# Patient Record
Sex: FEMALE | Race: BLACK OR AFRICAN AMERICAN | NOT HISPANIC OR LATINO | Employment: UNEMPLOYED | URBAN - METROPOLITAN AREA
[De-identification: names, ages, dates, MRNs, and addresses within clinical notes are randomized per-mention and may not be internally consistent; named-entity substitution may affect disease eponyms.]

---

## 2018-06-21 ENCOUNTER — HOSPITAL ENCOUNTER (EMERGENCY)
Age: 63
Discharge: HOME | End: 2018-06-22
Attending: EMERGENCY MEDICINE

## 2018-06-21 VITALS
DIASTOLIC BLOOD PRESSURE: 77 MMHG | SYSTOLIC BLOOD PRESSURE: 129 MMHG | TEMPERATURE: 98.1 F | HEIGHT: 62 IN | BODY MASS INDEX: 53.92 KG/M2 | OXYGEN SATURATION: 99 % | RESPIRATION RATE: 18 BRPM | HEART RATE: 66 BPM | WEIGHT: 293 LBS

## 2018-06-21 DIAGNOSIS — R10.9 ABDOMINAL PAIN, UNSPECIFIED ABDOMINAL LOCATION: Primary | ICD-10-CM

## 2018-06-21 LAB
ANION GAP BLD CALC-SCNC: 13 MMOL/L
APPEARANCE UR: CLEAR
BASOPHILS # BLD AUTO: 0 K/MCL (ref 0–0.3)
BASOPHILS NFR BLD AUTO: 1 %
BILIRUB UR QL STRIP: NEGATIVE
BUN BLD-MCNC: 7 MG/DL (ref 6–20)
CA-I BLD ISE-SCNC: 1.2 MMOL/L (ref 1.15–1.29)
CHLORIDE BLD-SCNC: 107 MMOL/L (ref 98–107)
CO2 BLD-SCNC: 24 MMOL/L (ref 19–24)
COLOR UR: YELLOW
CREAT BLD-MCNC: 1.1 MG/DL (ref 0.51–0.95)
CREAT BLD-MCNC: 63 MG/DL
DIFFERENTIAL METHOD BLD: ABNORMAL
EOSINOPHIL # BLD AUTO: 0.3 K/MCL (ref 0.1–0.5)
EOSINOPHIL NFR SPEC: 4 %
ERYTHROCYTE [DISTWIDTH] IN BLOOD: 19.1 % (ref 11–15)
GLUCOSE BLD-MCNC: 100 MG/DL (ref 65–99)
GLUCOSE UR STRIP-MCNC: NEGATIVE MG/DL
HCT VFR BLD CALC: 39.6 % (ref 36–46.5)
HCT VFR BLD CALC: 41 % (ref 36–46.5)
HGB BLD-MCNC: 11.5 G/DL (ref 12–15.5)
HGB BLD-MCNC: 13.9 G/DL (ref 12–15.5)
HGB UR QL STRIP: NEGATIVE
IMM GRANULOCYTES # BLD AUTO: 0 K/MCL (ref 0–0.2)
IMM GRANULOCYTES NFR BLD: 1 %
KETONES UR STRIP-MCNC: NEGATIVE MG/DL
LEUKOCYTE ESTERASE UR QL STRIP: NEGATIVE
LYMPHOCYTES # BLD MANUAL: 1.9 K/MCL (ref 1–4)
LYMPHOCYTES NFR BLD MANUAL: 28 %
MCH RBC QN AUTO: 21 PG (ref 26–34)
MCHC RBC AUTO-ENTMCNC: 29 G/DL (ref 32–36.5)
MCV RBC AUTO: 72.3 FL (ref 78–100)
MONOCYTES # BLD MANUAL: 0.5 K/MCL (ref 0.3–0.9)
MONOCYTES NFR BLD MANUAL: 8 %
NEUTROPHILS # BLD: 4 K/MCL (ref 1.8–7.7)
NEUTROPHILS NFR BLD AUTO: 58 %
NITRITE UR QL STRIP: NEGATIVE
NRBC BLD MANUAL-RTO: 0 /100 WBC
PH UR STRIP: 7 UNITS (ref 5–7)
PLATELET # BLD: 266 K/MCL (ref 140–450)
POTASSIUM BLD-SCNC: 3.6 MMOL/L (ref 3.4–5.1)
PROT UR STRIP-MCNC: NEGATIVE MG/DL
RBC # BLD: 5.48 MIL/MCL (ref 4–5.2)
SODIUM BLD-SCNC: 140 MMOL/L (ref 135–145)
SP GR UR STRIP: 1.01 (ref 1–1.03)
SPECIMEN SOURCE: NORMAL
TROPONIN I BLD-MCNC: <0.1 NG/ML
UROBILINOGEN UR STRIP-MCNC: 1 MG/DL (ref 0–1)
WBC # BLD: 6.7 K/MCL (ref 4.2–11)

## 2018-06-21 PROCEDURE — 80053 COMPREHEN METABOLIC PANEL: CPT

## 2018-06-21 PROCEDURE — 84484 ASSAY OF TROPONIN QUANT: CPT

## 2018-06-21 PROCEDURE — 36000 PLACE NEEDLE IN VEIN: CPT

## 2018-06-21 PROCEDURE — 99284 EMERGENCY DEPT VISIT MOD MDM: CPT

## 2018-06-21 PROCEDURE — 85025 COMPLETE CBC W/AUTO DIFF WBC: CPT

## 2018-06-21 PROCEDURE — 36415 COLL VENOUS BLD VENIPUNCTURE: CPT

## 2018-06-21 PROCEDURE — 83690 ASSAY OF LIPASE: CPT

## 2018-06-21 PROCEDURE — 10002807 HB RX 258: Performed by: EMERGENCY MEDICINE

## 2018-06-21 PROCEDURE — 80047 BASIC METABLC PNL IONIZED CA: CPT

## 2018-06-21 PROCEDURE — 81003 URINALYSIS AUTO W/O SCOPE: CPT

## 2018-06-21 PROCEDURE — 10002800 HB RX 250 W HCPCS: Performed by: EMERGENCY MEDICINE

## 2018-06-21 RX ADMIN — MORPHINE SULFATE 4 MG: 50 INJECTION, SOLUTION, CONCENTRATE INTRAVENOUS at 23:23

## 2018-06-21 ASSESSMENT — PAIN SCALES - GENERAL
PAINLEVEL_OUTOF10: 7
PAIN_LEVEL_AT_REST: 7
PAINLEVEL_OUTOF10: 10

## 2018-06-21 ASSESSMENT — ENCOUNTER SYMPTOMS
ABDOMINAL PAIN: 1
SHORTNESS OF BREATH: 0
NAUSEA: 0
VOMITING: 0
HEADACHES: 0
COUGH: 0
DIZZINESS: 0
FEVER: 0
DIARRHEA: 0
LIGHT-HEADEDNESS: 0
RHINORRHEA: 0
WEAKNESS: 0
CHILLS: 0
SORE THROAT: 0
DIAPHORESIS: 0

## 2018-06-22 ENCOUNTER — APPOINTMENT (OUTPATIENT)
Dept: CT IMAGING | Age: 63
End: 2018-06-22
Attending: EMERGENCY MEDICINE

## 2018-06-22 LAB
ALBUMIN SERPL-MCNC: 3.3 G/DL (ref 3.6–5.1)
ALBUMIN/GLOB SERPL: 0.8 {RATIO} (ref 1–2.4)
ALP SERPL-CCNC: 73 UNITS/L (ref 45–117)
ALT SERPL-CCNC: 21 UNITS/L
ANION GAP SERPL CALC-SCNC: 13 MMOL/L (ref 10–20)
AST SERPL-CCNC: 15 UNITS/L
BILIRUB SERPL-MCNC: 0.3 MG/DL (ref 0.2–1)
BUN SERPL-MCNC: 8 MG/DL (ref 6–20)
BUN/CREAT SERPL: 8 (ref 7–25)
CALCIUM SERPL-MCNC: 8.8 MG/DL (ref 8.4–10.2)
CHLORIDE SERPL-SCNC: 107 MMOL/L (ref 98–107)
CO2 SERPL-SCNC: 24 MMOL/L (ref 21–32)
CREAT SERPL-MCNC: 1.05 MG/DL (ref 0.51–0.95)
GLOBULIN SER-MCNC: 4.3 G/DL (ref 2–4)
GLUCOSE SERPL-MCNC: 101 MG/DL (ref 65–99)
LIPASE SERPL-CCNC: 167 UNITS/L (ref 73–393)
POTASSIUM SERPL-SCNC: 3.7 MMOL/L (ref 3.4–5.1)
PROT SERPL-MCNC: 7.6 G/DL (ref 6.4–8.2)
SODIUM SERPL-SCNC: 140 MMOL/L (ref 135–145)

## 2018-06-22 PROCEDURE — 74177 CT ABD & PELVIS W/CONTRAST: CPT | Performed by: RADIOLOGY

## 2018-06-22 PROCEDURE — 74177 CT ABD & PELVIS W/CONTRAST: CPT

## 2018-06-22 PROCEDURE — 10002807 HB RX 258: Performed by: EMERGENCY MEDICINE

## 2018-06-22 PROCEDURE — 10002805 HB CONTRAST AGENT: Performed by: EMERGENCY MEDICINE

## 2018-06-22 RX ORDER — HYOSCYAMINE SULFATE 0.125 MG
.125-.25 TABLET ORAL EVERY 6 HOURS PRN
Qty: 20 TABLET | Refills: 0 | Status: SHIPPED | OUTPATIENT
Start: 2018-06-22 | End: 2019-01-29

## 2018-06-22 RX ADMIN — SODIUM CHLORIDE 50 ML: 9 INJECTION, SOLUTION INTRAVENOUS at 01:02

## 2018-06-22 RX ADMIN — IOPAMIDOL 100 ML: 612 INJECTION, SOLUTION INTRAVENOUS at 01:02

## 2018-06-22 ASSESSMENT — PAIN SCALES - GENERAL: PAINLEVEL_OUTOF10: 5

## 2019-01-29 ENCOUNTER — HOSPITAL ENCOUNTER (INPATIENT)
Age: 64
LOS: 11 days | Discharge: HOME | DRG: 872 | End: 2019-02-09
Attending: EMERGENCY MEDICINE

## 2019-01-29 ENCOUNTER — APPOINTMENT (OUTPATIENT)
Dept: CT IMAGING | Age: 64
DRG: 872 | End: 2019-01-29
Attending: INTERNAL MEDICINE

## 2019-01-29 DIAGNOSIS — L03.311 CELLULITIS OF ABDOMINAL WALL: Primary | ICD-10-CM

## 2019-01-29 DIAGNOSIS — A41.9 SEPSIS, DUE TO UNSPECIFIED ORGANISM: ICD-10-CM

## 2019-01-29 LAB
ALBUMIN SERPL-MCNC: 3.2 G/DL (ref 3.6–5.1)
ALBUMIN/GLOB SERPL: 0.7 {RATIO} (ref 1–2.4)
ALP SERPL-CCNC: 95 UNITS/L (ref 45–117)
ALT SERPL-CCNC: 29 UNITS/L
ANION GAP BLD CALC-SCNC: 19 MMOL/L
ANION GAP SERPL CALC-SCNC: 13 MMOL/L (ref 10–20)
APPEARANCE UR: CLEAR
AST SERPL-CCNC: 26 UNITS/L
BACTERIA #/AREA URNS HPF: ABNORMAL /HPF
BASOPHILS # BLD AUTO: 0 K/MCL (ref 0–0.3)
BASOPHILS NFR BLD AUTO: 0 %
BILIRUB SERPL-MCNC: 0.9 MG/DL (ref 0.2–1)
BILIRUB UR QL STRIP: NEGATIVE
BUN BLD-MCNC: 12 MG/DL (ref 6–20)
BUN SERPL-MCNC: 13 MG/DL (ref 6–20)
BUN/CREAT SERPL: 10 (ref 7–25)
CA-I BLD ISE-SCNC: 1.12 MMOL/L (ref 1.15–1.29)
CALCIUM SERPL-MCNC: 8.5 MG/DL (ref 8.4–10.2)
CHLORIDE BLD-SCNC: 102 MMOL/L (ref 98–107)
CHLORIDE SERPL-SCNC: 105 MMOL/L (ref 98–107)
CO2 BLD-SCNC: 21 MMOL/L (ref 19–24)
CO2 SERPL-SCNC: 22 MMOL/L (ref 21–32)
COLOR UR: ABNORMAL
CREAT BLD-MCNC: 1.4 MG/DL (ref 0.51–0.95)
CREAT BLD-MCNC: 47 MG/DL
CREAT SERPL-MCNC: 1.26 MG/DL (ref 0.51–0.95)
DIFFERENTIAL METHOD BLD: ABNORMAL
EOSINOPHIL # BLD AUTO: 0 K/MCL (ref 0.1–0.5)
EOSINOPHIL NFR SPEC: 0 %
ERYTHROCYTE [DISTWIDTH] IN BLOOD: 18.4 % (ref 11–15)
GLOBULIN SER-MCNC: 4.9 G/DL (ref 2–4)
GLUCOSE BLD-MCNC: 169 MG/DL (ref 65–99)
GLUCOSE SERPL-MCNC: 161 MG/DL (ref 65–99)
GLUCOSE UR STRIP-MCNC: NEGATIVE MG/DL
HCT VFR BLD CALC: 39.8 % (ref 36–46.5)
HCT VFR BLD CALC: 44 % (ref 36–46.5)
HGB BLD-MCNC: 11.8 G/DL (ref 12–15.5)
HGB BLD-MCNC: 15 G/DL (ref 12–15.5)
HGB UR QL STRIP: NEGATIVE
HYALINE CASTS #/AREA URNS LPF: ABNORMAL /LPF (ref 0–5)
IMM GRANULOCYTES # BLD AUTO: 0.1 K/MCL (ref 0–0.2)
IMM GRANULOCYTES NFR BLD: 1 %
KETONES UR STRIP-MCNC: NEGATIVE MG/DL
LACTATE BLD-MCNC: 1.3 MMOL/L
LEUKOCYTE ESTERASE UR QL STRIP: NEGATIVE
LYMPHOCYTES # BLD MANUAL: 1.5 K/MCL (ref 1–4)
LYMPHOCYTES NFR BLD MANUAL: 10 %
MCH RBC QN AUTO: 21.3 PG (ref 26–34)
MCHC RBC AUTO-ENTMCNC: 29.6 G/DL (ref 32–36.5)
MCV RBC AUTO: 71.8 FL (ref 78–100)
MONOCYTES # BLD MANUAL: 1.5 K/MCL (ref 0.3–0.9)
MONOCYTES NFR BLD MANUAL: 10 %
NEUTROPHILS # BLD: 11.4 K/MCL (ref 1.8–7.7)
NEUTROPHILS NFR BLD AUTO: 79 %
NITRITE UR QL STRIP: NEGATIVE
NRBC BLD MANUAL-RTO: 0 /100 WBC
PH UR STRIP: 6.5 UNITS (ref 5–7)
PLATELET # BLD: 300 K/MCL (ref 140–450)
POTASSIUM BLD-SCNC: 3.2 MMOL/L (ref 3.4–5.1)
POTASSIUM SERPL-SCNC: 3.4 MMOL/L (ref 3.4–5.1)
PROT SERPL-MCNC: 8.1 G/DL (ref 6.4–8.2)
PROT UR STRIP-MCNC: 100 MG/DL
RBC # BLD: 5.54 MIL/MCL (ref 4–5.2)
RBC #/AREA URNS HPF: ABNORMAL /HPF (ref 0–3)
SODIUM BLD-SCNC: 138 MMOL/L (ref 135–145)
SODIUM SERPL-SCNC: 137 MMOL/L (ref 135–145)
SP GR UR STRIP: 1.03 (ref 1–1.03)
SPECIMEN SOURCE: ABNORMAL
SQUAMOUS #/AREA URNS HPF: ABNORMAL /HPF (ref 0–5)
UROBILINOGEN UR STRIP-MCNC: 2 MG/DL (ref 0–1)
WBC # BLD: 14.5 K/MCL (ref 4.2–11)
WBC #/AREA URNS HPF: ABNORMAL /HPF (ref 0–5)

## 2019-01-29 PROCEDURE — 36415 COLL VENOUS BLD VENIPUNCTURE: CPT

## 2019-01-29 PROCEDURE — 10002801 HB RX 250 W/O HCPCS: Performed by: PHYSICIAN ASSISTANT

## 2019-01-29 PROCEDURE — 87040 BLOOD CULTURE FOR BACTERIA: CPT

## 2019-01-29 PROCEDURE — 96361 HYDRATE IV INFUSION ADD-ON: CPT

## 2019-01-29 PROCEDURE — 81001 URINALYSIS AUTO W/SCOPE: CPT

## 2019-01-29 PROCEDURE — 74177 CT ABD & PELVIS W/CONTRAST: CPT

## 2019-01-29 PROCEDURE — 96360 HYDRATION IV INFUSION INIT: CPT

## 2019-01-29 PROCEDURE — 96374 THER/PROPH/DIAG INJ IV PUSH: CPT

## 2019-01-29 PROCEDURE — 10002800 HB RX 250 W HCPCS: Performed by: PHYSICIAN ASSISTANT

## 2019-01-29 PROCEDURE — 99223 1ST HOSP IP/OBS HIGH 75: CPT | Performed by: HOSPITALIST

## 2019-01-29 PROCEDURE — 96365 THER/PROPH/DIAG IV INF INIT: CPT

## 2019-01-29 PROCEDURE — 10000002 HB ROOM CHARGE MED SURG

## 2019-01-29 PROCEDURE — 10004651 HB RX, NO CHARGE ITEM: Performed by: PHYSICIAN ASSISTANT

## 2019-01-29 PROCEDURE — 80047 BASIC METABLC PNL IONIZED CA: CPT

## 2019-01-29 PROCEDURE — 10002807 HB RX 258: Performed by: PHYSICIAN ASSISTANT

## 2019-01-29 PROCEDURE — 74177 CT ABD & PELVIS W/CONTRAST: CPT | Performed by: RADIOLOGY

## 2019-01-29 PROCEDURE — 83605 ASSAY OF LACTIC ACID: CPT

## 2019-01-29 PROCEDURE — 10002805 HB CONTRAST AGENT: Performed by: EMERGENCY MEDICINE

## 2019-01-29 PROCEDURE — 80053 COMPREHEN METABOLIC PANEL: CPT

## 2019-01-29 PROCEDURE — 85025 COMPLETE CBC W/AUTO DIFF WBC: CPT

## 2019-01-29 PROCEDURE — 99284 EMERGENCY DEPT VISIT MOD MDM: CPT

## 2019-01-29 PROCEDURE — 10002800 HB RX 250 W HCPCS: Performed by: HOSPITALIST

## 2019-01-29 PROCEDURE — 10002807 HB RX 258: Performed by: EMERGENCY MEDICINE

## 2019-01-29 RX ORDER — MORPHINE SULFATE 15 MG/1
15 TABLET ORAL EVERY 4 HOURS PRN
Status: DISCONTINUED | OUTPATIENT
Start: 2019-01-29 | End: 2019-02-05 | Stop reason: ALTCHOICE

## 2019-01-29 RX ORDER — FUROSEMIDE 40 MG/1
40 TABLET ORAL DAILY
COMMUNITY

## 2019-01-29 RX ORDER — ENOXAPARIN SODIUM 100 MG/ML
40 INJECTION SUBCUTANEOUS EVERY 12 HOURS
Status: DISCONTINUED | OUTPATIENT
Start: 2019-01-30 | End: 2019-01-30

## 2019-01-29 RX ORDER — POTASSIUM CHLORIDE 750 MG/1
10 TABLET, EXTENDED RELEASE ORAL DAILY
COMMUNITY

## 2019-01-29 RX ORDER — OMEPRAZOLE 40 MG/1
40 CAPSULE, DELAYED RELEASE ORAL NIGHTLY
COMMUNITY

## 2019-01-29 RX ORDER — DEXTROSE MONOHYDRATE 25 G/50ML
25 INJECTION, SOLUTION INTRAVENOUS PRN
Status: DISCONTINUED | OUTPATIENT
Start: 2019-01-29 | End: 2019-01-30 | Stop reason: SDUPTHER

## 2019-01-29 RX ORDER — 0.9 % SODIUM CHLORIDE 0.9 %
2 VIAL (ML) INJECTION EVERY 12 HOURS SCHEDULED
Status: DISCONTINUED | OUTPATIENT
Start: 2019-01-30 | End: 2019-02-09 | Stop reason: HOSPADM

## 2019-01-29 RX ORDER — HEPARIN SODIUM 5000 [USP'U]/ML
5000 INJECTION, SOLUTION INTRAVENOUS; SUBCUTANEOUS EVERY 8 HOURS SCHEDULED
Status: DISCONTINUED | OUTPATIENT
Start: 2019-01-30 | End: 2019-01-29

## 2019-01-29 RX ORDER — METFORMIN HYDROCHLORIDE 500 MG/1
1000 TABLET, EXTENDED RELEASE ORAL
COMMUNITY

## 2019-01-29 RX ORDER — METFORMIN HYDROCHLORIDE 750 MG/1
750 TABLET, EXTENDED RELEASE ORAL
COMMUNITY
End: 2019-01-29 | Stop reason: DRUGHIGH

## 2019-01-29 RX ORDER — TIZANIDINE 4 MG/1
4 TABLET ORAL EVERY 6 HOURS PRN
COMMUNITY

## 2019-01-29 RX ORDER — ERGOCALCIFEROL 1.25 MG/1
50000 CAPSULE ORAL
COMMUNITY

## 2019-01-29 RX ORDER — BUDESONIDE AND FORMOTEROL FUMARATE DIHYDRATE 160; 4.5 UG/1; UG/1
2 AEROSOL RESPIRATORY (INHALATION) 2 TIMES DAILY
COMMUNITY

## 2019-01-29 RX ORDER — SODIUM CHLORIDE, SODIUM LACTATE, POTASSIUM CHLORIDE, CALCIUM CHLORIDE 600; 310; 30; 20 MG/100ML; MG/100ML; MG/100ML; MG/100ML
INJECTION, SOLUTION INTRAVENOUS CONTINUOUS
Status: DISCONTINUED | OUTPATIENT
Start: 2019-01-29 | End: 2019-02-01

## 2019-01-29 RX ORDER — 0.9 % SODIUM CHLORIDE 0.9 %
2 VIAL (ML) INJECTION PRN
Status: DISCONTINUED | OUTPATIENT
Start: 2019-01-29 | End: 2019-02-09 | Stop reason: HOSPADM

## 2019-01-29 RX ORDER — DEXTROSE MONOHYDRATE 50 MG/ML
INJECTION, SOLUTION INTRAVENOUS CONTINUOUS PRN
Status: DISCONTINUED | OUTPATIENT
Start: 2019-01-29 | End: 2019-01-30 | Stop reason: SDUPTHER

## 2019-01-29 RX ORDER — ACETAMINOPHEN 500 MG
1000 TABLET ORAL ONCE
Status: COMPLETED | OUTPATIENT
Start: 2019-01-29 | End: 2019-01-29

## 2019-01-29 RX ORDER — NICOTINE POLACRILEX 4 MG
15 LOZENGE BUCCAL PRN
Status: DISCONTINUED | OUTPATIENT
Start: 2019-01-29 | End: 2019-01-30 | Stop reason: SDUPTHER

## 2019-01-29 RX ADMIN — SODIUM CHLORIDE 1000 ML: 9 INJECTION, SOLUTION INTRAVENOUS at 23:43

## 2019-01-29 RX ADMIN — SODIUM CHLORIDE 50 ML: 9 INJECTION, SOLUTION INTRAVENOUS at 19:07

## 2019-01-29 RX ADMIN — SODIUM CHLORIDE 1000 ML: 9 INJECTION, SOLUTION INTRAVENOUS at 22:32

## 2019-01-29 RX ADMIN — CEFEPIME HYDROCHLORIDE 2000 MG: 2 INJECTION, POWDER, FOR SOLUTION INTRAVENOUS at 21:20

## 2019-01-29 RX ADMIN — ACETAMINOPHEN 1000 MG: 500 TABLET ORAL at 18:37

## 2019-01-29 RX ADMIN — SODIUM CHLORIDE 1000 ML: 9 INJECTION, SOLUTION INTRAVENOUS at 18:37

## 2019-01-29 RX ADMIN — SODIUM CHLORIDE 1000 ML: 9 INJECTION, SOLUTION INTRAVENOUS at 21:24

## 2019-01-29 RX ADMIN — MORPHINE SULFATE 2 MG: 4 INJECTION INTRAVENOUS at 21:20

## 2019-01-29 RX ADMIN — IOPAMIDOL 100 ML: 612 INJECTION, SOLUTION INTRAVENOUS at 19:07

## 2019-01-29 RX ADMIN — VANCOMYCIN HYDROCHLORIDE 1500 MG: 10 INJECTION, POWDER, LYOPHILIZED, FOR SOLUTION INTRAVENOUS at 22:35

## 2019-01-29 ASSESSMENT — COGNITIVE AND FUNCTIONAL STATUS - GENERAL
ARE YOU DEAF OR DO YOU HAVE SERIOUS DIFFICULTY  HEARING: NO
BECAUSE OF A PHYSICAL, MENTAL, OR EMOTIONAL CONDITION, DO YOU HAVE DIFFICULTY DOING ERRANDS ALONE: NO
ARE YOU BLIND OR DO YOU HAVE SERIOUS DIFFICULTY SEEING, EVEN WHEN WEARING GLASSES: NO
BECAUSE OF A PHYSICAL, MENTAL, OR EMOTIONAL CONDITION, DO YOU HAVE SERIOUS DIFFICULTY CONCENTRATING, REMEMBERING OR MAKING DECISIONS: NO
DO YOU HAVE DIFFICULTY DRESSING OR BATHING: YES
DO YOU HAVE SERIOUS DIFFICULTY WALKING OR CLIMBING STAIRS: YES

## 2019-01-29 ASSESSMENT — ACTIVITIES OF DAILY LIVING (ADL)
CHRONIC_PAIN_PRESENT: YES, CHRONIC
RECENT_DECLINE_ADL: NO
MOBILITY_ASSIST_DEVICES: CANE;STANDARD WALKER
ADL_BEFORE_ADMISSION: INDEPENDENT
DESCRIBE HOW PAIN IMPACTS YOUR LIFE: MOBILITY
ADL_SHORT_OF_BREATH: YES
ADL_SCORE: 12

## 2019-01-29 ASSESSMENT — ENCOUNTER SYMPTOMS
DIARRHEA: 0
LIGHT-HEADEDNESS: 0
ABDOMINAL PAIN: 0
HEADACHES: 0
BACK PAIN: 0
WHEEZING: 0
RHINORRHEA: 0
FEVER: 1
SORE THROAT: 0
WEAKNESS: 0
VOMITING: 0
NAUSEA: 0
SHORTNESS OF BREATH: 0
CHILLS: 0
DIZZINESS: 0

## 2019-01-29 ASSESSMENT — PAIN SCALES - GENERAL
PAINLEVEL_OUTOF10: 10
PAINLEVEL_OUTOF10: 10

## 2019-01-29 ASSESSMENT — LIFESTYLE VARIABLES
ALCOHOL_USE_STATUS: NO OR LOW RISK WITH VALIDATED TOOL
HOW OFTEN DO YOU HAVE 6 OR MORE DRINKS ON ONE OCCASION: NEVER
HOW MANY STANDARD DRINKS CONTAINING ALCOHOL DO YOU HAVE ON A TYPICAL DAY: 0,1 OR 2
AUDIT-C TOTAL SCORE: 0
HOW OFTEN DO YOU HAVE A DRINK CONTAINING ALCOHOL: NEVER

## 2019-01-30 LAB
ALBUMIN SERPL-MCNC: 2.5 G/DL (ref 3.6–5.1)
ALBUMIN/GLOB SERPL: 0.6 {RATIO} (ref 1–2.4)
ALP SERPL-CCNC: 85 UNITS/L (ref 45–117)
ALT SERPL-CCNC: 23 UNITS/L
ANION GAP SERPL CALC-SCNC: 13 MMOL/L (ref 10–20)
AST SERPL-CCNC: 19 UNITS/L
BASOPHILS # BLD AUTO: 0.1 K/MCL (ref 0–0.3)
BASOPHILS NFR BLD AUTO: 0 %
BILIRUB SERPL-MCNC: 1.7 MG/DL (ref 0.2–1)
BUN SERPL-MCNC: 9 MG/DL (ref 6–20)
BUN/CREAT SERPL: 8 (ref 7–25)
CALCIUM SERPL-MCNC: 7.3 MG/DL (ref 8.4–10.2)
CHLORIDE SERPL-SCNC: 110 MMOL/L (ref 98–107)
CO2 SERPL-SCNC: 18 MMOL/L (ref 21–32)
CREAT SERPL-MCNC: 1.13 MG/DL (ref 0.51–0.95)
CRP SERPL-MCNC: 24 MG/DL
DIFFERENTIAL METHOD BLD: ABNORMAL
EOSINOPHIL # BLD AUTO: 0 K/MCL (ref 0.1–0.5)
EOSINOPHIL NFR SPEC: 0 %
ERYTHROCYTE [DISTWIDTH] IN BLOOD: 17.4 % (ref 11–15)
ERYTHROCYTE [SEDIMENTATION RATE] IN BLOOD: 42 MM/HR (ref 0–20)
GLOBULIN SER-MCNC: 4 G/DL (ref 2–4)
GLUCOSE BLDC GLUCOMTR-MCNC: 156 MG/DL (ref 65–99)
GLUCOSE BLDC GLUCOMTR-MCNC: 189 MG/DL (ref 65–99)
GLUCOSE BLDC GLUCOMTR-MCNC: 199 MG/DL (ref 65–99)
GLUCOSE SERPL-MCNC: 224 MG/DL (ref 65–99)
HCT VFR BLD CALC: 35.9 % (ref 36–46.5)
HGB BLD-MCNC: 10.4 G/DL (ref 12–15.5)
IMM GRANULOCYTES # BLD AUTO: 0.2 K/MCL (ref 0–0.2)
IMM GRANULOCYTES NFR BLD: 1 %
LACTATE SERPL-SCNC: 1.6 MMOL/L (ref 0–2)
LYMPHOCYTES # BLD MANUAL: 1.5 K/MCL (ref 1–4)
LYMPHOCYTES NFR BLD MANUAL: 9 %
MAGNESIUM SERPL-MCNC: 1.6 MG/DL (ref 1.7–2.4)
MCH RBC QN AUTO: 20.7 PG (ref 26–34)
MCHC RBC AUTO-ENTMCNC: 29 G/DL (ref 32–36.5)
MCV RBC AUTO: 71.4 FL (ref 78–100)
MONOCYTES # BLD MANUAL: 2 K/MCL (ref 0.3–0.9)
MONOCYTES NFR BLD MANUAL: 12 %
NEUTROPHILS # BLD: 12.4 K/MCL (ref 1.8–7.7)
NEUTROPHILS NFR BLD AUTO: 78 %
NRBC BLD MANUAL-RTO: 0 /100 WBC
PLATELET # BLD: 246 K/MCL (ref 140–450)
POTASSIUM SERPL-SCNC: 3.1 MMOL/L (ref 3.4–5.1)
POTASSIUM SERPL-SCNC: 3.9 MMOL/L (ref 3.4–5.1)
PROCALCITONIN SERPL-MCNC: 1.72 NG/ML
PROT SERPL-MCNC: 6.5 G/DL (ref 6.4–8.2)
RBC # BLD: 5.03 MIL/MCL (ref 4–5.2)
SODIUM SERPL-SCNC: 138 MMOL/L (ref 135–145)
WBC # BLD: 16.1 K/MCL (ref 4.2–11)

## 2019-01-30 PROCEDURE — 82962 GLUCOSE BLOOD TEST: CPT

## 2019-01-30 PROCEDURE — 36415 COLL VENOUS BLD VENIPUNCTURE: CPT

## 2019-01-30 PROCEDURE — 10002807 HB RX 258: Performed by: INTERNAL MEDICINE

## 2019-01-30 PROCEDURE — 85025 COMPLETE CBC W/AUTO DIFF WBC: CPT

## 2019-01-30 PROCEDURE — 94640 AIRWAY INHALATION TREATMENT: CPT

## 2019-01-30 PROCEDURE — 10002801 HB RX 250 W/O HCPCS: Performed by: HOSPITALIST

## 2019-01-30 PROCEDURE — 10000002 HB ROOM CHARGE MED SURG

## 2019-01-30 PROCEDURE — 10002800 HB RX 250 W HCPCS: Performed by: INTERNAL MEDICINE

## 2019-01-30 PROCEDURE — 80053 COMPREHEN METABOLIC PANEL: CPT

## 2019-01-30 PROCEDURE — 10002803 HB RX 637: Performed by: INTERNAL MEDICINE

## 2019-01-30 PROCEDURE — 83735 ASSAY OF MAGNESIUM: CPT

## 2019-01-30 PROCEDURE — 10004325 HB COUNTER ASSESSMENT EA 15 MIN

## 2019-01-30 PROCEDURE — 10002807 HB RX 258: Performed by: PHYSICIAN ASSISTANT

## 2019-01-30 PROCEDURE — 10002800 HB RX 250 W HCPCS: Performed by: HOSPITALIST

## 2019-01-30 PROCEDURE — 86140 C-REACTIVE PROTEIN: CPT

## 2019-01-30 PROCEDURE — 10002801 HB RX 250 W/O HCPCS: Performed by: INTERNAL MEDICINE

## 2019-01-30 PROCEDURE — 83605 ASSAY OF LACTIC ACID: CPT

## 2019-01-30 PROCEDURE — 10004651 HB RX, NO CHARGE ITEM: Performed by: INTERNAL MEDICINE

## 2019-01-30 PROCEDURE — 85652 RBC SED RATE AUTOMATED: CPT

## 2019-01-30 PROCEDURE — 10002807 HB RX 258: Performed by: HOSPITALIST

## 2019-01-30 PROCEDURE — 10002803 HB RX 637: Performed by: HOSPITALIST

## 2019-01-30 PROCEDURE — 84145 PROCALCITONIN (PCT): CPT

## 2019-01-30 PROCEDURE — 10004651 HB RX, NO CHARGE ITEM: Performed by: HOSPITALIST

## 2019-01-30 PROCEDURE — 84132 ASSAY OF SERUM POTASSIUM: CPT

## 2019-01-30 RX ORDER — POTASSIUM CHLORIDE 14.9 G/1000ML
40 INJECTION, SOLUTION INTRAVENOUS EVERY 4 HOURS PRN
Status: DISCONTINUED | OUTPATIENT
Start: 2019-01-30 | End: 2019-02-09 | Stop reason: HOSPADM

## 2019-01-30 RX ORDER — ACETAMINOPHEN 325 MG/1
650 TABLET ORAL EVERY 4 HOURS PRN
Status: DISCONTINUED | OUTPATIENT
Start: 2019-01-30 | End: 2019-02-09 | Stop reason: HOSPADM

## 2019-01-30 RX ORDER — POTASSIUM CHLORIDE 1.5 G/1.58G
20 POWDER, FOR SOLUTION ORAL EVERY 4 HOURS PRN
Status: DISCONTINUED | OUTPATIENT
Start: 2019-01-30 | End: 2019-02-09 | Stop reason: HOSPADM

## 2019-01-30 RX ORDER — SODIUM CHLORIDE 9 MG/ML
INJECTION, SOLUTION INTRAVENOUS
Status: DISPENSED
Start: 2019-01-30 | End: 2019-01-30

## 2019-01-30 RX ORDER — ERGOCALCIFEROL 1.25 MG/1
50000 CAPSULE ORAL
Status: DISCONTINUED | OUTPATIENT
Start: 2019-02-03 | End: 2019-02-09 | Stop reason: HOSPADM

## 2019-01-30 RX ORDER — PANTOPRAZOLE SODIUM 40 MG/1
40 TABLET, DELAYED RELEASE ORAL
Status: DISCONTINUED | OUTPATIENT
Start: 2019-01-30 | End: 2019-02-09 | Stop reason: HOSPADM

## 2019-01-30 RX ORDER — ALBUTEROL SULFATE 90 UG/1
2 AEROSOL, METERED RESPIRATORY (INHALATION)
Status: DISCONTINUED | OUTPATIENT
Start: 2019-01-30 | End: 2019-02-09 | Stop reason: HOSPADM

## 2019-01-30 RX ORDER — POTASSIUM CHLORIDE 20 MEQ/1
20 TABLET, EXTENDED RELEASE ORAL EVERY 4 HOURS PRN
Status: DISCONTINUED | OUTPATIENT
Start: 2019-01-30 | End: 2019-02-09 | Stop reason: HOSPADM

## 2019-01-30 RX ORDER — ACETAZOLAMIDE 250 MG/1
250 TABLET ORAL 2 TIMES DAILY
Status: DISCONTINUED | OUTPATIENT
Start: 2019-01-30 | End: 2019-01-31

## 2019-01-30 RX ORDER — DEXTROSE MONOHYDRATE 50 MG/ML
INJECTION, SOLUTION INTRAVENOUS CONTINUOUS PRN
Status: DISCONTINUED | OUTPATIENT
Start: 2019-01-30 | End: 2019-02-09 | Stop reason: HOSPADM

## 2019-01-30 RX ORDER — ENOXAPARIN SODIUM 100 MG/ML
40 INJECTION SUBCUTANEOUS EVERY 12 HOURS
Status: DISCONTINUED | OUTPATIENT
Start: 2019-01-30 | End: 2019-02-09 | Stop reason: HOSPADM

## 2019-01-30 RX ORDER — INSULIN GLARGINE 100 [IU]/ML
10 INJECTION, SOLUTION SUBCUTANEOUS DAILY
Status: DISCONTINUED | OUTPATIENT
Start: 2019-01-30 | End: 2019-02-01

## 2019-01-30 RX ORDER — TIZANIDINE 4 MG/1
4 TABLET ORAL EVERY 6 HOURS PRN
Status: DISCONTINUED | OUTPATIENT
Start: 2019-01-30 | End: 2019-02-09 | Stop reason: HOSPADM

## 2019-01-30 RX ORDER — MONTELUKAST SODIUM 10 MG/1
10 TABLET ORAL NIGHTLY
Status: DISCONTINUED | OUTPATIENT
Start: 2019-01-30 | End: 2019-02-09 | Stop reason: HOSPADM

## 2019-01-30 RX ORDER — NICOTINE POLACRILEX 4 MG
15 LOZENGE BUCCAL PRN
Status: DISCONTINUED | OUTPATIENT
Start: 2019-01-30 | End: 2019-02-09 | Stop reason: HOSPADM

## 2019-01-30 RX ORDER — SIMVASTATIN 40 MG
40 TABLET ORAL NIGHTLY
Status: DISCONTINUED | OUTPATIENT
Start: 2019-01-30 | End: 2019-01-31 | Stop reason: ALTCHOICE

## 2019-01-30 RX ORDER — POTASSIUM CHLORIDE 1.5 G/1.58G
40 POWDER, FOR SOLUTION ORAL EVERY 4 HOURS PRN
Status: DISCONTINUED | OUTPATIENT
Start: 2019-01-30 | End: 2019-02-09 | Stop reason: HOSPADM

## 2019-01-30 RX ORDER — EZETIMIBE 10 MG/1
10 TABLET ORAL DAILY
Status: DISCONTINUED | OUTPATIENT
Start: 2019-01-30 | End: 2019-02-09 | Stop reason: HOSPADM

## 2019-01-30 RX ORDER — FLUTICASONE PROPIONATE AND SALMETEROL 250; 50 UG/1; UG/1
1 POWDER RESPIRATORY (INHALATION)
Status: DISCONTINUED | OUTPATIENT
Start: 2019-01-30 | End: 2019-02-09 | Stop reason: HOSPADM

## 2019-01-30 RX ORDER — CLINDAMYCIN PHOSPHATE 900 MG/50ML
900 INJECTION INTRAVENOUS EVERY 8 HOURS SCHEDULED
Status: DISCONTINUED | OUTPATIENT
Start: 2019-01-30 | End: 2019-02-08

## 2019-01-30 RX ORDER — DEXTROSE MONOHYDRATE 25 G/50ML
25 INJECTION, SOLUTION INTRAVENOUS PRN
Status: DISCONTINUED | OUTPATIENT
Start: 2019-01-30 | End: 2019-02-09 | Stop reason: HOSPADM

## 2019-01-30 RX ORDER — POTASSIUM CHLORIDE 20 MEQ/1
40 TABLET, EXTENDED RELEASE ORAL EVERY 4 HOURS PRN
Status: DISCONTINUED | OUTPATIENT
Start: 2019-01-30 | End: 2019-02-09 | Stop reason: HOSPADM

## 2019-01-30 RX ORDER — MAGNESIUM SULFATE 1 G/100ML
1 INJECTION INTRAVENOUS DAILY PRN
Status: DISCONTINUED | OUTPATIENT
Start: 2019-01-30 | End: 2019-02-09 | Stop reason: HOSPADM

## 2019-01-30 RX ORDER — LORATADINE 10 MG/1
10 TABLET ORAL
Status: DISCONTINUED | OUTPATIENT
Start: 2019-01-30 | End: 2019-02-09 | Stop reason: HOSPADM

## 2019-01-30 RX ORDER — POTASSIUM CHLORIDE 14.9 MG/ML
20 INJECTION INTRAVENOUS EVERY 4 HOURS PRN
Status: DISCONTINUED | OUTPATIENT
Start: 2019-01-30 | End: 2019-02-09 | Stop reason: HOSPADM

## 2019-01-30 RX ADMIN — MUPIROCIN: 20 OINTMENT TOPICAL at 20:52

## 2019-01-30 RX ADMIN — PANTOPRAZOLE SODIUM 40 MG: 40 TABLET, DELAYED RELEASE ORAL at 17:15

## 2019-01-30 RX ADMIN — MORPHINE SULFATE 15 MG: 15 TABLET ORAL at 20:24

## 2019-01-30 RX ADMIN — MUPIROCIN: 20 OINTMENT TOPICAL at 09:18

## 2019-01-30 RX ADMIN — VANCOMYCIN HYDROCHLORIDE 1500 MG: 10 INJECTION, POWDER, LYOPHILIZED, FOR SOLUTION INTRAVENOUS at 05:15

## 2019-01-30 RX ADMIN — MORPHINE SULFATE 15 MG: 15 TABLET ORAL at 00:09

## 2019-01-30 RX ADMIN — LORATADINE 10 MG: 10 TABLET ORAL at 20:21

## 2019-01-30 RX ADMIN — MORPHINE SULFATE 15 MG: 15 TABLET ORAL at 09:17

## 2019-01-30 RX ADMIN — CEFEPIME 2000 MG: 2 INJECTION, POWDER, FOR SOLUTION INTRAVENOUS at 20:26

## 2019-01-30 RX ADMIN — ASPIRIN 81 MG: 81 TABLET, COATED ORAL at 20:21

## 2019-01-30 RX ADMIN — CEFEPIME HYDROCHLORIDE 1000 MG: 1 INJECTION, POWDER, FOR SOLUTION INTRAMUSCULAR; INTRAVENOUS at 08:54

## 2019-01-30 RX ADMIN — MORPHINE SULFATE 15 MG: 15 TABLET ORAL at 05:15

## 2019-01-30 RX ADMIN — CLINDAMYCIN IN 5 PERCENT DEXTROSE 900 MG: 18 INJECTION, SOLUTION INTRAVENOUS at 14:45

## 2019-01-30 RX ADMIN — SIMVASTATIN 40 MG: 40 TABLET, FILM COATED ORAL at 20:21

## 2019-01-30 RX ADMIN — ACETAMINOPHEN 650 MG: 325 TABLET ORAL at 06:28

## 2019-01-30 RX ADMIN — MAGNESIUM SULFATE HEPTAHYDRATE 1 G: 1 INJECTION, SOLUTION INTRAVENOUS at 15:40

## 2019-01-30 RX ADMIN — ALBUTEROL SULFATE 2 PUFF: 90 AEROSOL, METERED RESPIRATORY (INHALATION) at 15:50

## 2019-01-30 RX ADMIN — SODIUM CHLORIDE, POTASSIUM CHLORIDE, SODIUM LACTATE AND CALCIUM CHLORIDE: 600; 310; 30; 20 INJECTION, SOLUTION INTRAVENOUS at 14:23

## 2019-01-30 RX ADMIN — CLINDAMYCIN IN 5 PERCENT DEXTROSE 900 MG: 18 INJECTION, SOLUTION INTRAVENOUS at 05:15

## 2019-01-30 RX ADMIN — ACETAZOLAMIDE 250 MG: 250 TABLET ORAL at 20:21

## 2019-01-30 RX ADMIN — MUPIROCIN: 20 OINTMENT TOPICAL at 14:25

## 2019-01-30 RX ADMIN — FLUTICASONE PROPIONATE AND SALMETEROL 1 PUFF: 50; 250 POWDER RESPIRATORY (INHALATION) at 17:35

## 2019-01-30 RX ADMIN — SODIUM CHLORIDE 1000 ML: 9 INJECTION, SOLUTION INTRAVENOUS at 00:45

## 2019-01-30 RX ADMIN — POTASSIUM CHLORIDE 40 MEQ: 1500 TABLET, EXTENDED RELEASE ORAL at 14:24

## 2019-01-30 RX ADMIN — MORPHINE SULFATE 15 MG: 15 TABLET ORAL at 14:39

## 2019-01-30 RX ADMIN — ACETAMINOPHEN 650 MG: 325 TABLET ORAL at 01:17

## 2019-01-30 RX ADMIN — SODIUM CHLORIDE 250 ML: 9 INJECTION, SOLUTION INTRAVENOUS at 00:53

## 2019-01-30 RX ADMIN — ACETAMINOPHEN 650 MG: 325 TABLET ORAL at 20:40

## 2019-01-30 RX ADMIN — EZETIMIBE 10 MG: 10 TABLET ORAL at 20:21

## 2019-01-30 RX ADMIN — CLINDAMYCIN IN 5 PERCENT DEXTROSE 900 MG: 18 INJECTION, SOLUTION INTRAVENOUS at 01:17

## 2019-01-30 RX ADMIN — MONTELUKAST 10 MG: 10 TABLET, FILM COATED ORAL at 20:21

## 2019-01-30 RX ADMIN — SODIUM CHLORIDE, POTASSIUM CHLORIDE, SODIUM LACTATE AND CALCIUM CHLORIDE: 600; 310; 30; 20 INJECTION, SOLUTION INTRAVENOUS at 01:12

## 2019-01-30 ASSESSMENT — PAIN SCALES - GENERAL
PAIN_LEVEL_AT_REST: 10
PAIN_LEVEL_AT_REST: 10
PAIN_LEVEL_AT_REST: 8
PAIN_LEVEL_AT_REST: 10
PAIN_LEVEL_AT_REST: 10

## 2019-01-31 LAB
ASO AB TITR SER LA: <100 UNITS/ML
BASOPHILS # BLD AUTO: 0 K/MCL (ref 0–0.3)
BASOPHILS NFR BLD AUTO: 0 %
DIFFERENTIAL METHOD BLD: ABNORMAL
EOSINOPHIL # BLD AUTO: 0.2 K/MCL (ref 0.1–0.5)
EOSINOPHIL NFR SPEC: 2 %
ERYTHROCYTE [DISTWIDTH] IN BLOOD: 18.2 % (ref 11–15)
GLUCOSE BLDC GLUCOMTR-MCNC: 155 MG/DL (ref 65–99)
GLUCOSE BLDC GLUCOMTR-MCNC: 205 MG/DL (ref 65–99)
GLUCOSE BLDC GLUCOMTR-MCNC: 212 MG/DL (ref 65–99)
GLUCOSE BLDC GLUCOMTR-MCNC: 225 MG/DL (ref 65–99)
HCT VFR BLD CALC: 35.2 % (ref 36–46.5)
HGB BLD-MCNC: 10.2 G/DL (ref 12–15.5)
IMM GRANULOCYTES # BLD AUTO: 0.4 K/MCL (ref 0–0.2)
IMM GRANULOCYTES NFR BLD: 3 %
LYMPHOCYTES # BLD MANUAL: 1.5 K/MCL (ref 1–4)
LYMPHOCYTES NFR BLD MANUAL: 10 %
MCH RBC QN AUTO: 20.8 PG (ref 26–34)
MCHC RBC AUTO-ENTMCNC: 29 G/DL (ref 32–36.5)
MCV RBC AUTO: 71.8 FL (ref 78–100)
MONOCYTES # BLD MANUAL: 1.2 K/MCL (ref 0.3–0.9)
MONOCYTES NFR BLD MANUAL: 8 %
NEUTROPHILS # BLD: 12.2 K/MCL (ref 1.8–7.7)
NEUTROPHILS NFR BLD AUTO: 77 %
NRBC BLD MANUAL-RTO: 0 /100 WBC
PLATELET # BLD: 265 K/MCL (ref 140–450)
RBC # BLD: 4.9 MIL/MCL (ref 4–5.2)
WBC # BLD: 15.6 K/MCL (ref 4.2–11)

## 2019-01-31 PROCEDURE — 94640 AIRWAY INHALATION TREATMENT: CPT

## 2019-01-31 PROCEDURE — 10004651 HB RX, NO CHARGE ITEM: Performed by: HOSPITALIST

## 2019-01-31 PROCEDURE — 86060 ANTISTREPTOLYSIN O TITER: CPT

## 2019-01-31 PROCEDURE — 85025 COMPLETE CBC W/AUTO DIFF WBC: CPT

## 2019-01-31 PROCEDURE — 10002803 HB RX 637: Performed by: INTERNAL MEDICINE

## 2019-01-31 PROCEDURE — 36415 COLL VENOUS BLD VENIPUNCTURE: CPT

## 2019-01-31 PROCEDURE — 10002801 HB RX 250 W/O HCPCS: Performed by: INTERNAL MEDICINE

## 2019-01-31 PROCEDURE — 10000002 HB ROOM CHARGE MED SURG

## 2019-01-31 PROCEDURE — 10002807 HB RX 258: Performed by: HOSPITALIST

## 2019-01-31 PROCEDURE — 10002801 HB RX 250 W/O HCPCS: Performed by: HOSPITALIST

## 2019-01-31 PROCEDURE — 10002800 HB RX 250 W HCPCS: Performed by: HOSPITALIST

## 2019-01-31 PROCEDURE — 10002807 HB RX 258: Performed by: INTERNAL MEDICINE

## 2019-01-31 PROCEDURE — 10002800 HB RX 250 W HCPCS: Performed by: INTERNAL MEDICINE

## 2019-01-31 PROCEDURE — 10004651 HB RX, NO CHARGE ITEM: Performed by: INTERNAL MEDICINE

## 2019-01-31 PROCEDURE — 82962 GLUCOSE BLOOD TEST: CPT

## 2019-01-31 PROCEDURE — 99221 1ST HOSP IP/OBS SF/LOW 40: CPT | Performed by: SURGERY

## 2019-01-31 PROCEDURE — 10002803 HB RX 637: Performed by: HOSPITALIST

## 2019-01-31 RX ORDER — INSULIN LISPRO 100 [IU]/ML
4 INJECTION, SOLUTION INTRAVENOUS; SUBCUTANEOUS ONCE
Status: DISCONTINUED | OUTPATIENT
Start: 2019-01-31 | End: 2019-02-09 | Stop reason: HOSPADM

## 2019-01-31 RX ORDER — ATORVASTATIN CALCIUM 40 MG/1
40 TABLET, FILM COATED ORAL NIGHTLY
COMMUNITY

## 2019-01-31 RX ORDER — ACETAZOLAMIDE 250 MG/1
500 TABLET ORAL 2 TIMES DAILY
Status: DISCONTINUED | OUTPATIENT
Start: 2019-01-31 | End: 2019-02-09 | Stop reason: HOSPADM

## 2019-01-31 RX ORDER — ATORVASTATIN CALCIUM 40 MG/1
40 TABLET, FILM COATED ORAL NIGHTLY
Status: DISCONTINUED | OUTPATIENT
Start: 2019-01-31 | End: 2019-02-09

## 2019-01-31 RX ADMIN — ASPIRIN 81 MG: 81 TABLET, COATED ORAL at 20:18

## 2019-01-31 RX ADMIN — MORPHINE SULFATE 2 MG: 4 INJECTION INTRAVENOUS at 09:54

## 2019-01-31 RX ADMIN — CLINDAMYCIN IN 5 PERCENT DEXTROSE 900 MG: 18 INJECTION, SOLUTION INTRAVENOUS at 22:48

## 2019-01-31 RX ADMIN — MUPIROCIN: 20 OINTMENT TOPICAL at 16:06

## 2019-01-31 RX ADMIN — CLINDAMYCIN IN 5 PERCENT DEXTROSE 900 MG: 18 INJECTION, SOLUTION INTRAVENOUS at 18:03

## 2019-01-31 RX ADMIN — PANTOPRAZOLE SODIUM 40 MG: 40 TABLET, DELAYED RELEASE ORAL at 06:06

## 2019-01-31 RX ADMIN — MORPHINE SULFATE 15 MG: 15 TABLET ORAL at 00:25

## 2019-01-31 RX ADMIN — MORPHINE SULFATE 15 MG: 15 TABLET ORAL at 06:06

## 2019-01-31 RX ADMIN — CLINDAMYCIN IN 5 PERCENT DEXTROSE 900 MG: 18 INJECTION, SOLUTION INTRAVENOUS at 06:06

## 2019-01-31 RX ADMIN — CEFEPIME 2000 MG: 2 INJECTION, POWDER, FOR SOLUTION INTRAVENOUS at 09:53

## 2019-01-31 RX ADMIN — SODIUM CHLORIDE, POTASSIUM CHLORIDE, SODIUM LACTATE AND CALCIUM CHLORIDE: 600; 310; 30; 20 INJECTION, SOLUTION INTRAVENOUS at 18:02

## 2019-01-31 RX ADMIN — MUPIROCIN: 20 OINTMENT TOPICAL at 20:20

## 2019-01-31 RX ADMIN — FLUTICASONE PROPIONATE AND SALMETEROL 1 PUFF: 50; 250 POWDER RESPIRATORY (INHALATION) at 08:16

## 2019-01-31 RX ADMIN — MUPIROCIN: 20 OINTMENT TOPICAL at 09:54

## 2019-01-31 RX ADMIN — ACETAMINOPHEN 650 MG: 325 TABLET ORAL at 06:06

## 2019-01-31 RX ADMIN — ACETAZOLAMIDE 250 MG: 250 TABLET ORAL at 09:54

## 2019-01-31 RX ADMIN — MORPHINE SULFATE 2 MG: 4 INJECTION INTRAVENOUS at 20:27

## 2019-01-31 RX ADMIN — SODIUM CHLORIDE, POTASSIUM CHLORIDE, SODIUM LACTATE AND CALCIUM CHLORIDE: 600; 310; 30; 20 INJECTION, SOLUTION INTRAVENOUS at 18:03

## 2019-01-31 RX ADMIN — ACETAMINOPHEN 650 MG: 325 TABLET ORAL at 12:08

## 2019-01-31 RX ADMIN — CLINDAMYCIN IN 5 PERCENT DEXTROSE 900 MG: 18 INJECTION, SOLUTION INTRAVENOUS at 00:07

## 2019-01-31 RX ADMIN — EZETIMIBE 10 MG: 10 TABLET ORAL at 20:18

## 2019-01-31 RX ADMIN — ACETAMINOPHEN 650 MG: 325 TABLET ORAL at 18:09

## 2019-01-31 RX ADMIN — ACETAZOLAMIDE 500 MG: 250 TABLET ORAL at 20:18

## 2019-01-31 RX ADMIN — ATORVASTATIN CALCIUM 40 MG: 40 TABLET, FILM COATED ORAL at 20:18

## 2019-01-31 RX ADMIN — CEFEPIME 2000 MG: 2 INJECTION, POWDER, FOR SOLUTION INTRAVENOUS at 20:17

## 2019-01-31 RX ADMIN — SODIUM CHLORIDE, PRESERVATIVE FREE 2 ML: 5 INJECTION INTRAVENOUS at 20:28

## 2019-01-31 RX ADMIN — FLUTICASONE PROPIONATE AND SALMETEROL 1 PUFF: 50; 250 POWDER RESPIRATORY (INHALATION) at 18:27

## 2019-01-31 RX ADMIN — VANCOMYCIN HYDROCHLORIDE 1250 MG: 10 INJECTION, POWDER, LYOPHILIZED, FOR SOLUTION INTRAVENOUS at 06:06

## 2019-01-31 RX ADMIN — LORATADINE 10 MG: 10 TABLET ORAL at 20:18

## 2019-01-31 RX ADMIN — MORPHINE SULFATE 15 MG: 15 TABLET ORAL at 16:05

## 2019-01-31 RX ADMIN — SODIUM CHLORIDE, POTASSIUM CHLORIDE, SODIUM LACTATE AND CALCIUM CHLORIDE: 600; 310; 30; 20 INJECTION, SOLUTION INTRAVENOUS at 03:15

## 2019-01-31 RX ADMIN — MONTELUKAST 10 MG: 10 TABLET, FILM COATED ORAL at 20:18

## 2019-01-31 ASSESSMENT — PAIN SCALES - GENERAL
PAIN_LEVEL_AT_REST: 7
PAIN_LEVEL_AT_REST: 10
PAIN_LEVEL_AT_REST: 9
PAIN_LEVEL_AT_REST: 10
PAIN_LEVEL_AT_REST: 10
PAIN_LEVEL_AT_REST: 5
PAIN_LEVEL_AT_REST: 10

## 2019-02-01 LAB
ANION GAP SERPL CALC-SCNC: 12 MMOL/L (ref 10–20)
BASOPHILS # BLD AUTO: 0 K/MCL (ref 0–0.3)
BASOPHILS NFR BLD AUTO: 0 %
BUN SERPL-MCNC: 12 MG/DL (ref 6–20)
BUN/CREAT SERPL: 10 (ref 7–25)
CALCIUM SERPL-MCNC: 7.8 MG/DL (ref 8.4–10.2)
CHLORIDE SERPL-SCNC: 107 MMOL/L (ref 98–107)
CO2 SERPL-SCNC: 21 MMOL/L (ref 21–32)
CREAT SERPL-MCNC: 1.25 MG/DL (ref 0.51–0.95)
DIFFERENTIAL METHOD BLD: ABNORMAL
EOSINOPHIL # BLD AUTO: 0.1 K/MCL (ref 0.1–0.5)
EOSINOPHIL NFR SPEC: 1 %
ERYTHROCYTE [DISTWIDTH] IN BLOOD: 18 % (ref 11–15)
GLUCOSE BLDC GLUCOMTR-MCNC: 159 MG/DL (ref 65–99)
GLUCOSE BLDC GLUCOMTR-MCNC: 163 MG/DL (ref 65–99)
GLUCOSE BLDC GLUCOMTR-MCNC: 221 MG/DL (ref 65–99)
GLUCOSE BLDC GLUCOMTR-MCNC: 230 MG/DL (ref 65–99)
GLUCOSE SERPL-MCNC: 222 MG/DL (ref 65–99)
HCT VFR BLD CALC: 31.6 % (ref 36–46.5)
HGB BLD-MCNC: 9.4 G/DL (ref 12–15.5)
IMM GRANULOCYTES # BLD AUTO: 0.3 K/MCL (ref 0–0.2)
IMM GRANULOCYTES NFR BLD: 2 %
LYMPHOCYTES # BLD MANUAL: 1.3 K/MCL (ref 1–4)
LYMPHOCYTES NFR BLD MANUAL: 9 %
MCH RBC QN AUTO: 21 PG (ref 26–34)
MCHC RBC AUTO-ENTMCNC: 29.7 G/DL (ref 32–36.5)
MCV RBC AUTO: 70.5 FL (ref 78–100)
MONOCYTES # BLD MANUAL: 0.9 K/MCL (ref 0.3–0.9)
MONOCYTES NFR BLD MANUAL: 7 %
NEUTROPHILS # BLD: 10.8 K/MCL (ref 1.8–7.7)
NEUTROPHILS NFR BLD AUTO: 81 %
NRBC BLD MANUAL-RTO: 0 /100 WBC
PLATELET # BLD: 282 K/MCL (ref 140–450)
POTASSIUM SERPL-SCNC: 3.9 MMOL/L (ref 3.4–5.1)
RBC # BLD: 4.48 MIL/MCL (ref 4–5.2)
SODIUM SERPL-SCNC: 136 MMOL/L (ref 135–145)
WBC # BLD: 13.5 K/MCL (ref 4.2–11)

## 2019-02-01 PROCEDURE — 10004651 HB RX, NO CHARGE ITEM: Performed by: INTERNAL MEDICINE

## 2019-02-01 PROCEDURE — 10002807 HB RX 258: Performed by: INTERNAL MEDICINE

## 2019-02-01 PROCEDURE — 82962 GLUCOSE BLOOD TEST: CPT

## 2019-02-01 PROCEDURE — 10004325 HB COUNTER ASSESSMENT EA 15 MIN

## 2019-02-01 PROCEDURE — 10002800 HB RX 250 W HCPCS: Performed by: HOSPITALIST

## 2019-02-01 PROCEDURE — 10002807 HB RX 258: Performed by: HOSPITALIST

## 2019-02-01 PROCEDURE — 10002801 HB RX 250 W/O HCPCS: Performed by: INTERNAL MEDICINE

## 2019-02-01 PROCEDURE — 10002800 HB RX 250 W HCPCS: Performed by: INTERNAL MEDICINE

## 2019-02-01 PROCEDURE — 10002801 HB RX 250 W/O HCPCS: Performed by: HOSPITALIST

## 2019-02-01 PROCEDURE — 80048 BASIC METABOLIC PNL TOTAL CA: CPT

## 2019-02-01 PROCEDURE — 10002803 HB RX 637: Performed by: INTERNAL MEDICINE

## 2019-02-01 PROCEDURE — 85025 COMPLETE CBC W/AUTO DIFF WBC: CPT

## 2019-02-01 PROCEDURE — 10000002 HB ROOM CHARGE MED SURG

## 2019-02-01 PROCEDURE — 10004651 HB RX, NO CHARGE ITEM: Performed by: HOSPITALIST

## 2019-02-01 PROCEDURE — 94640 AIRWAY INHALATION TREATMENT: CPT

## 2019-02-01 PROCEDURE — 36415 COLL VENOUS BLD VENIPUNCTURE: CPT

## 2019-02-01 PROCEDURE — 10002803 HB RX 637: Performed by: HOSPITALIST

## 2019-02-01 RX ORDER — INSULIN GLARGINE 100 [IU]/ML
10 INJECTION, SOLUTION SUBCUTANEOUS EVERY 12 HOURS SCHEDULED
Status: DISCONTINUED | OUTPATIENT
Start: 2019-02-01 | End: 2019-02-05

## 2019-02-01 RX ORDER — INSULIN GLARGINE 100 [IU]/ML
6 INJECTION, SOLUTION SUBCUTANEOUS ONCE
Status: COMPLETED | OUTPATIENT
Start: 2019-02-01 | End: 2019-02-01

## 2019-02-01 RX ORDER — FUROSEMIDE 20 MG/1
40 TABLET ORAL DAILY
Status: DISCONTINUED | OUTPATIENT
Start: 2019-02-01 | End: 2019-02-09 | Stop reason: HOSPADM

## 2019-02-01 RX ADMIN — ATORVASTATIN CALCIUM 40 MG: 40 TABLET, FILM COATED ORAL at 20:54

## 2019-02-01 RX ADMIN — ALBUTEROL SULFATE 2 PUFF: 90 AEROSOL, METERED RESPIRATORY (INHALATION) at 08:01

## 2019-02-01 RX ADMIN — ACETAZOLAMIDE 500 MG: 250 TABLET ORAL at 09:09

## 2019-02-01 RX ADMIN — INSULIN GLARGINE 10 UNITS: 100 INJECTION, SOLUTION SUBCUTANEOUS at 21:10

## 2019-02-01 RX ADMIN — ENOXAPARIN SODIUM 40 MG: 40 INJECTION SUBCUTANEOUS at 20:55

## 2019-02-01 RX ADMIN — INSULIN LISPRO 5 UNITS: 100 INJECTION, SOLUTION INTRAVENOUS; SUBCUTANEOUS at 18:22

## 2019-02-01 RX ADMIN — MONTELUKAST 10 MG: 10 TABLET, FILM COATED ORAL at 20:54

## 2019-02-01 RX ADMIN — MUPIROCIN: 20 OINTMENT TOPICAL at 20:57

## 2019-02-01 RX ADMIN — ACETAMINOPHEN 650 MG: 325 TABLET ORAL at 09:09

## 2019-02-01 RX ADMIN — FUROSEMIDE 40 MG: 20 TABLET ORAL at 13:53

## 2019-02-01 RX ADMIN — FLUTICASONE PROPIONATE AND SALMETEROL 1 PUFF: 50; 250 POWDER RESPIRATORY (INHALATION) at 20:08

## 2019-02-01 RX ADMIN — VANCOMYCIN HYDROCHLORIDE 1250 MG: 10 INJECTION, POWDER, LYOPHILIZED, FOR SOLUTION INTRAVENOUS at 06:27

## 2019-02-01 RX ADMIN — MORPHINE SULFATE 2 MG: 4 INJECTION INTRAVENOUS at 19:06

## 2019-02-01 RX ADMIN — MORPHINE SULFATE 2 MG: 4 INJECTION INTRAVENOUS at 06:13

## 2019-02-01 RX ADMIN — MORPHINE SULFATE 15 MG: 15 TABLET ORAL at 21:25

## 2019-02-01 RX ADMIN — CLINDAMYCIN IN 5 PERCENT DEXTROSE 900 MG: 18 INJECTION, SOLUTION INTRAVENOUS at 14:02

## 2019-02-01 RX ADMIN — EZETIMIBE 10 MG: 10 TABLET ORAL at 20:54

## 2019-02-01 RX ADMIN — FLUTICASONE PROPIONATE AND SALMETEROL 1 PUFF: 50; 250 POWDER RESPIRATORY (INHALATION) at 08:01

## 2019-02-01 RX ADMIN — CEFEPIME 2000 MG: 2 INJECTION, POWDER, FOR SOLUTION INTRAVENOUS at 09:10

## 2019-02-01 RX ADMIN — SODIUM CHLORIDE, POTASSIUM CHLORIDE, SODIUM LACTATE AND CALCIUM CHLORIDE: 600; 310; 30; 20 INJECTION, SOLUTION INTRAVENOUS at 09:21

## 2019-02-01 RX ADMIN — MORPHINE SULFATE 2 MG: 4 INJECTION INTRAVENOUS at 13:56

## 2019-02-01 RX ADMIN — CEFEPIME 2000 MG: 2 INJECTION, POWDER, FOR SOLUTION INTRAVENOUS at 21:26

## 2019-02-01 RX ADMIN — ACETAZOLAMIDE 500 MG: 250 TABLET ORAL at 20:59

## 2019-02-01 RX ADMIN — MUPIROCIN: 20 OINTMENT TOPICAL at 09:55

## 2019-02-01 RX ADMIN — LORATADINE 10 MG: 10 TABLET ORAL at 20:54

## 2019-02-01 RX ADMIN — CLINDAMYCIN IN 5 PERCENT DEXTROSE 900 MG: 18 INJECTION, SOLUTION INTRAVENOUS at 20:51

## 2019-02-01 RX ADMIN — MUPIROCIN: 20 OINTMENT TOPICAL at 14:52

## 2019-02-01 RX ADMIN — INSULIN LISPRO 6 UNITS: 100 INJECTION, SOLUTION INTRAVENOUS; SUBCUTANEOUS at 14:49

## 2019-02-01 RX ADMIN — SODIUM CHLORIDE, PRESERVATIVE FREE 2 ML: 5 INJECTION INTRAVENOUS at 20:57

## 2019-02-01 RX ADMIN — INSULIN GLARGINE 6 UNITS: 100 INJECTION, SOLUTION SUBCUTANEOUS at 14:48

## 2019-02-01 RX ADMIN — ASPIRIN 81 MG: 81 TABLET, COATED ORAL at 20:54

## 2019-02-01 RX ADMIN — CLINDAMYCIN IN 5 PERCENT DEXTROSE 900 MG: 18 INJECTION, SOLUTION INTRAVENOUS at 05:38

## 2019-02-01 RX ADMIN — ACETAMINOPHEN 650 MG: 325 TABLET ORAL at 15:04

## 2019-02-01 RX ADMIN — PANTOPRAZOLE SODIUM 40 MG: 40 TABLET, DELAYED RELEASE ORAL at 06:13

## 2019-02-01 ASSESSMENT — PAIN SCALES - GENERAL
PAIN_LEVEL_AT_REST: 10
PAIN_LEVEL_AT_REST: 7
PAIN_LEVEL_AT_REST: 10
PAIN_LEVEL_AT_REST: 10
PAIN_LEVEL_AT_REST: 7

## 2019-02-02 ENCOUNTER — APPOINTMENT (OUTPATIENT)
Dept: CT IMAGING | Age: 64
DRG: 872 | End: 2019-02-02
Attending: INTERNAL MEDICINE

## 2019-02-02 LAB
C DIFF DNA SPEC QL NAA+PROBE: NOT DETECTED
CREAT SERPL-MCNC: 1.09 MG/DL (ref 0.51–0.95)
GLUCOSE BLDC GLUCOMTR-MCNC: 141 MG/DL (ref 65–99)
GLUCOSE BLDC GLUCOMTR-MCNC: 154 MG/DL (ref 65–99)
GLUCOSE BLDC GLUCOMTR-MCNC: 157 MG/DL (ref 65–99)
SPECIMEN SOURCE: NORMAL
VANCOMYCIN TROUGH SERPL-MCNC: 7 MCG/ML (ref 10–20)

## 2019-02-02 PROCEDURE — 10002807 HB RX 258: Performed by: RADIOLOGY

## 2019-02-02 PROCEDURE — 82962 GLUCOSE BLOOD TEST: CPT

## 2019-02-02 PROCEDURE — 10002800 HB RX 250 W HCPCS: Performed by: INTERNAL MEDICINE

## 2019-02-02 PROCEDURE — 10002801 HB RX 250 W/O HCPCS: Performed by: INTERNAL MEDICINE

## 2019-02-02 PROCEDURE — 80202 ASSAY OF VANCOMYCIN: CPT

## 2019-02-02 PROCEDURE — 10002801 HB RX 250 W/O HCPCS: Performed by: HOSPITALIST

## 2019-02-02 PROCEDURE — 87493 C DIFF AMPLIFIED PROBE: CPT

## 2019-02-02 PROCEDURE — 10002807 HB RX 258: Performed by: HOSPITALIST

## 2019-02-02 PROCEDURE — 10002803 HB RX 637: Performed by: INTERNAL MEDICINE

## 2019-02-02 PROCEDURE — 71260 CT THORAX DX C+: CPT | Performed by: RADIOLOGY

## 2019-02-02 PROCEDURE — 10004651 HB RX, NO CHARGE ITEM: Performed by: HOSPITALIST

## 2019-02-02 PROCEDURE — 94640 AIRWAY INHALATION TREATMENT: CPT

## 2019-02-02 PROCEDURE — 10004651 HB RX, NO CHARGE ITEM: Performed by: INTERNAL MEDICINE

## 2019-02-02 PROCEDURE — 10002800 HB RX 250 W HCPCS: Performed by: HOSPITALIST

## 2019-02-02 PROCEDURE — 82565 ASSAY OF CREATININE: CPT

## 2019-02-02 PROCEDURE — 10002805 HB CONTRAST AGENT: Performed by: RADIOLOGY

## 2019-02-02 PROCEDURE — 10002807 HB RX 258: Performed by: INTERNAL MEDICINE

## 2019-02-02 PROCEDURE — 71260 CT THORAX DX C+: CPT

## 2019-02-02 PROCEDURE — 10000002 HB ROOM CHARGE MED SURG

## 2019-02-02 PROCEDURE — 36415 COLL VENOUS BLD VENIPUNCTURE: CPT

## 2019-02-02 RX ADMIN — ACETAZOLAMIDE 500 MG: 250 TABLET ORAL at 21:47

## 2019-02-02 RX ADMIN — IOPAMIDOL 75 ML: 612 INJECTION, SOLUTION INTRAVENOUS at 08:45

## 2019-02-02 RX ADMIN — FLUTICASONE PROPIONATE AND SALMETEROL 1 PUFF: 50; 250 POWDER RESPIRATORY (INHALATION) at 06:07

## 2019-02-02 RX ADMIN — ASPIRIN 81 MG: 81 TABLET, COATED ORAL at 21:47

## 2019-02-02 RX ADMIN — ENOXAPARIN SODIUM 40 MG: 40 INJECTION SUBCUTANEOUS at 21:48

## 2019-02-02 RX ADMIN — CLINDAMYCIN IN 5 PERCENT DEXTROSE 900 MG: 18 INJECTION, SOLUTION INTRAVENOUS at 13:23

## 2019-02-02 RX ADMIN — FLUTICASONE PROPIONATE AND SALMETEROL 1 PUFF: 50; 250 POWDER RESPIRATORY (INHALATION) at 19:22

## 2019-02-02 RX ADMIN — VANCOMYCIN HYDROCHLORIDE 1750 MG: 10 INJECTION, POWDER, LYOPHILIZED, FOR SOLUTION INTRAVENOUS at 10:58

## 2019-02-02 RX ADMIN — INSULIN LISPRO 4 UNITS: 100 INJECTION, SOLUTION INTRAVENOUS; SUBCUTANEOUS at 14:35

## 2019-02-02 RX ADMIN — PANTOPRAZOLE SODIUM 40 MG: 40 TABLET, DELAYED RELEASE ORAL at 05:32

## 2019-02-02 RX ADMIN — EZETIMIBE 10 MG: 10 TABLET ORAL at 21:47

## 2019-02-02 RX ADMIN — MUPIROCIN: 20 OINTMENT TOPICAL at 21:48

## 2019-02-02 RX ADMIN — ATORVASTATIN CALCIUM 40 MG: 40 TABLET, FILM COATED ORAL at 21:47

## 2019-02-02 RX ADMIN — INSULIN LISPRO 4 UNITS: 100 INJECTION, SOLUTION INTRAVENOUS; SUBCUTANEOUS at 18:50

## 2019-02-02 RX ADMIN — INSULIN GLARGINE 10 UNITS: 100 INJECTION, SOLUTION SUBCUTANEOUS at 21:48

## 2019-02-02 RX ADMIN — SODIUM CHLORIDE, PRESERVATIVE FREE 2 ML: 5 INJECTION INTRAVENOUS at 21:59

## 2019-02-02 RX ADMIN — ACETAZOLAMIDE 500 MG: 250 TABLET ORAL at 10:08

## 2019-02-02 RX ADMIN — INSULIN GLARGINE 10 UNITS: 100 INJECTION, SOLUTION SUBCUTANEOUS at 10:15

## 2019-02-02 RX ADMIN — CEFEPIME 2000 MG: 2 INJECTION, POWDER, FOR SOLUTION INTRAVENOUS at 10:09

## 2019-02-02 RX ADMIN — LORATADINE 10 MG: 10 TABLET ORAL at 21:48

## 2019-02-02 RX ADMIN — FUROSEMIDE 40 MG: 20 TABLET ORAL at 10:08

## 2019-02-02 RX ADMIN — MUPIROCIN: 20 OINTMENT TOPICAL at 14:33

## 2019-02-02 RX ADMIN — ACETAMINOPHEN 650 MG: 325 TABLET ORAL at 14:33

## 2019-02-02 RX ADMIN — ENOXAPARIN SODIUM 40 MG: 40 INJECTION SUBCUTANEOUS at 10:09

## 2019-02-02 RX ADMIN — CEFEPIME 2000 MG: 2 INJECTION, POWDER, FOR SOLUTION INTRAVENOUS at 22:38

## 2019-02-02 RX ADMIN — CLINDAMYCIN IN 5 PERCENT DEXTROSE 900 MG: 18 INJECTION, SOLUTION INTRAVENOUS at 05:30

## 2019-02-02 RX ADMIN — CLINDAMYCIN IN 5 PERCENT DEXTROSE 900 MG: 18 INJECTION, SOLUTION INTRAVENOUS at 21:49

## 2019-02-02 RX ADMIN — MUPIROCIN: 20 OINTMENT TOPICAL at 10:10

## 2019-02-02 RX ADMIN — SODIUM CHLORIDE 50 ML: 9 INJECTION, SOLUTION INTRAVENOUS at 08:45

## 2019-02-02 RX ADMIN — SODIUM CHLORIDE, PRESERVATIVE FREE 2 ML: 5 INJECTION INTRAVENOUS at 10:09

## 2019-02-02 RX ADMIN — MONTELUKAST 10 MG: 10 TABLET, FILM COATED ORAL at 21:47

## 2019-02-02 ASSESSMENT — PAIN SCALES - GENERAL
PAIN_LEVEL_AT_REST: 5
PAIN_LEVEL_AT_REST: 8

## 2019-02-03 LAB
ANION GAP SERPL CALC-SCNC: 11 MMOL/L (ref 10–20)
BACTERIA BLD CULT: NORMAL
BACTERIA BLD CULT: NORMAL
BASOPHILS # BLD AUTO: 0.1 K/MCL (ref 0–0.3)
BASOPHILS NFR BLD AUTO: 1 %
BUN SERPL-MCNC: 9 MG/DL (ref 6–20)
BUN/CREAT SERPL: 9 (ref 7–25)
CALCIUM SERPL-MCNC: 8.6 MG/DL (ref 8.4–10.2)
CHLORIDE SERPL-SCNC: 110 MMOL/L (ref 98–107)
CO2 SERPL-SCNC: 23 MMOL/L (ref 21–32)
CREAT SERPL-MCNC: 1.01 MG/DL (ref 0.51–0.95)
DIFFERENTIAL METHOD BLD: ABNORMAL
EOSINOPHIL # BLD AUTO: 0.4 K/MCL (ref 0.1–0.5)
EOSINOPHIL NFR SPEC: 3 %
ERYTHROCYTE [DISTWIDTH] IN BLOOD: 17.5 % (ref 11–15)
GLUCOSE BLDC GLUCOMTR-MCNC: 125 MG/DL (ref 65–99)
GLUCOSE BLDC GLUCOMTR-MCNC: 126 MG/DL (ref 65–99)
GLUCOSE BLDC GLUCOMTR-MCNC: 154 MG/DL (ref 65–99)
GLUCOSE BLDC GLUCOMTR-MCNC: 183 MG/DL (ref 65–99)
GLUCOSE SERPL-MCNC: 126 MG/DL (ref 65–99)
HCT VFR BLD CALC: 31.1 % (ref 36–46.5)
HGB BLD-MCNC: 9.4 G/DL (ref 12–15.5)
IMM GRANULOCYTES # BLD AUTO: 0.7 K/MCL (ref 0–0.2)
IMM GRANULOCYTES NFR BLD: 5 %
LYMPHOCYTES # BLD MANUAL: 2 K/MCL (ref 1–4)
LYMPHOCYTES NFR BLD MANUAL: 15 %
MCH RBC QN AUTO: 20.8 PG (ref 26–34)
MCHC RBC AUTO-ENTMCNC: 30.2 G/DL (ref 32–36.5)
MCV RBC AUTO: 69 FL (ref 78–100)
MONOCYTES # BLD MANUAL: 1.1 K/MCL (ref 0.3–0.9)
MONOCYTES NFR BLD MANUAL: 8 %
NEUTROPHILS # BLD: 9.2 K/MCL (ref 1.8–7.7)
NEUTROPHILS NFR BLD AUTO: 68 %
NRBC BLD MANUAL-RTO: 0 /100 WBC
PLATELET # BLD: 314 K/MCL (ref 140–450)
POTASSIUM SERPL-SCNC: 3.6 MMOL/L (ref 3.4–5.1)
RBC # BLD: 4.51 MIL/MCL (ref 4–5.2)
REPORT STATUS (RPT): NORMAL
REPORT STATUS (RPT): NORMAL
SODIUM SERPL-SCNC: 140 MMOL/L (ref 135–145)
SPECIMEN SOURCE: NORMAL
SPECIMEN SOURCE: NORMAL
WBC # BLD: 13.4 K/MCL (ref 4.2–11)

## 2019-02-03 PROCEDURE — 10004651 HB RX, NO CHARGE ITEM: Performed by: HOSPITALIST

## 2019-02-03 PROCEDURE — 82962 GLUCOSE BLOOD TEST: CPT

## 2019-02-03 PROCEDURE — 10004651 HB RX, NO CHARGE ITEM: Performed by: INTERNAL MEDICINE

## 2019-02-03 PROCEDURE — 10002800 HB RX 250 W HCPCS: Performed by: INTERNAL MEDICINE

## 2019-02-03 PROCEDURE — 10002803 HB RX 637: Performed by: HOSPITALIST

## 2019-02-03 PROCEDURE — 10002801 HB RX 250 W/O HCPCS: Performed by: HOSPITALIST

## 2019-02-03 PROCEDURE — 10002801 HB RX 250 W/O HCPCS: Performed by: INTERNAL MEDICINE

## 2019-02-03 PROCEDURE — 36415 COLL VENOUS BLD VENIPUNCTURE: CPT

## 2019-02-03 PROCEDURE — 94640 AIRWAY INHALATION TREATMENT: CPT

## 2019-02-03 PROCEDURE — 10002807 HB RX 258: Performed by: INTERNAL MEDICINE

## 2019-02-03 PROCEDURE — 10002807 HB RX 258: Performed by: HOSPITALIST

## 2019-02-03 PROCEDURE — 10002803 HB RX 637: Performed by: INTERNAL MEDICINE

## 2019-02-03 PROCEDURE — 85025 COMPLETE CBC W/AUTO DIFF WBC: CPT

## 2019-02-03 PROCEDURE — 10002800 HB RX 250 W HCPCS: Performed by: HOSPITALIST

## 2019-02-03 PROCEDURE — 10000002 HB ROOM CHARGE MED SURG

## 2019-02-03 PROCEDURE — 80048 BASIC METABOLIC PNL TOTAL CA: CPT

## 2019-02-03 RX ADMIN — MUPIROCIN: 20 OINTMENT TOPICAL at 21:42

## 2019-02-03 RX ADMIN — LORATADINE 10 MG: 10 TABLET ORAL at 21:41

## 2019-02-03 RX ADMIN — ERGOCALCIFEROL 50000 UNITS: 1.25 CAPSULE ORAL at 09:06

## 2019-02-03 RX ADMIN — FUROSEMIDE 40 MG: 20 TABLET ORAL at 09:05

## 2019-02-03 RX ADMIN — MUPIROCIN: 20 OINTMENT TOPICAL at 09:07

## 2019-02-03 RX ADMIN — INSULIN LISPRO 4 UNITS: 100 INJECTION, SOLUTION INTRAVENOUS; SUBCUTANEOUS at 09:29

## 2019-02-03 RX ADMIN — ACETAMINOPHEN 650 MG: 325 TABLET ORAL at 00:32

## 2019-02-03 RX ADMIN — INSULIN GLARGINE 10 UNITS: 100 INJECTION, SOLUTION SUBCUTANEOUS at 09:29

## 2019-02-03 RX ADMIN — MONTELUKAST 10 MG: 10 TABLET, FILM COATED ORAL at 21:41

## 2019-02-03 RX ADMIN — INSULIN LISPRO 4 UNITS: 100 INJECTION, SOLUTION INTRAVENOUS; SUBCUTANEOUS at 18:48

## 2019-02-03 RX ADMIN — ACETAMINOPHEN 650 MG: 325 TABLET ORAL at 07:19

## 2019-02-03 RX ADMIN — FLUTICASONE PROPIONATE AND SALMETEROL 1 PUFF: 50; 250 POWDER RESPIRATORY (INHALATION) at 08:28

## 2019-02-03 RX ADMIN — CLINDAMYCIN IN 5 PERCENT DEXTROSE 900 MG: 18 INJECTION, SOLUTION INTRAVENOUS at 14:52

## 2019-02-03 RX ADMIN — CLINDAMYCIN IN 5 PERCENT DEXTROSE 900 MG: 18 INJECTION, SOLUTION INTRAVENOUS at 06:38

## 2019-02-03 RX ADMIN — ACETAZOLAMIDE 500 MG: 250 TABLET ORAL at 21:41

## 2019-02-03 RX ADMIN — CLINDAMYCIN IN 5 PERCENT DEXTROSE 900 MG: 18 INJECTION, SOLUTION INTRAVENOUS at 21:41

## 2019-02-03 RX ADMIN — ACETAMINOPHEN 650 MG: 325 TABLET ORAL at 15:40

## 2019-02-03 RX ADMIN — CEFEPIME 2000 MG: 2 INJECTION, POWDER, FOR SOLUTION INTRAVENOUS at 09:07

## 2019-02-03 RX ADMIN — VANCOMYCIN HYDROCHLORIDE 1750 MG: 10 INJECTION, POWDER, LYOPHILIZED, FOR SOLUTION INTRAVENOUS at 07:20

## 2019-02-03 RX ADMIN — MUPIROCIN: 20 OINTMENT TOPICAL at 14:52

## 2019-02-03 RX ADMIN — INSULIN GLARGINE 10 UNITS: 100 INJECTION, SOLUTION SUBCUTANEOUS at 21:41

## 2019-02-03 RX ADMIN — SODIUM CHLORIDE, PRESERVATIVE FREE 2 ML: 5 INJECTION INTRAVENOUS at 21:43

## 2019-02-03 RX ADMIN — ACETAMINOPHEN 650 MG: 325 TABLET ORAL at 21:40

## 2019-02-03 RX ADMIN — ASPIRIN 81 MG: 81 TABLET, COATED ORAL at 21:41

## 2019-02-03 RX ADMIN — EZETIMIBE 10 MG: 10 TABLET ORAL at 21:41

## 2019-02-03 RX ADMIN — CEFEPIME 2000 MG: 2 INJECTION, POWDER, FOR SOLUTION INTRAVENOUS at 22:31

## 2019-02-03 RX ADMIN — FLUTICASONE PROPIONATE AND SALMETEROL 1 PUFF: 50; 250 POWDER RESPIRATORY (INHALATION) at 19:51

## 2019-02-03 RX ADMIN — INSULIN LISPRO 5 UNITS: 100 INJECTION, SOLUTION INTRAVENOUS; SUBCUTANEOUS at 12:36

## 2019-02-03 RX ADMIN — ATORVASTATIN CALCIUM 40 MG: 40 TABLET, FILM COATED ORAL at 21:41

## 2019-02-03 RX ADMIN — ENOXAPARIN SODIUM 40 MG: 40 INJECTION SUBCUTANEOUS at 09:06

## 2019-02-03 RX ADMIN — PANTOPRAZOLE SODIUM 40 MG: 40 TABLET, DELAYED RELEASE ORAL at 06:37

## 2019-02-03 RX ADMIN — ENOXAPARIN SODIUM 40 MG: 40 INJECTION SUBCUTANEOUS at 21:42

## 2019-02-03 RX ADMIN — ACETAZOLAMIDE 500 MG: 250 TABLET ORAL at 09:06

## 2019-02-03 ASSESSMENT — PAIN SCALES - GENERAL
PAIN_LEVEL_AT_REST: 5
PAIN_LEVEL_AT_REST: 4
PAIN_LEVEL_AT_REST: 10
PAIN_LEVEL_AT_REST: 7
PAIN_LEVEL_AT_REST: 5
PAIN_LEVEL_WITH_ACTIVITY: 5
PAIN_LEVEL_AT_REST: 10
PAIN_LEVEL_WITH_ACTIVITY: 5

## 2019-02-04 LAB
ANION GAP SERPL CALC-SCNC: 12 MMOL/L (ref 10–20)
BASOPHILS # BLD: 0 K/MCL (ref 0–0.3)
BASOPHILS NFR BLD: 0 %
BUN SERPL-MCNC: 8 MG/DL (ref 6–20)
BUN/CREAT SERPL: 9 (ref 7–25)
CALCIUM SERPL-MCNC: 9.4 MG/DL (ref 8.4–10.2)
CHLORIDE SERPL-SCNC: 109 MMOL/L (ref 98–107)
CO2 SERPL-SCNC: 22 MMOL/L (ref 21–32)
CREAT SERPL-MCNC: 0.85 MG/DL (ref 0.51–0.95)
DIFFERENTIAL METHOD BLD: ABNORMAL
EOSINOPHIL # BLD: 0.3 K/MCL (ref 0.1–0.5)
EOSINOPHIL NFR BLD: 2 %
ERYTHROCYTE [DISTWIDTH] IN BLOOD: 18 % (ref 11–15)
GLUCOSE BLDC GLUCOMTR-MCNC: 115 MG/DL (ref 65–99)
GLUCOSE BLDC GLUCOMTR-MCNC: 153 MG/DL (ref 65–99)
GLUCOSE BLDC GLUCOMTR-MCNC: 189 MG/DL (ref 65–99)
GLUCOSE SERPL-MCNC: 111 MG/DL (ref 65–99)
HCT VFR BLD CALC: 33.1 % (ref 36–46.5)
HGB BLD-MCNC: 10 G/DL (ref 12–15.5)
HYPOCHROMIA BLD QL SMEAR: ABNORMAL
LG PLATELETS BLD QL SMEAR: PRESENT
LYMPHOCYTES # BLD: 2.2 K/MCL (ref 1–4)
LYMPHOCYTES NFR BLD: 16 %
MCH RBC QN AUTO: 21 PG (ref 26–34)
MCHC RBC AUTO-ENTMCNC: 30.2 G/DL (ref 32–36.5)
MCV RBC AUTO: 69.4 FL (ref 78–100)
METAMYELOCYTES NFR BLD: 4 % (ref 0–2)
MICROCYTES BLD QL SMEAR: ABNORMAL
MONOCYTES # BLD: 0.6 K/MCL (ref 0.3–0.9)
MONOCYTES NFR BLD: 4 %
NEUTROPHILS # BLD: 10.3 K/MCL (ref 1.8–7.7)
NEUTS BAND NFR BLD: 4 % (ref 0–10)
NEUTS HYPERSEG NFR BLD: 1 %
NEUTS SEG NFR BLD: 69 %
NRBC BLD MANUAL-RTO: 1 /100 WBC
OVALOCYTES BLD QL SMEAR: ABNORMAL
PLATELET # BLD: 360 K/MCL (ref 140–450)
POLYCHROMASIA BLD QL SMEAR: ABNORMAL
POTASSIUM SERPL-SCNC: 3.7 MMOL/L (ref 3.4–5.1)
RBC # BLD: 4.77 MIL/MCL (ref 4–5.2)
SCHISTOCYTES BLD QL SMEAR: ABNORMAL
SODIUM SERPL-SCNC: 139 MMOL/L (ref 135–145)
VANCOMYCIN TROUGH SERPL-MCNC: 10.1 MCG/ML (ref 10–20)
WBC # BLD: 13.9 K/MCL (ref 4.2–11)

## 2019-02-04 PROCEDURE — 94640 AIRWAY INHALATION TREATMENT: CPT

## 2019-02-04 PROCEDURE — 87205 SMEAR GRAM STAIN: CPT

## 2019-02-04 PROCEDURE — 10002800 HB RX 250 W HCPCS: Performed by: INTERNAL MEDICINE

## 2019-02-04 PROCEDURE — 82962 GLUCOSE BLOOD TEST: CPT

## 2019-02-04 PROCEDURE — 80048 BASIC METABOLIC PNL TOTAL CA: CPT

## 2019-02-04 PROCEDURE — 10004651 HB RX, NO CHARGE ITEM: Performed by: HOSPITALIST

## 2019-02-04 PROCEDURE — 10002801 HB RX 250 W/O HCPCS: Performed by: HOSPITALIST

## 2019-02-04 PROCEDURE — 87186 SC STD MICRODIL/AGAR DIL: CPT

## 2019-02-04 PROCEDURE — 10002801 HB RX 250 W/O HCPCS: Performed by: INTERNAL MEDICINE

## 2019-02-04 PROCEDURE — 10002803 HB RX 637: Performed by: INTERNAL MEDICINE

## 2019-02-04 PROCEDURE — 10004325 HB COUNTER ASSESSMENT EA 15 MIN

## 2019-02-04 PROCEDURE — 10002807 HB RX 258: Performed by: INTERNAL MEDICINE

## 2019-02-04 PROCEDURE — 87147 CULTURE TYPE IMMUNOLOGIC: CPT

## 2019-02-04 PROCEDURE — 10000002 HB ROOM CHARGE MED SURG

## 2019-02-04 PROCEDURE — 10002807 HB RX 258: Performed by: HOSPITALIST

## 2019-02-04 PROCEDURE — 85025 COMPLETE CBC W/AUTO DIFF WBC: CPT

## 2019-02-04 PROCEDURE — 36415 COLL VENOUS BLD VENIPUNCTURE: CPT

## 2019-02-04 PROCEDURE — 10002800 HB RX 250 W HCPCS: Performed by: HOSPITALIST

## 2019-02-04 PROCEDURE — 10004651 HB RX, NO CHARGE ITEM: Performed by: INTERNAL MEDICINE

## 2019-02-04 PROCEDURE — 80202 ASSAY OF VANCOMYCIN: CPT

## 2019-02-04 RX ADMIN — ACETAZOLAMIDE 500 MG: 250 TABLET ORAL at 09:41

## 2019-02-04 RX ADMIN — FLUTICASONE PROPIONATE AND SALMETEROL 1 PUFF: 50; 250 POWDER RESPIRATORY (INHALATION) at 07:43

## 2019-02-04 RX ADMIN — INSULIN LISPRO 4 UNITS: 100 INJECTION, SOLUTION INTRAVENOUS; SUBCUTANEOUS at 09:43

## 2019-02-04 RX ADMIN — CEFEPIME 2000 MG: 2 INJECTION, POWDER, FOR SOLUTION INTRAVENOUS at 09:40

## 2019-02-04 RX ADMIN — ATORVASTATIN CALCIUM 40 MG: 40 TABLET, FILM COATED ORAL at 20:55

## 2019-02-04 RX ADMIN — VANCOMYCIN HYDROCHLORIDE 1750 MG: 10 INJECTION, POWDER, LYOPHILIZED, FOR SOLUTION INTRAVENOUS at 06:48

## 2019-02-04 RX ADMIN — LORATADINE 10 MG: 10 TABLET ORAL at 20:55

## 2019-02-04 RX ADMIN — CEFEPIME 2000 MG: 2 INJECTION, POWDER, FOR SOLUTION INTRAVENOUS at 21:46

## 2019-02-04 RX ADMIN — MONTELUKAST 10 MG: 10 TABLET, FILM COATED ORAL at 20:55

## 2019-02-04 RX ADMIN — CLINDAMYCIN IN 5 PERCENT DEXTROSE 900 MG: 18 INJECTION, SOLUTION INTRAVENOUS at 15:13

## 2019-02-04 RX ADMIN — CLINDAMYCIN IN 5 PERCENT DEXTROSE 900 MG: 18 INJECTION, SOLUTION INTRAVENOUS at 21:00

## 2019-02-04 RX ADMIN — MUPIROCIN: 20 OINTMENT TOPICAL at 15:17

## 2019-02-04 RX ADMIN — FUROSEMIDE 40 MG: 20 TABLET ORAL at 09:41

## 2019-02-04 RX ADMIN — ACETAMINOPHEN 650 MG: 325 TABLET ORAL at 21:54

## 2019-02-04 RX ADMIN — FLUTICASONE PROPIONATE AND SALMETEROL 1 PUFF: 50; 250 POWDER RESPIRATORY (INHALATION) at 19:03

## 2019-02-04 RX ADMIN — MUPIROCIN: 20 OINTMENT TOPICAL at 20:55

## 2019-02-04 RX ADMIN — PANTOPRAZOLE SODIUM 40 MG: 40 TABLET, DELAYED RELEASE ORAL at 06:07

## 2019-02-04 RX ADMIN — CLINDAMYCIN IN 5 PERCENT DEXTROSE 900 MG: 18 INJECTION, SOLUTION INTRAVENOUS at 06:07

## 2019-02-04 RX ADMIN — INSULIN LISPRO 5 UNITS: 100 INJECTION, SOLUTION INTRAVENOUS; SUBCUTANEOUS at 16:26

## 2019-02-04 RX ADMIN — SODIUM CHLORIDE, PRESERVATIVE FREE 2 ML: 5 INJECTION INTRAVENOUS at 20:52

## 2019-02-04 RX ADMIN — ASPIRIN 81 MG: 81 TABLET, COATED ORAL at 20:52

## 2019-02-04 RX ADMIN — INSULIN GLARGINE 10 UNITS: 100 INJECTION, SOLUTION SUBCUTANEOUS at 21:46

## 2019-02-04 RX ADMIN — ACETAZOLAMIDE 500 MG: 250 TABLET ORAL at 20:54

## 2019-02-04 RX ADMIN — ENOXAPARIN SODIUM 40 MG: 40 INJECTION SUBCUTANEOUS at 20:55

## 2019-02-04 RX ADMIN — INSULIN GLARGINE 10 UNITS: 100 INJECTION, SOLUTION SUBCUTANEOUS at 09:43

## 2019-02-04 ASSESSMENT — PAIN SCALES - GENERAL
PAIN_LEVEL_AT_REST: 6
PAIN_LEVEL_WITH_ACTIVITY: 3
PAIN_LEVEL_AT_REST: 3
PAIN_LEVEL_AT_REST: 3
PAIN_LEVEL_WITH_ACTIVITY: 3
PAIN_LEVEL_AT_REST: 3
PAIN_LEVEL_AT_REST: 6

## 2019-02-05 ENCOUNTER — APPOINTMENT (OUTPATIENT)
Dept: ULTRASOUND IMAGING | Age: 64
DRG: 872 | End: 2019-02-05
Attending: NURSE PRACTITIONER

## 2019-02-05 LAB
BASOPHILS # BLD: 0 K/MCL (ref 0–0.3)
BASOPHILS NFR BLD: 0 %
DIFFERENTIAL METHOD BLD: ABNORMAL
EOSINOPHIL # BLD: 0.3 K/MCL (ref 0.1–0.5)
EOSINOPHIL NFR BLD: 2 %
ERYTHROCYTE [DISTWIDTH] IN BLOOD: 17.5 % (ref 11–15)
GLUCOSE BLDC GLUCOMTR-MCNC: 126 MG/DL (ref 65–99)
GLUCOSE BLDC GLUCOMTR-MCNC: 145 MG/DL (ref 65–99)
HCT VFR BLD CALC: 33.5 % (ref 36–46.5)
HGB BLD-MCNC: 10.1 G/DL (ref 12–15.5)
LYMPHOCYTES # BLD: 3.4 K/MCL (ref 1–4)
LYMPHOCYTES NFR BLD: 26 %
MCH RBC QN AUTO: 20.5 PG (ref 26–34)
MCHC RBC AUTO-ENTMCNC: 30.1 G/DL (ref 32–36.5)
MCV RBC AUTO: 68 FL (ref 78–100)
METAMYELOCYTES NFR BLD: 2 % (ref 0–2)
MICROCYTES BLD QL SMEAR: ABNORMAL
MONOCYTES # BLD: 0.9 K/MCL (ref 0.3–0.9)
MONOCYTES NFR BLD: 7 %
NEUTROPHILS # BLD: 8.1 K/MCL (ref 1.8–7.7)
NEUTS SEG NFR BLD: 63 %
NRBC BLD MANUAL-RTO: 1 /100 WBC
OVALOCYTES BLD QL SMEAR: ABNORMAL
PLAT MORPH BLD: NORMAL
PLATELET # BLD: 408 K/MCL (ref 140–450)
POLYCHROMASIA BLD QL SMEAR: ABNORMAL
RBC # BLD: 4.93 MIL/MCL (ref 4–5.2)
WBC # BLD: 12.9 K/MCL (ref 4.2–11)
WBC MORPH BLD: NORMAL

## 2019-02-05 PROCEDURE — 10002801 HB RX 250 W/O HCPCS: Performed by: INTERNAL MEDICINE

## 2019-02-05 PROCEDURE — 76604 US EXAM CHEST: CPT

## 2019-02-05 PROCEDURE — 10002800 HB RX 250 W HCPCS: Performed by: HOSPITALIST

## 2019-02-05 PROCEDURE — 10002800 HB RX 250 W HCPCS: Performed by: INTERNAL MEDICINE

## 2019-02-05 PROCEDURE — 87147 CULTURE TYPE IMMUNOLOGIC: CPT

## 2019-02-05 PROCEDURE — 87186 SC STD MICRODIL/AGAR DIL: CPT

## 2019-02-05 PROCEDURE — 94640 AIRWAY INHALATION TREATMENT: CPT

## 2019-02-05 PROCEDURE — 10002803 HB RX 637: Performed by: INTERNAL MEDICINE

## 2019-02-05 PROCEDURE — 10002807 HB RX 258: Performed by: INTERNAL MEDICINE

## 2019-02-05 PROCEDURE — 85025 COMPLETE CBC W/AUTO DIFF WBC: CPT

## 2019-02-05 PROCEDURE — 36415 COLL VENOUS BLD VENIPUNCTURE: CPT

## 2019-02-05 PROCEDURE — 10002801 HB RX 250 W/O HCPCS: Performed by: HOSPITALIST

## 2019-02-05 PROCEDURE — 87205 SMEAR GRAM STAIN: CPT

## 2019-02-05 PROCEDURE — 82962 GLUCOSE BLOOD TEST: CPT

## 2019-02-05 PROCEDURE — 10004651 HB RX, NO CHARGE ITEM: Performed by: HOSPITALIST

## 2019-02-05 PROCEDURE — 10004651 HB RX, NO CHARGE ITEM: Performed by: INTERNAL MEDICINE

## 2019-02-05 PROCEDURE — 10000002 HB ROOM CHARGE MED SURG

## 2019-02-05 PROCEDURE — 76604 US EXAM CHEST: CPT | Performed by: RADIOLOGY

## 2019-02-05 RX ORDER — DIPHENHYDRAMINE HYDROCHLORIDE 50 MG/ML
25 INJECTION INTRAMUSCULAR; INTRAVENOUS EVERY 4 HOURS PRN
Status: DISCONTINUED | OUTPATIENT
Start: 2019-02-05 | End: 2019-02-09 | Stop reason: HOSPADM

## 2019-02-05 RX ORDER — INSULIN GLARGINE 100 [IU]/ML
10 INJECTION, SOLUTION SUBCUTANEOUS DAILY
Status: DISCONTINUED | OUTPATIENT
Start: 2019-02-06 | End: 2019-02-09 | Stop reason: HOSPADM

## 2019-02-05 RX ORDER — DIPHENHYDRAMINE HCL 25 MG
25 CAPSULE ORAL EVERY 4 HOURS PRN
Status: DISCONTINUED | OUTPATIENT
Start: 2019-02-05 | End: 2019-02-09 | Stop reason: HOSPADM

## 2019-02-05 RX ORDER — OXYCODONE HYDROCHLORIDE 5 MG/1
5 TABLET ORAL EVERY 4 HOURS PRN
Status: DISCONTINUED | OUTPATIENT
Start: 2019-02-05 | End: 2019-02-09 | Stop reason: HOSPADM

## 2019-02-05 RX ADMIN — FLUTICASONE PROPIONATE AND SALMETEROL 1 PUFF: 50; 250 POWDER RESPIRATORY (INHALATION) at 08:05

## 2019-02-05 RX ADMIN — ACETAZOLAMIDE 500 MG: 250 TABLET ORAL at 20:37

## 2019-02-05 RX ADMIN — CEFEPIME 2000 MG: 2 INJECTION, POWDER, FOR SOLUTION INTRAVENOUS at 10:31

## 2019-02-05 RX ADMIN — SODIUM CHLORIDE, PRESERVATIVE FREE 2 ML: 5 INJECTION INTRAVENOUS at 10:30

## 2019-02-05 RX ADMIN — FUROSEMIDE 40 MG: 20 TABLET ORAL at 10:30

## 2019-02-05 RX ADMIN — MUPIROCIN: 20 OINTMENT TOPICAL at 10:32

## 2019-02-05 RX ADMIN — ENOXAPARIN SODIUM 40 MG: 40 INJECTION SUBCUTANEOUS at 10:29

## 2019-02-05 RX ADMIN — CLINDAMYCIN IN 5 PERCENT DEXTROSE 900 MG: 18 INJECTION, SOLUTION INTRAVENOUS at 06:52

## 2019-02-05 RX ADMIN — CEFEPIME 2000 MG: 2 INJECTION, POWDER, FOR SOLUTION INTRAVENOUS at 21:39

## 2019-02-05 RX ADMIN — MONTELUKAST 10 MG: 10 TABLET, FILM COATED ORAL at 20:37

## 2019-02-05 RX ADMIN — MORPHINE SULFATE 2 MG: 4 INJECTION INTRAVENOUS at 11:01

## 2019-02-05 RX ADMIN — ATORVASTATIN CALCIUM 40 MG: 40 TABLET, FILM COATED ORAL at 20:37

## 2019-02-05 RX ADMIN — ASPIRIN 81 MG: 81 TABLET, COATED ORAL at 20:37

## 2019-02-05 RX ADMIN — LORATADINE 10 MG: 10 TABLET ORAL at 20:37

## 2019-02-05 RX ADMIN — PANTOPRAZOLE SODIUM 40 MG: 40 TABLET, DELAYED RELEASE ORAL at 06:52

## 2019-02-05 RX ADMIN — SODIUM CHLORIDE, PRESERVATIVE FREE 2 ML: 5 INJECTION INTRAVENOUS at 20:39

## 2019-02-05 RX ADMIN — DIPHENHYDRAMINE HYDROCHLORIDE 25 MG: 25 CAPSULE ORAL at 14:38

## 2019-02-05 RX ADMIN — CLINDAMYCIN IN 5 PERCENT DEXTROSE 900 MG: 18 INJECTION, SOLUTION INTRAVENOUS at 13:51

## 2019-02-05 RX ADMIN — EZETIMIBE 10 MG: 10 TABLET ORAL at 20:37

## 2019-02-05 RX ADMIN — ACETAZOLAMIDE 500 MG: 250 TABLET ORAL at 10:30

## 2019-02-05 RX ADMIN — OXYCODONE HYDROCHLORIDE 5 MG: 5 TABLET ORAL at 14:59

## 2019-02-05 RX ADMIN — ENOXAPARIN SODIUM 40 MG: 40 INJECTION SUBCUTANEOUS at 20:39

## 2019-02-05 RX ADMIN — MUPIROCIN: 20 OINTMENT TOPICAL at 13:53

## 2019-02-05 RX ADMIN — CLINDAMYCIN IN 5 PERCENT DEXTROSE 900 MG: 18 INJECTION, SOLUTION INTRAVENOUS at 21:05

## 2019-02-05 RX ADMIN — VANCOMYCIN HYDROCHLORIDE 1000 MG: 1 INJECTION, POWDER, LYOPHILIZED, FOR SOLUTION INTRAVENOUS at 17:20

## 2019-02-05 RX ADMIN — VANCOMYCIN HYDROCHLORIDE 1000 MG: 1 INJECTION, POWDER, LYOPHILIZED, FOR SOLUTION INTRAVENOUS at 05:15

## 2019-02-05 RX ADMIN — FLUTICASONE PROPIONATE AND SALMETEROL 1 PUFF: 50; 250 POWDER RESPIRATORY (INHALATION) at 19:38

## 2019-02-05 RX ADMIN — INSULIN LISPRO 4 UNITS: 100 INJECTION, SOLUTION INTRAVENOUS; SUBCUTANEOUS at 17:50

## 2019-02-05 ASSESSMENT — PAIN SCALES - GENERAL
PAIN_LEVEL_WITH_ACTIVITY: 3
PAIN_LEVEL_AT_REST: 10
PAIN_LEVEL_WITH_ACTIVITY: 3
PAIN_LEVEL_AT_REST: 7
PAIN_LEVEL_AT_REST: 3
PAIN_LEVEL_AT_REST: 3
PAIN_LEVEL_AT_REST: 5
PAIN_LEVEL_AT_REST: 7

## 2019-02-06 LAB
BACTERIA SPEC AEROBE CULT: ABNORMAL
GLUCOSE BLDC GLUCOMTR-MCNC: 138 MG/DL (ref 65–99)
GLUCOSE BLDC GLUCOMTR-MCNC: 161 MG/DL (ref 65–99)
GLUCOSE BLDC GLUCOMTR-MCNC: 179 MG/DL (ref 65–99)
GRAM STN SPEC: ABNORMAL
ORGANISM: ABNORMAL
REPORT STATUS (RPT): ABNORMAL
SPECIMEN SOURCE: ABNORMAL

## 2019-02-06 PROCEDURE — 10004651 HB RX, NO CHARGE ITEM: Performed by: INTERNAL MEDICINE

## 2019-02-06 PROCEDURE — 10002801 HB RX 250 W/O HCPCS: Performed by: INTERNAL MEDICINE

## 2019-02-06 PROCEDURE — 10002800 HB RX 250 W HCPCS: Performed by: INTERNAL MEDICINE

## 2019-02-06 PROCEDURE — 10002803 HB RX 637: Performed by: INTERNAL MEDICINE

## 2019-02-06 PROCEDURE — 10000002 HB ROOM CHARGE MED SURG

## 2019-02-06 PROCEDURE — 10004651 HB RX, NO CHARGE ITEM: Performed by: HOSPITALIST

## 2019-02-06 PROCEDURE — 10002800 HB RX 250 W HCPCS: Performed by: HOSPITALIST

## 2019-02-06 PROCEDURE — 10002807 HB RX 258: Performed by: INTERNAL MEDICINE

## 2019-02-06 PROCEDURE — 94640 AIRWAY INHALATION TREATMENT: CPT

## 2019-02-06 PROCEDURE — 10002801 HB RX 250 W/O HCPCS: Performed by: HOSPITALIST

## 2019-02-06 PROCEDURE — 82962 GLUCOSE BLOOD TEST: CPT

## 2019-02-06 RX ADMIN — ATORVASTATIN CALCIUM 40 MG: 40 TABLET, FILM COATED ORAL at 20:41

## 2019-02-06 RX ADMIN — ENOXAPARIN SODIUM 40 MG: 40 INJECTION SUBCUTANEOUS at 20:41

## 2019-02-06 RX ADMIN — INSULIN GLARGINE 10 UNITS: 100 INJECTION, SOLUTION SUBCUTANEOUS at 10:29

## 2019-02-06 RX ADMIN — ACETAZOLAMIDE 500 MG: 250 TABLET ORAL at 10:47

## 2019-02-06 RX ADMIN — MUPIROCIN: 20 OINTMENT TOPICAL at 20:44

## 2019-02-06 RX ADMIN — SODIUM CHLORIDE, PRESERVATIVE FREE 2 ML: 5 INJECTION INTRAVENOUS at 10:48

## 2019-02-06 RX ADMIN — ENOXAPARIN SODIUM 40 MG: 40 INJECTION SUBCUTANEOUS at 10:03

## 2019-02-06 RX ADMIN — FLUTICASONE PROPIONATE AND SALMETEROL 1 PUFF: 50; 250 POWDER RESPIRATORY (INHALATION) at 20:34

## 2019-02-06 RX ADMIN — OXYCODONE HYDROCHLORIDE 5 MG: 5 TABLET ORAL at 18:18

## 2019-02-06 RX ADMIN — OXYCODONE HYDROCHLORIDE 5 MG: 5 TABLET ORAL at 12:48

## 2019-02-06 RX ADMIN — CLINDAMYCIN IN 5 PERCENT DEXTROSE 900 MG: 18 INJECTION, SOLUTION INTRAVENOUS at 05:45

## 2019-02-06 RX ADMIN — FLUTICASONE PROPIONATE AND SALMETEROL 1 PUFF: 50; 250 POWDER RESPIRATORY (INHALATION) at 08:07

## 2019-02-06 RX ADMIN — FUROSEMIDE 40 MG: 20 TABLET ORAL at 10:30

## 2019-02-06 RX ADMIN — PANTOPRAZOLE SODIUM 40 MG: 40 TABLET, DELAYED RELEASE ORAL at 05:54

## 2019-02-06 RX ADMIN — MUPIROCIN: 20 OINTMENT TOPICAL at 14:00

## 2019-02-06 RX ADMIN — ACETAZOLAMIDE 500 MG: 250 TABLET ORAL at 20:41

## 2019-02-06 RX ADMIN — DIPHENHYDRAMINE HYDROCHLORIDE 25 MG: 25 CAPSULE ORAL at 12:43

## 2019-02-06 RX ADMIN — DIPHENHYDRAMINE HYDROCHLORIDE 25 MG: 25 CAPSULE ORAL at 01:17

## 2019-02-06 RX ADMIN — MUPIROCIN: 20 OINTMENT TOPICAL at 10:28

## 2019-02-06 RX ADMIN — OXYCODONE HYDROCHLORIDE 5 MG: 5 TABLET ORAL at 01:16

## 2019-02-06 RX ADMIN — SODIUM CHLORIDE, PRESERVATIVE FREE 2 ML: 5 INJECTION INTRAVENOUS at 20:44

## 2019-02-06 RX ADMIN — CLINDAMYCIN IN 5 PERCENT DEXTROSE 900 MG: 18 INJECTION, SOLUTION INTRAVENOUS at 13:53

## 2019-02-06 RX ADMIN — INSULIN LISPRO 5 UNITS: 100 INJECTION, SOLUTION INTRAVENOUS; SUBCUTANEOUS at 17:45

## 2019-02-06 RX ADMIN — LORATADINE 10 MG: 10 TABLET ORAL at 20:41

## 2019-02-06 RX ADMIN — CEFEPIME 2000 MG: 2 INJECTION, POWDER, FOR SOLUTION INTRAVENOUS at 10:00

## 2019-02-06 RX ADMIN — MONTELUKAST 10 MG: 10 TABLET, FILM COATED ORAL at 20:41

## 2019-02-06 RX ADMIN — ASPIRIN 81 MG: 81 TABLET, COATED ORAL at 20:41

## 2019-02-06 RX ADMIN — DIPHENHYDRAMINE HYDROCHLORIDE 25 MG: 25 CAPSULE ORAL at 18:18

## 2019-02-06 RX ADMIN — VANCOMYCIN HYDROCHLORIDE 1000 MG: 1 INJECTION, POWDER, LYOPHILIZED, FOR SOLUTION INTRAVENOUS at 17:15

## 2019-02-06 RX ADMIN — CLINDAMYCIN IN 5 PERCENT DEXTROSE 900 MG: 18 INJECTION, SOLUTION INTRAVENOUS at 21:30

## 2019-02-06 RX ADMIN — VANCOMYCIN HYDROCHLORIDE 1000 MG: 1 INJECTION, POWDER, LYOPHILIZED, FOR SOLUTION INTRAVENOUS at 05:48

## 2019-02-06 ASSESSMENT — PAIN SCALES - GENERAL
PAIN_LEVEL_WITH_ACTIVITY: 5
PAIN_LEVEL_AT_REST: 4
PAIN_LEVEL_WITH_ACTIVITY: 7
PAIN_LEVEL_AT_REST: 7
PAIN_LEVEL_AT_REST: 3
PAIN_LEVEL_AT_REST: 3
PAIN_LEVEL_AT_REST: 6
PAIN_LEVEL_AT_REST: 8
PAIN_LEVEL_AT_REST: 5
PAIN_LEVEL_AT_REST: 5
PAIN_LEVEL_AT_REST: 6

## 2019-02-06 ASSESSMENT — ACTIVITIES OF DAILY LIVING (ADL)
ADL_SCORE: 12
DRESSING: INDEPENDENT
BATHING: INDEPENDENT
TOILETING: INDEPENDENT
FEEDING: INDEPENDENT

## 2019-02-07 LAB
CREAT SERPL-MCNC: 0.9 MG/DL (ref 0.51–0.95)
GLUCOSE BLDC GLUCOMTR-MCNC: 117 MG/DL (ref 65–99)
GLUCOSE BLDC GLUCOMTR-MCNC: 130 MG/DL (ref 65–99)
GLUCOSE BLDC GLUCOMTR-MCNC: 147 MG/DL (ref 65–99)
GLUCOSE BLDC GLUCOMTR-MCNC: 175 MG/DL (ref 65–99)
VANCOMYCIN TROUGH SERPL-MCNC: 15.5 MCG/ML (ref 10–20)

## 2019-02-07 PROCEDURE — 10004651 HB RX, NO CHARGE ITEM: Performed by: INTERNAL MEDICINE

## 2019-02-07 PROCEDURE — 80202 ASSAY OF VANCOMYCIN: CPT

## 2019-02-07 PROCEDURE — 10004651 HB RX, NO CHARGE ITEM: Performed by: HOSPITALIST

## 2019-02-07 PROCEDURE — 87075 CULTR BACTERIA EXCEPT BLOOD: CPT

## 2019-02-07 PROCEDURE — 10002801 HB RX 250 W/O HCPCS: Performed by: INTERNAL MEDICINE

## 2019-02-07 PROCEDURE — 10002801 HB RX 250 W/O HCPCS: Performed by: NURSE PRACTITIONER

## 2019-02-07 PROCEDURE — 10002800 HB RX 250 W HCPCS: Performed by: INTERNAL MEDICINE

## 2019-02-07 PROCEDURE — 82962 GLUCOSE BLOOD TEST: CPT

## 2019-02-07 PROCEDURE — 10000002 HB ROOM CHARGE MED SURG

## 2019-02-07 PROCEDURE — 10002803 HB RX 637: Performed by: INTERNAL MEDICINE

## 2019-02-07 PROCEDURE — 10002800 HB RX 250 W HCPCS: Performed by: HOSPITALIST

## 2019-02-07 PROCEDURE — 82565 ASSAY OF CREATININE: CPT

## 2019-02-07 PROCEDURE — 36415 COLL VENOUS BLD VENIPUNCTURE: CPT

## 2019-02-07 PROCEDURE — 10002801 HB RX 250 W/O HCPCS: Performed by: HOSPITALIST

## 2019-02-07 PROCEDURE — 87147 CULTURE TYPE IMMUNOLOGIC: CPT

## 2019-02-07 PROCEDURE — 87186 SC STD MICRODIL/AGAR DIL: CPT

## 2019-02-07 PROCEDURE — 0HD7XZZ EXTRACTION OF ABDOMEN SKIN, EXTERNAL APPROACH: ICD-10-PCS | Performed by: SURGERY

## 2019-02-07 PROCEDURE — 10004325 HB COUNTER ASSESSMENT EA 15 MIN

## 2019-02-07 PROCEDURE — 94640 AIRWAY INHALATION TREATMENT: CPT

## 2019-02-07 PROCEDURE — 10002807 HB RX 258: Performed by: INTERNAL MEDICINE

## 2019-02-07 RX ORDER — LIDOCAINE HYDROCHLORIDE AND EPINEPHRINE 10; 10 MG/ML; UG/ML
20 INJECTION, SOLUTION INFILTRATION; PERINEURAL ONCE
Status: COMPLETED | OUTPATIENT
Start: 2019-02-07 | End: 2019-02-07

## 2019-02-07 RX ADMIN — INSULIN LISPRO 5 UNITS: 100 INJECTION, SOLUTION INTRAVENOUS; SUBCUTANEOUS at 18:55

## 2019-02-07 RX ADMIN — CLINDAMYCIN IN 5 PERCENT DEXTROSE 900 MG: 18 INJECTION, SOLUTION INTRAVENOUS at 15:04

## 2019-02-07 RX ADMIN — ENOXAPARIN SODIUM 40 MG: 40 INJECTION SUBCUTANEOUS at 21:46

## 2019-02-07 RX ADMIN — CLINDAMYCIN IN 5 PERCENT DEXTROSE 900 MG: 18 INJECTION, SOLUTION INTRAVENOUS at 05:44

## 2019-02-07 RX ADMIN — ACETAZOLAMIDE 500 MG: 250 TABLET ORAL at 22:59

## 2019-02-07 RX ADMIN — INSULIN LISPRO 4 UNITS: 100 INJECTION, SOLUTION INTRAVENOUS; SUBCUTANEOUS at 09:11

## 2019-02-07 RX ADMIN — DIPHENHYDRAMINE HYDROCHLORIDE 25 MG: 25 CAPSULE ORAL at 13:53

## 2019-02-07 RX ADMIN — PANTOPRAZOLE SODIUM 40 MG: 40 TABLET, DELAYED RELEASE ORAL at 09:09

## 2019-02-07 RX ADMIN — DIPHENHYDRAMINE HYDROCHLORIDE 25 MG: 25 CAPSULE ORAL at 22:03

## 2019-02-07 RX ADMIN — INSULIN LISPRO 4 UNITS: 100 INJECTION, SOLUTION INTRAVENOUS; SUBCUTANEOUS at 15:45

## 2019-02-07 RX ADMIN — MONTELUKAST 10 MG: 10 TABLET, FILM COATED ORAL at 21:45

## 2019-02-07 RX ADMIN — SODIUM CHLORIDE, PRESERVATIVE FREE 2 ML: 5 INJECTION INTRAVENOUS at 09:20

## 2019-02-07 RX ADMIN — CLINDAMYCIN IN 5 PERCENT DEXTROSE 900 MG: 18 INJECTION, SOLUTION INTRAVENOUS at 21:50

## 2019-02-07 RX ADMIN — FLUTICASONE PROPIONATE AND SALMETEROL 1 PUFF: 50; 250 POWDER RESPIRATORY (INHALATION) at 20:38

## 2019-02-07 RX ADMIN — ATORVASTATIN CALCIUM 40 MG: 40 TABLET, FILM COATED ORAL at 21:45

## 2019-02-07 RX ADMIN — HYDROMORPHONE HYDROCHLORIDE 0.6 MG: 1 INJECTION, SOLUTION INTRAMUSCULAR; INTRAVENOUS; SUBCUTANEOUS at 13:54

## 2019-02-07 RX ADMIN — ENOXAPARIN SODIUM 40 MG: 40 INJECTION SUBCUTANEOUS at 09:08

## 2019-02-07 RX ADMIN — DIPHENHYDRAMINE HYDROCHLORIDE 25 MG: 50 INJECTION, SOLUTION INTRAMUSCULAR; INTRAVENOUS at 17:14

## 2019-02-07 RX ADMIN — SODIUM CHLORIDE, PRESERVATIVE FREE 2 ML: 5 INJECTION INTRAVENOUS at 21:45

## 2019-02-07 RX ADMIN — ACETAZOLAMIDE 500 MG: 250 TABLET ORAL at 09:09

## 2019-02-07 RX ADMIN — OXYCODONE HYDROCHLORIDE 5 MG: 5 TABLET ORAL at 22:02

## 2019-02-07 RX ADMIN — FUROSEMIDE 40 MG: 20 TABLET ORAL at 09:09

## 2019-02-07 RX ADMIN — OXYCODONE HYDROCHLORIDE 5 MG: 5 TABLET ORAL at 13:51

## 2019-02-07 RX ADMIN — FLUTICASONE PROPIONATE AND SALMETEROL 1 PUFF: 50; 250 POWDER RESPIRATORY (INHALATION) at 10:35

## 2019-02-07 RX ADMIN — INSULIN GLARGINE 10 UNITS: 100 INJECTION, SOLUTION SUBCUTANEOUS at 09:09

## 2019-02-07 RX ADMIN — ASPIRIN 81 MG: 81 TABLET, COATED ORAL at 21:45

## 2019-02-07 RX ADMIN — VANCOMYCIN HYDROCHLORIDE 1000 MG: 1 INJECTION, POWDER, LYOPHILIZED, FOR SOLUTION INTRAVENOUS at 17:15

## 2019-02-07 RX ADMIN — VANCOMYCIN HYDROCHLORIDE 1000 MG: 1 INJECTION, POWDER, LYOPHILIZED, FOR SOLUTION INTRAVENOUS at 06:51

## 2019-02-07 RX ADMIN — MORPHINE SULFATE 2 MG: 4 INJECTION INTRAVENOUS at 23:17

## 2019-02-07 RX ADMIN — LIDOCAINE HYDROCHLORIDE,EPINEPHRINE BITARTRATE 20 ML: 10; .01 INJECTION, SOLUTION INFILTRATION; PERINEURAL at 14:37

## 2019-02-07 RX ADMIN — LORATADINE 10 MG: 10 TABLET ORAL at 21:45

## 2019-02-07 RX ADMIN — MUPIROCIN: 20 OINTMENT TOPICAL at 09:19

## 2019-02-07 ASSESSMENT — PAIN SCALES - GENERAL
PAIN_LEVEL_AT_REST: 7
PAIN_LEVEL_AT_REST: 7
PAIN_LEVEL_WITH_ACTIVITY: 5
PAIN_LEVEL_AT_REST: 2
PAIN_LEVEL_AT_REST: 7
PAIN_LEVEL_AT_REST: 6
PAIN_LEVEL_AT_REST: 4
PAIN_LEVEL_AT_REST: 2

## 2019-02-08 PROBLEM — L03.311 CELLULITIS OF ABDOMINAL WALL: Status: ACTIVE | Noted: 2019-02-08

## 2019-02-08 LAB
BASOPHILS # BLD: 0.1 K/MCL (ref 0–0.3)
BASOPHILS NFR BLD: 1 %
DIFFERENTIAL METHOD BLD: ABNORMAL
EOSINOPHIL # BLD: 0.8 K/MCL (ref 0.1–0.5)
EOSINOPHIL NFR BLD: 8 %
ERYTHROCYTE [DISTWIDTH] IN BLOOD: 18.5 % (ref 11–15)
GLUCOSE BLDC GLUCOMTR-MCNC: 131 MG/DL (ref 65–99)
GLUCOSE BLDC GLUCOMTR-MCNC: 162 MG/DL (ref 65–99)
GLUCOSE BLDC GLUCOMTR-MCNC: 168 MG/DL (ref 65–99)
HCT VFR BLD CALC: 35 % (ref 36–46.5)
HGB BLD-MCNC: 10.6 G/DL (ref 12–15.5)
LYMPHOCYTES # BLD: 1.6 K/MCL (ref 1–4)
LYMPHOCYTES NFR BLD: 17 %
MCH RBC QN AUTO: 21.3 PG (ref 26–34)
MCHC RBC AUTO-ENTMCNC: 30.3 G/DL (ref 32–36.5)
MCV RBC AUTO: 70.3 FL (ref 78–100)
MONOCYTES # BLD: 0.5 K/MCL (ref 0.3–0.9)
MONOCYTES NFR BLD: 5 %
MYELOCYTES NFR BLD: 2 %
NEUTROPHILS # BLD: 6.3 K/MCL (ref 1.8–7.7)
NEUTS SEG NFR BLD: 67 %
NRBC BLD MANUAL-RTO: 0 /100 WBC
OVALOCYTES BLD QL SMEAR: ABNORMAL
PLAT MORPH BLD: NORMAL
PLATELET # BLD: 406 K/MCL (ref 140–450)
POLYCHROMASIA BLD QL SMEAR: ABNORMAL
RBC # BLD: 4.98 MIL/MCL (ref 4–5.2)
WBC # BLD: 9.4 K/MCL (ref 4.2–11)
WBC MORPH BLD: NORMAL

## 2019-02-08 PROCEDURE — 82962 GLUCOSE BLOOD TEST: CPT

## 2019-02-08 PROCEDURE — 10002800 HB RX 250 W HCPCS: Performed by: HOSPITALIST

## 2019-02-08 PROCEDURE — 10004651 HB RX, NO CHARGE ITEM: Performed by: HOSPITALIST

## 2019-02-08 PROCEDURE — 10000002 HB ROOM CHARGE MED SURG

## 2019-02-08 PROCEDURE — 36415 COLL VENOUS BLD VENIPUNCTURE: CPT

## 2019-02-08 PROCEDURE — 10002803 HB RX 637: Performed by: PHYSICIAN ASSISTANT

## 2019-02-08 PROCEDURE — 10002800 HB RX 250 W HCPCS: Performed by: NURSE PRACTITIONER

## 2019-02-08 PROCEDURE — 10004651 HB RX, NO CHARGE ITEM: Performed by: INTERNAL MEDICINE

## 2019-02-08 PROCEDURE — 94640 AIRWAY INHALATION TREATMENT: CPT

## 2019-02-08 PROCEDURE — 10002801 HB RX 250 W/O HCPCS: Performed by: INTERNAL MEDICINE

## 2019-02-08 PROCEDURE — 10002801 HB RX 250 W/O HCPCS: Performed by: HOSPITALIST

## 2019-02-08 PROCEDURE — 10002803 HB RX 637: Performed by: INTERNAL MEDICINE

## 2019-02-08 PROCEDURE — 10002807 HB RX 258: Performed by: INTERNAL MEDICINE

## 2019-02-08 PROCEDURE — 10002800 HB RX 250 W HCPCS: Performed by: INTERNAL MEDICINE

## 2019-02-08 PROCEDURE — 85025 COMPLETE CBC W/AUTO DIFF WBC: CPT

## 2019-02-08 RX ORDER — LINEZOLID 600 MG/1
600 TABLET, FILM COATED ORAL EVERY 12 HOURS SCHEDULED
Status: DISCONTINUED | OUTPATIENT
Start: 2019-02-08 | End: 2019-02-09

## 2019-02-08 RX ORDER — LINEZOLID 600 MG/1
600 TABLET, FILM COATED ORAL 2 TIMES DAILY
Qty: 28 TABLET | Refills: 0 | Status: SHIPPED | OUTPATIENT
Start: 2019-02-08 | End: 2019-02-11 | Stop reason: SDUPTHER

## 2019-02-08 RX ADMIN — ACETAZOLAMIDE 500 MG: 250 TABLET ORAL at 11:27

## 2019-02-08 RX ADMIN — LORATADINE 10 MG: 10 TABLET ORAL at 21:18

## 2019-02-08 RX ADMIN — FUROSEMIDE 40 MG: 20 TABLET ORAL at 11:22

## 2019-02-08 RX ADMIN — ACETAMINOPHEN 650 MG: 325 TABLET ORAL at 21:31

## 2019-02-08 RX ADMIN — MONTELUKAST 10 MG: 10 TABLET, FILM COATED ORAL at 21:18

## 2019-02-08 RX ADMIN — ACETAZOLAMIDE 500 MG: 250 TABLET ORAL at 21:18

## 2019-02-08 RX ADMIN — FLUTICASONE PROPIONATE AND SALMETEROL 1 PUFF: 50; 250 POWDER RESPIRATORY (INHALATION) at 19:59

## 2019-02-08 RX ADMIN — ASPIRIN 81 MG: 81 TABLET, COATED ORAL at 21:18

## 2019-02-08 RX ADMIN — CLINDAMYCIN IN 5 PERCENT DEXTROSE 900 MG: 18 INJECTION, SOLUTION INTRAVENOUS at 14:11

## 2019-02-08 RX ADMIN — LINEZOLID 600 MG: 600 TABLET, FILM COATED ORAL at 21:18

## 2019-02-08 RX ADMIN — INSULIN LISPRO 4 UNITS: 100 INJECTION, SOLUTION INTRAVENOUS; SUBCUTANEOUS at 09:28

## 2019-02-08 RX ADMIN — ENOXAPARIN SODIUM 40 MG: 40 INJECTION SUBCUTANEOUS at 11:22

## 2019-02-08 RX ADMIN — INSULIN LISPRO 5 UNITS: 100 INJECTION, SOLUTION INTRAVENOUS; SUBCUTANEOUS at 14:34

## 2019-02-08 RX ADMIN — CLINDAMYCIN IN 5 PERCENT DEXTROSE 900 MG: 18 INJECTION, SOLUTION INTRAVENOUS at 05:44

## 2019-02-08 RX ADMIN — ENOXAPARIN SODIUM 40 MG: 40 INJECTION SUBCUTANEOUS at 21:19

## 2019-02-08 RX ADMIN — FLUTICASONE PROPIONATE AND SALMETEROL 1 PUFF: 50; 250 POWDER RESPIRATORY (INHALATION) at 08:51

## 2019-02-08 RX ADMIN — HYDROMORPHONE HYDROCHLORIDE 0.6 MG: 1 INJECTION, SOLUTION INTRAMUSCULAR; INTRAVENOUS; SUBCUTANEOUS at 10:35

## 2019-02-08 RX ADMIN — INSULIN GLARGINE 10 UNITS: 100 INJECTION, SOLUTION SUBCUTANEOUS at 11:22

## 2019-02-08 RX ADMIN — ATORVASTATIN CALCIUM 40 MG: 40 TABLET, FILM COATED ORAL at 21:18

## 2019-02-08 RX ADMIN — PANTOPRAZOLE SODIUM 40 MG: 40 TABLET, DELAYED RELEASE ORAL at 06:20

## 2019-02-08 RX ADMIN — VANCOMYCIN HYDROCHLORIDE 1000 MG: 1 INJECTION, POWDER, LYOPHILIZED, FOR SOLUTION INTRAVENOUS at 06:20

## 2019-02-08 RX ADMIN — SODIUM CHLORIDE, PRESERVATIVE FREE 2 ML: 5 INJECTION INTRAVENOUS at 11:23

## 2019-02-08 RX ADMIN — SODIUM CHLORIDE, PRESERVATIVE FREE 2 ML: 5 INJECTION INTRAVENOUS at 21:19

## 2019-02-08 ASSESSMENT — PAIN SCALES - GENERAL
PAIN_LEVEL_WITH_ACTIVITY: 5
PAIN_LEVEL_AT_REST: 4
PAIN_LEVEL_AT_REST: 6
PAIN_LEVEL_AT_REST: 4
PAIN_LEVEL_WITH_ACTIVITY: 5

## 2019-02-09 VITALS
HEIGHT: 61 IN | BODY MASS INDEX: 55.32 KG/M2 | HEART RATE: 74 BPM | RESPIRATION RATE: 18 BRPM | WEIGHT: 293 LBS | DIASTOLIC BLOOD PRESSURE: 68 MMHG | TEMPERATURE: 98.2 F | SYSTOLIC BLOOD PRESSURE: 122 MMHG | OXYGEN SATURATION: 98 %

## 2019-02-09 LAB
BACTERIA SPEC ANAEROBE+AEROBE CULT: ABNORMAL
CREAT SERPL-MCNC: 0.92 MG/DL (ref 0.51–0.95)
GLUCOSE BLDC GLUCOMTR-MCNC: 139 MG/DL (ref 65–99)
GLUCOSE BLDC GLUCOMTR-MCNC: 152 MG/DL (ref 65–99)
GRAM STN SPEC: ABNORMAL
ORGANISM: ABNORMAL
REPORT STATUS (RPT): ABNORMAL
SPECIMEN SOURCE: ABNORMAL

## 2019-02-09 PROCEDURE — 10002800 HB RX 250 W HCPCS: Performed by: INTERNAL MEDICINE

## 2019-02-09 PROCEDURE — 10002807 HB RX 258: Performed by: INTERNAL MEDICINE

## 2019-02-09 PROCEDURE — 94640 AIRWAY INHALATION TREATMENT: CPT

## 2019-02-09 PROCEDURE — 10002803 HB RX 637: Performed by: INTERNAL MEDICINE

## 2019-02-09 PROCEDURE — 82565 ASSAY OF CREATININE: CPT

## 2019-02-09 PROCEDURE — 10002800 HB RX 250 W HCPCS: Performed by: HOSPITALIST

## 2019-02-09 PROCEDURE — 82962 GLUCOSE BLOOD TEST: CPT

## 2019-02-09 PROCEDURE — 10002803 HB RX 637: Performed by: PHYSICIAN ASSISTANT

## 2019-02-09 PROCEDURE — 36415 COLL VENOUS BLD VENIPUNCTURE: CPT

## 2019-02-09 RX ORDER — OXYCODONE HYDROCHLORIDE 5 MG/1
5 TABLET ORAL EVERY 6 HOURS PRN
Qty: 20 TABLET | Refills: 0 | Status: SHIPPED | OUTPATIENT
Start: 2019-02-09 | End: 2023-03-30 | Stop reason: CLARIF

## 2019-02-09 RX ADMIN — LINEZOLID 600 MG: 600 TABLET, FILM COATED ORAL at 08:18

## 2019-02-09 RX ADMIN — FLUTICASONE PROPIONATE AND SALMETEROL 1 PUFF: 50; 250 POWDER RESPIRATORY (INHALATION) at 08:41

## 2019-02-09 RX ADMIN — DAPTOMYCIN 660 MG: 500 INJECTION, POWDER, LYOPHILIZED, FOR SOLUTION INTRAVENOUS at 15:36

## 2019-02-09 RX ADMIN — INSULIN LISPRO 4 UNITS: 100 INJECTION, SOLUTION INTRAVENOUS; SUBCUTANEOUS at 08:18

## 2019-02-09 RX ADMIN — FUROSEMIDE 40 MG: 20 TABLET ORAL at 08:18

## 2019-02-09 RX ADMIN — HYDROMORPHONE HYDROCHLORIDE 0.5 MG: 1 INJECTION, SOLUTION INTRAMUSCULAR; INTRAVENOUS; SUBCUTANEOUS at 10:08

## 2019-02-09 RX ADMIN — INSULIN LISPRO 5 UNITS: 100 INJECTION, SOLUTION INTRAVENOUS; SUBCUTANEOUS at 13:38

## 2019-02-09 RX ADMIN — INSULIN GLARGINE 10 UNITS: 100 INJECTION, SOLUTION SUBCUTANEOUS at 08:20

## 2019-02-09 RX ADMIN — ENOXAPARIN SODIUM 40 MG: 40 INJECTION SUBCUTANEOUS at 08:18

## 2019-02-09 RX ADMIN — PANTOPRAZOLE SODIUM 40 MG: 40 TABLET, DELAYED RELEASE ORAL at 06:59

## 2019-02-09 RX ADMIN — ACETAZOLAMIDE 500 MG: 250 TABLET ORAL at 08:18

## 2019-02-09 ASSESSMENT — PAIN SCALES - GENERAL
PAIN_LEVEL_AT_REST: 3
PAIN_LEVEL_AT_REST: 4
PAIN_LEVEL_AT_REST: 3

## 2019-02-11 LAB
BACTERIA SPEC ANAEROBE+AEROBE CULT: ABNORMAL
GRAM STN SPEC: ABNORMAL
ORGANISM: ABNORMAL
REPORT STATUS (RPT): ABNORMAL
SPECIMEN SOURCE: ABNORMAL

## 2019-02-11 RX ORDER — LINEZOLID 600 MG/1
600 TABLET, FILM COATED ORAL 2 TIMES DAILY
Qty: 28 TABLET | Refills: 0 | OUTPATIENT
Start: 2019-02-11 | End: 2019-02-20 | Stop reason: CLARIF

## 2019-02-14 ENCOUNTER — HOSPITAL ENCOUNTER (OUTPATIENT)
Dept: OUTPATIENT SERVICES | Age: 64
Discharge: STILL ADMITTED OR ADMINISTRATIVE | End: 2019-02-14
Attending: NURSE PRACTITIONER

## 2019-02-14 VITALS
SYSTOLIC BLOOD PRESSURE: 138 MMHG | OXYGEN SATURATION: 99 % | RESPIRATION RATE: 20 BRPM | TEMPERATURE: 97.8 F | DIASTOLIC BLOOD PRESSURE: 64 MMHG | HEART RATE: 77 BPM

## 2019-02-14 DIAGNOSIS — L03.311 CELLULITIS OF ABDOMINAL WALL: Primary | ICD-10-CM

## 2019-02-14 PROCEDURE — 10004187 HB COUNTER-VISIT CENSUS, OUTPATIENT

## 2019-02-14 PROCEDURE — 99211 OFF/OP EST MAY X REQ PHY/QHP: CPT

## 2019-02-14 ASSESSMENT — PAIN SCALES - GENERAL: PAIN_LEVEL_AT_REST: 3

## 2019-02-14 ASSESSMENT — PAIN DESCRIPTION - PAIN TYPE: TYPE: ACUTE PAIN

## 2019-02-15 ENCOUNTER — HOSPITAL ENCOUNTER (OUTPATIENT)
Dept: OUTPATIENT SERVICES | Age: 64
Discharge: STILL ADMITTED OR ADMINISTRATIVE | End: 2019-02-15
Attending: NURSE PRACTITIONER

## 2019-02-15 VITALS
SYSTOLIC BLOOD PRESSURE: 142 MMHG | DIASTOLIC BLOOD PRESSURE: 70 MMHG | RESPIRATION RATE: 18 BRPM | OXYGEN SATURATION: 96 % | HEART RATE: 71 BPM | TEMPERATURE: 97.2 F

## 2019-02-15 DIAGNOSIS — L03.311 CELLULITIS OF ABDOMINAL WALL: Primary | ICD-10-CM

## 2019-02-15 PROCEDURE — 10004187 HB COUNTER-VISIT CENSUS, OUTPATIENT

## 2019-02-15 PROCEDURE — 99211 OFF/OP EST MAY X REQ PHY/QHP: CPT

## 2019-02-15 ASSESSMENT — PAIN SCALES - GENERAL: PAIN_LEVEL_AT_REST: 2

## 2019-02-19 ENCOUNTER — HOSPITAL ENCOUNTER (OUTPATIENT)
Dept: OUTPATIENT SERVICES | Age: 64
Discharge: STILL ADMITTED OR ADMINISTRATIVE | End: 2019-02-19
Attending: NURSE PRACTITIONER

## 2019-02-19 VITALS
RESPIRATION RATE: 18 BRPM | SYSTOLIC BLOOD PRESSURE: 139 MMHG | HEART RATE: 81 BPM | OXYGEN SATURATION: 96 % | TEMPERATURE: 97.8 F | DIASTOLIC BLOOD PRESSURE: 91 MMHG

## 2019-02-19 DIAGNOSIS — L03.311 CELLULITIS OF ABDOMINAL WALL: Primary | ICD-10-CM

## 2019-02-19 PROCEDURE — 99211 OFF/OP EST MAY X REQ PHY/QHP: CPT

## 2019-02-19 PROCEDURE — 10004187 HB COUNTER-VISIT CENSUS, OUTPATIENT

## 2019-02-19 ASSESSMENT — PAIN SCALES - GENERAL: PAIN_LEVEL_AT_REST: 4

## 2019-02-20 ENCOUNTER — OFFICE VISIT (OUTPATIENT)
Dept: SURGERY | Age: 64
End: 2019-02-20

## 2019-02-20 VITALS
HEIGHT: 64 IN | DIASTOLIC BLOOD PRESSURE: 84 MMHG | RESPIRATION RATE: 18 BRPM | WEIGHT: 291 LBS | BODY MASS INDEX: 49.68 KG/M2 | SYSTOLIC BLOOD PRESSURE: 122 MMHG

## 2019-02-20 DIAGNOSIS — L03.319 CELLULITIS AND ABSCESS OF TRUNK: Primary | ICD-10-CM

## 2019-02-20 DIAGNOSIS — L02.219 CELLULITIS AND ABSCESS OF TRUNK: Primary | ICD-10-CM

## 2019-02-20 PROCEDURE — 99201 OFFICE/OUTPT VISIT,NEW,LEVL I: CPT | Performed by: SURGERY

## 2019-02-20 RX ORDER — LOSARTAN POTASSIUM 50 MG/1
50 TABLET ORAL DAILY
COMMUNITY
Start: 2018-12-16

## 2019-02-20 RX ORDER — FERROUS SULFATE 325(65) MG
325 TABLET ORAL DAILY
COMMUNITY
End: 2023-03-30

## 2019-02-20 RX ORDER — OLOPATADINE HYDROCHLORIDE 2 MG/ML
1 SOLUTION/ DROPS OPHTHALMIC DAILY
COMMUNITY
Start: 2019-02-02

## 2019-02-20 RX ORDER — GABAPENTIN 300 MG/1
300 CAPSULE ORAL 2 TIMES DAILY
COMMUNITY
Start: 2019-02-08

## 2019-02-20 RX ORDER — PIOGLITAZONEHYDROCHLORIDE 30 MG/1
30 TABLET ORAL DAILY
COMMUNITY
Start: 2019-02-04 | End: 2023-03-30

## 2019-02-22 ENCOUNTER — HOSPITAL ENCOUNTER (OUTPATIENT)
Dept: OUTPATIENT SERVICES | Age: 64
Discharge: STILL ADMITTED OR ADMINISTRATIVE | End: 2019-02-22
Attending: NURSE PRACTITIONER

## 2019-02-22 VITALS
RESPIRATION RATE: 20 BRPM | TEMPERATURE: 97.4 F | OXYGEN SATURATION: 100 % | HEART RATE: 66 BPM | SYSTOLIC BLOOD PRESSURE: 121 MMHG | DIASTOLIC BLOOD PRESSURE: 58 MMHG

## 2019-02-22 PROCEDURE — 99211 OFF/OP EST MAY X REQ PHY/QHP: CPT

## 2019-02-22 PROCEDURE — 10004187 HB COUNTER-VISIT CENSUS, OUTPATIENT

## 2019-02-25 ENCOUNTER — HOSPITAL ENCOUNTER (OUTPATIENT)
Dept: HYPERBARIC MEDICINE | Age: 64
Discharge: STILL ADMITTED OR ADMINISTRATIVE | End: 2019-02-25
Attending: PHYSICIAN ASSISTANT

## 2019-02-25 ENCOUNTER — APPOINTMENT (OUTPATIENT)
Dept: OUTPATIENT SERVICES | Age: 64
End: 2019-02-25
Attending: NURSE PRACTITIONER

## 2019-02-25 VITALS
OXYGEN SATURATION: 99 % | DIASTOLIC BLOOD PRESSURE: 83 MMHG | TEMPERATURE: 97.6 F | SYSTOLIC BLOOD PRESSURE: 146 MMHG | RESPIRATION RATE: 18 BRPM | HEART RATE: 66 BPM

## 2019-02-25 DIAGNOSIS — L03.311 CELLULITIS OF ABDOMINAL WALL: Primary | ICD-10-CM

## 2019-02-25 PROCEDURE — 10004185 HB COUNTER-VISIT  CENSUS

## 2019-02-25 PROCEDURE — 99212 OFFICE O/P EST SF 10 MIN: CPT

## 2019-02-25 PROCEDURE — 10004196 HB COUNTER-WOUNDCARE OP

## 2019-02-26 ENCOUNTER — APPOINTMENT (OUTPATIENT)
Dept: OUTPATIENT SERVICES | Age: 64
End: 2019-02-26
Attending: NURSE PRACTITIONER

## 2019-02-26 DIAGNOSIS — L02.213 ABSCESS OF CHEST WALL: Primary | ICD-10-CM

## 2019-02-27 ENCOUNTER — HOSPITAL ENCOUNTER (OUTPATIENT)
Dept: HYPERBARIC MEDICINE | Age: 64
Discharge: STILL ADMITTED OR ADMINISTRATIVE | End: 2019-02-27
Attending: PHYSICIAN ASSISTANT

## 2019-02-27 ENCOUNTER — APPOINTMENT (OUTPATIENT)
Dept: OUTPATIENT SERVICES | Age: 64
End: 2019-02-27
Attending: NURSE PRACTITIONER

## 2019-02-27 DIAGNOSIS — L03.311 CELLULITIS OF ABDOMINAL WALL: Primary | ICD-10-CM

## 2019-02-27 DIAGNOSIS — L02.213 CUTANEOUS ABSCESS OF CHEST WALL: Primary | ICD-10-CM

## 2019-02-27 DIAGNOSIS — L02.213 CUTANEOUS ABSCESS OF CHEST WALL: ICD-10-CM

## 2019-02-27 LAB
MRSA DNA SPEC QL NAA+PROBE: NOT DETECTED
MRSA SPEC QL CULT: NORMAL
REPORT STATUS (RPT): NORMAL
S AUREUS DNA SPEC QL NAA+PROBE: NOT DETECTED
SPECIMEN SOURCE: NORMAL
SPECIMEN SOURCE: NORMAL

## 2019-02-27 PROCEDURE — 99212 OFFICE O/P EST SF 10 MIN: CPT

## 2019-02-27 PROCEDURE — 10004185 HB COUNTER-VISIT  CENSUS

## 2019-02-27 PROCEDURE — 10004196 HB COUNTER-WOUNDCARE OP

## 2019-02-27 PROCEDURE — 87640 STAPH A DNA AMP PROBE: CPT

## 2019-02-28 ENCOUNTER — APPOINTMENT (OUTPATIENT)
Dept: OUTPATIENT SERVICES | Age: 64
End: 2019-02-28
Attending: NURSE PRACTITIONER

## 2019-03-01 ENCOUNTER — HOSPITAL ENCOUNTER (OUTPATIENT)
Dept: HYPERBARIC MEDICINE | Age: 64
Discharge: STILL ADMITTED OR ADMINISTRATIVE | End: 2019-03-01
Attending: PHYSICIAN ASSISTANT

## 2019-03-01 ENCOUNTER — APPOINTMENT (OUTPATIENT)
Dept: OUTPATIENT SERVICES | Age: 64
End: 2019-03-01
Attending: NURSE PRACTITIONER

## 2019-03-01 DIAGNOSIS — L03.311 CELLULITIS OF ABDOMINAL WALL: Primary | ICD-10-CM

## 2019-03-01 PROCEDURE — 10004196 HB COUNTER-WOUNDCARE OP

## 2019-03-01 PROCEDURE — 10004185 HB COUNTER-VISIT  CENSUS

## 2019-03-01 PROCEDURE — 99212 OFFICE O/P EST SF 10 MIN: CPT

## 2019-03-04 ENCOUNTER — HOSPITAL ENCOUNTER (OUTPATIENT)
Dept: HYPERBARIC MEDICINE | Age: 64
Discharge: STILL ADMITTED OR ADMINISTRATIVE | End: 2019-03-04
Attending: PHYSICIAN ASSISTANT

## 2019-03-04 DIAGNOSIS — L03.311 CELLULITIS OF ABDOMINAL WALL: Primary | ICD-10-CM

## 2019-03-04 PROCEDURE — 10004196 HB COUNTER-WOUNDCARE OP

## 2019-03-04 PROCEDURE — 99212 OFFICE O/P EST SF 10 MIN: CPT

## 2019-03-04 PROCEDURE — 10004185 HB COUNTER-VISIT  CENSUS

## 2019-03-06 ENCOUNTER — HOSPITAL ENCOUNTER (OUTPATIENT)
Dept: HYPERBARIC MEDICINE | Age: 64
Discharge: STILL ADMITTED OR ADMINISTRATIVE | End: 2019-03-06
Attending: PHYSICIAN ASSISTANT

## 2019-03-06 DIAGNOSIS — L03.311 CELLULITIS OF ABDOMINAL WALL: Primary | ICD-10-CM

## 2019-03-06 PROCEDURE — 99212 OFFICE O/P EST SF 10 MIN: CPT

## 2019-03-06 PROCEDURE — A6212 FOAM DRG <=16 SQ IN W/BORDER: HCPCS

## 2019-03-06 PROCEDURE — 10004185 HB COUNTER-VISIT  CENSUS

## 2019-03-06 PROCEDURE — 10004196 HB COUNTER-WOUNDCARE OP

## 2019-03-08 ENCOUNTER — HOSPITAL ENCOUNTER (OUTPATIENT)
Dept: HYPERBARIC MEDICINE | Age: 64
Discharge: STILL ADMITTED OR ADMINISTRATIVE | End: 2019-03-08
Attending: PHYSICIAN ASSISTANT

## 2019-03-08 DIAGNOSIS — L03.311 CELLULITIS OF ABDOMINAL WALL: Primary | ICD-10-CM

## 2019-03-08 PROCEDURE — 99212 OFFICE O/P EST SF 10 MIN: CPT

## 2019-03-08 PROCEDURE — A6212 FOAM DRG <=16 SQ IN W/BORDER: HCPCS

## 2019-03-08 PROCEDURE — 10004185 HB COUNTER-VISIT  CENSUS

## 2019-03-08 PROCEDURE — 10004196 HB COUNTER-WOUNDCARE OP

## 2019-03-11 ENCOUNTER — APPOINTMENT (OUTPATIENT)
Dept: HYPERBARIC MEDICINE | Age: 64
End: 2019-03-11
Attending: PHYSICIAN ASSISTANT

## 2019-03-12 ENCOUNTER — HOSPITAL ENCOUNTER (OUTPATIENT)
Dept: HYPERBARIC MEDICINE | Age: 64
Discharge: STILL ADMITTED OR ADMINISTRATIVE | End: 2019-03-12
Attending: PHYSICIAN ASSISTANT

## 2019-03-12 DIAGNOSIS — L03.311 CELLULITIS OF ABDOMINAL WALL: Primary | ICD-10-CM

## 2019-03-12 PROCEDURE — 10004196 HB COUNTER-WOUNDCARE OP

## 2019-03-12 PROCEDURE — 10004185 HB COUNTER-VISIT  CENSUS

## 2019-03-12 PROCEDURE — 99211 OFF/OP EST MAY X REQ PHY/QHP: CPT

## 2019-03-12 PROCEDURE — A6212 FOAM DRG <=16 SQ IN W/BORDER: HCPCS

## 2019-03-13 ENCOUNTER — APPOINTMENT (OUTPATIENT)
Dept: HYPERBARIC MEDICINE | Age: 64
End: 2019-03-13
Attending: PHYSICIAN ASSISTANT

## 2019-03-15 ENCOUNTER — HOSPITAL ENCOUNTER (OUTPATIENT)
Dept: HYPERBARIC MEDICINE | Age: 64
Discharge: STILL ADMITTED OR ADMINISTRATIVE | End: 2019-03-15
Attending: PHYSICIAN ASSISTANT

## 2019-03-15 DIAGNOSIS — L03.311 CELLULITIS OF ABDOMINAL WALL: Primary | ICD-10-CM

## 2019-03-15 PROCEDURE — 10004185 HB COUNTER-VISIT  CENSUS

## 2019-03-15 PROCEDURE — 99211 OFF/OP EST MAY X REQ PHY/QHP: CPT

## 2019-03-15 PROCEDURE — 10004196 HB COUNTER-WOUNDCARE OP

## 2019-03-15 PROCEDURE — A6212 FOAM DRG <=16 SQ IN W/BORDER: HCPCS

## 2019-03-19 ENCOUNTER — APPOINTMENT (OUTPATIENT)
Dept: HYPERBARIC MEDICINE | Age: 64
End: 2019-03-19
Attending: PHYSICIAN ASSISTANT

## 2019-03-22 ENCOUNTER — HOSPITAL ENCOUNTER (OUTPATIENT)
Dept: HYPERBARIC MEDICINE | Age: 64
Discharge: STILL ADMITTED OR ADMINISTRATIVE | End: 2019-03-22
Attending: PHYSICIAN ASSISTANT

## 2019-03-22 DIAGNOSIS — L03.311 CELLULITIS OF ABDOMINAL WALL: Primary | ICD-10-CM

## 2019-03-22 PROCEDURE — 10004196 HB COUNTER-WOUNDCARE OP

## 2019-03-22 PROCEDURE — 99211 OFF/OP EST MAY X REQ PHY/QHP: CPT

## 2019-03-22 PROCEDURE — 10004185 HB COUNTER-VISIT  CENSUS

## 2019-03-22 PROCEDURE — A6212 FOAM DRG <=16 SQ IN W/BORDER: HCPCS

## 2019-03-26 ENCOUNTER — HOSPITAL ENCOUNTER (OUTPATIENT)
Dept: HYPERBARIC MEDICINE | Age: 64
Discharge: STILL ADMITTED OR ADMINISTRATIVE | End: 2019-03-26
Attending: PHYSICIAN ASSISTANT

## 2019-03-26 DIAGNOSIS — L03.311 CELLULITIS OF ABDOMINAL WALL: Primary | ICD-10-CM

## 2019-03-26 PROCEDURE — A6212 FOAM DRG <=16 SQ IN W/BORDER: HCPCS

## 2019-03-26 PROCEDURE — 10004196 HB COUNTER-WOUNDCARE OP

## 2019-03-26 PROCEDURE — 99211 OFF/OP EST MAY X REQ PHY/QHP: CPT

## 2019-03-26 PROCEDURE — 10004185 HB COUNTER-VISIT  CENSUS

## 2019-03-29 ENCOUNTER — HOSPITAL ENCOUNTER (OUTPATIENT)
Dept: HYPERBARIC MEDICINE | Age: 64
Discharge: STILL ADMITTED OR ADMINISTRATIVE | End: 2019-03-29
Attending: PHYSICIAN ASSISTANT

## 2019-03-29 DIAGNOSIS — L03.311 CELLULITIS OF ABDOMINAL WALL: Primary | ICD-10-CM

## 2019-03-29 PROCEDURE — 10004196 HB COUNTER-WOUNDCARE OP

## 2019-03-29 PROCEDURE — A6213 FOAM DRG >16<=48 SQ IN W/BDR: HCPCS

## 2019-03-29 PROCEDURE — 10004185 HB COUNTER-VISIT  CENSUS

## 2019-03-29 PROCEDURE — 99212 OFFICE O/P EST SF 10 MIN: CPT

## 2019-04-24 ENCOUNTER — APPOINTMENT (OUTPATIENT)
Dept: HYPERBARIC MEDICINE | Age: 64
End: 2019-04-24
Attending: PHYSICIAN ASSISTANT

## 2019-05-08 ENCOUNTER — HOSPITAL ENCOUNTER (OUTPATIENT)
Dept: HYPERBARIC MEDICINE | Age: 64
Discharge: STILL ADMITTED OR ADMINISTRATIVE | End: 2019-05-08
Attending: PHYSICIAN ASSISTANT

## 2019-05-08 VITALS
HEART RATE: 55 BPM | SYSTOLIC BLOOD PRESSURE: 110 MMHG | TEMPERATURE: 96.3 F | RESPIRATION RATE: 18 BRPM | DIASTOLIC BLOOD PRESSURE: 71 MMHG

## 2019-05-08 PROCEDURE — 10004196 HB COUNTER-WOUNDCARE OP

## 2019-05-08 PROCEDURE — 10004185 HB COUNTER-VISIT  CENSUS

## 2019-05-08 PROCEDURE — 99211 OFF/OP EST MAY X REQ PHY/QHP: CPT

## 2019-09-16 ENCOUNTER — HOSPITAL ENCOUNTER (OUTPATIENT)
Age: 64
End: 2019-09-16
Attending: INTERNAL MEDICINE | Admitting: INTERNAL MEDICINE

## 2022-05-05 ENCOUNTER — HOSPITAL ENCOUNTER (OUTPATIENT)
Age: 67
End: 2022-05-05
Attending: INTERNAL MEDICINE | Admitting: INTERNAL MEDICINE

## 2022-07-08 DIAGNOSIS — Z11.52 ENCOUNTER FOR PREPROCEDURE SCREENING LABORATORY TESTING FOR COVID-19: Primary | ICD-10-CM

## 2022-07-08 DIAGNOSIS — Z01.812 ENCOUNTER FOR PREPROCEDURE SCREENING LABORATORY TESTING FOR COVID-19: Primary | ICD-10-CM

## 2022-11-15 ENCOUNTER — HOSPITAL ENCOUNTER (OUTPATIENT)
Age: 67
End: 2022-11-15
Attending: INTERNAL MEDICINE | Admitting: INTERNAL MEDICINE

## 2023-01-23 ENCOUNTER — ANESTHESIA EVENT (OUTPATIENT)
Dept: SURGERY | Age: 68
End: 2023-01-23

## 2023-01-23 RX ORDER — NICOTINE POLACRILEX 4 MG
15 LOZENGE BUCCAL
Status: CANCELLED | OUTPATIENT
Start: 2023-01-23

## 2023-01-23 RX ORDER — DEXTROSE MONOHYDRATE 25 G/50ML
25 INJECTION, SOLUTION INTRAVENOUS PRN
Status: CANCELLED | OUTPATIENT
Start: 2023-01-23

## 2023-01-23 RX ORDER — HUMAN INSULIN 100 [IU]/ML
INJECTION, SOLUTION SUBCUTANEOUS
Status: CANCELLED | OUTPATIENT
Start: 2023-01-23

## 2023-01-23 RX ORDER — 0.9 % SODIUM CHLORIDE 0.9 %
2 VIAL (ML) INJECTION EVERY 12 HOURS SCHEDULED
Status: CANCELLED | OUTPATIENT
Start: 2023-01-23

## 2023-01-23 RX ORDER — SODIUM CHLORIDE, SODIUM LACTATE, POTASSIUM CHLORIDE, CALCIUM CHLORIDE 600; 310; 30; 20 MG/100ML; MG/100ML; MG/100ML; MG/100ML
INJECTION, SOLUTION INTRAVENOUS CONTINUOUS
Status: CANCELLED | OUTPATIENT
Start: 2023-01-23

## 2023-01-23 RX ORDER — MIDAZOLAM HYDROCHLORIDE 1 MG/ML
2 INJECTION, SOLUTION INTRAMUSCULAR; INTRAVENOUS
Status: CANCELLED | OUTPATIENT
Start: 2023-01-23

## 2023-01-23 RX ORDER — LIDOCAINE HYDROCHLORIDE 10 MG/ML
5-10 INJECTION, SOLUTION EPIDURAL; INFILTRATION; INTRACAUDAL; PERINEURAL PRN
Status: CANCELLED | OUTPATIENT
Start: 2023-01-23

## 2023-01-23 RX ORDER — SODIUM CHLORIDE 9 MG/ML
INJECTION, SOLUTION INTRAVENOUS CONTINUOUS
Status: CANCELLED | OUTPATIENT
Start: 2023-01-23

## 2023-01-24 ENCOUNTER — ANESTHESIA (OUTPATIENT)
Dept: SURGERY | Age: 68
End: 2023-01-24

## 2023-03-03 ENCOUNTER — INPATIENT (INPATIENT)
Facility: HOSPITAL | Age: 68
LOS: 45 days | Discharge: NURSING, REHAB OR LONG-TERM CARE | DRG: 853 | End: 2023-04-18
Attending: INTERNAL MEDICINE | Admitting: HOSPITALIST
Payer: MEDICARE

## 2023-03-03 VITALS
OXYGEN SATURATION: 98 % | SYSTOLIC BLOOD PRESSURE: 142 MMHG | DIASTOLIC BLOOD PRESSURE: 71 MMHG | TEMPERATURE: 98 F | HEART RATE: 116 BPM | RESPIRATION RATE: 20 BRPM

## 2023-03-03 PROCEDURE — 93010 ELECTROCARDIOGRAM REPORT: CPT

## 2023-03-03 PROCEDURE — 99285 EMERGENCY DEPT VISIT HI MDM: CPT

## 2023-03-04 DIAGNOSIS — R77.8 OTHER SPECIFIED ABNORMALITIES OF PLASMA PROTEINS: ICD-10-CM

## 2023-03-04 LAB
ALBUMIN SERPL ELPH-MCNC: 3.4 G/DL — LOW (ref 3.5–5.2)
ALP SERPL-CCNC: 71 U/L — SIGNIFICANT CHANGE UP (ref 30–115)
ALT FLD-CCNC: 14 U/L — SIGNIFICANT CHANGE UP (ref 0–41)
AMMONIA BLD-MCNC: 31 UMOL/L — SIGNIFICANT CHANGE UP (ref 11–55)
ANION GAP SERPL CALC-SCNC: 16 MMOL/L — HIGH (ref 7–14)
APPEARANCE UR: ABNORMAL
APTT BLD: 25.6 SEC — LOW (ref 27–39.2)
AST SERPL-CCNC: 44 U/L — HIGH (ref 0–41)
BASE EXCESS BLDV CALC-SCNC: 4.1 MMOL/L — HIGH (ref -2–3)
BASOPHILS # BLD AUTO: 0.04 K/UL — SIGNIFICANT CHANGE UP (ref 0–0.2)
BASOPHILS NFR BLD AUTO: 0.2 % — SIGNIFICANT CHANGE UP (ref 0–1)
BILIRUB SERPL-MCNC: 0.3 MG/DL — SIGNIFICANT CHANGE UP (ref 0.2–1.2)
BILIRUB UR-MCNC: NEGATIVE — SIGNIFICANT CHANGE UP
BUN SERPL-MCNC: 28 MG/DL — HIGH (ref 10–20)
CA-I SERPL-SCNC: 1.23 MMOL/L — SIGNIFICANT CHANGE UP (ref 1.15–1.33)
CALCIUM SERPL-MCNC: 9.9 MG/DL — SIGNIFICANT CHANGE UP (ref 8.4–10.5)
CHLORIDE SERPL-SCNC: 99 MMOL/L — SIGNIFICANT CHANGE UP (ref 98–110)
CK SERPL-CCNC: 1462 U/L — HIGH (ref 0–225)
CO2 SERPL-SCNC: 25 MMOL/L — SIGNIFICANT CHANGE UP (ref 17–32)
COLOR SPEC: YELLOW — SIGNIFICANT CHANGE UP
CREAT SERPL-MCNC: 1.1 MG/DL — SIGNIFICANT CHANGE UP (ref 0.7–1.5)
DIFF PNL FLD: NEGATIVE — SIGNIFICANT CHANGE UP
EGFR: 55 ML/MIN/1.73M2 — LOW
EOSINOPHIL # BLD AUTO: 0 K/UL — SIGNIFICANT CHANGE UP (ref 0–0.7)
EOSINOPHIL NFR BLD AUTO: 0 % — SIGNIFICANT CHANGE UP (ref 0–8)
ETHANOL SERPL-MCNC: <10 MG/DL — SIGNIFICANT CHANGE UP
FLUAV AG NPH QL: SIGNIFICANT CHANGE UP
FLUBV AG NPH QL: SIGNIFICANT CHANGE UP
GAS PNL BLDV: 131 MMOL/L — LOW (ref 136–145)
GAS PNL BLDV: SIGNIFICANT CHANGE UP
GAS PNL BLDV: SIGNIFICANT CHANGE UP
GLUCOSE SERPL-MCNC: 121 MG/DL — HIGH (ref 70–99)
GLUCOSE UR QL: NEGATIVE — SIGNIFICANT CHANGE UP
HCO3 BLDV-SCNC: 32 MMOL/L — HIGH (ref 22–29)
HCT VFR BLD CALC: 44.7 % — SIGNIFICANT CHANGE UP (ref 37–47)
HCT VFR BLDA CALC: 46 % — SIGNIFICANT CHANGE UP (ref 39–51)
HGB BLD CALC-MCNC: 15.3 G/DL — SIGNIFICANT CHANGE UP (ref 12.6–17.4)
HGB BLD-MCNC: 14.2 G/DL — SIGNIFICANT CHANGE UP (ref 12–16)
IMM GRANULOCYTES NFR BLD AUTO: 0.6 % — HIGH (ref 0.1–0.3)
INR BLD: 1.11 RATIO — SIGNIFICANT CHANGE UP (ref 0.65–1.3)
KETONES UR-MCNC: SIGNIFICANT CHANGE UP
LACTATE BLDV-MCNC: 3 MMOL/L — HIGH (ref 0.5–2)
LEUKOCYTE ESTERASE UR-ACNC: ABNORMAL
LYMPHOCYTES # BLD AUTO: 13.6 % — LOW (ref 20.5–51.1)
LYMPHOCYTES # BLD AUTO: 2.67 K/UL — SIGNIFICANT CHANGE UP (ref 1.2–3.4)
MAGNESIUM SERPL-MCNC: 2.2 MG/DL — SIGNIFICANT CHANGE UP (ref 1.8–2.4)
MCHC RBC-ENTMCNC: 28.7 PG — SIGNIFICANT CHANGE UP (ref 27–31)
MCHC RBC-ENTMCNC: 31.8 G/DL — LOW (ref 32–37)
MCV RBC AUTO: 90.5 FL — SIGNIFICANT CHANGE UP (ref 81–99)
MONOCYTES # BLD AUTO: 1.48 K/UL — HIGH (ref 0.1–0.6)
MONOCYTES NFR BLD AUTO: 7.5 % — SIGNIFICANT CHANGE UP (ref 1.7–9.3)
NEUTROPHILS # BLD AUTO: 15.32 K/UL — HIGH (ref 1.4–6.5)
NEUTROPHILS NFR BLD AUTO: 78.1 % — HIGH (ref 42.2–75.2)
NITRITE UR-MCNC: NEGATIVE — SIGNIFICANT CHANGE UP
NRBC # BLD: 0 /100 WBCS — SIGNIFICANT CHANGE UP (ref 0–0)
PCO2 BLDV: 59 MMHG — HIGH (ref 39–42)
PH BLDV: 7.34 — SIGNIFICANT CHANGE UP (ref 7.32–7.43)
PH UR: 6 — SIGNIFICANT CHANGE UP (ref 5–8)
PLATELET # BLD AUTO: 459 K/UL — HIGH (ref 130–400)
PO2 BLDV: 26 MMHG — SIGNIFICANT CHANGE UP
POTASSIUM BLDV-SCNC: 5.1 MMOL/L — SIGNIFICANT CHANGE UP (ref 3.5–5.1)
POTASSIUM SERPL-MCNC: 5.7 MMOL/L — HIGH (ref 3.5–5)
POTASSIUM SERPL-SCNC: 5.7 MMOL/L — HIGH (ref 3.5–5)
PROT SERPL-MCNC: 7.4 G/DL — SIGNIFICANT CHANGE UP (ref 6–8)
PROT UR-MCNC: ABNORMAL
PROTHROM AB SERPL-ACNC: 12.7 SEC — SIGNIFICANT CHANGE UP (ref 9.95–12.87)
RBC # BLD: 4.94 M/UL — SIGNIFICANT CHANGE UP (ref 4.2–5.4)
RBC # FLD: 13.2 % — SIGNIFICANT CHANGE UP (ref 11.5–14.5)
RSV RNA NPH QL NAA+NON-PROBE: SIGNIFICANT CHANGE UP
SAO2 % BLDV: 34.1 % — SIGNIFICANT CHANGE UP
SARS-COV-2 RNA SPEC QL NAA+PROBE: SIGNIFICANT CHANGE UP
SODIUM SERPL-SCNC: 140 MMOL/L — SIGNIFICANT CHANGE UP (ref 135–146)
SP GR SPEC: 1.03 — SIGNIFICANT CHANGE UP (ref 1.01–1.03)
TROPONIN T SERPL-MCNC: 0.05 NG/ML — CRITICAL HIGH
UROBILINOGEN FLD QL: SIGNIFICANT CHANGE UP
VALPROATE SERPL-MCNC: 87 UG/ML — SIGNIFICANT CHANGE UP (ref 50–100)
WBC # BLD: 19.62 K/UL — HIGH (ref 4.8–10.8)
WBC # FLD AUTO: 19.62 K/UL — HIGH (ref 4.8–10.8)

## 2023-03-04 PROCEDURE — 83735 ASSAY OF MAGNESIUM: CPT

## 2023-03-04 PROCEDURE — 84145 PROCALCITONIN (PCT): CPT

## 2023-03-04 PROCEDURE — 93005 ELECTROCARDIOGRAM TRACING: CPT

## 2023-03-04 PROCEDURE — 87040 BLOOD CULTURE FOR BACTERIA: CPT

## 2023-03-04 PROCEDURE — 71045 X-RAY EXAM CHEST 1 VIEW: CPT

## 2023-03-04 PROCEDURE — 85027 COMPLETE CBC AUTOMATED: CPT

## 2023-03-04 PROCEDURE — 70496 CT ANGIOGRAPHY HEAD: CPT | Mod: 26,MA

## 2023-03-04 PROCEDURE — 82962 GLUCOSE BLOOD TEST: CPT

## 2023-03-04 PROCEDURE — 73700 CT LOWER EXTREMITY W/O DYE: CPT | Mod: LT

## 2023-03-04 PROCEDURE — U0005: CPT

## 2023-03-04 PROCEDURE — 95700 EEG CONT REC W/VID EEG TECH: CPT

## 2023-03-04 PROCEDURE — 83605 ASSAY OF LACTIC ACID: CPT

## 2023-03-04 PROCEDURE — 84100 ASSAY OF PHOSPHORUS: CPT

## 2023-03-04 PROCEDURE — 72141 MRI NECK SPINE W/O DYE: CPT

## 2023-03-04 PROCEDURE — 71260 CT THORAX DX C+: CPT | Mod: 26,MA

## 2023-03-04 PROCEDURE — 71045 X-RAY EXAM CHEST 1 VIEW: CPT | Mod: 26

## 2023-03-04 PROCEDURE — 86900 BLOOD TYPING SEROLOGIC ABO: CPT

## 2023-03-04 PROCEDURE — 84443 ASSAY THYROID STIM HORMONE: CPT

## 2023-03-04 PROCEDURE — 92526 ORAL FUNCTION THERAPY: CPT | Mod: GN

## 2023-03-04 PROCEDURE — 70498 CT ANGIOGRAPHY NECK: CPT | Mod: 26,MA

## 2023-03-04 PROCEDURE — 86923 COMPATIBILITY TEST ELECTRIC: CPT

## 2023-03-04 PROCEDURE — 95819 EEG AWAKE AND ASLEEP: CPT

## 2023-03-04 PROCEDURE — P9016: CPT

## 2023-03-04 PROCEDURE — 95714 VEEG EA 12-26 HR UNMNTR: CPT

## 2023-03-04 PROCEDURE — 81003 URINALYSIS AUTO W/O SCOPE: CPT

## 2023-03-04 PROCEDURE — 99222 1ST HOSP IP/OBS MODERATE 55: CPT

## 2023-03-04 PROCEDURE — 93925 LOWER EXTREMITY STUDY: CPT

## 2023-03-04 PROCEDURE — 80202 ASSAY OF VANCOMYCIN: CPT

## 2023-03-04 PROCEDURE — 36415 COLL VENOUS BLD VENIPUNCTURE: CPT

## 2023-03-04 PROCEDURE — 82550 ASSAY OF CK (CPK): CPT

## 2023-03-04 PROCEDURE — 70450 CT HEAD/BRAIN W/O DYE: CPT | Mod: 26,MA,59

## 2023-03-04 PROCEDURE — 97530 THERAPEUTIC ACTIVITIES: CPT | Mod: GP

## 2023-03-04 PROCEDURE — 86850 RBC ANTIBODY SCREEN: CPT

## 2023-03-04 PROCEDURE — 87086 URINE CULTURE/COLONY COUNT: CPT

## 2023-03-04 PROCEDURE — 70551 MRI BRAIN STEM W/O DYE: CPT

## 2023-03-04 PROCEDURE — 93306 TTE W/DOPPLER COMPLETE: CPT

## 2023-03-04 PROCEDURE — 72170 X-RAY EXAM OF PELVIS: CPT | Mod: 26

## 2023-03-04 PROCEDURE — 36430 TRANSFUSION BLD/BLD COMPNT: CPT

## 2023-03-04 PROCEDURE — 85610 PROTHROMBIN TIME: CPT

## 2023-03-04 PROCEDURE — 92610 EVALUATE SWALLOWING FUNCTION: CPT | Mod: GN

## 2023-03-04 PROCEDURE — 73070 X-RAY EXAM OF ELBOW: CPT | Mod: LT

## 2023-03-04 PROCEDURE — 73620 X-RAY EXAM OF FOOT: CPT | Mod: LT

## 2023-03-04 PROCEDURE — 85025 COMPLETE CBC W/AUTO DIFF WBC: CPT

## 2023-03-04 PROCEDURE — 87635 SARS-COV-2 COVID-19 AMP PRB: CPT

## 2023-03-04 PROCEDURE — 87075 CULTR BACTERIA EXCEPT BLOOD: CPT

## 2023-03-04 PROCEDURE — 85045 AUTOMATED RETICULOCYTE COUNT: CPT

## 2023-03-04 PROCEDURE — 80053 COMPREHEN METABOLIC PANEL: CPT

## 2023-03-04 PROCEDURE — 87186 SC STD MICRODIL/AGAR DIL: CPT

## 2023-03-04 PROCEDURE — 74177 CT ABD & PELVIS W/CONTRAST: CPT | Mod: 26,MA

## 2023-03-04 PROCEDURE — 83615 LACTATE (LD) (LDH) ENZYME: CPT

## 2023-03-04 PROCEDURE — 83540 ASSAY OF IRON: CPT

## 2023-03-04 PROCEDURE — 0225U NFCT DS DNA&RNA 21 SARSCOV2: CPT

## 2023-03-04 PROCEDURE — 74177 CT ABD & PELVIS W/CONTRAST: CPT

## 2023-03-04 PROCEDURE — 80164 ASSAY DIPROPYLACETIC ACD TOT: CPT

## 2023-03-04 PROCEDURE — 97110 THERAPEUTIC EXERCISES: CPT | Mod: GP

## 2023-03-04 PROCEDURE — 71260 CT THORAX DX C+: CPT

## 2023-03-04 PROCEDURE — 81001 URINALYSIS AUTO W/SCOPE: CPT

## 2023-03-04 PROCEDURE — 80048 BASIC METABOLIC PNL TOTAL CA: CPT

## 2023-03-04 PROCEDURE — 82728 ASSAY OF FERRITIN: CPT

## 2023-03-04 PROCEDURE — 93971 EXTREMITY STUDY: CPT | Mod: LT

## 2023-03-04 PROCEDURE — 99223 1ST HOSP IP/OBS HIGH 75: CPT

## 2023-03-04 PROCEDURE — 84436 ASSAY OF TOTAL THYROXINE: CPT

## 2023-03-04 PROCEDURE — 97162 PT EVAL MOD COMPLEX 30 MIN: CPT | Mod: GP

## 2023-03-04 PROCEDURE — 87070 CULTURE OTHR SPECIMN AEROBIC: CPT

## 2023-03-04 PROCEDURE — 93970 EXTREMITY STUDY: CPT

## 2023-03-04 PROCEDURE — 72125 CT NECK SPINE W/O DYE: CPT | Mod: 26,MA

## 2023-03-04 PROCEDURE — 84439 ASSAY OF FREE THYROXINE: CPT

## 2023-03-04 PROCEDURE — 84484 ASSAY OF TROPONIN QUANT: CPT

## 2023-03-04 PROCEDURE — 86901 BLOOD TYPING SEROLOGIC RH(D): CPT

## 2023-03-04 PROCEDURE — 82607 VITAMIN B-12: CPT

## 2023-03-04 PROCEDURE — 73030 X-RAY EXAM OF SHOULDER: CPT | Mod: LT

## 2023-03-04 PROCEDURE — U0003: CPT

## 2023-03-04 PROCEDURE — 82746 ASSAY OF FOLIC ACID SERUM: CPT

## 2023-03-04 PROCEDURE — 87077 CULTURE AEROBIC IDENTIFY: CPT

## 2023-03-04 PROCEDURE — 85730 THROMBOPLASTIN TIME PARTIAL: CPT

## 2023-03-04 RX ORDER — DEXAMETHASONE 0.5 MG/5ML
10 ELIXIR ORAL ONCE
Refills: 0 | Status: COMPLETED | OUTPATIENT
Start: 2023-03-04 | End: 2023-03-04

## 2023-03-04 RX ORDER — SODIUM CHLORIDE 9 MG/ML
1000 INJECTION INTRAMUSCULAR; INTRAVENOUS; SUBCUTANEOUS ONCE
Refills: 0 | Status: COMPLETED | OUTPATIENT
Start: 2023-03-04 | End: 2023-03-04

## 2023-03-04 RX ORDER — ACETAMINOPHEN 500 MG
325 TABLET ORAL ONCE
Refills: 0 | Status: DISCONTINUED | OUTPATIENT
Start: 2023-03-04 | End: 2023-04-18

## 2023-03-04 RX ORDER — VANCOMYCIN HCL 1 G
1000 VIAL (EA) INTRAVENOUS ONCE
Refills: 0 | Status: COMPLETED | OUTPATIENT
Start: 2023-03-04 | End: 2023-03-04

## 2023-03-04 RX ORDER — SODIUM CHLORIDE 9 MG/ML
1000 INJECTION INTRAMUSCULAR; INTRAVENOUS; SUBCUTANEOUS
Refills: 0 | Status: DISCONTINUED | OUTPATIENT
Start: 2023-03-04 | End: 2023-03-05

## 2023-03-04 RX ORDER — CEFTRIAXONE 500 MG/1
1000 INJECTION, POWDER, FOR SOLUTION INTRAMUSCULAR; INTRAVENOUS EVERY 24 HOURS
Refills: 0 | Status: COMPLETED | OUTPATIENT
Start: 2023-03-04 | End: 2023-03-06

## 2023-03-04 RX ORDER — CEFEPIME 1 G/1
1000 INJECTION, POWDER, FOR SOLUTION INTRAMUSCULAR; INTRAVENOUS ONCE
Refills: 0 | Status: COMPLETED | OUTPATIENT
Start: 2023-03-04 | End: 2023-03-04

## 2023-03-04 RX ORDER — SODIUM CHLORIDE 9 MG/ML
1000 INJECTION, SOLUTION INTRAVENOUS ONCE
Refills: 0 | Status: DISCONTINUED | OUTPATIENT
Start: 2023-03-04 | End: 2023-03-04

## 2023-03-04 RX ORDER — SODIUM CHLORIDE 9 MG/ML
1000 INJECTION, SOLUTION INTRAVENOUS ONCE
Refills: 0 | Status: COMPLETED | OUTPATIENT
Start: 2023-03-04 | End: 2023-03-04

## 2023-03-04 RX ORDER — LEVETIRACETAM 250 MG/1
1000 TABLET, FILM COATED ORAL ONCE
Refills: 0 | Status: COMPLETED | OUTPATIENT
Start: 2023-03-04 | End: 2023-03-04

## 2023-03-04 RX ORDER — ENOXAPARIN SODIUM 100 MG/ML
40 INJECTION SUBCUTANEOUS EVERY 24 HOURS
Refills: 0 | Status: DISCONTINUED | OUTPATIENT
Start: 2023-03-04 | End: 2023-04-14

## 2023-03-04 RX ORDER — ACETAMINOPHEN 500 MG
650 TABLET ORAL ONCE
Refills: 0 | Status: COMPLETED | OUTPATIENT
Start: 2023-03-04 | End: 2023-03-04

## 2023-03-04 RX ADMIN — SODIUM CHLORIDE 1000 MILLILITER(S): 9 INJECTION, SOLUTION INTRAVENOUS at 04:12

## 2023-03-04 RX ADMIN — Medication 102 MILLIGRAM(S): at 04:12

## 2023-03-04 RX ADMIN — CEFTRIAXONE 100 MILLIGRAM(S): 500 INJECTION, POWDER, FOR SOLUTION INTRAMUSCULAR; INTRAVENOUS at 14:25

## 2023-03-04 RX ADMIN — CEFEPIME 100 MILLIGRAM(S): 1 INJECTION, POWDER, FOR SOLUTION INTRAMUSCULAR; INTRAVENOUS at 05:07

## 2023-03-04 RX ADMIN — SODIUM CHLORIDE 60 MILLILITER(S): 9 INJECTION INTRAMUSCULAR; INTRAVENOUS; SUBCUTANEOUS at 22:19

## 2023-03-04 RX ADMIN — SODIUM CHLORIDE 60 MILLILITER(S): 9 INJECTION INTRAMUSCULAR; INTRAVENOUS; SUBCUTANEOUS at 11:12

## 2023-03-04 RX ADMIN — Medication 250 MILLIGRAM(S): at 06:28

## 2023-03-04 RX ADMIN — LEVETIRACETAM 400 MILLIGRAM(S): 250 TABLET, FILM COATED ORAL at 04:12

## 2023-03-04 RX ADMIN — ENOXAPARIN SODIUM 40 MILLIGRAM(S): 100 INJECTION SUBCUTANEOUS at 22:19

## 2023-03-04 RX ADMIN — Medication 650 MILLIGRAM(S): at 04:12

## 2023-03-04 RX ADMIN — SODIUM CHLORIDE 1000 MILLILITER(S): 9 INJECTION INTRAMUSCULAR; INTRAVENOUS; SUBCUTANEOUS at 06:29

## 2023-03-04 NOTE — PATIENT PROFILE ADULT - FALL HARM RISK - HARM RISK INTERVENTIONS

## 2023-03-04 NOTE — ED PROVIDER NOTE - PROGRESS NOTE DETAILS
PS: As per EMS papers, pt has history of CVA, seizure disorder, HLD, on ASA and plavix Patient is unable to provide any information regarding her health/illness or daughter name/phone number. EMS papers reviewed. Spoke with ED clerical staff, they did not receive any information to include in patient registration. At this time, unable to find patient daughter phone number to reach her to obtain patient medical information and to update patient clinical status. Patient with difficult IV access, US guidance used and placed IV and sent patient to radiology for CT. Neurology on call consulted and requested for their evaluation and care. Patient remained stable in ED, signed out to Dr. Dowd, pending UA, CT chest/abd results, neurology evaluation and repeat lactate. Patient remained stable in ED, signed out to Dr. Dowd at shift change, pending UA, CT chest/abd results, neurology evaluation, LP as needed repeat lactate and admission.

## 2023-03-04 NOTE — ED ADULT NURSE REASSESSMENT NOTE - NS ED NURSE REASSESS COMMENT FT1
Received pt from previous RN, Pt alert and oriented x1 to person. NAD. VSS stable. Denies any discomfort upon rounding. Safety measures maintained. Call bell within reach. Awaiting transport to .

## 2023-03-04 NOTE — ED PROVIDER NOTE - ATTENDING APP SHARED VISIT CONTRIBUTION OF CARE
Patient is BIB EMS for evaluation of Patient is BIB EMS for evaluation of generalized pain. Patient daughter was not in ED to obtain the information. As per Triage staff, daughter told EMS that, she was coming, but has not come to ED yet. EMS papers reviewed when available. Patient is unable to provide her daughter's phone number. Patient also is unable to give the details on her health/illness. Patient told her daughter name as " Vesta Naqvi", but could not recall her phone number. As per ED clerical staff, no family members came to ED to register her, so no demographic information is available.   +dried gastric contents on the Lt side of the face, upper chest and on the mask noted, appears dried vomitus.   Vitals reviewed.   Patient both upper and lower extremities are cold to touch, but as per EMS papers, she was in the car, not outside on the street.   Patient is resting with neck holding to LT side, and noted pressure point skin changes on Lt lateral chin and Lt upper chin area. Patient is c/o neck pain for a while. Patient is c/o generalized pain, but denies any other symptoms.   Patient with h/o previous stroke and seizures as per EMS.   Patient RUE/RLE appears stronger than LT side, and noted patient is not moving LUE/LLE.   B/L knees on medial aspect has superficial blisters and appears ?pressure blisters.   Lungs: CTA, no wheezing, no crackles.  Abd: +BS, NT, ND, soft, no rebound, no guarding. No CVA tenderness, no rash.  Back: +stage I and II decubitus ulcers, no discharge noted.   CNS: Awake, answers simple questions, knows her name, follows simple commands,   RUE/RLE motor strength is 3/5; LUE/LLE motor strength is 0-1/5,   A/P: Patient was BIB EMS for evaluation of generalized pain. Patient with unknown baseline mental status, with h/o seizures and stroke.   List of patient medications from Epic EMR print out dated of August 2022 were found on the patient stretcher, ?left by EMS, which were reviewed and scanned to the chart.   Patient with cold extremities and warm core, rectal temp is 100.8,   will obtain labs, CT/CTA, COVID-19/flu test, UA, and reevaluate.   Neurology consult since unknown baseline and abnormal neurologic exam with h/o previous stroke and seizures.   IVF, IV abx, reevaluation.   Will continue to attempt to reach patient family, to found out the baseline mental status.   Patient appears to be non-ambulatory due to the decubitus ulcers and pressure point ulcers.

## 2023-03-04 NOTE — PATIENT PROFILE ADULT - DO YOU FEEL THREATENED BY OTHERS?
Continued Stay Note  GINA Clement     Patient Name: Wiley Winston  MRN: [id number]  Today's Date: 2/2/2018    Admit Date: 1/27/2018          Discharge Plan       02/02/18 0947    Case Management/Social Work Plan    Plan dc to [facility] today.    Additional Comments spoke with Elizabeth @ [facility] and she is aware of dc today. spoke with Ana NAVARRO and she is aware to call report to [phone]. spoke with Courtney-significant other and she is aware of dc today and will meet patient at facility. will continue to follow.               Discharge Codes     None        Expected Discharge Date and Time     Expected Discharge Date Expected Discharge Time    Feb 2, 2018             Zo Terrell RN     no

## 2023-03-04 NOTE — H&P ADULT - NSHPLABSRESULTS_GEN_ALL_CORE
LABS:  03-04 @ 07:04 Creatine 1462 U/L<H> [0 - 225]  cret                        14.2   19.62 )-----------( 459      ( 04 Mar 2023 03:28 )             44.7     03-04    140  |  99  |  28<H>  ----------------------------<  121<H>  5.7<H>   |  25  |  1.1    Ca    9.9      04 Mar 2023 03:28  Mg     2.2     03-04    TPro  7.4  /  Alb  3.4<L>  /  TBili  0.3  /  DBili  x   /  AST  44<H>  /  ALT  14  /  AlkPhos  71  03-04    PT/INR - ( 04 Mar 2023 03:28 )   PT: 12.70 sec;   INR: 1.11 ratio         PTT - ( 04 Mar 2023 03:28 )  PTT:25.6 sec

## 2023-03-04 NOTE — ED PROVIDER NOTE - PHYSICAL EXAMINATION
CONSTITUTIONAL: no apparent distress, and responsive to stimuli, but not answering Qs appropriately-unable to provide hx  EYES: PERRL; EOM intact.   NECK: Supple; non-tender; no cervical lymphadenopathy.   CARDIOVASCULAR: Normal S1, S2; no murmurs, rubs, or gallops.   RESPIRATORY: Normal chest excursion with respiration; breath sounds clear and equal bilaterally; no wheezes, rhonchi, or rales.  GI/: Non-distended; non-tender; no palpable organomegaly.   MS: No evidence of trauma or deformity. Normal ROM in all four extremities; non-tender to palpation; distal pulses are normal.   SKIN: pt feels cold to touch, but dry; good turgor; no apparent lesions or exudate.   NEURO/PSYCH: A & O x 1?

## 2023-03-04 NOTE — ED ADULT NURSE REASSESSMENT NOTE - NS ED NURSE REASSESS COMMENT FT1
pt remains lethargic, responsive to stimuli with periods of confusion, md team aware. pt placed in continuous cardiac monitoring. pt admitted to medicine floor. vss at this time. pt remains lethargic, responsive to stimuli remains disoriented times 4, md team aware. pt placed in continuous cardiac monitoring. pt admitted to medicine floor. vss at this time.

## 2023-03-04 NOTE — ED PROVIDER NOTE - OBJECTIVE STATEMENT
pt brought to ED via EMS who per triage note found pt on street. daughter per triage note told EMS that pt c/o generalized pain, however no family present. pt has never been to this hospital per records, no contact information in chart; pt unable to provide any hx and only states name as "deyvi" and says "stiff".

## 2023-03-04 NOTE — H&P ADULT - VTE RISK ASSESSMENT
VTE Assessment already completed for this visit You can access the FollowMyHealth Patient Portal offered by [facility] by registering at the following website: [email or web address]. By joining [facility]’s FollowMyHealth portal, you will also be able to view your health information using other applications (apps) compatible with our system.

## 2023-03-04 NOTE — ED ADULT NURSE REASSESSMENT NOTE - NS ED NURSE REASSESS COMMENT FT1
pt remains lethargic at times. pt disoriented , awake at this time and eating a bite sized soft tray as per order with assistance. pt for 3A , report given to RN .

## 2023-03-04 NOTE — ED PROVIDER NOTE - CLINICAL SUMMARY MEDICAL DECISION MAKING FREE TEXT BOX
Patient evaluated by ED team with possible AMS, no significant history available.  Patient responding in ED, answering questions ANO x2.  Per EMS papers per early providers patient with history of CVA and seizure disorder HLD and on aspirin and Plavix.  Patient treated with broad-spectrum antibiotics and pan scan imaging performed, noted to have renal calculus without obstruction.  Patient found to have UTI, admitted for continued work-up, further treatment and evaluation.

## 2023-03-04 NOTE — H&P ADULT - NSHPPHYSICALEXAM_GEN_ALL_CORE
CONSTITUTIONAL: no apparent distress, and responsive to stimuli, but not answering Qs appropriately-unable to provide hx  EYES: PERRL; EOM intact.   NECK: Supple; non-tender; no cervical lymphadenopathy.   CARDIOVASCULAR: Normal S1, S2; no murmurs, rubs, or gallops.   RESPIRATORY: Normal chest excursion with respiration; breath sounds clear and equal bilaterally; no wheezes, rhonchi, or rales.  GI/: Non-distended; non-tender; no palpable organomegaly.   MS: No evidence of trauma or deformity. Normal ROM in all four extremities; non-tender to palpation; distal pulses are normal.   SKIN: pt feels cold to touch, but dry; good turgor; no apparent lesions or exudate

## 2023-03-04 NOTE — CONSULT NOTE ADULT - ATTENDING COMMENTS
Pt w/ evidence of chronic R MCA on HCT w/ evidence of spastic LHP and R gaze preference.  No family available for patient's baseline.  No available past medical history currently here for ? encephalopathy.  Recommend REEG and clarify with family regarding pt baseline and prior workup for stroke.  Recommendations as above.

## 2023-03-04 NOTE — CONSULT NOTE ADULT - SUBJECTIVE AND OBJECTIVE BOX
Neurology Consult    Patient is a 67y old  Female with unclear PMH, the patient is poor historian, and no family member to check her medical history or the current presentation, and no phone numbers in the file. She was brought by EMS after the daughter left her in a car, and they brought her from the car. Patient was seen and examined at the bedside.     The patient denied headache, but she mentioned tenderness in her Lt arm, and Lt knee. She was able to answer a few questions, and followed some commands.     HPI:      PAST MEDICAL & SURGICAL HISTORY:      FAMILY HISTORY:      Social History: (-) x 3    Allergies    Allergy Status Unknown    Intolerances        MEDICATIONS  (STANDING):  acetaminophen  Suppository .. 325 milliGRAM(s) Rectal once    MEDICATIONS  (PRN):      Review of systems:    Constitutional: as per HPI  Eyes: No eye pain or discharge  ENMT:  No difficulty hearing; No sinus or throat pain  Neck: No pain or stiffness  Respiratory: No cough, wheezing, chills or hemoptysis  Cardiovascular: No chest pain, palpitations, shortness of breath, dyspnea on exertion  Gastrointestinal: No abdominal pain, nausea, vomiting or hematemesis; No diarrhea or constipation.   Genitourinary: No dysuria, frequency, hematuria or incontinence  Neurological: As per HPI  Skin: No rashes or lesions   Endocrine: No heat or cold intolerance; No hair loss  Musculoskeletal: No joint pain or swelling  Psychiatric: No depression, anxiety, mood swings  Heme/Lymph: No easy bruising or bleeding gums    Vital Signs Last 24 Hrs  T(C): 38.2 (04 Mar 2023 00:45), Max: 38.2 (04 Mar 2023 00:45)  T(F): 100.8 (04 Mar 2023 00:45), Max: 100.8 (04 Mar 2023 00:45)  HR: 116 (04 Mar 2023 04:01) (116 - 116)  BP: 122/77 (04 Mar 2023 04:01) (122/77 - 142/71)  BP(mean): --  RR: 19 (04 Mar 2023 04:01) (19 - 20)  SpO2: 98% (04 Mar 2023 04:01) (98% - 98%)    Parameters below as of 04 Mar 2023 04:01  Patient On (Oxygen Delivery Method): room air        Examination:  General:  Appearance is consistent with chronologic age.  No abnormal facies.  Gross skin survey showed a few ulcers at the Lt clavicle, Lt lower chin, medial aspect of both knees.    Cognitive/Language: sleepy, oriented to person, place, but not time and date. Language with normal repetition, comprehension and naming.  mild dysarthric.    Eyes: grossly intact VFF. Limited eyes movements, not fully following commands to determine the exact movements affected, with disconjugate gaze, but she was able to pursue target objects,  EOMI w/o nystagmus, skew or reported double vision.  PERRL.  No ptosis/weakness of eyelid closure.    Face: no facial asymmetry.    Ears/Nose/Throat:  Hearing grossly intact b/l.    Motor examination: Normal tone, bulk and range of motion.  No tenderness, twitching, tremors or involuntary movements.  Formal Muscle Strength Testing: (MRC grade R/L) 5/5 RUE; 2/5 LUE, 1/5 both legs.  No observable drift.  Reflexes: 1+ b/l biceps, triceps, brachioradialis, patella and Achilles.  Plantar response downgoing b/l.   Sensory examination: Intact to light touch and pinprick, pain, temperature and proprioception and vibration in all extremities.  The patient had pain on moving her neck, but she stated that it is old           Labs:   CBC Full  -  ( 04 Mar 2023 03:28 )  WBC Count : 19.62 K/uL  RBC Count : 4.94 M/uL  Hemoglobin : 14.2 g/dL  Hematocrit : 44.7 %  Platelet Count - Automated : 459 K/uL  Mean Cell Volume : 90.5 fL  Mean Cell Hemoglobin : 28.7 pg  Mean Cell Hemoglobin Concentration : 31.8 g/dL  Auto Neutrophil # : 15.32 K/uL  Auto Lymphocyte # : 2.67 K/uL  Auto Monocyte # : 1.48 K/uL  Auto Eosinophil # : 0.00 K/uL  Auto Basophil # : 0.04 K/uL  Auto Neutrophil % : 78.1 %  Auto Lymphocyte % : 13.6 %  Auto Monocyte % : 7.5 %  Auto Eosinophil % : 0.0 %  Auto Basophil % : 0.2 %    03-04    140  |  99  |  28<H>  ----------------------------<  121<H>  5.7<H>   |  25  |  1.1    Ca    9.9      04 Mar 2023 03:28  Mg     2.2     03-04    TPro  7.4  /  Alb  3.4<L>  /  TBili  0.3  /  DBili  x   /  AST  44<H>  /  ALT  14  /  AlkPhos  71  03-04    LIVER FUNCTIONS - ( 04 Mar 2023 03:28 )  Alb: 3.4 g/dL / Pro: 7.4 g/dL / ALK PHOS: 71 U/L / ALT: 14 U/L / AST: 44 U/L / GGT: x           PT/INR - ( 04 Mar 2023 03:28 )   PT: 12.70 sec;   INR: 1.11 ratio         PTT - ( 04 Mar 2023 03:28 )  PTT:25.6 sec  Urinalysis Basic - ( 04 Mar 2023 05:10 )    Color: Yellow / Appearance: Slightly Turbid / S.030 / pH: x  Gluc: x / Ketone: Trace  / Bili: Negative / Urobili: <2 mg/dL   Blood: x / Protein: 30 mg/dL / Nitrite: Negative   Leuk Esterase: Moderate / RBC: 3 /HPF /  /HPF   Sq Epi: x / Non Sq Epi: 1 /HPF / Bacteria: Many          Neuroimaging:  NCHCT: CT Head No Cont:   ACC: [id number] EXAM:  CT BRAIN   ORDERED BY: ORALIA PACK     PROCEDURE DATE:  2023          INTERPRETATION:  Clinical History/Reason For Exam: Changes in mental   status.    Technique: Multiple contiguous axial CT images were obtained from the  base of the skull to the vertex without administration of intravenous   contrast. Axial, coronal and sagittal images were reviewed.    No studies are available for direct comparison.    Findings:    The ventricles, basal cisterns and sulcal patternare prominent and   compatible with parenchymal volume loss with asymmetric enlargement of   the right lateral ventricle compatible with element of dilatation ex   vacuo. There is a large chronic infarct in the right frontal lobe. The   gray-white matter differentiation is preserved.    There is no acute mass effect, midline shift or intracranial hemorrhage.    Partial opacification of bilateral mastoid air cells..    No evidence of a depressed skull fracture.    Beam hardening artifact is noted overlying the brain stem and posterior   fossa which is inherent to CT in this location.      Impression:      No evidence of intracranial hemorrhage, territorial infarct, or mass   effect.    Partial opacification of bilateral mastoid air cells    --- End of Report ---            NATALIE GARCIA MD; Attending Radiologist  This document has been electronically signed. Mar  4 2023  2:18AM (23 @ 02:04)      23 @ 06:38

## 2023-03-04 NOTE — H&P ADULT - ASSESSMENT
This is a case of a 67 year old lady brought to ED via EMS who per triage note found pt on street. Per triage note, daughter told EMS that pt c/o generalized pain, however no family present.   Patient has never been to this hospital per records, no contact information in chart,   No infomation could be obtained on the name of any relatives or family members, home address, phone numbers, or any contact information.  Meds, medical and surgical history could not be obtained    # AMS  # Hx of R MCA stroke  # UTI  - In ED, hemodynamically stable. She was pan-scanned. no evidence of new stroke, trauma, or infection.  - WBC 19K  - UA +ve  - S/p cefepime and vanc in the ED  - C/w reocephin  - CTH showed old stroke in the territory of the Rt MCA.   - rEEG  - TSH, vitamin b12, and folate levels  - A1c, lipid profile in the AM  - Speech and swallow eval      Diet: pending S&S eval  Activity: IAT  DVT Prophylaxis: lovenox  GI Prophylaxis: not indication  CHG Order  Code Status: full  Disposition: admit

## 2023-03-04 NOTE — H&P ADULT - ATTENDING COMMENTS
67 year old lady brought to ED via EMS who per triage note found pt on street. Per triage note, daughter told EMS that pt c/o generalized pain, however no family present.     #Metabolic Encephalopathy  #Suspected UTI  #Chronic right frontal lobe infarct seen on CTH   WBC and Large LE  - Cont IV ceftriaxone for now  - f/u EEG  - S/S Eval  - f/u BCx, UCx  - Awaiting family member contact for more information on pt's baseline and presentation    DVT PPX, Lovenox    #Progress Note Handoff  Pending (specify):   Family discussion:  Disposition: 67 year old lady brought to ED via EMS who per triage note found pt on street. Per triage note, daughter told EMS that pt c/o generalized pain, however no family present.     #Metabolic Encephalopathy  #Suspected UTI  #Chronic right frontal lobe infarct seen on CTH   WBC and Large LE  - Cont IV ceftriaxone for now  - f/u EEG  - S/S Eval  - f/u BCx, UCx  - Awaiting family member contact for more information on pt's baseline and presentation    DVT PPX, Lovenox    #Progress Note Handoff  Pending (specify): EEG, BCx, UCx, more history from family members  Family discussion: No contact listed   Disposition: Home 67 year old lady brought to ED via EMS who per triage note found pt on street. Per triage note, daughter told EMS that pt c/o generalized pain, however no family present.     #Metabolic Encephalopathy  #Suspected UTI  #Chronic right frontal lobe infarct seen on CTH  #Sepsis, POA  Febrile 100.8 and tachycardic on admission   WBC and Large LE  - Cont IV ceftriaxone for now  - f/u EEG  - S/S Eval  - f/u BCx, UCx  - Awaiting family member contact for more information on pt's baseline and presentation    DVT PPX, Lovenox    #Progress Note Handoff  Pending (specify): EEG, BCx, UCx, more history from family members  Family discussion: No contact listed   Disposition: Home

## 2023-03-04 NOTE — ED ADULT NURSE NOTE - PRIMARY CARE PROVIDER
January 6, 2020      Zoila Donaldson MD  [address] [facility] [address]           [facility] [address]  [address]  [facility] [address]  Phone: [phone]          Patient: Murali Tarango   MR Number: [id number]   YOB: 1992   Date of Visit: 1/3/2020       Dear Dr. Zoila Donaldson:    Thank you for referring Murali Tarango to me for evaluation. Attached you will find relevant portions of my assessment and plan of care.    If you have questions, please do not hesitate to call me. I look forward to following Murali Tarango along with you.    Sincerely,    Emely Sierra MD    Enclosure  CC:  No Recipients    If you would like to receive this communication electronically, please contact [email or web address] or [phone] to request more information on [facility] [employer] access.    For providers and/or their staff who would like to refer a patient to [facility], please contact us through our one-stop-shop provider referral line, [facility], at [phone].    If you feel you have received this communication in error or would no longer like to receive these types of communications, please e-mail [email or web address]          PMD

## 2023-03-04 NOTE — ED ADULT NURSE NOTE - OBJECTIVE STATEMENT
As per daughter and triage note, daughter told EMS that pt c/o generalized pain, however no family present at bedside. pt has never been to this hospital per records, no contact information in chart; pt unable to provide any hx. Pt resting comfortably in bed. Pt receiving antibiotics as per MD order.

## 2023-03-04 NOTE — ED PROVIDER NOTE - ADMIT DISPOSITION PRESENT ON ADMISSION SEPSIS Q1 - RE-EVALUATED PATIENT FLUID AND VITAL SIGNS
- - -
I have re-evaluated the patient's fluid status and reviewed vital signs. Clinical perfusion assessment was performed.

## 2023-03-05 PROCEDURE — 99233 SBSQ HOSP IP/OBS HIGH 50: CPT

## 2023-03-05 PROCEDURE — 95819 EEG AWAKE AND ASLEEP: CPT | Mod: 26

## 2023-03-05 RX ORDER — SODIUM CHLORIDE 9 MG/ML
1000 INJECTION INTRAMUSCULAR; INTRAVENOUS; SUBCUTANEOUS
Refills: 0 | Status: DISCONTINUED | OUTPATIENT
Start: 2023-03-05 | End: 2023-03-08

## 2023-03-05 RX ADMIN — SODIUM CHLORIDE 60 MILLILITER(S): 9 INJECTION INTRAMUSCULAR; INTRAVENOUS; SUBCUTANEOUS at 05:16

## 2023-03-05 RX ADMIN — SODIUM CHLORIDE 100 MILLILITER(S): 9 INJECTION INTRAMUSCULAR; INTRAVENOUS; SUBCUTANEOUS at 08:48

## 2023-03-05 RX ADMIN — CEFTRIAXONE 100 MILLIGRAM(S): 500 INJECTION, POWDER, FOR SOLUTION INTRAMUSCULAR; INTRAVENOUS at 14:26

## 2023-03-05 RX ADMIN — ENOXAPARIN SODIUM 40 MILLIGRAM(S): 100 INJECTION SUBCUTANEOUS at 21:41

## 2023-03-05 RX ADMIN — SODIUM CHLORIDE 100 MILLILITER(S): 9 INJECTION INTRAMUSCULAR; INTRAVENOUS; SUBCUTANEOUS at 19:42

## 2023-03-05 NOTE — PROGRESS NOTE ADULT - SUBJECTIVE AND OBJECTIVE BOX
CAMILLE JOSEPH  67y  Female      Patient is a 67y old  Female who presents with a chief complaint of altered mental status     INTERVAL HPI/OVERNIGHT EVENTS:      ******************************* REVIEW OF SYSTEMS:**********************************************    All other review of systems negative    *********************** VITALS ******************************************    T(F): 96 (23 @ 05:00)  HR: 98 (23 @ 05:00) (97 - 109)  BP: 106/61 (23 @ 05:00) (106/61 - 134/83)  RR: 18 (23 @ 05:00) (18 - 18)  SpO2: 96% (23 @ 07:40) (95% - 100%)            ******************************** PHYSICAL EXAM:**************************************************  GENERAL: NAD    PSYCH: no agitation,   HEENT:     NERVOUS SYSTEM:  Alert & Oriented X3, improving mentation , left sided weakness from past CVA on right.     PULMONARY: KHADAR, CTA    CARDIOVASCULAR: S1S2 RRR    GI: Soft, NT, ND; BS present.    EXTREMITIES:  2+ Peripheral Pulses, No clubbing, cyanosis, or edema    LYMPH: No lymphadenopathy noted    SKIN: No rashes or lesions      **************************** LABS *******************************************************                          14.2   19.62 )-----------( 459      ( 04 Mar 2023 03:28 )             44.7     03-04    140  |  99  |  28<H>  ----------------------------<  121<H>  5.7<H>   |  25  |  1.1    Ca    9.9      04 Mar 2023 03:28  Mg     2.2     03-04    TPro  7.4  /  Alb  3.4<L>  /  TBili  0.3  /  DBili  x   /  AST  44<H>  /  ALT  14  /  AlkPhos  71  03-04      Urinalysis Basic - ( 04 Mar 2023 05:10 )    Color: Yellow / Appearance: Slightly Turbid / S.030 / pH: x  Gluc: x / Ketone: Trace  / Bili: Negative / Urobili: <2 mg/dL   Blood: x / Protein: 30 mg/dL / Nitrite: Negative   Leuk Esterase: Moderate / RBC: 3 /HPF /  /HPF   Sq Epi: x / Non Sq Epi: 1 /HPF / Bacteria: Many      PT/INR - ( 04 Mar 2023 03:28 )   PT: 12.70 sec;   INR: 1.11 ratio         PTT - ( 04 Mar 2023 03:28 )  PTT:25.6 sec  Lactate Trend    CARDIAC MARKERS ( 04 Mar 2023 07:04 )  x     / x     / 1462 U/L / x     / x      CARDIAC MARKERS ( 04 Mar 2023 03:28 )  x     / 0.05 ng/mL / x     / x     / x          CAPILLARY BLOOD GLUCOSE      POCT Blood Glucose.: 88 mg/dL (04 Mar 2023 03:46)          **************************Active Medications *******************************************  Allergy Status Unknown      acetaminophen  Suppository .. 325 milliGRAM(s) Rectal once  cefTRIAXone   IVPB 1000 milliGRAM(s) IV Intermittent every 24 hours  enoxaparin Injectable 40 milliGRAM(s) SubCutaneous every 24 hours  sodium chloride 0.9%. 1000 milliLiter(s) IV Continuous <Continuous>      ***************************************************  RADIOLOGY & ADDITIONAL TESTS:    Imaging Personally Reviewed:  [ ] YES  [ ] NO    HEALTH ISSUES - PROBLEM Dx:

## 2023-03-05 NOTE — SWALLOW BEDSIDE ASSESSMENT ADULT - SWALLOW EVAL: DIAGNOSIS
+toleration for puree, minced and moist, thin liquids w/ o overt s/s of penetration/aspiration +toleration for puree, minced and moist, s& b, thin liquids w/ o overt s/s of penetration/aspiration; mildly prolonged mastication for soft and bite-sized but functional

## 2023-03-05 NOTE — PROGRESS NOTE ADULT - ASSESSMENT
This is a case of a 67 year old lady brought to ED via EMS who per triage note found pt on street. Per triage note, daughter told EMS that pt c/o generalized pain, however no family present.   Patient has never been to this hospital per records, no contact information in chart,   No infomation could be obtained on the name of any relatives or family members, home address, phone numbers, or any contact information.  Meds, medical and surgical history could not be obtained    # Altered Mental Status     likely due to Metabolic Encephalopathy from Sepsis POA due to UTI  # Hx of R MCA stroke  # UTI  - In ED, hemodynamically stable. She was pan-scanned. no evidence of new stroke, trauma, or infection.  - WBC 19K  - UA +ve  - S/p cefepime and vanc in the ED  - C/w Rocephin, follow blood and urine cx   - CTH showed old stroke in the territory of the Rt MCA.   - rEEG  - TSH, vitamin b12, and folate levels    # Hyperkalemia >> repeat BMP     Possible Rhabdomyolysis >> IVF , Trend CK.     As of this AM. pt is more alert and awake, was able to tell her sister's name Dominga who she lives with.  Social work eval.       Diet: pending S&S eval  Activity: IAT  DVT Prophylaxis: lovenox  GI Prophylaxis: not indication  CHG Order  Code Status: full    #Progress Note Handoff  Pending (specify):  sepsis / Rhabdo /   Family discussion: yet to be established   Disposition: TBD

## 2023-03-06 LAB
ALBUMIN SERPL ELPH-MCNC: 2.7 G/DL — LOW (ref 3.5–5.2)
ALP SERPL-CCNC: 55 U/L — SIGNIFICANT CHANGE UP (ref 30–115)
ALT FLD-CCNC: 12 U/L — SIGNIFICANT CHANGE UP (ref 0–41)
ANION GAP SERPL CALC-SCNC: 5 MMOL/L — LOW (ref 7–14)
AST SERPL-CCNC: 27 U/L — SIGNIFICANT CHANGE UP (ref 0–41)
BILIRUB SERPL-MCNC: <0.2 MG/DL — SIGNIFICANT CHANGE UP (ref 0.2–1.2)
BUN SERPL-MCNC: 21 MG/DL — HIGH (ref 10–20)
CALCIUM SERPL-MCNC: 8.6 MG/DL — SIGNIFICANT CHANGE UP (ref 8.4–10.5)
CHLORIDE SERPL-SCNC: 104 MMOL/L — SIGNIFICANT CHANGE UP (ref 98–110)
CO2 SERPL-SCNC: 29 MMOL/L — SIGNIFICANT CHANGE UP (ref 17–32)
CREAT SERPL-MCNC: <0.5 MG/DL — LOW (ref 0.7–1.5)
EGFR: 109 ML/MIN/1.73M2 — SIGNIFICANT CHANGE UP
GLUCOSE SERPL-MCNC: 90 MG/DL — SIGNIFICANT CHANGE UP (ref 70–99)
HCT VFR BLD CALC: 31.4 % — LOW (ref 37–47)
HGB BLD-MCNC: 10.1 G/DL — LOW (ref 12–16)
MCHC RBC-ENTMCNC: 29.4 PG — SIGNIFICANT CHANGE UP (ref 27–31)
MCHC RBC-ENTMCNC: 32.2 G/DL — SIGNIFICANT CHANGE UP (ref 32–37)
MCV RBC AUTO: 91.5 FL — SIGNIFICANT CHANGE UP (ref 81–99)
NRBC # BLD: 0 /100 WBCS — SIGNIFICANT CHANGE UP (ref 0–0)
PLATELET # BLD AUTO: 329 K/UL — SIGNIFICANT CHANGE UP (ref 130–400)
POTASSIUM SERPL-MCNC: 4.9 MMOL/L — SIGNIFICANT CHANGE UP (ref 3.5–5)
POTASSIUM SERPL-SCNC: 4.9 MMOL/L — SIGNIFICANT CHANGE UP (ref 3.5–5)
PROT SERPL-MCNC: 5.4 G/DL — LOW (ref 6–8)
RBC # BLD: 3.43 M/UL — LOW (ref 4.2–5.4)
RBC # FLD: 13.2 % — SIGNIFICANT CHANGE UP (ref 11.5–14.5)
SODIUM SERPL-SCNC: 138 MMOL/L — SIGNIFICANT CHANGE UP (ref 135–146)
TROPONIN T SERPL-MCNC: <0.01 NG/ML — SIGNIFICANT CHANGE UP
TROPONIN T SERPL-MCNC: <0.01 NG/ML — SIGNIFICANT CHANGE UP
WBC # BLD: 9.67 K/UL — SIGNIFICANT CHANGE UP (ref 4.8–10.8)
WBC # FLD AUTO: 9.67 K/UL — SIGNIFICANT CHANGE UP (ref 4.8–10.8)

## 2023-03-06 PROCEDURE — 99232 SBSQ HOSP IP/OBS MODERATE 35: CPT

## 2023-03-06 RX ADMIN — ENOXAPARIN SODIUM 40 MILLIGRAM(S): 100 INJECTION SUBCUTANEOUS at 21:20

## 2023-03-06 RX ADMIN — CEFTRIAXONE 100 MILLIGRAM(S): 500 INJECTION, POWDER, FOR SOLUTION INTRAMUSCULAR; INTRAVENOUS at 11:31

## 2023-03-06 RX ADMIN — SODIUM CHLORIDE 100 MILLILITER(S): 9 INJECTION INTRAMUSCULAR; INTRAVENOUS; SUBCUTANEOUS at 05:27

## 2023-03-06 NOTE — DIETITIAN INITIAL EVALUATION ADULT - PERTINENT LABORATORY DATA
03-06    138  |  104  |  21<H>  ----------------------------<  90  4.9   |  29  |  <0.5<L>    Ca    8.6      06 Mar 2023 05:40    TPro  5.4<L>  /  Alb  2.7<L>  /  TBili  <0.2  /  DBili  x   /  AST  27  /  ALT  12  /  AlkPhos  55  03-06

## 2023-03-06 NOTE — PROGRESS NOTE ADULT - SUBJECTIVE AND OBJECTIVE BOX
OVERNIGHT EVENTS: LORIE O/N patient states that she believes she had a stroke because she had similar presenting symptoms on her last hospital admission ([facility]), the patient denies any CP, SOB, palpitations, N/V/D/C, endorses dysuria.       VITAL SIGNS:  Vital Signs Last 24 Hrs  T(C): 36.6 (06 Mar 2023 05:00), Max: 36.6 (06 Mar 2023 05:00)  T(F): 97.8 (06 Mar 2023 05:00), Max: 97.8 (06 Mar 2023 05:00)  HR: 87 (06 Mar 2023 05:00) (87 - 100)  BP: 114/60 (06 Mar 2023 05:00) (100/52 - 114/60)  BP(mean): --  RR: 18 (06 Mar 2023 05:00) (17 - 18)  SpO2: 100% (06 Mar 2023 05:00) (100% - 100%)    Parameters below as of 06 Mar 2023 05:00  Patient On (Oxygen Delivery Method): nasal cannula  O2 Flow (L/min): 2      PHYSICAL EXAM:    General: Well developed, well nourished, no acute distress lying in bed.   HEENT: NC/AT; PERRL, anicteric sclera; MMM  Neck: supple  Cardiovascular: +S1/S2, RRR, no murmurs, rubs, gallops  Respiratory: CTA B/L in anterior lung fields- no W/R/R  Gastrointestinal: soft, NT/ND; +BSx4  Extremities: WWP; 2+LE edema L>>R , left sided weakness in the LUE and LLE-in both cases unable to lift against gravity, RUE and RLE were able to be raised against gravity albeit slowly  Vascular: 2+ radial, DP/PT pulses B/L  Neurological: AAOx3 with periods of confusion;     MEDICATIONS:  MEDICATIONS  (STANDING):  acetaminophen  Suppository .. 325 milliGRAM(s) Rectal once  cefTRIAXone   IVPB 1000 milliGRAM(s) IV Intermittent every 24 hours  enoxaparin Injectable 40 milliGRAM(s) SubCutaneous every 24 hours  sodium chloride 0.9%. 1000 milliLiter(s) (100 mL/Hr) IV Continuous <Continuous>    MEDICATIONS  (PRN):      ALLERGIES:  Allergies    Allergy Status Unknown    Intolerances        LABS:                        10.1   9.67  )-----------( 329      ( 06 Mar 2023 05:40 )             31.4     03-06    138  |  104  |  21<H>  ----------------------------<  90  4.9   |  29  |  <0.5<L>    Ca    8.6      06 Mar 2023 05:40    TPro  5.4<L>  /  Alb  2.7<L>  /  TBili  <0.2  /  DBili  x   /  AST  27  /  ALT  12  /  AlkPhos  55  03-06        CAPILLARY BLOOD GLUCOSE          RADIOLOGY & ADDITIONAL TESTS: Reviewed.    PLAN:  OVERNIGHT EVENTS: LORIE O/N patient states that she believes she had a stroke because she had similar presenting symptoms on her last hospital admission ([facility]), the patient denies any CP, SOB, palpitations, N/V/D/C, endorses dysuria.       VITAL SIGNS:  Vital Signs Last 24 Hrs  T(C): 36.6 (06 Mar 2023 05:00), Max: 36.6 (06 Mar 2023 05:00)  T(F): 97.8 (06 Mar 2023 05:00), Max: 97.8 (06 Mar 2023 05:00)  HR: 87 (06 Mar 2023 05:00) (87 - 100)  BP: 114/60 (06 Mar 2023 05:00) (100/52 - 114/60)  BP(mean): --  RR: 18 (06 Mar 2023 05:00) (17 - 18)  SpO2: 100% (06 Mar 2023 05:00) (100% - 100%)    Parameters below as of 06 Mar 2023 05:00  Patient On (Oxygen Delivery Method): nasal cannula  O2 Flow (L/min): 2      PHYSICAL EXAM:    General: Well developed, well nourished, no acute distress lying in bed on NC 1.5L   HEENT: NC/AT; PERRL, anicteric sclera; MMM  Neck: supple  Cardiovascular: +S1/S2, RRR, no murmurs, rubs, gallops  Respiratory: CTA B/L in anterior lung fields- no W/R/R  Gastrointestinal: soft, NT/ND; +BSx4  Extremities: WWP; 2+LE edema LLE>>RLE and RUE>>LUE , left sided weakness in the LUE and LLE-in both cases unable to lift against gravity, RUE and RLE were able to be raised against gravity albeit slowly.   Vascular: 2+ radial, DP/PT pulses B/L  Neurological: AAOx3 with periods of confusion;     MEDICATIONS:  MEDICATIONS  (STANDING):  acetaminophen  Suppository .. 325 milliGRAM(s) Rectal once  cefTRIAXone   IVPB 1000 milliGRAM(s) IV Intermittent every 24 hours  enoxaparin Injectable 40 milliGRAM(s) SubCutaneous every 24 hours  sodium chloride 0.9%. 1000 milliLiter(s) (100 mL/Hr) IV Continuous <Continuous>    MEDICATIONS  (PRN):      ALLERGIES:  Allergies    Allergy Status Unknown    Intolerances        LABS:                        10.1   9.67  )-----------( 329      ( 06 Mar 2023 05:40 )             31.4     03-06    138  |  104  |  21<H>  ----------------------------<  90  4.9   |  29  |  <0.5<L>    Ca    8.6      06 Mar 2023 05:40    TPro  5.4<L>  /  Alb  2.7<L>  /  TBili  <0.2  /  DBili  x   /  AST  27  /  ALT  12  /  AlkPhos  55  03-06        CAPILLARY BLOOD GLUCOSE          RADIOLOGY & ADDITIONAL TESTS: Reviewed.    PLAN:

## 2023-03-06 NOTE — DIETITIAN INITIAL EVALUATION ADULT - ORAL INTAKE PTA/DIET HISTORY
patient reports limited info regarding subjective information: reports having a good PO intake prior to admission, denies any food restrictions, denies food preferences.  suspect meeting ??75% estimated energy needs though unable to confirm as patient limited historian

## 2023-03-06 NOTE — DIETITIAN INITIAL EVALUATION ADULT - PERTINENT MEDS FT
MEDICATIONS  (STANDING):  acetaminophen  Suppository .. 325 milliGRAM(s) Rectal once  enoxaparin Injectable 40 milliGRAM(s) SubCutaneous every 24 hours  sodium chloride 0.9%. 1000 milliLiter(s) (100 mL/Hr) IV Continuous <Continuous>

## 2023-03-06 NOTE — DIETITIAN INITIAL EVALUATION ADULT - ENERGY INTAKE
patient has been consistently consuming <50% of meal trays during admission  patient reports having a 'good' appetite during admission, documentation shows that patient has been consistently consuming </=50% of meal trays during admission. reports that she "really enjoys the food here" had egg salad for lunch today.  suspect meeting <75% estimated energy needs x2days

## 2023-03-06 NOTE — CONSULT NOTE ADULT - SUBJECTIVE AND OBJECTIVE BOX
HPI:   This is a case of a 67 year old lady brought to ED via EMS who per triage note found pt on street. Per triage note, daughter told EMS that pt c/o generalized pain, however no family present.   Patient has never been to this hospital per records, no contact information in chart, mildly cooperative, says she is feeling "okay" and when asked where she lives she said [place]. No infomration could be obtained on the name of any relatives or family members, home address, phone numbers, or any contact information.    In ED, hemodynamically stable. She was pan-scanned. no evidence of new stroke, trauma, or infection.  WBC 19K  UA +ve  CTH showed old stroke in the territory of the Rt MCA. The patient was found to be febrile, with elevated WBC, and lactate suggesting toxic metabolic etiology for her current level of consciousness.    Admit for further management and workup     NCHCT: CT Head No Cont:   ACC: [id number] EXAM:  CT BRAIN   ORDERED BY: ORALIA PACK     PROCEDURE DATE:  03/04/2023          INTERPRETATION:  Clinical History/Reason For Exam: Changes in mental   status.    Technique: Multiple contiguous axial CT images were obtained from the  base of the skull to the vertex without administration of intravenous   contrast. Axial, coronal and sagittal images were reviewed.    No studies are available for direct comparison.    Findings:    The ventricles, basal cisterns and sulcal patternare prominent and   compatible with parenchymal volume loss with asymmetric enlargement of   the right lateral ventricle compatible with element of dilatation ex   vacuo. There is a large chronic infarct in the right frontal lobe. The   gray-white matter differentiation is preserved.    There is no acute mass effect, midline shift or intracranial hemorrhage.    Partial opacification of bilateral mastoid air cells..    No evidence of a depressed skull fracture.    Beam hardening artifact is noted overlying the brain stem and posterior   fossa which is inherent to CT in this location.      Impression:      No evidence of intracranial hemorrhage, territorial infarct, or mass   effect.    Partial opacification of bilateral mastoid air cells    --- End of Report ---      # Altered Mental Status     likely due to Metabolic Encephalopathy from Sepsis POA due to UTI  # Hx of R MCA stroke    Medical charts / labs / imaging studies reviewed       PAST MEDICAL & SURGICAL HISTORY:      Hospital Course:    TODAY'S SUBJECTIVE & REVIEW OF SYMPTOMS:     Constitutional WNL   Cardio WNL   Resp WNL   GI WNL  Heme WNL  Endo WNL  Skin WNL  MSK pain  Neuro WNL  Cognitive WNL  Psych WNL      MEDICATIONS  (STANDING):  acetaminophen  Suppository .. 325 milliGRAM(s) Rectal once  enoxaparin Injectable 40 milliGRAM(s) SubCutaneous every 24 hours  sodium chloride 0.9%. 1000 milliLiter(s) (100 mL/Hr) IV Continuous <Continuous>    MEDICATIONS  (PRN):      FAMILY HISTORY:      Allergies    Allergy Status Unknown    Intolerances        SOCIAL HISTORY:    [  ] Etoh  [  ] Smoking  [  ] Substance abuse     Home Environment:  [   ] Home Alone  [ x  ] Lives with Family  [   ] Home Health Aid    Dwelling:  [   ] Apartment  [ x  ] Private House  [   ] Adult Home  [   ] Skilled Nursing Facility      [   ] Short Term  [   ] Long Term  [   ] Stairs       Elevator [   ]    FUNCTIONAL STATUS PTA: (Check all that apply)  Ambulation: [    ]Independent    [  x ] Dependent     [   ] Non-Ambulatory  Assistive Device: [   ] SA Cane  [   ]  Q Cane  [   ] Walker  [   ]  Wheelchair  ADL : [   ] Independent  [ x   ]  Dependent       Vital Signs Last 24 Hrs  T(C): 36.7 (06 Mar 2023 14:00), Max: 36.7 (06 Mar 2023 14:00)  T(F): 98 (06 Mar 2023 14:00), Max: 98 (06 Mar 2023 14:00)  HR: 100 (06 Mar 2023 14:00) (87 - 100)  BP: 121/61 (06 Mar 2023 14:00) (113/69 - 121/61)  BP(mean): --  RR: 18 (06 Mar 2023 14:00) (18 - 18)  SpO2: 100% (06 Mar 2023 14:00) (100% - 100%)    Parameters below as of 06 Mar 2023 05:00  Patient On (Oxygen Delivery Method): nasal cannula  O2 Flow (L/min): 2        PHYSICAL EXAM: Awake & confused   GENERAL: NAD  HEAD:  Normocephalic  CHEST/LUNG: Clear   HEART: S1S2+  ABDOMEN: Soft, Nontender  EXTREMITIES:  no calf tenderness    NERVOUS SYSTEM:  Cranial Nerves 2-12 intact [   ] Abnormal  [   ]  ROM: WFL all extremities [   ]  Abnormal [   ]able to move right side / no active movement left side - limited cooperation   Motor Strength: WFL all extremities  [   ]  Abnormal [   ]  Sensation: intact to light touch [   ] Abnormal [   ]    FUNCTIONAL STATUS:  Bed Mobility: Independent [   ]  Supervision [   ]  Needs Assistance [ x  ]  N/A [   ]  Transfers: Independent [   ]  Supervision [   ]  Needs Assistance [   ]  N/A [   ]   Ambulation: Independent [   ]  Supervision [   ]  Needs Assistance [   ]  N/A [   ]  ADL: Independent [   ] Requires Assistance [   ] N/A [   ]      LABS:                        10.1   9.67  )-----------( 329      ( 06 Mar 2023 05:40 )             31.4     03-06    138  |  104  |  21<H>  ----------------------------<  90  4.9   |  29  |  <0.5<L>    Ca    8.6      06 Mar 2023 05:40    TPro  5.4<L>  /  Alb  2.7<L>  /  TBili  <0.2  /  DBili  x   /  AST  27  /  ALT  12  /  AlkPhos  55  03-06          RADIOLOGY & ADDITIONAL STUDIES:

## 2023-03-06 NOTE — DIETITIAN INITIAL EVALUATION ADULT - OTHER CALCULATIONS
energy: 3351-0235 kcal/day (MSJ x 1.2-1.5)  Using dry reported UBW 68.2kg with consideration for wound healing, age, BMI

## 2023-03-06 NOTE — DIETITIAN INITIAL EVALUATION ADULT - ADD RECOMMEND
Patient at HIGH nutrition risk   will follow up within 3-5days  [address], [address]  5436 or via TEAMS RECOMMEND: zinc 220mg/daily x10-14d, ascorbic acid 500mg/daily, MV w/ minerals daily for wound healing if medically feasible    Patient at HIGH nutrition risk   will follow up within 3-5days  [address], [address]  5414 or via TEAMS

## 2023-03-06 NOTE — DIETITIAN INITIAL EVALUATION ADULT - NSFNSGIIOFT_GEN_A_CORE
^ height per patient report  ^ weight obtained 3/6 via bed scale at time of visit, with consideration for edema   UBW: 68.2kg  IBW 52.3kg   patient bed weight higher than reported UBW, patient reported that she has 'some weight loss here or there'  unsure if accurate report considering patient noted with confusion

## 2023-03-06 NOTE — DIETITIAN INITIAL EVALUATION ADULT - ORAL NUTRITION SUPPLEMENTS
pending evaluation  Ensure HIGH PROTEIN 2x/day to optimize pro/kcal intake (350kcal, 20 g pro/serving) Vanilla/Strawberry only  Magic Cup  1x/day at dinner (290kcal, 9g protein/serving)

## 2023-03-06 NOTE — PROGRESS NOTE ADULT - ASSESSMENT
This is a case of a 67 year old lady brought to ED via EMS who per triage note found pt on street. Per triage note, daughter told EMS that pt c/o generalized pain, however no family present.   Patient has never been to this hospital per records, no contact information in chart,   No infomation could be obtained on the name of any relatives or family members, home address, phone numbers, or any contact information.  Meds, medical and surgical history could not be obtained    # Altered Mental Status likely due to Metabolic Encephalopathy from UTI associated sepsis  # Hx of R MCA stroke  - In ED, hemodynamically stable. She was pan-scanned. no evidence of new stroke, trauma, or infection.  - WBC 19K  - UA +ve  - S/p cefepime and vanc in the ED  - C/w Rocephin, follow blood and urine cx   - CTH showed old stroke in the territory of the Rt MCA.   - rEEG  - TSH, vitamin b12, and folate levels  -Neuro c/s: infectious w/u, rEEG; Further testing pending family input.       # Hyperkalemia >> repeat BMP     Possible Rhabdomyolysis >> IVF , Trend CK.     As of this AM. pt is more alert and awake, was able to tell her sister's name oDminga who she lives with.  Social work eval.       Diet: pending S&S eval  Activity: IAT  DVT Prophylaxis: lovenox  GI Prophylaxis: not indication  CHG Order  Code Status: full    #Progress Note Handoff  Diet: soft and bite sized with thin liquids.   Pending (specify):  sepsis / Rhabdo /   Family discussion: yet to be established       To do: blood and urine cultures

## 2023-03-06 NOTE — PROGRESS NOTE ADULT - ATTENDING COMMENTS
Patient seen at bedside. Changes made within note as well. Discussed with medical team that includes resident(s), medical student(s), nursing staff, case management.     This is a case of a 67 year old lady brought to ED via EMS who per triage note found pt on street. Per triage note, daughter told EMS that pt c/o generalized pain, however no family present.   Patient has never been to this hospital per records, no contact information in chart,   No infomation could be obtained on the name of any relatives or family members, home address, phone numbers, or any contact information.  Meds, medical and surgical history could not be obtained    # Altered Mental Status likely due to Metabolic Encephalopathy from UTI associated sepsis  # Hx of R MCA stroke  - In ED, hemodynamically stable. She was pan-scanned. no evidence of new stroke, trauma, or infection.  - WBC 19K--->9.67  - UA +ve  - S/p cefepime and vanc in the ED  - C/w Rocephin, follow blood and urine cx   - CTH showed old stroke in the territory of the Rt MCA.   - rEEG, Consistent with diffuse cerebral electrophysiological dysfunction.    Secondary to none specific cause.  - TSH, vitamin b12, and folate levels  -Neuro c/s: infectious w/u, rEEG; Further testing pending family input.   follow neurology recommendations.   today patient was able to answer some of my questions but appears to be somewhat confused.   she does not remember any contact information.  trying to find contact information.     #elevated troponin   follow repeat troponin levels,   follow echo  no chest pain or any other symptoms       # Hyperkalemia >> repeat BMP, improved      Possible Rhabdomyolysis >> IVF , Trend CK.   follow CK in AM     As of this AM. pt is more alert and awake, was able to tell her sister's name Dominga who she lives with.  Social work eval.       Diet: pending S&S eval  Activity: IAT  DVT Prophylaxis: lovenox  GI Prophylaxis: not indication  CHG Order  Code Status: full    #Progress Note Handoff  Diet: soft and bite sized with thin liquids.           follow up: blood and urine cultures pending, , need to contact family once have the information on file to know baseline, follow echo and neurology recommendations, follow troponin. Patient seen at bedside. Changes made within note as well. Discussed with medical team that includes resident(s), medical student(s), nursing staff, case management.     This is a case of a 67 year old lady brought to ED via EMS who per triage note found pt on street. Per triage note, daughter told EMS that pt c/o generalized pain, however no family present.   Patient has never been to this hospital per records, no contact information in chart,   No infomation could be obtained on the name of any relatives or family members, home address, phone numbers, or any contact information.  Meds, medical and surgical history could not be obtained    # Altered Mental Status likely due to Metabolic Encephalopathy from UTI associated sepsis  # Hx of R MCA stroke  - In ED, hemodynamically stable. She was pan-scanned. no evidence of new stroke, trauma, or infection.  - WBC 19K--->9.67  - UA +ve  - S/p cefepime and vanc in the ED  - C/w Rocephin, follow blood and urine cx   - CTH showed old stroke in the territory of the Rt MCA.   - rEEG, Consistent with diffuse cerebral electrophysiological dysfunction.    Secondary to none specific cause.  - TSH, vitamin b12, and folate levels  -Neuro c/s: infectious w/u, rEEG; Further testing pending family input.   follow neurology recommendations.   today patient was able to answer some of my questions but appears to be somewhat confused.   she does not remember any contact information.  trying to find contact information.     #elevated troponin   follow repeat troponin levels,   follow echo  no chest pain or any other symptoms     #anemia  most recent hgb 10.1 from 14?  IV fluids were given dilutional?  no evidence of bleeding   monitor Hgb.       # Hyperkalemia >> repeat BMP, improved      Possible Rhabdomyolysis >> IVF , Trend CK.   follow CK in AM     As of this AM. pt is more alert and awake, was able to tell her sister's name Dominga who she lives with.  Social work eval.       Diet: pending S&S eval  Activity: IAT  DVT Prophylaxis: lovenox  GI Prophylaxis: not indication  CHG Order  Code Status: full    #Progress Note Handoff  Diet: soft and bite sized with thin liquids.           follow up: blood and urine cultures pending, , need to contact family once have the information on file to know baseline, follow echo and neurology recommendations, follow troponin. follow hgb. if persistent drop consult GI.

## 2023-03-06 NOTE — DIETITIAN INITIAL EVALUATION ADULT - PHYSCIAL ASSESSMENT
cognition disoriented x 4 cognition per flow sheets disoriented x 4  was answering questions at time of visit appropriately though seemed confused

## 2023-03-06 NOTE — DIETITIAN INITIAL EVALUATION ADULT - OTHER INFO
67 year old lady brought to ED via EMS who per triage note found pt on street. Per triage note, daughter told EMS that pt c/o generalized pain, however no family present.   Patient has never been to this hospital per records, no contact information in chart,   No infomation could be obtained on the name of any relatives or family members, home address, phone numbers, or any contact information.  Meds, medical and surgical history could not be obtained  # Altered Mental Status likely due to Metabolic Encephalopathy from UTI associated sepsis  # Hx of R MCA stroke

## 2023-03-07 LAB
-  AMIKACIN: SIGNIFICANT CHANGE UP
-  AMOXICILLIN/CLAVULANIC ACID: SIGNIFICANT CHANGE UP
-  AMPICILLIN/SULBACTAM: SIGNIFICANT CHANGE UP
-  AMPICILLIN: SIGNIFICANT CHANGE UP
-  AZTREONAM: SIGNIFICANT CHANGE UP
-  CEFAZOLIN: SIGNIFICANT CHANGE UP
-  CEFEPIME: SIGNIFICANT CHANGE UP
-  CEFOXITIN: SIGNIFICANT CHANGE UP
-  CEFTRIAXONE: SIGNIFICANT CHANGE UP
-  CEFUROXIME: SIGNIFICANT CHANGE UP
-  CIPROFLOXACIN: SIGNIFICANT CHANGE UP
-  ERTAPENEM: SIGNIFICANT CHANGE UP
-  GENTAMICIN: SIGNIFICANT CHANGE UP
-  IMIPENEM: SIGNIFICANT CHANGE UP
-  LEVOFLOXACIN: SIGNIFICANT CHANGE UP
-  MEROPENEM: SIGNIFICANT CHANGE UP
-  NITROFURANTOIN: SIGNIFICANT CHANGE UP
-  PIPERACILLIN/TAZOBACTAM: SIGNIFICANT CHANGE UP
-  TOBRAMYCIN: SIGNIFICANT CHANGE UP
-  TRIMETHOPRIM/SULFAMETHOXAZOLE: SIGNIFICANT CHANGE UP
ALBUMIN SERPL ELPH-MCNC: 2.4 G/DL — LOW (ref 3.5–5.2)
ALP SERPL-CCNC: 57 U/L — SIGNIFICANT CHANGE UP (ref 30–115)
ALT FLD-CCNC: 12 U/L — SIGNIFICANT CHANGE UP (ref 0–41)
ANION GAP SERPL CALC-SCNC: 7 MMOL/L — SIGNIFICANT CHANGE UP (ref 7–14)
AST SERPL-CCNC: 18 U/L — SIGNIFICANT CHANGE UP (ref 0–41)
BASOPHILS # BLD AUTO: 0.05 K/UL — SIGNIFICANT CHANGE UP (ref 0–0.2)
BASOPHILS NFR BLD AUTO: 0.6 % — SIGNIFICANT CHANGE UP (ref 0–1)
BILIRUB SERPL-MCNC: <0.2 MG/DL — SIGNIFICANT CHANGE UP (ref 0.2–1.2)
BUN SERPL-MCNC: 8 MG/DL — LOW (ref 10–20)
CALCIUM SERPL-MCNC: 8.5 MG/DL — SIGNIFICANT CHANGE UP (ref 8.4–10.4)
CHLORIDE SERPL-SCNC: 103 MMOL/L — SIGNIFICANT CHANGE UP (ref 98–110)
CK SERPL-CCNC: 130 U/L — SIGNIFICANT CHANGE UP (ref 0–225)
CO2 SERPL-SCNC: 28 MMOL/L — SIGNIFICANT CHANGE UP (ref 17–32)
CREAT SERPL-MCNC: <0.5 MG/DL — LOW (ref 0.7–1.5)
CULTURE RESULTS: SIGNIFICANT CHANGE UP
DRUG SCREEN, SERUM: SIGNIFICANT CHANGE UP
EGFR: 117 ML/MIN/1.73M2 — SIGNIFICANT CHANGE UP
EOSINOPHIL # BLD AUTO: 0.1 K/UL — SIGNIFICANT CHANGE UP (ref 0–0.7)
EOSINOPHIL NFR BLD AUTO: 1.1 % — SIGNIFICANT CHANGE UP (ref 0–8)
GLUCOSE SERPL-MCNC: 84 MG/DL — SIGNIFICANT CHANGE UP (ref 70–99)
HCT VFR BLD CALC: 30.7 % — LOW (ref 37–47)
HGB BLD-MCNC: 9.8 G/DL — LOW (ref 12–16)
IMM GRANULOCYTES NFR BLD AUTO: 1.2 % — HIGH (ref 0.1–0.3)
LEVETIRACETAM SERPL-MCNC: 64.9 UG/ML — HIGH (ref 10–40)
LYMPHOCYTES # BLD AUTO: 2.37 K/UL — SIGNIFICANT CHANGE UP (ref 1.2–3.4)
LYMPHOCYTES # BLD AUTO: 26.2 % — SIGNIFICANT CHANGE UP (ref 20.5–51.1)
MAGNESIUM SERPL-MCNC: 1.8 MG/DL — SIGNIFICANT CHANGE UP (ref 1.8–2.4)
MCHC RBC-ENTMCNC: 29.1 PG — SIGNIFICANT CHANGE UP (ref 27–31)
MCHC RBC-ENTMCNC: 31.9 G/DL — LOW (ref 32–37)
MCV RBC AUTO: 91.1 FL — SIGNIFICANT CHANGE UP (ref 81–99)
METHOD TYPE: SIGNIFICANT CHANGE UP
MONOCYTES # BLD AUTO: 0.94 K/UL — HIGH (ref 0.1–0.6)
MONOCYTES NFR BLD AUTO: 10.4 % — HIGH (ref 1.7–9.3)
NEUTROPHILS # BLD AUTO: 5.49 K/UL — SIGNIFICANT CHANGE UP (ref 1.4–6.5)
NEUTROPHILS NFR BLD AUTO: 60.5 % — SIGNIFICANT CHANGE UP (ref 42.2–75.2)
NRBC # BLD: 0 /100 WBCS — SIGNIFICANT CHANGE UP (ref 0–0)
ORGANISM # SPEC MICROSCOPIC CNT: SIGNIFICANT CHANGE UP
ORGANISM # SPEC MICROSCOPIC CNT: SIGNIFICANT CHANGE UP
PLATELET # BLD AUTO: 340 K/UL — SIGNIFICANT CHANGE UP (ref 130–400)
POTASSIUM SERPL-MCNC: 4.3 MMOL/L — SIGNIFICANT CHANGE UP (ref 3.5–5)
POTASSIUM SERPL-SCNC: 4.3 MMOL/L — SIGNIFICANT CHANGE UP (ref 3.5–5)
PROT SERPL-MCNC: 4.9 G/DL — LOW (ref 6–8)
RBC # BLD: 3.37 M/UL — LOW (ref 4.2–5.4)
RBC # FLD: 13 % — SIGNIFICANT CHANGE UP (ref 11.5–14.5)
SODIUM SERPL-SCNC: 138 MMOL/L — SIGNIFICANT CHANGE UP (ref 135–146)
SPECIMEN SOURCE: SIGNIFICANT CHANGE UP
WBC # BLD: 9.06 K/UL — SIGNIFICANT CHANGE UP (ref 4.8–10.8)
WBC # FLD AUTO: 9.06 K/UL — SIGNIFICANT CHANGE UP (ref 4.8–10.8)

## 2023-03-07 PROCEDURE — 93970 EXTREMITY STUDY: CPT | Mod: 26

## 2023-03-07 PROCEDURE — 93010 ELECTROCARDIOGRAM REPORT: CPT

## 2023-03-07 PROCEDURE — 99232 SBSQ HOSP IP/OBS MODERATE 35: CPT

## 2023-03-07 RX ORDER — ACETAMINOPHEN 500 MG
650 TABLET ORAL EVERY 6 HOURS
Refills: 0 | Status: DISCONTINUED | OUTPATIENT
Start: 2023-03-07 | End: 2023-04-18

## 2023-03-07 RX ORDER — SALICYLIC ACID 0.5 %
1 CLEANSER (GRAM) TOPICAL
Refills: 0 | Status: DISCONTINUED | OUTPATIENT
Start: 2023-03-07 | End: 2023-04-18

## 2023-03-07 RX ORDER — SODIUM CHLORIDE 9 MG/ML
1000 INJECTION, SOLUTION INTRAVENOUS ONCE
Refills: 0 | Status: COMPLETED | OUTPATIENT
Start: 2023-03-07 | End: 2023-03-07

## 2023-03-07 RX ADMIN — Medication 1 APPLICATION(S): at 13:44

## 2023-03-07 RX ADMIN — ENOXAPARIN SODIUM 40 MILLIGRAM(S): 100 INJECTION SUBCUTANEOUS at 21:57

## 2023-03-07 RX ADMIN — Medication 650 MILLIGRAM(S): at 22:01

## 2023-03-07 RX ADMIN — Medication 650 MILLIGRAM(S): at 22:31

## 2023-03-07 RX ADMIN — SODIUM CHLORIDE 1000 MILLILITER(S): 9 INJECTION, SOLUTION INTRAVENOUS at 23:15

## 2023-03-07 NOTE — PROGRESS NOTE ADULT - SUBJECTIVE AND OBJECTIVE BOX
OVERNIGHT EVENTS: no acute events overnight  today patient looked much better  she was able to tell me her name, , location.  somewtimes during the interview she seems to be farhatwhdeisi schmitz. She told me that she lives in [place]. she mentioned that she lives at [address] [place] [address]? but not sure if thats the correct address of her home.  she was unable to name any family. but ,mentioned that one of her sister lives in [place].       VITAL SIGNS:  Vital Signs Last 24 Hrs  T(C): 37.3 (07 Mar 2023 04:52), Max: 37.3 (07 Mar 2023 04:52)  T(F): 99.2 (07 Mar 2023 04:52), Max: 99.2 (07 Mar 2023 04:52)  HR: 96 (07 Mar 2023 04:52) (96 - 102)  BP: 136/77 (07 Mar 2023 04:52) (121/61 - 136/77)  BP(mean): --  RR: 18 (07 Mar 2023 04:52) (18 - 18)  SpO2: 100% (07 Mar 2023 07:57) (100% - 100%)    Parameters below as of 07 Mar 2023 07:57  Patient On (Oxygen Delivery Method): nasal cannula          PHYSICAL EXAM:    General: Well developed, well nourished, no acute distress lying in bed   HEENT: NC/AT; PERRL, anicteric sclera; MMM  Neck: supple  Cardiovascular: +S1/S2, RRR, no murmurs, rubs, gallops  Respiratory: CTA B/L in anterior lung fields- no W/R/R  Gastrointestinal: soft, NT/ND; +BSx4  Extremities: WWP; 2+LE edema LLE>>RLE and RUE>>LUE , left sided weakness in the LUE and LLE-in both cases unable to lift against gravity, RUE and RLE were able to be raised against gravity albeit slowly.   Vascular: 2+ radial, DP/PT pulses B/L  Neurological: AAOx3 with periods of confusion;     MEDICATIONS:  MEDICATIONS  (STANDING):  acetaminophen  Suppository .. 325 milliGRAM(s) Rectal once  cefTRIAXone   IVPB 1000 milliGRAM(s) IV Intermittent every 24 hours  enoxaparin Injectable 40 milliGRAM(s) SubCutaneous every 24 hours  sodium chloride 0.9%. 1000 milliLiter(s) (100 mL/Hr) IV Continuous <Continuous>    MEDICATIONS  (PRN):      ALLERGIES:  Allergies    Allergy Status Unknown    Intolerances        LABS:               LABS:                        9.8    9.06  )-----------( 340      ( 07 Mar 2023 07:10 )             30.7     03-07    138  |  103  |  8<L>  ----------------------------<  84  4.3   |  28  |  <0.5<L>    Ca    8.5      07 Mar 2023 07:10  Mg     1.8     -07    TPro  4.9<L>  /  Alb  2.4<L>  /  TBili  <0.2  /  DBili  x   /  AST  18  /  ALT  12  /  AlkPhos  57  03-07                CAPILLARY BLOOD GLUCOSE          RADIOLOGY & ADDITIONAL TESTS: Reviewed.    PLAN:

## 2023-03-07 NOTE — SWALLOW BEDSIDE ASSESSMENT ADULT - SWALLOW EVAL: DIAGNOSIS
+ toleration of thin liquids via straw sip and soft and bite size textures. Consecutive swallows noted for all trials. No overt s/s of aspiration/ penetration

## 2023-03-07 NOTE — PROGRESS NOTE ADULT - ASSESSMENT
Patient seen at bedside. Changes made within note as well. Discussed with medical team that includes resident(s), medical student(s), nursing staff, case management.     This is a case of a 67 year old lady brought to ED via EMS who per triage note found pt on street. Per triage note, daughter told EMS that pt c/o generalized pain, however no family present.   Patient has never been to this hospital per records, no contact information in chart,   No infomation could be obtained on the name of any relatives or family members, home address, phone numbers, or any contact information.  Meds, medical and surgical history could not be obtained    # Altered Mental Status likely due to Metabolic Encephalopathy from UTI associated sepsis, improving   # Hx of R MCA stroke  - In ED, hemodynamically stable. She was pan-scanned. no evidence of new stroke, trauma, or infection.  - WBC 19K--->9.67  - UA +ve  - S/p cefepime and vanc in the ED  - s/p Rocephin, blood cultures negative to date. urine culture positive for e coli sensitive to ceftriaxone   - CTH showed old stroke in the territory of the Rt MCA.   - rEEG, Consistent with diffuse cerebral electrophysiological dysfunction.    Secondary to none specific cause.  - TSH, vitamin b12, and folate levels  -Neuro c/s: infectious w/u, rEEG; Further testing pending family input. follow neurology   appears to be more alert than yesterday.     #elevated troponin , resolved   follow echo  no chest pain or any other symptoms     #anemia  most recent hgb 10.1 from 14?  IV fluids were given dilutional?  no evidence of bleeding   monitor Hgb. now stable most recent 9.8      # Hyperkalemia >improved      Possible Rhabdomyolysis >> IVF , Trend CK.   repeat ck 130       Social work eval. trying to reach patient family. las per physiatry Short Term Re[facility] in Skilled Nursing Facility / SNF     Diet: soft and bite sized   Activity: IAT  DVT Prophylaxis: lovenox      follow up: echo, PT, /family member contact, neurology follow up,  kim needs rehab. d/w CM. monitor hgb   CHG Order  Code Status: full    #Progress Note Handoff  Diet: soft and bite sized with thin liquids.

## 2023-03-07 NOTE — PHYSICAL THERAPY INITIAL EVALUATION ADULT - ADDITIONAL COMMENTS
Patient has never been to this hospital per records, no contact information in chart. As per pt she was able to move bed to chair using "riley" at home.

## 2023-03-07 NOTE — PHYSICAL THERAPY INITIAL EVALUATION ADULT - PERTINENT HX OF CURRENT PROBLEM, REHAB EVAL
67 year old lady brought to ED via EMS who per triage note found pt on street. Per triage note, daughter told EMS that pt c/o generalized pain, however no family present. Admitted due to AMS, sepsis.

## 2023-03-07 NOTE — PROGRESS NOTE ADULT - SUBJECTIVE AND OBJECTIVE BOX
OVERNIGHT EVENTS: LORIE O/N patient endorsing headache this AM, will adminster tylenol.     SUBJECTIVE / INTERVAL HPI: Patient seen and examined at bedside.     VITAL SIGNS:  Vital Signs Last 24 Hrs  T(C): 36.8 (07 Mar 2023 14:34), Max: 37.3 (07 Mar 2023 04:52)  T(F): 98.3 (07 Mar 2023 14:34), Max: 99.2 (07 Mar 2023 04:52)  HR: 99 (07 Mar 2023 14:34) (96 - 102)  BP: 125/79 (07 Mar 2023 14:34) (125/79 - 136/77)  BP(mean): --  RR: 20 (07 Mar 2023 14:34) (18 - 20)  SpO2: 98% (07 Mar 2023 14:34) (98% - 100%)    Parameters below as of 07 Mar 2023 13:18  Patient On (Oxygen Delivery Method): room air        PHYSICAL EXAM:    General: Well developed, well nourished, no acute distress lying in bed comfortably   HEENT: NC/AT; PERRL, anicteric sclera;  Cardiovascular: +S1/S2, RRR, no murmurs, rubs, gallops  Respiratory: CTA B/L; no W/R/R  Gastrointestinal: soft, NT/ND; +BSx4  Extremities: WWP; +edema in the Left side >>R lower extremity   Vascular: 2+ radial, DP/PT pulses B/L  Neurological: AAOx3; no focal deficits    MEDICATIONS:  MEDICATIONS  (STANDING):  acetaminophen  Suppository .. 325 milliGRAM(s) Rectal once  enoxaparin Injectable 40 milliGRAM(s) SubCutaneous every 24 hours  sodium chloride 0.9%. 1000 milliLiter(s) (100 mL/Hr) IV Continuous <Continuous>  vitamin A &amp; D Ointment 1 Application(s) Topical two times a day    MEDICATIONS  (PRN):  acetaminophen     Tablet .. 650 milliGRAM(s) Oral every 6 hours PRN Moderate Pain (4 - 6)      ALLERGIES:  Allergies    Allergy Status Unknown    Intolerances        LABS:                        9.8    9.06  )-----------( 340      ( 07 Mar 2023 07:10 )             30.7     03-07    138  |  103  |  8<L>  ----------------------------<  84  4.3   |  28  |  <0.5<L>    Ca    8.5      07 Mar 2023 07:10  Mg     1.8     03-07    TPro  4.9<L>  /  Alb  2.4<L>  /  TBili  <0.2  /  DBili  x   /  AST  18  /  ALT  12  /  AlkPhos  57  03-07        CAPILLARY BLOOD GLUCOSE          RADIOLOGY & ADDITIONAL TESTS: Reviewed.    PLAN:

## 2023-03-07 NOTE — PROGRESS NOTE ADULT - ASSESSMENT
This is a case of a 67 year old lady brought to ED via EMS who per triage note found pt on street. Per triage note, daughter told EMS that pt c/o generalized pain, however no family present.   Patient has never been to this hospital per records, no contact information in chart,   No infomation could be obtained on the name of any relatives or family members, home address, phone numbers, or any contact information.  Meds, medical and surgical history could not be obtained    # Altered Mental Status likely due to Metabolic Encephalopathy from UTI associated sepsis  # Hx of R MCA stroke  - In ED, hemodynamically stable. She was pan-scanned. no evidence of new stroke, trauma, or infection.  - WBC 19K: down trending  - UA +ve  - S/p cefepime and vanc in the ED  - C/w Rocephin, follow blood and urine cx   - CTH showed old stroke in the territory of the Rt MCA.   - rEEG, c/w diffuse cerebral electrophysiological dysfunction  - TSH, vitamin b12, and folate levels-->unable to collect.  -Neuro c/s: infectious w/u, rEEG: Consistent with diffuse cerebral electrophysiological dysfunction; Further testing pending family input.     #Tropinemia:  follow up troponin levels,   Echo ordred    #anemia  most recent hgb 10.1 from 14, appear dilutional, will follow    # Hyperkalemia >> repeat BMP     Possible Rhabdomyolysis >> IVF , Trend CK.       As of this AM. pt is more alert and awake, was able to tell her sister's name Dominga who she lives with.  Social work eval.       Diet: pending S&S eval  Activity: IAT  DVT Prophylaxis: lovenox  GI Prophylaxis: not indication  CHG Order  Code Status: full    #Progress Note Handoff  Diet: soft and bite sized with thin liquids.   Pending (specify):  sepsis / Rhabdo /   Family discussion: yet to be established       To do:   blood and urine cultures pending, , need to contact family once have the information on file to know baseline, follow echo and neurology recommendations, follow troponin. follow hgb. if persistent drop consult GI.

## 2023-03-08 LAB
ALBUMIN SERPL ELPH-MCNC: 2.7 G/DL — LOW (ref 3.5–5.2)
ALP SERPL-CCNC: 59 U/L — SIGNIFICANT CHANGE UP (ref 30–115)
ALT FLD-CCNC: 13 U/L — SIGNIFICANT CHANGE UP (ref 0–41)
ANION GAP SERPL CALC-SCNC: 11 MMOL/L — SIGNIFICANT CHANGE UP (ref 7–14)
AST SERPL-CCNC: 19 U/L — SIGNIFICANT CHANGE UP (ref 0–41)
BASOPHILS # BLD AUTO: 0.04 K/UL — SIGNIFICANT CHANGE UP (ref 0–0.2)
BASOPHILS NFR BLD AUTO: 0.3 % — SIGNIFICANT CHANGE UP (ref 0–1)
BILIRUB SERPL-MCNC: 0.2 MG/DL — SIGNIFICANT CHANGE UP (ref 0.2–1.2)
BUN SERPL-MCNC: 6 MG/DL — LOW (ref 10–20)
CALCIUM SERPL-MCNC: 8.9 MG/DL — SIGNIFICANT CHANGE UP (ref 8.4–10.4)
CHLORIDE SERPL-SCNC: 103 MMOL/L — SIGNIFICANT CHANGE UP (ref 98–110)
CK SERPL-CCNC: 83 U/L — SIGNIFICANT CHANGE UP (ref 0–225)
CO2 SERPL-SCNC: 26 MMOL/L — SIGNIFICANT CHANGE UP (ref 17–32)
CREAT SERPL-MCNC: <0.5 MG/DL — LOW (ref 0.7–1.5)
EGFR: 109 ML/MIN/1.73M2 — SIGNIFICANT CHANGE UP
EOSINOPHIL # BLD AUTO: 0.01 K/UL — SIGNIFICANT CHANGE UP (ref 0–0.7)
EOSINOPHIL NFR BLD AUTO: 0.1 % — SIGNIFICANT CHANGE UP (ref 0–8)
GLUCOSE BLDC GLUCOMTR-MCNC: 120 MG/DL — HIGH (ref 70–99)
GLUCOSE SERPL-MCNC: 96 MG/DL — SIGNIFICANT CHANGE UP (ref 70–99)
HCT VFR BLD CALC: 33.1 % — LOW (ref 37–47)
HGB BLD-MCNC: 10.6 G/DL — LOW (ref 12–16)
IMM GRANULOCYTES NFR BLD AUTO: 0.9 % — HIGH (ref 0.1–0.3)
LYMPHOCYTES # BLD AUTO: 1.99 K/UL — SIGNIFICANT CHANGE UP (ref 1.2–3.4)
LYMPHOCYTES # BLD AUTO: 15.9 % — LOW (ref 20.5–51.1)
MAGNESIUM SERPL-MCNC: 1.7 MG/DL — LOW (ref 1.8–2.4)
MCHC RBC-ENTMCNC: 29.1 PG — SIGNIFICANT CHANGE UP (ref 27–31)
MCHC RBC-ENTMCNC: 32 G/DL — SIGNIFICANT CHANGE UP (ref 32–37)
MCV RBC AUTO: 90.9 FL — SIGNIFICANT CHANGE UP (ref 81–99)
MONOCYTES # BLD AUTO: 1.18 K/UL — HIGH (ref 0.1–0.6)
MONOCYTES NFR BLD AUTO: 9.4 % — HIGH (ref 1.7–9.3)
NEUTROPHILS # BLD AUTO: 9.19 K/UL — HIGH (ref 1.4–6.5)
NEUTROPHILS NFR BLD AUTO: 73.4 % — SIGNIFICANT CHANGE UP (ref 42.2–75.2)
NRBC # BLD: 0 /100 WBCS — SIGNIFICANT CHANGE UP (ref 0–0)
PLATELET # BLD AUTO: 375 K/UL — SIGNIFICANT CHANGE UP (ref 130–400)
POTASSIUM SERPL-MCNC: 4.6 MMOL/L — SIGNIFICANT CHANGE UP (ref 3.5–5)
POTASSIUM SERPL-SCNC: 4.6 MMOL/L — SIGNIFICANT CHANGE UP (ref 3.5–5)
PROT SERPL-MCNC: 5.4 G/DL — LOW (ref 6–8)
RBC # BLD: 3.64 M/UL — LOW (ref 4.2–5.4)
RBC # FLD: 12.9 % — SIGNIFICANT CHANGE UP (ref 11.5–14.5)
SODIUM SERPL-SCNC: 140 MMOL/L — SIGNIFICANT CHANGE UP (ref 135–146)
T4 AB SER-ACNC: 7 UG/DL — SIGNIFICANT CHANGE UP (ref 4.6–12)
T4 FREE SERPL-MCNC: 1.3 NG/DL — SIGNIFICANT CHANGE UP (ref 0.9–1.8)
TSH SERPL-MCNC: 2.4 UIU/ML — SIGNIFICANT CHANGE UP (ref 0.27–4.2)
WBC # BLD: 12.52 K/UL — HIGH (ref 4.8–10.8)
WBC # FLD AUTO: 12.52 K/UL — HIGH (ref 4.8–10.8)

## 2023-03-08 PROCEDURE — 71045 X-RAY EXAM CHEST 1 VIEW: CPT | Mod: 26

## 2023-03-08 PROCEDURE — 99233 SBSQ HOSP IP/OBS HIGH 50: CPT

## 2023-03-08 PROCEDURE — 93010 ELECTROCARDIOGRAM REPORT: CPT

## 2023-03-08 PROCEDURE — 99291 CRITICAL CARE FIRST HOUR: CPT

## 2023-03-08 RX ORDER — LEVETIRACETAM 250 MG/1
1000 TABLET, FILM COATED ORAL EVERY 12 HOURS
Refills: 0 | Status: DISCONTINUED | OUTPATIENT
Start: 2023-03-08 | End: 2023-03-14

## 2023-03-08 RX ORDER — VALPROIC ACID (AS SODIUM SALT) 250 MG/5ML
500 SOLUTION, ORAL ORAL EVERY 12 HOURS
Refills: 0 | Status: DISCONTINUED | OUTPATIENT
Start: 2023-03-08 | End: 2023-03-11

## 2023-03-08 RX ORDER — SODIUM CHLORIDE 9 MG/ML
500 INJECTION, SOLUTION INTRAVENOUS ONCE
Refills: 0 | Status: COMPLETED | OUTPATIENT
Start: 2023-03-08 | End: 2023-03-08

## 2023-03-08 RX ORDER — SODIUM CHLORIDE 9 MG/ML
1000 INJECTION, SOLUTION INTRAVENOUS
Refills: 0 | Status: DISCONTINUED | OUTPATIENT
Start: 2023-03-08 | End: 2023-03-11

## 2023-03-08 RX ORDER — CEFEPIME 1 G/1
1000 INJECTION, POWDER, FOR SOLUTION INTRAMUSCULAR; INTRAVENOUS ONCE
Refills: 0 | Status: DISCONTINUED | OUTPATIENT
Start: 2023-03-08 | End: 2023-03-08

## 2023-03-08 RX ORDER — MAGNESIUM SULFATE 500 MG/ML
2 VIAL (ML) INJECTION
Refills: 0 | Status: DISCONTINUED | OUTPATIENT
Start: 2023-03-08 | End: 2023-03-08

## 2023-03-08 RX ORDER — VALPROIC ACID (AS SODIUM SALT) 250 MG/5ML
1500 SOLUTION, ORAL ORAL ONCE
Refills: 0 | Status: COMPLETED | OUTPATIENT
Start: 2023-03-08 | End: 2023-03-08

## 2023-03-08 RX ADMIN — Medication 2 MILLIGRAM(S): at 06:47

## 2023-03-08 RX ADMIN — Medication 25 GRAM(S): at 14:34

## 2023-03-08 RX ADMIN — Medication 25 GRAM(S): at 18:16

## 2023-03-08 RX ADMIN — SODIUM CHLORIDE 500 MILLILITER(S): 9 INJECTION, SOLUTION INTRAVENOUS at 01:19

## 2023-03-08 RX ADMIN — Medication 25 GRAM(S): at 21:51

## 2023-03-08 RX ADMIN — SODIUM CHLORIDE 50 MILLILITER(S): 9 INJECTION, SOLUTION INTRAVENOUS at 09:24

## 2023-03-08 RX ADMIN — Medication 50 MILLIGRAM(S): at 18:16

## 2023-03-08 RX ADMIN — LEVETIRACETAM 400 MILLIGRAM(S): 250 TABLET, FILM COATED ORAL at 17:03

## 2023-03-08 RX ADMIN — Medication 25 GRAM(S): at 16:35

## 2023-03-08 RX ADMIN — Medication 1 APPLICATION(S): at 17:28

## 2023-03-08 RX ADMIN — SODIUM CHLORIDE 500 MILLILITER(S): 9 INJECTION, SOLUTION INTRAVENOUS at 05:06

## 2023-03-08 RX ADMIN — Medication 1 MILLIGRAM(S): at 06:43

## 2023-03-08 RX ADMIN — SODIUM CHLORIDE 50 MILLILITER(S): 9 INJECTION, SOLUTION INTRAVENOUS at 22:42

## 2023-03-08 RX ADMIN — Medication 1 APPLICATION(S): at 05:06

## 2023-03-08 RX ADMIN — Medication 25 GRAM(S): at 20:25

## 2023-03-08 RX ADMIN — Medication 50 MILLIGRAM(S): at 17:21

## 2023-03-08 RX ADMIN — LEVETIRACETAM 400 MILLIGRAM(S): 250 TABLET, FILM COATED ORAL at 06:52

## 2023-03-08 RX ADMIN — ENOXAPARIN SODIUM 40 MILLIGRAM(S): 100 INJECTION SUBCUTANEOUS at 21:51

## 2023-03-08 NOTE — PROGRESS NOTE ADULT - SUBJECTIVE AND OBJECTIVE BOX
NEUROLOGY CONSULT PROGRESS NOTE    INTERVAL HISTORY: Patient somnolent. Open eyes to verbal stimuli with limitation of conjugated gaze to left side.     REVIEW OF SYSTEMS:  As per HPI, otherwise negative for Constitutional, Eyes, Ears/Nose/Mouth/Throat, Neck, Cardiovascular, Respiratory, Gastrointestinal, Genitourinary, Skin, Endocrine, Musculoskeletal, Psychiatric, and Hematologic/Lymphatic.    MEDICATIONS:  acetaminophen     Tablet .. 650 milliGRAM(s) Oral every 6 hours PRN  acetaminophen  Suppository .. 325 milliGRAM(s) Rectal once  dextrose 5% + sodium chloride 0.45%. 1000 milliLiter(s) IV Continuous <Continuous>  enoxaparin Injectable 40 milliGRAM(s) SubCutaneous every 24 hours  levETIRAcetam  IVPB 1000 milliGRAM(s) IV Intermittent every 12 hours  LORazepam   Injectable 2 milliGRAM(s) IV Push once  valproate sodium  IVPB 1500 milliGRAM(s) IV Intermittent once  valproate sodium  IVPB 500 milliGRAM(s) IV Intermittent every 12 hours  vitamin A &amp; D Ointment 1 Application(s) Topical two times a day    VITAL SIGNS:  Vital Signs Last 24 Hrs  T(C): 37.1 (08 Mar 2023 06:55), Max: 37.3 (07 Mar 2023 23:02)  T(F): 98.7 (08 Mar 2023 06:55), Max: 99.1 (07 Mar 2023 23:02)  HR: 118 (08 Mar 2023 08:15) (99 - 144)  BP: 134/75 (08 Mar 2023 08:15) (99/61 - 137/80)  BP(mean): --  RR: 17 (08 Mar 2023 06:55) (16 - 20)  SpO2: 100% (08 Mar 2023 06:55) (98% - 100%)    Parameters below as of 08 Mar 2023 05:08  Patient On (Oxygen Delivery Method): room air        PHYSICAL EXAMINATION:  Physical exam:  General: No acute distress, somnolent    Neurologic:  -Mental status: Somnolent, alert. Not following commands. Head rotated to the L side.  II: Blinks to threat   III, IV, VI: L gaze preference, able to overcome with head movements  VII: Facial asymmetry.  Motor: No jerking movements.    LABS:                          10.6   12.52 )-----------( 375      ( 08 Mar 2023 08:32 )             33.1     03-08    140  |  103  |  6<L>  ----------------------------<  96  4.6   |  26  |  <0.5<L>    Ca    8.9      08 Mar 2023 08:32  Mg     1.7     03-08    TPro  5.4<L>  /  Alb  2.7<L>  /  TBili  0.2  /  DBili  x   /  AST  19  /  ALT  13  /  AlkPhos  59  03-08          RADIOLOGY & ADDITIONAL STUDIES:      IMPRESSION & RECOMMENDATIONS:

## 2023-03-08 NOTE — RAPID RESPONSE TEAM SUMMARY - NSOTHERINTERVENTIONSRRT_GEN_ALL_CORE
Attending Attestation:  -RRT called at 0616 for eval of seizure-like activity. When I arrived at bedside, the pt had left gaze preference, twitching of the LUE, and her head was turned to the left. The pt is A&O, follows commands, and responds to pain. HR (130s), not hypoxic on RA, otherwise VSS    Critical review of the chart revealed that the pt has a hx of right-sided MCA CVA (residual left-sided weakness) and was admitted for suspected septic encephalopathy due to UTI.     We gave the pt 2 mg of IV Ativan with minimal results      A/P:    LUE twitching w/ left gaze preference, PNES vs focal w/ status  Tachycardia, rule out sepsis  UTI      Give another 2 mg of IV Ativan  Neuro at bedside  Keppra 1g  VEEG  Send STAT cultures  Send STAT labs (CBC, BMP, TSH, procal)  STAT EKG  Can monitor on 3A for now  Need further background from family regarding PMH        CCT

## 2023-03-08 NOTE — PROGRESS NOTE ADULT - SUBJECTIVE AND OBJECTIVE BOX
OVERNIGHT EVENTS: O/N RR was called due to patient tachycardia and possible seizure activity. Patient was given ativan x2 and started on keppra, vEEG was started this AM and sepsis w/u including TSH and T4 is pending.     SUBJECTIVE / INTERVAL HPI: Patient seen and examined at bedside.     Per the patients daughter (verbalized that she is the HCP), the patient was BIB EMS for evaluation of generalized pain.   Patient daughter states that the patient has a history of strokes for which she was admitted to at [facility] when asked about medications and past medical history the daughter denied that the patient had any past medical history and was unsure of what medications the patient was on.       VITAL SIGNS:  Vital Signs Last 24 Hrs  T(C): 37.1 (08 Mar 2023 06:55), Max: 37.3 (07 Mar 2023 23:02)  T(F): 98.7 (08 Mar 2023 06:55), Max: 99.1 (07 Mar 2023 23:02)  HR: 118 (08 Mar 2023 08:15) (99 - 144)  BP: 134/75 (08 Mar 2023 08:15) (99/61 - 137/80)  BP(mean): --  RR: 17 (08 Mar 2023 06:55) (16 - 20)  SpO2: 100% (08 Mar 2023 06:55) (98% - 100%)    Parameters below as of 08 Mar 2023 05:08  Patient On (Oxygen Delivery Method): room air        PHYSICAL EXAM:    General: Well developed, well nourished, lethargic this AM   HEENT: NC/AT; PERRL, anicteric sclera; MMM  Cardiovascular: +S1/S2, RRR, no murmurs, rubs, gallops  Respiratory: CTA B/L; no W/R/R  Gastrointestinal: soft, NT/ND; +BSx4  Extremities: WWP; 2+ LE edema L>>R, no clubbing or cyanosis   Vascular: 2+ radial, DP/PT pulses B/L Left side exam limited d/t edema.  Neurological: AAOx3 to person, place, and time; Left sided weakeness appears to be related to previous strokes.     MEDICATIONS:  MEDICATIONS  (STANDING):  acetaminophen  Suppository .. 325 milliGRAM(s) Rectal once  dextrose 5% + sodium chloride 0.45%. 1000 milliLiter(s) (50 mL/Hr) IV Continuous <Continuous>  enoxaparin Injectable 40 milliGRAM(s) SubCutaneous every 24 hours  levETIRAcetam  IVPB 1000 milliGRAM(s) IV Intermittent every 12 hours  LORazepam   Injectable 2 milliGRAM(s) IV Push once  valproate sodium  IVPB 1500 milliGRAM(s) IV Intermittent once  valproate sodium  IVPB 500 milliGRAM(s) IV Intermittent every 12 hours  vitamin A &amp; D Ointment 1 Application(s) Topical two times a day    MEDICATIONS  (PRN):  acetaminophen     Tablet .. 650 milliGRAM(s) Oral every 6 hours PRN Moderate Pain (4 - 6)      ALLERGIES:  Allergies    Allergy Status Unknown    Intolerances        LABS:                        10.6   12.52 )-----------( 375      ( 08 Mar 2023 08:32 )             33.1     03-08    140  |  103  |  6<L>  ----------------------------<  96  4.6   |  26  |  <0.5<L>    Ca    8.9      08 Mar 2023 08:32  Mg     1.7     03-08    TPro  5.4<L>  /  Alb  2.7<L>  /  TBili  0.2  /  DBili  x   /  AST  19  /  ALT  13  /  AlkPhos  59  03-08        CAPILLARY BLOOD GLUCOSE      POCT Blood Glucose.: 120 mg/dL (08 Mar 2023 06:23)      RADIOLOGY & ADDITIONAL TESTS: Reviewed.    PLAN:

## 2023-03-08 NOTE — PROGRESS NOTE ADULT - SUBJECTIVE AND OBJECTIVE BOX
pt seen and examined.     My notes supersede resident's notes in case of discrepancy       ROS: no cp, no sob, no n/v, no fever    Vital Signs Last 24 Hrs  T(C): 37.1 (08 Mar 2023 06:55), Max: 37.3 (07 Mar 2023 23:02)  T(F): 98.7 (08 Mar 2023 06:55), Max: 99.1 (07 Mar 2023 23:02)  HR: 118 (08 Mar 2023 08:15) (99 - 144)  BP: 134/75 (08 Mar 2023 08:15) (99/61 - 137/80)  BP(mean): --  RR: 17 (08 Mar 2023 06:55) (16 - 20)  SpO2: 100% (08 Mar 2023 06:55) (98% - 100%)    Parameters below as of 08 Mar 2023 05:08  Patient On (Oxygen Delivery Method): room air      physical exam  constitutional NAD, Respiratory  lungs CTA, CVS heart RRR, GI: abdomen Soft NT, ND, BS+, skin: intact  neuro exam confused. had rapid response last night, due to shaking and tachycardia. now no shaking but still mildly tachycardic.     MEDICATIONS  (STANDING):  acetaminophen  Suppository .. 325 milliGRAM(s) Rectal once  dextrose 5% + sodium chloride 0.45%. 1000 milliLiter(s) (50 mL/Hr) IV Continuous <Continuous>  enoxaparin Injectable 40 milliGRAM(s) SubCutaneous every 24 hours  levETIRAcetam  IVPB 1000 milliGRAM(s) IV Intermittent every 12 hours  LORazepam   Injectable 2 milliGRAM(s) IV Push once  valproate sodium  IVPB 1500 milliGRAM(s) IV Intermittent once  valproate sodium  IVPB 500 milliGRAM(s) IV Intermittent every 12 hours  vitamin A &amp; D Ointment 1 Application(s) Topical two times a day    MEDICATIONS  (PRN):  acetaminophen     Tablet .. 650 milliGRAM(s) Oral every 6 hours PRN Moderate Pain (4 - 6)                        10.6   12.52 )-----------( 375      ( 08 Mar 2023 08:32 )             33.1     03-08    140  |  103  |  6<L>  ----------------------------<  96  4.6   |  26  |  <0.5<L>    Ca    8.9      08 Mar 2023 08:32  Mg     1.7     03-08    TPro  5.4<L>  /  Alb  2.7<L>  /  TBili  0.2  /  DBili  x   /  AST  19  /  ALT  13  /  AlkPhos  59  03-08    CARDIAC MARKERS ( 08 Mar 2023 08:32 )  x     / x     / 83 U/L / x     / x      CARDIAC MARKERS ( 07 Mar 2023 07:10 )  x     / x     / 130 U/L / x     / x      CARDIAC MARKERS ( 06 Mar 2023 18:08 )  x     / <0.01 ng/mL / x     / x     / x      CARDIAC MARKERS ( 06 Mar 2023 12:14 )  x     / <0.01 ng/mL / x     / x     / x        Culture - Urine (03.04.23 @ 05:10)    -  Amikacin: S <=16    -  Amoxicillin/Clavulanic Acid: S <=8/4    -  Ampicillin: S <=8 These ampicillin results predict results for amoxicillin    -  Ampicillin/Sulbactam: S <=4/2 Enterobacter, Klebsiella aerogenes, Citrobacter, and Serratia may develop resistance during prolonged therapy (3-4 days)    -  Aztreonam: S <=4    -  Cefazolin: S <=2 For uncomplicated UTI with K. pneumoniae, E. coli, or P. mirablis: JULIANA <=16 is sensitive and JULIANA >=32 is resistant. This also predicts results for oral agents cefaclor, cefdinir, cefpodoxime, cefprozil, cefuroxime axetil, cephalexin and locarbef for uncomplicated UTI. Note that some isolates may be susceptible to these agents while testing resistant to cefazolin.    -  Cefepime: S <=2    -  Cefoxitin: S <=8    -  Ceftriaxone: S <=1 Enterobacter, Klebsiella aerogenes, Citrobacter, and Serratia may develop resistance during prolonged therapy    -  Cefuroxime: S <=4    -  Ciprofloxacin: S <=0.25    -  Ertapenem: S <=0.5    -  Gentamicin: S <=2    -  Imipenem: S <=1    -  Levofloxacin: S <=0.5    -  Meropenem: S <=1    -  Nitrofurantoin: S <=32 Should not be used to treat pyelonephritis    -  Piperacillin/Tazobactam: S <=8    -  Tobramycin: S <=2    -  Trimethoprim/Sulfamethoxazole: S <=0.5/9.5    Specimen Source: Catheterized Catheterized    Culture Results:   >100,000 CFU/ml Escherichia coli    Organism Identification: Escherichia coli    Organism: Escherichia coli    Method Type: JULIANA    a/p   This is a case of a 67 year old lady brought to ED due to generalized pain, however no family present.   history is not clear. pt cannot provide info.     # possible tonic clonic sz,   dw neuro   meds adjusted.   plan is EEG today     # possible UTI , Ecoli, possible metabolic encephalopathy due to sepsis POA  Blood Gas Venous - Lactate: 3.30: Elevated lactate. Consider ordering follow-up lactate to trend. mmol/L (03.04.23 @ 03:17)  Blood Gas Venous - Lactate: 3.00: Elevated lactate. Consider ordering follow-up lactate to trend. mmol/L (03.04.23 @ 06:20)    rocephin 1 g q24hrs    # mag def, replace, keep mag >2    #Progress Note Handoff  Pending (specify):   Family discussion: dw daughter Dominga, who did not  the phone. I left her a msg, also called sister Wanda [phone], Wanda believes that the other daughter Fer is to be the decision maker. also called Fer, [phone] , not able to speak with her either, left her a msg also   Disposition: TBD

## 2023-03-08 NOTE — PROGRESS NOTE ADULT - ATTENDING COMMENTS
Patient seen and examined and agree with above except as noted.  Patients history, notes, labs, imaging, vitals and meds reviewed personally.  Patient likely seized last night due too missing her depakote since admission.  Dose could not be corroborated so appears was not given    Plan as above

## 2023-03-08 NOTE — PROGRESS NOTE ADULT - ASSESSMENT
This is a case of a 67 year old lady brought to ED via EMS who per triage note found pt on street. Per triage note, daughter told EMS that pt c/o generalized pain, however no family present.   Patient has never been to this hospital per records, no contact information in chart,   No infomation could be obtained on the name of any relatives or family members, home address, phone numbers, or any contact information.  Meds, medical and surgical history could not be obtained    # Altered Mental Status likely due to Metabolic Encephalopathy from UTI associated sepsis  # Hx of R MCA stroke  - In ED, hemodynamically stable. She was pan-scanned. no evidence of new stroke, trauma, or infection.  - WBC 19K: spiked on 3/8 i/s/o of seizure like activity.   - UA +ve; S/p cefepime and vanc in the ED and Rocephin, follow blood and urine cx   - CTH showed old stroke in the territory of the Rt MCA.   - rEEG consistent with diffuse cerebral electrophysiological dysfunction- video EEG in progress for better characterization  -Neuro reccs appreciated:     -Ativan 2 mg IV once  -Keppra 1g IV once, then c/w 1g BID  -VEEG for better characterization of the event  -Keep Mg>2  -Seizure and fall precautions  - TSH pending    #Tropinemia:  troponin stable at <.01  Echo ordered/pending    #anemia  most recent hgb 10.1 from 14, appear dilutional  stable    # Hyperkalemia >> repeat BMP  Possible Rhabdomyolysis >> IVF , Trend CK- downtrending      Diet: thin liquid; soft & bite-sized  Activity: IAT- PT recc. PEPE  DVT Prophylaxis: lovenox  GI Prophylaxis: not indication  CHG Order  Code Status: full    #Progress Note Handoff  Diet: soft and bite sized with thin liquids.   Pending (specify):  VEEG, neuro f/u, STAT: Cx, labs (CBC, BMP, RSH, procal), EKG.    Family discussion: yet to be established       To do: need to contact family once have the information on file to know baseline.

## 2023-03-08 NOTE — PROGRESS NOTE ADULT - ASSESSMENT
67y old female with unclear PMH, outpatient medication review shows the patient is on low dose Lamictal and Depakote in addition to Keppra (possible mood disorder/ seizures), currently admitted u/c of med team for encephalopathy. On prior neurology assessment the patient noted to have spastic LHP and R gaze preference. CTH showed old stroke in the territory of the Rt MCA. CTA no LVo but reduced caliber of M1 segment. EEG was normal, currently with L gaze preference and L sided jerking movements. Clinically no significant changes.    Suspect R focal partial seizures in the setting of R sided frontal lobe chronic infarct vs less likely non epileptic event  -Ativan 2 mg IV once  -Keppra 1g IV once, then c/w 1g BID  -VEEG for better characterization of the event  -Keep Mg>2  -Seizure and fall precautions  -Need to obtain more collaterals about the patient's PMH    Pending attending attestation 67y old female with unclear PMH, outpatient medication review shows the patient is on low dose Lamictal and Depakote in addition to Keppra (possible mood disorder/ seizures), currently admitted u/c of med team for encephalopathy. On prior neurology assessment the patient noted to have spastic LHP and R gaze preference. CTH showed old stroke in the territory of the Rt MCA. CTA no LVo but reduced caliber of M1 segment. EEG was normal, currently with L gaze preference and L sided jerking movements. Clinically no significant changes.    Suspect R focal partial seizures in the setting of R sided frontal lobe chronic infarct vs less likely non epileptic event  -Load with Depakote 1500 mg IV and then, 500 mg BID  -Ativan 2 mg IV once  -Keppra 1g IV once, then c/w 1g BID  -VEEG for better characterization of the event  -Keep Mg>2  -Seizure and fall precautions  -Need to obtain more collaterals about the patient's PMH    Pending attending attestation bone cuts

## 2023-03-08 NOTE — RAPID RESPONSE TEAM SUMMARY - NSSITUATIONBACKGROUNDRRT_GEN_ALL_CORE
Was informed by RN that patient was shaking. Evaluated at bedside patient was responding, had sustained shaking. Was given IV 2 mg. Neuro came recommended another push of IV 2 ativan and video eeg. Sepsis work up ordered as well. Neuro started patient on keppra 1g BID. Appears from outpatient meds that patient was taking keppra and lamotrigine at home.    Patient has been tachy overnight, was given ivf bolus which helped, became tachy again was given bolus again which helped. Please follow up on TSH.   working diagnosis is pseudoseizures vs focal seizure. please follow up sepsis work up and start abx if appropriate. Please also follow up the TSH/T4.

## 2023-03-08 NOTE — PROGRESS NOTE ADULT - SUBJECTIVE AND OBJECTIVE BOX
Neurology Progress Note    Neurology team called again as the patient was having L sided gaze preference with L facial and LUE jerking movements.   The patient seen and examined at the bedside. She is awake, and alert. Follows some commands.  Noted L gaze preference with LUE twitching.    MEDICATIONS  (STANDING):  acetaminophen  Suppository .. 325 milliGRAM(s) Rectal once  enoxaparin Injectable 40 milliGRAM(s) SubCutaneous every 24 hours  levETIRAcetam  IVPB 1000 milliGRAM(s) IV Intermittent every 12 hours  LORazepam   Injectable 2 milliGRAM(s) IV Push once  LORazepam   Injectable 1 milliGRAM(s) IV Push once  LORazepam   Injectable 2 milliGRAM(s) IV Push once  sodium chloride 0.9%. 1000 milliLiter(s) (100 mL/Hr) IV Continuous <Continuous>  vitamin A &amp; D Ointment 1 Application(s) Topical two times a day    MEDICATIONS  (PRN):  acetaminophen     Tablet .. 650 milliGRAM(s) Oral every 6 hours PRN Moderate Pain (4 - 6)      Allergies    Allergy Status Unknown    Intolerances        Vital Signs Last 24 Hrs  T(C): 37.3 (08 Mar 2023 05:08), Max: 37.3 (07 Mar 2023 23:02)  T(F): 99.1 (08 Mar 2023 05:08), Max: 99.1 (07 Mar 2023 23:02)  HR: 144 (08 Mar 2023 05:08) (99 - 144)  BP: 116/64 (08 Mar 2023 05:08) (99/61 - 125/79)  RR: 18 (08 Mar 2023 05:08) (16 - 20)  SpO2: 100% (08 Mar 2023 05:08) (98% - 100%)    Parameters below as of 08 Mar 2023 05:08  Patient On (Oxygen Delivery Method): room air        Physical exam:  General: No acute distress, awake and alert    Neurologic:  -Mental status: Awake, alert, oriented to person. Not following commands. Head rotated to the L side.  II: Blinks to threat   III, IV, VI: L gaze preference, able to overcome with head movements  VII: Facial asymmetry related to facial twitches  Motor: Noted jerking movements and twitches over the LUE      LABS:                        9.8    9.06  )-----------( 340      ( 07 Mar 2023 07:10 )             30.7     03-07    138  |  103  |  8<L>  ----------------------------<  84  4.3   |  28  |  <0.5<L>    Ca    8.5      07 Mar 2023 07:10  Mg     1.8     03-07    TPro  4.9<L>  /  Alb  2.4<L>  /  TBili  <0.2  /  DBili  x   /  AST  18  /  ALT  12  /  AlkPhos  57  03-07          RADIOLOGY & ADDITIONAL TESTS:    CT Head No Cont (03.04.23 @ 02:04) >  here is a large chronic infarct in the right frontal lobe. The   gray-white matter differentiation is preserved     EEG (03.04.23 @ 17:40) >  Clinical Correlation & Recommendations  Consistent with diffuse cerebral electrophysiological dysfunction.    Secondary to none specific cause.

## 2023-03-08 NOTE — PROGRESS NOTE ADULT - ASSESSMENT
67y old female with unclear PMH, outpatient medication review shows the patient is on low dose Lamictal and Depakote in addition to Keppra (possible mood disorder/ seizures), currently admitted u/c of med team for encephalopathy. On prior neurology assessment the patient noted to have spastic LHP and R gaze preference. CTH showed old stroke in the territory of the Rt MCA. CTA no LVo but reduced caliber of M1 segment. EEG was normal, currently with L gaze preference and L sided jerking movements.    Suspect R focal partial seizures in the setting of R sided frontal lobe chronic infarct vs less likely non epileptic event  -Ativan 2 mg IV once  -Keppra 1g IV once, then c/w 1g BID  -VEEG for better   -Keep Mg>2  -Seizure and fall precautions    Pending attending attestation 67y old female with unclear PMH, outpatient medication review shows the patient is on low dose Lamictal and Depakote in addition to Keppra (possible mood disorder/ seizures), currently admitted u/c of med team for encephalopathy. On prior neurology assessment the patient noted to have spastic LHP and R gaze preference. CTH showed old stroke in the territory of the Rt MCA. CTA no LVo but reduced caliber of M1 segment. EEG was normal, currently with L gaze preference and L sided jerking movements.    Suspect R focal partial seizures in the setting of R sided frontal lobe chronic infarct vs less likely non epileptic event  -Ativan 2 mg IV once  -Keppra 1g IV once, then c/w 1g BID  -VEEG for better characterization of the event  -Keep Mg>2  -Seizure and fall precautions  -Need to obtain more collaterals about the patient's PMH    Pending attending attestation

## 2023-03-09 LAB
ALBUMIN SERPL ELPH-MCNC: 2.5 G/DL — LOW (ref 3.5–5.2)
ALP SERPL-CCNC: 58 U/L — SIGNIFICANT CHANGE UP (ref 30–115)
ALT FLD-CCNC: 10 U/L — SIGNIFICANT CHANGE UP (ref 0–41)
ANION GAP SERPL CALC-SCNC: 12 MMOL/L — SIGNIFICANT CHANGE UP (ref 7–14)
AST SERPL-CCNC: 14 U/L — SIGNIFICANT CHANGE UP (ref 0–41)
BASOPHILS # BLD AUTO: 0.05 K/UL — SIGNIFICANT CHANGE UP (ref 0–0.2)
BASOPHILS NFR BLD AUTO: 0.5 % — SIGNIFICANT CHANGE UP (ref 0–1)
BILIRUB SERPL-MCNC: <0.2 MG/DL — SIGNIFICANT CHANGE UP (ref 0.2–1.2)
BUN SERPL-MCNC: 4 MG/DL — LOW (ref 10–20)
CALCIUM SERPL-MCNC: 8.4 MG/DL — SIGNIFICANT CHANGE UP (ref 8.4–10.5)
CHLORIDE SERPL-SCNC: 103 MMOL/L — SIGNIFICANT CHANGE UP (ref 98–110)
CK SERPL-CCNC: 56 U/L — SIGNIFICANT CHANGE UP (ref 0–225)
CO2 SERPL-SCNC: 25 MMOL/L — SIGNIFICANT CHANGE UP (ref 17–32)
CREAT SERPL-MCNC: <0.5 MG/DL — LOW (ref 0.7–1.5)
CULTURE RESULTS: SIGNIFICANT CHANGE UP
CULTURE RESULTS: SIGNIFICANT CHANGE UP
EGFR: 117 ML/MIN/1.73M2 — SIGNIFICANT CHANGE UP
EOSINOPHIL # BLD AUTO: 0.17 K/UL — SIGNIFICANT CHANGE UP (ref 0–0.7)
EOSINOPHIL NFR BLD AUTO: 1.6 % — SIGNIFICANT CHANGE UP (ref 0–8)
GLUCOSE SERPL-MCNC: 84 MG/DL — SIGNIFICANT CHANGE UP (ref 70–99)
HCT VFR BLD CALC: 32.3 % — LOW (ref 37–47)
HGB BLD-MCNC: 10.1 G/DL — LOW (ref 12–16)
IMM GRANULOCYTES NFR BLD AUTO: 0.8 % — HIGH (ref 0.1–0.3)
LYMPHOCYTES # BLD AUTO: 2.68 K/UL — SIGNIFICANT CHANGE UP (ref 1.2–3.4)
LYMPHOCYTES # BLD AUTO: 25.4 % — SIGNIFICANT CHANGE UP (ref 20.5–51.1)
MAGNESIUM SERPL-MCNC: 3 MG/DL — HIGH (ref 1.8–2.4)
MCHC RBC-ENTMCNC: 28.7 PG — SIGNIFICANT CHANGE UP (ref 27–31)
MCHC RBC-ENTMCNC: 31.3 G/DL — LOW (ref 32–37)
MCV RBC AUTO: 91.8 FL — SIGNIFICANT CHANGE UP (ref 81–99)
MONOCYTES # BLD AUTO: 0.98 K/UL — HIGH (ref 0.1–0.6)
MONOCYTES NFR BLD AUTO: 9.3 % — SIGNIFICANT CHANGE UP (ref 1.7–9.3)
NEUTROPHILS # BLD AUTO: 6.59 K/UL — HIGH (ref 1.4–6.5)
NEUTROPHILS NFR BLD AUTO: 62.4 % — SIGNIFICANT CHANGE UP (ref 42.2–75.2)
NRBC # BLD: 0 /100 WBCS — SIGNIFICANT CHANGE UP (ref 0–0)
PLATELET # BLD AUTO: 392 K/UL — SIGNIFICANT CHANGE UP (ref 130–400)
POTASSIUM SERPL-MCNC: 4.1 MMOL/L — SIGNIFICANT CHANGE UP (ref 3.5–5)
POTASSIUM SERPL-SCNC: 4.1 MMOL/L — SIGNIFICANT CHANGE UP (ref 3.5–5)
PROT SERPL-MCNC: 5.1 G/DL — LOW (ref 6–8)
RBC # BLD: 3.52 M/UL — LOW (ref 4.2–5.4)
RBC # FLD: 13.2 % — SIGNIFICANT CHANGE UP (ref 11.5–14.5)
SODIUM SERPL-SCNC: 140 MMOL/L — SIGNIFICANT CHANGE UP (ref 135–146)
SPECIMEN SOURCE: SIGNIFICANT CHANGE UP
SPECIMEN SOURCE: SIGNIFICANT CHANGE UP
TROPONIN T SERPL-MCNC: 0.01 NG/ML — SIGNIFICANT CHANGE UP
WBC # BLD: 10.55 K/UL — SIGNIFICANT CHANGE UP (ref 4.8–10.8)
WBC # FLD AUTO: 10.55 K/UL — SIGNIFICANT CHANGE UP (ref 4.8–10.8)

## 2023-03-09 PROCEDURE — 99233 SBSQ HOSP IP/OBS HIGH 50: CPT

## 2023-03-09 PROCEDURE — 95720 EEG PHY/QHP EA INCR W/VEEG: CPT

## 2023-03-09 RX ORDER — PANTOPRAZOLE SODIUM 20 MG/1
1 TABLET, DELAYED RELEASE ORAL
Qty: 0 | Refills: 0 | DISCHARGE

## 2023-03-09 RX ORDER — ZINC SULFATE TAB 220 MG (50 MG ZINC EQUIVALENT) 220 (50 ZN) MG
220 TAB ORAL DAILY
Refills: 0 | Status: COMPLETED | OUTPATIENT
Start: 2023-03-09 | End: 2023-03-19

## 2023-03-09 RX ORDER — ASCORBIC ACID 60 MG
1 TABLET,CHEWABLE ORAL
Qty: 0 | Refills: 0 | DISCHARGE

## 2023-03-09 RX ORDER — MULTIVIT-MIN/FERROUS GLUCONATE 9 MG/15 ML
1 LIQUID (ML) ORAL DAILY
Refills: 0 | Status: DISCONTINUED | OUTPATIENT
Start: 2023-03-09 | End: 2023-04-18

## 2023-03-09 RX ORDER — LEVETIRACETAM 250 MG/1
1 TABLET, FILM COATED ORAL
Qty: 0 | Refills: 0 | DISCHARGE

## 2023-03-09 RX ORDER — ASCORBIC ACID 60 MG
500 TABLET,CHEWABLE ORAL DAILY
Refills: 0 | Status: DISCONTINUED | OUTPATIENT
Start: 2023-03-09 | End: 2023-04-18

## 2023-03-09 RX ORDER — MULTIVIT-MIN/FERROUS GLUCONATE 9 MG/15 ML
1 LIQUID (ML) ORAL
Qty: 0 | Refills: 0 | DISCHARGE

## 2023-03-09 RX ADMIN — SODIUM CHLORIDE 50 MILLILITER(S): 9 INJECTION, SOLUTION INTRAVENOUS at 16:53

## 2023-03-09 RX ADMIN — ENOXAPARIN SODIUM 40 MILLIGRAM(S): 100 INJECTION SUBCUTANEOUS at 21:18

## 2023-03-09 RX ADMIN — LEVETIRACETAM 400 MILLIGRAM(S): 250 TABLET, FILM COATED ORAL at 05:47

## 2023-03-09 RX ADMIN — Medication 1 APPLICATION(S): at 05:47

## 2023-03-09 RX ADMIN — LEVETIRACETAM 400 MILLIGRAM(S): 250 TABLET, FILM COATED ORAL at 18:28

## 2023-03-09 RX ADMIN — Medication 50 MILLIGRAM(S): at 05:47

## 2023-03-09 RX ADMIN — Medication 50 MILLIGRAM(S): at 18:34

## 2023-03-09 NOTE — PROGRESS NOTE ADULT - SUBJECTIVE AND OBJECTIVE BOX
OVERNIGHT EVENTS: LORIE O/N patient being monitored on EEG patient appeared tired this morning and difficulty answering questions.     SUBJECTIVE / INTERVAL HPI: Patient seen and examined at bedside.     VITAL SIGNS:  Vital Signs Last 24 Hrs  T(C): 35.9 (09 Mar 2023 04:54), Max: 38.3 (08 Mar 2023 14:45)  T(F): 96.6 (09 Mar 2023 04:54), Max: 100.9 (08 Mar 2023 14:45)  HR: 90 (09 Mar 2023 04:54) (90 - 97)  BP: 113/64 (09 Mar 2023 04:54) (106/58 - 113/64)  BP(mean): --  RR: 18 (09 Mar 2023 04:54) (18 - 20)  SpO2: 100% (09 Mar 2023 04:54) (98% - 100%)    Parameters below as of 09 Mar 2023 04:54  Patient On (Oxygen Delivery Method): room air        PHYSICAL EXAM:    General: Well developed, well nourished, no acute distress lying in bed with EEG monitor   Cardiovascular: +S1/S2, RRR, no murmurs, rubs, gallops  Respiratory: CTA B/L; no W/R/R  Gastrointestinal: soft, NT/ND; +BSx4  Extremities: WWP; no edema, clubbing or cyanosis  Vascular: 2+ radial, DP/PT pulses B/L    MEDICATIONS:  MEDICATIONS  (STANDING):  acetaminophen  Suppository .. 325 milliGRAM(s) Rectal once  dextrose 5% + sodium chloride 0.45%. 1000 milliLiter(s) (50 mL/Hr) IV Continuous <Continuous>  enoxaparin Injectable 40 milliGRAM(s) SubCutaneous every 24 hours  levETIRAcetam  IVPB 1000 milliGRAM(s) IV Intermittent every 12 hours  LORazepam   Injectable 2 milliGRAM(s) IV Push once  valproate sodium  IVPB 500 milliGRAM(s) IV Intermittent every 12 hours  vitamin A &amp; D Ointment 1 Application(s) Topical two times a day    MEDICATIONS  (PRN):  acetaminophen     Tablet .. 650 milliGRAM(s) Oral every 6 hours PRN Moderate Pain (4 - 6)      ALLERGIES:  Allergies    Allergy Status Unknown    Intolerances        LABS:                        10.1   10.55 )-----------( 392      ( 09 Mar 2023 05:32 )             32.3     03-09    140  |  103  |  4<L>  ----------------------------<  84  4.1   |  25  |  <0.5<L>    Ca    8.4      09 Mar 2023 05:32  Mg     3.0     03-09    TPro  5.1<L>  /  Alb  2.5<L>  /  TBili  <0.2  /  DBili  x   /  AST  14  /  ALT  10  /  AlkPhos  58  03-09        CAPILLARY BLOOD GLUCOSE      POCT Blood Glucose.: 120 mg/dL (08 Mar 2023 06:23)      RADIOLOGY & ADDITIONAL TESTS: Reviewed.    PLAN:  OVERNIGHT EVENTS: LORIE O/N patient being monitored on EEG patient appeared tired this morning and difficulty answering questions.     SUBJECTIVE / INTERVAL HPI: Patient seen and examined at bedside.     VITAL SIGNS:  Vital Signs Last 24 Hrs  T(C): 35.9 (09 Mar 2023 04:54), Max: 38.3 (08 Mar 2023 14:45)  T(F): 96.6 (09 Mar 2023 04:54), Max: 100.9 (08 Mar 2023 14:45)  HR: 90 (09 Mar 2023 04:54) (90 - 97)  BP: 113/64 (09 Mar 2023 04:54) (106/58 - 113/64)  BP(mean): --  RR: 18 (09 Mar 2023 04:54) (18 - 20)  SpO2: 100% (09 Mar 2023 04:54) (98% - 100%)    Parameters below as of 09 Mar 2023 04:54  Patient On (Oxygen Delivery Method): room air    PHYSICAL EXAM:    General: Well developed, well nourished, no acute distress lying in bed with EEG monitor , obtunded, opens eyes to verbal stimuli but not verbal at this time   Cardiovascular: +S1/S2, RRR, no murmurs, rubs, gallops  Respiratory: CTA B/L; no W/R/R  Gastrointestinal: soft, NT/ND; +BSx4  Extremities: WWP; no edema, clubbing or cyanosis  Vascular: 2+ radial, DP/PT pulses B/L    MEDICATIONS:  MEDICATIONS  (STANDING):  acetaminophen  Suppository .. 325 milliGRAM(s) Rectal once  dextrose 5% + sodium chloride 0.45%. 1000 milliLiter(s) (50 mL/Hr) IV Continuous <Continuous>  enoxaparin Injectable 40 milliGRAM(s) SubCutaneous every 24 hours  levETIRAcetam  IVPB 1000 milliGRAM(s) IV Intermittent every 12 hours  LORazepam   Injectable 2 milliGRAM(s) IV Push once  valproate sodium  IVPB 500 milliGRAM(s) IV Intermittent every 12 hours  vitamin A &amp; D Ointment 1 Application(s) Topical two times a day    MEDICATIONS  (PRN):  acetaminophen     Tablet .. 650 milliGRAM(s) Oral every 6 hours PRN Moderate Pain (4 - 6)    ALLERGIES:  Allergies    Allergy Status Unknown      LABS:                        10.1   10.55 )-----------( 392      ( 09 Mar 2023 05:32 )             32.3     03-09    140  |  103  |  4<L>  ----------------------------<  84  4.1   |  25  |  <0.5<L>    Ca    8.4      09 Mar 2023 05:32  Mg     3.0     03-09    TPro  5.1<L>  /  Alb  2.5<L>  /  TBili  <0.2  /  DBili  x   /  AST  14  /  ALT  10  /  AlkPhos  58  03-09        CAPILLARY BLOOD GLUCOSE      POCT Blood Glucose.: 120 mg/dL (08 Mar 2023 06:23)      RADIOLOGY & ADDITIONAL TESTS: Reviewed.  < from: CT Angio Head w/ IV Cont (03.04.23 @ 03:44) >  CTA HEAD:  No evidence of occlusion or aneurysm.    Diminutive caliber of the right M1 MCA.    CTA NECK:  No evidence of carotid or vertebral artery stenosis.    < end of copied text >      PLAN:

## 2023-03-09 NOTE — PROGRESS NOTE ADULT - ATTENDING COMMENTS
a/p  67 year old lady brought to ED supposedly due to generalized pain, however no family present.   history is not clear. pt cannot provide info.     # possible tonic clonic sz,   dw neuro   meds adjusted.   plan is on VEEG today     # possible UTI , Ecoli, possible metabolic encephalopathy due to sepsis POA  Blood Gas Venous - Lactate: 3.30: Elevated lactate. Consider ordering follow-up lactate to trend. mmol/L (03.04.23 @ 03:17)  Blood Gas Venous - Lactate: 3.00: Elevated lactate. Consider ordering follow-up lactate to trend. mmol/L (03.04.23 @ 06:20)    Rocephin 1 g q24hrs    #Progress Note Handoff  Pending (specify): improved mental status   Family discussion: called family , I was not able to speak with daughter.   Disposition: TBD , possible snf

## 2023-03-09 NOTE — PROGRESS NOTE ADULT - ASSESSMENT
This is a case of a 67 year old lady brought to ED via EMS who per triage note found pt on street. Per triage note, daughter told EMS that pt c/o generalized pain, however no family present.   Patient has never been to this hospital per records, no contact information in chart,   No infomation could be obtained on the name of any relatives or family members, home address, phone numbers, or any contact information.  Meds, medical and surgical history could not be obtained    # Altered Mental Status likely due to Metabolic Encephalopathy from UTI associated sepsis  # Hx of R MCA stroke  - In ED, hemodynamically stable. She was pan-scanned. no evidence of new stroke, trauma, or infection.  - WBC 19K: spiked on 3/8 i/s/o of seizure like activity.   - UA +ve; S/p cefepime and vanc in the ED and Rocephin, follow blood and urine cx   - CTH showed old stroke in the territory of the Rt MCA.   - rEEG consistent with diffuse cerebral electrophysiological dysfunction- video EEG in progress for better characterization  -Neuro reccs appreciated:   -Ativan 2 mg IV once  -Keppra 1g IV once, then c/w 1g BID  -Keep Mg>2  -Seizure and fall precautions  -TSH: 2.40     #Tropinemia:  troponin stable at <.01  Echo ordered/pending-Do we need it?    #anemia  most recent hgb 10.1 from 14, appear dilutional  stable    # Hyperkalemia >> repeat BMP  Possible Rhabdomyolysis >> IVF , Trend CK- downtrending      Diet: thin liquid; soft & bite-sized  Activity: IAT- PT recc. PEPE  DVT Prophylaxis: lovenox  GI Prophylaxis: not indication  CHG Order  Code Status: full    #Progress Note Handoff  Diet: soft and bite sized with thin liquids.   Pending (specify):  VEEG, neuro f/u, STAT: Cx, labs (CBC, BMP, RSH, procal), EKG.    Family discussion: yet to be established

## 2023-03-09 NOTE — CHART NOTE - NSCHARTNOTEFT_GEN_A_CORE
Registered Dietitian Follow-Up  Patient Profile Reviewed                           Yes [x]   No []  Nutrition History Previously Obtained        Yes [x]  No []      Pertinent Medical Interventions:  67 year old lady brought to ED via EMS who per triage note found pt on street. Per triage note, daughter told EMS that pt c/o generalized pain, however no family present.     Nutrition Interval History:   07-Mar-2023 SLP evaluation   Soft and bite-sized w/ thin liquids  allow for swallow between intakes; maintain upright posture during/after eating for 30 mins; oral hygiene  dependent  1:1 feed    Nutrient Intake:   </=50% meal tray consumption  Patient meeting *** % of estimated energy needs in-house     Diet order:   Diet, Soft and Bite Sized:   Free Water Flush Instructions:  MAGIC CUP 1X AT LUNCH. PLEASE GIVE VANILLA OR STRAWBERRY SUPPLEMENTS, no chocolate  Supplement Feeding Modality:  Oral  Ensure Plus High Protein Cans or Servings Per Day:  2       Frequency:  Daily (23 @ 16:27) [Active]    Anthropometrics:  Height (cm): 167.6 (23 @ 16:25)  Weight (kg): 68.3 (23 @ 10:12)  BMI (kg/m2): 24.3 (23 @ 10:12)    OTHER WEIGHTS:   ^ height per patient report  ^ weight obtained 3/6 via bed scale at time of visit, with consideration for edema   UBW: 68.2kg  IBW 52.3kg   patient bed weight higher than reported UBW, patient reported that she has 'some weight loss here or there'  unsure if accurate report considering patient noted with confusion  Daily Weight in k.3 ()    MEDICATIONS  (STANDING):  acetaminophen  Suppository .. 325 milliGRAM(s) Rectal once  dextrose 5% + sodium chloride 0.45%. 1000 milliLiter(s) (50 mL/Hr) IV Continuous <Continuous>  enoxaparin Injectable 40 milliGRAM(s) SubCutaneous every 24 hours  levETIRAcetam  IVPB 1000 milliGRAM(s) IV Intermittent every 12 hours  LORazepam   Injectable 2 milliGRAM(s) IV Push once  valproate sodium  IVPB 500 milliGRAM(s) IV Intermittent every 12 hours  vitamin A &amp; D Ointment 1 Application(s) Topical two times a day    MEDICATIONS  (PRN):  acetaminophen     Tablet .. 650 milliGRAM(s) Oral every 6 hours PRN Moderate Pain (4 - 6)    Pertinent Labs:  @ 05:32: Na 140, BUN 4<L>, Cr <0.5<L>, BG 84, K+ 4.1, Phos --, Mg 3.0<H>, Alk Phos 58, ALT/SGPT 10, AST/SGOT 14, HbA1c --    Physical Findings:  - Cognition: disoriented to; time; situation. mild temple/clavicle deletion   - GI function: Last BM not indicated per flow sheets   - Tubes: n/a  - Oral/Mouth cavity: soft and bite size   - Skin: per flow sheets wounds below  1. inner knee skin tear, right inner knee blister  2. Left: buttocks, Stage III  3. Right: buttocks, Stage III  4. R ear unstageable added 3/5  5. L ear unstageable added 3/5  - Edema: left ankle; right ankle 2+ on 3/5     Nutrition Requirements: with consideration for age, weight, BMI  Weight Used: 68.2 kg dry weight      Estimated Energy Needs    Continue [x]  Adjust [] 5689-8398 kcal/day (MSJ x 1.2-1.5)  Estimated Protein Needs    Continue [x]  Adjust [] 82-88g/day (1.2-1.3g/kg)  Estimated Fluid Needs        Continue [x]  Adjust [] 1700mLday (25mL/kg)    [x] Previous Nutrition Diagnosis:            [x] Ongoing          [] Resolved  #1 Increased Nutrient Needs  Goal/Expected Outcome: meet >/=75% est energy needs within 3-5 days    Nutrition Intervention: Meals and Snacks, Medical Food Supplement, Vitamin Supplement, Nutrition Related Medication, Coordination of Care  Indicator/Monitoring:  Monitor diet order, energy intake, food and nutrient intake, body composition, weight    Recommendations:  - soft and bite size thin liquids per SLP  - Ensure HIGH PROTEIN 2x/day to optimize pro/kcal intake (350kcal, 20 g pro/serving) Vanilla/Strawberry only  - Magic Cup 1x/day at dinner (290kcal, 9g protein/serving)  - meal tray assistance as needed  - RECOMMEND: zinc 220mg/daily x10-14d, ascorbic acid 500mg/daily, MV w/ minerals daily for wound healing if medically feasible      Patient at HIGH nutrition risk   will follow up within 3-5days  Amargo Couture, RD  [id number] or via TEAMS Registered Dietitian Follow-Up  Patient Profile Reviewed                           Yes [x]   No []  Nutrition History Previously Obtained        Yes [x]  No []      Pertinent Medical Interventions:  67 year old lady brought to ED via EMS who per triage note found pt on street. Per triage note, daughter told EMS that pt c/o generalized pain, however no family present.     Nutrition Interval History:   07-Mar-2023 SLP evaluation   Soft and bite-sized w/ thin liquids  allow for swallow between intakes; maintain upright posture during/after eating for 30 mins; oral hygiene  dependent  1:1 feed    Nutrient Intake:   patient reports that she's been less over the last few days secondary to having limited mobility in arms + having weakness unable to feed self.  reports her sister is coming to feed her which has been helping+ when staff feeds patient she is able to eat better as well. reports drinking ensure though unable to quantify amount she drinks  flow sheet documentation shows patient with </=50% meal tray consumption?  suspect patient meeting <75% of estimated energy needs in-house x5days    Diet order:   Diet, Soft and Bite Sized:   Free Water Flush Instructions:  MAGIC CUP 1X AT LUNCH. PLEASE GIVE VANILLA OR STRAWBERRY SUPPLEMENTS, no chocolate  Supplement Feeding Modality:  Oral  Ensure Plus High Protein Cans or Servings Per Day:  2       Frequency:  Daily (23 @ 16:27) [Active]    Anthropometrics:  Height (cm): 167.6 (23 @ 16:25)  Weight (kg): 68.3 (23 @ 10:12)  BMI (kg/m2): 24.3 (23 @ 10:12)    OTHER WEIGHTS:   ^ height per patient report  ^ weight obtained 3/6 via bed scale at time of visit, with consideration for edema   UBW: 68.2kg  IBW 52.3kg   patient bed weight higher than reported UBW, patient reported that she has 'some weight loss here or there'  unsure if accurate report considering patient noted with confusion  Daily Weight in k.3 ()    MEDICATIONS  (STANDING):  acetaminophen  Suppository .. 325 milliGRAM(s) Rectal once  dextrose 5% + sodium chloride 0.45%. 1000 milliLiter(s) (50 mL/Hr) IV Continuous <Continuous>  enoxaparin Injectable 40 milliGRAM(s) SubCutaneous every 24 hours  levETIRAcetam  IVPB 1000 milliGRAM(s) IV Intermittent every 12 hours  LORazepam   Injectable 2 milliGRAM(s) IV Push once  valproate sodium  IVPB 500 milliGRAM(s) IV Intermittent every 12 hours  vitamin A &amp; D Ointment 1 Application(s) Topical two times a day    MEDICATIONS  (PRN):  acetaminophen     Tablet .. 650 milliGRAM(s) Oral every 6 hours PRN Moderate Pain (4 - 6)    Pertinent Labs:  @ 05:32: Na 140, BUN 4<L>, Cr <0.5<L>, BG 84, K+ 4.1, Phos --, Mg 3.0<H>, Alk Phos 58, ALT/SGPT 10, AST/SGOT 14, HbA1c --    Physical Findings:  - Cognition: disoriented to; time; situation. mild temple/clavicle deletion   - GI function: Last BM not indicated per flow sheets   - Tubes: n/a  - Oral/Mouth cavity: soft and bite size   - Skin: per flow sheets wounds below  1. inner knee skin tear, right inner knee blister  2. Left: buttocks, Stage III  3. Right: buttocks, Stage III  4. R ear unstageable added 3/5  5. L ear unstageable added 3/5  - Edema: left ankle; right ankle 2+ on 3/5     Nutrition Requirements: with consideration for age, weight, BMI, wounds  Weight Used: 68.2 kg dry weight      Estimated Energy Needs    Continue [x]  Adjust [] 3554-9362 kcal/day (MSJ x 1.2-1.5)  Estimated Protein Needs    Continue [x]  Adjust [] 82-102g/day (1.2-1.5g/kg)  Estimated Fluid Needs        Continue [x]  Adjust [] 1700mLday (25mL/kg)    [x] Previous Nutrition Diagnosis:            [x] Ongoing          [] Resolved  #1 Increased Nutrient Needs  Goal/Expected Outcome: meet >/=75% est energy needs within 3-5 days    Nutrition Intervention: Meals and Snacks, Medical Food Supplement, Vitamin Supplement, Nutrition Related Medication, Coordination of Care  Indicator/Monitoring:  Monitor diet order, energy intake, food and nutrient intake, body composition, weight    Recommendations:  - soft and bite size thin liquids per SLP  - Ensure HIGH PROTEIN 2x/day to optimize pro/kcal intake (350kcal, 20 g pro/serving) Vanilla/Strawberry only  - Magic Cup 1x/day at dinner (290kcal, 9g protein/serving)  - 1:1 feeding assistance, patient with limited mobility in arm/hands  - RECOMMEND: zinc 220mg/daily x10-14d, ascorbic acid 500mg/daily, MV w/ minerals daily for wound healing if medically feasible  - obtain weights    Patient at HIGH nutrition risk   will follow up within 3-5days  Amarghakeem Abbott, RD  [phone] or via TEAMS

## 2023-03-10 LAB
ALBUMIN SERPL ELPH-MCNC: 2.4 G/DL — LOW (ref 3.5–5.2)
ALP SERPL-CCNC: 53 U/L — SIGNIFICANT CHANGE UP (ref 30–115)
ALT FLD-CCNC: 9 U/L — SIGNIFICANT CHANGE UP (ref 0–41)
ANION GAP SERPL CALC-SCNC: 8 MMOL/L — SIGNIFICANT CHANGE UP (ref 7–14)
AST SERPL-CCNC: 11 U/L — SIGNIFICANT CHANGE UP (ref 0–41)
BASOPHILS # BLD AUTO: 0.05 K/UL — SIGNIFICANT CHANGE UP (ref 0–0.2)
BASOPHILS NFR BLD AUTO: 0.4 % — SIGNIFICANT CHANGE UP (ref 0–1)
BILIRUB SERPL-MCNC: <0.2 MG/DL — SIGNIFICANT CHANGE UP (ref 0.2–1.2)
BUN SERPL-MCNC: 7 MG/DL — LOW (ref 10–20)
CALCIUM SERPL-MCNC: 8 MG/DL — LOW (ref 8.4–10.5)
CHLORIDE SERPL-SCNC: 105 MMOL/L — SIGNIFICANT CHANGE UP (ref 98–110)
CO2 SERPL-SCNC: 26 MMOL/L — SIGNIFICANT CHANGE UP (ref 17–32)
CREAT SERPL-MCNC: <0.5 MG/DL — LOW (ref 0.7–1.5)
EGFR: 117 ML/MIN/1.73M2 — SIGNIFICANT CHANGE UP
EOSINOPHIL # BLD AUTO: 0.16 K/UL — SIGNIFICANT CHANGE UP (ref 0–0.7)
EOSINOPHIL NFR BLD AUTO: 1.2 % — SIGNIFICANT CHANGE UP (ref 0–8)
GLUCOSE SERPL-MCNC: 105 MG/DL — HIGH (ref 70–99)
HCT VFR BLD CALC: 29.7 % — LOW (ref 37–47)
HGB BLD-MCNC: 9.6 G/DL — LOW (ref 12–16)
IMM GRANULOCYTES NFR BLD AUTO: 0.5 % — HIGH (ref 0.1–0.3)
LYMPHOCYTES # BLD AUTO: 27.8 % — SIGNIFICANT CHANGE UP (ref 20.5–51.1)
LYMPHOCYTES # BLD AUTO: 3.79 K/UL — HIGH (ref 1.2–3.4)
MAGNESIUM SERPL-MCNC: 2.3 MG/DL — SIGNIFICANT CHANGE UP (ref 1.8–2.4)
MCHC RBC-ENTMCNC: 29.7 PG — SIGNIFICANT CHANGE UP (ref 27–31)
MCHC RBC-ENTMCNC: 32.3 G/DL — SIGNIFICANT CHANGE UP (ref 32–37)
MCV RBC AUTO: 92 FL — SIGNIFICANT CHANGE UP (ref 81–99)
MONOCYTES # BLD AUTO: 1.45 K/UL — HIGH (ref 0.1–0.6)
MONOCYTES NFR BLD AUTO: 10.6 % — HIGH (ref 1.7–9.3)
NEUTROPHILS # BLD AUTO: 8.1 K/UL — HIGH (ref 1.4–6.5)
NEUTROPHILS NFR BLD AUTO: 59.5 % — SIGNIFICANT CHANGE UP (ref 42.2–75.2)
NRBC # BLD: 0 /100 WBCS — SIGNIFICANT CHANGE UP (ref 0–0)
PLATELET # BLD AUTO: 409 K/UL — HIGH (ref 130–400)
POTASSIUM SERPL-MCNC: 4 MMOL/L — SIGNIFICANT CHANGE UP (ref 3.5–5)
POTASSIUM SERPL-SCNC: 4 MMOL/L — SIGNIFICANT CHANGE UP (ref 3.5–5)
PROT SERPL-MCNC: 4.9 G/DL — LOW (ref 6–8)
RBC # BLD: 3.23 M/UL — LOW (ref 4.2–5.4)
RBC # FLD: 13.3 % — SIGNIFICANT CHANGE UP (ref 11.5–14.5)
SODIUM SERPL-SCNC: 139 MMOL/L — SIGNIFICANT CHANGE UP (ref 135–146)
WBC # BLD: 13.62 K/UL — HIGH (ref 4.8–10.8)
WBC # FLD AUTO: 13.62 K/UL — HIGH (ref 4.8–10.8)

## 2023-03-10 PROCEDURE — 73620 X-RAY EXAM OF FOOT: CPT | Mod: 26,LT

## 2023-03-10 PROCEDURE — 99233 SBSQ HOSP IP/OBS HIGH 50: CPT

## 2023-03-10 PROCEDURE — 93971 EXTREMITY STUDY: CPT | Mod: 26,LT

## 2023-03-10 PROCEDURE — 95720 EEG PHY/QHP EA INCR W/VEEG: CPT

## 2023-03-10 PROCEDURE — 99232 SBSQ HOSP IP/OBS MODERATE 35: CPT | Mod: GC

## 2023-03-10 PROCEDURE — 93306 TTE W/DOPPLER COMPLETE: CPT | Mod: 26

## 2023-03-10 RX ORDER — MORPHINE SULFATE 50 MG/1
2 CAPSULE, EXTENDED RELEASE ORAL ONCE
Refills: 0 | Status: DISCONTINUED | OUTPATIENT
Start: 2023-03-10 | End: 2023-03-10

## 2023-03-10 RX ADMIN — Medication 1 TABLET(S): at 11:53

## 2023-03-10 RX ADMIN — Medication 50 MILLIGRAM(S): at 06:14

## 2023-03-10 RX ADMIN — ENOXAPARIN SODIUM 40 MILLIGRAM(S): 100 INJECTION SUBCUTANEOUS at 21:32

## 2023-03-10 RX ADMIN — Medication 650 MILLIGRAM(S): at 13:00

## 2023-03-10 RX ADMIN — MORPHINE SULFATE 2 MILLIGRAM(S): 50 CAPSULE, EXTENDED RELEASE ORAL at 14:02

## 2023-03-10 RX ADMIN — Medication 50 MILLIGRAM(S): at 17:21

## 2023-03-10 RX ADMIN — LEVETIRACETAM 400 MILLIGRAM(S): 250 TABLET, FILM COATED ORAL at 17:21

## 2023-03-10 RX ADMIN — Medication 1 APPLICATION(S): at 17:21

## 2023-03-10 RX ADMIN — Medication 650 MILLIGRAM(S): at 13:45

## 2023-03-10 RX ADMIN — MORPHINE SULFATE 2 MILLIGRAM(S): 50 CAPSULE, EXTENDED RELEASE ORAL at 14:32

## 2023-03-10 RX ADMIN — ZINC SULFATE TAB 220 MG (50 MG ZINC EQUIVALENT) 220 MILLIGRAM(S): 220 (50 ZN) TAB at 11:51

## 2023-03-10 RX ADMIN — Medication 1 APPLICATION(S): at 06:14

## 2023-03-10 RX ADMIN — Medication 500 MILLIGRAM(S): at 11:51

## 2023-03-10 RX ADMIN — LEVETIRACETAM 400 MILLIGRAM(S): 250 TABLET, FILM COATED ORAL at 05:46

## 2023-03-10 NOTE — CONSULT NOTE ADULT - SUBJECTIVE AND OBJECTIVE BOX
Podiatry Consult Note    Subjective:  JOSEPH OBRIEN  Seen Bedside 67y Female  .   Patient is a 67y old  Female who presents with a chief complaint of ams (10 Mar 2023 07:25)    HPI:   This is a case of a 67 year old lady brought to ED via EMS who per triage note found pt on street. Per triage note, daughter told EMS that pt c/o generalized pain, however no family present.   Patient has never been to this hospital per records, no contact information in chart, mildly cooperative, says she is feeling "okay" and when asked where she lives she said [place]. No infomration could be obtained on the name of any relatives or family members, home address, phone numbers, or any contact information.    In ED, hemodynamically stable. She was pan-scanned. no evidence of new stroke, trauma, or infection.  WBC 19K  UA +ve  CTH showed old stroke in the territory of the Rt MCA. The patient was found to be febrile, with elevated WBC, and lactate suggesting toxic metabolic etiology for her current level of consciousness.    Admit for further management and workup (04 Mar 2023 10:29)      Past Medical History and Surgical History  PAST MEDICAL & SURGICAL HISTORY:       Review of Systems:  [X] Ten point review of systems is otherwise negative except as noted     Objective:  Vital Signs Last 24 Hrs  T(C): 37.2 (10 Mar 2023 13:50), Max: 37.5 (10 Mar 2023 05:03)  T(F): 98.9 (10 Mar 2023 13:50), Max: 99.5 (10 Mar 2023 05:03)  HR: 113 (10 Mar 2023 13:50) (97 - 113)  BP: 104/68 (10 Mar 2023 13:50) (104/68 - 117/68)  BP(mean): --  RR: 20 (10 Mar 2023 13:50) (18 - 20)  SpO2: 100% (10 Mar 2023 13:50) (100% - 100%)    Parameters below as of 10 Mar 2023 13:50  Patient On (Oxygen Delivery Method): room air                            9.6    13.62 )-----------( 409      ( 10 Mar 2023 06:38 )             29.7                 03-10    139  |  105  |  7<L>  ----------------------------<  105<H>  4.0   |  26  |  <0.5<L>    Ca    8.0<L>      10 Mar 2023 06:38  Mg     2.3     03-10    TPro  4.9<L>  /  Alb  2.4<L>  /  TBili  <0.2  /  DBili  x   /  AST  11  /  ALT  9   /  AlkPhos  53  03-10    X-ray, left foot, 3/10/23  IMPRESSION:  No acute osseous abnormality seen      Physical Exam - Lower Extremity Focused:   Derm:  Dry and warm, no open wounds, scars bruises or discoloration.   Vascular: DP and PT Pulses Diminished; Foot is Warm to Warm to the touch; Capillary Refill Time < 3 Seconds;    Neuro: Protective Sensation intact   MSK: Diffuse pain on palpation of the left foot, Left side hemiparesis     Assessment:   - S/P stroke  - Left side hemiparesis  - Left side foot drop    Plan:  Chart reviewed and Patient evaluated. All Questions and Concerns Addressed and Answered  XR Imaging Left Foot; reviewed as above   Weight Bearing Status; WBAT w/ Heel Touch w/ Surgical Shoe;   Recommend ankle brace and PT   Recommend B/l arterial duplex     Discussed Plan w/ Dr. Nazario     Podiatry

## 2023-03-10 NOTE — PROGRESS NOTE ADULT - ATTENDING COMMENTS
A/P    # AMS , seizure disorder , cont meds per neurologist, followup neuro     # left foot pain , check xray     # left arm swelling, rule out dvt, possible due to IV, check doppler.     # anemia  Hemoglobin: 9.6 g/dL (03.10.23 @ 06:38)    #Progress Note Handoff  Pending (specify): clinical improvement   Family discussion: gabriele pt   Disposition: Home

## 2023-03-10 NOTE — PROGRESS NOTE ADULT - SUBJECTIVE AND OBJECTIVE BOX
Neurology Progress Note    Interval History:  Patient appears somnolent, but opened her eyes to verbal stimuli. She was mildly irritable and minimally cooperative to exam, stating that she feels sleepy, however she was able to follow some commands. She did not voice any new complaints.          Medications:  acetaminophen     Tablet .. 650 milliGRAM(s) Oral every 6 hours PRN  acetaminophen  Suppository .. 325 milliGRAM(s) Rectal once  ascorbic acid 500 milliGRAM(s) Oral daily  dextrose 5% + sodium chloride 0.45%. 1000 milliLiter(s) IV Continuous <Continuous>  enoxaparin Injectable 40 milliGRAM(s) SubCutaneous every 24 hours  levETIRAcetam  IVPB 1000 milliGRAM(s) IV Intermittent every 12 hours  LORazepam   Injectable 2 milliGRAM(s) IV Push once  multivitamin/minerals 1 Tablet(s) Oral daily  valproate sodium  IVPB 500 milliGRAM(s) IV Intermittent every 12 hours  vitamin A &amp; D Ointment 1 Application(s) Topical two times a day  zinc sulfate 220 milliGRAM(s) Oral daily      Vital Signs Last 24 Hrs  T(C): 37.5 (10 Mar 2023 05:03), Max: 37.5 (10 Mar 2023 05:03)  T(F): 99.5 (10 Mar 2023 05:03), Max: 99.5 (10 Mar 2023 05:03)  HR: 97 (10 Mar 2023 05:03) (97 - 114)  BP: 117/68 (10 Mar 2023 05:03) (105/64 - 118/83)  BP(mean): --  RR: 18 (10 Mar 2023 05:03) (18 - 18)  SpO2: 100% (10 Mar 2023 08:04) (98% - 100%)    Parameters below as of 10 Mar 2023 08:04  Patient On (Oxygen Delivery Method): room air        Neurological Exam:   Mental status: Somnolent but arousable to voice. Naming, repetition and comprehension intact. Attention/concentration impaired. No dysarthria, no aphasia.  Cranial nerves: Pupils equally round and reactive to light, difficulty tracking, R-sided gaze preference. No diplopia. L sided facial weakness.   Motor:  R sided hand trembling at beginning of exam. R sided arm spasticity. Patient uncooperative with MRC grading. Normal tone and bulk. No jerking movements  Sensation: Intact to light touch, vibration and cold  Reflexes: Minimal throughout  Gait: Not tested    Labs:  CBC Full  -  ( 10 Mar 2023 06:38 )  WBC Count : 13.62 K/uL  RBC Count : 3.23 M/uL  Hemoglobin : 9.6 g/dL  Hematocrit : 29.7 %  Platelet Count - Automated : 409 K/uL  Mean Cell Volume : 92.0 fL  Mean Cell Hemoglobin : 29.7 pg  Mean Cell Hemoglobin Concentration : 32.3 g/dL  Auto Neutrophil # : 8.10 K/uL  Auto Lymphocyte # : 3.79 K/uL  Auto Monocyte # : 1.45 K/uL  Auto Eosinophil # : 0.16 K/uL  Auto Basophil # : 0.05 K/uL  Auto Neutrophil % : 59.5 %  Auto Lymphocyte % : 27.8 %  Auto Monocyte % : 10.6 %  Auto Eosinophil % : 1.2 %  Auto Basophil % : 0.4 %    03-10    139  |  105  |  7<L>  ----------------------------<  105<H>  4.0   |  26  |  <0.5<L>    Ca    8.0<L>      10 Mar 2023 06:38  Mg     2.3     03-10    TPro  4.9<L>  /  Alb  2.4<L>  /  TBili  <0.2  /  DBili  x   /  AST  11  /  ALT  9   /  AlkPhos  53  03-10    LIVER FUNCTIONS - ( 10 Mar 2023 06:38 )  Alb: 2.4 g/dL / Pro: 4.9 g/dL / ALK PHOS: 53 U/L / ALT: 9 U/L / AST: 11 U/L / GGT: x              Neurology Progress Note    Interval History:  Patient appears somnolent, but opened her eyes to verbal stimuli. She was mildly irritable and minimally cooperative to exam, stating that she feels sleepy, however she was able to follow some commands. She did not voice any new complaints.      Medications:  acetaminophen     Tablet .. 650 milliGRAM(s) Oral every 6 hours PRN  acetaminophen  Suppository .. 325 milliGRAM(s) Rectal once  ascorbic acid 500 milliGRAM(s) Oral daily  dextrose 5% + sodium chloride 0.45%. 1000 milliLiter(s) IV Continuous <Continuous>  enoxaparin Injectable 40 milliGRAM(s) SubCutaneous every 24 hours  levETIRAcetam  IVPB 1000 milliGRAM(s) IV Intermittent every 12 hours  LORazepam   Injectable 2 milliGRAM(s) IV Push once  multivitamin/minerals 1 Tablet(s) Oral daily  valproate sodium  IVPB 500 milliGRAM(s) IV Intermittent every 12 hours  vitamin A &amp; D Ointment 1 Application(s) Topical two times a day  zinc sulfate 220 milliGRAM(s) Oral daily      Vital Signs Last 24 Hrs  T(C): 37.5 (10 Mar 2023 05:03), Max: 37.5 (10 Mar 2023 05:03)  T(F): 99.5 (10 Mar 2023 05:03), Max: 99.5 (10 Mar 2023 05:03)  HR: 97 (10 Mar 2023 05:03) (97 - 114)  BP: 117/68 (10 Mar 2023 05:03) (105/64 - 118/83)  RR: 18 (10 Mar 2023 05:03) (18 - 18)  SpO2: 100% (10 Mar 2023 08:04) (98% - 100%)    Parameters below as of 10 Mar 2023 08:04  Patient On (Oxygen Delivery Method): room air      Neurological Exam:   Mental status: Somnolent but arousable to voice. Naming, repetition and comprehension intact. Attention/concentration impaired. No dysarthria, no aphasia.  Cranial nerves: Pupils equally round and reactive to light, difficulty tracking, R-sided gaze preference. No diplopia. L sided facial weakness.   Motor:  R sided hand trembling at beginning of exam. R sided arm spasticity. Patient uncooperative with MRC grading. Normal tone and bulk. No jerking movements  Sensation: Intact to light touch, vibration and cold  Reflexes: Minimal throughout  Gait: Not tested      Labs:  CBC Full  -  ( 10 Mar 2023 06:38 )  WBC Count : 13.62 K/uL  RBC Count : 3.23 M/uL  Hemoglobin : 9.6 g/dL  Hematocrit : 29.7 %  Platelet Count - Automated : 409 K/uL  Mean Cell Volume : 92.0 fL  Mean Cell Hemoglobin : 29.7 pg  Mean Cell Hemoglobin Concentration : 32.3 g/dL  Auto Neutrophil # : 8.10 K/uL  Auto Lymphocyte # : 3.79 K/uL  Auto Monocyte # : 1.45 K/uL  Auto Eosinophil # : 0.16 K/uL  Auto Basophil # : 0.05 K/uL  Auto Neutrophil % : 59.5 %  Auto Lymphocyte % : 27.8 %  Auto Monocyte % : 10.6 %  Auto Eosinophil % : 1.2 %  Auto Basophil % : 0.4 %    03-10    139  |  105  |  7<L>  ----------------------------<  105<H>  4.0   |  26  |  <0.5<L>    Ca    8.0<L>      10 Mar 2023 06:38  Mg     2.3     03-10    TPro  4.9<L>  /  Alb  2.4<L>  /  TBili  <0.2  /  DBili  x   /  AST  11  /  ALT  9   /  AlkPhos  53  03-10    LIVER FUNCTIONS - ( 10 Mar 2023 06:38 )  Alb: 2.4 g/dL / Pro: 4.9 g/dL / ALK PHOS: 53 U/L / ALT: 9 U/L / AST: 11 U/L / GGT: x             < from: EEG w/ Video Each 12-26 Hours, Unmonitored (03.09.23 @ 09:00) >  PROCEDURE DATE:  03/09/2023          INTERPRETATION:  Day 2  3/9/2023 08:30- 3/10/2023 08:00      Exam Performed on:  The digital V-EEG (video-encephalogram) was recorded   using [employer] [facility] System.  The standard 10-20 System based on   American clinical Neurophysiology guidelines was applied and the anterior   temporal T1, T2 electrodes were added when needed.  The encephalography   was acquired using a minimum number of 19 and 23 channels respectively   for pediatrics and adults. Raw data was reviewed in detail.    History  Epilepsy      Diagnosis and purpose of monitoring:  Characterization and Assessment      Medication    Medication  Dosage  Date  Comment  Valproic acid  -  2023/03/09  -  Levetiracetam  -  2023/03/09  -    Awake:  The recording during the wakefulness consists of an asymmetrical   background that is not optimally organized and posterior dominant rhythm   in the range of 8-9Hz, which is better expressed over the left hemisphere.            Asleep:  Early stages of sleep were presented well.  Stage II of non-REM sleep was   characterized by the presence of symmetrical and poorly-defined sleep   spindles and vertex sharp waves.  The deeper stages of sleep were not   clearly identified.        Focal Slowing:  There is moderate right frontotemporal slowing      Generalized Slowing:  None    Breach Artifact:  None      Interictal Activity  None      Activation and Provocation Procedures:  None performed      Events:  None      Impression:  Abnormal due to the presence of focal slowing as above.          Clinical Correlation  Findings consistent with right hemispheric electrocerebral dysfunction   secondary to nonspecific etiology

## 2023-03-10 NOTE — PROGRESS NOTE ADULT - ASSESSMENT
67y old female with unclear PMH, outpatient medication review shows the patient is on low dose Lamictal and Depakote in addition to Keppra (possible mood disorder/ seizures), currently admitted u/c of med team for encephalopathy. On prior neurology assessment the patient noted to have spastic LHP and R gaze preference. CTH showed old stroke in the territory of the Rt MCA. CTA no LVo but reduced caliber of M1 segment. EEG from 03/09 showed findings consistent with R hemispheric electrocerebral dysfunction was normal. On todays exam patient had temporary tremor in R hand, difficulty tracking, R sided gaze preference, her somnolence appears improved from last examination. Clinically no significant changes.    Suspect R focal partial seizures in the setting of R sided frontal lobe chronic infarct vs less likely non epileptic event  -Continue with Depakote 500 mg BID  -Keppra 1g BID  -Obtain REEG  -Discontinue VEEG  -Keep Mg>2  -Seizure and fall precautions  -Need to obtain more collaterals about the patient's PMH    Pending attending attestation   This is a 67 year old F with unclear PMHx, outpatient medication review shows the patient is on low dose Lamictal and Depakote in addition to Keppra (possible mood disorder/ seizures), currently admitted u/c of [facility] team for encephalopathy. On prior neurology assessment the patient noted to have spastic LHP and R gaze preference. CTH showed old stroke in the territory of the R MCA. CTA showed no LVO but reduced caliber of M1 segment. VEEG from 03/09 showed findings consistent with R hemispheric electrocerebral dysfunction, focal slowing. On today's exam patient had temporary tremor in R hand, difficulty tracking, R sided gaze preference, her somnolence appears improved from last examination. Clinically there were no significant changes. Suspected EEG findings due to R sided frontal lobe chronic infarct.    Plan:  - VEEG reviewed, showed moderate right frontotemporal slowing  - Continue with Depakote 500 mg BID  - Continue Keppra 1000 mg BID  - Okay to discontinue VEEG monitoring  - Seizure & fall precautions  - Keep Mg >2, K >4, replete PRN  - Can give 2 mg IV Ativan for seizures > 2 mins  - Need to obtain more collateral about the patient's PMHx    Pending attending attestation   This is a 67 year old F with unclear PMHx, outpatient medication review shows the patient is on low dose Lamictal and Depakote in addition to Keppra (possible mood disorder/ seizures), currently admitted u/c of [facility] team for encephalopathy. On prior neurology assessment the patient noted to have spastic LHP and R gaze preference. CTH showed old stroke in the territory of the R MCA. CTA showed no LVO but reduced caliber of M1 segment. VEEG from 03/09 showed findings consistent with R hemispheric electrocerebral dysfunction, focal slowing. On today's exam patient had temporary tremor in R hand, difficulty tracking, R sided gaze preference, her somnolence appears improved from last examination. Clinically there were no significant changes. Suspected EEG findings due to R sided frontal lobe chronic infarct.    Plan:  - VEEG reviewed, showed moderate right frontotemporal slowing  - Continue with Depakote 750 mg BID  - Continue Keppra 1000 mg BID  - Okay to discontinue VEEG monitoring  - Seizure & fall precautions  - Keep Mg >2, K >4, replete PRN  - Can give 2 mg IV Ativan for seizures > 2 mins

## 2023-03-10 NOTE — PROGRESS NOTE ADULT - ASSESSMENT
This is a case of a 67 year old lady brought to ED via EMS who per triage note found pt on street. Per triage note, daughter told EMS that pt c/o generalized pain, however no family present.   Patient has never been to this hospital per records, no contact information in chart,   No infomation could be obtained on the name of any relatives or family members, home address, phone numbers, or any contact information.  Meds, medical and surgical history could not be obtained    # Altered Mental Status likely due to Metabolic Encephalopathy from UTI associated sepsis  # Hx of R MCA stroke  - In ED, hemodynamically stable. She was pan-scanned. no evidence of new stroke, trauma, or infection.  - WBC 19K: spiked on 3/8 i/s/o of seizure like activity.   - UA +ve; S/p cefepime and vanc in the ED and Rocephin, follow blood and urine cx   - CTH showed old stroke in the territory of the Rt MCA.   - rEEG consistent with diffuse cerebral electrophysiological dysfunction- video EEG in progress for better characterization  -Neuro reccs appreciated:   -Ativan 2 mg IV once  -Keppra 1g IV once, then c/w 1g BID  -Keep Mg>2  -Seizure and fall precautions  -TSH: 2.40     #Tropinemia:  troponin stable at <.01  Echo ordered/pending-Do we need it?    #anemia  most recent hgb 10.1 from 14, appear dilutional  stable    # Hyperkalemia >> repeat BMP  Possible Rhabdomyolysis >> IVF , Trend CK- downtrending      Diet: thin liquid; soft & bite-sized  Activity: IAT- PT recc. PEPE  DVT Prophylaxis: lovenox  GI Prophylaxis: not indication  CHG Order  Code Status: full    #Progress Note Handoff  Diet: soft and bite sized with thin liquids.   Pending (specify):  VEEG, neuro f/u, STAT: Cx, labs (CBC, BMP, RSH, procal), EKG.    Family discussion: yet to be established     This is a case of a 67 year old lady brought to ED via EMS who per triage note found pt on street. Per triage note, daughter told EMS that pt c/o generalized pain, however no family present.   Patient has never been to this hospital per records, no contact information in chart,   No infomation could be obtained on the name of any relatives or family members, home address, phone numbers, or any contact information.  Meds, medical and surgical history could not be obtained    # Altered Mental Status likely due to Metabolic Encephalopathy from UTI associated sepsis  # Hx of R MCA stroke  - In ED, hemodynamically stable. She was pan-scanned. no evidence of new stroke, trauma, or infection.  - WBC 19K: spiked on 3/8 i/s/o of seizure like activity.   - UA +ve; S/p cefepime and vanc in the ED and Rocephin, follow blood and urine cx   - CTH showed old stroke in the territory of the Rt MCA.   - rEEG consistent with diffuse cerebral electrophysiological dysfunction- video EEG in progress for better characterization  -Neuro reccs appreciated:   -Ativan 2 mg IV once  -Keppra 1g IV once, then c/w 1g BID  -Keep Mg>2  -Seizure and fall precautions  -TSH: 2.40     #LUE swelling  - pain in right arm around IV site, swollen, tender  - f/u LUE duplex    #Left foot pain  - palpable pulses, ROM intact, sensation intact  - xray foot ordered    #Tropinemia:  troponin stable at <.01  Echo ordered/pending    #anemia  most recent hgb 10.1 from 14, appear dilutional  stable    # Hyperkalemia >> repeat BMP  Possible Rhabdomyolysis >> IVF , Trend CK- downtrending      Diet: thin liquid; soft & bite-sized  Activity: IAT- PT recc. PEPE  DVT Prophylaxis: lovenox  GI Prophylaxis: not indication  CHG Order  Code Status: full    #Progress Note Handoff  Diet: soft and bite sized with thin liquids.   Pending (specify):  VEEG, neuro f/u    Family discussion: yet to be established

## 2023-03-10 NOTE — PROGRESS NOTE ADULT - ATTENDING COMMENTS
Patient seen and examined and agree with above except as noted.  Patients history, notes, labs, imaging, vitals and meds reviewed personally.    Plan as above

## 2023-03-10 NOTE — PROGRESS NOTE ADULT - SUBJECTIVE AND OBJECTIVE BOX
DAILY PROGRESS NOTE  ===========================================================    Patient Information:  JOSEPH OBRIEN  /  67y  /  Female  /  MRN#: [id number]    Patient is a 67y old Female who presents with a chief complaint of Generalized pain (09 Mar 2023 09:19)      Hospital Day: 6d     |:::::::::::::::::::::::::::| SUBJECTIVE |:::::::::::::::::::::::::::|    OVERNIGHT EVENTS:   TODAY: Patient was seen today at bedside. vEEG running, pt awake and alert, responsive. Review of systems is otherwise negative.    |:::::::::::::::::::::::::::| OBJECTIVE |:::::::::::::::::::::::::::|    VITAL SIGNS: Last 24 Hours  T(C): 37.5 (10 Mar 2023 05:03), Max: 37.5 (10 Mar 2023 05:03)  T(F): 99.5 (10 Mar 2023 05:03), Max: 99.5 (10 Mar 2023 05:03)  HR: 97 (10 Mar 2023 05:03) (97 - 114)  BP: 117/68 (10 Mar 2023 05:03) (105/64 - 118/83)  BP(mean): --  RR: 18 (10 Mar 2023 05:03) (18 - 18)  SpO2: 100% (10 Mar 2023 05:03) (98% - 100%)    03-09-23 @ 07:01  -  03-10-23 @ 07:00  --------------------------------------------------------  IN: 0 mL / OUT: 400 mL / NET: -400 mL      PHYSICAL EXAM:  GENERAL: Awake, alert; NAD.  HEENT: Head NC/AT; Conjunctivae pink, Sclera anicteric; Oral mucosa moist.  CARDIO: Regular rate; Regular rhythm; S1 & S2.  RESP: No rales, wheezing, or rhonchi appreciated.  GI: Soft; NT/ND; BS; No guarding; No rebound tenderness.  EXT: No edema.   SKIN: Intact, no rashes, no erythema    LAB RESULTS:                        10.1   10.55 )-----------( 392      ( 09 Mar 2023 05:32 )             32.3     03-09    140  |  103  |  4<L>  ----------------------------<  84  4.1   |  25  |  <0.5<L>    Ca    8.4      09 Mar 2023 05:32  Mg     3.0     03-09    TPro  5.1<L>  /  Alb  2.5<L>  /  TBili  <0.2  /  DBili  x   /  AST  14  /  ALT  10  /  AlkPhos  58  03-09            CARDIAC MARKERS ( 09 Mar 2023 05:32 )  x     / 0.01 ng/mL / 56 U/L / x     / x      CARDIAC MARKERS ( 08 Mar 2023 08:32 )  x     / x     / 83 U/L / x     / x          MICROBIOLOGY:    RADIOLOGY:    ALLERGIES:  Allergy Status Unknown    MEDICATIONS:  acetaminophen     Tablet .. 650 milliGRAM(s) Oral every 6 hours PRN  acetaminophen  Suppository .. 325 milliGRAM(s) Rectal once  ascorbic acid 500 milliGRAM(s) Oral daily  dextrose 5% + sodium chloride 0.45%. 1000 milliLiter(s) IV Continuous <Continuous>  enoxaparin Injectable 40 milliGRAM(s) SubCutaneous every 24 hours  levETIRAcetam  IVPB 1000 milliGRAM(s) IV Intermittent every 12 hours  LORazepam   Injectable 2 milliGRAM(s) IV Push once  multivitamin/minerals 1 Tablet(s) Oral daily  valproate sodium  IVPB 500 milliGRAM(s) IV Intermittent every 12 hours  vitamin A &amp; D Ointment 1 Application(s) Topical two times a day  zinc sulfate 220 milliGRAM(s) Oral daily    ===========================================================     DAILY PROGRESS NOTE  ===========================================================    Patient Information:  JOSEPH OBRIEN  /  67y  /  Female  /  MRN#: [id number]    Patient is a 67y old Female who presents with a chief complaint of Generalized pain (09 Mar 2023 09:19)      Hospital Day: 6d     |:::::::::::::::::::::::::::| SUBJECTIVE |:::::::::::::::::::::::::::|    OVERNIGHT EVENTS:   TODAY: Patient was seen today at bedside. vEEG running, pt awake and alert, responsive. Review of systems is otherwise negative.    |:::::::::::::::::::::::::::| OBJECTIVE |:::::::::::::::::::::::::::|    VITAL SIGNS: Last 24 Hours  T(C): 37.5 (10 Mar 2023 05:03), Max: 37.5 (10 Mar 2023 05:03)  T(F): 99.5 (10 Mar 2023 05:03), Max: 99.5 (10 Mar 2023 05:03)  HR: 97 (10 Mar 2023 05:03) (97 - 114)  BP: 117/68 (10 Mar 2023 05:03) (105/64 - 118/83)  BP(mean): --  RR: 18 (10 Mar 2023 05:03) (18 - 18)  SpO2: 100% (10 Mar 2023 05:03) (98% - 100%)    03-09-23 @ 07:01  -  03-10-23 @ 07:00  --------------------------------------------------------  IN: 0 mL / OUT: 400 mL / NET: -400 mL      PHYSICAL EXAM:  GENERAL: opens eyes to command, can answer yes or no to pain but not able to provide any other info   HEENT: Head NC/AT; Conjunctivae pink, Sclera anicteric; Oral mucosa moist.  CARDIO: Regular rate; Regular rhythm; S1 & S2.  RESP: No rales, wheezing, or rhonchi appreciated.  GI: Soft; NT/ND; BS; No guarding; No rebound tenderness.  EXT: left arm swelling, IV infiltrated,   left foot is tender, no open wound,   SKIN: Intact, no rashes, no erythema    LAB RESULTS:                        10.1   10.55 )-----------( 392      ( 09 Mar 2023 05:32 )             32.3     03-09    140  |  103  |  4<L>  ----------------------------<  84  4.1   |  25  |  <0.5<L>    Ca    8.4      09 Mar 2023 05:32  Mg     3.0     03-09    TPro  5.1<L>  /  Alb  2.5<L>  /  TBili  <0.2  /  DBili  x   /  AST  14  /  ALT  10  /  AlkPhos  58  03-09            CARDIAC MARKERS ( 09 Mar 2023 05:32 )  x     / 0.01 ng/mL / 56 U/L / x     / x      CARDIAC MARKERS ( 08 Mar 2023 08:32 )  x     / x     / 83 U/L / x     / x          MICROBIOLOGY:    RADIOLOGY:    ALLERGIES:  Allergy Status Unknown    MEDICATIONS:  acetaminophen     Tablet .. 650 milliGRAM(s) Oral every 6 hours PRN  acetaminophen  Suppository .. 325 milliGRAM(s) Rectal once  ascorbic acid 500 milliGRAM(s) Oral daily  dextrose 5% + sodium chloride 0.45%. 1000 milliLiter(s) IV Continuous <Continuous>  enoxaparin Injectable 40 milliGRAM(s) SubCutaneous every 24 hours  levETIRAcetam  IVPB 1000 milliGRAM(s) IV Intermittent every 12 hours  LORazepam   Injectable 2 milliGRAM(s) IV Push once  multivitamin/minerals 1 Tablet(s) Oral daily  valproate sodium  IVPB 500 milliGRAM(s) IV Intermittent every 12 hours  vitamin A &amp; D Ointment 1 Application(s) Topical two times a day  zinc sulfate 220 milliGRAM(s) Oral daily    ===========================================================

## 2023-03-11 LAB
ALBUMIN SERPL ELPH-MCNC: 2.5 G/DL — LOW (ref 3.5–5.2)
ALP SERPL-CCNC: 52 U/L — SIGNIFICANT CHANGE UP (ref 30–115)
ALT FLD-CCNC: 7 U/L — SIGNIFICANT CHANGE UP (ref 0–41)
ANION GAP SERPL CALC-SCNC: 10 MMOL/L — SIGNIFICANT CHANGE UP (ref 7–14)
AST SERPL-CCNC: 10 U/L — SIGNIFICANT CHANGE UP (ref 0–41)
BASOPHILS # BLD AUTO: 0.04 K/UL — SIGNIFICANT CHANGE UP (ref 0–0.2)
BASOPHILS NFR BLD AUTO: 0.3 % — SIGNIFICANT CHANGE UP (ref 0–1)
BILIRUB SERPL-MCNC: <0.2 MG/DL — SIGNIFICANT CHANGE UP (ref 0.2–1.2)
BUN SERPL-MCNC: 6 MG/DL — LOW (ref 10–20)
CALCIUM SERPL-MCNC: 8.4 MG/DL — SIGNIFICANT CHANGE UP (ref 8.4–10.5)
CHLORIDE SERPL-SCNC: 104 MMOL/L — SIGNIFICANT CHANGE UP (ref 98–110)
CO2 SERPL-SCNC: 25 MMOL/L — SIGNIFICANT CHANGE UP (ref 17–32)
CREAT SERPL-MCNC: <0.5 MG/DL — LOW (ref 0.7–1.5)
EGFR: 117 ML/MIN/1.73M2 — SIGNIFICANT CHANGE UP
EOSINOPHIL # BLD AUTO: 0.29 K/UL — SIGNIFICANT CHANGE UP (ref 0–0.7)
EOSINOPHIL NFR BLD AUTO: 2.4 % — SIGNIFICANT CHANGE UP (ref 0–8)
GLUCOSE SERPL-MCNC: 93 MG/DL — SIGNIFICANT CHANGE UP (ref 70–99)
HCT VFR BLD CALC: 28.9 % — LOW (ref 37–47)
HGB BLD-MCNC: 9.3 G/DL — LOW (ref 12–16)
IMM GRANULOCYTES NFR BLD AUTO: 0.4 % — HIGH (ref 0.1–0.3)
LYMPHOCYTES # BLD AUTO: 25.4 % — SIGNIFICANT CHANGE UP (ref 20.5–51.1)
LYMPHOCYTES # BLD AUTO: 3.01 K/UL — SIGNIFICANT CHANGE UP (ref 1.2–3.4)
MAGNESIUM SERPL-MCNC: 2 MG/DL — SIGNIFICANT CHANGE UP (ref 1.8–2.4)
MCHC RBC-ENTMCNC: 29.2 PG — SIGNIFICANT CHANGE UP (ref 27–31)
MCHC RBC-ENTMCNC: 32.2 G/DL — SIGNIFICANT CHANGE UP (ref 32–37)
MCV RBC AUTO: 90.9 FL — SIGNIFICANT CHANGE UP (ref 81–99)
MONOCYTES # BLD AUTO: 1.3 K/UL — HIGH (ref 0.1–0.6)
MONOCYTES NFR BLD AUTO: 11 % — HIGH (ref 1.7–9.3)
NEUTROPHILS # BLD AUTO: 7.17 K/UL — HIGH (ref 1.4–6.5)
NEUTROPHILS NFR BLD AUTO: 60.5 % — SIGNIFICANT CHANGE UP (ref 42.2–75.2)
NRBC # BLD: 0 /100 WBCS — SIGNIFICANT CHANGE UP (ref 0–0)
PLATELET # BLD AUTO: 396 K/UL — SIGNIFICANT CHANGE UP (ref 130–400)
POTASSIUM SERPL-MCNC: 4.2 MMOL/L — SIGNIFICANT CHANGE UP (ref 3.5–5)
POTASSIUM SERPL-SCNC: 4.2 MMOL/L — SIGNIFICANT CHANGE UP (ref 3.5–5)
PROT SERPL-MCNC: 4.9 G/DL — LOW (ref 6–8)
RBC # BLD: 3.18 M/UL — LOW (ref 4.2–5.4)
RBC # FLD: 13.6 % — SIGNIFICANT CHANGE UP (ref 11.5–14.5)
SODIUM SERPL-SCNC: 139 MMOL/L — SIGNIFICANT CHANGE UP (ref 135–146)
WBC # BLD: 11.86 K/UL — HIGH (ref 4.8–10.8)
WBC # FLD AUTO: 11.86 K/UL — HIGH (ref 4.8–10.8)

## 2023-03-11 PROCEDURE — 93010 ELECTROCARDIOGRAM REPORT: CPT

## 2023-03-11 PROCEDURE — 73700 CT LOWER EXTREMITY W/O DYE: CPT | Mod: 26,LT,76

## 2023-03-11 PROCEDURE — 99233 SBSQ HOSP IP/OBS HIGH 50: CPT

## 2023-03-11 RX ORDER — KETOROLAC TROMETHAMINE 30 MG/ML
15 SYRINGE (ML) INJECTION ONCE
Refills: 0 | Status: COMPLETED | OUTPATIENT
Start: 2023-03-11 | End: 2023-03-11

## 2023-03-11 RX ORDER — VALPROIC ACID (AS SODIUM SALT) 250 MG/5ML
250 SOLUTION, ORAL ORAL ONCE
Refills: 0 | Status: COMPLETED | OUTPATIENT
Start: 2023-03-11 | End: 2023-03-11

## 2023-03-11 RX ORDER — VALPROIC ACID (AS SODIUM SALT) 250 MG/5ML
750 SOLUTION, ORAL ORAL EVERY 12 HOURS
Refills: 0 | Status: DISCONTINUED | OUTPATIENT
Start: 2023-03-11 | End: 2023-03-14

## 2023-03-11 RX ADMIN — Medication 1 TABLET(S): at 12:14

## 2023-03-11 RX ADMIN — ZINC SULFATE TAB 220 MG (50 MG ZINC EQUIVALENT) 220 MILLIGRAM(S): 220 (50 ZN) TAB at 12:13

## 2023-03-11 RX ADMIN — LEVETIRACETAM 400 MILLIGRAM(S): 250 TABLET, FILM COATED ORAL at 05:51

## 2023-03-11 RX ADMIN — Medication 1 APPLICATION(S): at 19:17

## 2023-03-11 RX ADMIN — Medication 1 APPLICATION(S): at 05:51

## 2023-03-11 RX ADMIN — ENOXAPARIN SODIUM 40 MILLIGRAM(S): 100 INJECTION SUBCUTANEOUS at 21:30

## 2023-03-11 RX ADMIN — Medication 500 MILLIGRAM(S): at 12:14

## 2023-03-11 RX ADMIN — Medication 50 MILLIGRAM(S): at 18:16

## 2023-03-11 RX ADMIN — LEVETIRACETAM 400 MILLIGRAM(S): 250 TABLET, FILM COATED ORAL at 18:16

## 2023-03-11 RX ADMIN — Medication 50 MILLIGRAM(S): at 05:51

## 2023-03-11 RX ADMIN — Medication 52.5 MILLIGRAM(S): at 10:32

## 2023-03-11 NOTE — PROGRESS NOTE ADULT - SUBJECTIVE AND OBJECTIVE BOX
pt seen and examined.     My notes supersede resident's notes in case of discrepancy       ROS: no cp, no sob, no n/v, no fever    Vital Signs Last 24 Hrs  T(C): 37.1 (11 Mar 2023 05:00), Max: 37.2 (10 Mar 2023 13:50)  T(F): 98.8 (11 Mar 2023 05:00), Max: 98.9 (10 Mar 2023 13:50)  HR: 103 (11 Mar 2023 05:00) (100 - 113)  BP: 112/64 (11 Mar 2023 05:00) (99/58 - 112/64)  BP(mean): --  RR: 18 (11 Mar 2023 05:00) (18 - 20)  SpO2: 100% (11 Mar 2023 05:00) (100% - 100%)    Parameters below as of 10 Mar 2023 13:50  Patient On (Oxygen Delivery Method): room air        physical exam  constitutional NAD, AAOX3, Respiratory  lungs CTA, CVS heart RRR, GI: abdomen Soft NT, ND, BS+, skin: intact  neuro exam Motor, sensory and CN normal, no deficit     MEDICATIONS  (STANDING):  acetaminophen  Suppository .. 325 milliGRAM(s) Rectal once  ascorbic acid 500 milliGRAM(s) Oral daily  dextrose 5% + sodium chloride 0.45%. 1000 milliLiter(s) (50 mL/Hr) IV Continuous <Continuous>  enoxaparin Injectable 40 milliGRAM(s) SubCutaneous every 24 hours  levETIRAcetam  IVPB 1000 milliGRAM(s) IV Intermittent every 12 hours  LORazepam   Injectable 2 milliGRAM(s) IV Push once  multivitamin/minerals 1 Tablet(s) Oral daily  valproate sodium  IVPB 750 milliGRAM(s) IV Intermittent every 12 hours  valproate sodium  IVPB 250 milliGRAM(s) IV Intermittent once  vitamin A &amp; D Ointment 1 Application(s) Topical two times a day  zinc sulfate 220 milliGRAM(s) Oral daily    MEDICATIONS  (PRN):  acetaminophen     Tablet .. 650 milliGRAM(s) Oral every 6 hours PRN Moderate Pain (4 - 6)  oxycodone    5 mG/acetaminophen 325 mG 1 Tablet(s) Oral every 6 hours PRN Severe Pain (7 - 10)                            9.3    11.86 )-----------( 396      ( 11 Mar 2023 05:32 )             28.9     03-11    139  |  104  |  6<L>  ----------------------------<  93  4.2   |  25  |  <0.5<L>    Ca    8.4      11 Mar 2023 05:32  Mg     2.0     03-11    TPro  4.9<L>  /  Alb  2.5<L>  /  TBili  <0.2  /  DBili  x   /  AST  10  /  ALT  7   /  AlkPhos  52  03-11                  Culture - Blood (collected 09 Mar 2023 11:46)  Source: .Blood None  Preliminary Report (10 Mar 2023 23:01):    No growth to date.                 pt seen and examined.     My notes supersede resident's notes in case of discrepancy       ROS: no cp, no sob, no n/v, no fever    Vital Signs Last 24 Hrs  T(C): 37.1 (11 Mar 2023 05:00), Max: 37.2 (10 Mar 2023 13:50)  T(F): 98.8 (11 Mar 2023 05:00), Max: 98.9 (10 Mar 2023 13:50)  HR: 103 (11 Mar 2023 05:00) (100 - 113)  BP: 112/64 (11 Mar 2023 05:00) (99/58 - 112/64)  BP(mean): --  RR: 18 (11 Mar 2023 05:00) (18 - 20)  SpO2: 100% (11 Mar 2023 05:00) (100% - 100%)    Parameters below as of 10 Mar 2023 13:50  Patient On (Oxygen Delivery Method): room air        physical exam  constitutional NAD,, Respiratory  lungs CTA, CVS heart RRR, GI: abdomen Soft NT, ND, BS+, skin: small, stage 2 ulcer on the left knee ( medial aspect) not infected,  left keyur/ankle tender on passive movement   neuro exam awake, eyes open , answers simple questions, but not able to have full conversation, following eye, but not able to participate in neuro exam,     MEDICATIONS  (STANDING):  acetaminophen  Suppository .. 325 milliGRAM(s) Rectal once  ascorbic acid 500 milliGRAM(s) Oral daily  dextrose 5% + sodium chloride 0.45%. 1000 milliLiter(s) (50 mL/Hr) IV Continuous <Continuous>  enoxaparin Injectable 40 milliGRAM(s) SubCutaneous every 24 hours  levETIRAcetam  IVPB 1000 milliGRAM(s) IV Intermittent every 12 hours  LORazepam   Injectable 2 milliGRAM(s) IV Push once  multivitamin/minerals 1 Tablet(s) Oral daily  valproate sodium  IVPB 750 milliGRAM(s) IV Intermittent every 12 hours  valproate sodium  IVPB 250 milliGRAM(s) IV Intermittent once  vitamin A &amp; D Ointment 1 Application(s) Topical two times a day  zinc sulfate 220 milliGRAM(s) Oral daily    MEDICATIONS  (PRN):  acetaminophen     Tablet .. 650 milliGRAM(s) Oral every 6 hours PRN Moderate Pain (4 - 6)  oxycodone    5 mG/acetaminophen 325 mG 1 Tablet(s) Oral every 6 hours PRN Severe Pain (7 - 10)                            9.3    11.86 )-----------( 396      ( 11 Mar 2023 05:32 )             28.9     03-11    139  |  104  |  6<L>  ----------------------------<  93  4.2   |  25  |  <0.5<L>    Ca    8.4      11 Mar 2023 05:32  Mg     2.0     03-11    TPro  4.9<L>  /  Alb  2.5<L>  /  TBili  <0.2  /  DBili  x   /  AST  10  /  ALT  7   /  AlkPhos  52  03-11    a/p  #               Culture - Blood (collected 09 Mar 2023 11:46)  Source: .Blood None  Preliminary Report (10 Mar 2023 23:01):    No growth to date.                 pt seen and examined.     My notes supersede resident's notes in case of discrepancy       ROS: no cp, no sob, no n/v, no fever    Vital Signs Last 24 Hrs  T(C): 37.1 (11 Mar 2023 05:00), Max: 37.2 (10 Mar 2023 13:50)  T(F): 98.8 (11 Mar 2023 05:00), Max: 98.9 (10 Mar 2023 13:50)  HR: 103 (11 Mar 2023 05:00) (100 - 113)  BP: 112/64 (11 Mar 2023 05:00) (99/58 - 112/64)  BP(mean): --  RR: 18 (11 Mar 2023 05:00) (18 - 20)  SpO2: 100% (11 Mar 2023 05:00) (100% - 100%)    Parameters below as of 10 Mar 2023 13:50  Patient On (Oxygen Delivery Method): room air    physical exam  constitutional NAD,, Respiratory  lungs CTA, CVS heart RRR, GI: abdomen Soft NT, ND, BS+, skin: small, stage 2 ulcer on the left knee ( medial aspect) not infected,  left keyur/ankle tender on passive movement   neuro exam awake, eyes open , answers simple questions, but not able to have full conversation, following eye, but not able to participate in neuro exam,     MEDICATIONS  (STANDING):  acetaminophen  Suppository .. 325 milliGRAM(s) Rectal once  ascorbic acid 500 milliGRAM(s) Oral daily  dextrose 5% + sodium chloride 0.45%. 1000 milliLiter(s) (50 mL/Hr) IV Continuous <Continuous>  enoxaparin Injectable 40 milliGRAM(s) SubCutaneous every 24 hours  levETIRAcetam  IVPB 1000 milliGRAM(s) IV Intermittent every 12 hours  LORazepam   Injectable 2 milliGRAM(s) IV Push once  multivitamin/minerals 1 Tablet(s) Oral daily  valproate sodium  IVPB 750 milliGRAM(s) IV Intermittent every 12 hours  valproate sodium  IVPB 250 milliGRAM(s) IV Intermittent once  vitamin A &amp; D Ointment 1 Application(s) Topical two times a day  zinc sulfate 220 milliGRAM(s) Oral daily    MEDICATIONS  (PRN):  acetaminophen     Tablet .. 650 milliGRAM(s) Oral every 6 hours PRN Moderate Pain (4 - 6)  oxycodone    5 mG/acetaminophen 325 mG 1 Tablet(s) Oral every 6 hours PRN Severe Pain (7 - 10)                            9.3    11.86 )-----------( 396      ( 11 Mar 2023 05:32 )             28.9     03-11    139  |  104  |  6<L>  ----------------------------<  93  4.2   |  25  |  <0.5<L>    Ca    8.4      11 Mar 2023 05:32  Mg     2.0     03-11    TPro  4.9<L>  /  Alb  2.5<L>  /  TBili  <0.2  /  DBili  x   /  AST  10  /  ALT  7   /  AlkPhos  52  03-11    Culture - Blood (collected 09 Mar 2023 11:46)  Source: .Blood None  Preliminary Report (10 Mar 2023 23:01):    No growth to date.    Urine Microscopic-Add On (NC) (03.04.23 @ 05:10)    Uric Acid Crystals: Few    Red Blood Cell - Urine: 3 /HPF    White Blood Cell - Urine: 153 /HPF    Hyaline Casts: 31 /LPF    Bacteria: Many    Epithelial Cells: 1 /HPF    Culture - Urine (03.04.23 @ 05:10)    -  Amikacin: S <=16    -  Amoxicillin/Clavulanic Acid: S <=8/4    -  Ampicillin: S <=8 These ampicillin results predict results for amoxicillin    -  Ampicillin/Sulbactam: S <=4/2 Enterobacter, Klebsiella aerogenes, Citrobacter, and Serratia may develop resistance during prolonged therapy (3-4 days)    -  Aztreonam: S <=4    -  Cefazolin: S <=2 For uncomplicated UTI with K. pneumoniae, E. coli, or P. mirablis: JULIANA <=16 is sensitive and JULIANA >=32 is resistant. This also predicts results for oral agents cefaclor, cefdinir, cefpodoxime, cefprozil, cefuroxime axetil, cephalexin and locarbef for uncomplicated UTI. Note that some isolates may be susceptible to these agents while testing resistant to cefazolin.    -  Cefepime: S <=2    -  Cefoxitin: S <=8    -  Ceftriaxone: S <=1 Enterobacter, Klebsiella aerogenes, Citrobacter, and Serratia may develop resistance during prolonged therapy    -  Cefuroxime: S <=4    -  Ciprofloxacin: S <=0.25    -  Ertapenem: S <=0.5    -  Gentamicin: S <=2    -  Imipenem: S <=1    -  Levofloxacin: S <=0.5    -  Meropenem: S <=1    -  Nitrofurantoin: S <=32 Should not be used to treat pyelonephritis    -  Piperacillin/Tazobactam: S <=8    -  Tobramycin: S <=2    -  Trimethoprim/Sulfamethoxazole: S <=0.5/9.5    Specimen Source: Catheterized Catheterized    Culture Results:   >100,000 CFU/ml Escherichia coli    Organism Identification: Escherichia coli    Organism: Escherichia coli    Method Type: JULIANA    < from: CT Head No Cont (03.04.23 @ 02:04) >  No evidence of intracranial hemorrhage, territorial infarct, or mass effect.    Partial opacification of bilateral mastoid air cells    < end of copied text >    a/p    67 year old lady brought to ED via EMS     # Altered Mental Status likely due to Metabolic Encephalopathy from UTI ( ecoli) associated sepsis plus seizure,   - finished course of abx   - Continue with Depakote 750 mg BID  - Continue Keppra 1000 mg BID    # tachycardia, < from: 12 Lead ECG (03.08.23 @ 06:32) >   Sinus tachycardia    < end of copied text >  possible due to pain ,     # Hx of R MCA stroke  - In ED, hemodynamically stable. She was pan-scanned. no evidence of new stroke, trauma, or infection.    # left arm swelling, rule out dvt, possible due to IV infiltration  doppler done, report pending   clinically less swollen today     # anemia  Hemoglobin: 9.3 g/dL (03-11-23 @ 05:32)  Hemoglobin: 9.6 g/dL (03-10-23 @ 06:38)  Hemoglobin: 10.1 g/dL (03-09-23 @ 05:32)  Hemoglobin: 10.6 g/dL (03-08-23 @ 08:32)  Mean Cell Volume: 90.9 fL (03.11.23 @ 05:32)  check iron, ferritin, b12, folate , retic count     #Progress Note Handoff  Pending (specify): clinical improvement   Family discussion: gabriele pt   Disposition: Home .                 pt seen and examined.     My notes supersede resident's notes in case of discrepancy       ROS: no cp, no sob, no n/v, no fever    Vital Signs Last 24 Hrs  T(C): 37.1 (11 Mar 2023 05:00), Max: 37.2 (10 Mar 2023 13:50)  T(F): 98.8 (11 Mar 2023 05:00), Max: 98.9 (10 Mar 2023 13:50)  HR: 103 (11 Mar 2023 05:00) (100 - 113)  BP: 112/64 (11 Mar 2023 05:00) (99/58 - 112/64)  BP(mean): --  RR: 18 (11 Mar 2023 05:00) (18 - 20)  SpO2: 100% (11 Mar 2023 05:00) (100% - 100%)    Parameters below as of 10 Mar 2023 13:50  Patient On (Oxygen Delivery Method): room air    physical exam  constitutional NAD,, Respiratory  lungs CTA, CVS heart RRR, GI: abdomen Soft NT, ND, BS+, skin: small, stage 2 ulcer on the left knee ( medial aspect) not infected,  left keyur/ankle tender on passive movement   neuro exam awake, eyes open , answers simple questions, but not able to have full conversation, following eye, but not able to participate in neuro exam,     MEDICATIONS  (STANDING):  acetaminophen  Suppository .. 325 milliGRAM(s) Rectal once  ascorbic acid 500 milliGRAM(s) Oral daily  dextrose 5% + sodium chloride 0.45%. 1000 milliLiter(s) (50 mL/Hr) IV Continuous <Continuous>  enoxaparin Injectable 40 milliGRAM(s) SubCutaneous every 24 hours  levETIRAcetam  IVPB 1000 milliGRAM(s) IV Intermittent every 12 hours  LORazepam   Injectable 2 milliGRAM(s) IV Push once  multivitamin/minerals 1 Tablet(s) Oral daily  valproate sodium  IVPB 750 milliGRAM(s) IV Intermittent every 12 hours  valproate sodium  IVPB 250 milliGRAM(s) IV Intermittent once  vitamin A &amp; D Ointment 1 Application(s) Topical two times a day  zinc sulfate 220 milliGRAM(s) Oral daily    MEDICATIONS  (PRN):  acetaminophen     Tablet .. 650 milliGRAM(s) Oral every 6 hours PRN Moderate Pain (4 - 6)  oxycodone    5 mG/acetaminophen 325 mG 1 Tablet(s) Oral every 6 hours PRN Severe Pain (7 - 10)                            9.3    11.86 )-----------( 396      ( 11 Mar 2023 05:32 )             28.9     03-11    139  |  104  |  6<L>  ----------------------------<  93  4.2   |  25  |  <0.5<L>    Ca    8.4      11 Mar 2023 05:32  Mg     2.0     03-11    TPro  4.9<L>  /  Alb  2.5<L>  /  TBili  <0.2  /  DBili  x   /  AST  10  /  ALT  7   /  AlkPhos  52  03-11    Culture - Blood (collected 09 Mar 2023 11:46)  Source: .Blood None  Preliminary Report (10 Mar 2023 23:01):    No growth to date.    Urine Microscopic-Add On (NC) (03.04.23 @ 05:10)    Uric Acid Crystals: Few    Red Blood Cell - Urine: 3 /HPF    White Blood Cell - Urine: 153 /HPF    Hyaline Casts: 31 /LPF    Bacteria: Many    Epithelial Cells: 1 /HPF    Culture - Urine (03.04.23 @ 05:10)    -  Amikacin: S <=16    -  Amoxicillin/Clavulanic Acid: S <=8/4    -  Ampicillin: S <=8 These ampicillin results predict results for amoxicillin    -  Ampicillin/Sulbactam: S <=4/2 Enterobacter, Klebsiella aerogenes, Citrobacter, and Serratia may develop resistance during prolonged therapy (3-4 days)    -  Aztreonam: S <=4    -  Cefazolin: S <=2 For uncomplicated UTI with K. pneumoniae, E. coli, or P. mirablis: JULIANA <=16 is sensitive and JULIANA >=32 is resistant. This also predicts results for oral agents cefaclor, cefdinir, cefpodoxime, cefprozil, cefuroxime axetil, cephalexin and locarbef for uncomplicated UTI. Note that some isolates may be susceptible to these agents while testing resistant to cefazolin.    -  Cefepime: S <=2    -  Cefoxitin: S <=8    -  Ceftriaxone: S <=1 Enterobacter, Klebsiella aerogenes, Citrobacter, and Serratia may develop resistance during prolonged therapy    -  Cefuroxime: S <=4    -  Ciprofloxacin: S <=0.25    -  Ertapenem: S <=0.5    -  Gentamicin: S <=2    -  Imipenem: S <=1    -  Levofloxacin: S <=0.5    -  Meropenem: S <=1    -  Nitrofurantoin: S <=32 Should not be used to treat pyelonephritis    -  Piperacillin/Tazobactam: S <=8    -  Tobramycin: S <=2    -  Trimethoprim/Sulfamethoxazole: S <=0.5/9.5    Specimen Source: Catheterized Catheterized    Culture Results:   >100,000 CFU/ml Escherichia coli    Organism Identification: Escherichia coli    Organism: Escherichia coli    Method Type: JULIANA    < from: CT Head No Cont (03.04.23 @ 02:04) >  No evidence of intracranial hemorrhage, territorial infarct, or mass effect.    Partial opacification of bilateral mastoid air cells    < end of copied text >    Summary:   1. Technically difficult study.   2. Normal global left ventricular systolic function.   3. Left ventricular ejection fraction,by visual estimation, is 55 to   60%.   4. Normal right ventricular size and function.   5. No hemodynamically significant valvular abnormality.      a/p    67 year old lady brought to ED via EMS     # Altered Mental Status likely due to Metabolic Encephalopathy from UTI ( ecoli) associated sepsis plus seizure,   - finished course of abx   - Continue with Depakote 750 mg BID  - Continue Keppra 1000 mg BID    # tachycardia, < from: 12 Lead ECG (03.08.23 @ 06:32) >   Sinus tachycardia  < end of copied text >  possible due to pain ( foot)   Thyroid Stimulating Hormone, Serum: 2.40 uIU/mL (03.08.23 @ 13:21)      # Hx of R MCA stroke  - In ED, hemodynamically stable. She was pan-scanned. no evidence of new stroke, trauma, or infection.    # left arm swelling, rule out dvt, possible due to IV infiltration  doppler done, report pending   clinically less swollen today     # anemia, monitor cbc , add mvi   Hemoglobin: 9.3 g/dL (03-11-23 @ 05:32)  Hemoglobin: 9.6 g/dL (03-10-23 @ 06:38)  Hemoglobin: 10.1 g/dL (03-09-23 @ 05:32)  Hemoglobin: 10.6 g/dL (03-08-23 @ 08:32)  Mean Cell Volume: 90.9 fL (03.11.23 @ 05:32)  check iron, ferritin, b12, folate , retic count     #Progress Note Handoff  Pending (specify): clinical improvement   Family discussion: gabriele pt   Disposition: Home .

## 2023-03-12 LAB
ALBUMIN SERPL ELPH-MCNC: 2.7 G/DL — LOW (ref 3.5–5.2)
ALP SERPL-CCNC: 60 U/L — SIGNIFICANT CHANGE UP (ref 30–115)
ALT FLD-CCNC: 8 U/L — SIGNIFICANT CHANGE UP (ref 0–41)
ANION GAP SERPL CALC-SCNC: 8 MMOL/L — SIGNIFICANT CHANGE UP (ref 7–14)
AST SERPL-CCNC: 12 U/L — SIGNIFICANT CHANGE UP (ref 0–41)
BASOPHILS # BLD AUTO: 0.02 K/UL — SIGNIFICANT CHANGE UP (ref 0–0.2)
BASOPHILS NFR BLD AUTO: 0.2 % — SIGNIFICANT CHANGE UP (ref 0–1)
BILIRUB SERPL-MCNC: <0.2 MG/DL — SIGNIFICANT CHANGE UP (ref 0.2–1.2)
BUN SERPL-MCNC: 6 MG/DL — LOW (ref 10–20)
CALCIUM SERPL-MCNC: 8.8 MG/DL — SIGNIFICANT CHANGE UP (ref 8.4–10.5)
CHLORIDE SERPL-SCNC: 106 MMOL/L — SIGNIFICANT CHANGE UP (ref 98–110)
CO2 SERPL-SCNC: 26 MMOL/L — SIGNIFICANT CHANGE UP (ref 17–32)
CREAT SERPL-MCNC: <0.5 MG/DL — LOW (ref 0.7–1.5)
CULTURE RESULTS: SIGNIFICANT CHANGE UP
EGFR: 117 ML/MIN/1.73M2 — SIGNIFICANT CHANGE UP
EOSINOPHIL # BLD AUTO: 0.12 K/UL — SIGNIFICANT CHANGE UP (ref 0–0.7)
EOSINOPHIL NFR BLD AUTO: 1.1 % — SIGNIFICANT CHANGE UP (ref 0–8)
GLUCOSE SERPL-MCNC: 91 MG/DL — SIGNIFICANT CHANGE UP (ref 70–99)
HCT VFR BLD CALC: 31.2 % — LOW (ref 37–47)
HGB BLD-MCNC: 9.9 G/DL — LOW (ref 12–16)
IMM GRANULOCYTES NFR BLD AUTO: 0.5 % — HIGH (ref 0.1–0.3)
IRON SATN MFR SERPL: 29 UG/DL — LOW (ref 35–150)
LDH SERPL L TO P-CCNC: 181 — SIGNIFICANT CHANGE UP (ref 50–242)
LYMPHOCYTES # BLD AUTO: 3.42 K/UL — HIGH (ref 1.2–3.4)
LYMPHOCYTES # BLD AUTO: 31.3 % — SIGNIFICANT CHANGE UP (ref 20.5–51.1)
MAGNESIUM SERPL-MCNC: 1.9 MG/DL — SIGNIFICANT CHANGE UP (ref 1.8–2.4)
MCHC RBC-ENTMCNC: 29.1 PG — SIGNIFICANT CHANGE UP (ref 27–31)
MCHC RBC-ENTMCNC: 31.7 G/DL — LOW (ref 32–37)
MCV RBC AUTO: 91.8 FL — SIGNIFICANT CHANGE UP (ref 81–99)
MONOCYTES # BLD AUTO: 1.06 K/UL — HIGH (ref 0.1–0.6)
MONOCYTES NFR BLD AUTO: 9.7 % — HIGH (ref 1.7–9.3)
NEUTROPHILS # BLD AUTO: 6.26 K/UL — SIGNIFICANT CHANGE UP (ref 1.4–6.5)
NEUTROPHILS NFR BLD AUTO: 57.2 % — SIGNIFICANT CHANGE UP (ref 42.2–75.2)
NRBC # BLD: 0 /100 WBCS — SIGNIFICANT CHANGE UP (ref 0–0)
PLATELET # BLD AUTO: 438 K/UL — HIGH (ref 130–400)
POTASSIUM SERPL-MCNC: 4.2 MMOL/L — SIGNIFICANT CHANGE UP (ref 3.5–5)
POTASSIUM SERPL-SCNC: 4.2 MMOL/L — SIGNIFICANT CHANGE UP (ref 3.5–5)
PROT SERPL-MCNC: 5.2 G/DL — LOW (ref 6–8)
RBC # BLD: 3.4 M/UL — LOW (ref 4.2–5.4)
RBC # BLD: 3.4 M/UL — LOW (ref 4.2–5.4)
RBC # FLD: 13.8 % — SIGNIFICANT CHANGE UP (ref 11.5–14.5)
RETICS #: 107.1 K/UL — SIGNIFICANT CHANGE UP (ref 25–125)
RETICS/RBC NFR: 3.2 % — HIGH (ref 0.5–1.5)
SODIUM SERPL-SCNC: 140 MMOL/L — SIGNIFICANT CHANGE UP (ref 135–146)
SPECIMEN SOURCE: SIGNIFICANT CHANGE UP
WBC # BLD: 10.94 K/UL — HIGH (ref 4.8–10.8)
WBC # FLD AUTO: 10.94 K/UL — HIGH (ref 4.8–10.8)

## 2023-03-12 PROCEDURE — 73030 X-RAY EXAM OF SHOULDER: CPT | Mod: 26,LT

## 2023-03-12 PROCEDURE — 93925 LOWER EXTREMITY STUDY: CPT | Mod: 26

## 2023-03-12 PROCEDURE — 99233 SBSQ HOSP IP/OBS HIGH 50: CPT

## 2023-03-12 PROCEDURE — 73070 X-RAY EXAM OF ELBOW: CPT | Mod: 26,LT

## 2023-03-12 RX ORDER — MORPHINE SULFATE 50 MG/1
15 CAPSULE, EXTENDED RELEASE ORAL EVERY 6 HOURS
Refills: 0 | Status: DISCONTINUED | OUTPATIENT
Start: 2023-03-12 | End: 2023-03-19

## 2023-03-12 RX ORDER — KETOROLAC TROMETHAMINE 30 MG/ML
15 SYRINGE (ML) INJECTION ONCE
Refills: 0 | Status: DISCONTINUED | OUTPATIENT
Start: 2023-03-12 | End: 2023-03-12

## 2023-03-12 RX ADMIN — ZINC SULFATE TAB 220 MG (50 MG ZINC EQUIVALENT) 220 MILLIGRAM(S): 220 (50 ZN) TAB at 12:58

## 2023-03-12 RX ADMIN — LEVETIRACETAM 400 MILLIGRAM(S): 250 TABLET, FILM COATED ORAL at 18:51

## 2023-03-12 RX ADMIN — Medication 500 MILLIGRAM(S): at 12:58

## 2023-03-12 RX ADMIN — Medication 50 MILLIGRAM(S): at 05:59

## 2023-03-12 RX ADMIN — ENOXAPARIN SODIUM 40 MILLIGRAM(S): 100 INJECTION SUBCUTANEOUS at 21:47

## 2023-03-12 RX ADMIN — Medication 15 MILLIGRAM(S): at 13:19

## 2023-03-12 RX ADMIN — Medication 50 MILLIGRAM(S): at 18:51

## 2023-03-12 RX ADMIN — LEVETIRACETAM 400 MILLIGRAM(S): 250 TABLET, FILM COATED ORAL at 05:59

## 2023-03-12 RX ADMIN — Medication 1 APPLICATION(S): at 19:09

## 2023-03-12 RX ADMIN — Medication 1 TABLET(S): at 12:58

## 2023-03-12 RX ADMIN — Medication 1 APPLICATION(S): at 05:59

## 2023-03-12 NOTE — PROGRESS NOTE ADULT - ATTENDING COMMENTS
seen 3/13  podiatry consulted for generalized left foot pain  Patient not fully cooperative w/ providing history   on physical exam patient has some pain on palpation of the dorsal tarsal bones and pain w/ ankle dorsiflexion   CT scan;  minimally displaced avulsion fracture at the medial malleolus. Correlate with point tenderness.  No surgical intervention from podiatric standpoint  if patient is ambulatory would recommend protective WB with CAM boot  consider ortho consult for medial malleolus fracture

## 2023-03-12 NOTE — PROGRESS NOTE ADULT - SUBJECTIVE AND OBJECTIVE BOX
Podiatry Progress Note    Subjective:  JOSEPH OBRIEN is a  67y Female.   Seen bedside.   Patient is a 67y old  Female who presents with a chief complaint of ams (10 Mar 2023 07:25)      Past Medical History and Surgical History  PAST MEDICAL & SURGICAL HISTORY:       Objective:  Vital Signs Last 24 Hrs  T(C): 36.7 (12 Mar 2023 04:30), Max: 37.6 (11 Mar 2023 20:39)  T(F): 98.1 (12 Mar 2023 04:30), Max: 99.6 (11 Mar 2023 20:39)  HR: 104 (12 Mar 2023 04:30) (82 - 104)  BP: 100/74 (12 Mar 2023 04:30) (100/74 - 121/83)  BP(mean): --  RR: 18 (12 Mar 2023 04:30) (18 - 22)  SpO2: 99% (12 Mar 2023 04:30) (99% - 100%)    Parameters below as of 11 Mar 2023 16:42  Patient On (Oxygen Delivery Method): room air                            9.9    10.94 )-----------( 438      ( 12 Mar 2023 05:35 )             31.2                 03-12    140  |  106  |  6<L>  ----------------------------<  91  4.2   |  26  |  <0.5<L>    Ca    8.8      12 Mar 2023 05:35  Mg     1.9     03-12    TPro  5.2<L>  /  Alb  2.7<L>  /  TBili  <0.2  /  DBili  x   /  AST  12  /  ALT  8   /  AlkPhos  60  03-12      X-ray, left foot, 3/10/23  IMPRESSION:  No acute osseous abnormality seen  CT 3/11  IMPRESSION:    Findings suggest an acute minimally displaced avulsion fracture at the medial malleolus. Correlate with point tenderness.      Physical Exam - Lower Extremity Focused:   Derm:  Dry and warm, no open wounds, scars bruises or discoloration.   Vascular: DP and PT Pulses Diminished; Foot is Warm to Warm to the touch; Capillary Refill Time < 3 Seconds;    Neuro: Protective Sensation intact   MSK: Diffuse pain on palpation of the left foot, Left side hemiparesis     Assessment:   - S/P stroke  - Left side hemiparesis  - Left side foot drop    Plan:  Chart reviewed and Patient evaluated. All Questions and Concerns Addressed and Answered  XR Imaging Left Foot; reviewed as above   Weight Bearing Status; WBAT w/ Heel Touch w/ Surgical Shoe;   Recommend Rx: CAM walker for the Left side.  Left foot NWB.     Discussed Plan w/ Dr. Nazario     Podiatry

## 2023-03-12 NOTE — PROGRESS NOTE ADULT - SUBJECTIVE AND OBJECTIVE BOX
pt seen and examined.     My notes supersede resident's notes in case of discrepancy       ROS: no cp, no sob, no n/v, no fever    Vital Signs Last 24 Hrs  T(C): 36.7 (12 Mar 2023 04:30), Max: 37.6 (11 Mar 2023 20:39)  T(F): 98.1 (12 Mar 2023 04:30), Max: 99.6 (11 Mar 2023 20:39)  HR: 104 (12 Mar 2023 04:30) (82 - 104)  BP: 100/74 (12 Mar 2023 04:30) (100/74 - 126/74)  BP(mean): --  RR: 18 (12 Mar 2023 04:30) (17 - 22)  SpO2: 99% (12 Mar 2023 04:30) (98% - 100%)    Parameters below as of 11 Mar 2023 16:42  Patient On (Oxygen Delivery Method): room air      physical exam  constitutional NAD, AAOX3, Respiratory  lungs CTA, CVS heart RRR, GI: abdomen Soft NT, ND, BS+, skin: intact  neuro exam Motor, sensory and CN normal, no deficit , left foot severe tenderness.     MEDICATIONS  (STANDING):  acetaminophen  Suppository .. 325 milliGRAM(s) Rectal once  ascorbic acid 500 milliGRAM(s) Oral daily  enoxaparin Injectable 40 milliGRAM(s) SubCutaneous every 24 hours  ketorolac   Injectable 15 milliGRAM(s) IV Push once  levETIRAcetam  IVPB 1000 milliGRAM(s) IV Intermittent every 12 hours  LORazepam   Injectable 2 milliGRAM(s) IV Push once  multivitamin/minerals 1 Tablet(s) Oral daily  valproate sodium  IVPB 750 milliGRAM(s) IV Intermittent every 12 hours  vitamin A &amp; D Ointment 1 Application(s) Topical two times a day  zinc sulfate 220 milliGRAM(s) Oral daily    MEDICATIONS  (PRN):  acetaminophen     Tablet .. 650 milliGRAM(s) Oral every 6 hours PRN Moderate Pain (4 - 6)  oxycodone    5 mG/acetaminophen 325 mG 1 Tablet(s) Oral every 6 hours PRN Severe Pain (7 - 10)                        9.9    10.94 )-----------( 438      ( 12 Mar 2023 05:35 )             31.2     03-12    140  |  106  |  6<L>  ----------------------------<  91  4.2   |  26  |  <0.5<L>    Ca    8.8      12 Mar 2023 05:35  Mg     1.9     03-12    TPro  5.2<L>  /  Alb  2.7<L>  /  TBili  <0.2  /  DBili  x   /  AST  12  /  ALT  8   /  AlkPhos  60  03-12    Culture - Blood (collected 09 Mar 2023 11:46)  Source: .Blood None  Preliminary Report (10 Mar 2023 23:01):    No growth to date.    < from: CT Ankle No Cont, Left (03.11.23 @ 21:44) >  Findings suggest an acute minimally displaced avulsion fracture at the   medial malleolus. Correlate with point tenderness.    < end of copied text >      a/p  67 year old lady brought to ED via EMS , very limited information is available, since pt has AMS and family is not available     # Altered Mental Status likely due to Metabolic Encephalopathy from UTI ( ecoli) associated sepsis plus seizure,   - finished course of abx   - Continue with Depakote 750 mg BID  - Continue Keppra 1000 mg BID  - more alert today     # foot pain: due to medial malleus fx, dw podiatry , they will evaluate pt, and decide if she needs surgical intervention, for now, cont dvt prophylaxis, pain meds, and consider brace.     # tachycardia,    Sinus tachycardia due to pain, added pain meds   Thyroid Stimulating Hormone, Serum: 2.40 uIU/mL (03.08.23 @ 13:21)    # Hx of R MCA stroke with left side weakness.   - In ED, hemodynamically stable. She was pan-scanned. no evidence of new stroke, trauma, or infection.  PT eval     # left arm swelling, rule out dvt, possible due to IV infiltration  doppler done, report pending   clinically less swollen today   < from: [facility] Duplex Upper Ext Vein Scan, Left (03.10.23 @ 20:50) >  No DVT however there is superficial thrombosis noted in the left cephalic   vein    < end of copied text >  elevate, no iv on the left arm   has some tenderness on shoulder, will check xray of shoulder.       # anemia, monitor cbc , add mvi   Mean Cell Volume: 91.8 fL (03.12.23 @ 05:35)  Hemoglobin: 9.9 g/dL (03-12-23 @ 05:35)  Hemoglobin: 9.3 g/dL (03-11-23 @ 05:32)  Hemoglobin: 9.6 g/dL (03-10-23 @ 06:38)  Hemoglobin: 10.1 g/dL (03-09-23 @ 05:32)  Iron Total, Serum: 29 ug/dL (03.12.23 @ 05:35)  possible iron def, vs chronic disease.   followup ferritin, b12, folate , retic count     #Progress Note Handoff  Pending (specify): clinical improvement , xray of shoulder, podiatry consult, anemia workup   Family discussion: gabriele pt   Disposition: Home .

## 2023-03-13 LAB
ALBUMIN SERPL ELPH-MCNC: 2.4 G/DL — LOW (ref 3.5–5.2)
ALP SERPL-CCNC: 53 U/L — SIGNIFICANT CHANGE UP (ref 30–115)
ALT FLD-CCNC: 6 U/L — SIGNIFICANT CHANGE UP (ref 0–41)
ANION GAP SERPL CALC-SCNC: 9 MMOL/L — SIGNIFICANT CHANGE UP (ref 7–14)
AST SERPL-CCNC: 11 U/L — SIGNIFICANT CHANGE UP (ref 0–41)
BASOPHILS # BLD AUTO: 0.01 K/UL — SIGNIFICANT CHANGE UP (ref 0–0.2)
BASOPHILS NFR BLD AUTO: 0.1 % — SIGNIFICANT CHANGE UP (ref 0–1)
BILIRUB SERPL-MCNC: <0.2 MG/DL — SIGNIFICANT CHANGE UP (ref 0.2–1.2)
BUN SERPL-MCNC: 8 MG/DL — LOW (ref 10–20)
CALCIUM SERPL-MCNC: 8.6 MG/DL — SIGNIFICANT CHANGE UP (ref 8.4–10.5)
CHLORIDE SERPL-SCNC: 109 MMOL/L — SIGNIFICANT CHANGE UP (ref 98–110)
CO2 SERPL-SCNC: 25 MMOL/L — SIGNIFICANT CHANGE UP (ref 17–32)
CREAT SERPL-MCNC: <0.5 MG/DL — LOW (ref 0.7–1.5)
EGFR: 117 ML/MIN/1.73M2 — SIGNIFICANT CHANGE UP
EOSINOPHIL # BLD AUTO: 0.17 K/UL — SIGNIFICANT CHANGE UP (ref 0–0.7)
EOSINOPHIL NFR BLD AUTO: 1.8 % — SIGNIFICANT CHANGE UP (ref 0–8)
FERRITIN SERPL-MCNC: 246 NG/ML — HIGH (ref 15–150)
FOLATE SERPL-MCNC: 7.4 NG/ML — SIGNIFICANT CHANGE UP
GLUCOSE SERPL-MCNC: 72 MG/DL — SIGNIFICANT CHANGE UP (ref 70–99)
HCT VFR BLD CALC: 27.3 % — LOW (ref 37–47)
HGB BLD-MCNC: 8.6 G/DL — LOW (ref 12–16)
IMM GRANULOCYTES NFR BLD AUTO: 0.4 % — HIGH (ref 0.1–0.3)
LYMPHOCYTES # BLD AUTO: 2.69 K/UL — SIGNIFICANT CHANGE UP (ref 1.2–3.4)
LYMPHOCYTES # BLD AUTO: 28.2 % — SIGNIFICANT CHANGE UP (ref 20.5–51.1)
MAGNESIUM SERPL-MCNC: 1.9 MG/DL — SIGNIFICANT CHANGE UP (ref 1.8–2.4)
MCHC RBC-ENTMCNC: 29.1 PG — SIGNIFICANT CHANGE UP (ref 27–31)
MCHC RBC-ENTMCNC: 31.5 G/DL — LOW (ref 32–37)
MCV RBC AUTO: 92.2 FL — SIGNIFICANT CHANGE UP (ref 81–99)
MONOCYTES # BLD AUTO: 0.91 K/UL — HIGH (ref 0.1–0.6)
MONOCYTES NFR BLD AUTO: 9.5 % — HIGH (ref 1.7–9.3)
NEUTROPHILS # BLD AUTO: 5.71 K/UL — SIGNIFICANT CHANGE UP (ref 1.4–6.5)
NEUTROPHILS NFR BLD AUTO: 60 % — SIGNIFICANT CHANGE UP (ref 42.2–75.2)
NRBC # BLD: 0 /100 WBCS — SIGNIFICANT CHANGE UP (ref 0–0)
PLATELET # BLD AUTO: 392 K/UL — SIGNIFICANT CHANGE UP (ref 130–400)
POTASSIUM SERPL-MCNC: 4.4 MMOL/L — SIGNIFICANT CHANGE UP (ref 3.5–5)
POTASSIUM SERPL-SCNC: 4.4 MMOL/L — SIGNIFICANT CHANGE UP (ref 3.5–5)
PROT SERPL-MCNC: 4.9 G/DL — LOW (ref 6–8)
RBC # BLD: 2.96 M/UL — LOW (ref 4.2–5.4)
RBC # FLD: 13.9 % — SIGNIFICANT CHANGE UP (ref 11.5–14.5)
SODIUM SERPL-SCNC: 143 MMOL/L — SIGNIFICANT CHANGE UP (ref 135–146)
VIT B12 SERPL-MCNC: 1129 PG/ML — SIGNIFICANT CHANGE UP (ref 232–1245)
WBC # BLD: 9.53 K/UL — SIGNIFICANT CHANGE UP (ref 4.8–10.8)
WBC # FLD AUTO: 9.53 K/UL — SIGNIFICANT CHANGE UP (ref 4.8–10.8)

## 2023-03-13 PROCEDURE — 99222 1ST HOSP IP/OBS MODERATE 55: CPT

## 2023-03-13 PROCEDURE — 99233 SBSQ HOSP IP/OBS HIGH 50: CPT

## 2023-03-13 RX ADMIN — Medication 1 TABLET(S): at 13:45

## 2023-03-13 RX ADMIN — Medication 50 MILLIGRAM(S): at 17:41

## 2023-03-13 RX ADMIN — ENOXAPARIN SODIUM 40 MILLIGRAM(S): 100 INJECTION SUBCUTANEOUS at 21:43

## 2023-03-13 RX ADMIN — LEVETIRACETAM 400 MILLIGRAM(S): 250 TABLET, FILM COATED ORAL at 17:41

## 2023-03-13 RX ADMIN — Medication 500 MILLIGRAM(S): at 13:45

## 2023-03-13 RX ADMIN — Medication 50 MILLIGRAM(S): at 05:35

## 2023-03-13 RX ADMIN — Medication 15 MILLIGRAM(S): at 13:49

## 2023-03-13 RX ADMIN — Medication 1 APPLICATION(S): at 17:24

## 2023-03-13 RX ADMIN — ZINC SULFATE TAB 220 MG (50 MG ZINC EQUIVALENT) 220 MILLIGRAM(S): 220 (50 ZN) TAB at 13:44

## 2023-03-13 RX ADMIN — LEVETIRACETAM 400 MILLIGRAM(S): 250 TABLET, FILM COATED ORAL at 05:35

## 2023-03-13 NOTE — PROGRESS NOTE ADULT - SUBJECTIVE AND OBJECTIVE BOX
pt seen and examined.     My notes supersede resident's notes in case of discrepancy       ROS: no cp, no sob, no n/v, no fever    Vital Signs Last 24 Hrs  T(C): 37.3 (13 Mar 2023 04:46), Max: 37.3 (13 Mar 2023 04:46)  T(F): 99.2 (13 Mar 2023 04:46), Max: 99.2 (13 Mar 2023 04:46)  HR: 102 (13 Mar 2023 04:46) (101 - 104)  BP: 106/73 (13 Mar 2023 04:46) (104/70 - 109/72)  BP(mean): --  RR: 18 (13 Mar 2023 04:46) (18 - 18)  SpO2: 99% (13 Mar 2023 04:46) (99% - 99%)    physical exam  constitutional NAD, Arousable and can communicate only with limited answers,   Respiratory  lungs CTA, CVS heart RRR, GI: abdomen Soft NT, ND, BS+, left upper ext swelling,   neuro exam unable to participate, has stiffness on the right and paralysis on the left.   left , also left shoulder tender.     MEDICATIONS  (STANDING):  acetaminophen  Suppository .. 325 milliGRAM(s) Rectal once  ascorbic acid 500 milliGRAM(s) Oral daily  enoxaparin Injectable 40 milliGRAM(s) SubCutaneous every 24 hours  levETIRAcetam  IVPB 1000 milliGRAM(s) IV Intermittent every 12 hours  LORazepam   Injectable 2 milliGRAM(s) IV Push once  multivitamin/minerals 1 Tablet(s) Oral daily  valproate sodium  IVPB 750 milliGRAM(s) IV Intermittent every 12 hours  vitamin A &amp; D Ointment 1 Application(s) Topical two times a day  zinc sulfate 220 milliGRAM(s) Oral daily    MEDICATIONS  (PRN):  acetaminophen     Tablet .. 650 milliGRAM(s) Oral every 6 hours PRN Moderate Pain (4 - 6)  morphine  IR 15 milliGRAM(s) Oral every 6 hours PRN Severe Pain (7 - 10)  oxycodone    5 mG/acetaminophen 325 mG 1 Tablet(s) Oral every 6 hours PRN Severe Pain (7 - 10)                        8.6    9.53  )-----------( 392      ( 13 Mar 2023 05:36 )             27.3     03-13    143  |  109  |  8<L>  ----------------------------<  72  4.4   |  25  |  <0.5<L>    Ca    8.6      13 Mar 2023 05:36  Mg     1.9     03-13    TPro  4.9<L>  /  Alb  2.4<L>  /  TBili  <0.2  /  DBili  x   /  AST  11  /  ALT  6   /  AlkPhos  53  03-13    < from: EEG w/ Video Each 12-26 Hours, Unmonitored (03.09.23 @ 09:00) >  Findings consistent with right hemispheric electrocerebral dysfunction   secondary to nonspecific etiology    < end of copied text >    < from: CT Angio Head w/ IV Cont (03.04.23 @ 03:44) >    LEFT ANTERIOR CIRCULATION:  The CCA is patent up to the bulb without stenosis. The carotid bulb is   patent. The ECA and its proximal branches are patent without stenosis.   The extracranial ICA is patent without stenosis. The intracranial ICA is   patent without stenosis.    The anterior and middle cerebral arteries are patent without stenosis.      POSTERIOR CIRCULATION:    Right dominant vertebral artery circulation. The left vertebral artery   arises from the aortic arch. The vertebral arteries are patent without   stenosis bilaterally. The basilar artery is patent without stenosis. The   proximal branch vasculature of the posterior circulation are within   normal limits.    The posterior cerebral arteries are patent without stenosis. There is   fetal origin of the right PCA.    There is no evidence for saccular aneurysm, vascular malformation, or   large vessel occlusion.    No dural sinus venous thromboses.      IMPRESSION:    CTA HEAD:  No evidence of occlusion or aneurysm.    Diminutive caliber of the right M1 MCA.    CTA NECK:  No evidence of carotid or vertebral artery stenosis.    < end of copied text >    < from: CT Ankle No Cont, Left (03.11.23 @ 21:44) >  Findings suggest an acute minimally displaced avulsion fracture at the   medial malleolus. Correlate with point tenderness.    < end of copied text >        a/p  67 year old lady brought to ED via EMS , very limited information is available, since pt has AMS and family is not available     # Altered Mental Status likely due to Metabolic Encephalopathy from UTI ( ecoli) associated sepsis plus seizure, hx of stroke and left hemiparesis   - finished course of abx   - Continue with Depakote 750 mg BID  - Continue Keppra 1000 mg BID  - more alert today   - at baseline she is alert and is able to communicate, and is oriented. and her ambulation baseline and is using a wheelchair,   - she was in a hospital in [facility] last fall and was admitted and was lethargic for months     # foot pain: due to medial malleus fx, dw podiatry , they will evaluate pt, and decide if she needs surgical intervention, for now, cont dvt prophylaxis, pain meds, and consider brace.   cam walker  nwb on the left foot     # tachycardia,    Sinus tachycardia due to pain, added pain meds   Thyroid Stimulating Hormone, Serum: 2.40 uIU/mL (03.08.23 @ 13:21)    # Hx of R MCA stroke with left side weakness.   - In ED, hemodynamically stable. She was pan-scanned. no evidence of new stroke, trauma, or infection.  PT eval     # left arm swelling, rule out dvt, possible due to IV infiltration  doppler done, report pending   clinically less swollen today   < from: [facility] Duplex Upper Ext Vein Scan, Left (03.10.23 @ 20:50) >  No DVT however there is superficial thrombosis noted in the left cephalic   vein    < end of copied text >  elevate, no iv on the left arm   has some tenderness on shoulder, will check xray of shoulder.       # anemia, monitor cbc , add mvi , drop in hgb   no active bleeding,   repeat ct abd     Mean Cell Volume: 91.8 fL (03.12.23 @ 05:35)  Hemoglobin: 8.6 g/dL (03-13-23 @ 05:36)  Hemoglobin: 9.9 g/dL (03-12-23 @ 05:35)  Hemoglobin: 9.3 g/dL (03-11-23 @ 05:32)  Hemoglobin: 9.6 g/dL (03-10-23 @ 06:38)  ct abd non contrast   Iron Total, Serum: 29 ug/dL (03.12.23 @ 05:35)  possible iron def, vs chronic disease.     followup ferritin, b12, folate , retic count     #Progress Note Handoff  Pending (specify): clinical improvement , xray of shoulder, podiatry consult, anemia workup   Family discussion: called daughter Dominga , [phone], spoke with her, she will be here tomorrow , we will meet  then, she will bring the discharge papers from her previous admission from [place] ( [facility]) tomorrow.   Disposition: will discuss discharge plan tomorrow with the daughter

## 2023-03-13 NOTE — PROGRESS NOTE ADULT - ASSESSMENT
Pt is a 67 year old F brought to ED via EMS who per triage note found pt on street. Per triage note, daughter told EMS that pt c/o generalized pain, however no family present.   Patient has never been to this hospital per records, no contact information in chart,   No infomation could be obtained on the name of any relatives or family members, home address, phone numbers, or any contact information.  Meds, medical and surgical history could not be obtained on admission. Pt admitted to medicine for further evaluation and treatment.    # Altered Mental Status likely due to Metabolic Encephalopathy from UTI associated sepsis  # Hx of R MCA stroke  - In ED, hemodynamically stable. She was pan-scanned. no evidence of new stroke, trauma, or infection.  - WBC 19K: spiked on 3/8 i/s/o of seizure like activity.   - UA +ve; S/p cefepime and vanc in the ED and Rocephin, follow blood and urine cx   - CTH showed old stroke in the territory of the Rt MCA.   - rEEG consistent with diffuse cerebral electrophysiological dysfunction- video EEG Findings consistent with right hemispheric electrocerebral dysfunction   secondary to nonspecific etiology  - Neuro recs appreciated, c/w depakote and keppra  - Ativan 2 mg IV once on admission  - Keppra 1g IV once, then c/w 1g BID  - Keep Mg>2  - Seizure and fall precautions  - TSH: 2.40 WNL  - NC brain MRI pending order    #LUE swelling  - pain in right arm around IV site, swollen, tender  - LUE duplex negative for DVT    #Left foot pain - tender to palpation  - palpable pulses, ROM intact, sensation intact  - CT foot Findings suggest an acute minimally displaced avulsion fracture at the medial malleolus.  - Podiatry recs appreciated    #Tropinemia on admission  - troponin stable at <.01  - Echo 3/10 EF 55-60%    #Anemia  - stable    # Hyperkalemia  - resolved      Diet: thin liquid; soft & bite-sized  Activity: IAT- PT recc. PEPE  DVT Prophylaxis: lovenox  GI Prophylaxis: not indication  Code Status: full  Pending: Neuro FU, Brain MRI NC Pt is a 67 year old F brought to ED via EMS who per triage note found pt on street. Per triage note, daughter told EMS that pt c/o generalized pain, however no family present.   Patient has never been to this hospital per records, no contact information in chart,   No infomation could be obtained on the name of any relatives or family members, home address, phone numbers, or any contact information.  Meds, medical and surgical history could not be obtained on admission. Pt admitted to medicine for further evaluation and treatment.    #Altered Mental Status likely due to Metabolic Encephalopathy from UTI associated sepsis  #Hx of R MCA stroke  - In ED, hemodynamically stable. She was pan-scanned. no evidence of new stroke, trauma, or infection.  - WBC 19K: spiked on 3/8 i/s/o of seizure like activity.   - UA +ve; S/p cefepime and vanc in the ED and Rocephin, follow blood and urine cx   - CTH showed old stroke in the territory of the Rt MCA.   - rEEG consistent with diffuse cerebral electrophysiological dysfunction- video EEG Findings consistent with right hemispheric electrocerebral dysfunction   secondary to nonspecific etiology  - Neuro recs appreciated, c/w depakote and keppra  - Ativan 2 mg IV once on admission  - Keppra 1g IV once, then c/w 1g BID  - Keep Mg>2  - Seizure and fall precautions  - TSH: 2.40 WNL  - NC brain MRI pending order    #LUE swelling  - pain in right arm around IV site, swollen, tender  - LUE duplex negative for DVT    #Left foot pain - tender to palpation  - palpable pulses, ROM intact, sensation intact  - CT foot Findings suggest an acute minimally displaced avulsion fracture at the medial malleolus.  - Podiatry recs appreciated    #Tropinemia on admission  - troponin stable at <.01  - Echo 3/10 EF 55-60%    #Anemia  - stable    #Hyperkalemia  - resolved      Diet: thin liquid; soft & bite-sized  Activity: IAT- PT recc. PEPE  DVT Prophylaxis: lovenox  GI Prophylaxis: not indication  Code Status: full  Pending: Neuro FU, Brain MRI NC    Attempted to contact pt daughter Dominga [phone] three times throughout the day and left a message, no answer. Cannot order MRI brain until confirmation that patient does not have any MRI incompatible hardware/ Pt is a 67 year old F brought to ED via EMS who per triage note found pt on street. Per triage note, daughter told EMS that pt c/o generalized pain, however no family present.   Patient has never been to this hospital per records, no contact information in chart,   No infomation could be obtained on the name of any relatives or family members, home address, phone numbers, or any contact information.  Meds, medical and surgical history could not be obtained on admission. Pt admitted to medicine for further evaluation and treatment.    #Altered Mental Status likely due to Metabolic Encephalopathy from UTI associated sepsis  #Hx of R MCA stroke  - In ED, hemodynamically stable. She was pan-scanned. no evidence of new stroke, trauma, or infection.  - WBC 19K: spiked on 3/8 i/s/o of seizure like activity.   - UA +ve; S/p cefepime and vanc in the ED and Rocephin, follow blood and urine cx   - CTH showed old stroke in the territory of the Rt MCA.   - rEEG consistent with diffuse cerebral electrophysiological dysfunction- video EEG Findings consistent with right hemispheric electrocerebral dysfunction   secondary to nonspecific etiology  - Neuro recs appreciated, c/w depakote and keppra  - Ativan 2 mg IV once on admission  - Keppra 1g IV once, then c/w 1g BID  - Keep Mg>2  - Seizure and fall precautions  - TSH: 2.40 WNL  - NC brain MRI pending order    #LUE swelling  - pain in right arm around IV site, swollen, tender  - LUE duplex negative for DVT    #Left foot pain - tender to palpation  - palpable pulses, ROM intact, sensation intact  - CT foot Findings suggest an acute minimally displaced avulsion fracture at the medial malleolus.  - Podiatry recs appreciated    #Tropinemia on admission  - troponin stable at <.01  - Echo 3/10 EF 55-60%    #Anemia  - stable    #Hyperkalemia  - resolved      Diet: thin liquid; soft & bite-sized  Activity: IAT- PT recc. PEPE  DVT Prophylaxis: lovenox  GI Prophylaxis: not indication  Code Status: full  Pending: Neuro FU, Brain MRI NC    Attempted to contact pt daughter Dominga [phone] three times throughout the day and left a message, no answer. Cannot order MRI brain until confirmation that patient does not have any MRI incompatible hardware.    Spoke with Siobhan (daughter) at bedside, she will come in for family meeting tomorrow 3/14 at noon to discuss her mother's care.

## 2023-03-13 NOTE — PROGRESS NOTE ADULT - SUBJECTIVE AND OBJECTIVE BOX
JOSEPH OBRIEN 67y Female  MRN#: [id number]   Hospital Day: 9d    SUBJECTIVE  Patient is a 67y old Female who presents with a chief complaint of ams  Currently admitted to medicine with the primary diagnosis of UTI (urinary tract infection)      INTERVAL HPI AND OVERNIGHT EVENTS:  Patient was examined and seen at bedside. This morning she is resting comfortably in bed and reports no issues or overnight events.    OBJECTIVE  PAST MEDICAL & SURGICAL HISTORY    ALLERGIES:  Allergy Status Unknown    MEDICATIONS:  STANDING MEDICATIONS  acetaminophen  Suppository .. 325 milliGRAM(s) Rectal once  ascorbic acid 500 milliGRAM(s) Oral daily  enoxaparin Injectable 40 milliGRAM(s) SubCutaneous every 24 hours  levETIRAcetam  IVPB 1000 milliGRAM(s) IV Intermittent every 12 hours  LORazepam   Injectable 2 milliGRAM(s) IV Push once  multivitamin/minerals 1 Tablet(s) Oral daily  valproate sodium  IVPB 750 milliGRAM(s) IV Intermittent every 12 hours  vitamin A &amp; D Ointment 1 Application(s) Topical two times a day  zinc sulfate 220 milliGRAM(s) Oral daily    PRN MEDICATIONS  acetaminophen     Tablet .. 650 milliGRAM(s) Oral every 6 hours PRN  morphine  IR 15 milliGRAM(s) Oral every 6 hours PRN  oxycodone    5 mG/acetaminophen 325 mG 1 Tablet(s) Oral every 6 hours PRN      VITAL SIGNS: Last 24 Hours  T(C): 37.3 (13 Mar 2023 04:46), Max: 37.3 (13 Mar 2023 04:46)  T(F): 99.2 (13 Mar 2023 04:46), Max: 99.2 (13 Mar 2023 04:46)  HR: 102 (13 Mar 2023 04:46) (101 - 104)  BP: 106/73 (13 Mar 2023 04:46) (104/70 - 109/72)  BP(mean): --  RR: 18 (13 Mar 2023 04:46) (18 - 18)  SpO2: 99% (13 Mar 2023 04:46) (99% - 99%)    LABS:                        8.6    9.53  )-----------( 392      ( 13 Mar 2023 05:36 )             27.3     03-13    143  |  109  |  8<L>  ----------------------------<  72  4.4   |  25  |  <0.5<L>    Ca    8.6      13 Mar 2023 05:36  Mg     1.9     03-13    TPro  4.9<L>  /  Alb  2.4<L>  /  TBili  <0.2  /  DBili  x   /  AST  11  /  ALT  6   /  AlkPhos  53  03-13      RADIOLOGY:  < from: VA Duplex Lower Extrem Arterial, Bilat (03.12.23 @ 11:03) >  Impression:    Normal arterial flow in the bilateral lower extremities.    < end of copied text >    < from: CT Ankle No Cont, Left (03.11.23 @ 21:44) >  IMPRESSION:    Findings suggest an acute minimally displaced avulsion fracture at the   medial malleolus. Correlate with point tenderness.      < end of copied text >      PHYSICAL EXAM:  CONSTITUTIONAL: No acute distress, AAOx3  HEAD: Atraumatic, normocephalic  EYES: EOM intact, PERRLA, conjunctiva and sclera clear  ENT: moist mucous membranes  PULMONARY: Clear to auscultation bilaterally  CARDIOVASCULAR: Regular rate and rhythm  GASTROINTESTINAL: Soft, non-tender, non-distended; bowel sounds present  MUSCULOSKELETAL: no edema  NEUROLOGY: non-focal  SKIN: warm and dry     OJSEPH OBRIEN 67y Female  MRN#: [id number]   Hospital Day: 9d    SUBJECTIVE  Patient is a 67y old Female who presents with a chief complaint of ams  Currently admitted to medicine with the primary diagnosis of UTI (urinary tract infection)      INTERVAL HPI AND OVERNIGHT EVENTS:  Patient was examined and seen at bedside. This morning she is resting comfortably in bed and reports no issues or overnight events.    OBJECTIVE  PAST MEDICAL & SURGICAL HISTORY    ALLERGIES:  Allergy Status Unknown    MEDICATIONS:  STANDING MEDICATIONS  acetaminophen  Suppository .. 325 milliGRAM(s) Rectal once  ascorbic acid 500 milliGRAM(s) Oral daily  enoxaparin Injectable 40 milliGRAM(s) SubCutaneous every 24 hours  levETIRAcetam  IVPB 1000 milliGRAM(s) IV Intermittent every 12 hours  LORazepam   Injectable 2 milliGRAM(s) IV Push once  multivitamin/minerals 1 Tablet(s) Oral daily  valproate sodium  IVPB 750 milliGRAM(s) IV Intermittent every 12 hours  vitamin A &amp; D Ointment 1 Application(s) Topical two times a day  zinc sulfate 220 milliGRAM(s) Oral daily    PRN MEDICATIONS  acetaminophen     Tablet .. 650 milliGRAM(s) Oral every 6 hours PRN  morphine  IR 15 milliGRAM(s) Oral every 6 hours PRN  oxycodone    5 mG/acetaminophen 325 mG 1 Tablet(s) Oral every 6 hours PRN      VITAL SIGNS: Last 24 Hours  T(C): 37.3 (13 Mar 2023 04:46), Max: 37.3 (13 Mar 2023 04:46)  T(F): 99.2 (13 Mar 2023 04:46), Max: 99.2 (13 Mar 2023 04:46)  HR: 102 (13 Mar 2023 04:46) (101 - 104)  BP: 106/73 (13 Mar 2023 04:46) (104/70 - 109/72)  BP(mean): --  RR: 18 (13 Mar 2023 04:46) (18 - 18)  SpO2: 99% (13 Mar 2023 04:46) (99% - 99%)    LABS:                        8.6    9.53  )-----------( 392      ( 13 Mar 2023 05:36 )             27.3     03-13    143  |  109  |  8<L>  ----------------------------<  72  4.4   |  25  |  <0.5<L>    Ca    8.6      13 Mar 2023 05:36  Mg     1.9     03-13    TPro  4.9<L>  /  Alb  2.4<L>  /  TBili  <0.2  /  DBili  x   /  AST  11  /  ALT  6   /  AlkPhos  53  03-13      RADIOLOGY:  < from: VA Duplex Lower Extrem Arterial, Bilat (03.12.23 @ 11:03) >  Impression:    Normal arterial flow in the bilateral lower extremities.    < end of copied text >    < from: CT Ankle No Cont, Left (03.11.23 @ 21:44) >  IMPRESSION:    Findings suggest an acute minimally displaced avulsion fracture at the   medial malleolus. Correlate with point tenderness.      < end of copied text >      PHYSICAL EXAM:  CONSTITUTIONAL: No acute distress, asleep this AM   HEENT: Atraumatic, normocephalic, EOMI, moist mucous membranes  PULMONARY: Clear to auscultation bilaterally  CARDIOVASCULAR: Regular rate and rhythm  GASTROINTESTINAL: Soft, non-tender, non-distended; bowel sounds present  MUSCULOSKELETAL: no edema, left foot tenderness to palpation  NEUROLOGY: non-focal  SKIN: warm and dry, deep tissue injury b/l ears, stage 3 sacral ulcer

## 2023-03-14 LAB
ALBUMIN SERPL ELPH-MCNC: 2.6 G/DL — LOW (ref 3.5–5.2)
ALP SERPL-CCNC: 57 U/L — SIGNIFICANT CHANGE UP (ref 30–115)
ALT FLD-CCNC: 6 U/L — SIGNIFICANT CHANGE UP (ref 0–41)
ANION GAP SERPL CALC-SCNC: 10 MMOL/L — SIGNIFICANT CHANGE UP (ref 7–14)
AST SERPL-CCNC: 11 U/L — SIGNIFICANT CHANGE UP (ref 0–41)
BASOPHILS # BLD AUTO: 0.04 K/UL — SIGNIFICANT CHANGE UP (ref 0–0.2)
BASOPHILS NFR BLD AUTO: 0.4 % — SIGNIFICANT CHANGE UP (ref 0–1)
BILIRUB SERPL-MCNC: <0.2 MG/DL — SIGNIFICANT CHANGE UP (ref 0.2–1.2)
BUN SERPL-MCNC: 7 MG/DL — LOW (ref 10–20)
CALCIUM SERPL-MCNC: 8.8 MG/DL — SIGNIFICANT CHANGE UP (ref 8.4–10.5)
CHLORIDE SERPL-SCNC: 107 MMOL/L — SIGNIFICANT CHANGE UP (ref 98–110)
CO2 SERPL-SCNC: 26 MMOL/L — SIGNIFICANT CHANGE UP (ref 17–32)
CREAT SERPL-MCNC: <0.5 MG/DL — LOW (ref 0.7–1.5)
CULTURE RESULTS: SIGNIFICANT CHANGE UP
EGFR: 117 ML/MIN/1.73M2 — SIGNIFICANT CHANGE UP
EOSINOPHIL # BLD AUTO: 0.27 K/UL — SIGNIFICANT CHANGE UP (ref 0–0.7)
EOSINOPHIL NFR BLD AUTO: 2.7 % — SIGNIFICANT CHANGE UP (ref 0–8)
GLUCOSE SERPL-MCNC: 79 MG/DL — SIGNIFICANT CHANGE UP (ref 70–99)
HCT VFR BLD CALC: 28.5 % — LOW (ref 37–47)
HGB BLD-MCNC: 8.9 G/DL — LOW (ref 12–16)
IMM GRANULOCYTES NFR BLD AUTO: 0.5 % — HIGH (ref 0.1–0.3)
LYMPHOCYTES # BLD AUTO: 2.61 K/UL — SIGNIFICANT CHANGE UP (ref 1.2–3.4)
LYMPHOCYTES # BLD AUTO: 26.3 % — SIGNIFICANT CHANGE UP (ref 20.5–51.1)
MAGNESIUM SERPL-MCNC: 1.9 MG/DL — SIGNIFICANT CHANGE UP (ref 1.8–2.4)
MCHC RBC-ENTMCNC: 28.6 PG — SIGNIFICANT CHANGE UP (ref 27–31)
MCHC RBC-ENTMCNC: 31.2 G/DL — LOW (ref 32–37)
MCV RBC AUTO: 91.6 FL — SIGNIFICANT CHANGE UP (ref 81–99)
MONOCYTES # BLD AUTO: 1.05 K/UL — HIGH (ref 0.1–0.6)
MONOCYTES NFR BLD AUTO: 10.6 % — HIGH (ref 1.7–9.3)
NEUTROPHILS # BLD AUTO: 5.91 K/UL — SIGNIFICANT CHANGE UP (ref 1.4–6.5)
NEUTROPHILS NFR BLD AUTO: 59.5 % — SIGNIFICANT CHANGE UP (ref 42.2–75.2)
NRBC # BLD: 0 /100 WBCS — SIGNIFICANT CHANGE UP (ref 0–0)
PLATELET # BLD AUTO: 413 K/UL — HIGH (ref 130–400)
POTASSIUM SERPL-MCNC: 4.2 MMOL/L — SIGNIFICANT CHANGE UP (ref 3.5–5)
POTASSIUM SERPL-SCNC: 4.2 MMOL/L — SIGNIFICANT CHANGE UP (ref 3.5–5)
PROT SERPL-MCNC: 5.1 G/DL — LOW (ref 6–8)
RBC # BLD: 3.11 M/UL — LOW (ref 4.2–5.4)
RBC # FLD: 13.6 % — SIGNIFICANT CHANGE UP (ref 11.5–14.5)
SODIUM SERPL-SCNC: 143 MMOL/L — SIGNIFICANT CHANGE UP (ref 135–146)
SPECIMEN SOURCE: SIGNIFICANT CHANGE UP
WBC # BLD: 9.93 K/UL — SIGNIFICANT CHANGE UP (ref 4.8–10.8)
WBC # FLD AUTO: 9.93 K/UL — SIGNIFICANT CHANGE UP (ref 4.8–10.8)

## 2023-03-14 PROCEDURE — 99233 SBSQ HOSP IP/OBS HIGH 50: CPT

## 2023-03-14 PROCEDURE — 99232 SBSQ HOSP IP/OBS MODERATE 35: CPT

## 2023-03-14 RX ORDER — VALPROIC ACID (AS SODIUM SALT) 250 MG/5ML
500 SOLUTION, ORAL ORAL THREE TIMES A DAY
Refills: 0 | Status: DISCONTINUED | OUTPATIENT
Start: 2023-03-14 | End: 2023-03-20

## 2023-03-14 RX ORDER — BACLOFEN 100 %
5 POWDER (GRAM) MISCELLANEOUS EVERY 12 HOURS
Refills: 0 | Status: DISCONTINUED | OUTPATIENT
Start: 2023-03-14 | End: 2023-04-18

## 2023-03-14 RX ORDER — DIVALPROEX SODIUM 500 MG/1
750 TABLET, DELAYED RELEASE ORAL
Refills: 0 | Status: DISCONTINUED | OUTPATIENT
Start: 2023-03-14 | End: 2023-03-14

## 2023-03-14 RX ORDER — LEVETIRACETAM 250 MG/1
1000 TABLET, FILM COATED ORAL
Refills: 0 | Status: DISCONTINUED | OUTPATIENT
Start: 2023-03-14 | End: 2023-03-21

## 2023-03-14 RX ORDER — LEVETIRACETAM 250 MG/1
1000 TABLET, FILM COATED ORAL
Refills: 0 | Status: DISCONTINUED | OUTPATIENT
Start: 2023-03-14 | End: 2023-03-14

## 2023-03-14 RX ADMIN — Medication 1 TABLET(S): at 12:05

## 2023-03-14 RX ADMIN — LEVETIRACETAM 1000 MILLIGRAM(S): 250 TABLET, FILM COATED ORAL at 12:04

## 2023-03-14 RX ADMIN — ENOXAPARIN SODIUM 40 MILLIGRAM(S): 100 INJECTION SUBCUTANEOUS at 21:31

## 2023-03-14 RX ADMIN — Medication 5 MILLIGRAM(S): at 18:26

## 2023-03-14 RX ADMIN — LEVETIRACETAM 400 MILLIGRAM(S): 250 TABLET, FILM COATED ORAL at 05:55

## 2023-03-14 RX ADMIN — Medication 650 MILLIGRAM(S): at 14:50

## 2023-03-14 RX ADMIN — Medication 500 MILLIGRAM(S): at 21:31

## 2023-03-14 RX ADMIN — Medication 1 APPLICATION(S): at 18:30

## 2023-03-14 RX ADMIN — MORPHINE SULFATE 15 MILLIGRAM(S): 50 CAPSULE, EXTENDED RELEASE ORAL at 12:09

## 2023-03-14 RX ADMIN — Medication 50 MILLIGRAM(S): at 05:54

## 2023-03-14 RX ADMIN — LEVETIRACETAM 1000 MILLIGRAM(S): 250 TABLET, FILM COATED ORAL at 18:26

## 2023-03-14 RX ADMIN — Medication 500 MILLIGRAM(S): at 13:30

## 2023-03-14 RX ADMIN — Medication 500 MILLIGRAM(S): at 12:05

## 2023-03-14 RX ADMIN — Medication 1 APPLICATION(S): at 05:54

## 2023-03-14 RX ADMIN — MORPHINE SULFATE 15 MILLIGRAM(S): 50 CAPSULE, EXTENDED RELEASE ORAL at 12:39

## 2023-03-14 RX ADMIN — Medication 650 MILLIGRAM(S): at 15:39

## 2023-03-14 RX ADMIN — Medication 1 APPLICATION(S): at 05:56

## 2023-03-14 RX ADMIN — ZINC SULFATE TAB 220 MG (50 MG ZINC EQUIVALENT) 220 MILLIGRAM(S): 220 (50 ZN) TAB at 12:05

## 2023-03-14 NOTE — PROGRESS NOTE ADULT - ASSESSMENT
67 year old F with unclear PMHx, outpatient medication review shows the patient is on low dose Lamictal and Depakote in addition to Keppra (possible mood disorder/ seizures), currently admitted u/c of med team for encephalopathy. On prior neurology assessment the patient noted to have spastic LHP and R gaze preference. On today's exam: RUE spasticity noted .   Recommendations:  - Obtain MRI of brain w/o cotrast to r/o any new stroke  - For spasticity, can start Baclofen 5 mg BID  - Her foot pain seems to be due to neuropathy. Consider Gabapentin/Pregabalin. Caution needed when used with opioid.  - Continue AED at same dose: Keppra 1000 mg BID, Dapakote: 750 mg BID

## 2023-03-14 NOTE — PROGRESS NOTE ADULT - ATTENDING COMMENTS
I have personally seen and examined this patient on 2/14. I have fully participated in the care of this patient.  I have reviewed all pertinent clinical information, including history, physical exam, plan and note.  Patient with history of right MCA infarct with left side spasticity now with right arm stiffness. Recommended to repeat Brain MRI. Start Baclofen. Will follow.  I have reviewed all pertinent clinical information and reviewed all relevant imaging and diagnostic studies personally.  Recommendations as above.  Agree with above assessment except as noted.

## 2023-03-14 NOTE — PROGRESS NOTE ADULT - SUBJECTIVE AND OBJECTIVE BOX
INTERVAL HPI/OVERNIGHT EVENTS:  Patient seen and examined. She reports pain and burning of both feet left more than right, also reports trouble lifting right arm up.    MEDICATIONS  (STANDING):  acetaminophen  Suppository .. 325 milliGRAM(s) Rectal once  ascorbic acid 500 milliGRAM(s) Oral daily  baclofen 5 milliGRAM(s) Oral every 12 hours  enoxaparin Injectable 40 milliGRAM(s) SubCutaneous every 24 hours  levETIRAcetam  Solution 1000 milliGRAM(s) Oral two times a day  LORazepam   Injectable 2 milliGRAM(s) IV Push once  multivitamin/minerals 1 Tablet(s) Oral daily  valproic  acid Syrup 500 milliGRAM(s) Oral three times a day  vitamin A &amp; D Ointment 1 Application(s) Topical two times a day  zinc sulfate 220 milliGRAM(s) Oral daily    MEDICATIONS  (PRN):  acetaminophen     Tablet .. 650 milliGRAM(s) Oral every 6 hours PRN Moderate Pain (4 - 6)  morphine  IR 15 milliGRAM(s) Oral every 6 hours PRN Severe Pain (7 - 10)  oxycodone    5 mG/acetaminophen 325 mG 1 Tablet(s) Oral every 6 hours PRN Severe Pain (7 - 10)      Allergies    Allergy Status Unknown    Intolerances        Vital Signs Last 24 Hrs  T(C): 36.4 (14 Mar 2023 13:05), Max: 37.2 (13 Mar 2023 14:30)  T(F): 97.5 (14 Mar 2023 13:05), Max: 98.9 (13 Mar 2023 14:30)  HR: 97 (14 Mar 2023 13:05) (92 - 104)  BP: 121/77 (14 Mar 2023 13:05) (102/59 - 136/73)  BP(mean): --  RR: 15 (14 Mar 2023 13:05) (15 - 18)  SpO2: 100% (14 Mar 2023 13:05) (100% - 100%)    Parameters below as of 14 Mar 2023 07:40  Patient On (Oxygen Delivery Method): room air        Physical exam:  Mental status: Awake, alert and oriented x3. Naming, repetition and comprehension intact. Attention/concentration intact. No dysarthria, no aphasia.  Cranial nerves: Pupils equally round and reactive to light, difficulty tracking, R-sided gaze preference. No diplopia. L sided facial weakness.   Motor:  RUE:3/5, LUE:2/5, LLE:2/5, RLE:3/5  Sensation: Intact to light touch,   Reflexes: Minimal throughout  Gait: Not tested      LABS:                        8.9    9.93  )-----------( 413      ( 14 Mar 2023 04:30 )             28.5     03-14    143  |  107  |  7<L>  ----------------------------<  79  4.2   |  26  |  <0.5<L>    Ca    8.8      14 Mar 2023 04:30  Mg     1.9     03-14    TPro  5.1<L>  /  Alb  2.6<L>  /  TBili  <0.2  /  DBili  x   /  AST  11  /  ALT  6   /  AlkPhos  57  03-14          RADIOLOGY & ADDITIONAL TESTS:  < from: CT Head No Cont (03.04.23 @ 02:04) >  No evidence of intracranial hemorrhage, territorial infarct, or mass   effect.        < from: CT Angio Head w/ IV Cont (03.04.23 @ 03:44) >  TA HEAD:  No evidence of occlusion or aneurysm.    Diminutive caliber of the right M1 MCA.    CTA NECK:  No evidence of carotid or vertebral artery stenosis.

## 2023-03-14 NOTE — PROGRESS NOTE ADULT - SUBJECTIVE AND OBJECTIVE BOX
Called [facility] [phone], spoke to staff and asked them to cancel prescription for amlodipine.   JOSEPH OBRIEN 67y Female  MRN#: [id number]   Hospital Day: 10d    SUBJECTIVE  Patient is a 67y old Female who presents with a chief complaint of ams (10 Mar 2023 07:25)  Currently admitted to medicine with the primary diagnosis of UTI (urinary tract infection)      INTERVAL HPI AND OVERNIGHT EVENTS:  Patient was examined and seen at bedside. This morning she is resting comfortably in bed and reports no issues or overnight events.    OBJECTIVE  PAST MEDICAL & SURGICAL HISTORY    ALLERGIES:  Allergy Status Unknown    MEDICATIONS:  STANDING MEDICATIONS  acetaminophen  Suppository .. 325 milliGRAM(s) Rectal once  ascorbic acid 500 milliGRAM(s) Oral daily  enoxaparin Injectable 40 milliGRAM(s) SubCutaneous every 24 hours  levETIRAcetam  Solution 1000 milliGRAM(s) Oral two times a day  LORazepam   Injectable 2 milliGRAM(s) IV Push once  multivitamin/minerals 1 Tablet(s) Oral daily  valproic  acid Syrup 500 milliGRAM(s) Oral three times a day  vitamin A &amp; D Ointment 1 Application(s) Topical two times a day  zinc sulfate 220 milliGRAM(s) Oral daily    PRN MEDICATIONS  acetaminophen     Tablet .. 650 milliGRAM(s) Oral every 6 hours PRN  morphine  IR 15 milliGRAM(s) Oral every 6 hours PRN  oxycodone    5 mG/acetaminophen 325 mG 1 Tablet(s) Oral every 6 hours PRN      VITAL SIGNS: Last 24 Hours  T(C): 36.3 (14 Mar 2023 05:53), Max: 37.2 (13 Mar 2023 14:30)  T(F): 97.4 (14 Mar 2023 05:53), Max: 98.9 (13 Mar 2023 14:30)  HR: 92 (14 Mar 2023 05:53) (92 - 104)  BP: 102/59 (14 Mar 2023 05:53) (102/59 - 136/73)  BP(mean): --  RR: 18 (14 Mar 2023 05:53) (18 - 18)  SpO2: 100% (14 Mar 2023 07:40) (100% - 100%)    LABS:                        8.9    9.93  )-----------( 413      ( 14 Mar 2023 04:30 )             28.5     03-14    143  |  107  |  7<L>  ----------------------------<  79  4.2   |  26  |  <0.5<L>    Ca    8.8      14 Mar 2023 04:30  Mg     1.9     03-14    TPro  5.1<L>  /  Alb  2.6<L>  /  TBili  <0.2  /  DBili  x   /  AST  11  /  ALT  6   /  AlkPhos  57  03-14          RADIOLOGY:  < from: [facility] Duplex Lower Extrem Arterial, Bilat (03.12.23 @ 11:03) >  Impression:    Normal arterial flow in the bilateral lower extremities.    < end of copied text >    < from: CT Ankle No Cont, Left (03.11.23 @ 21:44) >  IMPRESSION:    Findings suggest an acute minimally displaced avulsion fracture at the   medial malleolus. Correlate with point tenderness.      < end of copied text >      PHYSICAL EXAM:  CONSTITUTIONAL: No acute distress, asleep this AM   HEENT: Atraumatic, normocephalic, EOMI, moist mucous membranes  PULMONARY: Clear to auscultation bilaterally  CARDIOVASCULAR: Regular rate and rhythm  GASTROINTESTINAL: Soft, non-tender, non-distended; bowel sounds present  MUSCULOSKELETAL: no edema, left foot tenderness to palpation  NEUROLOGY: non-focal  SKIN: warm and dry, deep tissue injury b/l ears, stage 3 sacral ulcer

## 2023-03-14 NOTE — PROGRESS NOTE ADULT - ASSESSMENT
Pt is a 67 year old F brought to ED via EMS who per triage note found pt on street. Per triage note, daughter told EMS that pt c/o generalized pain, however no family present.   Patient has never been to this hospital per records, no contact information in chart,   No infomation could be obtained on the name of any relatives or family members, home address, phone numbers, or any contact information.  Meds, medical and surgical history could not be obtained on admission. Pt admitted to medicine for further evaluation and treatment.    #Altered Mental Status likely due to Metabolic Encephalopathy from UTI associated sepsis  #Hx of R MCA stroke  - In ED, hemodynamically stable. She was pan-scanned. no evidence of new stroke, trauma, or infection.  - WBC 19K: spiked on 3/8 i/s/o of seizure like activity.   - UA +ve; S/p cefepime and vanc in the ED and Rocephin, follow blood and urine cx   - CTH showed old stroke in the territory of the Rt MCA.   - rEEG consistent with diffuse cerebral electrophysiological dysfunction- video EEG Findings consistent with right hemispheric electrocerebral dysfunction   secondary to nonspecific etiology  - Neuro recs appreciated, c/w depakote and keppra  - Ativan 2 mg IV once on admission  - Keppra 1g IV once, then c/w 1g BID  - Keep Mg>2  - Seizure and fall precautions  - TSH: 2.40 WNL  - NC brain MRI pending order    #LUE swelling  - pain in right arm around IV site, swollen, tender  - LUE duplex negative for DVT    #Left foot pain - tender to palpation  - palpable pulses, ROM intact, sensation intact  - CT foot Findings suggest an acute minimally displaced avulsion fracture at the medial malleolus.  - Podiatry recs appreciated    #Tropinemia on admission  - troponin stable at <.01  - Echo 3/10 EF 55-60%    #Anemia  - stable    #Hyperkalemia  - resolved      Diet: thin liquid; soft & bite-sized  Activity: IAT- PT recc. PEPE  DVT Prophylaxis: lovenox  GI Prophylaxis: not indication  Code Status: full  Pending: Neuro FU, Brain MRI NC    Attempted to contact pt daughter Dominga [phone] three times throughout the day and left a message, no answer. Cannot order MRI brain until confirmation that patient does not have any MRI incompatible hardware.    Spoke with Siobhan (daughter) at bedside, she will come in for family meeting tomorrow 3/14 at noon to discuss her mother's care.   Pt is a 67 year old F brought to ED via EMS who per triage note found pt on street. Per triage note, daughter told EMS that pt c/o generalized pain, however no family present.   Patient has never been to this hospital per records, no contact information in chart,   No infomation could be obtained on the name of any relatives or family members, home address, phone numbers, or any contact information.  Meds, medical and surgical history could not be obtained on admission. Pt admitted to medicine for further evaluation and treatment.    #Altered Mental Status likely due to Metabolic Encephalopathy from UTI associated sepsis  #Hx of R MCA stroke  - In ED, hemodynamically stable. She was pan-scanned. no evidence of new stroke, trauma, or infection.  - WBC 19K: spiked on 3/8 i/s/o of seizure like activity.   - UA +ve; S/p cefepime and vanc in the ED and Rocephin, follow blood and urine cx   - CTH showed old stroke in the territory of the Rt MCA.   - rEEG consistent with diffuse cerebral electrophysiological dysfunction- video EEG Findings consistent with right hemispheric electrocerebral dysfunction   secondary to nonspecific etiology  - Neuro recs appreciated, c/w depakote and keppra  - Ativan 2 mg IV once on admission  - Keppra 1g IV once, then c/w 1g BID  - Keep Mg>2  - Seizure and fall precautions  - TSH: 2.40 WNL  - NC brain MRI pending, baclofen 5mg BID     #LUE swelling  - pain in right arm around IV site, swollen, tender  - LUE duplex negative for DVT    #Left foot pain - tender to palpation  - palpable pulses, ROM intact, sensation intact  - CT foot Findings suggest an acute minimally displaced avulsion fracture at the medial malleolus.  - Podiatry recs appreciated    #Tropinemia on admission  - troponin stable at <.01  - Echo 3/10 EF 55-60%    #Anemia  - stable    #Hyperkalemia  - resolved      Diet: thin liquid; soft & bite-sized  Activity: IAT- PT recc. PEPE  DVT Prophylaxis: lovenox  GI Prophylaxis: not indication  Code Status: full  Pending: Brain MRI NC    Family meeting held today

## 2023-03-14 NOTE — DIETITIAN NUTRITION RISK NOTIFICATION - TREATMENT: THE FOLLOWING DIET HAS BEEN RECOMMENDED
Diet, Soft and Bite Sized:   Free Water Flush Instructions:  MAGIC CUP 1X AT LUNCH. PLEASE GIVE VANILLA OR STRAWBERRY SUPPLEMENTS, no chocolate  Supplement Feeding Modality:  Oral  Ensure Plus High Protein Cans or Servings Per Day:  2       Frequency:  Daily (03-06-23 @ 16:27) [Active]

## 2023-03-14 NOTE — PROGRESS NOTE ADULT - SUBJECTIVE AND OBJECTIVE BOX
pt seen and examined.     My notes supersede resident's notes in case of discrepancy       ROS: no cp, no sob, no n/v, no fever    Vital Signs Last 24 Hrs  T(C): 36.3 (14 Mar 2023 05:53), Max: 37.2 (13 Mar 2023 14:30)  T(F): 97.4 (14 Mar 2023 05:53), Max: 98.9 (13 Mar 2023 14:30)  HR: 92 (14 Mar 2023 05:53) (92 - 104)  BP: 102/59 (14 Mar 2023 05:53) (102/59 - 136/73)  BP(mean): --  RR: 18 (14 Mar 2023 05:53) (18 - 18)  SpO2: 100% (14 Mar 2023 07:40) (100% - 100%)    Parameters below as of 14 Mar 2023 07:40  Patient On (Oxygen Delivery Method): room air    physical exam  constitutional NAD, Awake and alert, has left foot pain , Respiratory  lungs CTA, CVS heart RRR, GI: abdomen Soft NT, ND, BS+, skin: intact  neuro exam left hemiparesis, tenderness on the left foot. left upper ext has edema,     MEDICATIONS  (STANDING):  acetaminophen  Suppository .. 325 milliGRAM(s) Rectal once  ascorbic acid 500 milliGRAM(s) Oral daily  enoxaparin Injectable 40 milliGRAM(s) SubCutaneous every 24 hours  levETIRAcetam  Solution 1000 milliGRAM(s) Oral two times a day  LORazepam   Injectable 2 milliGRAM(s) IV Push once  multivitamin/minerals 1 Tablet(s) Oral daily  valproic  acid Syrup 500 milliGRAM(s) Oral three times a day  vitamin A &amp; D Ointment 1 Application(s) Topical two times a day  zinc sulfate 220 milliGRAM(s) Oral daily    MEDICATIONS  (PRN):  acetaminophen     Tablet .. 650 milliGRAM(s) Oral every 6 hours PRN Moderate Pain (4 - 6)  morphine  IR 15 milliGRAM(s) Oral every 6 hours PRN Severe Pain (7 - 10)  oxycodone    5 mG/acetaminophen 325 mG 1 Tablet(s) Oral every 6 hours PRN Severe Pain (7 - 10)                        8.9    9.93  )-----------( 413      ( 14 Mar 2023 04:30 )             28.5     03-14    143  |  107  |  7<L>  ----------------------------<  79  4.2   |  26  |  <0.5<L>    Ca    8.8      14 Mar 2023 04:30  Mg     1.9     03-14    TPro  5.1<L>  /  Alb  2.6<L>  /  TBili  <0.2  /  DBili  x   /  AST  11  /  ALT  6   /  AlkPhos  57  03-14    Ferritin, Serum: 246 ng/mL [15 - 150] (03-12-23 @ 05:35)    a/p  67 year old lady brought to ED via EMS , very limited information is available, since pt has AMS and family is not available     # Altered Mental Status likely due to Metabolic Encephalopathy from UTI ( ecoli) associated sepsis plus seizure, hx of stroke and left hemiparesis   - finished course of abx   - Continue with Depakote 750 mg BID  - Continue Keppra 1000 mg BID  - more alert today   - at baseline she is alert and is able to communicate, and is oriented. and her ambulation baseline and is using a wheelchair,   - she was in a hospital in [facility] last fall and was admitted and was lethargic for months     # foot pain: due to medial malleus fx, dw podiatry , they will evaluate pt, and decide if she needs surgical intervention, for now, cont dvt prophylaxis, pain meds, and consider brace.   cam walker  nwb on the left foot     # tachycardia,    Sinus tachycardia due to pain, added pain meds   Thyroid Stimulating Hormone, Serum: 2.40 uIU/mL (03.08.23 @ 13:21)    # Hx of R MCA stroke with left side weakness.   - In ED, hemodynamically stable. She was pan-scanned. no evidence of new stroke, trauma, or infection.  PT eval     # left arm swelling, rule out dvt, possible due to IV infiltration  doppler done, report pending   clinically less swollen today   < from: [facility] Duplex Upper Ext Vein Scan, Left (03.10.23 @ 20:50) >  No DVT however there is superficial thrombosis noted in the left cephalic   vein    < end of copied text >  elevate, no iv on the left arm   has some tenderness on shoulder, will check xray of shoulder.       # anemia, monitor cbc , add mvi , drop in hgb ( now stable)   no active bleeding,   repeat ct abd     Mean Cell Volume: 91.8 fL (03.12.23 @ 05:35)  Hemoglobin: 8.9 g/dL (03-14-23 @ 04:30)  Hemoglobin: 8.6 g/dL (03-13-23 @ 05:36)  Hemoglobin: 9.9 g/dL (03-12-23 @ 05:35)  Hemoglobin: 9.3 g/dL (03-11-23 @ 05:32)  ct abd non contrast   Iron Total, Serum: 29 ug/dL (03.12.23 @ 05:35)   Ferritin, Serum: 246 ng/mL (03.12.23 @ 05:35)  Vitamin B12, Serum: 1129 pg/mL (03.12.23 @ 05:35)  Folate, Serum: 7.4 ng/mL (03.12.23 @ 05:35)    anemia of chronic disease     #Progress Note Handoff  Pending (specify): clinical improvement , xray of shoulder, podiatry consult, anemia workup   Family discussion: planned family meeting this afternoon   Disposition: will discuss discharge plan tomorrow with the daughter                                pt seen and examined.     My notes supersede resident's notes in case of discrepancy       ROS: no cp, no sob, no n/v, no fever    Vital Signs Last 24 Hrs  T(C): 36.3 (14 Mar 2023 05:53), Max: 37.2 (13 Mar 2023 14:30)  T(F): 97.4 (14 Mar 2023 05:53), Max: 98.9 (13 Mar 2023 14:30)  HR: 92 (14 Mar 2023 05:53) (92 - 104)  BP: 102/59 (14 Mar 2023 05:53) (102/59 - 136/73)  BP(mean): --  RR: 18 (14 Mar 2023 05:53) (18 - 18)  SpO2: 100% (14 Mar 2023 07:40) (100% - 100%)    Parameters below as of 14 Mar 2023 07:40  Patient On (Oxygen Delivery Method): room air    physical exam  constitutional NAD, Awake and alert, has left foot pain , Respiratory  lungs CTA, CVS heart RRR, GI: abdomen Soft NT, ND, BS+, skin: intact  neuro exam left hemiparesis, tenderness on the left foot. left upper ext has edema,     MEDICATIONS  (STANDING):  acetaminophen  Suppository .. 325 milliGRAM(s) Rectal once  ascorbic acid 500 milliGRAM(s) Oral daily  enoxaparin Injectable 40 milliGRAM(s) SubCutaneous every 24 hours  levETIRAcetam  Solution 1000 milliGRAM(s) Oral two times a day  LORazepam   Injectable 2 milliGRAM(s) IV Push once  multivitamin/minerals 1 Tablet(s) Oral daily  valproic  acid Syrup 500 milliGRAM(s) Oral three times a day  vitamin A &amp; D Ointment 1 Application(s) Topical two times a day  zinc sulfate 220 milliGRAM(s) Oral daily    MEDICATIONS  (PRN):  acetaminophen     Tablet .. 650 milliGRAM(s) Oral every 6 hours PRN Moderate Pain (4 - 6)  morphine  IR 15 milliGRAM(s) Oral every 6 hours PRN Severe Pain (7 - 10)  oxycodone    5 mG/acetaminophen 325 mG 1 Tablet(s) Oral every 6 hours PRN Severe Pain (7 - 10)                        8.9    9.93  )-----------( 413      ( 14 Mar 2023 04:30 )             28.5     03-14    143  |  107  |  7<L>  ----------------------------<  79  4.2   |  26  |  <0.5<L>    Ca    8.8      14 Mar 2023 04:30  Mg     1.9     03-14    TPro  5.1<L>  /  Alb  2.6<L>  /  TBili  <0.2  /  DBili  x   /  AST  11  /  ALT  6   /  AlkPhos  57  03-14    Ferritin, Serum: 246 ng/mL [15 - 150] (03-12-23 @ 05:35)    a/p  67 year old lady brought to ED via EMS , very limited information is available, since pt has AMS and family is not available     # Altered Mental Status likely due to Metabolic Encephalopathy from UTI ( ecoli) associated sepsis plus seizure, hx of stroke and left hemiparesis   - finished course of abx   - Continue with Depakote 750 mg BID  - Continue Keppra 1000 mg BID  - more alert today   - at baseline she is alert and is able to communicate, and is oriented. and her ambulation baseline and is using a wheelchair,   - she was in a hospital in [facility] last fall and was admitted and was lethargic for months     # foot pain: due to medial malleus fx, dw podiatry , they will evaluate pt, and decide if she needs surgical intervention, for now, cont dvt prophylaxis, pain meds, and consider brace.   cam walker  nwb on the left foot     # tachycardia,    Sinus tachycardia due to pain, added pain meds   Thyroid Stimulating Hormone, Serum: 2.40 uIU/mL (03.08.23 @ 13:21)    # Hx of R MCA stroke with left side weakness.   - In ED, hemodynamically stable. She was pan-scanned. no evidence of new stroke, trauma, or infection.  PT eval     # left arm swelling, rule out dvt, possible due to IV infiltration  doppler done, report pending   clinically less swollen today   < from: VA Duplex Upper Ext Vein Scan, Left (03.10.23 @ 20:50) >  No DVT however there is superficial thrombosis noted in the left cephalic   vein    < end of copied text >  elevate, no iv on the left arm   has some tenderness on shoulder, will check xray of shoulder.       # anemia, monitor cbc , add mvi , drop in hgb ( now stable)   no active bleeding,   repeat ct abd     Mean Cell Volume: 91.8 fL (03.12.23 @ 05:35)  Hemoglobin: 8.9 g/dL (03-14-23 @ 04:30)  Hemoglobin: 8.6 g/dL (03-13-23 @ 05:36)  Hemoglobin: 9.9 g/dL (03-12-23 @ 05:35)  Hemoglobin: 9.3 g/dL (03-11-23 @ 05:32)  ct abd non contrast   Iron Total, Serum: 29 ug/dL (03.12.23 @ 05:35)   Ferritin, Serum: 246 ng/mL (03.12.23 @ 05:35)  Vitamin B12, Serum: 1129 pg/mL (03.12.23 @ 05:35)  Folate, Serum: 7.4 ng/mL (03.12.23 @ 05:35)    anemia of chronic disease     #Progress Note Handoff  Pending (specify): discharge planning   Family discussion:  met with two daughters, both want to be HCP but they do not talk to each other. we will ask pt whom she wants as HCP after they leave.   Disposition: SNF

## 2023-03-14 NOTE — CHART NOTE - NSCHARTNOTEFT_GEN_A_CORE
Registered Dietitian Follow-Up  Patient Profile Reviewed                           Yes [x]   No []  Nutrition History Previously Obtained        Yes [x]  No []      Pertinent Medical Interventions:  67 year old lady brought to ED via EMS who per triage note found pt on street. Per triage note, daughter told EMS that pt c/o generalized pain, however no family present.     Nutrition Interval History:   07-Mar-2023 SLP evaluation   Soft and bite-sized w/ thin liquids  allow for swallow between intakes; maintain upright posture during/after eating for 30 mins; oral hygiene  dependent  1:1 feed    Nutrient Intake:   **** patient completing </=50% of meal trays yesterday and today, overall decrease in PO intake   requires assistance eating secondary to having limited mobility in arms + having weakness unable to feed self.    suspect patient meeting <75% of estimated energy needs in-house [id number]    Diet, Soft and Bite Sized:   Free Water Flush Instructions:  MAGIC CUP 1X AT LUNCH. PLEASE GIVE VANILLA OR STRAWBERRY SUPPLEMENTS, no chocolate  Supplement Feeding Modality:  Oral  Ensure Plus High Protein Cans or Servings Per Day:  2       Frequency:  Daily (23 @ 16:27) [Active]    Anthropometrics:  Height (cm): 167.6 (23 @ 16:25)  Weight (kg): 68.3 (23 @ 10:12)  BMI (kg/m2): 24.3 (23 @ 10:12)    OTHER WEIGHTS:   ^ height per patient report  ^ weight obtained 3/6 via bed scale at time of visit, with consideration for edema   UBW: 68.2kg  IBW 52.3kg   patient bed weight higher than reported UBW, patient reported that she has 'some weight loss here or there'  unsure if accurate report considering patient noted with confusion  Daily Weight in k.3 ()    MEDICATIONS  (STANDING):  acetaminophen  Suppository .. 325 milliGRAM(s) Rectal once  ascorbic acid 500 milliGRAM(s) Oral daily  enoxaparin Injectable 40 milliGRAM(s) SubCutaneous every 24 hours  levETIRAcetam  Solution 1000 milliGRAM(s) Oral two times a day  LORazepam   Injectable 2 milliGRAM(s) IV Push once  multivitamin/minerals 1 Tablet(s) Oral daily  valproic  acid Syrup 500 milliGRAM(s) Oral three times a day  vitamin A &amp; D Ointment 1 Application(s) Topical two times a day  zinc sulfate 220 milliGRAM(s) Oral daily    MEDICATIONS  (PRN):  acetaminophen     Tablet .. 650 milliGRAM(s) Oral every 6 hours PRN Moderate Pain (4 - 6)  morphine  IR 15 milliGRAM(s) Oral every 6 hours PRN Severe Pain (7 - 10)  oxycodone    5 mG/acetaminophen 325 mG 1 Tablet(s) Oral every 6 hours PRN Severe Pain (7 - 10)    LABS:                     8.9    9.93  )-----------( 413      ( 14 Mar 2023 04:30 )             28.5     03-14    143  |  107  |  7<L>  ----------------------------<  79  4.2   |  26  |  <0.5<L>    Ca    8.8      14 Mar 2023 04:30  Mg     1.9     03-14    TPro  5.1<L>  /  Alb  2.6<L>  /  TBili  <0.2  /  DBili  x   /  AST  11  /  ALT  6   /  AlkPhos  57  03-14    LIVER FUNCTIONS - ( 14 Mar 2023 04:30 )  Alb: 2.6 g/dL / Pro: 5.1 g/dL / ALK PHOS: 57 U/L / ALT: 6 U/L / AST: 11 U/L / GGT: x           Physical Findings:  - Cognition: disoriented to; situation; time, mild temple/clavicle deletion   - GI function: Last BM not indicated per flow sheets   - Tubes: n/a  - Oral/Mouth cavity: soft and bite size   - Skin: per flow sheets wounds below  1. inner knee skin tear, right inner knee blister  2. Left: buttocks, Stage III  3. Right: buttocks, Stage III  4. R ear unstageable added 3/5  5. L ear unstageable added 3/5  - Edema: left arm 2+, generalized 1+     Nutrition Requirements: with consideration for age, weight, BMI, wounds  Weight Used: 68.2 kg dry weight      Estimated Energy Needs    Continue [x]  Adjust [] 3957-5068 kcal/day (MSJ x 1.2-1.5)  Estimated Protein Needs    Continue [x]  Adjust [] 82-102g/day (1.2-1.5g/kg)  Estimated Fluid Needs        Continue [x]  Adjust [] 1700mLday (25mL/kg)    [x] Previous Nutrition Diagnosis:            [x] Ongoing          [] Resolved  #1 Increased Nutrient Needs  Malnutrition (moderate) acute illness (AMS) <75% estimated energy needs >7days, mild muscle/fat depletion  Goal/Expected Outcome: meet >/=75% est energy needs within 5 days    Nutrition Intervention: Meals and Snacks, Medical Food Supplement, Vitamin Supplement, Nutrition Related Medication, Coordination of Care  Indicator/Monitoring:  Monitor diet order, energy intake, food and nutrient intake, body composition, weight    Recommendations:  - soft and bite size thin liquids per SLP  - Ensure HIGH PROTEIN 2x/day to optimize pro/kcal intake (350kcal, 20 g pro/serving) Vanilla/Strawberry only  - Magic Cup 1x/day at dinner (290kcal, 9g protein/serving)  - 1:1 feeding assistance, patient with limited mobility in arm/hands  - continue zinc 220mg/daily x10-14d, ascorbic acid 500mg/daily, MV w/ minerals daily for wound healing if medically feasible  - obtain weights  - consider appetite stimulant if not contraindicated, would avoid marinol as may make patient more lethargic, further alter cognition   - recommend 3 day calorie count     Patient at HIGH nutrition risk   will follow up within 3-5days  Cesario Abbott RD  [id number] or via TEAMS. Registered Dietitian Follow-Up  Patient Profile Reviewed                           Yes [x]   No []  Nutrition History Previously Obtained        Yes [x]  No []      Pertinent Medical Interventions:  67 year old lady brought to ED via EMS who per triage note found pt on street. Per triage note, daughter told EMS that pt c/o generalized pain, however no family present.     Nutrition Interval History:   07-Mar-2023 SLP evaluation   Soft and bite-sized w/ thin liquids  allow for swallow between intakes; maintain upright posture during/after eating for 30 mins; oral hygiene  dependent  1:1 feed    Nutrient Intake:   **** patient completing </=50% of meal trays yesterday and today, overall decrease in PO intake   having <50% of oral supplements  requires assistance eating secondary to having limited mobility in arms + having weakness unable to feed self.    suspect patient meeting <75% of estimated energy needs in-house [id number]    Diet, Soft and Bite Sized:   Free Water Flush Instructions:  MAGIC CUP 1X AT LUNCH. PLEASE GIVE VANILLA OR STRAWBERRY SUPPLEMENTS, no chocolate  Supplement Feeding Modality:  Oral  Ensure Plus High Protein Cans or Servings Per Day:  2       Frequency:  Daily (23 @ 16:27) [Active]    Anthropometrics:  Height (cm): 167.6 (23 @ 16:25)  Weight (kg): 68.3 (23 @ 10:12)  BMI (kg/m2): 24.3 (23 @ 10:12)    OTHER WEIGHTS:   ^ height per patient report  ^ weight obtained 3/6 via bed scale at time of visit, with consideration for edema   UBW: 68.2kg  IBW 52.3kg   patient bed weight higher than reported UBW, patient reported that she has 'some weight loss here or there'  unsure if accurate report considering patient noted with confusion  Daily Weight in k.3 ()    MEDICATIONS  (STANDING):  acetaminophen  Suppository .. 325 milliGRAM(s) Rectal once  ascorbic acid 500 milliGRAM(s) Oral daily  enoxaparin Injectable 40 milliGRAM(s) SubCutaneous every 24 hours  levETIRAcetam  Solution 1000 milliGRAM(s) Oral two times a day  LORazepam   Injectable 2 milliGRAM(s) IV Push once  multivitamin/minerals 1 Tablet(s) Oral daily  valproic  acid Syrup 500 milliGRAM(s) Oral three times a day  vitamin A &amp; D Ointment 1 Application(s) Topical two times a day  zinc sulfate 220 milliGRAM(s) Oral daily    MEDICATIONS  (PRN):  acetaminophen     Tablet .. 650 milliGRAM(s) Oral every 6 hours PRN Moderate Pain (4 - 6)  morphine  IR 15 milliGRAM(s) Oral every 6 hours PRN Severe Pain (7 - 10)  oxycodone    5 mG/acetaminophen 325 mG 1 Tablet(s) Oral every 6 hours PRN Severe Pain (7 - 10)    LABS:                     8.9    9.93  )-----------( 413      ( 14 Mar 2023 04:30 )             28.5     03-14    143  |  107  |  7<L>  ----------------------------<  79  4.2   |  26  |  <0.5<L>    Ca    8.8      14 Mar 2023 04:30  Mg     1.9     03-14    TPro  5.1<L>  /  Alb  2.6<L>  /  TBili  <0.2  /  DBili  x   /  AST  11  /  ALT  6   /  AlkPhos  57  03-14    LIVER FUNCTIONS - ( 14 Mar 2023 04:30 )  Alb: 2.6 g/dL / Pro: 5.1 g/dL / ALK PHOS: 57 U/L / ALT: 6 U/L / AST: 11 U/L / GGT: x           Physical Findings:  - Cognition: disoriented to; situation; time, mild temple/clavicle deletion   - GI function: Last BM not indicated per flow sheets   - Tubes: n/a  - Oral/Mouth cavity: soft and bite size   - Skin: per flow sheets wounds below  1. inner knee skin tear, right inner knee blister  2. Left: buttocks, Stage III  3. Right: buttocks, Stage III  4. R ear unstageable added 3/5  5. L ear unstageable added 3/5  - Edema: left arm 2+, generalized 1+     Nutrition Requirements: with consideration for age, weight, BMI, wounds  Weight Used: 68.2 kg dry weight      Estimated Energy Needs    Continue [x]  Adjust [] 4036-4835 kcal/day (MSJ x 1.2-1.5)  Estimated Protein Needs    Continue [x]  Adjust [] 82-102g/day (1.2-1.5g/kg)  Estimated Fluid Needs        Continue [x]  Adjust [] 1700mLday (25mL/kg)    [x] Previous Nutrition Diagnosis:            [x] Ongoing          [] Resolved  #1 Increased Nutrient Needs  Malnutrition (moderate) acute illness (AMS) <75% estimated energy needs >7days, mild muscle/fat depletion  Goal/Expected Outcome: meet >/=75% est energy needs within 5 days    Nutrition Intervention: Meals and Snacks, Medical Food Supplement, Vitamin Supplement, Nutrition Related Medication, Coordination of Care  Indicator/Monitoring:  Monitor diet order, energy intake, food and nutrient intake, body composition, weight    Recommendations:  - soft and bite size thin liquids per SLP  - Ensure HIGH PROTEIN 2x/day to optimize pro/kcal intake (350kcal, 20 g pro/serving) Vanilla/Strawberry only  - Magic Cup 1x/day at dinner (290kcal, 9g protein/serving)  - 1:1 feeding assistance, patient with limited mobility in arm/hands  - continue zinc 220mg/daily x10-14d, ascorbic acid 500mg/daily, MV w/ minerals daily for wound healing if medically feasible  - obtain weights  - consider appetite stimulant if not contraindicated, would avoid marinol as may make patient more lethargic, further alter cognition   - recommend 3 day calorie count     Patient at HIGH nutrition risk   will follow up within 3-5days  Cesario Abbott RD  [id number] or via TEAMS. Registered Dietitian Follow-Up  Patient Profile Reviewed                           Yes [x]   No []  Nutrition History Previously Obtained        Yes [x]  No []      Pertinent Medical Interventions:  67 year old lady brought to ED via EMS who per triage note found pt on street. Per triage note, daughter told EMS that pt c/o generalized pain, however no family present.     Nutrition Interval History:   07-Mar-2023 SLP evaluation   Soft and bite-sized w/ thin liquids  allow for swallow between intakes; maintain upright posture during/after eating for 30 mins; oral hygiene  dependent  1:1 feed    Nutrient Intake:   **** patient completing </=50% of meal trays yesterday. as of today patient did not feel well at bfast and ate minimal, <25% of meal tray  overall PO intake has been further decreasing through admission   having <50% of oral supplements  requires assistance eating secondary to having limited mobility in arms + having weakness unable to feed self.    suspect patient meeting <75% of estimated energy needs in-house [id number]    Diet, Soft and Bite Sized:   Free Water Flush Instructions:  MAGIC CUP 1X AT LUNCH. PLEASE GIVE VANILLA OR STRAWBERRY SUPPLEMENTS, no chocolate  Supplement Feeding Modality:  Oral  Ensure Plus High Protein Cans or Servings Per Day:  2       Frequency:  Daily (23 @ 16:27) [Active]    Anthropometrics:  Height (cm): 167.6 (23 @ 16:25)  Weight (kg): 68.3 (23 @ 10:12)  BMI (kg/m2): 24.3 (23 @ 10:12)    OTHER WEIGHTS:   ^ height per patient report  ^ weight obtained 3/ via bed scale at time of visit, with consideration for edema   UBW: 68.2kg  IBW 52.3kg   patient bed weight higher than reported UBW, patient reported that she has 'some weight loss here or there'  unsure if accurate report considering patient noted with confusion  Daily Weight in k.3 ()    MEDICATIONS  (STANDING):  acetaminophen  Suppository .. 325 milliGRAM(s) Rectal once  ascorbic acid 500 milliGRAM(s) Oral daily  enoxaparin Injectable 40 milliGRAM(s) SubCutaneous every 24 hours  levETIRAcetam  Solution 1000 milliGRAM(s) Oral two times a day  LORazepam   Injectable 2 milliGRAM(s) IV Push once  multivitamin/minerals 1 Tablet(s) Oral daily  valproic  acid Syrup 500 milliGRAM(s) Oral three times a day  vitamin A &amp; D Ointment 1 Application(s) Topical two times a day  zinc sulfate 220 milliGRAM(s) Oral daily    MEDICATIONS  (PRN):  acetaminophen     Tablet .. 650 milliGRAM(s) Oral every 6 hours PRN Moderate Pain (4 - 6)  morphine  IR 15 milliGRAM(s) Oral every 6 hours PRN Severe Pain (7 - 10)  oxycodone    5 mG/acetaminophen 325 mG 1 Tablet(s) Oral every 6 hours PRN Severe Pain (7 - 10)    LABS:                     8.9    9.93  )-----------( 413      ( 14 Mar 2023 04:30 )             28.5     03-14    143  |  107  |  7<L>  ----------------------------<  79  4.2   |  26  |  <0.5<L>    Ca    8.8      14 Mar 2023 04:30  Mg     1.9     -    TPro  5.1<L>  /  Alb  2.6<L>  /  TBili  <0.2  /  DBili  x   /  AST  11  /  ALT  6   /  AlkPhos  57  03-14    LIVER FUNCTIONS - ( 14 Mar 2023 04:30 )  Alb: 2.6 g/dL / Pro: 5.1 g/dL / ALK PHOS: 57 U/L / ALT: 6 U/L / AST: 11 U/L / GGT: x           Physical Findings:  - Cognition: disoriented to; situation; time, mild temple/clavicle deletion   - GI function: Last BM not indicated per flow sheets   - Tubes: n/a  - Oral/Mouth cavity: soft and bite size   - Skin: per flow sheets wounds below  1. inner knee skin tear, right inner knee blister  2. Left: buttocks, Stage III  3. Right: buttocks, Stage III  4. R ear unstageable added 3/5  5. L ear unstageable added 3/5  - Edema: left arm 2+, generalized 1+     Nutrition Requirements: with consideration for age, weight, BMI, wounds  Weight Used: 68.2 kg dry weight      Estimated Energy Needs    Continue [x]  Adjust [] 1565-8706 kcal/day (MSJ x 1.2-1.5)  Estimated Protein Needs    Continue [x]  Adjust [] 82-102g/day (1.2-1.5g/kg)  Estimated Fluid Needs        Continue [x]  Adjust [] 1700mLday (25mL/kg)    [x] Previous Nutrition Diagnosis:            [x] Ongoing          [] Resolved  #1 Increased Nutrient Needs  Malnutrition (moderate) acute illness (AMS) <75% estimated energy needs >7days, mild muscle/fat depletion  Goal/Expected Outcome: meet >/=75% est energy needs within 5 days    Nutrition Intervention: Meals and Snacks, Medical Food Supplement, Vitamin Supplement, Nutrition Related Medication, Coordination of Care  Indicator/Monitoring:  Monitor diet order, energy intake, food and nutrient intake, body composition, weight    Recommendations:  - soft and bite size thin liquids per SLP  - Ensure HIGH PROTEIN 2x/day to optimize pro/kcal intake (350kcal, 20 g pro/serving) Vanilla/Strawberry only  - Magic Cup 1x/day at dinner (290kcal, 9g protein/serving)  - 1:1 feeding assistance, patient with limited mobility in arm/hands  - continue zinc 220mg/daily x10-14d, ascorbic acid 500mg/daily, MV w/ minerals daily for wound healing if medically feasible  - obtain weights  - consider appetite stimulant if not contraindicated, would avoid marinol as may make patient more lethargic, further alter cognition   - recommend 3 day calorie count     Patient at HIGH nutrition risk   will follow up within 3-5days  Cesario Abbott RD  [id number] or via TEAMS. Registered Dietitian Follow-Up  Patient Profile Reviewed                           Yes [x]   No []  Nutrition History Previously Obtained        Yes [x]  No []      Pertinent Medical Interventions:  67 year old lady brought to ED via EMS who per triage note found pt on street. Per triage note, daughter told EMS that pt c/o generalized pain, however no family present.     Nutrition Interval History:   07-Mar-2023 SLP evaluation   Soft and bite-sized w/ thin liquids  allow for swallow between intakes; maintain upright posture during/after eating for 30 mins; oral hygiene  dependent  1:1 feed    Nutrient Intake:   **** patient completing </=50% of meal trays yesterday. as of today patient did not feel well at bfast and ate minimal, <25% of meal tray  overall PO intake has been further decreasing through admission   having <50% of oral supplements  requires assistance eating secondary to having limited mobility in arms + having weakness unable to feed self.    suspect patient meeting <75% of estimated energy needs in-house [id number]    Diet, Soft and Bite Sized:   Free Water Flush Instructions:  MAGIC CUP 1X AT LUNCH. PLEASE GIVE VANILLA OR STRAWBERRY SUPPLEMENTS, no chocolate  Supplement Feeding Modality:  Oral  Ensure Plus High Protein Cans or Servings Per Day:  2       Frequency:  Daily (23 @ 16:27) [Active]    Anthropometrics:  Height (cm): 167.6 (23 @ 16:25)  Weight (kg): 68.3 (23 @ 10:12)  BMI (kg/m2): 24.3 (23 @ 10:12)    OTHER WEIGHTS:   ^ height per patient report  ^ weight obtained 3/ via bed scale at time of visit, with consideration for edema   UBW: 68.2kg  IBW 52.3kg   patient bed weight higher than reported UBW, patient reported that she has 'some weight loss here or there'  unsure if accurate report considering patient noted with confusion  Daily Weight in k.3 ()    MEDICATIONS  (STANDING):  acetaminophen  Suppository .. 325 milliGRAM(s) Rectal once  ascorbic acid 500 milliGRAM(s) Oral daily  enoxaparin Injectable 40 milliGRAM(s) SubCutaneous every 24 hours  levETIRAcetam  Solution 1000 milliGRAM(s) Oral two times a day  LORazepam   Injectable 2 milliGRAM(s) IV Push once  multivitamin/minerals 1 Tablet(s) Oral daily  valproic  acid Syrup 500 milliGRAM(s) Oral three times a day  vitamin A &amp; D Ointment 1 Application(s) Topical two times a day  zinc sulfate 220 milliGRAM(s) Oral daily    MEDICATIONS  (PRN):  acetaminophen     Tablet .. 650 milliGRAM(s) Oral every 6 hours PRN Moderate Pain (4 - 6)  morphine  IR 15 milliGRAM(s) Oral every 6 hours PRN Severe Pain (7 - 10)  oxycodone    5 mG/acetaminophen 325 mG 1 Tablet(s) Oral every 6 hours PRN Severe Pain (7 - 10)    LABS:                     8.9    9.93  )-----------( 413      ( 14 Mar 2023 04:30 )             28.5     03-14    143  |  107  |  7<L>  ----------------------------<  79  4.2   |  26  |  <0.5<L>    Ca    8.8      14 Mar 2023 04:30  Mg     1.9     -    TPro  5.1<L>  /  Alb  2.6<L>  /  TBili  <0.2  /  DBili  x   /  AST  11  /  ALT  6   /  AlkPhos  57  03-14    LIVER FUNCTIONS - ( 14 Mar 2023 04:30 )  Alb: 2.6 g/dL / Pro: 5.1 g/dL / ALK PHOS: 57 U/L / ALT: 6 U/L / AST: 11 U/L / GGT: x           Physical Findings:  - Cognition: disoriented to; situation; time, mild temple/clavicle deletion   - GI function: Last BM not indicated per flow sheets   - Tubes: n/a  - Oral/Mouth cavity: soft and bite size   - Skin: per flow sheets wounds below  1. inner knee skin tear, right inner knee blister  2. Left: buttocks, Stage III  3. Right: buttocks, Stage III  4. R ear unstageable added 3/5  5. L ear unstageable added 3/5  - Edema: left arm 2+, generalized 1+     Nutrition Requirements: with consideration for age, weight, BMI, wounds  Weight Used: 68.2 kg dry weight      Estimated Energy Needs    Continue [x]  Adjust [] 0869-8282 kcal/day (MSJ x 1.2-1.5)  Estimated Protein Needs    Continue [x]  Adjust [] 82-102g/day (1.2-1.5g/kg)  Estimated Fluid Needs        Continue [x]  Adjust [] 1700mLday (25mL/kg)    [x] Previous Nutrition Diagnosis:            [x] Ongoing          [] Resolved  #1 Increased Nutrient Needs  Malnutrition (moderate) acute illness (AMS) <75% estimated energy needs >7days, mild muscle depletion  Goal/Expected Outcome: meet >/=75% est energy needs within 5 days    Nutrition Intervention: Meals and Snacks, Medical Food Supplement, Vitamin Supplement, Nutrition Related Medication, Coordination of Care  Indicator/Monitoring:  Monitor diet order, energy intake, food and nutrient intake, body composition, weight    Recommendations:  - soft and bite size thin liquids per SLP  - Ensure HIGH PROTEIN 2x/day to optimize pro/kcal intake (350kcal, 20 g pro/serving) Vanilla/Strawberry only  - Magic Cup 1x/day at dinner (290kcal, 9g protein/serving)  - 1:1 feeding assistance, patient with limited mobility in arm/hands  - continue zinc 220mg/daily x10-14d, ascorbic acid 500mg/daily, MV w/ minerals daily for wound healing if medically feasible  - obtain weights  - consider appetite stimulant if not contraindicated, would avoid marinol as may make patient more lethargic, further alter cognition   - recommend 3 day calorie count     Patient at HIGH nutrition risk   will follow up within 3-5days  Cesario Abbott, RD  [phone] or via TEAMS.

## 2023-03-15 LAB
ALBUMIN SERPL ELPH-MCNC: 2.4 G/DL — LOW (ref 3.5–5.2)
ALP SERPL-CCNC: 56 U/L — SIGNIFICANT CHANGE UP (ref 30–115)
ALT FLD-CCNC: <5 U/L — SIGNIFICANT CHANGE UP (ref 0–41)
ANION GAP SERPL CALC-SCNC: 11 MMOL/L — SIGNIFICANT CHANGE UP (ref 7–14)
AST SERPL-CCNC: 9 U/L — SIGNIFICANT CHANGE UP (ref 0–41)
BASOPHILS # BLD AUTO: 0.04 K/UL — SIGNIFICANT CHANGE UP (ref 0–0.2)
BASOPHILS NFR BLD AUTO: 0.4 % — SIGNIFICANT CHANGE UP (ref 0–1)
BILIRUB SERPL-MCNC: <0.2 MG/DL — SIGNIFICANT CHANGE UP (ref 0.2–1.2)
BUN SERPL-MCNC: 6 MG/DL — LOW (ref 10–20)
CALCIUM SERPL-MCNC: 8.7 MG/DL — SIGNIFICANT CHANGE UP (ref 8.4–10.5)
CHLORIDE SERPL-SCNC: 102 MMOL/L — SIGNIFICANT CHANGE UP (ref 98–110)
CO2 SERPL-SCNC: 26 MMOL/L — SIGNIFICANT CHANGE UP (ref 17–32)
CREAT SERPL-MCNC: <0.5 MG/DL — LOW (ref 0.7–1.5)
EGFR: 117 ML/MIN/1.73M2 — SIGNIFICANT CHANGE UP
EOSINOPHIL # BLD AUTO: 0.19 K/UL — SIGNIFICANT CHANGE UP (ref 0–0.7)
EOSINOPHIL NFR BLD AUTO: 2 % — SIGNIFICANT CHANGE UP (ref 0–8)
GLUCOSE SERPL-MCNC: 72 MG/DL — SIGNIFICANT CHANGE UP (ref 70–99)
HCT VFR BLD CALC: 27.5 % — LOW (ref 37–47)
HGB BLD-MCNC: 8.8 G/DL — LOW (ref 12–16)
IMM GRANULOCYTES NFR BLD AUTO: 0.5 % — HIGH (ref 0.1–0.3)
LYMPHOCYTES # BLD AUTO: 1.81 K/UL — SIGNIFICANT CHANGE UP (ref 1.2–3.4)
LYMPHOCYTES # BLD AUTO: 19 % — LOW (ref 20.5–51.1)
MAGNESIUM SERPL-MCNC: 2.1 MG/DL — SIGNIFICANT CHANGE UP (ref 1.8–2.4)
MCHC RBC-ENTMCNC: 29.4 PG — SIGNIFICANT CHANGE UP (ref 27–31)
MCHC RBC-ENTMCNC: 32 G/DL — SIGNIFICANT CHANGE UP (ref 32–37)
MCV RBC AUTO: 92 FL — SIGNIFICANT CHANGE UP (ref 81–99)
MONOCYTES # BLD AUTO: 0.85 K/UL — HIGH (ref 0.1–0.6)
MONOCYTES NFR BLD AUTO: 8.9 % — SIGNIFICANT CHANGE UP (ref 1.7–9.3)
NEUTROPHILS # BLD AUTO: 6.58 K/UL — HIGH (ref 1.4–6.5)
NEUTROPHILS NFR BLD AUTO: 69.2 % — SIGNIFICANT CHANGE UP (ref 42.2–75.2)
NRBC # BLD: 0 /100 WBCS — SIGNIFICANT CHANGE UP (ref 0–0)
PLATELET # BLD AUTO: 404 K/UL — HIGH (ref 130–400)
POTASSIUM SERPL-MCNC: 4.1 MMOL/L — SIGNIFICANT CHANGE UP (ref 3.5–5)
POTASSIUM SERPL-SCNC: 4.1 MMOL/L — SIGNIFICANT CHANGE UP (ref 3.5–5)
PROT SERPL-MCNC: 4.8 G/DL — LOW (ref 6–8)
RBC # BLD: 2.99 M/UL — LOW (ref 4.2–5.4)
RBC # FLD: 13.6 % — SIGNIFICANT CHANGE UP (ref 11.5–14.5)
SARS-COV-2 RNA SPEC QL NAA+PROBE: SIGNIFICANT CHANGE UP
SODIUM SERPL-SCNC: 139 MMOL/L — SIGNIFICANT CHANGE UP (ref 135–146)
WBC # BLD: 9.52 K/UL — SIGNIFICANT CHANGE UP (ref 4.8–10.8)
WBC # FLD AUTO: 9.52 K/UL — SIGNIFICANT CHANGE UP (ref 4.8–10.8)

## 2023-03-15 PROCEDURE — 99221 1ST HOSP IP/OBS SF/LOW 40: CPT

## 2023-03-15 PROCEDURE — 99232 SBSQ HOSP IP/OBS MODERATE 35: CPT

## 2023-03-15 PROCEDURE — 70551 MRI BRAIN STEM W/O DYE: CPT | Mod: 26

## 2023-03-15 RX ADMIN — ZINC SULFATE TAB 220 MG (50 MG ZINC EQUIVALENT) 220 MILLIGRAM(S): 220 (50 ZN) TAB at 12:01

## 2023-03-15 RX ADMIN — Medication 5 MILLIGRAM(S): at 05:48

## 2023-03-15 RX ADMIN — Medication 500 MILLIGRAM(S): at 14:37

## 2023-03-15 RX ADMIN — Medication 1 APPLICATION(S): at 17:13

## 2023-03-15 RX ADMIN — LEVETIRACETAM 1000 MILLIGRAM(S): 250 TABLET, FILM COATED ORAL at 17:12

## 2023-03-15 RX ADMIN — Medication 5 MILLIGRAM(S): at 17:12

## 2023-03-15 RX ADMIN — Medication 1 TABLET(S): at 12:01

## 2023-03-15 RX ADMIN — ENOXAPARIN SODIUM 40 MILLIGRAM(S): 100 INJECTION SUBCUTANEOUS at 21:38

## 2023-03-15 RX ADMIN — Medication 500 MILLIGRAM(S): at 12:01

## 2023-03-15 RX ADMIN — LEVETIRACETAM 1000 MILLIGRAM(S): 250 TABLET, FILM COATED ORAL at 05:47

## 2023-03-15 RX ADMIN — Medication 650 MILLIGRAM(S): at 12:16

## 2023-03-15 RX ADMIN — Medication 1 APPLICATION(S): at 05:49

## 2023-03-15 RX ADMIN — Medication 500 MILLIGRAM(S): at 21:10

## 2023-03-15 RX ADMIN — Medication 500 MILLIGRAM(S): at 05:47

## 2023-03-15 RX ADMIN — Medication 650 MILLIGRAM(S): at 12:58

## 2023-03-15 NOTE — BH CONSULTATION LIAISON ASSESSMENT NOTE - NSBHCHARTREVIEWLAB_PSY_A_CORE FT
8.8    9.52  )-----------( 404      ( 15 Mar 2023 05:03 )             27.5   03-15    139  |  102  |  6<L>  ----------------------------<  72  4.1   |  26  |  <0.5<L>    Ca    8.7      15 Mar 2023 05:03  Mg     2.1     03-15    TPro  4.8<L>  /  Alb  2.4<L>  /  TBili  <0.2  /  DBili  x   /  AST  9   /  ALT  <5  /  AlkPhos  56  03-15

## 2023-03-15 NOTE — CONSULT NOTE ADULT - SUBJECTIVE AND OBJECTIVE BOX
HPI:   This is a case of a 67 year old lady brought to ED via EMS who per triage note found pt on street. Per triage note, daughter told EMS that pt c/o generalized pain, however no family present.   Patient has never been to this hospital per records, no contact information in chart, mildly cooperative, says she is feeling "okay" and when asked where she lives she said [place]. No infomration could be obtained on the name of any relatives or family members, home address, phone numbers, or any contact information.  In ED, hemodynamically stable. She was pan-scanned. no evidence of new stroke, trauma, or infection.  WBC 19K  UA +ve  CTH showed old stroke in the territory of the Rt MCA. The patient was found to be febrile, with elevated WBC, and lactate suggesting toxic metabolic etiology for her current level of consciousness.    Admit for further management and workup         PAST MEDICAL & SURGICAL HISTORY:      REVIEW OF SYSTEMS: Pt unable to offer    MEDICATIONS  (STANDING):  acetaminophen  Suppository .. 325 milliGRAM(s) Rectal once  ascorbic acid 500 milliGRAM(s) Oral daily  baclofen 5 milliGRAM(s) Oral every 12 hours  enoxaparin Injectable 40 milliGRAM(s) SubCutaneous every 24 hours  levETIRAcetam  Solution 1000 milliGRAM(s) Oral two times a day  LORazepam   Injectable 2 milliGRAM(s) IV Push once  multivitamin/minerals 1 Tablet(s) Oral daily  valproic  acid Syrup 500 milliGRAM(s) Oral three times a day  vitamin A &amp; D Ointment 1 Application(s) Topical two times a day  zinc sulfate 220 milliGRAM(s) Oral daily    MEDICATIONS  (PRN):  acetaminophen     Tablet .. 650 milliGRAM(s) Oral every 6 hours PRN Moderate Pain (4 - 6)  morphine  IR 15 milliGRAM(s) Oral every 6 hours PRN Severe Pain (7 - 10)  oxycodone    5 mG/acetaminophen 325 mG 1 Tablet(s) Oral every 6 hours PRN Severe Pain (7 - 10)      Allergies  Allergy Status Unknown  Intolerances      SOCIAL HISTORY:   From home     FAMILY HISTORY:    No pertinent family hx     PHYSICAL EXAM:  Vital Signs Last 24 Hrs  T(C): 36.8 (15 Mar 2023 05:37), Max: 36.8 (15 Mar 2023 05:37)  T(F): 98.2 (15 Mar 2023 05:37), Max: 98.2 (15 Mar 2023 05:37)  HR: 95 (15 Mar 2023 05:37) (95 - 97)  BP: 103/63 (15 Mar 2023 05:37) (100/62 - 121/77)  BP(mean): --  RR: 18 (15 Mar 2023 05:37) (15 - 18)  SpO2: 99% (15 Mar 2023 05:37) (96% - 100%)    Parameters below as of 15 Mar 2023 05:37  Patient On (Oxygen Delivery Method): room air    CONSTITUTIONAL: No acute distress, asleep this AM   HEENT: Atraumatic, normocephalic, EOMI, moist mucous membranes  PULMONARY: Clear to auscultation bilaterally  CARDIOVASCULAR: Regular rate and rhythm  GASTROINTESTINAL: Soft, non-tender, non-distended; bowel sounds present  MUSCULOSKELETAL: no edema, left foot tenderness to palpation  NEUROLOGY: non-focal  SKIN: warm and dry, deep tissue injury b/l ears, stage 3 sacral ulcer       LABS/ CULTURES/ RADIOLOGY:                        8.8    9.52  )-----------( 404      ( 15 Mar 2023 05:03 )             27.5       139  |  102  |  6   ----------------------------<  72      [03-15-23 @ 05:03]  4.1   |  26  |  <0.5        Ca     8.7     [03-15-23 @ 05:03]      Mg     2.1     [03-15-23 @ 05:03]    TPro  4.8  /  Alb  2.4  /  TBili  <0.2  /  DBili  x   /  AST  9   /  ALT  <5  /  AlkPhos  56  [03-15-23 @ 05:03]              Culture - Blood (collected 03-09-23 @ 11:46)  Source: .Blood None  Final Report (03-14-23 @ 23:00):    No Growth Final

## 2023-03-15 NOTE — PROGRESS NOTE ADULT - SUBJECTIVE AND OBJECTIVE BOX
JOSEPH OBRIEN 67y Female  MRN#: [id number]   Hospital Day: 11d    SUBJECTIVE  Patient is a 67y old Female who presents with a chief complaint of ams  Currently admitted to medicine with the primary diagnosis of UTI (urinary tract infection)      INTERVAL HPI AND OVERNIGHT EVENTS:  Patient was examined and seen at bedside. This morning she is resting comfortably in bed and reports no issues or overnight events.    OBJECTIVE  PAST MEDICAL & SURGICAL HISTORY    ALLERGIES:  Allergy Status Unknown    MEDICATIONS:  STANDING MEDICATIONS  acetaminophen  Suppository .. 325 milliGRAM(s) Rectal once  ascorbic acid 500 milliGRAM(s) Oral daily  baclofen 5 milliGRAM(s) Oral every 12 hours  enoxaparin Injectable 40 milliGRAM(s) SubCutaneous every 24 hours  levETIRAcetam  Solution 1000 milliGRAM(s) Oral two times a day  LORazepam   Injectable 2 milliGRAM(s) IV Push once  multivitamin/minerals 1 Tablet(s) Oral daily  valproic  acid Syrup 500 milliGRAM(s) Oral three times a day  vitamin A &amp; D Ointment 1 Application(s) Topical two times a day  zinc sulfate 220 milliGRAM(s) Oral daily    PRN MEDICATIONS  acetaminophen     Tablet .. 650 milliGRAM(s) Oral every 6 hours PRN  morphine  IR 15 milliGRAM(s) Oral every 6 hours PRN  oxycodone    5 mG/acetaminophen 325 mG 1 Tablet(s) Oral every 6 hours PRN      VITAL SIGNS: Last 24 Hours  T(C): 37.2 (15 Mar 2023 12:05), Max: 37.2 (15 Mar 2023 12:05)  T(F): 99 (15 Mar 2023 12:05), Max: 99 (15 Mar 2023 12:05)  HR: 70 (15 Mar 2023 12:05) (70 - 96)  BP: 103/63 (15 Mar 2023 05:37) (100/62 - 103/63)  BP(mean): --  RR: 15 (15 Mar 2023 12:05) (15 - 18)  SpO2: 97% (15 Mar 2023 12:05) (96% - 99%)    LABS:                        8.8    9.52  )-----------( 404      ( 15 Mar 2023 05:03 )             27.5     03-15    139  |  102  |  6<L>  ----------------------------<  72  4.1   |  26  |  <0.5<L>    Ca    8.7      15 Mar 2023 05:03  Mg     2.1     03-15    TPro  4.8<L>  /  Alb  2.4<L>  /  TBili  <0.2  /  DBili  x   /  AST  9   /  ALT  <5  /  AlkPhos  56  03-15        RADIOLOGY:  < from: VA Duplex Lower Extrem Arterial, Bilat (03.12.23 @ 11:03) >  Impression:    Normal arterial flow in the bilateral lower extremities.    < end of copied text >    < from: CT Ankle No Cont, Left (03.11.23 @ 21:44) >  IMPRESSION:    Findings suggest an acute minimally displaced avulsion fracture at the   medial malleolus. Correlate with point tenderness.      < end of copied text >    PHYSICAL EXAM:  CONSTITUTIONAL: No acute distress, asleep this AM   HEENT: Atraumatic, normocephalic, EOMI, moist mucous membranes  PULMONARY: Clear to auscultation bilaterally  CARDIOVASCULAR: Regular rate and rhythm  GASTROINTESTINAL: Soft, non-tender, non-distended; bowel sounds present  MUSCULOSKELETAL: no edema, left foot tenderness to palpation  NEUROLOGY: non-focal  SKIN: warm and dry, deep tissue injury b/l ears, stage 3 sacral ulcer

## 2023-03-15 NOTE — PROGRESS NOTE ADULT - ASSESSMENT
66 y/o woman with PMH of Right MCA stroke with left sided weakness was brought to the ED via EMS. In the ED, she had altered mental status and sepsis.     1. Altered Mental Status likely due to Metabolic Encephalopathy from UTI, sepsis and seizure in the context of stroke with left hemiparesis   s/p course of abx for E. coli UTI  CT scan of head: no acute pathology  CTA of head/neck: no evidence of occlusion, aneurysm or stenosis  s/p EEG and video EEG  s/p rapid response for seizure  more awake and alert now per notes  per chart: at baseline she is alert, able to communicate and is oriented. Ambulation at baseline: uses a wheelchair. She was in a hospital in [facility] last fall and was lethargic for months   on Depakote 500 mg TID and Keppra 1000 mg BID  seizure precautions  neurology f/u appreciated: MRI of brain to rule out acute stroke (ordered)  behavioral health eval appreciated: pt does not have the capacity to choose a Health care agent    2. Left avulsion fracture of medial malleolus  evaluated by podiatry - no surgical intervention - WB with CAM boot  fall precautions  pain control    3. Sinus tachycardia   now resolved - attributed to pain  EKG reviewed  TSH WNL    4. Left cephalic vein thrombosis - warm compress    5. Anemia - normocytic  labs reviewed - Hgb in 9-10 range   monitor for bleeding  possibly due to frequent phlebotomy  CBC in AM  workup if continues to drop    6. DVT prophylaxis    full code  guarded prognosis       Progress Note Handoff  Pending (specify): MRI of brain -> if no acute pathology, then discharge planning to STR  Family discussion: pt with 2 daughters and 2 sons - med team has communicated with the daughters who are in agreement with STR on discharge  further conversations re: HCP can be done as outpt   Disposition: SNF

## 2023-03-15 NOTE — PROGRESS NOTE ADULT - SUBJECTIVE AND OBJECTIVE BOX
JOSEPH OBRIEN  67y Female    INTERVAL HPI/OVERNIGHT EVENTS:    pt lying in bed - tearful at times  no fever  has pain of left LE when touched  wanted to eat - PCA informed (this was at lunch time)    T(F): 99 (03-15-23 @ 12:05), Max: 99 (03-15-23 @ 12:05)  HR: 70 (03-15-23 @ 12:05) (70 - 96)  BP: 103/63 (03-15-23 @ 05:37) (100/62 - 103/63)  RR: 15 (03-15-23 @ 12:05) (15 - 18)  SpO2: 97% (03-15-23 @ 12:05) (96% - 99%) on RA  I&O's Summary    14 Mar 2023 07:01  -  15 Mar 2023 07:00  --------------------------------------------------------  IN: 0 mL / OUT: 400 mL / NET: -400 mL    15 Mar 2023 07:01  -  15 Mar 2023 16:58  --------------------------------------------------------  IN: 0 mL / OUT: 200 mL / NET: -200 mL      PHYSICAL EXAM:  GENERAL: NAD  HEAD:  Normocephalic  EYES:  conjunctiva and sclera clear  ENMT: Moist mucous membranes  NERVOUS SYSTEM:  Alert, awake, tearful at times, left hemiparesis  CHEST/LUNG: CTA b/l  HEART: Regular rate and rhythm  ABDOMEN: Soft, Nontender, Nondistended; Bowel sounds present  EXTREMITIES:   left lower leg/ankle with edema, tenderness  SKIN: warm, dry    Consultant(s) Notes Reviewed:  [x ] YES  [ ] NO  Care Discussed with Consultants/Other Providers [ x] YES  [ ] NO    MEDICATIONS  (STANDING):  acetaminophen  Suppository .. 325 milliGRAM(s) Rectal once  ascorbic acid 500 milliGRAM(s) Oral daily  baclofen 5 milliGRAM(s) Oral every 12 hours  enoxaparin Injectable 40 milliGRAM(s) SubCutaneous every 24 hours  levETIRAcetam  Solution 1000 milliGRAM(s) Oral two times a day  LORazepam   Injectable 2 milliGRAM(s) IV Push once  multivitamin/minerals 1 Tablet(s) Oral daily  valproic  acid Syrup 500 milliGRAM(s) Oral three times a day  vitamin A &amp; D Ointment 1 Application(s) Topical two times a day  zinc sulfate 220 milliGRAM(s) Oral daily    MEDICATIONS  (PRN):  acetaminophen     Tablet .. 650 milliGRAM(s) Oral every 6 hours PRN Moderate Pain (4 - 6)  morphine  IR 15 milliGRAM(s) Oral every 6 hours PRN Severe Pain (7 - 10)  oxycodone    5 mG/acetaminophen 325 mG 1 Tablet(s) Oral every 6 hours PRN Severe Pain (7 - 10)      LABS:                        8.8    9.52  )-----------( 404      ( 15 Mar 2023 05:03 )             27.5     03-15    139  |  102  |  6<L>  ----------------------------<  72  4.1   |  26  |  <0.5<L>    Ca    8.7      15 Mar 2023 05:03  Mg     2.1     03-15    TPro  4.8<L>  /  Alb  2.4<L>  /  TBili  <0.2  /  DBili  x   /  AST  9   /  ALT  <5  /  AlkPhos  56  03-15          RADIOLOGY & ADDITIONAL TESTS:    Imaging or report Personally Reviewed:  [x ] YES  [ ] NO    < from: Xray Shoulder 2 Views, Left (03.12.23 @ 13:11) >  FINDINGS/  IMPRESSION:    No acute displaced fracture or dislocation. Moderate degenerative changes   of the acromioclavicular and glenohumeral joints.    < end of copied text >      < from: Xray Elbow AP + Lateral, Left (03.12.23 @ 13:10) >  FINDINGS/  IMPRESSION:    No acute displaced fracture or dislocation. Mild degenerative changes in   the elbow joint. Suboptimal lateral view limits evaluation for effusion;   no large effusion identified.    < end of copied text >      < from: VA Duplex Lower Extrem Arterial, Bilat (03.12.23 @ 11:03) >  Impression:    Normal arterial flow in the bilateral lower extremities.      < end of copied text >      < from: CT Ankle No Cont, Left (03.11.23 @ 21:44) >    IMPRESSION:    Findings suggest an acute minimally displaced avulsion fracture at the   medial malleolus. Correlate with point tenderness.      < end of copied text >      < from: VA Duplex Upper Ext Vein Scan, Left (03.10.23 @ 20:50) >  Impression:    No DVT however there is superficial thrombosis noted in the left cephalic   vein    < end of copied text >      < from: Xray Chest 1 View-PORTABLE IMMEDIATE (Xray Chest 1 View-PORTABLE IMMEDIATE .) (03.08.23 @ 06:46) >  Impression:    No radiographic evidence of acute cardiopulmonary disease.      < end of copied text >      < from: CT Chest w/ IV Cont (03.04.23 @ 03:46) >  IMPRESSION:      4 mm calculus in the left ureter without evidence of hydroureter or   hydronephrosis.    Underdistended urinary bladder limits evaluation of wall thickness, may   correlate with urinalysis.    No evidence of acute traumatic injury.    Nonspecific presacral edema.      < end of copied text >      < from: CT Head No Cont (03.04.23 @ 02:04) >  Impression:      No evidence of intracranial hemorrhage, territorial infarct, or mass   effect.    Partial opacification of bilateral mastoid air cells    < end of copied text >      < from: CT Angio Head w/ IV Cont (03.04.23 @ 03:44) >  IMPRESSION:    CTA HEAD:  No evidence of occlusion or aneurysm.    Diminutive caliber of the right M1 MCA.    CTA NECK:  No evidence of carotid or vertebral artery stenosis.      < end of copied text >        < from: TTE Echo Complete w/o Contrast w/ Doppler (03.10.23 @ 11:16) >  Summary:   1. Technically difficult study.   2. Normal global left ventricular systolic function.   3. Left ventricular ejection fraction,by visual estimation, is 55 to   60%.   4. Normal right ventricular size and function.   5. No hemodynamically significant valvular abnormality.    < end of copied text >      Case discussed with residents and RN on rounds today

## 2023-03-15 NOTE — BH CONSULTATION LIAISON ASSESSMENT NOTE - NSBHCONSULTFOLLOWAFTERCARE_PSY_A_CORE FT
Patient can continue with PCP, or alternative treatment to [facility] outpatient located at [address] [place] [address], [id number]; [phone]

## 2023-03-15 NOTE — BH CONSULTATION LIAISON ASSESSMENT NOTE - CURRENT MEDICATION
MEDICATIONS  (STANDING):  acetaminophen  Suppository .. 325 milliGRAM(s) Rectal once  ascorbic acid 500 milliGRAM(s) Oral daily  baclofen 5 milliGRAM(s) Oral every 12 hours  enoxaparin Injectable 40 milliGRAM(s) SubCutaneous every 24 hours  levETIRAcetam  Solution 1000 milliGRAM(s) Oral two times a day  LORazepam   Injectable 2 milliGRAM(s) IV Push once  multivitamin/minerals 1 Tablet(s) Oral daily  valproic  acid Syrup 500 milliGRAM(s) Oral three times a day  vitamin A &amp; D Ointment 1 Application(s) Topical two times a day  zinc sulfate 220 milliGRAM(s) Oral daily    MEDICATIONS  (PRN):  acetaminophen     Tablet .. 650 milliGRAM(s) Oral every 6 hours PRN Moderate Pain (4 - 6)  morphine  IR 15 milliGRAM(s) Oral every 6 hours PRN Severe Pain (7 - 10)  oxycodone    5 mG/acetaminophen 325 mG 1 Tablet(s) Oral every 6 hours PRN Severe Pain (7 - 10)

## 2023-03-15 NOTE — BH CONSULTATION LIAISON ASSESSMENT NOTE - NSBHCHARTREVIEWINVESTIGATE_PSY_A_CORE FT
< from: 12 Lead ECG (03.11.23 @ 18:18) >      Ventricular Rate 116 BPM    Atrial Rate 116 BPM    P-R Interval 124 ms    QRS Duration 64 ms    Q-T Interval 330 ms    QTC Calculation(Bazett) 458 ms    P Axis 60 degrees    R Axis 8 degrees    T Axis 58 degrees    Diagnosis Line Sinus tachycardia  Nonspecific T wave abnormality  Abnormal ECG    Confirmed by Balaji Ruiz ([id number]) on 3/13/2023 2:11:48 PM    < end of copied text >

## 2023-03-15 NOTE — BH CONSULTATION LIAISON ASSESSMENT NOTE - NSBHCHARTREVIEWVS_PSY_A_CORE FT
Vital Signs Last 24 Hrs  T(C): 36.8 (15 Mar 2023 05:37), Max: 36.8 (15 Mar 2023 05:37)  T(F): 98.2 (15 Mar 2023 05:37), Max: 98.2 (15 Mar 2023 05:37)  HR: 95 (15 Mar 2023 05:37) (95 - 97)  BP: 103/63 (15 Mar 2023 05:37) (100/62 - 121/77)  BP(mean): --  RR: 18 (15 Mar 2023 05:37) (15 - 18)  SpO2: 99% (15 Mar 2023 05:37) (96% - 100%)    Parameters below as of 15 Mar 2023 05:37  Patient On (Oxygen Delivery Method): room air

## 2023-03-15 NOTE — BH CONSULTATION LIAISON ASSESSMENT NOTE - HPI (INCLUDE ILLNESS QUALITY, SEVERITY, DURATION, TIMING, CONTEXT, MODIFYING FACTORS, ASSOCIATED SIGNS AND SYMPTOMS)
67 year old female, single, retired, mother of four children (2 daughters, and 2 sons) currently residing in a house, where her older daughter resides, with no prior psychiatric history, no prior inpatient psychiatry admission or history of suicidal ideation; reported history of stroke and related seizure admitted for AMS currently being managed for Sepsis with notable abnormal EEG, psychiatry is consulted for capacity to participate in choosing a health care proxy, at this time, family is unclear.     On evaluation, a lethargic patient is seen in bed, able to respond to verbal stimuli, she recognizes her name, she knows she is in a hospital, but otherwise, she is not be able to provide further information such as why she is admitted, what she is being treated for. She is tearful, with notable constricted affect. Upon inquiring about her mood, patient suddenly began to cry.  She knows her daughters previously came to the hospital, but unaware when. Upon inquiring her understanding about a health care proxy, despite explanation provided, patient was not able to communicate her understanding, however upon inquiring who she would like to be on the document, patient stated "both daughters." Further questions regarding why the two daughters are chosen, whether there has been previous conversation with them, patient could not provide any answer.  Other than naming her two daughters (Dominga and Fer), patient could explain the reasoning for her choice, he, her understanding for choosing these two individuals. She is also experiencing severe impairment in concentration, difficulty expressing her thought, slow to answer questions, often stops in the middle of her statements despite the statements being short. However, there is no psychosis, no manic symptoms noted. She is not expressing any suicidal or homicidal ideation.     Collateral information obtained from this patient's daughter whose name is the only listed in the system, as per phone conversation with her, she does not have the phone number of the other children.   As per my conversation with Ms. Dominga Naqvi, she has lived with her mom for many years and confirmed that patient has no prior psychiatric history. Patient's current behaviors have been ongoing for the past couple of months, progressively getting worse. In the past patient was able to make her own decision respectively. Ms. Orr did admit there is disagreement between the family, however, there is likely there may be a turn around. She provides no history of suicide attempt or ideation by her mother. Ms. Orr went on to say there is no psychiatric history in the family.

## 2023-03-15 NOTE — PROGRESS NOTE ADULT - ASSESSMENT
Pt is a 67 year old F brought to ED via EMS who per triage note found pt on street. Per triage note, daughter told EMS that pt c/o generalized pain, however no family present on admission  Pt admitted to medicine for further evaluation and treatment.    #Altered Mental Status likely due to Metabolic Encephalopathy from UTI associated sepsis  #Hx of R MCA stroke  - In ED, hemodynamically stable. She was pan-scanned. no evidence of new stroke, trauma, or infection.  - WBC 19K: spiked on 3/8 i/s/o of seizure like activity.   - UA +ve; S/p cefepime and vanc in the ED and Rocephin, follow blood and urine cx   - CTH showed old stroke in the territory of the Rt MCA.   - rEEG consistent with diffuse cerebral electrophysiological dysfunction- video EEG Findings consistent with right hemispheric electrocerebral dysfunction   secondary to nonspecific etiology  - Neuro recs appreciated, c/w depakote and keppra  - Pt was given Ativan 2 mg IV once on admission, Keppra 1g IV once, then c/w 1g BID  - Seizure and fall precautions  - TSH: 2.40 WNL  - NC brain MRI pending,  - 3/15 added baclofen 5mg BID PRN for muscle spasm    #LUE swelling  - pain in right arm around IV site, swollen, tender  - LUE duplex negative for DVT    #Left foot pain - tender to palpation  - palpable pulses, ROM intact, sensation intact  - CT foot Findings suggest an acute minimally displaced avulsion fracture at the medial malleolus.  - Podiatry recs appreciated    #Troponemia on admission  - troponin stable at <.01  - Echo 3/10 EF 55-60%    #Anemia  - stable    #Hyperkalemia  - resolved      Diet: thin liquid; soft & bite-sized  Activity: IAT- PT recc. PEPE  DVT Prophylaxis: lovenox  GI Prophylaxis: not indication  Code Status: full  Pending: Brain MRI NC

## 2023-03-15 NOTE — BH CONSULTATION LIAISON ASSESSMENT NOTE - OTHER
Notable psychomotor retardation Uncooperative due to delirium Affected by presence of delirium Unable to express

## 2023-03-15 NOTE — BH CONSULTATION LIAISON ASSESSMENT NOTE - SUMMARY
67 year old female, single, retired, mother of four children (2 daughters, and 2 sons) currently residing in a house, where her older daughter resides, with no prior psychiatric history, no prior inpatient psychiatry admission or history of suicidal ideation; reported history of stroke and related seizure admitted for AMS currently being managed for Sepsis with notable abnormal EEG, psychiatry is consulted for capacity to participate in choosing a health care proxy, at this time, family is unclear.    On evaluation, this patient is observed to be tearful with notable difficulty expressing her thoughts. Although this patient is able to state she wants both of her daughters to be the health care proxy, she is unable to provide further information regarding her understanding of a health care proxy, the reason she choses both daughters and what are her expectations from her daughters.  She is observed to have difficulty expressing herself, and many of her statements were illogical; at times she provided no answer, indication of poor understanding and appreciation of the questions, with notable difficulties rationalizing her thought process. Based on this evaluation, at this time she lacks capacity to participate in choosing her health care proxy. She is tearful, with constricted affect. However no suicidal or homicidal ideation expressed. She is not observed to be psychotic or responding to internal stimuli. Decision capacity is time and task specific, however, if mental status is improved due to active delirium, please reconsult psychiatry service.   This case is discussed with patient's daughter, Ms. Dominga Naqvi whose name is the only one listed in the chart.  Ms. Orr herself did not have the phone numbers of other family members.     Ongoing delirium work up as per primary team  History of seizure, neurology is on board  For now we would not recommend initiation of psychotropic medication, but referral to [facility] outpatient psychiatry service located at [address] [place] [address], [phone]; [phone] can be provided for support.

## 2023-03-16 LAB
ALBUMIN SERPL ELPH-MCNC: 2.6 G/DL — LOW (ref 3.5–5.2)
ALP SERPL-CCNC: 63 U/L — SIGNIFICANT CHANGE UP (ref 30–115)
ALT FLD-CCNC: 5 U/L — SIGNIFICANT CHANGE UP (ref 0–41)
ANION GAP SERPL CALC-SCNC: 11 MMOL/L — SIGNIFICANT CHANGE UP (ref 7–14)
AST SERPL-CCNC: 11 U/L — SIGNIFICANT CHANGE UP (ref 0–41)
BASOPHILS # BLD AUTO: 0.04 K/UL — SIGNIFICANT CHANGE UP (ref 0–0.2)
BASOPHILS NFR BLD AUTO: 0.5 % — SIGNIFICANT CHANGE UP (ref 0–1)
BILIRUB SERPL-MCNC: <0.2 MG/DL — SIGNIFICANT CHANGE UP (ref 0.2–1.2)
BUN SERPL-MCNC: 6 MG/DL — LOW (ref 10–20)
CALCIUM SERPL-MCNC: 8.9 MG/DL — SIGNIFICANT CHANGE UP (ref 8.4–10.4)
CHLORIDE SERPL-SCNC: 105 MMOL/L — SIGNIFICANT CHANGE UP (ref 98–110)
CO2 SERPL-SCNC: 27 MMOL/L — SIGNIFICANT CHANGE UP (ref 17–32)
CREAT SERPL-MCNC: <0.5 MG/DL — LOW (ref 0.7–1.5)
EGFR: 117 ML/MIN/1.73M2 — SIGNIFICANT CHANGE UP
EOSINOPHIL # BLD AUTO: 0.14 K/UL — SIGNIFICANT CHANGE UP (ref 0–0.7)
EOSINOPHIL NFR BLD AUTO: 1.6 % — SIGNIFICANT CHANGE UP (ref 0–8)
GLUCOSE SERPL-MCNC: 88 MG/DL — SIGNIFICANT CHANGE UP (ref 70–99)
HCT VFR BLD CALC: 30 % — LOW (ref 37–47)
HGB BLD-MCNC: 9.7 G/DL — LOW (ref 12–16)
IMM GRANULOCYTES NFR BLD AUTO: 0.5 % — HIGH (ref 0.1–0.3)
LYMPHOCYTES # BLD AUTO: 3.13 K/UL — SIGNIFICANT CHANGE UP (ref 1.2–3.4)
LYMPHOCYTES # BLD AUTO: 35.3 % — SIGNIFICANT CHANGE UP (ref 20.5–51.1)
MAGNESIUM SERPL-MCNC: 1.8 MG/DL — SIGNIFICANT CHANGE UP (ref 1.8–2.4)
MCHC RBC-ENTMCNC: 28.9 PG — SIGNIFICANT CHANGE UP (ref 27–31)
MCHC RBC-ENTMCNC: 32.3 G/DL — SIGNIFICANT CHANGE UP (ref 32–37)
MCV RBC AUTO: 89.3 FL — SIGNIFICANT CHANGE UP (ref 81–99)
MONOCYTES # BLD AUTO: 0.77 K/UL — HIGH (ref 0.1–0.6)
MONOCYTES NFR BLD AUTO: 8.7 % — SIGNIFICANT CHANGE UP (ref 1.7–9.3)
NEUTROPHILS # BLD AUTO: 4.75 K/UL — SIGNIFICANT CHANGE UP (ref 1.4–6.5)
NEUTROPHILS NFR BLD AUTO: 53.4 % — SIGNIFICANT CHANGE UP (ref 42.2–75.2)
NRBC # BLD: 0 /100 WBCS — SIGNIFICANT CHANGE UP (ref 0–0)
PLATELET # BLD AUTO: 475 K/UL — HIGH (ref 130–400)
POTASSIUM SERPL-MCNC: 4.2 MMOL/L — SIGNIFICANT CHANGE UP (ref 3.5–5)
POTASSIUM SERPL-SCNC: 4.2 MMOL/L — SIGNIFICANT CHANGE UP (ref 3.5–5)
PROT SERPL-MCNC: 5.4 G/DL — LOW (ref 6–8)
RBC # BLD: 3.36 M/UL — LOW (ref 4.2–5.4)
RBC # FLD: 13.4 % — SIGNIFICANT CHANGE UP (ref 11.5–14.5)
SODIUM SERPL-SCNC: 143 MMOL/L — SIGNIFICANT CHANGE UP (ref 135–146)
WBC # BLD: 8.87 K/UL — SIGNIFICANT CHANGE UP (ref 4.8–10.8)
WBC # FLD AUTO: 8.87 K/UL — SIGNIFICANT CHANGE UP (ref 4.8–10.8)

## 2023-03-16 PROCEDURE — 72141 MRI NECK SPINE W/O DYE: CPT | Mod: 26

## 2023-03-16 PROCEDURE — 99233 SBSQ HOSP IP/OBS HIGH 50: CPT

## 2023-03-16 PROCEDURE — 99232 SBSQ HOSP IP/OBS MODERATE 35: CPT | Mod: GC

## 2023-03-16 RX ORDER — ASPIRIN/CALCIUM CARB/MAGNESIUM 324 MG
81 TABLET ORAL DAILY
Refills: 0 | Status: DISCONTINUED | OUTPATIENT
Start: 2023-03-16 | End: 2023-04-18

## 2023-03-16 RX ADMIN — Medication 1 APPLICATION(S): at 17:58

## 2023-03-16 RX ADMIN — Medication 5 MILLIGRAM(S): at 18:28

## 2023-03-16 RX ADMIN — Medication 500 MILLIGRAM(S): at 21:42

## 2023-03-16 RX ADMIN — LEVETIRACETAM 1000 MILLIGRAM(S): 250 TABLET, FILM COATED ORAL at 05:11

## 2023-03-16 RX ADMIN — ZINC SULFATE TAB 220 MG (50 MG ZINC EQUIVALENT) 220 MILLIGRAM(S): 220 (50 ZN) TAB at 12:10

## 2023-03-16 RX ADMIN — LEVETIRACETAM 1000 MILLIGRAM(S): 250 TABLET, FILM COATED ORAL at 18:28

## 2023-03-16 RX ADMIN — Medication 500 MILLIGRAM(S): at 12:10

## 2023-03-16 RX ADMIN — ENOXAPARIN SODIUM 40 MILLIGRAM(S): 100 INJECTION SUBCUTANEOUS at 21:41

## 2023-03-16 RX ADMIN — Medication 1 TABLET(S): at 12:10

## 2023-03-16 RX ADMIN — Medication 500 MILLIGRAM(S): at 17:57

## 2023-03-16 RX ADMIN — Medication 81 MILLIGRAM(S): at 21:41

## 2023-03-16 RX ADMIN — Medication 500 MILLIGRAM(S): at 05:11

## 2023-03-16 RX ADMIN — Medication 5 MILLIGRAM(S): at 05:11

## 2023-03-16 RX ADMIN — Medication 1 APPLICATION(S): at 05:15

## 2023-03-16 NOTE — PROGRESS NOTE ADULT - SUBJECTIVE AND OBJECTIVE BOX
JOSEPH OBRIEN  67y Female    INTERVAL HPI/OVERNIGHT EVENTS:    lying in bed - not communicating much today  no fever    T(F): 98.1 (03-16-23 @ 05:21), Max: 98.9 (03-15-23 @ 20:00)  HR: 86 (03-16-23 @ 05:21) (65 - 86)  BP: 105/62 (03-16-23 @ 05:21) (105/62 - 142/65)  RR: 18 (03-16-23 @ 05:21) (18 - 18)  SpO2: 98% (03-16-23 @ 05:21) (96% - 98%) on RA    I&O's Summary    15 Mar 2023 07:01  -  16 Mar 2023 07:00  --------------------------------------------------------  IN: 0 mL / OUT: 600 mL / NET: -600 mL    PHYSICAL EXAM:  GENERAL: NAD  HEAD:  Normocephalic  EYES:  conjunctiva and sclera clear  ENMT: Mouth closed  NERVOUS SYSTEM:  Alert, awake, not speaking much now, left hemiparesis  CHEST/LUNG: shallow BS b/l  HEART: Regular rate and rhythm  ABDOMEN: Soft, Nontender, Nondistended  EXTREMITIES:  left LE with swelling of ankle, nontender today  SKIN: warm, dry    Consultant(s) Notes Reviewed:  [x ] YES  [ ] NO  Care Discussed with Consultants/Other Providers [ x] YES  [ ] NO    MEDICATIONS  (STANDING):  acetaminophen  Suppository .. 325 milliGRAM(s) Rectal once  ascorbic acid 500 milliGRAM(s) Oral daily  baclofen 5 milliGRAM(s) Oral every 12 hours  enoxaparin Injectable 40 milliGRAM(s) SubCutaneous every 24 hours  levETIRAcetam  Solution 1000 milliGRAM(s) Oral two times a day  LORazepam   Injectable 2 milliGRAM(s) IV Push once  multivitamin/minerals 1 Tablet(s) Oral daily  valproic  acid Syrup 500 milliGRAM(s) Oral three times a day  vitamin A &amp; D Ointment 1 Application(s) Topical two times a day  zinc sulfate 220 milliGRAM(s) Oral daily    MEDICATIONS  (PRN):  acetaminophen     Tablet .. 650 milliGRAM(s) Oral every 6 hours PRN Moderate Pain (4 - 6)  morphine  IR 15 milliGRAM(s) Oral every 6 hours PRN Severe Pain (7 - 10)  oxycodone    5 mG/acetaminophen 325 mG 1 Tablet(s) Oral every 6 hours PRN Severe Pain (7 - 10)      LABS:                        9.7    8.87  )-----------( 475      ( 16 Mar 2023 05:39 )             30.0     03-16    143  |  105  |  6<L>  ----------------------------<  88  4.2   |  27  |  <0.5<L>    Ca    8.9      16 Mar 2023 05:39  Mg     1.8     03-16    TPro  5.4<L>  /  Alb  2.6<L>  /  TBili  <0.2  /  DBili  x   /  AST  11  /  ALT  5   /  AlkPhos  63  03-16          RADIOLOGY & ADDITIONAL TESTS:    Imaging or report Personally Reviewed:  [x ] YES  [ ] NO    < from: MR Head No Cont (03.15.23 @ 17:20) >  IMPRESSION:    Small patchy subcortical infarct in the right frontal lobe.    Redemonstrated chronic infarct in the right MCA territory.    Mild chronic microvascular ischemic changes.    < end of copied text >      Case discussed with residents and RN on rounds today

## 2023-03-16 NOTE — PROGRESS NOTE ADULT - SUBJECTIVE AND OBJECTIVE BOX
INTERVAL HPI/OVERNIGHT EVENTS:  Patient seen and examined. She is sleeping and refused to participate in exam upon waking.    MEDICATIONS  (STANDING):  acetaminophen  Suppository .. 325 milliGRAM(s) Rectal once  ascorbic acid 500 milliGRAM(s) Oral daily  baclofen 5 milliGRAM(s) Oral every 12 hours  enoxaparin Injectable 40 milliGRAM(s) SubCutaneous every 24 hours  levETIRAcetam  Solution 1000 milliGRAM(s) Oral two times a day  LORazepam   Injectable 2 milliGRAM(s) IV Push once  multivitamin/minerals 1 Tablet(s) Oral daily  valproic  acid Syrup 500 milliGRAM(s) Oral three times a day  vitamin A &amp; D Ointment 1 Application(s) Topical two times a day  zinc sulfate 220 milliGRAM(s) Oral daily    MEDICATIONS  (PRN):  acetaminophen     Tablet .. 650 milliGRAM(s) Oral every 6 hours PRN Moderate Pain (4 - 6)  morphine  IR 15 milliGRAM(s) Oral every 6 hours PRN Severe Pain (7 - 10)  oxycodone    5 mG/acetaminophen 325 mG 1 Tablet(s) Oral every 6 hours PRN Severe Pain (7 - 10)      Allergies    Allergy Status Unknown    Intolerances        Vital Signs Last 24 Hrs  T(C): 36.7 (16 Mar 2023 05:21), Max: 37.2 (15 Mar 2023 20:00)  T(F): 98.1 (16 Mar 2023 05:21), Max: 98.9 (15 Mar 2023 20:00)  HR: 86 (16 Mar 2023 05:21) (65 - 86)  BP: 105/62 (16 Mar 2023 05:21) (105/62 - 142/65)  BP(mean): --  RR: 18 (16 Mar 2023 05:21) (18 - 18)  SpO2: 98% (16 Mar 2023 05:21) (96% - 98%)    Parameters below as of 16 Mar 2023 05:21  Patient On (Oxygen Delivery Method): room air        Physical exam: Limited exam today as patient non-co operative  Mental status: Awake, alert  No dysarthria, no aphasia.  Cranial nerves: . L sided facial weakness.   Reflexes: 3+ in B/L biceps, brachioradialis.    LABS:                        9.7    8.87  )-----------( 475      ( 16 Mar 2023 05:39 )             30.0     03-16    143  |  105  |  6<L>  ----------------------------<  88  4.2   |  27  |  <0.5<L>    Ca    8.9      16 Mar 2023 05:39  Mg     1.8     03-16    TPro  5.4<L>  /  Alb  2.6<L>  /  TBili  <0.2  /  DBili  x   /  AST  11  /  ALT  5   /  AlkPhos  63  03-16          RADIOLOGY & ADDITIONAL TESTS:  < from: MR Head No Cont (03.15.23 @ 17:20) >  Small patchy subcortical infarct in the right frontal lobe.

## 2023-03-16 NOTE — PROGRESS NOTE ADULT - ASSESSMENT
Pt is a 67 year old F brought to ED via EMS who per triage note found pt on street. Per triage note, daughter told EMS that pt c/o generalized pain, however no family present on admission. Pt admitted to medicine for further evaluation and treatment.    #Altered Mental Status likely due to Metabolic Encephalopathy from UTI associated sepsis  #Hx of R MCA stroke  - In ED, hemodynamically stable. She was pan-scanned. no evidence of new stroke, trauma, or infection.  - WBC 19K: spiked on 3/8 i/s/o of seizure like activity.   - UA +ve; S/p cefepime and vanc in the ED and Rocephin, follow blood and urine cx   - CTH showed old stroke in the territory of the Rt MCA.   - rEEG consistent with diffuse cerebral electrophysiological dysfunction- video EEG Findings consistent with right hemispheric electrocerebral dysfunction   secondary to nonspecific etiology  - Neuro recs appreciated, c/w depakote and keppra  - Pt was given Ativan 2 mg IV once on admission, Keppra 1g IV once, then c/w 1g BID  - Seizure and fall precautions  - TSH: 2.40 WNL  - NC brain MRI + for new subcortical infarct in R frontal lobe- neuro FU pending  - 3/15 added baclofen 5mg BID PRN for muscle spasm    #LUE swelling  - pain in right arm around IV site, swollen, tender  - LUE duplex negative for DVT    #Left foot pain - tender to palpation  - palpable pulses, ROM intact, sensation intact  - CT foot Findings suggest an acute minimally displaced avulsion fracture at the medial malleolus.  - Podiatry recs appreciated    #Troponemia on admission  - troponin stable at <.01  - Echo 3/10 EF 55-60%    #Anemia  - stable    #Hyperkalemia  - resolved      Diet: thin liquid; soft & bite-sized  Activity: IAT- PT recc. PEPE  DVT Prophylaxis: lovenox  GI Prophylaxis: not indication  Code Status: full  Pending: Neuro FU given new stroke on MRI

## 2023-03-16 NOTE — PROGRESS NOTE ADULT - SUBJECTIVE AND OBJECTIVE BOX
JOSEPH OBRIEN 67y Female  MRN#: [id number]   Hospital Day: 12d    SUBJECTIVE  Patient is a 67y old Female who presents with a chief complaint of ams   Currently admitted to medicine with the primary diagnosis of UTI (urinary tract infection)      INTERVAL HPI AND OVERNIGHT EVENTS:  Patient was examined and seen at bedside. This morning she is resting comfortably in bed and reports no issues or overnight events.    OBJECTIVE  PAST MEDICAL & SURGICAL HISTORY    ALLERGIES:  Allergy Status Unknown    MEDICATIONS:  STANDING MEDICATIONS  acetaminophen  Suppository .. 325 milliGRAM(s) Rectal once  ascorbic acid 500 milliGRAM(s) Oral daily  baclofen 5 milliGRAM(s) Oral every 12 hours  enoxaparin Injectable 40 milliGRAM(s) SubCutaneous every 24 hours  levETIRAcetam  Solution 1000 milliGRAM(s) Oral two times a day  LORazepam   Injectable 2 milliGRAM(s) IV Push once  multivitamin/minerals 1 Tablet(s) Oral daily  valproic  acid Syrup 500 milliGRAM(s) Oral three times a day  vitamin A &amp; D Ointment 1 Application(s) Topical two times a day  zinc sulfate 220 milliGRAM(s) Oral daily    PRN MEDICATIONS  acetaminophen     Tablet .. 650 milliGRAM(s) Oral every 6 hours PRN  morphine  IR 15 milliGRAM(s) Oral every 6 hours PRN  oxycodone    5 mG/acetaminophen 325 mG 1 Tablet(s) Oral every 6 hours PRN      VITAL SIGNS: Last 24 Hours  T(C): 36.7 (16 Mar 2023 05:21), Max: 37.2 (15 Mar 2023 12:05)  T(F): 98.1 (16 Mar 2023 05:21), Max: 99 (15 Mar 2023 12:05)  HR: 86 (16 Mar 2023 05:21) (65 - 86)  BP: 105/62 (16 Mar 2023 05:21) (105/62 - 142/65)  BP(mean): --  RR: 18 (16 Mar 2023 05:21) (15 - 18)  SpO2: 98% (16 Mar 2023 05:21) (96% - 98%)    LABS:                        9.7    8.87  )-----------( 475      ( 16 Mar 2023 05:39 )             30.0     03-16    143  |  105  |  6<L>  ----------------------------<  88  4.2   |  27  |  <0.5<L>    Ca    8.9      16 Mar 2023 05:39  Mg     1.8     03-16    TPro  5.4<L>  /  Alb  2.6<L>  /  TBili  <0.2  /  DBili  x   /  AST  11  /  ALT  5   /  AlkPhos  63  03-16        RADIOLOGY:  < from: MR Head No Cont (03.15.23 @ 17:20) >  IMPRESSION:    Small patchy subcortical infarct in the right frontal lobe.    Redemonstrated chronic infarct in the right MCA territory.    Mild chronic microvascular ischemic changes.    Communication: The summary of above findings were discussed with readback   confirmation with Dr. Bundy by radiologist Dr. Joseph on 3/16/2023 at 9:32   AM.    --- End of Report ---            BRUNO JOSEPH MD; Attending Radiologist  This document has been electronically signed. Mar 16 2023  9:32AM    < end of copied text >      PHYSICAL EXAM:  CONSTITUTIONAL: No acute distress, asleep this AM   HEENT: Atraumatic, normocephalic, EOMI, moist mucous membranes  PULMONARY: Clear to auscultation bilaterally  CARDIOVASCULAR: Regular rate and rhythm  GASTROINTESTINAL: Soft, non-tender, non-distended; bowel sounds present  MUSCULOSKELETAL: no edema, left foot tenderness to palpation  NEUROLOGY: non-focal  SKIN: warm and dry, deep tissue injury b/l ears, stage 3 sacral ulcer

## 2023-03-16 NOTE — PROGRESS NOTE ADULT - ASSESSMENT
67 year old F with unclear PMHx, outpatient medication review shows the patient is on low dose Lamictal and Depakote in addition to Keppra (possible mood disorder/ seizures), currently admitted u/c of med team for encephalopathy. HEr MRi of brain showing subcortical infarct within same M1 territory. The casue of stroke is most likely hypotension as she has a diminutive Rt M1. However, MRI findings doesn't explain her B/L upper extremity spasticity. So, it is important to rule out cervical cord pathology.  Recommendations:  - Obtain MRI of cervical spine w/o contrast  - For stroke : If patient hasn't been on Aspirin can start ASA 81 mg daily. ( if she was complaint with ASA daily, in that case will switch to Plavix 75 mg daily).  - Avoid dehydration , hypotension.  - Workup: TTE, Lipid profile, HbA1c.   - For spasticity, continue Baclofen 5 mg BID  - Her foot pain seems to be due to neuropathy. Consider Gabapentin/Pregabalin. Caution needed when used with opioid.  - Continue AED at same dose: Keppra 1000 mg BID, Dapakote: 750 mg BID   67 year old F with unclear PMHx, outpatient medication review shows the patient is on low dose Lamictal and Depakote in addition to Keppra (possible mood disorder/ seizures), currently admitted u/c of med team for encephalopathy. HEr MRi of brain showing subcortical infarct within same M1 territory. The casue of stroke is most likely hypotension as she has a diminutive Rt M1. However, MRI findings doesn't explain her B/L upper extremity spasticity. So, it is important to rule out cervical cord pathology.  Recommendations:  - Obtain MRI of cervical spine w/o contrast  - For stroke : Continue ASA 81 and Add Plavix 75   - Avoid dehydration , hypotension.  - Workup: TTE, Lipid profile, HbA1c.   - For spasticity, continue Baclofen 5 mg BID  - Her foot pain seems to be due to neuropathy. Consider Gabapentin/Pregabalin. Caution needed when used with opioid.  - Continue AED at same dose: Keppra 1000 mg BID, Dapakote: 750 mg BID

## 2023-03-16 NOTE — PROGRESS NOTE ADULT - ASSESSMENT
68 y/o woman with PMH of Right MCA stroke with left sided weakness was brought to the ED via EMS. In the ED, she had altered mental status and sepsis.     1. Altered Mental Status likely due to Metabolic Encephalopathy from UTI, sepsis and seizure in the context of stroke with left hemiparesis   s/p course of abx for E. coli UTI  CT scan of head: no acute pathology  CTA of head/neck: no evidence of occlusion, aneurysm or stenosis  s/p EEG and video EEG  s/p rapid response for seizure  more awake and alert now per notes  per chart: at baseline she is alert, able to communicate and is oriented. Ambulation at baseline: uses a wheelchair. She was in a hospital in [facility] last fall and was lethargic for months   on Depakote 500 mg TID and Keppra 1000 mg BID  seizure precautions  MRI of brain: Small patchy subcortical infarct in the right frontal lobe.Redemonstrated chronic infarct in the right MCA territory.  Mild chronic microvascular ischemic changes.  Neuro f/u  Add ASA and statin  behavioral health eval appreciated: pt does not have the capacity to choose a Health care agent    2. Left avulsion fracture of medial malleolus  evaluated by podiatry - no surgical intervention - WB with CAM boot  fall precautions  pain control    3. Sinus tachycardia   now resolved - attributed to pain  EKG reviewed  TSH WNL    4. Left cephalic vein thrombosis - warm compress    5. Anemia - normocytic  labs reviewed - Hgb in 9-10 range and now stable  monitor for bleeding  possibly due to frequent phlebotomy  CBC in AM    6. DVT prophylaxis    full code  guarded prognosis       Progress Note Handoff  Pending (specify): neuro f/u - discharge planning to SNF once cleared by neurology  Family discussion: pt with 2 daughters and 2 sons - med team has communicated with the daughters who are in agreement with STR on discharge  further conversations re: HCP can be done as outpt   Disposition: SNF

## 2023-03-16 NOTE — PROGRESS NOTE ADULT - ATTENDING COMMENTS
I have personally seen and examined this patient on 3/16.  I have fully participated in the care of this patient.  I have reviewed all pertinent clinical information, including history, physical exam, plan and note. Patient is not cooperative with exam today. Right arm seems still spastic. MRI brain showed new area of stroke at the same vascular territory that she has stroke before with right M1 stenosis. Avoid dehydration. Add Plavix to ASA. Continue Baclofen. Obtain MRI cervical spine. Will follow.    I have reviewed all pertinent clinical information and reviewed all relevant imaging and diagnostic studies personally.  Recommendations as above.  Agree with above assessment except as noted.

## 2023-03-17 LAB
ALBUMIN SERPL ELPH-MCNC: 2.7 G/DL — LOW (ref 3.5–5.2)
ALP SERPL-CCNC: 59 U/L — SIGNIFICANT CHANGE UP (ref 30–115)
ALT FLD-CCNC: <5 U/L — SIGNIFICANT CHANGE UP (ref 0–41)
ANION GAP SERPL CALC-SCNC: 9 MMOL/L — SIGNIFICANT CHANGE UP (ref 7–14)
AST SERPL-CCNC: 11 U/L — SIGNIFICANT CHANGE UP (ref 0–41)
BASOPHILS # BLD AUTO: 0.03 K/UL — SIGNIFICANT CHANGE UP (ref 0–0.2)
BASOPHILS NFR BLD AUTO: 0.3 % — SIGNIFICANT CHANGE UP (ref 0–1)
BILIRUB SERPL-MCNC: <0.2 MG/DL — SIGNIFICANT CHANGE UP (ref 0.2–1.2)
BUN SERPL-MCNC: 7 MG/DL — LOW (ref 10–20)
CALCIUM SERPL-MCNC: 8.7 MG/DL — SIGNIFICANT CHANGE UP (ref 8.4–10.5)
CHLORIDE SERPL-SCNC: 100 MMOL/L — SIGNIFICANT CHANGE UP (ref 98–110)
CO2 SERPL-SCNC: 28 MMOL/L — SIGNIFICANT CHANGE UP (ref 17–32)
CREAT SERPL-MCNC: <0.5 MG/DL — LOW (ref 0.7–1.5)
EGFR: 117 ML/MIN/1.73M2 — SIGNIFICANT CHANGE UP
EOSINOPHIL # BLD AUTO: 0.12 K/UL — SIGNIFICANT CHANGE UP (ref 0–0.7)
EOSINOPHIL NFR BLD AUTO: 1.3 % — SIGNIFICANT CHANGE UP (ref 0–8)
GLUCOSE SERPL-MCNC: 118 MG/DL — HIGH (ref 70–99)
HCT VFR BLD CALC: 29.5 % — LOW (ref 37–47)
HGB BLD-MCNC: 9.4 G/DL — LOW (ref 12–16)
IMM GRANULOCYTES NFR BLD AUTO: 0.6 % — HIGH (ref 0.1–0.3)
LYMPHOCYTES # BLD AUTO: 2.29 K/UL — SIGNIFICANT CHANGE UP (ref 1.2–3.4)
LYMPHOCYTES # BLD AUTO: 24.5 % — SIGNIFICANT CHANGE UP (ref 20.5–51.1)
MAGNESIUM SERPL-MCNC: 1.9 MG/DL — SIGNIFICANT CHANGE UP (ref 1.8–2.4)
MCHC RBC-ENTMCNC: 28.7 PG — SIGNIFICANT CHANGE UP (ref 27–31)
MCHC RBC-ENTMCNC: 31.9 G/DL — LOW (ref 32–37)
MCV RBC AUTO: 89.9 FL — SIGNIFICANT CHANGE UP (ref 81–99)
MONOCYTES # BLD AUTO: 0.81 K/UL — HIGH (ref 0.1–0.6)
MONOCYTES NFR BLD AUTO: 8.7 % — SIGNIFICANT CHANGE UP (ref 1.7–9.3)
NEUTROPHILS # BLD AUTO: 6.05 K/UL — SIGNIFICANT CHANGE UP (ref 1.4–6.5)
NEUTROPHILS NFR BLD AUTO: 64.6 % — SIGNIFICANT CHANGE UP (ref 42.2–75.2)
NRBC # BLD: 0 /100 WBCS — SIGNIFICANT CHANGE UP (ref 0–0)
PLATELET # BLD AUTO: 441 K/UL — HIGH (ref 130–400)
POTASSIUM SERPL-MCNC: 4.3 MMOL/L — SIGNIFICANT CHANGE UP (ref 3.5–5)
POTASSIUM SERPL-SCNC: 4.3 MMOL/L — SIGNIFICANT CHANGE UP (ref 3.5–5)
PROT SERPL-MCNC: 5.5 G/DL — LOW (ref 6–8)
RBC # BLD: 3.28 M/UL — LOW (ref 4.2–5.4)
RBC # FLD: 13.5 % — SIGNIFICANT CHANGE UP (ref 11.5–14.5)
SODIUM SERPL-SCNC: 137 MMOL/L — SIGNIFICANT CHANGE UP (ref 135–146)
WBC # BLD: 9.36 K/UL — SIGNIFICANT CHANGE UP (ref 4.8–10.8)
WBC # FLD AUTO: 9.36 K/UL — SIGNIFICANT CHANGE UP (ref 4.8–10.8)

## 2023-03-17 PROCEDURE — 99232 SBSQ HOSP IP/OBS MODERATE 35: CPT

## 2023-03-17 RX ORDER — ATORVASTATIN CALCIUM 80 MG/1
40 TABLET, FILM COATED ORAL AT BEDTIME
Refills: 0 | Status: DISCONTINUED | OUTPATIENT
Start: 2023-03-17 | End: 2023-04-18

## 2023-03-17 RX ORDER — CLOPIDOGREL BISULFATE 75 MG/1
75 TABLET, FILM COATED ORAL DAILY
Refills: 0 | Status: COMPLETED | OUTPATIENT
Start: 2023-03-17 | End: 2023-04-06

## 2023-03-17 RX ADMIN — Medication 1 APPLICATION(S): at 17:39

## 2023-03-17 RX ADMIN — Medication 1 TABLET(S): at 11:20

## 2023-03-17 RX ADMIN — ENOXAPARIN SODIUM 40 MILLIGRAM(S): 100 INJECTION SUBCUTANEOUS at 21:19

## 2023-03-17 RX ADMIN — Medication 1 APPLICATION(S): at 06:23

## 2023-03-17 RX ADMIN — ATORVASTATIN CALCIUM 40 MILLIGRAM(S): 80 TABLET, FILM COATED ORAL at 21:19

## 2023-03-17 RX ADMIN — Medication 500 MILLIGRAM(S): at 21:19

## 2023-03-17 RX ADMIN — Medication 5 MILLIGRAM(S): at 17:38

## 2023-03-17 RX ADMIN — Medication 500 MILLIGRAM(S): at 11:20

## 2023-03-17 RX ADMIN — Medication 500 MILLIGRAM(S): at 05:47

## 2023-03-17 RX ADMIN — ZINC SULFATE TAB 220 MG (50 MG ZINC EQUIVALENT) 220 MILLIGRAM(S): 220 (50 ZN) TAB at 11:20

## 2023-03-17 RX ADMIN — Medication 500 MILLIGRAM(S): at 13:58

## 2023-03-17 RX ADMIN — CLOPIDOGREL BISULFATE 75 MILLIGRAM(S): 75 TABLET, FILM COATED ORAL at 11:21

## 2023-03-17 RX ADMIN — LEVETIRACETAM 1000 MILLIGRAM(S): 250 TABLET, FILM COATED ORAL at 17:39

## 2023-03-17 RX ADMIN — LEVETIRACETAM 1000 MILLIGRAM(S): 250 TABLET, FILM COATED ORAL at 05:45

## 2023-03-17 RX ADMIN — Medication 81 MILLIGRAM(S): at 11:20

## 2023-03-17 RX ADMIN — Medication 5 MILLIGRAM(S): at 05:47

## 2023-03-17 NOTE — CHART NOTE - NSCHARTNOTEFT_GEN_A_CORE
MRI of C-spine reviewed: limited due to motion artifact, but no cord compression/cord edema.   Continue current management  From stroke perspective: DAPT for 21 days followed by ASA 81 mg daily  High intensity statin  Avoid Dehydration, hypotension. She seems to have low BP early morning. Please ensure adequate hydration  Can f/u in stroke clinic in 4 weeks following discharge

## 2023-03-17 NOTE — PROGRESS NOTE ADULT - ASSESSMENT
Pt is a 67 year old F brought to ED via EMS who per triage note found pt on street. Per triage note, daughter told EMS that pt c/o generalized pain, however no family present on admission. Pt admitted to medicine for further evaluation and treatment.    #Altered Mental Status likely due to Metabolic Encephalopathy from UTI associated sepsis  #Hx of R MCA stroke  - In ED, hemodynamically stable. She was pan-scanned. no evidence of new stroke, trauma, or infection.  - WBC 19K: spiked on 3/8 i/s/o of seizure like activity.   - UA +ve; S/p cefepime and vanc in the ED and Rocephin, follow blood and urine cx   - CTH showed old stroke in the territory of the Rt MCA.   - rEEG consistent with diffuse cerebral electrophysiological dysfunction- video EEG Findings consistent with right hemispheric electrocerebral dysfunction   secondary to nonspecific etiology  - Neuro recs appreciated, c/w depakote and keppra  - Pt was given Ativan 2 mg IV once on admission, Keppra 1g IV once, then c/w 1g BID  - Seizure and fall precautions  - TSH: 2.40 WNL  - NC brain MRI + for new subcortical infarct in R frontal lobe- Neuro stating likely 2/2 hypotension, MRI C -spine recommended for spasticity - pending read  - 3/15 added baclofen 5mg BID PRN for muscle spasm    #LUE swelling - resolved  - pain in right arm around IV site, swollen, tender - resolved  - LUE duplex negative for DVT    #Left foot pain - tender to palpation  - palpable pulses, ROM intact, sensation intact  - CT foot Findings suggest an acute minimally displaced avulsion fracture at the medial malleolus.   - Podiatry recs appreciated  - CAM boot in place now    #Troponemia on admission  - troponin stable at <.01  - Echo 3/10 EF 55-60%    #Anemia  - stable    #Hyperkalemia  - resolved      Diet: thin liquid; soft & bite-sized  Activity: IAT- PT recc. PEPE  DVT Prophylaxis: lovenox  GI Prophylaxis: not indication  Code Status: full  Pending: Neuro, MRI C spine results     Pt is a 67 year old F brought to ED via EMS who per triage note found pt on street. Per triage note, daughter told EMS that pt c/o generalized pain, however no family present on admission. Pt admitted to medicine for further evaluation and treatment.    #Altered Mental Status likely due to Metabolic Encephalopathy from UTI associated sepsis  #Hx of R MCA stroke  - In ED, hemodynamically stable. She was pan-scanned. no evidence of new stroke, trauma, or infection.  - WBC 19K: spiked on 3/8 i/s/o of seizure like activity.   - UA +ve; S/p cefepime and vanc in the ED and Rocephin, follow blood and urine cx   - CTH showed old stroke in the territory of the Rt MCA.   - rEEG consistent with diffuse cerebral electrophysiological dysfunction- video EEG Findings consistent with right hemispheric electrocerebral dysfunction   secondary to nonspecific etiology  - Neuro recs appreciated, c/w depakote and keppra  - Pt was given Ativan 2 mg IV once on admission, Keppra 1g IV once, then c/w 1g BID  - Seizure and fall precautions  - TSH: 2.40 WNL  - NC brain MRI + for new subcortical infarct in R frontal lobe- Neuro stating likely 2/2 hypotension, MRI C -spine recommended for spasticity - pending read  - 3/15 added baclofen 5mg BID PRN for muscle spasm    #LUE swelling - resolved  - pain in right arm around IV site, swollen, tender - resolved  - LUE duplex negative for DVT    #Left foot pain - tender to palpation  - palpable pulses, ROM intact, sensation intact  - CT foot Findings suggest an acute minimally displaced avulsion fracture at the medial malleolus.   - Podiatry recs appreciated  - CAM boot in place now    #Troponemia on admission  - troponin stable at <.01  - Echo 3/10 EF 55-60%    #Anemia  - stable    #Hyperkalemia  - resolved      Diet: thin liquid; soft & bite-sized  Activity: IAT- PT recc. PEPE  DVT Prophylaxis: lovenox  GI Prophylaxis: not indication  Code Status: full  Pending: Neuro, MRI C spine results    Called todd Orr for medical update, no answer will attempt again later this afternoon

## 2023-03-17 NOTE — PROGRESS NOTE ADULT - SUBJECTIVE AND OBJECTIVE BOX
JOSEPH OBRIEN 67y Female  MRN#: [id number]   Hospital Day: 13d    SUBJECTIVE  Patient is a 67y old Female who presents with a chief complaint of ams  Currently admitted to medicine with the primary diagnosis of UTI (urinary tract infection)      INTERVAL HPI AND OVERNIGHT EVENTS:  Patient was examined and seen at bedside. This morning she is resting comfortably in bed and reports no issues or overnight events.    OBJECTIVE  PAST MEDICAL & SURGICAL HISTORY    ALLERGIES:  Allergy Status Unknown    MEDICATIONS:  STANDING MEDICATIONS  acetaminophen  Suppository .. 325 milliGRAM(s) Rectal once  ascorbic acid 500 milliGRAM(s) Oral daily  aspirin  chewable 81 milliGRAM(s) Oral daily  atorvastatin 40 milliGRAM(s) Oral at bedtime  baclofen 5 milliGRAM(s) Oral every 12 hours  clopidogrel Tablet 75 milliGRAM(s) Oral daily  enoxaparin Injectable 40 milliGRAM(s) SubCutaneous every 24 hours  levETIRAcetam  Solution 1000 milliGRAM(s) Oral two times a day  LORazepam   Injectable 2 milliGRAM(s) IV Push once  multivitamin/minerals 1 Tablet(s) Oral daily  valproic  acid Syrup 500 milliGRAM(s) Oral three times a day  vitamin A &amp; D Ointment 1 Application(s) Topical two times a day  zinc sulfate 220 milliGRAM(s) Oral daily    PRN MEDICATIONS  acetaminophen     Tablet .. 650 milliGRAM(s) Oral every 6 hours PRN  morphine  IR 15 milliGRAM(s) Oral every 6 hours PRN  oxycodone    5 mG/acetaminophen 325 mG 1 Tablet(s) Oral every 6 hours PRN      VITAL SIGNS: Last 24 Hours  T(C): 37.3 (17 Mar 2023 05:00), Max: 37.3 (16 Mar 2023 13:10)  T(F): 99.1 (17 Mar 2023 05:00), Max: 99.2 (16 Mar 2023 13:10)  HR: 88 (17 Mar 2023 05:00) (88 - 98)  BP: 99/58 (17 Mar 2023 05:00) (99/58 - 121/89)  BP(mean): --  RR: 18 (17 Mar 2023 05:00) (16 - 18)  SpO2: 100% (17 Mar 2023 05:00) (100% - 100%)    LABS:                        9.7    8.87  )-----------( 475      ( 16 Mar 2023 05:39 )             30.0     03-16    143  |  105  |  6<L>  ----------------------------<  88  4.2   |  27  |  <0.5<L>    Ca    8.9      16 Mar 2023 05:39  Mg     1.8     03-16    TPro  5.4<L>  /  Alb  2.6<L>  /  TBili  <0.2  /  DBili  x   /  AST  11  /  ALT  5   /  AlkPhos  63  03-16            RADIOLOGY:  < from: MR Head No Cont (03.15.23 @ 17:20) >  IMPRESSION:    Small patchy subcortical infarct in the right frontal lobe.    Redemonstrated chronic infarct in the right MCA territory.    Mild chronic microvascular ischemic changes.    Communication: The summary of above findings were discussed with readback   confirmation with Dr. Bundy by radiologist Dr. Joseph on 3/16/2023 at 9:32   AM.    --- End of Report ---            BRUNO JOSEPH MD; Attending Radiologist  This document has been electronically signed. Mar 16 2023  9:32AM    < end of copied text >      PHYSICAL EXAM:  CONSTITUTIONAL: No acute distress, laying in bed comfortably  HEENT: Atraumatic, normocephalic, EOMI, moist mucous membranes  PULMONARY: Clear to auscultation bilaterally  CARDIOVASCULAR: Regular rate and rhythm  GASTROINTESTINAL: Soft, non-tender, non-distended; bowel sounds present  MUSCULOSKELETAL: no edema, left foot tenderness to palpation  NEUROLOGY: non-focal  SKIN: warm and dry, deep tissue injury b/l ears, stage 3 sacral ulcer

## 2023-03-17 NOTE — PROGRESS NOTE ADULT - ASSESSMENT
66 y/o woman with PMH of Right MCA stroke with left sided weakness was brought to the ED via EMS. In the ED, she had altered mental status and sepsis.     1. Altered Mental Status likely due to Metabolic Encephalopathy from UTI, sepsis and seizure in the context of stroke with left hemiparesis   s/p course of abx for E. coli UTI  CT scan of head: no acute pathology  CTA of head/neck: no evidence of occlusion, aneurysm or stenosis  s/p EEG and video EEG  s/p rapid response for seizure  more awake and alert now per notes  per chart: at baseline she is alert, able to communicate and is oriented. Ambulation at baseline: uses a wheelchair. She was in a hospital in [facility] last fall and was lethargic for months   on Depakote 500 mg TID and Keppra 1000 mg BID  seizure precautions  MRI of brain: Small patchy subcortical infarct in the right frontal lobe.Redemonstrated chronic infarct in the right MCA territory.  Mild chronic microvascular ischemic changes.  Neuro f/u appreciated - stroke likely due to hypotension - has diminutive caliber of right M1 - avoid hypotension  Added ASA, Plavix and statin for new stroke  pt with UE spasticity: MRI of C spine: Significantly motion limited study. No gross cord compression or spinal cord edema demonstrated. Multilevel small disc bulges. - continue baclofen 5mg bid  behavioral health eval appreciated: pt does not have the capacity to choose a Health care agent    2. Left avulsion fracture of medial malleolus  evaluated by podiatry - no surgical intervention - WB with CAM boot  continue PT  fall precautions  pain control    3. Sinus tachycardia   now resolved - attributed to pain  EKG reviewed  TSH WNL    4. Left cephalic vein thrombosis - s/p warm compress    5. Anemia - normocytic  labs reviewed - Hgb in 9-10 range and now stable  monitor for bleeding  possibly due to frequent phlebotomy  CBC in AM    6. DVT prophylaxis    full code  guarded prognosis       Progress Note Handoff  Pending (specify): neuro f/u - discharge planning to SNF once cleared by neurology, continue PT as tolerated  Family discussion: pt with 2 daughters and 2 sons - med team has communicated with the daughters who are in agreement with STR on discharge  further conversations re: HCP can be done as outpt   Disposition: SNF

## 2023-03-17 NOTE — PROGRESS NOTE ADULT - SUBJECTIVE AND OBJECTIVE BOX
JOSEPH OBRIEN  67y Female    INTERVAL HPI/OVERNIGHT EVENTS:    pt lying in bed - not answering questions with words - ate some breakfast per RN  pt does not like eggs  no fever  CAM boot in place    T(F): 99.1 (03-17-23 @ 05:00), Max: 99.2 (03-16-23 @ 13:10)  HR: 88 (03-17-23 @ 05:00) (88 - 98)  BP: 99/58 (03-17-23 @ 05:00) (99/58 - 121/89)  RR: 18 (03-17-23 @ 05:00) (16 - 18)  SpO2: 100% (03-17-23 @ 05:00) (100% - 100%)    PHYSICAL EXAM:  GENERAL: NAD  HEAD:  Normocephalic  EYES:  conjunctiva and sclera clear  ENMT: Mouth closed  NERVOUS SYSTEM:  Alert, awake, not answering my questions with words, left hemiparesis  CHEST/LUNG: CTA b/l  HEART: Regular rate and rhythm  ABDOMEN: Soft, Nontender, Nondistended  EXTREMITIES:   No edema right LE  left LE with CAM boot  SKIN: warm, dry    Consultant(s) Notes Reviewed:  [x ] YES  [ ] NO  Care Discussed with Consultants/Other Providers [ x] YES  [ ] NO    MEDICATIONS  (STANDING):  acetaminophen  Suppository .. 325 milliGRAM(s) Rectal once  ascorbic acid 500 milliGRAM(s) Oral daily  aspirin  chewable 81 milliGRAM(s) Oral daily  atorvastatin 40 milliGRAM(s) Oral at bedtime  baclofen 5 milliGRAM(s) Oral every 12 hours  clopidogrel Tablet 75 milliGRAM(s) Oral daily  enoxaparin Injectable 40 milliGRAM(s) SubCutaneous every 24 hours  levETIRAcetam  Solution 1000 milliGRAM(s) Oral two times a day  LORazepam   Injectable 2 milliGRAM(s) IV Push once  multivitamin/minerals 1 Tablet(s) Oral daily  valproic  acid Syrup 500 milliGRAM(s) Oral three times a day  vitamin A &amp; D Ointment 1 Application(s) Topical two times a day  zinc sulfate 220 milliGRAM(s) Oral daily    MEDICATIONS  (PRN):  acetaminophen     Tablet .. 650 milliGRAM(s) Oral every 6 hours PRN Moderate Pain (4 - 6)  morphine  IR 15 milliGRAM(s) Oral every 6 hours PRN Severe Pain (7 - 10)  oxycodone    5 mG/acetaminophen 325 mG 1 Tablet(s) Oral every 6 hours PRN Severe Pain (7 - 10)      LABS:                        9.7    8.87  )-----------( 475      ( 16 Mar 2023 05:39 )             30.0     03-16    143  |  105  |  6<L>  ----------------------------<  88  4.2   |  27  |  <0.5<L>    Ca    8.9      16 Mar 2023 05:39  Mg     1.8     03-16    TPro  5.4<L>  /  Alb  2.6<L>  /  TBili  <0.2  /  DBili  x   /  AST  11  /  ALT  5   /  AlkPhos  63  03-16          RADIOLOGY & ADDITIONAL TESTS:    Imaging or report Personally Reviewed:  [ x] YES  [ ] NO    < from: MR Cervical Spine No Cont (03.16.23 @ 21:26) >    The study is significantly limited due to patient motion.    The cervical vertebral bodies maintain normal height and alignment.    There is no gross bone marrow signal abnormality.    There is no gross cervical spinal cord signalabnormality.    Disc bulging is seen at C4-5, C5-6 and C6-7 with no evidence of cord   compression.    IMPRESSION:    Significantly motion limited study. No gross cord compression or spinal   cord edema demonstrated. Multilevel small disc bulges.    < end of copied text >      Case discussed with residents and RN on rounds today

## 2023-03-18 LAB
ALBUMIN SERPL ELPH-MCNC: 2.5 G/DL — LOW (ref 3.5–5.2)
ALP SERPL-CCNC: 52 U/L — SIGNIFICANT CHANGE UP (ref 30–115)
ALT FLD-CCNC: <5 U/L — SIGNIFICANT CHANGE UP (ref 0–41)
ANION GAP SERPL CALC-SCNC: 11 MMOL/L — SIGNIFICANT CHANGE UP (ref 7–14)
AST SERPL-CCNC: 11 U/L — SIGNIFICANT CHANGE UP (ref 0–41)
BASOPHILS # BLD AUTO: 0.04 K/UL — SIGNIFICANT CHANGE UP (ref 0–0.2)
BASOPHILS NFR BLD AUTO: 0.5 % — SIGNIFICANT CHANGE UP (ref 0–1)
BILIRUB SERPL-MCNC: <0.2 MG/DL — SIGNIFICANT CHANGE UP (ref 0.2–1.2)
BUN SERPL-MCNC: 10 MG/DL — SIGNIFICANT CHANGE UP (ref 10–20)
CALCIUM SERPL-MCNC: 8.8 MG/DL — SIGNIFICANT CHANGE UP (ref 8.4–10.5)
CHLORIDE SERPL-SCNC: 105 MMOL/L — SIGNIFICANT CHANGE UP (ref 98–110)
CO2 SERPL-SCNC: 26 MMOL/L — SIGNIFICANT CHANGE UP (ref 17–32)
CREAT SERPL-MCNC: <0.5 MG/DL — LOW (ref 0.7–1.5)
EGFR: 109 ML/MIN/1.73M2 — SIGNIFICANT CHANGE UP
EOSINOPHIL # BLD AUTO: 0.13 K/UL — SIGNIFICANT CHANGE UP (ref 0–0.7)
EOSINOPHIL NFR BLD AUTO: 1.6 % — SIGNIFICANT CHANGE UP (ref 0–8)
GLUCOSE SERPL-MCNC: 95 MG/DL — SIGNIFICANT CHANGE UP (ref 70–99)
HCT VFR BLD CALC: 28.8 % — LOW (ref 37–47)
HGB BLD-MCNC: 9.3 G/DL — LOW (ref 12–16)
IMM GRANULOCYTES NFR BLD AUTO: 1 % — HIGH (ref 0.1–0.3)
LYMPHOCYTES # BLD AUTO: 2.6 K/UL — SIGNIFICANT CHANGE UP (ref 1.2–3.4)
LYMPHOCYTES # BLD AUTO: 31.6 % — SIGNIFICANT CHANGE UP (ref 20.5–51.1)
MAGNESIUM SERPL-MCNC: 1.9 MG/DL — SIGNIFICANT CHANGE UP (ref 1.8–2.4)
MCHC RBC-ENTMCNC: 29.2 PG — SIGNIFICANT CHANGE UP (ref 27–31)
MCHC RBC-ENTMCNC: 32.3 G/DL — SIGNIFICANT CHANGE UP (ref 32–37)
MCV RBC AUTO: 90.3 FL — SIGNIFICANT CHANGE UP (ref 81–99)
MONOCYTES # BLD AUTO: 0.69 K/UL — HIGH (ref 0.1–0.6)
MONOCYTES NFR BLD AUTO: 8.4 % — SIGNIFICANT CHANGE UP (ref 1.7–9.3)
NEUTROPHILS # BLD AUTO: 4.69 K/UL — SIGNIFICANT CHANGE UP (ref 1.4–6.5)
NEUTROPHILS NFR BLD AUTO: 56.9 % — SIGNIFICANT CHANGE UP (ref 42.2–75.2)
NRBC # BLD: 0 /100 WBCS — SIGNIFICANT CHANGE UP (ref 0–0)
PLATELET # BLD AUTO: 388 K/UL — SIGNIFICANT CHANGE UP (ref 130–400)
POTASSIUM SERPL-MCNC: 4.6 MMOL/L — SIGNIFICANT CHANGE UP (ref 3.5–5)
POTASSIUM SERPL-SCNC: 4.6 MMOL/L — SIGNIFICANT CHANGE UP (ref 3.5–5)
PROT SERPL-MCNC: 5.2 G/DL — LOW (ref 6–8)
RBC # BLD: 3.19 M/UL — LOW (ref 4.2–5.4)
RBC # FLD: 13.8 % — SIGNIFICANT CHANGE UP (ref 11.5–14.5)
SODIUM SERPL-SCNC: 142 MMOL/L — SIGNIFICANT CHANGE UP (ref 135–146)
WBC # BLD: 8.23 K/UL — SIGNIFICANT CHANGE UP (ref 4.8–10.8)
WBC # FLD AUTO: 8.23 K/UL — SIGNIFICANT CHANGE UP (ref 4.8–10.8)

## 2023-03-18 PROCEDURE — 99232 SBSQ HOSP IP/OBS MODERATE 35: CPT

## 2023-03-18 RX ADMIN — LEVETIRACETAM 1000 MILLIGRAM(S): 250 TABLET, FILM COATED ORAL at 17:11

## 2023-03-18 RX ADMIN — Medication 500 MILLIGRAM(S): at 13:58

## 2023-03-18 RX ADMIN — Medication 500 MILLIGRAM(S): at 21:12

## 2023-03-18 RX ADMIN — Medication 1 TABLET(S): at 11:19

## 2023-03-18 RX ADMIN — Medication 500 MILLIGRAM(S): at 05:27

## 2023-03-18 RX ADMIN — Medication 5 MILLIGRAM(S): at 05:38

## 2023-03-18 RX ADMIN — ZINC SULFATE TAB 220 MG (50 MG ZINC EQUIVALENT) 220 MILLIGRAM(S): 220 (50 ZN) TAB at 11:19

## 2023-03-18 RX ADMIN — Medication 81 MILLIGRAM(S): at 11:19

## 2023-03-18 RX ADMIN — ENOXAPARIN SODIUM 40 MILLIGRAM(S): 100 INJECTION SUBCUTANEOUS at 22:01

## 2023-03-18 RX ADMIN — Medication 1 APPLICATION(S): at 06:55

## 2023-03-18 RX ADMIN — Medication 1 APPLICATION(S): at 17:49

## 2023-03-18 RX ADMIN — Medication 500 MILLIGRAM(S): at 11:17

## 2023-03-18 RX ADMIN — ATORVASTATIN CALCIUM 40 MILLIGRAM(S): 80 TABLET, FILM COATED ORAL at 21:13

## 2023-03-18 RX ADMIN — LEVETIRACETAM 1000 MILLIGRAM(S): 250 TABLET, FILM COATED ORAL at 05:37

## 2023-03-18 RX ADMIN — CLOPIDOGREL BISULFATE 75 MILLIGRAM(S): 75 TABLET, FILM COATED ORAL at 11:19

## 2023-03-18 RX ADMIN — Medication 5 MILLIGRAM(S): at 17:12

## 2023-03-18 NOTE — PROGRESS NOTE ADULT - ASSESSMENT
68 y/o woman with PMH of Right MCA stroke with left sided weakness was brought to the ED via EMS. In the ED, she had altered mental status and sepsis.     1. Altered Mental Status likely due to Metabolic Encephalopathy from UTI, sepsis and seizure in the context of stroke with left hemiparesis   s/p course of abx for E. coli UTI  CT scan of head: no acute pathology  CTA of head/neck: no evidence of occlusion, aneurysm or stenosis  s/p EEG and video EEG  s/p rapid response for seizure  more awake and alert now per notes  per chart: at baseline she is alert, able to communicate and is oriented. Ambulation at baseline: uses a wheelchair. She was in a hospital in [facility] last fall and was lethargic for months   on Depakote 500 mg TID and Keppra 1000 mg BID  seizure precautions  MRI of brain: Small patchy subcortical infarct in the right frontal lobe. Redemonstrated chronic infarct in the right MCA territory.  Mild chronic microvascular ischemic changes.  Neuro f/u appreciated - stroke likely due to hypotension - has diminutive caliber of right M1 - avoid hypotension  Added ASA, Plavix and statin for new stroke -  DAPT for 21 days followed by ASA 81 mg daily - f/u in stroke clinic in 4 weeks per neurology  pt with UE spasticity: MRI of C spine: Significantly motion limited study. No gross cord compression or spinal cord edema demonstrated. Multilevel small disc bulges. - continue baclofen 5mg bid  behavioral health eval appreciated: pt does not have the capacity to choose a Health care agent    2. Left avulsion fracture of medial malleolus  evaluated by podiatry - no surgical intervention - WB with CAM boot  continue PT  fall precautions  pain control    3. Sinus tachycardia   now resolved - attributed to pain  EKG reviewed  TSH WNL    4. Left cephalic vein thrombosis - s/p warm compress    5. Anemia - normocytic  labs reviewed - Hgb in 9-10 range and now stable  monitor for bleeding  possibly due to frequent phlebotomy    6. DVT prophylaxis      full code  overall guarded prognosis   no need for daily labs - now stable      Progress Note Handoff  Pending (specify): discharge planning to SNF in progress  Family discussion: pt with 2 daughters and 2 sons - med team has communicated with the daughters who are in agreement with STR on discharge  further conversations re: HCP can be done as outpt   Disposition: SNF

## 2023-03-18 NOTE — PROGRESS NOTE ADULT - SUBJECTIVE AND OBJECTIVE BOX
JOSEPH OBRIEN  67y Female    INTERVAL HPI/OVERNIGHT EVENTS:    she interacts when she wants to   not responding to questions but clearly stated "watch my foot" when I was examining her LE  no fever    T(F): 98 (03-18-23 @ 04:38), Max: 99.7 (03-17-23 @ 20:10)  HR: 86 (03-18-23 @ 04:38) (86 - 99)  BP: 108/64 (03-18-23 @ 04:38) (102/58 - 133/61)  RR: 18 (03-18-23 @ 04:38) (18 - 18)  SpO2: 95% (03-18-23 @ 07:40) (95% - 100%) on RA    I&O's Summary    17 Mar 2023 07:01  -  18 Mar 2023 07:00  --------------------------------------------------------  IN: 0 mL / OUT: 200 mL / NET: -200 mL    PHYSICAL EXAM:  GENERAL: NAD  HEAD:  Normocephalic  EYES:  conjunctiva and sclera clear  ENMT: Moist mucous membranes  NERVOUS SYSTEM:  Alert, awake, selectively answers questions and interacts, left hemiparesis  CHEST/LUNG: CTA b/l but shallow  HEART: Regular rate and rhythm  ABDOMEN: Soft, Nontender, Nondistended  EXTREMITIES:   No edema right LE  left LE with CAM boot  SKIN: warm, dry    Consultant(s) Notes Reviewed:  [x ] YES  [ ] NO  Care Discussed with Consultants/Other Providers [ x] YES  [ ] NO    MEDICATIONS  (STANDING):  acetaminophen  Suppository .. 325 milliGRAM(s) Rectal once  ascorbic acid 500 milliGRAM(s) Oral daily  aspirin  chewable 81 milliGRAM(s) Oral daily  atorvastatin 40 milliGRAM(s) Oral at bedtime  baclofen 5 milliGRAM(s) Oral every 12 hours  clopidogrel Tablet 75 milliGRAM(s) Oral daily  enoxaparin Injectable 40 milliGRAM(s) SubCutaneous every 24 hours  levETIRAcetam  Solution 1000 milliGRAM(s) Oral two times a day  LORazepam   Injectable 2 milliGRAM(s) IV Push once  multivitamin/minerals 1 Tablet(s) Oral daily  valproic  acid Syrup 500 milliGRAM(s) Oral three times a day  vitamin A &amp; D Ointment 1 Application(s) Topical two times a day  zinc sulfate 220 milliGRAM(s) Oral daily    MEDICATIONS  (PRN):  acetaminophen     Tablet .. 650 milliGRAM(s) Oral every 6 hours PRN Moderate Pain (4 - 6)  morphine  IR 15 milliGRAM(s) Oral every 6 hours PRN Severe Pain (7 - 10)      LABS:                        9.3    8.23  )-----------( 388      ( 18 Mar 2023 07:15 )             28.8     03-18    142  |  105  |  10  ----------------------------<  95  4.6   |  26  |  <0.5<L>    Ca    8.8      18 Mar 2023 07:15  Mg     1.9     03-18    TPro  5.2<L>  /  Alb  2.5<L>  /  TBili  <0.2  /  DBili  x   /  AST  11  /  ALT  <5  /  AlkPhos  52  03-18            Case discussed with residents and RN on rounds today

## 2023-03-19 LAB — SARS-COV-2 RNA SPEC QL NAA+PROBE: SIGNIFICANT CHANGE UP

## 2023-03-19 PROCEDURE — 99232 SBSQ HOSP IP/OBS MODERATE 35: CPT

## 2023-03-19 RX ORDER — ASCORBIC ACID 60 MG
1 TABLET,CHEWABLE ORAL
Qty: 0 | Refills: 0 | DISCHARGE
Start: 2023-03-19

## 2023-03-19 RX ORDER — ATORVASTATIN CALCIUM 80 MG/1
1 TABLET, FILM COATED ORAL
Qty: 0 | Refills: 0 | DISCHARGE
Start: 2023-03-19

## 2023-03-19 RX ORDER — ACETAMINOPHEN 500 MG
2 TABLET ORAL
Qty: 0 | Refills: 0 | DISCHARGE
Start: 2023-03-19

## 2023-03-19 RX ORDER — CLOPIDOGREL BISULFATE 75 MG/1
1 TABLET, FILM COATED ORAL
Qty: 0 | Refills: 0 | DISCHARGE
Start: 2023-03-19 | End: 2023-04-04

## 2023-03-19 RX ORDER — CLOPIDOGREL BISULFATE 75 MG/1
1 TABLET, FILM COATED ORAL
Qty: 0 | Refills: 0 | DISCHARGE

## 2023-03-19 RX ORDER — ASPIRIN/CALCIUM CARB/MAGNESIUM 324 MG
1 TABLET ORAL
Qty: 0 | Refills: 0 | DISCHARGE

## 2023-03-19 RX ORDER — VALPROIC ACID (AS SODIUM SALT) 250 MG/5ML
500 SOLUTION, ORAL ORAL
Qty: 0 | Refills: 0 | DISCHARGE
Start: 2023-03-19

## 2023-03-19 RX ORDER — MORPHINE SULFATE 50 MG/1
1 CAPSULE, EXTENDED RELEASE ORAL
Qty: 0 | Refills: 0 | DISCHARGE
Start: 2023-03-19

## 2023-03-19 RX ORDER — SALICYLIC ACID 0.5 %
1 CLEANSER (GRAM) TOPICAL
Qty: 0 | Refills: 0 | DISCHARGE
Start: 2023-03-19

## 2023-03-19 RX ORDER — ZINC SULFATE TAB 220 MG (50 MG ZINC EQUIVALENT) 220 (50 ZN) MG
1 TAB ORAL
Qty: 0 | Refills: 0 | DISCHARGE
Start: 2023-03-19

## 2023-03-19 RX ORDER — ASPIRIN/CALCIUM CARB/MAGNESIUM 324 MG
1 TABLET ORAL
Qty: 0 | Refills: 0 | DISCHARGE
Start: 2023-03-19

## 2023-03-19 RX ORDER — ENOXAPARIN SODIUM 100 MG/ML
40 INJECTION SUBCUTANEOUS
Qty: 0 | Refills: 0 | DISCHARGE
Start: 2023-03-19

## 2023-03-19 RX ORDER — MIRTAZAPINE 45 MG/1
1 TABLET, ORALLY DISINTEGRATING ORAL
Qty: 0 | Refills: 0 | DISCHARGE

## 2023-03-19 RX ORDER — LEVETIRACETAM 250 MG/1
10 TABLET, FILM COATED ORAL
Qty: 0 | Refills: 0 | DISCHARGE
Start: 2023-03-19

## 2023-03-19 RX ORDER — GABAPENTIN 400 MG/1
1 CAPSULE ORAL
Qty: 0 | Refills: 0 | DISCHARGE

## 2023-03-19 RX ORDER — BACLOFEN 100 %
1 POWDER (GRAM) MISCELLANEOUS
Qty: 0 | Refills: 0 | DISCHARGE
Start: 2023-03-19

## 2023-03-19 RX ORDER — DIVALPROEX SODIUM 500 MG/1
6 TABLET, DELAYED RELEASE ORAL
Qty: 0 | Refills: 0 | DISCHARGE

## 2023-03-19 RX ORDER — LEVETIRACETAM 250 MG/1
1 TABLET, FILM COATED ORAL
Qty: 0 | Refills: 0 | DISCHARGE

## 2023-03-19 RX ORDER — MULTIVIT-MIN/FERROUS GLUCONATE 9 MG/15 ML
1 LIQUID (ML) ORAL
Qty: 0 | Refills: 0 | DISCHARGE
Start: 2023-03-19

## 2023-03-19 RX ORDER — ATORVASTATIN CALCIUM 80 MG/1
1 TABLET, FILM COATED ORAL
Qty: 0 | Refills: 0 | DISCHARGE

## 2023-03-19 RX ADMIN — Medication 5 MILLIGRAM(S): at 05:42

## 2023-03-19 RX ADMIN — LEVETIRACETAM 1000 MILLIGRAM(S): 250 TABLET, FILM COATED ORAL at 17:23

## 2023-03-19 RX ADMIN — Medication 500 MILLIGRAM(S): at 11:38

## 2023-03-19 RX ADMIN — Medication 500 MILLIGRAM(S): at 21:14

## 2023-03-19 RX ADMIN — MORPHINE SULFATE 15 MILLIGRAM(S): 50 CAPSULE, EXTENDED RELEASE ORAL at 11:16

## 2023-03-19 RX ADMIN — MORPHINE SULFATE 15 MILLIGRAM(S): 50 CAPSULE, EXTENDED RELEASE ORAL at 17:24

## 2023-03-19 RX ADMIN — MORPHINE SULFATE 15 MILLIGRAM(S): 50 CAPSULE, EXTENDED RELEASE ORAL at 10:16

## 2023-03-19 RX ADMIN — Medication 650 MILLIGRAM(S): at 21:13

## 2023-03-19 RX ADMIN — Medication 1 APPLICATION(S): at 17:26

## 2023-03-19 RX ADMIN — CLOPIDOGREL BISULFATE 75 MILLIGRAM(S): 75 TABLET, FILM COATED ORAL at 11:38

## 2023-03-19 RX ADMIN — Medication 1 TABLET(S): at 11:38

## 2023-03-19 RX ADMIN — Medication 500 MILLIGRAM(S): at 05:06

## 2023-03-19 RX ADMIN — Medication 1 APPLICATION(S): at 05:42

## 2023-03-19 RX ADMIN — Medication 5 MILLIGRAM(S): at 17:23

## 2023-03-19 RX ADMIN — ZINC SULFATE TAB 220 MG (50 MG ZINC EQUIVALENT) 220 MILLIGRAM(S): 220 (50 ZN) TAB at 11:38

## 2023-03-19 RX ADMIN — MORPHINE SULFATE 15 MILLIGRAM(S): 50 CAPSULE, EXTENDED RELEASE ORAL at 23:54

## 2023-03-19 RX ADMIN — MORPHINE SULFATE 15 MILLIGRAM(S): 50 CAPSULE, EXTENDED RELEASE ORAL at 18:24

## 2023-03-19 RX ADMIN — LEVETIRACETAM 1000 MILLIGRAM(S): 250 TABLET, FILM COATED ORAL at 05:06

## 2023-03-19 RX ADMIN — Medication 500 MILLIGRAM(S): at 13:03

## 2023-03-19 RX ADMIN — MORPHINE SULFATE 15 MILLIGRAM(S): 50 CAPSULE, EXTENDED RELEASE ORAL at 23:24

## 2023-03-19 RX ADMIN — ENOXAPARIN SODIUM 40 MILLIGRAM(S): 100 INJECTION SUBCUTANEOUS at 21:15

## 2023-03-19 RX ADMIN — Medication 81 MILLIGRAM(S): at 11:38

## 2023-03-19 RX ADMIN — ATORVASTATIN CALCIUM 40 MILLIGRAM(S): 80 TABLET, FILM COATED ORAL at 21:14

## 2023-03-19 RX ADMIN — Medication 650 MILLIGRAM(S): at 21:43

## 2023-03-19 NOTE — PROGRESS NOTE ADULT - ASSESSMENT
68 y/o woman with PMH of Right MCA stroke with left sided weakness was brought to the ED via EMS. In the ED, she had altered mental status and sepsis.     1. Altered Mental Status likely due to Metabolic Encephalopathy from UTI, sepsis and seizure in the context of stroke with left hemiparesis   s/p course of abx for E. coli UTI  CT scan of head: no acute pathology  CTA of head/neck: no evidence of occlusion, aneurysm or stenosis  s/p EEG and video EEG  s/p rapid response for seizure  more awake and alert now   per chart: at baseline she is alert, able to communicate and is oriented. Ambulation at baseline: uses a wheelchair. She was in a hospital in [facility] last fall and was lethargic for months   on Depakote 500 mg TID and Keppra 1000 mg BID  seizure precautions  MRI of brain: Small patchy subcortical infarct in the right frontal lobe. Redemonstrated chronic infarct in the right MCA territory.  Mild chronic microvascular ischemic changes.  Neuro f/u appreciated - stroke likely due to hypotension - has diminutive caliber of right M1 - avoid hypotension  Added ASA, Plavix and statin for new stroke -  DAPT for 21 days followed by ASA 81 mg daily - f/u in stroke clinic in 4 weeks per neurology  pt with UE spasticity: MRI of C spine: Significantly motion limited study. No gross cord compression or spinal cord edema demonstrated. Multilevel small disc bulges. - continue baclofen 5mg bid  behavioral health eval appreciated: pt does not have the capacity to choose a Health care agent    2. Left avulsion fracture of medial malleolus  evaluated by podiatry - no surgical intervention - WB with CAM boot  continue PT  fall precautions  pain control    3. Sinus tachycardia   now resolved - attributed to pain  EKG reviewed  TSH WNL    4. Left cephalic vein thrombosis - s/p warm compress    5. Anemia - normocytic  labs reviewed - Hgb in 9-10 range and now stable  monitor for bleeding  possibly due to frequent phlebotomy    6. DVT prophylaxis      full code  overall guarded prognosis   no need for daily labs - now stable      Progress Note Handoff  Pending (specify): discharge planning to SNF, COVID ordered  Family discussion: updated daughter Dominga Naqvi on the plan of care  pt with 2 daughters and 2 sons - med team has communicated with the daughters who are in agreement with STR on discharge  further conversations re: HCP can be done as outpt   Disposition: SNF tomorrow

## 2023-03-19 NOTE — PROGRESS NOTE ADULT - SUBJECTIVE AND OBJECTIVE BOX
JOSEPH OBRIEN  67y Female    INTERVAL HPI/OVERNIGHT EVENTS:    pt remains awake, alert - interacts when she wants to  no fever    T(F): 97.4 (03-19-23 @ 04:30), Max: 97.7 (03-18-23 @ 20:00)  HR: 86 (03-19-23 @ 04:30) (86 - 89)  BP: 121/58 (03-19-23 @ 04:30) (116/60 - 121/67)  RR: 18 (03-19-23 @ 04:30) (18 - 20)  SpO2: 100% (03-19-23 @ 07:40) (96% - 100%)    PHYSICAL EXAM:  GENERAL: NAD  HEAD:  Normocephalic  EYES:  conjunctiva and sclera clear  ENMT: Moist mucous membranes  NERVOUS SYSTEM:  Alert, awake, left hemiparesis  CHEST/LUNG: shallow BS  HEART: Regular rate and rhythm  ABDOMEN: Soft, Nontender, Nondistended  EXTREMITIES:   No edema right LE  left LE in CAM boot  SKIN: warm, dry    Consultant(s) Notes Reviewed:  [x ] YES  [ ] NO  Care Discussed with Consultants/Other Providers [ x] YES  [ ] NO    MEDICATIONS  (STANDING):  acetaminophen  Suppository .. 325 milliGRAM(s) Rectal once  ascorbic acid 500 milliGRAM(s) Oral daily  aspirin  chewable 81 milliGRAM(s) Oral daily  atorvastatin 40 milliGRAM(s) Oral at bedtime  baclofen 5 milliGRAM(s) Oral every 12 hours  clopidogrel Tablet 75 milliGRAM(s) Oral daily  enoxaparin Injectable 40 milliGRAM(s) SubCutaneous every 24 hours  levETIRAcetam  Solution 1000 milliGRAM(s) Oral two times a day  LORazepam   Injectable 2 milliGRAM(s) IV Push once  multivitamin/minerals 1 Tablet(s) Oral daily  valproic  acid Syrup 500 milliGRAM(s) Oral three times a day  vitamin A &amp; D Ointment 1 Application(s) Topical two times a day  zinc sulfate 220 milliGRAM(s) Oral daily    MEDICATIONS  (PRN):  acetaminophen     Tablet .. 650 milliGRAM(s) Oral every 6 hours PRN Moderate Pain (4 - 6)  morphine  IR 15 milliGRAM(s) Oral every 6 hours PRN Severe Pain (7 - 10)      LABS:                        9.3    8.23  )-----------( 388      ( 18 Mar 2023 07:15 )             28.8     03-18    142  |  105  |  10  ----------------------------<  95  4.6   |  26  |  <0.5<L>    Ca    8.8      18 Mar 2023 07:15  Mg     1.9     03-18    TPro  5.2<L>  /  Alb  2.5<L>  /  TBili  <0.2  /  DBili  x   /  AST  11  /  ALT  <5  /  AlkPhos  52  03-18            Case discussed with CORNEL today

## 2023-03-20 PROCEDURE — 71045 X-RAY EXAM CHEST 1 VIEW: CPT | Mod: 26

## 2023-03-20 PROCEDURE — 99232 SBSQ HOSP IP/OBS MODERATE 35: CPT

## 2023-03-20 RX ORDER — GABAPENTIN 400 MG/1
100 CAPSULE ORAL THREE TIMES A DAY
Refills: 0 | Status: DISCONTINUED | OUTPATIENT
Start: 2023-03-20 | End: 2023-04-18

## 2023-03-20 RX ORDER — LEVETIRACETAM 250 MG/1
1000 TABLET, FILM COATED ORAL EVERY 12 HOURS
Refills: 0 | Status: DISCONTINUED | OUTPATIENT
Start: 2023-03-20 | End: 2023-03-20

## 2023-03-20 RX ORDER — LEVETIRACETAM 250 MG/1
1500 TABLET, FILM COATED ORAL EVERY 12 HOURS
Refills: 0 | Status: DISCONTINUED | OUTPATIENT
Start: 2023-03-20 | End: 2023-03-22

## 2023-03-20 RX ORDER — OXYCODONE AND ACETAMINOPHEN 5; 325 MG/1; MG/1
1 TABLET ORAL EVERY 6 HOURS
Refills: 0 | Status: DISCONTINUED | OUTPATIENT
Start: 2023-03-20 | End: 2023-03-20

## 2023-03-20 RX ORDER — VALPROIC ACID (AS SODIUM SALT) 250 MG/5ML
500 SOLUTION, ORAL ORAL THREE TIMES A DAY
Refills: 0 | Status: DISCONTINUED | OUTPATIENT
Start: 2023-03-20 | End: 2023-03-22

## 2023-03-20 RX ORDER — SODIUM CHLORIDE 9 MG/ML
1000 INJECTION, SOLUTION INTRAVENOUS
Refills: 0 | Status: DISCONTINUED | OUTPATIENT
Start: 2023-03-20 | End: 2023-03-21

## 2023-03-20 RX ORDER — SODIUM CHLORIDE 9 MG/ML
1000 INJECTION, SOLUTION INTRAVENOUS ONCE
Refills: 0 | Status: COMPLETED | OUTPATIENT
Start: 2023-03-20 | End: 2023-03-20

## 2023-03-20 RX ORDER — MORPHINE SULFATE 50 MG/1
2 CAPSULE, EXTENDED RELEASE ORAL EVERY 6 HOURS
Refills: 0 | Status: DISCONTINUED | OUTPATIENT
Start: 2023-03-20 | End: 2023-03-21

## 2023-03-20 RX ORDER — VANCOMYCIN HCL 1 G
1250 VIAL (EA) INTRAVENOUS ONCE
Refills: 0 | Status: COMPLETED | OUTPATIENT
Start: 2023-03-20 | End: 2023-03-20

## 2023-03-20 RX ORDER — CEFEPIME 1 G/1
2000 INJECTION, POWDER, FOR SOLUTION INTRAMUSCULAR; INTRAVENOUS ONCE
Refills: 0 | Status: COMPLETED | OUTPATIENT
Start: 2023-03-20 | End: 2023-03-20

## 2023-03-20 RX ORDER — MORPHINE SULFATE 50 MG/1
15 CAPSULE, EXTENDED RELEASE ORAL EVERY 6 HOURS
Refills: 0 | Status: DISCONTINUED | OUTPATIENT
Start: 2023-03-20 | End: 2023-03-20

## 2023-03-20 RX ADMIN — Medication 166.67 MILLIGRAM(S): at 23:07

## 2023-03-20 RX ADMIN — CLOPIDOGREL BISULFATE 75 MILLIGRAM(S): 75 TABLET, FILM COATED ORAL at 11:22

## 2023-03-20 RX ADMIN — GABAPENTIN 100 MILLIGRAM(S): 400 CAPSULE ORAL at 13:02

## 2023-03-20 RX ADMIN — MORPHINE SULFATE 2 MILLIGRAM(S): 50 CAPSULE, EXTENDED RELEASE ORAL at 22:00

## 2023-03-20 RX ADMIN — LEVETIRACETAM 400 MILLIGRAM(S): 250 TABLET, FILM COATED ORAL at 17:31

## 2023-03-20 RX ADMIN — Medication 81 MILLIGRAM(S): at 11:22

## 2023-03-20 RX ADMIN — CEFEPIME 100 MILLIGRAM(S): 1 INJECTION, POWDER, FOR SOLUTION INTRAMUSCULAR; INTRAVENOUS at 23:07

## 2023-03-20 RX ADMIN — Medication 1 TABLET(S): at 11:22

## 2023-03-20 RX ADMIN — SODIUM CHLORIDE 60 MILLILITER(S): 9 INJECTION, SOLUTION INTRAVENOUS at 17:05

## 2023-03-20 RX ADMIN — Medication 5 MILLIGRAM(S): at 21:56

## 2023-03-20 RX ADMIN — Medication 500 MILLIGRAM(S): at 11:22

## 2023-03-20 RX ADMIN — SODIUM CHLORIDE 333.33 MILLILITER(S): 9 INJECTION, SOLUTION INTRAVENOUS at 13:34

## 2023-03-20 RX ADMIN — SODIUM CHLORIDE 1000 MILLILITER(S): 9 INJECTION, SOLUTION INTRAVENOUS at 05:03

## 2023-03-20 RX ADMIN — Medication 500 MILLIGRAM(S): at 13:02

## 2023-03-20 RX ADMIN — Medication 1 APPLICATION(S): at 05:04

## 2023-03-20 RX ADMIN — Medication 500 MILLIGRAM(S): at 05:03

## 2023-03-20 RX ADMIN — MORPHINE SULFATE 2 MILLIGRAM(S): 50 CAPSULE, EXTENDED RELEASE ORAL at 23:10

## 2023-03-20 RX ADMIN — SODIUM CHLORIDE 1000 MILLILITER(S): 9 INJECTION, SOLUTION INTRAVENOUS at 21:06

## 2023-03-20 RX ADMIN — LEVETIRACETAM 1000 MILLIGRAM(S): 250 TABLET, FILM COATED ORAL at 05:03

## 2023-03-20 RX ADMIN — Medication 5 MILLIGRAM(S): at 05:04

## 2023-03-20 RX ADMIN — ENOXAPARIN SODIUM 40 MILLIGRAM(S): 100 INJECTION SUBCUTANEOUS at 21:42

## 2023-03-20 RX ADMIN — GABAPENTIN 100 MILLIGRAM(S): 400 CAPSULE ORAL at 08:32

## 2023-03-20 RX ADMIN — Medication 1 APPLICATION(S): at 17:32

## 2023-03-20 NOTE — CHART NOTE - NSCHARTNOTEFT_GEN_A_CORE
Registered Dietitian Follow-Up  Patient Profile Reviewed                           Yes [x]   No []  Nutrition History Previously Obtained        Yes [x]  No []      Pertinent Medical Interventions:  67 year old lady brought to ED via EMS who per triage note found pt on street. Per triage note, daughter told EMS that pt c/o generalized pain, however no family present.     Nutrition Interval History:   07-Mar-2023 SLP evaluation   Soft and bite-sized w/ thin liquids  allow for swallow between intakes; maintain upright posture during/after eating for 30 mins; oral hygiene  dependent  1:1 feed    Nutrient Intake:   patient completing </=50% of meal trays through admission   overall PO intake has been further decreasing through admission   having <50% of oral supplements  requires assistance eating secondary to having limited mobility in arms + having weakness unable to feed self.    suspect patient meeting <75% of estimated energy needs in-house [id number]    Diet, Soft and Bite Sized:   Free Water Flush Instructions:  MAGIC CUP 1X AT LUNCH. PLEASE GIVE VANILLA OR STRAWBERRY SUPPLEMENTS, no chocolate  Supplement Feeding Modality:  Oral  Ensure Plus High Protein Cans or Servings Per Day:  2       Frequency:  Daily (23 @ 16:27) [Active]    Anthropometrics:  Height (cm): 167.6 (23 @ 16:25)  Weight (kg): 68.3 (23 @ 10:12)  BMI (kg/m2): 24.3 (23 @ 10:12)    OTHER WEIGHTS:   ^ height per patient report  ^ weight obtained 3/6 via bed scale at time of visit, with consideration for edema   UBW: 68.2kg  IBW 52.3kg   patient bed weight higher than reported UBW, patient reported that she has 'some weight loss here or there'  unsure if accurate report considering patient noted with confusion  Daily Weight in k.3 ()    MEDICATIONS  (STANDING):  acetaminophen  Suppository .. 325 milliGRAM(s) Rectal once  ascorbic acid 500 milliGRAM(s) Oral daily  aspirin  chewable 81 milliGRAM(s) Oral daily  atorvastatin 40 milliGRAM(s) Oral at bedtime  baclofen 5 milliGRAM(s) Oral every 12 hours  clopidogrel Tablet 75 milliGRAM(s) Oral daily  enoxaparin Injectable 40 milliGRAM(s) SubCutaneous every 24 hours  gabapentin 100 milliGRAM(s) Oral three times a day  levETIRAcetam  Solution 1000 milliGRAM(s) Oral two times a day  LORazepam   Injectable 2 milliGRAM(s) IV Push once  multivitamin/minerals 1 Tablet(s) Oral daily  valproic  acid Syrup 500 milliGRAM(s) Oral three times a day  vitamin A &amp; D Ointment 1 Application(s) Topical two times a day    MEDICATIONS  (PRN):  acetaminophen     Tablet .. 650 milliGRAM(s) Oral every 6 hours PRN Moderate Pain (4 - 6)  oxycodone    5 mG/acetaminophen 325 mG 1 Tablet(s) Oral every 6 hours PRN Moderate Pain (4 - 6)    LABS:  3/18 last taken  Cr <0.5(L)    Physical Findings:  - Cognition: disoriented to; place; time; situation-- mild temple/clavicle deletion   - GI function: Last BM not indicated per flow sheets   - Tubes: n/a  - Oral/Mouth cavity: soft and bite size   - Skin: 3/15 WOCN assessment   High risk for pressure injury development or progression   B/L heel intact   B/l ear healed ulceration   Stage three pressure injury to B/l buttock   - Edema: left arm 2+, generalized 1+ 3/14     Nutrition Requirements: with consideration for age, weight, BMI, wounds  Weight Used: 68.2 kg dry weight      Estimated Energy Needs    Continue [x]  Adjust [] 2699-5063 kcal/day (MSJ x 1.2-1.5)  Estimated Protein Needs    Continue [x]  Adjust [] 82-102g/day (1.2-1.5g/kg)  Estimated Fluid Needs        Continue [x]  Adjust [] 1700mLday (25mL/kg)    [x] Previous Nutrition Diagnosis:            [x] Ongoing          [] Resolved  #1 Increased Nutrient Needs  Malnutrition (moderate)  Goal/Expected Outcome: meet >/=75% est energy needs within 5 days    Nutrition Intervention: Meals and Snacks, Medical Food Supplement, Vitamin Supplement, Nutrition Related Medication, Coordination of Care  Indicator/Monitoring:  Monitor diet order, energy intake, food and nutrient intake, body composition, weight    Recommendations:  - soft and bite size thin liquids per SLP  - Ensure HIGH PROTEIN 2x/day to optimize pro/kcal intake (350kcal, 20 g pro/serving) Vanilla/Strawberry only  - Magic Cup 1x/day at dinner (290kcal, 9g protein/serving)  - 1:1 feeding assistance, patient with limited mobility in arm/hands  - continue ascorbic acid 500mg/daily, MV w/ minerals daily for wound healing if medically feasible  - obtain weights  - consider appetite stimulant if not contraindicated, would avoid marinol as may make patient more lethargic, further alter cognition   - recommend 3 day calorie count   - discuss GOC regarding nutrition/hydration    Patient at HIGH nutrition risk   will follow up within 3-5days  Cesario Abbott, RD  [phone] or via TEAMS. Registered Dietitian Follow-Up  Patient Profile Reviewed                           Yes [x]   No []  Nutrition History Previously Obtained        Yes [x]  No []      Pertinent Medical Interventions:  67 year old lady brought to ED via EMS who per triage note found pt on street. Per triage note, daughter told EMS that pt c/o generalized pain, however no family present.     Nutrition Interval History:   - per RN patient ate very minimal today (only a few bites of breakfast and lunch) prior to having speech pathologist re-evaluation   then was made NPO (20-Mar-2023 SLP evaluation today recommendations for NPO + alternate means of nutrition/hydration)  - plan for SLP services to re-eval again tomorrow   - overall PO intake has been further decreasing through admission, has been having <50% of oral supplements as well  **patient meeting <75% of estimated energy needs in-house [id number]    Diet, Soft and Bite Sized:   Free Water Flush Instructions:  MAGIC CUP 1X AT LUNCH. PLEASE GIVE VANILLA OR STRAWBERRY SUPPLEMENTS, no chocolate  Supplement Feeding Modality:  Oral  Ensure Plus High Protein Cans or Servings Per Day:  2       Frequency:  Daily (23 @ 16:27) [Active]    Anthropometrics:  Height (cm): 167.6 (23 @ 16:25)  Weight (kg): 68.3 (23 @ 10:12)  BMI (kg/m2): 24.3 (23 @ 10:12)    OTHER WEIGHTS:   ^ height per patient report  ^ weight obtained 3/6 via bed scale at time of visit, with consideration for edema   UBW: 68.2kg  IBW 52.3kg   patient bed weight higher than reported UBW, patient reported that she has 'some weight loss here or there'  unsure if accurate report considering patient noted with confusion  Daily Weight in k.3 ()    MEDICATIONS  (STANDING):  acetaminophen  Suppository .. 325 milliGRAM(s) Rectal once  ascorbic acid 500 milliGRAM(s) Oral daily  aspirin  chewable 81 milliGRAM(s) Oral daily  atorvastatin 40 milliGRAM(s) Oral at bedtime  baclofen 5 milliGRAM(s) Oral every 12 hours  clopidogrel Tablet 75 milliGRAM(s) Oral daily  enoxaparin Injectable 40 milliGRAM(s) SubCutaneous every 24 hours  gabapentin 100 milliGRAM(s) Oral three times a day  levETIRAcetam  Solution 1000 milliGRAM(s) Oral two times a day  LORazepam   Injectable 2 milliGRAM(s) IV Push once  multivitamin/minerals 1 Tablet(s) Oral daily  valproic  acid Syrup 500 milliGRAM(s) Oral three times a day  vitamin A &amp; D Ointment 1 Application(s) Topical two times a day    MEDICATIONS  (PRN):  acetaminophen     Tablet .. 650 milliGRAM(s) Oral every 6 hours PRN Moderate Pain (4 - 6)  oxycodone    5 mG/acetaminophen 325 mG 1 Tablet(s) Oral every 6 hours PRN Moderate Pain (4 - 6)    LABS:  3/18 last taken  Cr <0.5(L)    Physical Findings:  - Cognition: disoriented to; place; time; situation-- mild temple/clavicle deletion   - GI function: Last BM not indicated per flow sheets   - Tubes: n/a  - Oral/Mouth cavity: soft and bite size   - Skin: 3/15 WOCN assessment   High risk for pressure injury development or progression   B/L heel intact   B/l ear healed ulceration   Stage three pressure injury to B/l buttock   - Edema: left arm 2+, generalized 1+ 3/14     Nutrition Requirements: with consideration for age, weight, BMI, wounds  Weight Used: 68.2 kg dry weight      Estimated Energy Needs    Continue [x]  Adjust [] 3041-1945 kcal/day (MSJ x 1.2-1.5)  Estimated Protein Needs    Continue [x]  Adjust [] 82-102g/day (1.2-1.5g/kg)  Estimated Fluid Needs        Continue [x]  Adjust [] 1700mLday (25mL/kg)    [x] Previous Nutrition Diagnosis:            [x] Ongoing          [] Resolved  #1 Increased Nutrient Needs  Malnutrition (moderate)  Goal/Expected Outcome: meet >/=75% est energy needs within 5 days    Nutrition Intervention: Meals and Snacks, Medical Food Supplement, Vitamin Supplement, Nutrition Related Medication, Coordination of Care  Indicator/Monitoring:  Monitor diet order, energy intake, food and nutrient intake, body composition, weight    Recommendations:  - currently NPO pending SLP re-eval tomorrow. clarify GOC regarding placing NGT for enteral feeds if needed  - if patient needs EN recommend: jevity 1.2 initiate day1 at 240mL 4x/day, if tolerated increase to goal rate of 360mL 4x/day @7am, 12pm, 5pm, 10pm [provides: 1440mL formula, 1728kcal, 80g protein, 1166mL free water] with Free water flush 30pre/post feeds for additional 240mL free water, total with EN 1406mL  - if patient diet upgraded / safe for PO recommend: diet texture deferred to SLP + Ensure HIGH PROTEIN 2x/day (350kcal, 20 g pro/serving) Vanilla/Strawberry only + Magic Cup 1x/day (290kcal, 9g protein/serving) to appropriate liquid texture  - consider appetite stimulant if not contraindicated, would avoid marinol as may make patient more lethargic, further alter cognition   - recommend 3 day calorie count if diet is advanced    Patient at HIGH nutrition risk   will follow up within 3-5days  Cesario Abbott, RD  [phone] or via TEAMS.

## 2023-03-20 NOTE — CHART NOTE - NSCHARTNOTEFT_GEN_A_CORE
called by RN regarding hypoTN  BP 80/55, , temp: 100.5  Pt coughing at encounter, says is new  received 2L bolus today    SIRS+    Plan:  1L bolus  Chest X-Ray + procal + BCx + RVP  1 dose vanc 1250 + cefepime 2g + levaquin 750 IV called by RN regarding hypoTN  BP 80/55, , temp: 100.5  Pt coughing at encounter, says is new  received 2L bolus today    SIRS+    Plan:  1L bolus  Chest X-Ray + procal + BCx + RVP  1 dose vanc 1250 + cefepime 2g + levaquin 750 IV    Chest X-Ray: RML?, fu official read

## 2023-03-20 NOTE — PROGRESS NOTE ADULT - SUBJECTIVE AND OBJECTIVE BOX
JOSEPH OBRIEN 67y Female  MRN#: [id number]     Hospital Day: 16d    CC:  Altered Mental Status likely due to Metabolic Encephalopathy from UTI, sepsis and seizure in the context of stroke with left hemiparesis     HOSPITAL COURSE:   67F. PMH:  BIBEMS 3/3 after being found on the street, daughter reported pt c/o generalized pain, had FS of 96. No family present at admission/no contact information. Pt reported feeling "okay".   In ED, hemodynamically stable with Fever. Labs: WBC 19K, elevated lactate, UA +ve  She was pan-scanned. no evidence of new stroke, trauma, or infection.  CTH showed old stroke in the territory of the Rt MCA.   Neuro eval'd 3/8-3/16: For stroke: L gaze preference, OP med review: on low dose Lamictal and Depakote in addition to Keppra (possible mood disorder/ seizures), HEr MRi of brain showing subcortical infarct within same M1 territory. The casue of stroke is most likely hypotension as she has a diminutive Rt M1. However, MRI findings doesn't explain her B/L upper extremity spasticity. So, it is important to rule out cervical cord pathology. vEEG 3/9: showed focal slowing. DAPT for 21 days followed by ASA 81 mg daily  High intensity statin, Can f/u in stroke clinic in 4 weeks following discharge.  Podiatry following for foot pain 2/ foot drop: WBAT w/ surgical shoe, ankle brace, PT   eval'd 3/15: Delirium     SUBJECTIVE     Overnight events  None    Subjective complaints                                               ----------------------------------------------------------  OBJECTIVE  PAST MEDICAL & SURGICAL HISTORY                                            -----------------------------------------------------------  ALLERGIES:  Allergy Status Unknown                                            ------------------------------------------------------------    HOME MEDICATIONS  Home Medications:  acetaminophen 325 mg oral tablet: 2 tab(s) orally every 6 hours, As needed, Moderate Pain (4 - 6) (19 Mar 2023 10:42)  ascorbic acid 500 mg oral tablet: 1 tab(s) orally once a day (19 Mar 2023 10:42)  aspirin 81 mg oral tablet, chewable: 1 tab(s) orally once a day (19 Mar 2023 10:42)  atorvastatin 40 mg oral tablet: 1 tab(s) orally once a day (at bedtime) (19 Mar 2023 10:42)  baclofen 5 mg oral tablet: 1 tab(s) orally every 12 hours (19 Mar 2023 10:42)  clopidogrel 75 mg oral tablet: 1 tab(s) orally once a day x 18 more days and then stop (19 Mar 2023 10:42)  enoxaparin: 40 unit(s) subcutaneous once a day (19 Mar 2023 10:42)  levETIRAcetam 100 mg/mL oral solution: 10 milliliter(s) orally 2 times a day (19 Mar 2023 10:42)  morphine 15 mg oral tablet: 1 tab(s) orally every 6 hours, As needed, Severe Pain (7 - 10) (19 Mar 2023 10:42)  Multiple Vitamins with Minerals oral tablet: 1 tab(s) orally once a day (19 Mar 2023 10:42)  pantoprazole 40 mg oral delayed release tablet: 1 tab(s) orally once a day (09 Mar 2023 19:56)  valproic acid 250 mg/5 mL oral liquid: 500 milligram(s) orally 3 times a day (19 Mar 2023 10:42)  vitamin A and D topical ointment: 1 application topically 2 times a day (19 Mar 2023 10:42)                           MEDICATIONS:  STANDING MEDICATIONS  acetaminophen  Suppository .. 325 milliGRAM(s) Rectal once  ascorbic acid 500 milliGRAM(s) Oral daily  aspirin  chewable 81 milliGRAM(s) Oral daily  atorvastatin 40 milliGRAM(s) Oral at bedtime  baclofen 5 milliGRAM(s) Oral every 12 hours  clopidogrel Tablet 75 milliGRAM(s) Oral daily  enoxaparin Injectable 40 milliGRAM(s) SubCutaneous every 24 hours  levETIRAcetam  Solution 1000 milliGRAM(s) Oral two times a day  LORazepam   Injectable 2 milliGRAM(s) IV Push once  multivitamin/minerals 1 Tablet(s) Oral daily  valproic  acid Syrup 500 milliGRAM(s) Oral three times a day  vitamin A &amp; D Ointment 1 Application(s) Topical two times a day    PRN MEDICATIONS  acetaminophen     Tablet .. 650 milliGRAM(s) Oral every 6 hours PRN  morphine  IR 15 milliGRAM(s) Oral every 6 hours PRN                                            ------------------------------------------------------------  VITAL SIGNS: Last 24 Hours  T(C): 35.8 (20 Mar 2023 05:00), Max: 36.7 (19 Mar 2023 12:05)  T(F): 96.5 (20 Mar 2023 05:00), Max: 98.1 (19 Mar 2023 12:05)  HR: 92 (20 Mar 2023 05:00) (92 - 97)  BP: 81/43 (20 Mar 2023 05:00) (81/43 - 131/74)  BP(mean): --  RR: 18 (20 Mar 2023 05:00) (16 - 18)  SpO2: 100% (20 Mar 2023 05:00) (100% - 100%)      03-19-23 @ 07:01  -  03-20-23 @ 06:07  --------------------------------------------------------  IN: 380 mL / OUT: 0 mL / NET: 380 mL                                             --------------------------------------------------------------  LABS:                        9.3    8.23  )-----------( 388      ( 18 Mar 2023 07:15 )             28.8     03-18    142  |  105  |  10  ----------------------------<  95  4.6   |  26  |  <0.5<L>    Ca    8.8      18 Mar 2023 07:15  Mg     1.9     03-18    TPro  5.2<L>  /  Alb  2.5<L>  /  TBili  <0.2  /  DBili  x   /  AST  11  /  ALT  <5  /  AlkPhos  52  03-18                                                              -------------------------------------------------------------  RADIOLOGY:                                            --------------------------------------------------------------    PHYSICAL EXAM:                                             --------------------------------------------------------------                 JOSEPH OBRIEN 67y Female  MRN#: [id number]     Hospital Day: 16d    CC:  Altered Mental Status likely due to Metabolic Encephalopathy from UTI, sepsis and seizure in the context of stroke with left hemiparesis     HOSPITAL COURSE:   67F. PMH:  BIBEMS 3/3 after being found on the street, daughter reported pt c/o generalized pain, had FS of 96. No family present at admission/no contact information. Pt reported feeling "okay".   In ED, hemodynamically stable with Fever. Labs: WBC 19K, elevated lactate, UA +ve  She was pan-scanned. no evidence of new stroke, trauma, or infection.  CTH showed old stroke in the territory of the Rt MCA.   Neuro eval'd 3/8-3/16: For stroke: L gaze preference, OP med review: on low dose Lamictal and Depakote in addition to Keppra (possible mood disorder/ seizures), HEr MRi of brain showing subcortical infarct within same M1 territory. The casue of stroke is most likely hypotension as she has a diminutive Rt M1. However, MRI findings doesn't explain her B/L upper extremity spasticity. So, it is important to rule out cervical cord pathology. vEEG 3/9: showed focal slowing. DAPT for 21 days followed by ASA 81 mg daily  High intensity statin, Can f/u in stroke clinic in 4 weeks following discharge.  Podiatry following for foot pain 2/ foot drop: WBAT w/ surgical shoe, ankle brace, PT  BH eval'd 3/15: Delirium     SUBJECTIVE     Overnight events  None    Subjective complaints   Pt evaluated at bedside. No active complaints.                                            ----------------------------------------------------------  OBJECTIVE  PAST MEDICAL & SURGICAL HISTORY                                            -----------------------------------------------------------  ALLERGIES:  Allergy Status Unknown                                            ------------------------------------------------------------    HOME MEDICATIONS  Home Medications:  acetaminophen 325 mg oral tablet: 2 tab(s) orally every 6 hours, As needed, Moderate Pain (4 - 6) (19 Mar 2023 10:42)  ascorbic acid 500 mg oral tablet: 1 tab(s) orally once a day (19 Mar 2023 10:42)  aspirin 81 mg oral tablet, chewable: 1 tab(s) orally once a day (19 Mar 2023 10:42)  atorvastatin 40 mg oral tablet: 1 tab(s) orally once a day (at bedtime) (19 Mar 2023 10:42)  baclofen 5 mg oral tablet: 1 tab(s) orally every 12 hours (19 Mar 2023 10:42)  clopidogrel 75 mg oral tablet: 1 tab(s) orally once a day x 18 more days and then stop (19 Mar 2023 10:42)  enoxaparin: 40 unit(s) subcutaneous once a day (19 Mar 2023 10:42)  levETIRAcetam 100 mg/mL oral solution: 10 milliliter(s) orally 2 times a day (19 Mar 2023 10:42)  morphine 15 mg oral tablet: 1 tab(s) orally every 6 hours, As needed, Severe Pain (7 - 10) (19 Mar 2023 10:42)  Multiple Vitamins with Minerals oral tablet: 1 tab(s) orally once a day (19 Mar 2023 10:42)  pantoprazole 40 mg oral delayed release tablet: 1 tab(s) orally once a day (09 Mar 2023 19:56)  valproic acid 250 mg/5 mL oral liquid: 500 milligram(s) orally 3 times a day (19 Mar 2023 10:42)  vitamin A and D topical ointment: 1 application topically 2 times a day (19 Mar 2023 10:42)                           MEDICATIONS:  STANDING MEDICATIONS  acetaminophen  Suppository .. 325 milliGRAM(s) Rectal once  ascorbic acid 500 milliGRAM(s) Oral daily  aspirin  chewable 81 milliGRAM(s) Oral daily  atorvastatin 40 milliGRAM(s) Oral at bedtime  baclofen 5 milliGRAM(s) Oral every 12 hours  clopidogrel Tablet 75 milliGRAM(s) Oral daily  enoxaparin Injectable 40 milliGRAM(s) SubCutaneous every 24 hours  levETIRAcetam  Solution 1000 milliGRAM(s) Oral two times a day  LORazepam   Injectable 2 milliGRAM(s) IV Push once  multivitamin/minerals 1 Tablet(s) Oral daily  valproic  acid Syrup 500 milliGRAM(s) Oral three times a day  vitamin A &amp; D Ointment 1 Application(s) Topical two times a day    PRN MEDICATIONS  acetaminophen     Tablet .. 650 milliGRAM(s) Oral every 6 hours PRN  morphine  IR 15 milliGRAM(s) Oral every 6 hours PRN                                            ------------------------------------------------------------  VITAL SIGNS: Last 24 Hours  T(C): 35.8 (20 Mar 2023 05:00), Max: 36.7 (19 Mar 2023 12:05)  T(F): 96.5 (20 Mar 2023 05:00), Max: 98.1 (19 Mar 2023 12:05)  HR: 92 (20 Mar 2023 05:00) (92 - 97)  BP: 81/43 (20 Mar 2023 05:00) (81/43 - 131/74)  BP(mean): --  RR: 18 (20 Mar 2023 05:00) (16 - 18)  SpO2: 100% (20 Mar 2023 05:00) (100% - 100%)      03-19-23 @ 07:01  -  03-20-23 @ 06:07  --------------------------------------------------------  IN: 380 mL / OUT: 0 mL / NET: 380 mL                                             --------------------------------------------------------------  LABS:                        9.3    8.23  )-----------( 388      ( 18 Mar 2023 07:15 )             28.8     03-18    142  |  105  |  10  ----------------------------<  95  4.6   |  26  |  <0.5<L>    Ca    8.8      18 Mar 2023 07:15  Mg     1.9     03-18    TPro  5.2<L>  /  Alb  2.5<L>  /  TBili  <0.2  /  DBili  x   /  AST  11  /  ALT  <5  /  AlkPhos  52  03-18                                                              -------------------------------------------------------------  RADIOLOGY:                                            --------------------------------------------------------------    PHYSICAL EXAM:  GEN: NAD  NEURO: Awake and alert, L hemiparesis   HEENT: nontraumatic, normocephalic, neck supple  CARD: RRR  PULM: B/L decreased breath sounds  ABD: Soft, nondistended, nontender  EXTR: No clubbing, cyanosis, edema. 2+ pulses b/l LE, left LE in CAM boot                                           --------------------------------------------------------------                 JOSEPH OBRIEN 67y Female  MRN#: [id number]     Hospital Day: 16d    CC: Altered Mental Status likely due to Metabolic Encephalopathy from UTI, sepsis and seizure in the context of stroke with left hemiparesis    HOSPITAL COURSE:  67F. PMH:  BIBEMS 3/3 after being found on the street, daughter reported pt c/o generalized pain, had FS of 96. No family present at admission/no contact information. Pt reported feeling "okay".  In ED, hemodynamically stable with Fever. Labs: WBC 19K, elevated lactate, UA +ve  She was pan-scanned. no evidence of new stroke, trauma, or infection.  CTH showed old stroke in the territory of the Rt MCA.  Neuro eval'd 3/8-3/16: For stroke: L gaze preference, OP med review: on low dose Lamictal and Depakote in addition to Keppra (possible mood disorder/ seizures), HEr MRi of brain showing subcortical infarct within same M1 territory. The casue of stroke is most likely hypotension as she has a diminutive Rt M1. However, MRI findings doesn't explain her B/L upper extremity spasticity. So, it is important to rule out cervical cord pathology. vEEG 3/9: showed focal slowing. DAPT for 21 days followed by ASA 81 mg daily  High intensity statin, Can f/u in stroke clinic in 4 weeks following discharge.  Podiatry following for foot pain 2/ foot drop: WBAT w/ surgical shoe, ankle brace, PT  BH eval'd 3/15: Delirium    SUBJECTIVE     Overnight events  None    Subjective complaints   Pt evaluated at bedside. No active complaints.                                            ----------------------------------------------------------  OBJECTIVE  PAST MEDICAL & SURGICAL HISTORY                                            -----------------------------------------------------------  ALLERGIES:  Allergy Status Unknown                                            ------------------------------------------------------------    HOME MEDICATIONS  Home Medications:  acetaminophen 325 mg oral tablet: 2 tab(s) orally every 6 hours, As needed, Moderate Pain (4 - 6) (19 Mar 2023 10:42)  ascorbic acid 500 mg oral tablet: 1 tab(s) orally once a day (19 Mar 2023 10:42)  aspirin 81 mg oral tablet, chewable: 1 tab(s) orally once a day (19 Mar 2023 10:42)  atorvastatin 40 mg oral tablet: 1 tab(s) orally once a day (at bedtime) (19 Mar 2023 10:42)  baclofen 5 mg oral tablet: 1 tab(s) orally every 12 hours (19 Mar 2023 10:42)  clopidogrel 75 mg oral tablet: 1 tab(s) orally once a day x 18 more days and then stop (19 Mar 2023 10:42)  enoxaparin: 40 unit(s) subcutaneous once a day (19 Mar 2023 10:42)  levETIRAcetam 100 mg/mL oral solution: 10 milliliter(s) orally 2 times a day (19 Mar 2023 10:42)  morphine 15 mg oral tablet: 1 tab(s) orally every 6 hours, As needed, Severe Pain (7 - 10) (19 Mar 2023 10:42)  Multiple Vitamins with Minerals oral tablet: 1 tab(s) orally once a day (19 Mar 2023 10:42)  pantoprazole 40 mg oral delayed release tablet: 1 tab(s) orally once a day (09 Mar 2023 19:56)  valproic acid 250 mg/5 mL oral liquid: 500 milligram(s) orally 3 times a day (19 Mar 2023 10:42)  vitamin A and D topical ointment: 1 application topically 2 times a day (19 Mar 2023 10:42)                           MEDICATIONS:  STANDING MEDICATIONS  acetaminophen  Suppository .. 325 milliGRAM(s) Rectal once  ascorbic acid 500 milliGRAM(s) Oral daily  aspirin  chewable 81 milliGRAM(s) Oral daily  atorvastatin 40 milliGRAM(s) Oral at bedtime  baclofen 5 milliGRAM(s) Oral every 12 hours  clopidogrel Tablet 75 milliGRAM(s) Oral daily  enoxaparin Injectable 40 milliGRAM(s) SubCutaneous every 24 hours  levETIRAcetam  Solution 1000 milliGRAM(s) Oral two times a day  LORazepam   Injectable 2 milliGRAM(s) IV Push once  multivitamin/minerals 1 Tablet(s) Oral daily  valproic  acid Syrup 500 milliGRAM(s) Oral three times a day  vitamin A &amp; D Ointment 1 Application(s) Topical two times a day    PRN MEDICATIONS  acetaminophen     Tablet .. 650 milliGRAM(s) Oral every 6 hours PRN  morphine  IR 15 milliGRAM(s) Oral every 6 hours PRN                                            ------------------------------------------------------------  VITAL SIGNS: Last 24 Hours  T(C): 35.8 (20 Mar 2023 05:00), Max: 36.7 (19 Mar 2023 12:05)  T(F): 96.5 (20 Mar 2023 05:00), Max: 98.1 (19 Mar 2023 12:05)  HR: 92 (20 Mar 2023 05:00) (92 - 97)  BP: 81/43 (20 Mar 2023 05:00) (81/43 - 131/74)  BP(mean): --  RR: 18 (20 Mar 2023 05:00) (16 - 18)  SpO2: 100% (20 Mar 2023 05:00) (100% - 100%)      03-19-23 @ 07:01  -  03-20-23 @ 06:07  --------------------------------------------------------  IN: 380 mL / OUT: 0 mL / NET: 380 mL                                             --------------------------------------------------------------  LABS:                        9.3    8.23  )-----------( 388      ( 18 Mar 2023 07:15 )             28.8     03-18    142  |  105  |  10  ----------------------------<  95  4.6   |  26  |  <0.5<L>    Ca    8.8      18 Mar 2023 07:15  Mg     1.9     03-18    TPro  5.2<L>  /  Alb  2.5<L>  /  TBili  <0.2  /  DBili  x   /  AST  11  /  ALT  <5  /  AlkPhos  52  03-18                                                              -------------------------------------------------------------  RADIOLOGY:                                            --------------------------------------------------------------    PHYSICAL EXAM:  GEN: NAD  NEURO: Awake and alert, L hemiparesis   HEENT: nontraumatic, normocephalic, neck supple  CARD: RRR  PULM: B/L decreased breath sounds  ABD: Soft, nondistended, nontender  EXTR: No clubbing, cyanosis, edema. 2+ pulses b/l LE, left LE in CAM boot                                           --------------------------------------------------------------

## 2023-03-20 NOTE — PROGRESS NOTE ADULT - ASSESSMENT
68 y/o woman with PMH of Right MCA stroke with left sided weakness was brought to the ED via EMS. In the ED, she had altered mental status and sepsis.     1. Altered Mental Status likely due to Metabolic Encephalopathy from UTI, sepsis and seizure in the context of stroke with left hemiparesis   s/p course of abx for E. coli UTI  CT scan of head: no acute pathology  CTA of head/neck: no evidence of occlusion, aneurysm or stenosis  s/p EEG and video EEG  s/p rapid response for seizure  more awake and alert now   per chart: at baseline she is alert, able to communicate and is oriented. Ambulation at baseline: uses a wheelchair. She was in a hospital in [facility] last fall and was lethargic for months   on Depakote 500 mg TID and Keppra 1000 mg BID  seizure precautions  MRI of brain: Small patchy subcortical infarct in the right frontal lobe. Redemonstrated chronic infarct in the right MCA territory.  Mild chronic microvascular ischemic changes.  Neuro f/u appreciated - stroke likely due to hypotension - has diminutive caliber of right M1 - avoid hypotension  Added ASA, Plavix and statin for new stroke -  DAPT for 21 days followed by ASA 81 mg daily - f/u in stroke clinic in 4 weeks per neurology  pt with UE spasticity: MRI of C spine: Significantly motion limited study. No gross cord compression or spinal cord edema demonstrated. Multilevel small disc bulges. - continue baclofen 5mg bid  behavioral health eval appreciated: pt does not have the capacity to choose a Health care agent    2. Left avulsion fracture of medial malleolus  evaluated by podiatry - no surgical intervention - WB with CAM boot  continue PT  fall precautions  pain control    3. Sinus tachycardia   now resolved - attributed to pain  EKG reviewed  TSH WNL    4. Left cephalic vein thrombosis - s/p warm compress    5. Anemia - normocytic  labs reviewed - Hgb in 9-10 range and now stable  monitor for bleeding  possibly due to frequent phlebotomy    6. DVT prophylaxis      full code  overall guarded prognosis   no need for daily labs - now stable   68 y/o woman with PMH of Right MCA stroke with left sided weakness was brought to the ED via EMS. In the ED, she had altered mental status and sepsis.     #Altered Mental Status likely due to Metabolic Encephalopathy from UTI, sepsis and seizure in the context of stroke with left hemiparesis   s/p course of abx for E. coli UTI  CT scan of head: no acute pathology  CTA of head/neck: no evidence of occlusion, aneurysm or stenosis  s/p EEG and video EEG  s/p rapid response for seizure  more awake and alert now   per chart: at baseline she is alert, able to communicate and is oriented. Ambulation at baseline: uses a wheelchair. She was in a hospital in [facility] last fall and was lethargic for months   on Depakote 500 mg TID and Keppra 1000 mg BID  seizure precautions  MRI of brain: Small patchy subcortical infarct in the right frontal lobe. Redemonstrated chronic infarct in the right MCA territory.  Mild chronic microvascular ischemic changes.  Neuro f/u appreciated - stroke likely due to hypotension - has diminutive caliber of right M1 - avoid hypotension  Added ASA, Plavix and statin for new stroke -  DAPT for 21 days followed by ASA 81 mg daily - f/u in stroke clinic in 4 weeks per neurology  pt with UE spasticity: MRI of C spine: Significantly motion limited study. No gross cord compression or spinal cord edema demonstrated. Multilevel small disc bulges. - continue baclofen 5mg bid  behavioral health eval appreciated: pt does not have the capacity to choose a Health care agent  - Decrease pain control regimen - if pain uncontrolled consider increasing percocet to 2tabs.   - Start gabapentin at 100mg TID (Lowered dose from home, increase as pt tolerates)    #Left avulsion fracture of medial malleolus  evaluated by podiatry - no surgical intervention - WB with CAM boot  continue PT  fall precautions  pain control    #Sinus tachycardia   now resolved - attributed to pain  EKG reviewed  TSH WNL    # Left cephalic vein thrombosis - s/p warm compress    #Anemia - normocytic  labs reviewed - Hgb in 9-10 range and now stable  monitor for bleeding  possibly due to frequent phlebotomy    # DVT prophylaxis      full code  overall guarded prognosis   no need for daily labs - now stable   68 y/o woman with PMH of Right MCA stroke with left sided weakness was brought to the ED via EMS. In the ED, she had altered mental status and sepsis.    #AMS likely due to Metabolic Encephalopathy from UTI, sepsis and seizure in the context of stroke with left hemiparesis  - per chart: at baseline she is alert, able to communicate and is oriented. Ambulation at baseline: uses a wheelchair. She was in a hospital in [facility] last fall and was lethargic for months  - CTH/CTA of head/neck (3/4): no acute pathology, no evidence of occlusion, aneurysm or stenosis  - 3/8: s/p rapid response for L gaze preference  - s/p EEG (3/8) and video EEG (3/9): Focal slowing  - MRI Brain (3/15): new subcortical infarct in the right frontal lobe. Redemonstrated chronic infarct in the right MCA territory.  - MRI of C spine (3/16): Significantly motion limited study. No gross cord compression or spinal cord edema demonstrated. Multilevel small disc bulges.  - s/p Vanc x1 (3/4), Cefepime (x1 3/4, x1 3/8), Ceftriaxone (3/4-3/6) (for UTI)  *****  - Neuro eval'd 3/8-3/16: For L gaze preference/stroke, OP med review: on low dose Lamictal and Depakote in addition to Keppra (possible mood disorder/ seizures), Her MRI of brain showing subcortical infarct within same M1 territory. The casue of stroke is most likely hypotension as she has a diminutive Rt M1. However, MRI findings doesn't explain her B/L upper extremity spasticity. So, it is important to rule out cervical cord pathology. DAPT for 21 days followed by ASA 81 mg daily  - BH eval'd 3/15: Delirium; pt does not have the capacity to choose a Health care agent  - Seizure precautions  - C/w Depakote 500mg TID and Keppra 1000mg BID  - C/w baclofen 5mg bid (for UE spasticity)  - C/w ASA, Plavix (3/17-present), and high dose statin for new stroke - DAPT for 21 days followed by ASA 81 mg daily  *****  - OP stroke clinic in 4 weeks per neurology    #Left avulsion fracture of medial malleolus  - Duplex BL LE (3/7): (-) for DVT  - CT Foot (3/11): acute minimally displaced avulsion fracture at the medial malleolus  *****  - Podiatry following for foot pain: WBAT w/ surgical shoe, ankle brace, PT  - PT  - fall precautions  - Decrease pain control regimen - if pain uncontrolled consider increasing percocet to 2tabs.  - Start gabapentin at 100mg TID (Lowered dose from home, increase as pt tolerates)    #Sinus tachycardia - now resolved - attributed to pain  - TSH (3/8) WNL, - EKG reviewed    #Left cephalic vein thrombosis  - Duplex LUE (3/10): No DVT however there is superficial thrombosis noted in the left cephalic vein  - s/p warm compress    #Anemia - normocytic - stable  - possibly due to frequent phlebotomy, monitor for bleeding    #Misc  - DVT ppx - lovenox  - Diet: Soft and bite sized (SS 3/7)  - full code, overall guarded prognosis  - no need for daily labs - now stable

## 2023-03-20 NOTE — CHART NOTE - NSCHARTNOTEFT_GEN_A_CORE
Patient was evaluated by Neurology last week for seizure and acute infarct. Patient has been stable pending dispo. As per primary team , patient had an episode of gaze deviation to left which has resolved. She is back to her baseline now. No recent missed dose of AED, no sign of infection.    Recommendations:   - Can increase Keppra 1500 mg BID  - Last VPA level: 03/04/23: 87  - Please send VPA level trough  - For now Continue Depakote 500 mg TID  - If this episode recurs consider starting video EEG.    Neurology will follow Patient was evaluated by Neurology last week for seizure and acute infarct. Patient has been stable pending dispo. As per primary team , During SLP evaluation, patient had an episode of gaze deviation to left which has resolved. She is back to her baseline now. No recent missed dose of AED, no sign of infection.    Recommendations:   - Can increase Keppra 1500 mg BID  - Last VPA level: 03/04/23: 87  - Please send VPA level trough  - For now Continue Depakote 500 mg TID  - If this episode recurs consider starting video EEG.    Neurology will follow

## 2023-03-20 NOTE — PROGRESS NOTE ADULT - ATTENDING COMMENTS
Patient is a 68 y/o woman with PMH of Right MCA stroke with left sided weakness was brought to the ED via EMS. In the ED, she had altered mental status and sepsis. Patient has a h/o prior hospitalization  in a hospital in [facility] last fall due to persistent metabolic encephalopathy for months     #  Altered Mental Status likely due to Metabolic Encephalopathy from UTI, sepsis and seizure in the context of acute and chronic stroke with left hemiparesis   s/p course of abx for E. coli UTI  MRI of brain: Small patchy subcortical infarct in the right frontal lobe. Redemonstrated chronic infarct in the right MCA territory.  Mild chronic microvascular ischemic changes.  CTA of head/neck: no evidence of occlusion, aneurysm or stenosis  s/p rapid response for seizure- s/p EEG and video EEG: continue on Depakote 500 mg TID and Keppra 1000 mg BID  seizure precautions  Neuro f/u appreciated - stroke likely due to hypotension - has diminutive caliber of right M1 - avoid hypotension  Added ASA, Plavix and statin for new stroke -  DAPT for 21 days followed by ASA 81 mg daily - f/u in stroke clinic in 4 weeks per neurology  pt with UE spasticity: MRI of C spine: Significantly motion limited study. No gross cord compression or spinal cord edema demonstrated. Multilevel small disc bulges. - continue baclofen 5mg bid  behavioral health eval appreciated: pt does not have the capacity to choose a Health care agent  -clinically patient is not following verbal commands, persistent left sided paralysis, dysarthria , oropharyngeal dysphagia - on soft bite size diet, to be fed with assistance with maintenance of aspiration precautions. patient is in chronic bed confinement status.    #Left avulsion fracture of medial malleolus  evaluated by podiatry - no surgical intervention - WB with CAM boot  continue PT/fall precautions  pain control    # Sinus tachycardia   now resolved - attributed to pain  EKG reviewed  TSH WNL    #Left cephalic vein thrombosis - s/p warm compress    # Anemia - normocytic  labs reviewed - Hgb in 9-10 range and now stable    # DVT prophylaxis    full code, overall patient's prognosis guarded     #Progress Note Handoff: Patient was medically optimized, stable for d/c to STR once bed is available   #Disposition: SNF once bed is available    Total time spent to complete patient's bedside assessment, review medical chart, discuss medical plan of care with covering medical team was more than 35 minutes with >50% of time spent face to face with patient, discussion with patient/family and/or coordination of care

## 2023-03-21 LAB
ALBUMIN SERPL ELPH-MCNC: 2.3 G/DL — LOW (ref 3.5–5.2)
ALP SERPL-CCNC: 59 U/L — SIGNIFICANT CHANGE UP (ref 30–115)
ALT FLD-CCNC: 5 U/L — SIGNIFICANT CHANGE UP (ref 0–41)
ANION GAP SERPL CALC-SCNC: 9 MMOL/L — SIGNIFICANT CHANGE UP (ref 7–14)
APPEARANCE UR: CLEAR — SIGNIFICANT CHANGE UP
AST SERPL-CCNC: 13 U/L — SIGNIFICANT CHANGE UP (ref 0–41)
BILIRUB SERPL-MCNC: 0.2 MG/DL — SIGNIFICANT CHANGE UP (ref 0.2–1.2)
BILIRUB UR-MCNC: NEGATIVE — SIGNIFICANT CHANGE UP
BUN SERPL-MCNC: 7 MG/DL — LOW (ref 10–20)
CALCIUM SERPL-MCNC: 8.5 MG/DL — SIGNIFICANT CHANGE UP (ref 8.4–10.4)
CHLORIDE SERPL-SCNC: 104 MMOL/L — SIGNIFICANT CHANGE UP (ref 98–110)
CO2 SERPL-SCNC: 25 MMOL/L — SIGNIFICANT CHANGE UP (ref 17–32)
COLOR SPEC: COLORLESS — SIGNIFICANT CHANGE UP
CREAT SERPL-MCNC: <0.5 MG/DL — LOW (ref 0.7–1.5)
DIFF PNL FLD: NEGATIVE — SIGNIFICANT CHANGE UP
EGFR: 109 ML/MIN/1.73M2 — SIGNIFICANT CHANGE UP
GLUCOSE BLDC GLUCOMTR-MCNC: 109 MG/DL — HIGH (ref 70–99)
GLUCOSE BLDC GLUCOMTR-MCNC: 65 MG/DL — LOW (ref 70–99)
GLUCOSE BLDC GLUCOMTR-MCNC: 81 MG/DL — SIGNIFICANT CHANGE UP (ref 70–99)
GLUCOSE SERPL-MCNC: 105 MG/DL — HIGH (ref 70–99)
GLUCOSE UR QL: ABNORMAL
HCT VFR BLD CALC: 23.8 % — LOW (ref 37–47)
HGB BLD-MCNC: 7.6 G/DL — LOW (ref 12–16)
KETONES UR-MCNC: ABNORMAL
LACTATE SERPL-SCNC: 1.1 MMOL/L — SIGNIFICANT CHANGE UP (ref 0.7–2)
LEUKOCYTE ESTERASE UR-ACNC: NEGATIVE — SIGNIFICANT CHANGE UP
MCHC RBC-ENTMCNC: 29.3 PG — SIGNIFICANT CHANGE UP (ref 27–31)
MCHC RBC-ENTMCNC: 31.9 G/DL — LOW (ref 32–37)
MCV RBC AUTO: 91.9 FL — SIGNIFICANT CHANGE UP (ref 81–99)
NITRITE UR-MCNC: NEGATIVE — SIGNIFICANT CHANGE UP
NRBC # BLD: 0 /100 WBCS — SIGNIFICANT CHANGE UP (ref 0–0)
PH UR: 8.5 — HIGH (ref 5–8)
PLATELET # BLD AUTO: 334 K/UL — SIGNIFICANT CHANGE UP (ref 130–400)
POTASSIUM SERPL-MCNC: 3.6 MMOL/L — SIGNIFICANT CHANGE UP (ref 3.5–5)
POTASSIUM SERPL-SCNC: 3.6 MMOL/L — SIGNIFICANT CHANGE UP (ref 3.5–5)
PROT SERPL-MCNC: 5 G/DL — LOW (ref 6–8)
PROT UR-MCNC: NEGATIVE — SIGNIFICANT CHANGE UP
RAPID RVP RESULT: SIGNIFICANT CHANGE UP
RBC # BLD: 2.59 M/UL — LOW (ref 4.2–5.4)
RBC # FLD: 13.7 % — SIGNIFICANT CHANGE UP (ref 11.5–14.5)
SARS-COV-2 RNA SPEC QL NAA+PROBE: SIGNIFICANT CHANGE UP
SODIUM SERPL-SCNC: 138 MMOL/L — SIGNIFICANT CHANGE UP (ref 135–146)
SP GR SPEC: 1.01 — LOW (ref 1.01–1.03)
UROBILINOGEN FLD QL: SIGNIFICANT CHANGE UP
VALPROATE SERPL-MCNC: 74 UG/ML — SIGNIFICANT CHANGE UP (ref 50–100)
WBC # BLD: 12.26 K/UL — HIGH (ref 4.8–10.8)
WBC # FLD AUTO: 12.26 K/UL — HIGH (ref 4.8–10.8)

## 2023-03-21 PROCEDURE — 99233 SBSQ HOSP IP/OBS HIGH 50: CPT

## 2023-03-21 RX ORDER — VANCOMYCIN HCL 1 G
1000 VIAL (EA) INTRAVENOUS EVERY 12 HOURS
Refills: 0 | Status: DISCONTINUED | OUTPATIENT
Start: 2023-03-21 | End: 2023-03-22

## 2023-03-21 RX ORDER — MIDODRINE HYDROCHLORIDE 2.5 MG/1
10 TABLET ORAL THREE TIMES A DAY
Refills: 0 | Status: DISCONTINUED | OUTPATIENT
Start: 2023-03-21 | End: 2023-03-23

## 2023-03-21 RX ORDER — ACETAMINOPHEN 500 MG
1000 TABLET ORAL ONCE
Refills: 0 | Status: COMPLETED | OUTPATIENT
Start: 2023-03-21 | End: 2023-03-21

## 2023-03-21 RX ORDER — CEFEPIME 1 G/1
2000 INJECTION, POWDER, FOR SOLUTION INTRAMUSCULAR; INTRAVENOUS EVERY 8 HOURS
Refills: 0 | Status: COMPLETED | OUTPATIENT
Start: 2023-03-21 | End: 2023-03-24

## 2023-03-21 RX ORDER — SODIUM CHLORIDE 9 MG/ML
1000 INJECTION, SOLUTION INTRAVENOUS
Refills: 0 | Status: DISCONTINUED | OUTPATIENT
Start: 2023-03-21 | End: 2023-03-23

## 2023-03-21 RX ORDER — DEXTROSE 50 % IN WATER 50 %
50 SYRINGE (ML) INTRAVENOUS ONCE
Refills: 0 | Status: COMPLETED | OUTPATIENT
Start: 2023-03-21 | End: 2023-03-21

## 2023-03-21 RX ADMIN — Medication 5 MILLIGRAM(S): at 05:18

## 2023-03-21 RX ADMIN — Medication 400 MILLIGRAM(S): at 05:51

## 2023-03-21 RX ADMIN — Medication 250 MILLIGRAM(S): at 17:57

## 2023-03-21 RX ADMIN — MIDODRINE HYDROCHLORIDE 10 MILLIGRAM(S): 2.5 TABLET ORAL at 10:36

## 2023-03-21 RX ADMIN — GABAPENTIN 100 MILLIGRAM(S): 400 CAPSULE ORAL at 21:35

## 2023-03-21 RX ADMIN — MIDODRINE HYDROCHLORIDE 10 MILLIGRAM(S): 2.5 TABLET ORAL at 21:35

## 2023-03-21 RX ADMIN — Medication 5 MILLIGRAM(S): at 17:45

## 2023-03-21 RX ADMIN — SODIUM CHLORIDE 75 MILLILITER(S): 9 INJECTION, SOLUTION INTRAVENOUS at 10:02

## 2023-03-21 RX ADMIN — LEVETIRACETAM 400 MILLIGRAM(S): 250 TABLET, FILM COATED ORAL at 17:56

## 2023-03-21 RX ADMIN — Medication 500 MILLIGRAM(S): at 11:58

## 2023-03-21 RX ADMIN — Medication 50 MILLILITER(S): at 07:02

## 2023-03-21 RX ADMIN — CLOPIDOGREL BISULFATE 75 MILLIGRAM(S): 75 TABLET, FILM COATED ORAL at 11:58

## 2023-03-21 RX ADMIN — Medication 5 MILLIGRAM(S): at 13:28

## 2023-03-21 RX ADMIN — ATORVASTATIN CALCIUM 40 MILLIGRAM(S): 80 TABLET, FILM COATED ORAL at 21:35

## 2023-03-21 RX ADMIN — GABAPENTIN 100 MILLIGRAM(S): 400 CAPSULE ORAL at 13:09

## 2023-03-21 RX ADMIN — Medication 5 MILLIGRAM(S): at 21:35

## 2023-03-21 RX ADMIN — CEFEPIME 100 MILLIGRAM(S): 1 INJECTION, POWDER, FOR SOLUTION INTRAMUSCULAR; INTRAVENOUS at 13:27

## 2023-03-21 RX ADMIN — Medication 1 TABLET(S): at 11:58

## 2023-03-21 RX ADMIN — ENOXAPARIN SODIUM 40 MILLIGRAM(S): 100 INJECTION SUBCUTANEOUS at 21:36

## 2023-03-21 RX ADMIN — Medication 1000 MILLIGRAM(S): at 06:43

## 2023-03-21 RX ADMIN — CEFEPIME 100 MILLIGRAM(S): 1 INJECTION, POWDER, FOR SOLUTION INTRAMUSCULAR; INTRAVENOUS at 21:35

## 2023-03-21 RX ADMIN — Medication 81 MILLIGRAM(S): at 11:58

## 2023-03-21 RX ADMIN — LEVETIRACETAM 400 MILLIGRAM(S): 250 TABLET, FILM COATED ORAL at 05:19

## 2023-03-21 RX ADMIN — Medication 1 APPLICATION(S): at 05:19

## 2023-03-21 NOTE — SWALLOW BEDSIDE ASSESSMENT ADULT - SWALLOW EVAL: DIAGNOSIS
+ toleration of thin liquids via straw sip, puree, and soft and bite size textures. Pt with mild - mod oral dysphagia characterized by delayed oral transit and swallow initiation

## 2023-03-21 NOTE — PROGRESS NOTE ADULT - SUBJECTIVE AND OBJECTIVE BOX
JOSEPH OBRIEN 67y Female  MRN#: [id number]     Hospital Day: 17d    CC: Altered Mental Status likely due to Metabolic Encephalopathy from UTI, sepsis and seizure in the context of stroke with left hemiparesis    HOSPITAL COURSE:  67F. PMH:  BIBEMS 3/3 after being found on the street, daughter reported pt c/o generalized pain, had FS of 96. No family present at admission/no contact information. Pt reported feeling "okay".  In ED, hemodynamically stable with Fever. Labs: WBC 19K, elevated lactate, UA +ve  She was pan-scanned. no evidence of new stroke, trauma, or infection.  CTH showed old stroke in the territory of the Rt MCA.  Neuro eval'd 3/8-3/16: For stroke: L gaze preference, OP med review: on low dose Lamictal and Depakote in addition to Keppra (possible mood disorder/ seizures), HEr MRi of brain showing subcortical infarct within same M1 territory. The casue of stroke is most likely hypotension as she has a diminutive Rt M1. However, MRI findings doesn't explain her B/L upper extremity spasticity. So, it is important to rule out cervical cord pathology. vEEG 3/9: showed focal slowing. DAPT for 21 days followed by ASA 81 mg daily  High intensity statin, Can f/u in stroke clinic in 4 weeks following discharge.  Podiatry following for foot pain 2/ foot drop: WBAT w/ surgical shoe, ankle brace, PT  BH eval'd 3/15: Delirium  Pt was treated for UTI, placed on appropriate medical management for stroke. 3/20 pt was noted to be less responsive and have forced gaze during her speech evaluation. Concern for seizure. Neurology was recalled- keppra was increased. Pt made NPO, and AEDs made IV. Overnight pt was again noted to be hypotensive requiring 2L fluid bolus, despite maintenance fluids.     SUBJECTIVE     Overnight events  None    Subjective complaints                                               ----------------------------------------------------------  OBJECTIVE  PAST MEDICAL & SURGICAL HISTORY                                            -----------------------------------------------------------  ALLERGIES:  Allergy Status Unknown                                            ------------------------------------------------------------    HOME MEDICATIONS  Home Medications:  acetaminophen 325 mg oral tablet: 2 tab(s) orally every 6 hours, As needed, Moderate Pain (4 - 6) (19 Mar 2023 10:42)  ascorbic acid 500 mg oral tablet: 1 tab(s) orally once a day (19 Mar 2023 10:42)  aspirin 81 mg oral tablet, chewable: 1 tab(s) orally once a day (19 Mar 2023 10:42)  atorvastatin 40 mg oral tablet: 1 tab(s) orally once a day (at bedtime) (19 Mar 2023 10:42)  baclofen 5 mg oral tablet: 1 tab(s) orally every 12 hours (19 Mar 2023 10:42)  clopidogrel 75 mg oral tablet: 1 tab(s) orally once a day x 18 more days and then stop (19 Mar 2023 10:42)  enoxaparin: 40 unit(s) subcutaneous once a day (19 Mar 2023 10:42)  levETIRAcetam 100 mg/mL oral solution: 10 milliliter(s) orally 2 times a day (19 Mar 2023 10:42)  morphine 15 mg oral tablet: 1 tab(s) orally every 6 hours, As needed, Severe Pain (7 - 10) (19 Mar 2023 10:42)  Multiple Vitamins with Minerals oral tablet: 1 tab(s) orally once a day (19 Mar 2023 10:42)  pantoprazole 40 mg oral delayed release tablet: 1 tab(s) orally once a day (09 Mar 2023 19:56)  valproic acid 250 mg/5 mL oral liquid: 500 milligram(s) orally 3 times a day (19 Mar 2023 10:42)  vitamin A and D topical ointment: 1 application topically 2 times a day (19 Mar 2023 10:42)                           MEDICATIONS:  STANDING MEDICATIONS  acetaminophen  Suppository .. 325 milliGRAM(s) Rectal once  ascorbic acid 500 milliGRAM(s) Oral daily  aspirin  chewable 81 milliGRAM(s) Oral daily  atorvastatin 40 milliGRAM(s) Oral at bedtime  baclofen 5 milliGRAM(s) Oral every 12 hours  clopidogrel Tablet 75 milliGRAM(s) Oral daily  enoxaparin Injectable 40 milliGRAM(s) SubCutaneous every 24 hours  gabapentin 100 milliGRAM(s) Oral three times a day  lactated ringers. 1000 milliLiter(s) IV Continuous <Continuous>  levETIRAcetam  IVPB 1500 milliGRAM(s) IV Intermittent every 12 hours  levETIRAcetam  Solution 1000 milliGRAM(s) Oral two times a day  multivitamin/minerals 1 Tablet(s) Oral daily  valproate sodium  IVPB 500 milliGRAM(s) IV Intermittent three times a day  vitamin A &amp; D Ointment 1 Application(s) Topical two times a day    PRN MEDICATIONS  acetaminophen     Tablet .. 650 milliGRAM(s) Oral every 6 hours PRN  morphine  - Injectable 2 milliGRAM(s) IV Push every 6 hours PRN  oxycodone    5 mG/acetaminophen 325 mG 1 Tablet(s) Oral every 6 hours PRN                                            ------------------------------------------------------------  VITAL SIGNS: Last 24 Hours  T(C): 38.7 (21 Mar 2023 05:00), Max: 38.7 (21 Mar 2023 05:00)  T(F): 101.7 (21 Mar 2023 05:00), Max: 101.7 (21 Mar 2023 05:00)  HR: 104 (21 Mar 2023 05:00) (103 - 113)  BP: 97/49 (21 Mar 2023 05:00) (80/55 - 100/51)  BP(mean): 69 (20 Mar 2023 06:37) (69 - 69)  RR: 18 (21 Mar 2023 05:00) (18 - 19)  SpO2: 98% (21 Mar 2023 05:00) (97% - 99%)      03-19-23 @ 07:01  -  03-20-23 @ 07:00  --------------------------------------------------------  IN: 380 mL / OUT: 0 mL / NET: 380 mL    03-20-23 @ 07:01  -  03-21-23 @ 06:17  --------------------------------------------------------  IN: 1240 mL / OUT: 0 mL / NET: 1240 mL                                             --------------------------------------------------------------  LABS:                                                                    -------------------------------------------------------------  RADIOLOGY:                                            --------------------------------------------------------------    PHYSICAL EXAM:                                             --------------------------------------------------------------                 JOSEPH OBRIEN 67y Female  MRN#: [id number]     Hospital Day: 17d    CC: Altered Mental Status likely due to Metabolic Encephalopathy from UTI, sepsis and seizure in the context of stroke with left hemiparesis    HOSPITAL COURSE:  67F. PMH:  BIBEMS 3/3 after being found on the street, daughter reported pt c/o generalized pain, had FS of 96. No family present at admission/no contact information. Pt reported feeling "okay".  In ED, hemodynamically stable with Fever. Labs: WBC 19K, elevated lactate, UA +ve  She was pan-scanned. no evidence of new stroke, trauma, or infection.  CTH showed old stroke in the territory of the Rt MCA.  Neuro eval'd 3/8-3/16: For stroke: L gaze preference, OP med review: on low dose Lamictal and Depakote in addition to Keppra (possible mood disorder/ seizures), HEr MRi of brain showing subcortical infarct within same M1 territory. The casue of stroke is most likely hypotension as she has a diminutive Rt M1. However, MRI findings doesn't explain her B/L upper extremity spasticity. So, it is important to rule out cervical cord pathology. vEEG 3/9: showed focal slowing. DAPT for 21 days followed by ASA 81 mg daily  High intensity statin, Can f/u in stroke clinic in 4 weeks following discharge.  Podiatry following for foot pain 2/ foot drop: WBAT w/ surgical shoe, ankle brace, PT  BH eval'd 3/15: Delirium  Pt was treated for UTI, placed on appropriate medical management for stroke. 3/20 pt was noted to be less responsive and have forced gaze during her speech evaluation. Concern for seizure. Neurology was recalled- keppra was increased. Pt made NPO, and AEDs made IV. Overnight pt was noted to be febrile, and again hypotensive requiring 2L fluid bolus, despite maintenance fluids. Septic workup sent.      SUBJECTIVE     Overnight events  None    Subjective complaints                                               ----------------------------------------------------------  OBJECTIVE  PAST MEDICAL & SURGICAL HISTORY                                            -----------------------------------------------------------  ALLERGIES:  Allergy Status Unknown                                            ------------------------------------------------------------    HOME MEDICATIONS  Home Medications:  acetaminophen 325 mg oral tablet: 2 tab(s) orally every 6 hours, As needed, Moderate Pain (4 - 6) (19 Mar 2023 10:42)  ascorbic acid 500 mg oral tablet: 1 tab(s) orally once a day (19 Mar 2023 10:42)  aspirin 81 mg oral tablet, chewable: 1 tab(s) orally once a day (19 Mar 2023 10:42)  atorvastatin 40 mg oral tablet: 1 tab(s) orally once a day (at bedtime) (19 Mar 2023 10:42)  baclofen 5 mg oral tablet: 1 tab(s) orally every 12 hours (19 Mar 2023 10:42)  clopidogrel 75 mg oral tablet: 1 tab(s) orally once a day x 18 more days and then stop (19 Mar 2023 10:42)  enoxaparin: 40 unit(s) subcutaneous once a day (19 Mar 2023 10:42)  levETIRAcetam 100 mg/mL oral solution: 10 milliliter(s) orally 2 times a day (19 Mar 2023 10:42)  morphine 15 mg oral tablet: 1 tab(s) orally every 6 hours, As needed, Severe Pain (7 - 10) (19 Mar 2023 10:42)  Multiple Vitamins with Minerals oral tablet: 1 tab(s) orally once a day (19 Mar 2023 10:42)  pantoprazole 40 mg oral delayed release tablet: 1 tab(s) orally once a day (09 Mar 2023 19:56)  valproic acid 250 mg/5 mL oral liquid: 500 milligram(s) orally 3 times a day (19 Mar 2023 10:42)  vitamin A and D topical ointment: 1 application topically 2 times a day (19 Mar 2023 10:42)                           MEDICATIONS:  STANDING MEDICATIONS  acetaminophen  Suppository .. 325 milliGRAM(s) Rectal once  ascorbic acid 500 milliGRAM(s) Oral daily  aspirin  chewable 81 milliGRAM(s) Oral daily  atorvastatin 40 milliGRAM(s) Oral at bedtime  baclofen 5 milliGRAM(s) Oral every 12 hours  clopidogrel Tablet 75 milliGRAM(s) Oral daily  enoxaparin Injectable 40 milliGRAM(s) SubCutaneous every 24 hours  gabapentin 100 milliGRAM(s) Oral three times a day  lactated ringers. 1000 milliLiter(s) IV Continuous <Continuous>  levETIRAcetam  IVPB 1500 milliGRAM(s) IV Intermittent every 12 hours  levETIRAcetam  Solution 1000 milliGRAM(s) Oral two times a day  multivitamin/minerals 1 Tablet(s) Oral daily  valproate sodium  IVPB 500 milliGRAM(s) IV Intermittent three times a day  vitamin A &amp; D Ointment 1 Application(s) Topical two times a day    PRN MEDICATIONS  acetaminophen     Tablet .. 650 milliGRAM(s) Oral every 6 hours PRN  morphine  - Injectable 2 milliGRAM(s) IV Push every 6 hours PRN  oxycodone    5 mG/acetaminophen 325 mG 1 Tablet(s) Oral every 6 hours PRN                                            ------------------------------------------------------------  VITAL SIGNS: Last 24 Hours  T(C): 38.7 (21 Mar 2023 05:00), Max: 38.7 (21 Mar 2023 05:00)  T(F): 101.7 (21 Mar 2023 05:00), Max: 101.7 (21 Mar 2023 05:00)  HR: 104 (21 Mar 2023 05:00) (103 - 113)  BP: 97/49 (21 Mar 2023 05:00) (80/55 - 100/51)  BP(mean): 69 (20 Mar 2023 06:37) (69 - 69)  RR: 18 (21 Mar 2023 05:00) (18 - 19)  SpO2: 98% (21 Mar 2023 05:00) (97% - 99%)      03-19-23 @ 07:01  -  03-20-23 @ 07:00  --------------------------------------------------------  IN: 380 mL / OUT: 0 mL / NET: 380 mL    03-20-23 @ 07:01  -  03-21-23 @ 06:17  --------------------------------------------------------  IN: 1240 mL / OUT: 0 mL / NET: 1240 mL                                             --------------------------------------------------------------  LABS:                                                                    -------------------------------------------------------------  RADIOLOGY:                                            --------------------------------------------------------------    PHYSICAL EXAM:                                             --------------------------------------------------------------                 JOSEPH OBRIEN 67y Female  MRN#: [id number]     Hospital Day: 17d    CC: Altered Mental Status likely due to Metabolic Encephalopathy from UTI, sepsis and seizure in the context of stroke with left hemiparesis    HOSPITAL COURSE:  67F. PMH:  BIBEMS 3/3 after being found on the street, daughter reported pt c/o generalized pain, had FS of 96. No family present at admission/no contact information. Pt reported feeling "okay".  In ED, hemodynamically stable with Fever. Labs: WBC 19K, elevated lactate, UA +ve  She was pan-scanned. no evidence of new stroke, trauma, or infection.  CTH showed old stroke in the territory of the Rt MCA.  Neuro eval'd 3/8-3/16: For stroke: L gaze preference, OP med review: on low dose Lamictal and Depakote in addition to Keppra (possible mood disorder/ seizures), HEr MRi of brain showing subcortical infarct within same M1 territory. The casue of stroke is most likely hypotension as she has a diminutive Rt M1. However, MRI findings doesn't explain her B/L upper extremity spasticity. So, it is important to rule out cervical cord pathology. vEEG 3/9: showed focal slowing. DAPT for 21 days followed by ASA 81 mg daily  High intensity statin, Can f/u in stroke clinic in 4 weeks following discharge.  Podiatry following for foot pain 2/ foot drop: WBAT w/ surgical shoe, ankle brace, PT  BH eval'd 3/15: Delirium  Pt was treated for UTI, placed on appropriate medical management for stroke. 3/20 pt was noted to be less responsive and have forced gaze during her speech evaluation. Concern for seizure. Neurology was recalled- keppra was increased. Pt made NPO, and AEDs made IV. Overnight pt was noted to be febrile, and again hypotensive requiring 2L fluid bolus, despite maintenance fluids. Septic workup sent.      SUBJECTIVE     Overnight events  None    Subjective complaints                                               ----------------------------------------------------------  OBJECTIVE  PAST MEDICAL & SURGICAL HISTORY                                            -----------------------------------------------------------  ALLERGIES:  Allergy Status Unknown                                            ------------------------------------------------------------    HOME MEDICATIONS  Home Medications:  acetaminophen 325 mg oral tablet: 2 tab(s) orally every 6 hours, As needed, Moderate Pain (4 - 6) (19 Mar 2023 10:42)  ascorbic acid 500 mg oral tablet: 1 tab(s) orally once a day (19 Mar 2023 10:42)  aspirin 81 mg oral tablet, chewable: 1 tab(s) orally once a day (19 Mar 2023 10:42)  atorvastatin 40 mg oral tablet: 1 tab(s) orally once a day (at bedtime) (19 Mar 2023 10:42)  baclofen 5 mg oral tablet: 1 tab(s) orally every 12 hours (19 Mar 2023 10:42)  clopidogrel 75 mg oral tablet: 1 tab(s) orally once a day x 18 more days and then stop (19 Mar 2023 10:42)  enoxaparin: 40 unit(s) subcutaneous once a day (19 Mar 2023 10:42)  levETIRAcetam 100 mg/mL oral solution: 10 milliliter(s) orally 2 times a day (19 Mar 2023 10:42)  morphine 15 mg oral tablet: 1 tab(s) orally every 6 hours, As needed, Severe Pain (7 - 10) (19 Mar 2023 10:42)  Multiple Vitamins with Minerals oral tablet: 1 tab(s) orally once a day (19 Mar 2023 10:42)  pantoprazole 40 mg oral delayed release tablet: 1 tab(s) orally once a day (09 Mar 2023 19:56)  valproic acid 250 mg/5 mL oral liquid: 500 milligram(s) orally 3 times a day (19 Mar 2023 10:42)  vitamin A and D topical ointment: 1 application topically 2 times a day (19 Mar 2023 10:42)                           MEDICATIONS:  STANDING MEDICATIONS  acetaminophen  Suppository .. 325 milliGRAM(s) Rectal once  ascorbic acid 500 milliGRAM(s) Oral daily  aspirin  chewable 81 milliGRAM(s) Oral daily  atorvastatin 40 milliGRAM(s) Oral at bedtime  baclofen 5 milliGRAM(s) Oral every 12 hours  clopidogrel Tablet 75 milliGRAM(s) Oral daily  enoxaparin Injectable 40 milliGRAM(s) SubCutaneous every 24 hours  gabapentin 100 milliGRAM(s) Oral three times a day  lactated ringers. 1000 milliLiter(s) IV Continuous <Continuous>  levETIRAcetam  IVPB 1500 milliGRAM(s) IV Intermittent every 12 hours  levETIRAcetam  Solution 1000 milliGRAM(s) Oral two times a day  multivitamin/minerals 1 Tablet(s) Oral daily  valproate sodium  IVPB 500 milliGRAM(s) IV Intermittent three times a day  vitamin A &amp; D Ointment 1 Application(s) Topical two times a day    PRN MEDICATIONS  acetaminophen     Tablet .. 650 milliGRAM(s) Oral every 6 hours PRN  morphine  - Injectable 2 milliGRAM(s) IV Push every 6 hours PRN  oxycodone    5 mG/acetaminophen 325 mG 1 Tablet(s) Oral every 6 hours PRN                                            ------------------------------------------------------------  VITAL SIGNS: Last 24 Hours  T(C): 38.7 (21 Mar 2023 05:00), Max: 38.7 (21 Mar 2023 05:00)  T(F): 101.7 (21 Mar 2023 05:00), Max: 101.7 (21 Mar 2023 05:00)  HR: 104 (21 Mar 2023 05:00) (103 - 113)  BP: 97/49 (21 Mar 2023 05:00) (80/55 - 100/51)  BP(mean): 69 (20 Mar 2023 06:37) (69 - 69)  RR: 18 (21 Mar 2023 05:00) (18 - 19)  SpO2: 98% (21 Mar 2023 05:00) (97% - 99%)      03-19-23 @ 07:01  -  03-20-23 @ 07:00  --------------------------------------------------------  IN: 380 mL / OUT: 0 mL / NET: 380 mL    03-20-23 @ 07:01  -  03-21-23 @ 06:17  --------------------------------------------------------  IN: 1240 mL / OUT: 0 mL / NET: 1240 mL                                             --------------------------------------------------------------  LABS:                                                                    -------------------------------------------------------------  RADIOLOGY:                                            --------------------------------------------------------------    PHYSICAL EXAM:  GEN: NAD  NEURO: Awake and alert, L hemiparesis, speaks in short sentences, does not participate in exam  HEENT: nontraumatic, normocephalic, neck supple  CARD: RRR  PULM: B/L decreased breath sounds  ABD: Soft, nondistended, nontender  EXTR: No clubbing, cyanosis, edema. 2+ pulses b/l LE, left LE in CAM boot  Skin: Sacral ulcer                                           --------------------------------------------------------------

## 2023-03-21 NOTE — CHART NOTE - NSCHARTNOTEFT_GEN_A_CORE
Discussed care with patient's daughter Siobhan ([phone]) who lives in [place].     Discussed patient's acute status, with possible seizure event and fevers with ongoing septic workup which is postponing her discharge to [facility]. Also discussed that her mother does not have capacity and cannot name HCP at this time.     Daughter discussed patient's complicated home situation. She is one of five siblings, though many are estranged. She would like to be responsible for her mother's care, but is new to the process. She reports that patient was supposed to be flor facility in [place], and called every NH in [place] to find that sibling Dominga had removed the patient from her residence. She is unsure of her sister's living situation and is concerned about ED triage documentation saying patient was picked up from the street. She has concerns for her sister Dominga's ability to care for the patient, stability, and mental well being. Pt's son communicates with Siobhan, but is less interested in being responsible for care or making decisions. Siobhan lives in [place] and was present for family meeting earlier during stay, but does have to come intermittently given the distance.

## 2023-03-21 NOTE — SWALLOW BEDSIDE ASSESSMENT ADULT - SWALLOW EVAL: CURRENT DIET
soft and bite sized, thin liquids
Soft and bite-sized w/ thin liquids
soft and bite size and thin liquids 1:1 assist
NPO x meds

## 2023-03-21 NOTE — PROGRESS NOTE ADULT - ASSESSMENT
68 y/o woman with PMH of Right MCA stroke with left sided weakness was brought to the ED via EMS. In the ED, she had altered mental status and sepsis.    #AMS likely due to Metabolic Encephalopathy from UTI, sepsis and seizure in the context of stroke with left hemiparesis  - per chart: at baseline she is alert, able to communicate and is oriented. Ambulation at baseline: uses a wheelchair. She was in a hospital in [facility] last fall and was lethargic for months  - CTH/CTA of head/neck (3/4): no acute pathology, no evidence of occlusion, aneurysm or stenosis  - 3/8: s/p rapid response for L gaze preference  - s/p EEG (3/8) and video EEG (3/9): Focal slowing  - MRI Brain (3/15): new subcortical infarct in the right frontal lobe. Redemonstrated chronic infarct in the right MCA territory.  - MRI of C spine (3/16): Significantly motion limited study. No gross cord compression or spinal cord edema demonstrated. Multilevel small disc bulges.  - s/p Vanc x1 (3/4), Cefepime (x1 3/4, x1 3/8), Ceftriaxone (3/4-3/6) (for UTI)  *****  - Neuro eval'd 3/8-3/16: For L gaze preference/stroke, OP med review: on low dose Lamictal and Depakote in addition to Keppra (possible mood disorder/ seizures), Her MRI of brain showing subcortical infarct within same M1 territory. The casue of stroke is most likely hypotension as she has a diminutive Rt M1. However, MRI findings doesn't explain her B/L upper extremity spasticity. So, it is important to rule out cervical cord pathology. DAPT for 21 days followed by ASA 81 mg daily  - BH eval'd 3/15: Delirium; pt does not have the capacity to choose a Health care agent  - Seizure precautions  - C/w Depakote 500mg TID and Keppra 1000mg BID  - C/w baclofen 5mg bid (for UE spasticity)  - C/w ASA, Plavix (3/17-present), and high dose statin for new stroke - DAPT for 21 days followed by ASA 81 mg daily  *****  - OP stroke clinic in 4 weeks per neurology    #Left avulsion fracture of medial malleolus  - Duplex BL LE (3/7): (-) for DVT  - CT Foot (3/11): acute minimally displaced avulsion fracture at the medial malleolus  *****  - Podiatry following for foot pain: WBAT w/ surgical shoe, ankle brace, PT  - PT  - fall precautions  - Decrease pain control regimen - if pain uncontrolled consider increasing percocet to 2tabs.  - Start gabapentin at 100mg TID (Lowered dose from home, increase as pt tolerates)    #Sinus tachycardia - now resolved - attributed to pain  - TSH (3/8) WNL, - EKG reviewed    #Left cephalic vein thrombosis  - Duplex LUE (3/10): No DVT however there is superficial thrombosis noted in the left cephalic vein  - s/p warm compress    #Anemia - normocytic - stable  - possibly due to frequent phlebotomy, monitor for bleeding    #Misc  - DVT ppx - lovenox  - Diet: Soft and bite sized (SS 3/7)  - full code, overall guarded prognosis  - no need for daily labs - now stable   66 y/o woman with PMH of Right MCA stroke with left sided weakness was brought to the ED via EMS. In the ED, she had altered mental status and sepsis.    #AMS likely due to Metabolic Encephalopathy from UTI, sepsis and seizure in the context of stroke with left hemiparesis  #Febrile 3/20  - per chart: at baseline she is alert, able to communicate and is oriented. Ambulation at baseline: uses a wheelchair. She was in a hospital in [email or web address] last fall and was lethargic for months  - 3/8: s/p rapid response for L gaze preference  - 3/20: Pt had event with L gaze preference during SS eval, pt made NPO. Examined by resident, no gaze preference present any longer. Pt responsive but less than baseline. Developed seizures post-event. Septic workup obtained. CXR appears clear, rest pending. --> 3/21 Pt more responsive this AM, expressing herself and in line with current baseline.   - CTH/CTA of head/neck (3/4): no acute pathology, no evidence of occlusion, aneurysm or stenosis  - EEG (3/8) and video EEG (3/9): Focal slowing  - MRI Brain (3/15): new subcortical infarct in the right frontal lobe. Redemonstrated chronic infarct in the right MCA territory.  - MRI of C spine (3/16): Significantly motion limited study. No gross cord compression or spinal cord edema demonstrated. Multilevel small disc bulges.  - s/p Vanc x1 (3/4), Cefepime (x1 3/4, x1 3/8), Ceftriaxone (3/4-3/6) (for UTI)  *****  - Neuro eval'd 3/8-3/20: For L gaze preference/stroke, OP med review: on low dose Lamictal and Depakote in addition to Keppra (possible mood disorder/ seizures), Her MRI of brain showing subcortical infarct within same M1 territory. The casue of stroke is most likely hypotension as she has a diminutive Rt M1. However, MRI findings doesn't explain her B/L upper extremity spasticity. So, it is important to rule out cervical cord pathology. DAPT for 21 days followed by ASA 81 mg daily, Increased keppra post 3/20 possible seizure event  - BH eval'd 3/15: Delirium; pt does not have the capacity to choose a Health care agent  - F/u WBC (3/21), BCx (3/20), Procal (3/20), RVP (3/20), Cortisol level (3/21), Lactate (3/21) WNL, UA  - F/u Depakote level   - Seizure precautions  - C/w Depakote 500mg TID and increased Keppra to 1500mg BID  - C/w baclofen 5mg bid (for UE spasticity)  - C/w ASA, Plavix (3/17-present), and high dose statin for new stroke - DAPT for 21 days followed by ASA 81 mg daily  - S/p Cefepime, levaquin (x1 3/20), vancomycin ON  - C/w Cefepime (3/20-present) and vancomycin (3/20-present)  - Start Midodrine 10mg TID  - Change fluids to D5+NS 75cc/hr  - NPO except medications, SS re-juliette today now that mentation is improved   *****  - OP stroke clinic in 4 weeks per neurology    #Sacral Ulcer  - Burn consult     #Left avulsion fracture of medial malleolus  - Duplex BL LE (3/7): (-) for DVT  - CT Foot (3/11): acute minimally displaced avulsion fracture at the medial malleolus  *****  - Podiatry following for foot pain: WBAT w/ surgical shoe, ankle brace, PT  - PT  - fall precautions  - Decrease pain control regimen - if pain uncontrolled consider increasing percocet to 2tabs.  - Start gabapentin at 100mg TID (Lowered dose from home, increase as pt tolerates)    #Sinus tachycardia - now resolved - attributed to pain  - TSH (3/8) WNL, - EKG reviewed    #Left cephalic vein thrombosis  - Duplex LUE (3/10): No DVT however there is superficial thrombosis noted in the left cephalic vein  - s/p warm compress    #Anemia - normocytic - stable  - possibly due to frequent phlebotomy, monitor for bleeding    #Misc  - DVT ppx - lovenox  - Diet: Soft and bite sized (SS 3/7)  - full code, overall guarded prognosis  - no need for daily labs - now stable

## 2023-03-21 NOTE — SWALLOW BEDSIDE ASSESSMENT ADULT - NS SPL SWALLOW CLINIC TRIAL FT
tolerated
bolus removed from oral cavity
tolerated, Pt benefits from liquid wash to trigger swallow at times when noticed to bolus hold. Mild to moderate oral dysphagia .
no overt s/s of penetration/aspiration

## 2023-03-21 NOTE — SWALLOW BEDSIDE ASSESSMENT ADULT - ORAL PREPARATORY PHASE
Reduced oral grading/Bolus falls into right lateral sulci
Within functional limits

## 2023-03-21 NOTE — PROGRESS NOTE ADULT - ATTENDING COMMENTS
Patient is a 66 y/o woman with PMH of Right MCA stroke with left sided weakness was brought to the ED via EMS. In the ED, she had altered mental status and sepsis. Patient has a h/o prior hospitalization  in a hospital in [facility] last fall due to persistent metabolic encephalopathy for months     #  Altered Mental Status likely due to Metabolic Encephalopathy from UTI, sepsis and seizure in the context of acute and chronic stroke with left hemiparesis   s/p course of abx for E. coli UTI  MRI of brain: Small patchy subcortical infarct in the right frontal lobe. Redemonstrated chronic infarct in the right MCA territory.  Mild chronic microvascular ischemic changes.  CTA of head/neck: no evidence of occlusion, aneurysm or stenosis  s/p rapid response for seizure- s/p EEG and video EEG: continue on Depakote 500 mg TID and Keppra 1000 mg BID  seizure precautions  Neuro f/u appreciated - stroke likely due to hypotension - has diminutive caliber of right M1 - avoid hypotension  Added ASA, Plavix and statin for new stroke -  DAPT for 21 days followed by ASA 81 mg daily - f/u in stroke clinic in 4 weeks per neurology  pt with UE spasticity: MRI of C spine: Significantly motion limited study. No gross cord compression or spinal cord edema demonstrated. Multilevel small disc bulges. - continue baclofen 5mg bid  behavioral health eval appreciated: pt does not have the capacity to choose a Health care agent  -clinically patient is not following verbal commands, persistent left sided paralysis, dysarthria , oropharyngeal dysphagia , patient is in chronic bed confinement status.    # Seizures- 3/20- patient had gaze deviation to the left- resolved, Neuro f/up rec- increase Keppra dose tx.   - if patient will have recurrent neuro event -consider repeating EEG    # New onset fevers on 3/20 at night and 3/21 in am- patient has unstagble sacral wound - consulted Burn team, f/up procal level .  -repeated CXR- personally reviewed, no acute infiltrates, effusions. f/up blood cxs, obtain u/a, urine cxs, start on IV Vanc/IV Cefepime, f/up CBC    #Left avulsion fracture of medial malleolus  evaluated by podiatry - no surgical intervention - WB with CAM boot  continue PT/fall precautions, pain control    # Sinus tachycardia   now resolved - attributed to pain  EKG reviewed, TSH WNL    #Left cephalic vein thrombosis - s/p warm compress    # Anemia - normocytic  labs reviewed - Hgb in 9-10 range and now stable    # DVT prophylaxis    full code, overall patient's prognosis guarded     #Progress Note Handoff: fever, f/up septic work up , f/up CBC, continue neuro checks, feed with assistance, maintain aspiration precautions   Family discussion: yes, medical team #Disposition: SNF once medically stable    Total time spent to complete patient's bedside assessment, review medical chart, discuss medical plan of care with covering medical team was more than 50 minutes with >50% of time spent face to face with patient, discussion with patient/family and/or coordination of care.

## 2023-03-21 NOTE — SWALLOW BEDSIDE ASSESSMENT ADULT - COMMENTS
CORNEL Carrillo who worked with PT yesterday reports Pt's intake is minimal and seemed to benefit from softer foods.
CT chest: 4 mm calculus in the left ureter without evidence of hydroureter or   hydronephrosis. Underdistended urinary bladder limits evaluation of wall thickness, may   correlate with urinalysis.No evidence of acute traumatic injury.Nonspecific presacral edema.         Pt requesting something to help moisturize her lips
no overt s/s of penetration/aspiration

## 2023-03-21 NOTE — SWALLOW BEDSIDE ASSESSMENT ADULT - SLP GENERAL OBSERVATIONS
pt received in bed asleep arousable w/o c/o pain. +room air +leaning to R-side +no verbalizations, not following commands, per RN pt responding to some simple questions earlier in day
Pt awake in bed, o2 via room air, low intensity voicing
O2 via RA, Pt responsive, delayed answers
PT received at bedside, alert and awake, oriented to person, place year. O2 via NC @ 2L/min

## 2023-03-21 NOTE — SWALLOW BEDSIDE ASSESSMENT ADULT - SLP PERTINENT HISTORY OF CURRENT PROBLEM
This is a case of a 67 year old lady brought to ED via EMS who per triage note found pt on street. Per triage note, daughter told EMS that pt c/o generalized pain, however no family present.
66 y/o F w/ PMHx: Right MCA stroke w/ left sided weakness was brought to the ED via EMS. AMS likely due to Metabolic Encephalopathy from UTI, sepsis and seizure in the context of stroke with left hemiparesis. RRT 3/8 for L gaze preference. MRI Brain (3/15): new subcortical infarct in the right frontal lobe. Redemonstrated chronic infarct in the right MCA territory. Pt w/ UE spasticity, MRI cervical spine-> No gross cord compression or spinal cord edema demonstrated. Multilevel small disc bulges. Pt known to SLP dept from current admission, recs for soft and bite sized, thin liquids
67 year old lady brought to ED via EMS who per triage note found pt on street. Per triage note, daughter told EMS that pt c/o generalized pain, however no family present.
Pt is a 66 y/o F w/ PMHx: Right MCA stroke w/ left sided weakness was brought to the ED via EMS. AMS likely due to Metabolic Encephalopathy from UTI, sepsis and seizure in the context of stroke with left hemiparesis. RRT 3/8 for L gaze preference. MRI Brain (3/15): new subcortical infarct in the right frontal lobe. Redemonstrated chronic infarct in the right MCA territory. Pt w/ UE spasticity, MRI cervical spine-> No gross cord compression or spinal cord edema demonstrated. Multilevel small disc bulges. Pt known to SLP dept from current admission, recs for soft and bite sized, thin liquids

## 2023-03-21 NOTE — SWALLOW BEDSIDE ASSESSMENT ADULT - SWALLOW EVAL: PATIENT/FAMILY GOALS STATEMENT
Pe educated on recs to continue current diet
Pt resistant to opening mouth when asked, SLP educated on rationale, Pt did not comply with request.

## 2023-03-21 NOTE — SWALLOW BEDSIDE ASSESSMENT ADULT - SWALLOW EVAL: RECOMMENDED DIET
NPO w/ non-oral means of nutrition/hydration
minced and moist and thin liquids , 1:1 feed
Soft and bite-sized w/ thin liquids
soft and bite size and thin liquids 1:1 feed

## 2023-03-21 NOTE — SWALLOW BEDSIDE ASSESSMENT ADULT - SWALLOW EVAL: RECOMMENDED FEEDING/EATING TECHNIQUES
allow for swallow between intakes/maintain upright posture during/after eating for 30 mins/oral hygiene
oral hygiene
1:1 feed/check mouth frequently for oral residue/pocketing/crush medication (when feasible)/maintain upright posture during/after eating for 30 mins/oral hygiene/small sips/bites
position upright (90 degrees)/small sips/bites

## 2023-03-22 LAB
ALBUMIN SERPL ELPH-MCNC: 2.3 G/DL — LOW (ref 3.5–5.2)
ALP SERPL-CCNC: 58 U/L — SIGNIFICANT CHANGE UP (ref 30–115)
ALT FLD-CCNC: <5 U/L — SIGNIFICANT CHANGE UP (ref 0–41)
ANION GAP SERPL CALC-SCNC: 9 MMOL/L — SIGNIFICANT CHANGE UP (ref 7–14)
AST SERPL-CCNC: 14 U/L — SIGNIFICANT CHANGE UP (ref 0–41)
BASOPHILS # BLD AUTO: 0.03 K/UL — SIGNIFICANT CHANGE UP (ref 0–0.2)
BASOPHILS # BLD AUTO: 0.04 K/UL — SIGNIFICANT CHANGE UP (ref 0–0.2)
BASOPHILS NFR BLD AUTO: 0.3 % — SIGNIFICANT CHANGE UP (ref 0–1)
BASOPHILS NFR BLD AUTO: 0.4 % — SIGNIFICANT CHANGE UP (ref 0–1)
BILIRUB SERPL-MCNC: <0.2 MG/DL — SIGNIFICANT CHANGE UP (ref 0.2–1.2)
BUN SERPL-MCNC: 6 MG/DL — LOW (ref 10–20)
CALCIUM SERPL-MCNC: 8.5 MG/DL — SIGNIFICANT CHANGE UP (ref 8.4–10.5)
CHLORIDE SERPL-SCNC: 105 MMOL/L — SIGNIFICANT CHANGE UP (ref 98–110)
CO2 SERPL-SCNC: 26 MMOL/L — SIGNIFICANT CHANGE UP (ref 17–32)
CREAT SERPL-MCNC: <0.5 MG/DL — LOW (ref 0.7–1.5)
CULTURE RESULTS: NO GROWTH — SIGNIFICANT CHANGE UP
EGFR: 117 ML/MIN/1.73M2 — SIGNIFICANT CHANGE UP
EOSINOPHIL # BLD AUTO: 0.19 K/UL — SIGNIFICANT CHANGE UP (ref 0–0.7)
EOSINOPHIL # BLD AUTO: 0.24 K/UL — SIGNIFICANT CHANGE UP (ref 0–0.7)
EOSINOPHIL NFR BLD AUTO: 2 % — SIGNIFICANT CHANGE UP (ref 0–8)
EOSINOPHIL NFR BLD AUTO: 2.1 % — SIGNIFICANT CHANGE UP (ref 0–8)
GLUCOSE BLDC GLUCOMTR-MCNC: 101 MG/DL — HIGH (ref 70–99)
GLUCOSE SERPL-MCNC: 93 MG/DL — SIGNIFICANT CHANGE UP (ref 70–99)
HCT VFR BLD CALC: 26.7 % — LOW (ref 37–47)
HCT VFR BLD CALC: 27.1 % — LOW (ref 37–47)
HGB BLD-MCNC: 8.5 G/DL — LOW (ref 12–16)
HGB BLD-MCNC: 8.6 G/DL — LOW (ref 12–16)
IMM GRANULOCYTES NFR BLD AUTO: 0.5 % — HIGH (ref 0.1–0.3)
IMM GRANULOCYTES NFR BLD AUTO: 0.5 % — HIGH (ref 0.1–0.3)
LYMPHOCYTES # BLD AUTO: 1.56 K/UL — SIGNIFICANT CHANGE UP (ref 1.2–3.4)
LYMPHOCYTES # BLD AUTO: 16.6 % — LOW (ref 20.5–51.1)
LYMPHOCYTES # BLD AUTO: 18.5 % — LOW (ref 20.5–51.1)
LYMPHOCYTES # BLD AUTO: 2.07 K/UL — SIGNIFICANT CHANGE UP (ref 1.2–3.4)
MAGNESIUM SERPL-MCNC: 1.6 MG/DL — LOW (ref 1.8–2.4)
MCHC RBC-ENTMCNC: 28.9 PG — SIGNIFICANT CHANGE UP (ref 27–31)
MCHC RBC-ENTMCNC: 29.3 PG — SIGNIFICANT CHANGE UP (ref 27–31)
MCHC RBC-ENTMCNC: 31.7 G/DL — LOW (ref 32–37)
MCHC RBC-ENTMCNC: 31.8 G/DL — LOW (ref 32–37)
MCV RBC AUTO: 90.8 FL — SIGNIFICANT CHANGE UP (ref 81–99)
MCV RBC AUTO: 92.2 FL — SIGNIFICANT CHANGE UP (ref 81–99)
MONOCYTES # BLD AUTO: 0.89 K/UL — HIGH (ref 0.1–0.6)
MONOCYTES # BLD AUTO: 1.05 K/UL — HIGH (ref 0.1–0.6)
MONOCYTES NFR BLD AUTO: 9.4 % — HIGH (ref 1.7–9.3)
MONOCYTES NFR BLD AUTO: 9.5 % — HIGH (ref 1.7–9.3)
NEUTROPHILS # BLD AUTO: 6.67 K/UL — HIGH (ref 1.4–6.5)
NEUTROPHILS # BLD AUTO: 7.74 K/UL — HIGH (ref 1.4–6.5)
NEUTROPHILS NFR BLD AUTO: 69.2 % — SIGNIFICANT CHANGE UP (ref 42.2–75.2)
NEUTROPHILS NFR BLD AUTO: 71 % — SIGNIFICANT CHANGE UP (ref 42.2–75.2)
NRBC # BLD: 0 /100 WBCS — SIGNIFICANT CHANGE UP (ref 0–0)
NRBC # BLD: 0 /100 WBCS — SIGNIFICANT CHANGE UP (ref 0–0)
PLATELET # BLD AUTO: 351 K/UL — SIGNIFICANT CHANGE UP (ref 130–400)
PLATELET # BLD AUTO: 352 K/UL — SIGNIFICANT CHANGE UP (ref 130–400)
POTASSIUM SERPL-MCNC: 3.8 MMOL/L — SIGNIFICANT CHANGE UP (ref 3.5–5)
POTASSIUM SERPL-SCNC: 3.8 MMOL/L — SIGNIFICANT CHANGE UP (ref 3.5–5)
PROCALCITONIN SERPL-MCNC: 0.05 NG/ML — SIGNIFICANT CHANGE UP (ref 0.02–0.1)
PROT SERPL-MCNC: 5 G/DL — LOW (ref 6–8)
RBC # BLD: 2.94 M/UL — LOW (ref 4.2–5.4)
RBC # BLD: 2.94 M/UL — LOW (ref 4.2–5.4)
RBC # FLD: 13.5 % — SIGNIFICANT CHANGE UP (ref 11.5–14.5)
RBC # FLD: 13.7 % — SIGNIFICANT CHANGE UP (ref 11.5–14.5)
SODIUM SERPL-SCNC: 140 MMOL/L — SIGNIFICANT CHANGE UP (ref 135–146)
SPECIMEN SOURCE: SIGNIFICANT CHANGE UP
WBC # BLD: 11.19 K/UL — HIGH (ref 4.8–10.8)
WBC # BLD: 9.4 K/UL — SIGNIFICANT CHANGE UP (ref 4.8–10.8)
WBC # FLD AUTO: 11.19 K/UL — HIGH (ref 4.8–10.8)
WBC # FLD AUTO: 9.4 K/UL — SIGNIFICANT CHANGE UP (ref 4.8–10.8)

## 2023-03-22 PROCEDURE — 99232 SBSQ HOSP IP/OBS MODERATE 35: CPT

## 2023-03-22 PROCEDURE — 99221 1ST HOSP IP/OBS SF/LOW 40: CPT | Mod: FS

## 2023-03-22 RX ORDER — VALPROIC ACID (AS SODIUM SALT) 250 MG/5ML
500 SOLUTION, ORAL ORAL THREE TIMES A DAY
Refills: 0 | Status: DISCONTINUED | OUTPATIENT
Start: 2023-03-22 | End: 2023-04-11

## 2023-03-22 RX ORDER — LEVETIRACETAM 250 MG/1
1500 TABLET, FILM COATED ORAL
Refills: 0 | Status: DISCONTINUED | OUTPATIENT
Start: 2023-03-22 | End: 2023-04-05

## 2023-03-22 RX ORDER — SODIUM CHLORIDE 9 MG/ML
500 INJECTION, SOLUTION INTRAVENOUS ONCE
Refills: 0 | Status: COMPLETED | OUTPATIENT
Start: 2023-03-22 | End: 2023-03-22

## 2023-03-22 RX ORDER — MAGNESIUM SULFATE 500 MG/ML
2 VIAL (ML) INJECTION ONCE
Refills: 0 | Status: COMPLETED | OUTPATIENT
Start: 2023-03-22 | End: 2023-03-22

## 2023-03-22 RX ADMIN — Medication 500 MILLIGRAM(S): at 22:07

## 2023-03-22 RX ADMIN — ENOXAPARIN SODIUM 40 MILLIGRAM(S): 100 INJECTION SUBCUTANEOUS at 22:06

## 2023-03-22 RX ADMIN — Medication 250 MILLIGRAM(S): at 05:12

## 2023-03-22 RX ADMIN — SODIUM CHLORIDE 75 MILLILITER(S): 9 INJECTION, SOLUTION INTRAVENOUS at 03:47

## 2023-03-22 RX ADMIN — MIDODRINE HYDROCHLORIDE 10 MILLIGRAM(S): 2.5 TABLET ORAL at 11:37

## 2023-03-22 RX ADMIN — ATORVASTATIN CALCIUM 40 MILLIGRAM(S): 80 TABLET, FILM COATED ORAL at 22:07

## 2023-03-22 RX ADMIN — Medication 1 APPLICATION(S): at 17:10

## 2023-03-22 RX ADMIN — GABAPENTIN 100 MILLIGRAM(S): 400 CAPSULE ORAL at 05:11

## 2023-03-22 RX ADMIN — Medication 5 MILLIGRAM(S): at 05:12

## 2023-03-22 RX ADMIN — CEFEPIME 100 MILLIGRAM(S): 1 INJECTION, POWDER, FOR SOLUTION INTRAMUSCULAR; INTRAVENOUS at 05:12

## 2023-03-22 RX ADMIN — Medication 500 MILLIGRAM(S): at 13:02

## 2023-03-22 RX ADMIN — Medication 5 MILLIGRAM(S): at 05:11

## 2023-03-22 RX ADMIN — GABAPENTIN 100 MILLIGRAM(S): 400 CAPSULE ORAL at 22:07

## 2023-03-22 RX ADMIN — SODIUM CHLORIDE 1000 MILLILITER(S): 9 INJECTION, SOLUTION INTRAVENOUS at 00:17

## 2023-03-22 RX ADMIN — GABAPENTIN 100 MILLIGRAM(S): 400 CAPSULE ORAL at 13:02

## 2023-03-22 RX ADMIN — LEVETIRACETAM 1500 MILLIGRAM(S): 250 TABLET, FILM COATED ORAL at 17:11

## 2023-03-22 RX ADMIN — CEFEPIME 100 MILLIGRAM(S): 1 INJECTION, POWDER, FOR SOLUTION INTRAMUSCULAR; INTRAVENOUS at 22:07

## 2023-03-22 RX ADMIN — MIDODRINE HYDROCHLORIDE 10 MILLIGRAM(S): 2.5 TABLET ORAL at 17:09

## 2023-03-22 RX ADMIN — Medication 81 MILLIGRAM(S): at 11:38

## 2023-03-22 RX ADMIN — CEFEPIME 100 MILLIGRAM(S): 1 INJECTION, POWDER, FOR SOLUTION INTRAMUSCULAR; INTRAVENOUS at 13:01

## 2023-03-22 RX ADMIN — Medication 1 TABLET(S): at 11:37

## 2023-03-22 RX ADMIN — Medication 25 GRAM(S): at 11:08

## 2023-03-22 RX ADMIN — LEVETIRACETAM 400 MILLIGRAM(S): 250 TABLET, FILM COATED ORAL at 05:13

## 2023-03-22 RX ADMIN — Medication 500 MILLIGRAM(S): at 11:38

## 2023-03-22 RX ADMIN — CLOPIDOGREL BISULFATE 75 MILLIGRAM(S): 75 TABLET, FILM COATED ORAL at 11:37

## 2023-03-22 RX ADMIN — Medication 1 APPLICATION(S): at 05:13

## 2023-03-22 RX ADMIN — Medication 5 MILLIGRAM(S): at 17:09

## 2023-03-22 RX ADMIN — MIDODRINE HYDROCHLORIDE 10 MILLIGRAM(S): 2.5 TABLET ORAL at 05:11

## 2023-03-22 NOTE — PROGRESS NOTE ADULT - SUBJECTIVE AND OBJECTIVE BOX
JOSEPH OBRIEN 67y Female  MRN#: [id number]     Hospital Day: 18d    CC: Altered Mental Status likely due to Metabolic Encephalopathy from UTI, sepsis and seizure in the context of stroke with left hemiparesis    HOSPITAL COURSE:  67F. PMH: Seizures, Rt MCA Stroke  BIBEMS 3/3 after being found on the street, daughter reported pt c/o generalized pain, had FS of 96. No family present at admission/no contact information. Pt reported feeling "okay".  In ED, hemodynamically stable with Fever. Labs: WBC 19K, elevated lactate, UA +ve  She was pan-scanned. no evidence of new stroke, trauma, or infection.  CTH showed old stroke in the territory of the Rt MCA.  Neuro eval'd 3/8-3/16: For stroke: L gaze preference, OP med review: on low dose Lamictal and Depakote in addition to Keppra (possible mood disorder/ seizures), HEr MRi of brain showing subcortical infarct within same M1 territory. The casue of stroke is most likely hypotension as she has a diminutive Rt M1. However, MRI findings doesn't explain her B/L upper extremity spasticity. So, it is important to rule out cervical cord pathology. vEEG 3/9: showed focal slowing. DAPT for 21 days followed by ASA 81 mg daily  High intensity statin, Can f/u in stroke clinic in 4 weeks following discharge.  Podiatry following for foot pain 2/ foot drop: WBAT w/ surgical shoe, ankle brace, PT  BH eval'd 3/15: Delirium  Pt was treated for UTI, placed on appropriate medical management for stroke. 3/20 pt was noted to be less responsive and have forced gaze during her speech evaluation. Concern for seizure. Neurology was recalled- keppra was increased. Pt made NPO, and AEDs made IV. Overnight pt was noted to be febrile, and again hypotensive requiring 2L fluid bolus, despite maintenance fluids. Septic workup sent.      SUBJECTIVE     Overnight events  None    Subjective complaints   Pt evaluated at bedside.                                              ----------------------------------------------------------  OBJECTIVE  PAST MEDICAL & SURGICAL HISTORY                                            -----------------------------------------------------------  ALLERGIES:  Allergy Status Unknown                                            ------------------------------------------------------------    HOME MEDICATIONS  Home Medications:  acetaminophen 325 mg oral tablet: 2 tab(s) orally every 6 hours, As needed, Moderate Pain (4 - 6) (19 Mar 2023 10:42)  ascorbic acid 500 mg oral tablet: 1 tab(s) orally once a day (19 Mar 2023 10:42)  aspirin 81 mg oral tablet, chewable: 1 tab(s) orally once a day (19 Mar 2023 10:42)  atorvastatin 40 mg oral tablet: 1 tab(s) orally once a day (at bedtime) (19 Mar 2023 10:42)  baclofen 5 mg oral tablet: 1 tab(s) orally every 12 hours (19 Mar 2023 10:42)  clopidogrel 75 mg oral tablet: 1 tab(s) orally once a day x 18 more days and then stop (19 Mar 2023 10:42)  enoxaparin: 40 unit(s) subcutaneous once a day (19 Mar 2023 10:42)  levETIRAcetam 100 mg/mL oral solution: 10 milliliter(s) orally 2 times a day (19 Mar 2023 10:42)  morphine 15 mg oral tablet: 1 tab(s) orally every 6 hours, As needed, Severe Pain (7 - 10) (19 Mar 2023 10:42)  Multiple Vitamins with Minerals oral tablet: 1 tab(s) orally once a day (19 Mar 2023 10:42)  pantoprazole 40 mg oral delayed release tablet: 1 tab(s) orally once a day (09 Mar 2023 19:56)  valproic acid 250 mg/5 mL oral liquid: 500 milligram(s) orally 3 times a day (19 Mar 2023 10:42)  vitamin A and D topical ointment: 1 application topically 2 times a day (19 Mar 2023 10:42)                           MEDICATIONS:  STANDING MEDICATIONS  acetaminophen  Suppository .. 325 milliGRAM(s) Rectal once  ascorbic acid 500 milliGRAM(s) Oral daily  aspirin  chewable 81 milliGRAM(s) Oral daily  atorvastatin 40 milliGRAM(s) Oral at bedtime  baclofen 5 milliGRAM(s) Oral every 12 hours  cefepime   IVPB 2000 milliGRAM(s) IV Intermittent every 8 hours  clopidogrel Tablet 75 milliGRAM(s) Oral daily  dextrose 5% + sodium chloride 0.9%. 1000 milliLiter(s) IV Continuous <Continuous>  enoxaparin Injectable 40 milliGRAM(s) SubCutaneous every 24 hours  gabapentin 100 milliGRAM(s) Oral three times a day  levETIRAcetam  IVPB 1500 milliGRAM(s) IV Intermittent every 12 hours  midodrine. 10 milliGRAM(s) Oral three times a day  multivitamin/minerals 1 Tablet(s) Oral daily  valproate sodium  IVPB 500 milliGRAM(s) IV Intermittent three times a day  vancomycin  IVPB 1000 milliGRAM(s) IV Intermittent every 12 hours  vitamin A &amp; D Ointment 1 Application(s) Topical two times a day    PRN MEDICATIONS  acetaminophen     Tablet .. 650 milliGRAM(s) Oral every 6 hours PRN  oxycodone    5 mG/acetaminophen 325 mG 1 Tablet(s) Oral every 6 hours PRN                                            ------------------------------------------------------------  VITAL SIGNS: Last 24 Hours  T(C): 36.2 (22 Mar 2023 05:00), Max: 38.1 (21 Mar 2023 07:23)  T(F): 97.1 (22 Mar 2023 05:00), Max: 100.5 (21 Mar 2023 07:23)  HR: 95 (22 Mar 2023 05:00) (79 - 100)  BP: 101/67 (22 Mar 2023 05:00) (87/60 - 102/59)  BP(mean): --  RR: 18 (22 Mar 2023 05:00) (18 - 19)  SpO2: 100% (22 Mar 2023 05:00) (100% - 100%)      23 @ 07:23 @ 07:00  --------------------------------------------------------  IN: 0 mL / OUT: 1480 mL / NET: -1480 mL                                             --------------------------------------------------------------  LABS:                        8.6    11.19 )-----------( 351      ( 22 Mar 2023 02:39 )             27.1         138  |  104  |  7<L>  ----------------------------<  105<H>  3.6   |  25  |  <0.5<L>    Ca    8.5      21 Mar 2023 09:20    TPro  5.0<L>  /  Alb  2.3<L>  /  TBili  0.2  /  DBili  x   /  AST  13  /  ALT  5   /  AlkPhos  59        Urinalysis Basic - ( 21 Mar 2023 11:57 )    Color: Colorless / Appearance: Clear / S.007 / pH: x  Gluc: x / Ketone: Small  / Bili: Negative / Urobili: <2 mg/dL   Blood: x / Protein: Negative / Nitrite: Negative   Leuk Esterase: Negative / RBC: x / WBC x   Sq Epi: x / Non Sq Epi: x / Bacteria: x        Lactate, Blood: 1.1 mmol/L (23 @ 09:20)        Culture - Blood (collected 20 Mar 2023 22:07)  Source: .Blood Blood  Preliminary Report (22 Mar 2023 02:02):    No growth to date.                                                    -------------------------------------------------------------  RADIOLOGY:                                            --------------------------------------------------------------    PHYSICAL EXAM:                                             --------------------------------------------------------------                 JOSEPH OBRIEN 67y Female  MRN#: [id number]     Hospital Day: 18d    CC: Altered Mental Status likely due to Metabolic Encephalopathy from UTI, sepsis and seizure in the context of stroke with left hemiparesis    HOSPITAL COURSE:  67F. PMH: Seizures, Rt MCA Stroke  BIBEMS 3/3 after being found on the street, daughter reported pt c/o generalized pain, had FS of 96. No family present at admission/no contact information. Pt reported feeling "okay".  In ED, hemodynamically stable with Fever. Labs: WBC 19K, elevated lactate, UA +ve  She was pan-scanned. no evidence of new stroke, trauma, or infection.  CTH showed old stroke in the territory of the Rt MCA.  Neuro eval'd 3/8-3/16: For stroke: L gaze preference, OP med review: on low dose Lamictal and Depakote in addition to Keppra (possible mood disorder/ seizures), HEr MRi of brain showing subcortical infarct within same M1 territory. The casue of stroke is most likely hypotension as she has a diminutive Rt M1. However, MRI findings doesn't explain her B/L upper extremity spasticity. So, it is important to rule out cervical cord pathology. vEEG 3/9: showed focal slowing. DAPT for 21 days followed by ASA 81 mg daily  High intensity statin, Can f/u in stroke clinic in 4 weeks following discharge.  Podiatry following for foot pain 2/ foot drop: WBAT w/ surgical shoe, ankle brace, PT  BH eval'd 3/15: Delirium  Pt was treated for UTI, placed on appropriate medical management for stroke. 3/20 pt was noted to be less responsive and have forced gaze during her speech evaluation. Concern for seizure. Neurology was recalled- keppra was increased. Pt made NPO, and AEDs made IV. Overnight pt was noted to be febrile, and again hypotensive requiring 2L fluid bolus, despite maintenance fluids. Septic workup sent. Leukocytosis noted, BCx NGTD, CXR looks clear.  Decubitus ulcer noted on sacrum, burn consulted. Id consulted for abx recs.     SUBJECTIVE     Overnight events  None    Subjective complaints   Pt evaluated at bedside.                                              ----------------------------------------------------------  OBJECTIVE  PAST MEDICAL & SURGICAL HISTORY                                            -----------------------------------------------------------  ALLERGIES:  Allergy Status Unknown                                            ------------------------------------------------------------    HOME MEDICATIONS  Home Medications:  acetaminophen 325 mg oral tablet: 2 tab(s) orally every 6 hours, As needed, Moderate Pain (4 - 6) (19 Mar 2023 10:42)  ascorbic acid 500 mg oral tablet: 1 tab(s) orally once a day (19 Mar 2023 10:42)  aspirin 81 mg oral tablet, chewable: 1 tab(s) orally once a day (19 Mar 2023 10:42)  atorvastatin 40 mg oral tablet: 1 tab(s) orally once a day (at bedtime) (19 Mar 2023 10:42)  baclofen 5 mg oral tablet: 1 tab(s) orally every 12 hours (19 Mar 2023 10:42)  clopidogrel 75 mg oral tablet: 1 tab(s) orally once a day x 18 more days and then stop (19 Mar 2023 10:42)  enoxaparin: 40 unit(s) subcutaneous once a day (19 Mar 2023 10:42)  levETIRAcetam 100 mg/mL oral solution: 10 milliliter(s) orally 2 times a day (19 Mar 2023 10:42)  morphine 15 mg oral tablet: 1 tab(s) orally every 6 hours, As needed, Severe Pain (7 - 10) (19 Mar 2023 10:42)  Multiple Vitamins with Minerals oral tablet: 1 tab(s) orally once a day (19 Mar 2023 10:42)  pantoprazole 40 mg oral delayed release tablet: 1 tab(s) orally once a day (09 Mar 2023 19:56)  valproic acid 250 mg/5 mL oral liquid: 500 milligram(s) orally 3 times a day (19 Mar 2023 10:42)  vitamin A and D topical ointment: 1 application topically 2 times a day (19 Mar 2023 10:42)                           MEDICATIONS:  STANDING MEDICATIONS  acetaminophen  Suppository .. 325 milliGRAM(s) Rectal once  ascorbic acid 500 milliGRAM(s) Oral daily  aspirin  chewable 81 milliGRAM(s) Oral daily  atorvastatin 40 milliGRAM(s) Oral at bedtime  baclofen 5 milliGRAM(s) Oral every 12 hours  cefepime   IVPB 2000 milliGRAM(s) IV Intermittent every 8 hours  clopidogrel Tablet 75 milliGRAM(s) Oral daily  dextrose 5% + sodium chloride 0.9%. 1000 milliLiter(s) IV Continuous <Continuous>  enoxaparin Injectable 40 milliGRAM(s) SubCutaneous every 24 hours  gabapentin 100 milliGRAM(s) Oral three times a day  levETIRAcetam  IVPB 1500 milliGRAM(s) IV Intermittent every 12 hours  midodrine. 10 milliGRAM(s) Oral three times a day  multivitamin/minerals 1 Tablet(s) Oral daily  valproate sodium  IVPB 500 milliGRAM(s) IV Intermittent three times a day  vancomycin  IVPB 1000 milliGRAM(s) IV Intermittent every 12 hours  vitamin A &amp; D Ointment 1 Application(s) Topical two times a day    PRN MEDICATIONS  acetaminophen     Tablet .. 650 milliGRAM(s) Oral every 6 hours PRN  oxycodone    5 mG/acetaminophen 325 mG 1 Tablet(s) Oral every 6 hours PRN                                            ------------------------------------------------------------  VITAL SIGNS: Last 24 Hours  T(C): 36.2 (22 Mar 2023 05:00), Max: 38.1 (21 Mar 2023 07:23)  T(F): 97.1 (22 Mar 2023 05:00), Max: 100.5 (21 Mar 2023 07:23)  HR: 95 (22 Mar 2023 05:00) (79 - 100)  BP: 101/67 (22 Mar 2023 05:00) (87/60 - 102/59)  BP(mean): --  RR: 18 (22 Mar 2023 05:00) (18 - 19)  SpO2: 100% (22 Mar 2023 05:00) (100% - 100%)      23 @ 07:01  -  23 @ 07:00  --------------------------------------------------------  IN: 0 mL / OUT: 1480 mL / NET: -1480 mL                                             --------------------------------------------------------------  LABS:                        8.6    11.19 )-----------( 351      ( 22 Mar 2023 02:39 )             27.1     03    138  |  104  |  7<L>  ----------------------------<  105<H>  3.6   |  25  |  <0.5<L>    Ca    8.5      21 Mar 2023 09:20    TPro  5.0<L>  /  Alb  2.3<L>  /  TBili  0.2  /  DBili  x   /  AST  13  /  ALT  5   /  AlkPhos  59  03-      Urinalysis Basic - ( 21 Mar 2023 11:57 )    Color: Colorless / Appearance: Clear / S.007 / pH: x  Gluc: x / Ketone: Small  / Bili: Negative / Urobili: <2 mg/dL   Blood: x / Protein: Negative / Nitrite: Negative   Leuk Esterase: Negative / RBC: x / WBC x   Sq Epi: x / Non Sq Epi: x / Bacteria: x        Lactate, Blood: 1.1 mmol/L (23 @ 09:20)        Culture - Blood (collected 20 Mar 2023 22:07)  Source: .Blood Blood  Preliminary Report (22 Mar 2023 02:02):    No growth to date.                                                    -------------------------------------------------------------  RADIOLOGY:                                            --------------------------------------------------------------    PHYSICAL EXAM:  GEN: NAD  NEURO: Awake and alert, L hemiparesis, speaks in short sentences, does not participate in exam, BLUE spasticity and contracted tone  HEENT: nontraumatic, normocephalic, neck supple  CARD: RRR  PULM: B/L decreased breath sounds  ABD: Soft, nondistended, nontender  EXTR: No clubbing, cyanosis, edema. 2+ pulses b/l LE, left LE in CAM boot  Skin: Sacral ulcer                                           --------------------------------------------------------------

## 2023-03-22 NOTE — PROGRESS NOTE ADULT - ATTENDING COMMENTS
68 y/o woman with PMH of Right MCA stroke with left sided weakness was brought to the ED via EMS. In the ED, she had altered mental status and sepsis. Patient has a h/o prior hospitalization  in a hospital in [facility] last fall due to persistent metabolic encephalopathy for months     #  Altered Mental Status likely due to Metabolic Encephalopathy from UTI, sepsis and seizure in the context of acute and chronic stroke with left hemiparesis   s/p course of abx for E. coli UTI  MRI of brain: Small patchy subcortical infarct in the right frontal lobe. Redemonstrated chronic infarct in the right MCA territory.  Mild chronic microvascular ischemic changes.  CTA of head/neck: no evidence of occlusion, aneurysm or stenosis  s/p rapid response for seizure- s/p EEG and video EEG: continue on Depakote 500 mg TID and Keppra 1000 mg BID  seizure precautions  Neuro f/u appreciated - stroke likely due to hypotension - has diminutive caliber of right M1 - avoid hypotension  Added ASA, Plavix and statin for new stroke -  DAPT for 21 days followed by ASA 81 mg daily - f/u in stroke clinic in 4 weeks per neurology  pt with UE spasticity: MRI of C spine: Significantly motion limited study. No gross cord compression or spinal cord edema demonstrated. Multilevel small disc bulges. - continue baclofen 5mg bid  behavioral health eval appreciated: pt does not have the capacity to choose a Health care agent  -clinically patient is not following verbal commands, persistent left sided paralysis, dysarthria , oropharyngeal dysphagia , patient is in chronic bed confinement status.    # Seizures- 3/20- patient had gaze deviation to the left- resolved, Neuro f/up rec- increase Keppra dose tx.   - if patient will have recurrent neuro event -consider repeating EEG    # New onset fevers on 3/20 at night and 3/21 in am: Maybe 2/2   # Unstagable sacral wound - Pending Burn eval,   3/20 procal = 0.05   Per ID, Cefepime.   -repeated CXR- personally reviewed, no acute infiltrates, effusions. f/up blood cxs, obtain u/a, urine cxs,     #Left avulsion fracture of medial malleolus  evaluated by podiatry - no surgical intervention - WB with CAM boot  continue PT/fall precautions, pain control    # Sinus tachycardia   now resolved - attributed to pain  EKG reviewed, TSH WNL    #Left cephalic vein thrombosis - s/p warm compress    # Anemia - normocytic  labs reviewed - Hgb in 9-10 range and now stable    # DVT prophylaxis    full code, overall patient's prognosis guarded   Family discussion: yes, medical team     #Progress Note Handoff: fever, f/up septic work up, feed with assistance, maintain aspiration precautions   SNF once medically stable

## 2023-03-22 NOTE — CONSULT NOTE ADULT - CONSULT REASON
deconditioning
Left foot pain
recent fever
skin assessment an treatment recommendation
AMS
sacral wounds

## 2023-03-22 NOTE — CONSULT NOTE ADULT - ASSESSMENT
Assessment:  Patient received in bed , awake with periods of confusion.  Limited Mobility , incontinence to  stool and urine.                        Currently being treated for Altered Mental Status likely due to Metabolic Encephalopathy from UTI associated sepsis                        High risk for pressure injury development or progression   Skin assessed- B/L  heel intact                         B/l ear healed ulceration   Wound #1  type and location :  Stage three pressure injury to B/l buttock   Size L :~3x1x0.1  R 3x1x0.1  Tissue Description :  Pink , macerated   Wound Exudate : None    Wound Edge:  Irregular   Periwound Condition :  Macerated           Plan:  Wound and skin care recs   Clean wound with soap  and  water, pat dry then apply triad with most saline guaze dressing   Pressure  injury  preventive  measures  skin  and incontinence care   Assess wound and inform primary provider of any changes   Case discussed with primary Rn  Wound/ ostomy specialist  to f/u as needed     Offloading: [x] Frequent position changes [ ] Devices/Equipment  Cleansing: [ ] Saline [x ] Soap/Water [ ] Other: ______  Topicals: [ x] Barrier Cream [ ] Antimicrobial [ ] Enzymatic Wound Debridement  Dressings: [ ] Dry, sterile [ ] Allevyn  Foam [ ] Absorbant Pads [ ] Collagenase    Other Recs.   Per primary team      Total time for bedside assessment , review of medical records  and  discussion of plan of care with primary team greater than 35 min
#Sacral wounds    - no burn surgical intervention at this time  - continue local wound care BID  - wash with soap and water, apply hydrogel, cover with allevyn pad  - offloading/positional changes  - ensure adequate nutrtion  - burn signing off, recall prn
This 67y old female with unknown PMH, no relative, or phone number to contact for further information regarding her baseline. CTH showed old stroke in the territory of the Rt MCA. CTA reported sever stenosis of Lt V4, but it looks more as the Lt vertibral ends in hemant PICA. The patient was found to be febrile, with elevated WBC, and lactate suggesting toxic metabolic etiology for her current level of consciousness. Seizures can not be r/o with the limited history.     Recommendations:   - pending further recognizing of the patient identity to be able to collect more info (supposedly, the daughter will come)  - Infectious w/u as per primary team   - rEEG   - Will consider further testing depending on the history we get if possible       Thank you for sharing this patient with me; please do not hesitate to contact me in case of any question.         
Assessment:   68 y/o woman with PMH of Right MCA stroke with left sided weakness was brought to the ED via EMS. In the ED, she had altered mental status and sepsis, with new onset fevers.       Plan:  Chart reviewed and Patient evaluated.       D/C Vanco  Continue w/ cefepime.   Req Venous duplex L arm, b/l LE. r/o possible thrombus.     Discussed Plan w/ Dr. Taveras.      
IMPRESSION: Rehab of deconditioning / encephalopathy / UTI / L hemiplegia from old stroke /  hyperkalemia     PRECAUTIONS: [   ] Cardiac  [   ] Respiratory  [   ] Seizures [   ] Contact Isolation  [   ] Droplet Isolation  [   ] Other    Weight Bearing Status:     RECOMMENDATION:    Out of Bed to Chair     DVT/Decubiti Prophylaxis    REHAB PLAN:     [  x  ] Bedside P/T 3-5 times a week   [    ]   Bedside O/T  2-3 times a week             [    ] Speech Therapy               [    ]  No Rehab Therapy Indicated   Conditioning/ROM                                    ADL  Bed Mobility                                               Conditioning/ROM  Transfers                                                     Bed Mobility  Sitting /Standing Balance                         Transfers                                        Gait Training                                               Sitting/Standing Balance  Stair Training [   ]Applicable                    Home equipment Eval                                                                        Splinting  [   ] Only      GOALS:   ADL   [    ]   Independent                    Transfers  [  x  ] Independent                          Ambulation  [  x  ] Independent     [ x    ] With device                            [    ]  CG                                                         [    ]  CG                                                                  [    ] CG                            [    ] Min A                                                   [    ] Min A                                                              [    ] Min  A          DISCHARGE PLAN:   [    ]  Good candidate for Intensive Rehabilitation/Hospital based                                             Will tolerate 3hrs Intensive Rehab Daily                                       [  x   ]  Short Term Rehab in Skilled Nursing Facility / SNF                                        [     ]  Home with Outpatient or  services                                         [     ]  Possible Candidate for Intensive Hospital based Rehab

## 2023-03-22 NOTE — PROGRESS NOTE ADULT - ASSESSMENT
66 y/o woman with PMH of Right MCA stroke with left sided weakness was brought to the ED via EMS. In the ED, she had altered mental status and sepsis.    #Seizures  #Acute R Frontal Lobe Infarct likely 2/ hypotension   #Chronic R MCA infarct  - per chart: at baseline she is alert, able to communicate and is oriented. Ambulation at baseline: uses a wheelchair. She was in a hospital in [facility] last fall and was lethargic for months  - 3/8: s/p rapid response for L gaze preference  - 3/20: Pt had event with L gaze preference during SS eval, pt made NPO. Examined by resident, no gaze preference present any longer. Pt responsive but less than baseline. Developed seizures post-event. Septic workup obtained. CXR appears clear, rest pending. --> 3/21 Pt more responsive this AM, expressing herself and in line with current baseline.   - Depakote level (3/4) 87 (WNL) -> (3/21) 74 (WNL)   - CTH/CTA of head/neck (3/4): no acute pathology, no evidence of occlusion, aneurysm or stenosis  - EEG (3/8) and video EEG (3/9): Focal slowing  - MRI Brain (3/15): new subcortical infarct in the right frontal lobe. Redemonstrated chronic infarct in the right MCA territory.  - MRI of C spine (3/16): Significantly motion limited study. No gross cord compression or spinal cord edema demonstrated. Multilevel small disc bulges.  *****  - Neuro eval'd 3/8-3/20: For L gaze preference/stroke, OP med review: on low dose Lamictal and Depakote in addition to Keppra (possible mood disorder/ seizures), Her MRI of brain showing subcortical infarct within same M1 territory. The casue of stroke is most likely hypotension as she has a diminutive Rt M1. However, MRI findings doesn't explain her B/L upper extremity spasticity. So, it is important to rule out cervical cord pathology. DAPT for 21 days followed by ASA 81 mg daily, Increased keppra post 3/20 possible seizure event  - BH eval'd 3/15: Delirium; pt does not have the capacity to choose a Health care agent  - Seizure precautions  - C/w Depakote 500mg TID and increased Keppra to 1500mg BID  - C/w baclofen 5mg bid (for UE spasticity)  - C/w ASA, Plavix (3/17-present), and high dose statin for new stroke - DAPT for 21 days followed by ASA 81 mg daily  - C/w Midodrine 10mg TID  - Change fluids to D5+NS 75cc/hr  - SS re-juliette 3/21: Minced and moist, 1:1 feeds, thin liquids through straw   *****  - OP stroke clinic in 4 weeks per neurology    #Metabolic Encephalopathy from UTI  #Febrile 3/20  #Sacral Ulcer  - 3/20: Pt became febrile after possible seizure episode, Cefepime, levaquin (x1 3/20), vancomycin ON  - s/p Vanc x1 (3/4), Cefepime (x1 3/4, x1 3/8), Ceftriaxone (3/4-3/6) (for UTI)  - F/u WBC (3/21) 12.26 (up from 8.23), BCx (3/20) NGTD, Procal (3/20) 0.05, RVP (3/20) COVID (-), Cortisol level (3/21), Lactate (3/21) WNL, UA nitrite (-), LEC (-), pending WBCs/Bacteria  - ID Consulted  - Burn consult   - C/w Cefepime (3/20-present) and vancomycin (3/20-present)    #Left avulsion fracture of medial malleolus  - Duplex BL LE (3/7): (-) for DVT  - CT Foot (3/11): acute minimally displaced avulsion fracture at the medial malleolus  *****  - Podiatry following for foot pain: WBAT w/ surgical shoe, ankle brace, PT  - fall precautions  - Decrease pain control regimen - if pain uncontrolled consider increasing percocet to 2tabs.  - C/w gabapentin at 100mg TID (Lowered dose from home, increase as pt tolerates)    #Sinus tachycardia - now resolved - attributed to pain  - TSH (3/8) WNL, - EKG reviewed    #Left cephalic vein thrombosis  - Duplex LUE (3/10): No DVT however there is superficial thrombosis noted in the left cephalic vein  - s/p warm compress    #Anemia - normocytic - stable  - possibly due to frequent phlebotomy, monitor for bleeding    #Misc  - DVT ppx - lovenox  - Diet: Soft and bite sized (SS 3/7)  - full code, overall guarded prognosis  - PT eval'd 3/7 66 y/o woman with PMH of Right MCA stroke with left sided weakness was brought to the ED via EMS. In the ED, she had altered mental status and sepsis.    #Seizures  #Acute R Frontal Lobe Infarct likely 2/ hypotension   #Chronic R MCA infarct  - per chart: at baseline she is alert, able to communicate and is oriented. Ambulation at baseline: uses a wheelchair. She was in a hospital in [facility] last fall and was lethargic for months  - 3/8: s/p rapid response for L gaze preference  - 3/20: Pt had event with L gaze preference during SS eval, pt made NPO. Examined by resident, no gaze preference present any longer. Pt responsive but less than baseline. Developed seizures post-event. Septic workup obtained. CXR appears clear, rest pending. --> 3/21 Pt more responsive this AM, expressing herself and in line with current baseline.   - Depakote level (3/4) 87 (WNL) -> (3/21) 74 (WNL)   - CTH/CTA of head/neck (3/4): no acute pathology, no evidence of occlusion, aneurysm or stenosis  - EEG (3/8) and video EEG (3/9): Focal slowing  - MRI Brain (3/15): new subcortical infarct in the right frontal lobe. Redemonstrated chronic infarct in the right MCA territory.  - MRI of C spine (3/16): Significantly motion limited study. No gross cord compression or spinal cord edema demonstrated. Multilevel small disc bulges.  *****  - Neuro eval'd 3/8-3/20: For L gaze preference/stroke, OP med review: on low dose Lamictal and Depakote in addition to Keppra (possible mood disorder/ seizures), Her MRI of brain showing subcortical infarct within same M1 territory. The casue of stroke is most likely hypotension as she has a diminutive Rt M1. However, MRI findings doesn't explain her B/L upper extremity spasticity. So, it is important to rule out cervical cord pathology. DAPT for 21 days followed by ASA 81 mg daily, Increased keppra post 3/20 possible seizure event  - BH eval'd 3/15: Delirium; pt does not have the capacity to choose a Health care agent  - Seizure precautions  - C/w Depakote 500mg TID and increased Keppra to 1500mg BID  - C/w baclofen 5mg bid (for UE spasticity)  - C/w ASA, Plavix (3/17-present), and high dose statin for new stroke - DAPT for 21 days followed by ASA 81 mg daily  - C/w Midodrine 10mg TID  - C. D5+NS 75cc/hr  - SS re-juliette 3/21: Minced and moist, 1:1 feeds, thin liquids through straw   *****  - OP stroke clinic in 4 weeks per neurology    #Metabolic Encephalopathy from UTI  #Febrile 3/20  #Sacral Ulcer  - 3/20: Pt became febrile after possible seizure episode, Cefepime, levaquin (x1 3/20), vancomycin ON  - s/p Vanc x1 (3/4), Cefepime (x1 3/4, x1 3/8), Ceftriaxone (3/4-3/6) (for UTI)  - F/u WBC (3/21) 12.26 (up from 8.23), BCx (3/20) NGTD, Procal (3/20) 0.05, RVP (3/20) COVID (-), Cortisol level (3/21), Lactate (3/21) WNL, UA nitrite (-), LEC (-), pending WBCs/Bacteria  - ID Consulted  - Burn consult   - C/w Cefepime (3/20-present) and vancomycin (3/20-present)    #Left avulsion fracture of medial malleolus  - Duplex BL LE (3/7): (-) for DVT  - CT Foot (3/11): acute minimally displaced avulsion fracture at the medial malleolus  *****  - Podiatry following for foot pain: WBAT w/ surgical shoe, ankle brace, PT  - fall precautions  - Decrease pain control regimen - if pain uncontrolled consider increasing percocet to 2tabs.  - C/w gabapentin at 100mg TID (Lowered dose from home, increase as pt tolerates)    #Sinus tachycardia - now resolved - attributed to pain  - TSH (3/8) WNL, - EKG reviewed    #Left cephalic vein thrombosis  - Duplex LUE (3/10): No DVT however there is superficial thrombosis noted in the left cephalic vein  - s/p warm compress    #Anemia - normocytic - stable  - possibly due to frequent phlebotomy, monitor for bleeding    #Misc  - DVT ppx - lovenox  - Diet: Soft and bite sized (SS 3/7)  - full code, overall guarded prognosis  - PT eval'd 3/7 68 y/o woman with PMH of Right MCA stroke with left sided weakness was brought to the ED via EMS. In the ED, she had altered mental status and sepsis.    #Seizures  #Acute R Frontal Lobe Infarct likely 2/ hypotension   #Chronic R MCA infarct  - per chart: at baseline she is alert, able to communicate and is oriented. Ambulation at baseline: uses a wheelchair. She was in a hospital in [facility] last fall and was lethargic for months  - 3/8: s/p rapid response for L gaze preference  - 3/20: Pt had event with L gaze preference during SS eval, pt made NPO. Examined by resident, no gaze preference present any longer. Pt responsive but less than baseline. Developed seizures post-event. Septic workup obtained. CXR appears clear, rest pending. --> 3/21 Pt more responsive this AM, expressing herself and in line with current baseline.   - Depakote level (3/4) 87 (WNL) -> (3/21) 74 (WNL)   - CTH/CTA of head/neck (3/4): no acute pathology, no evidence of occlusion, aneurysm or stenosis  - EEG (3/8) and video EEG (3/9): Focal slowing  - MRI Brain (3/15): new subcortical infarct in the right frontal lobe. Redemonstrated chronic infarct in the right MCA territory.  - MRI of C spine (3/16): Significantly motion limited study. No gross cord compression or spinal cord edema demonstrated. Multilevel small disc bulges.  *****  - Neuro eval'd 3/8-3/20: For L gaze preference/stroke, OP med review: on low dose Lamictal and Depakote in addition to Keppra (possible mood disorder/ seizures), Her MRI of brain showing subcortical infarct within same M1 territory. The casue of stroke is most likely hypotension as she has a diminutive Rt M1. However, MRI findings doesn't explain her B/L upper extremity spasticity. So, it is important to rule out cervical cord pathology. DAPT for 21 days followed by ASA 81 mg daily, Increased keppra post 3/20 possible seizure event  - BH eval'd 3/15: Delirium; pt does not have the capacity to choose a Health care agent  - SS re-juliette 3/21: Minced and moist, 1:1 feeds, thin liquids through straw  - Seizure precautions  - C/w Depakote 500mg TID and Keppra to 1500mg BID - transition to oral today  - C/w baclofen 5mg bid (for UE spasticity)  - C/w ASA, Plavix (3/17-present), and high dose statin for new stroke - DAPT for 21 days followed by ASA 81 mg daily  - C/w Midodrine 10mg TID  - C. D5+NS 75cc/hr  *****  - OP stroke clinic in 4 weeks per neurology    #Metabolic Encephalopathy from UTI  #Febrile 3/20  #Unstagable Sacral Ulcer  - 3/20: Pt became febrile after possible seizure episode, Cefepime, levaquin (x1 3/20), vancomycin ON  - s/p Vanc x1 (3/4), Cefepime (x1 3/4, x1 3/8), Ceftriaxone (3/4-3/6) (for UTI)  - F/u WBC (3/21) 12.26 (up from 8.23), BCx (3/20) NGTD, Procal (3/20) 0.05, RVP (3/20) COVID (-), Cortisol level (3/21), Lactate (3/21) WNL, UA nitrite (-), LEC (-), pending WBCs/Bacteria  - ID Consulted - f/u recs  - Burn consulted - f/u recs  - C/w Cefepime (3/20-present) and vancomycin (3/20-present)    #Left avulsion fracture of medial malleolus  - Duplex BL LE (3/7): (-) for DVT  - CT Foot (3/11): acute minimally displaced avulsion fracture at the medial malleolus  *****  - Podiatry following for foot pain: WBAT w/ surgical shoe, ankle brace, PT  - fall precautions  - pain control regimen - if pain uncontrolled consider increasing percocet to 2tabs.  - C/w gabapentin at 100mg TID (Lowered dose from home, increase as pt tolerates)    #Sinus tachycardia - now resolved - attributed to pain  - TSH (3/8) WNL, - EKG reviewed    #Left cephalic vein thrombosis  - Duplex LUE (3/10): No DVT however there is superficial thrombosis noted in the left cephalic vein  - s/p warm compress    #Anemia - normocytic - stable  - possibly due to frequent phlebotomy, monitor for bleeding    #Misc  - DVT ppx - lovenox  - Diet: Soft and bite sized (SS 3/7)  - full code, overall guarded prognosis  - PT eval'd 3/7

## 2023-03-22 NOTE — CONSULT NOTE ADULT - NS ATTEND AMEND GEN_ALL_CORE FT
As above   pt seen during daily rounds   Consultation requested for sacral ulcers    Exam - grossly clean wounds as above   No surgical intervention   Wound care, hygiene and offloading   Further mgmt  of wounds is appropriate for wound care nurse specialist / nursing protocol

## 2023-03-22 NOTE — CONSULT NOTE ADULT - SUBJECTIVE AND OBJECTIVE BOX
ID Consult Note    Subjective:  JOSEPH OBRIEN  Seen Bedside 67y Female  .   Patient is a 67y old  Female who presents with a chief complaint of acute stroke (21 Mar 2023 06:17)    HPI:   This is a case of a 67 year old lady brought to ED via EMS who per triage note found pt on street. Per triage note, daughter told EMS that pt c/o generalized pain, however no family present.   Patient has never been to this hospital per records, no contact information in chart, mildly cooperative, says she is feeling "okay" and when asked where she lives she said [place]. No infomration could be obtained on the name of any relatives or family members, home address, phone numbers, or any contact information.    In ED, hemodynamically stable. She was pan-scanned. no evidence of new stroke, trauma, or infection.  WBC 19K  UA +ve  CTH showed old stroke in the territory of the Rt MCA. The patient was found to be febrile, with elevated WBC, and lactate suggesting toxic metabolic etiology for her current level of consciousness.    Admit for further management and workup (04 Mar 2023 10:29)      Past Medical History and Surgical History  PAST MEDICAL & SURGICAL HISTORY:       Review of Systems:  [X] Ten point review of systems is otherwise negative except as noted     Objective:  Vital Signs Last 24 Hrs  T(C): 36.2 (22 Mar 2023 05:00), Max: 36.7 (21 Mar 2023 22:06)  T(F): 97.1 (22 Mar 2023 05:00), Max: 98 (21 Mar 2023 22:06)  HR: 95 (22 Mar 2023 05:00) (82 - 95)  BP: 101/67 (22 Mar 2023 05:00) (87/60 - 101/67)  BP(mean): --  RR: 18 (22 Mar 2023 05:00) (18 - 18)  SpO2: 100% (22 Mar 2023 05:00) (100% - 100%)                            8.5    9.40  )-----------( 352      ( 22 Mar 2023 08:13 )             26.7                 03-22    140  |  105  |  6<L>  ----------------------------<  93  3.8   |  26  |  <0.5<L>    Ca    8.5      22 Mar 2023 08:13  Mg     1.6     03-22    TPro  5.0<L>  /  Alb  2.3<L>  /  TBili  <0.2  /  DBili  x   /  AST  14  /  ALT  <5  /  AlkPhos  58  03-22    PHYSICAL EXAM:  GEN: NAD  NEURO: Awake and alert, L hemiparesis  HEENT: nontraumatic, normocephalic, neck supple  CARD: RRR  PULM: B/L decreased breath sounds  ABD: Soft, nondistended, nontender  EXTR: No clubbing, cyanosis, edema. 2+ pulses b/l LE, left LE in CAM boot      Assessment:   68 y/o woman with PMH of Right MCA stroke with left sided weakness was brought to the ED via EMS. In the ED, she had altered mental status and sepsis, with new onset fevers.       Plan:  Chart reviewed and Patient evaluated.       D/C Vanco  Continue w/ cefepime.   Req Venous duplex L arm, b/l LE. r/o possible infected thrombus.     Discussed Plan w/ Dr. Taveras.       ID Consult Note    Subjective:  JOSEPH OBRIEN  Seen Bedside 67y Female  .   Patient is a 67y old  Female who presents with a chief complaint of acute stroke (21 Mar 2023 06:17)    HPI:   This is a case of a 67 year old lady brought to ED via EMS who per triage note found pt on street. Per triage note, daughter told EMS that pt c/o generalized pain, however no family present.   Patient has never been to this hospital per records, no contact information in chart, mildly cooperative, says she is feeling "okay" and when asked where she lives she said [place]. No infomration could be obtained on the name of any relatives or family members, home address, phone numbers, or any contact information.    In ED, hemodynamically stable. She was pan-scanned. no evidence of new stroke, trauma, or infection.  WBC 19K  UA +ve  CTH showed old stroke in the territory of the Rt MCA. The patient was found to be febrile, with elevated WBC, and lactate suggesting toxic metabolic etiology for her current level of consciousness.    Admit for further management and workup (04 Mar 2023 10:29)      Past Medical History and Surgical History  PAST MEDICAL & SURGICAL HISTORY:       Review of Systems:  [X] Ten point review of systems is otherwise negative except as noted     Objective:  Vital Signs Last 24 Hrs  T(C): 36.2 (22 Mar 2023 05:00), Max: 36.7 (21 Mar 2023 22:06)  T(F): 97.1 (22 Mar 2023 05:00), Max: 98 (21 Mar 2023 22:06)  HR: 95 (22 Mar 2023 05:00) (82 - 95)  BP: 101/67 (22 Mar 2023 05:00) (87/60 - 101/67)  BP(mean): --  RR: 18 (22 Mar 2023 05:00) (18 - 18)  SpO2: 100% (22 Mar 2023 05:00) (100% - 100%)                            8.5    9.40  )-----------( 352      ( 22 Mar 2023 08:13 )             26.7                 03-22    140  |  105  |  6<L>  ----------------------------<  93  3.8   |  26  |  <0.5<L>    Ca    8.5      22 Mar 2023 08:13  Mg     1.6     03-22    TPro  5.0<L>  /  Alb  2.3<L>  /  TBili  <0.2  /  DBili  x   /  AST  14  /  ALT  <5  /  AlkPhos  58  03-22    PHYSICAL EXAM:  GEN: NAD  NEURO: Awake and alert, L hemiparesis  HEENT: nontraumatic, normocephalic, neck supple  CARD: RRR  PULM: B/L decreased breath sounds  ABD: Soft, nondistended, nontender  EXTR: No clubbing, cyanosis, edema. 2+ pulses b/l LE, left LE in CAM boot

## 2023-03-22 NOTE — CONSULT NOTE ADULT - ATTENDING COMMENTS
I have personally seen and examined this patient.  I have fully participated in the care of this patient.  I have reviewed all pertinent clinical information, including history, physical exam, plan and note.  I have reviewed all pertinent clinical information and reviewed all relevant imaging and diagnostic studies personally.  Corrections and edits were made wherever needed.       Complicated and prolonged hospital course, admitted 3/3 for R MCA CVA, ID consulted 3/22 for Fever  3/21 Tm 101.7 P>90 low BP- Sepsis   WBC 19 on admission, UCX 3/4 panS ecoli, UA , s/p ceftriaxone 3/4-6, cefepime 3/8    3/20 BCX NGTD   3/20 COVID/RVP NEGATIVE   CXR no PNA  No diarrhea  No obvious phlebitis  Recent MRI stable  cannot obtain further history from the patient secondary to altered mental status or sedation     - Rule out DVT, LE duplex  - Exam with LUE edema- if acute finding, also Duplex LUE  - D/C Vanc as BCX NG and no PNA  - Cont cefepime for now, possible empiric 5-7 day course  - Repeat BCX if spikes fever again  - Guarded prognosis GOC

## 2023-03-22 NOTE — CONSULT NOTE ADULT - SUBJECTIVE AND OBJECTIVE BOX
Patient is a 67y old  Female who presents with a chief complaint of acute stroke (21 Mar 2023 06:17)  AM ROUNDS    Vital Signs Last 24 Hrs  T(C): 37.3 (22 Mar 2023 14:30), Max: 37.3 (22 Mar 2023 14:30)  T(F): 99.1 (22 Mar 2023 14:30), Max: 99.1 (22 Mar 2023 14:30)  HR: 92 (22 Mar 2023 14:30) (82 - 95)  BP: 108/67 (22 Mar 2023 14:30) (87/60 - 108/67)  RR: 18 (22 Mar 2023 14:30) (18 - 18)  SpO2: 100% (22 Mar 2023 05:00) (100% - 100%)    LABS:                        8.5    9.40  )-----------( 352      ( 22 Mar 2023 08:13 )             26.7     03-22    140  |  105  |  6<L>  ----------------------------<  93  3.8   |  26  |  <0.5<L>    Ca    8.5      22 Mar 2023 08:13  Mg     1.6     03-22    TPro  5.0<L>  /  Alb  2.3<L>  /  TBili  <0.2  /  DBili  x   /  AST  14  /  ALT  <5  /  AlkPhos  58  03-22    MEDICATIONS  (STANDING):  acetaminophen  Suppository .. 325 milliGRAM(s) Rectal once  ascorbic acid 500 milliGRAM(s) Oral daily  aspirin  chewable 81 milliGRAM(s) Oral daily  atorvastatin 40 milliGRAM(s) Oral at bedtime  baclofen 5 milliGRAM(s) Oral every 12 hours  cefepime   IVPB 2000 milliGRAM(s) IV Intermittent every 8 hours  clopidogrel Tablet 75 milliGRAM(s) Oral daily  dextrose 5% + sodium chloride 0.9%. 1000 milliLiter(s) (75 mL/Hr) IV Continuous <Continuous>  enoxaparin Injectable 40 milliGRAM(s) SubCutaneous every 24 hours  gabapentin 100 milliGRAM(s) Oral three times a day  levETIRAcetam 1500 milliGRAM(s) Oral two times a day  midodrine. 10 milliGRAM(s) Oral three times a day  multivitamin/minerals 1 Tablet(s) Oral daily  valproic  acid Syrup 500 milliGRAM(s) Oral three times a day  vitamin A &amp; D Ointment 1 Application(s) Topical two times a day    MEDICATIONS  (PRN):  acetaminophen     Tablet .. 650 milliGRAM(s) Oral every 6 hours PRN Moderate Pain (4 - 6)  oxycodone    5 mG/acetaminophen 325 mG 1 Tablet(s) Oral every 6 hours PRN Moderate Pain (4 - 6)    PHYSICAL EXAM:  General: Pt lying in bed, no acute distress  Skin: two approx. 3x2 cm wounds to sacral region with pink moist base. No active bleeding, purulence or surrounding erythema   +dressing change performed

## 2023-03-22 NOTE — CONSULT NOTE ADULT - CONSULT REQUESTED DATE/TIME
10-Mar-2023 18:53
22-Mar-2023 12:37
15-Mar-2023 10:16
22-Mar-2023 18:02
06-Mar-2023 16:05
04-Mar-2023 06:37

## 2023-03-23 LAB
GLUCOSE BLDC GLUCOMTR-MCNC: 132 MG/DL — HIGH (ref 70–99)
GLUCOSE BLDC GLUCOMTR-MCNC: 85 MG/DL — SIGNIFICANT CHANGE UP (ref 70–99)
GLUCOSE BLDC GLUCOMTR-MCNC: 87 MG/DL — SIGNIFICANT CHANGE UP (ref 70–99)
GLUCOSE BLDC GLUCOMTR-MCNC: 98 MG/DL — SIGNIFICANT CHANGE UP (ref 70–99)

## 2023-03-23 PROCEDURE — 99232 SBSQ HOSP IP/OBS MODERATE 35: CPT

## 2023-03-23 PROCEDURE — 93970 EXTREMITY STUDY: CPT | Mod: 26

## 2023-03-23 RX ORDER — MIDODRINE HYDROCHLORIDE 2.5 MG/1
10 TABLET ORAL THREE TIMES A DAY
Refills: 0 | Status: DISCONTINUED | OUTPATIENT
Start: 2023-03-23 | End: 2023-04-18

## 2023-03-23 RX ADMIN — Medication 5 MILLIGRAM(S): at 19:02

## 2023-03-23 RX ADMIN — SODIUM CHLORIDE 75 MILLILITER(S): 9 INJECTION, SOLUTION INTRAVENOUS at 01:10

## 2023-03-23 RX ADMIN — MIDODRINE HYDROCHLORIDE 10 MILLIGRAM(S): 2.5 TABLET ORAL at 17:22

## 2023-03-23 RX ADMIN — LEVETIRACETAM 1500 MILLIGRAM(S): 250 TABLET, FILM COATED ORAL at 05:03

## 2023-03-23 RX ADMIN — GABAPENTIN 100 MILLIGRAM(S): 400 CAPSULE ORAL at 13:31

## 2023-03-23 RX ADMIN — ATORVASTATIN CALCIUM 40 MILLIGRAM(S): 80 TABLET, FILM COATED ORAL at 21:22

## 2023-03-23 RX ADMIN — ENOXAPARIN SODIUM 40 MILLIGRAM(S): 100 INJECTION SUBCUTANEOUS at 21:22

## 2023-03-23 RX ADMIN — Medication 1 APPLICATION(S): at 17:23

## 2023-03-23 RX ADMIN — GABAPENTIN 100 MILLIGRAM(S): 400 CAPSULE ORAL at 21:22

## 2023-03-23 RX ADMIN — LEVETIRACETAM 1500 MILLIGRAM(S): 250 TABLET, FILM COATED ORAL at 17:23

## 2023-03-23 RX ADMIN — GABAPENTIN 100 MILLIGRAM(S): 400 CAPSULE ORAL at 05:03

## 2023-03-23 RX ADMIN — Medication 500 MILLIGRAM(S): at 11:21

## 2023-03-23 RX ADMIN — Medication 1 APPLICATION(S): at 05:07

## 2023-03-23 RX ADMIN — Medication 81 MILLIGRAM(S): at 11:21

## 2023-03-23 RX ADMIN — CEFEPIME 100 MILLIGRAM(S): 1 INJECTION, POWDER, FOR SOLUTION INTRAMUSCULAR; INTRAVENOUS at 05:03

## 2023-03-23 RX ADMIN — MIDODRINE HYDROCHLORIDE 10 MILLIGRAM(S): 2.5 TABLET ORAL at 11:51

## 2023-03-23 RX ADMIN — CLOPIDOGREL BISULFATE 75 MILLIGRAM(S): 75 TABLET, FILM COATED ORAL at 11:20

## 2023-03-23 RX ADMIN — CEFEPIME 100 MILLIGRAM(S): 1 INJECTION, POWDER, FOR SOLUTION INTRAMUSCULAR; INTRAVENOUS at 21:22

## 2023-03-23 RX ADMIN — CEFEPIME 100 MILLIGRAM(S): 1 INJECTION, POWDER, FOR SOLUTION INTRAMUSCULAR; INTRAVENOUS at 13:33

## 2023-03-23 RX ADMIN — Medication 1 TABLET(S): at 11:52

## 2023-03-23 RX ADMIN — Medication 5 MILLIGRAM(S): at 05:04

## 2023-03-23 RX ADMIN — Medication 500 MILLIGRAM(S): at 13:31

## 2023-03-23 RX ADMIN — Medication 500 MILLIGRAM(S): at 05:07

## 2023-03-23 RX ADMIN — Medication 500 MILLIGRAM(S): at 21:22

## 2023-03-23 RX ADMIN — MIDODRINE HYDROCHLORIDE 10 MILLIGRAM(S): 2.5 TABLET ORAL at 05:03

## 2023-03-23 NOTE — PROGRESS NOTE ADULT - ASSESSMENT
ASSESSMENT   68 y/o woman with PMH of Right MCA stroke with left sided weakness was brought to the ED via EMS. In the ED, she had altered mental status and sepsis, with new onset fevers.     IMPRESSION  Complicated and prolonged hospital course, admitted 3/3 for R MCA CVA, ID consulted 3/22 for Fever  3/21 Tm 101.7 P>90 low BP- Sepsis   WBC 19 on admission, UCX 3/4 panS ecoli, UA , s/p ceftriaxone 3/4-6, cefepime 3/8  Skin: two approx. 3x2 cm wounds to sacral region with pink moist base. No active bleeding, purulence or surrounding erythema   3/21 UCX NG   3/20 BCX NGTD   3/20 COVID/RVP NEGATIVE   CXR no PNA  No diarrhea  No obvious phlebitis  Recent MRI stable  cannot obtain further history from the patient secondary to altered mental status or sedation     RECOMMENDATIONS  - Rule out DVT, LE duplex  - Exam with LUE edema- if acute finding, also Duplex LUE  - Cont cefepime for now, possible empiric 5 day course end 3/24  - Repeat BCX if spikes fever again  - Guarded prognosis GOC  - Appreciate Burn consult  - Please recall ID PRN     If any questions, please call or send a message on [employer] Teams  Please continue to update ID with any pertinent new laboratory or radiographic findings  Spectra [phone]

## 2023-03-23 NOTE — PROGRESS NOTE ADULT - ATTENDING COMMENTS
66 y/o woman with PMH of Right MCA stroke with left sided weakness was brought to the ED via EMS. In the ED, she had altered mental status and sepsis. Patient has a h/o prior hospitalization  in a [facility] last [facility] due to persistent metabolic encephalopathy for months     #  Altered Mental Status likely due to Metabolic Encephalopathy from UTI, sepsis and seizure in the context of acute and chronic stroke with left hemiparesis   s/p course of abx for E. coli UTI  MRI of brain: Small patchy subcortical infarct in the right frontal lobe. Redemonstrated chronic infarct in the right MCA territory.  Mild chronic microvascular ischemic changes.  CTA of head/neck: no evidence of occlusion, aneurysm or stenosis  s/p rapid response for seizure- s/p EEG and video EEG: continue on Depakote 500 mg TID and Keppra 1000 mg BID  seizure precautions  Neuro f/u appreciated - stroke likely due to hypotension - has diminutive caliber of right M1 - avoid hypotension  Added ASA, Plavix and statin for new stroke -  DAPT for 21 days followed by ASA 81 mg daily - f/u in stroke clinic in 4 weeks per neurology  pt with UE spasticity: MRI of C spine: Significantly motion limited study. No gross cord compression or spinal cord edema demonstrated. Multilevel small disc bulges. - continue baclofen 5mg bid  behavioral health eval appreciated: pt does not have the capacity to choose a Health care agent  -clinically patient is not following verbal commands, persistent left sided paralysis, dysarthria , oropharyngeal dysphagia , patient is in chronic bed confinement status.    # Seizures- 3/20- patient had gaze deviation to the left- resolved, Neuro f/up rec- increase Keppra dose tx.   - if patient will have recurrent neuro event -consider repeating EEG    # New onset fevers on 3/20 at night and 3/21 in am: Maybe 2/2   # Unstagable sacral wound - Per Burn eval: [facility] only.  3/20 procal = 0.05   Per ID, Cefepime. (f/u on end date)  -repeated CXR- personally reviewed, no acute infiltrates, effusions. f/up blood cxs, obtain u/a, urine cxs,     #Left avulsion fracture of medial malleolus  evaluated by podiatry - no surgical intervention - WB with CAM boot  continue PT/fall precautions, pain control    # Sinus tachycardia   now resolved - attributed to pain  EKG reviewed, TSH WNL    #Left cephalic vein thrombosis - s/p warm compress    # Anemia - normocytic  labs reviewed - Hgb in 9-10 range and now stable    # DVT prophylaxis    full code, overall patient's prognosis guarded   Family discussion: yes, medical team     #Progress Note Handoff: fever, f/up septic work up, feed with assistance, maintain aspiration precautions   able to answer questions. move RUE. Has a gaze preference.   SNF once medically stable .

## 2023-03-23 NOTE — PROGRESS NOTE ADULT - SUBJECTIVE AND OBJECTIVE BOX
CAMILLE, JOSEPH  67y, Female  Allergy: Allergy Status Unknown      LOS  19d    CHIEF COMPLAINT: acute stroke (21 Mar 2023 06:17)      INTERVAL EVENTS/HPI  - No acute events overnight  - T(F): , Max: 98.3 (03-22-23 @ 21:00) Afebrile   - Tolerating medication  - WBC Count: 9.40 (03-22-23 @ 08:13)  WBC Count: 11.19 (03-22-23 @ 02:39)     - Creatinine, Serum: <0.5 (03-22-23 @ 08:13)       ROS  unable to obtain history secondary to patient's mental status and/or sedation     VITALS:  T(F): 96, Max: 98.3 (03-22-23 @ 21:00)  HR: 84  BP: 121/66  RR: 18Vital Signs Last 24 Hrs  T(C): 35.6 (23 Mar 2023 14:29), Max: 36.8 (22 Mar 2023 21:00)  T(F): 96 (23 Mar 2023 14:29), Max: 98.3 (22 Mar 2023 21:00)  HR: 84 (23 Mar 2023 14:29) (84 - 92)  BP: 121/66 (23 Mar 2023 14:29) (121/66 - 147/61)  BP(mean): --  RR: 18 (23 Mar 2023 14:29) (18 - 18)  SpO2: 98% (23 Mar 2023 14:29) (98% - 98%)        PHYSICAL EXAM:  Gen: chronically ill appearing   HEENT: Normocephalic, atraumatic  Neck: supple, no lymphadenopathy  CV: Regular rate & regular rhythm  Lungs: decreased BS at bases, no fremitus  Abdomen: Soft, BS present  Ext: Warm, well perfused, LE edema  Neuro: L deficits, not following commands  Skin: no rash, no erythema  Lines: no phlebitis     FH: Non-contributory  Social Hx: Non-contributory    TESTS & MEASUREMENTS:                        8.5    9.40  )-----------( 352      ( 22 Mar 2023 08:13 )             26.7     03-22    140  |  105  |  6<L>  ----------------------------<  93  3.8   |  26  |  <0.5<L>    Ca    8.5      22 Mar 2023 08:13  Mg     1.6     03-22    TPro  5.0<L>  /  Alb  2.3<L>  /  TBili  <0.2  /  DBili  x   /  AST  14  /  ALT  <5  /  AlkPhos  58  03-22      LIVER FUNCTIONS - ( 22 Mar 2023 08:13 )  Alb: 2.3 g/dL / Pro: 5.0 g/dL / ALK PHOS: 58 U/L / ALT: <5 U/L / AST: 14 U/L / GGT: x               Culture - Urine (collected 03-21-23 @ 11:57)  Source: Catheterized Catheterized  Final Report (03-22-23 @ 17:54):    No growth    Culture - Blood (collected 03-20-23 @ 22:07)  Source: .Blood Blood  Preliminary Report (03-22-23 @ 02:02):    No growth to date.    Culture - Blood (collected 03-09-23 @ 11:46)  Source: .Blood None  Final Report (03-14-23 @ 23:00):    No Growth Final    Culture - Blood (collected 03-07-23 @ 07:10)  Source: .Blood None  Final Report (03-12-23 @ 18:00):    No Growth Final    Culture - Urine (collected 03-04-23 @ 05:10)  Source: Catheterized Catheterized  Final Report (03-07-23 @ 10:35):    >100,000 CFU/ml Escherichia coli  Organism: Escherichia coli (03-07-23 @ 10:35)  Organism: Escherichia coli (03-07-23 @ 10:35)      -  Amikacin: S <=16      -  Amoxicillin/Clavulanic Acid: S <=8/4      -  Ampicillin: S <=8 These ampicillin results predict results for amoxicillin      -  Ampicillin/Sulbactam: S <=4/2 Enterobacter, Klebsiella aerogenes, Citrobacter, and Serratia may develop resistance during prolonged therapy (3-4 days)      -  Aztreonam: S <=4      -  Cefazolin: S <=2 For uncomplicated UTI with K. pneumoniae, E. coli, or P. mirablis: JULIANA <=16 is sensitive and JULIANA >=32 is resistant. This also predicts results for oral agents cefaclor, cefdinir, cefpodoxime, cefprozil, cefuroxime axetil, cephalexin and locarbef for uncomplicated UTI. Note that some isolates may be susceptible to these agents while testing resistant to cefazolin.      -  Cefepime: S <=2      -  Cefoxitin: S <=8      -  Ceftriaxone: S <=1 Enterobacter, Klebsiella aerogenes, Citrobacter, and Serratia may develop resistance during prolonged therapy      -  Cefuroxime: S <=4      -  Ciprofloxacin: S <=0.25      -  Ertapenem: S <=0.5      -  Gentamicin: S <=2      -  Imipenem: S <=1      -  Levofloxacin: S <=0.5      -  Meropenem: S <=1      -  Nitrofurantoin: S <=32 Should not be used to treat pyelonephritis      -  Piperacillin/Tazobactam: S <=8      -  Tobramycin: S <=2      -  Trimethoprim/Sulfamethoxazole: S <=0.5/9.5      Method Type: JULIANA    Culture - Blood (collected 03-04-23 @ 03:28)  Source: .Blood Blood-Peripheral  Final Report (03-09-23 @ 09:00):    No Growth Final    Culture - Blood (collected 03-04-23 @ 03:28)  Source: .Blood Blood-Peripheral  Final Report (03-09-23 @ 09:00):    No Growth Final        Lactate, Blood: 1.1 mmol/L (03-21-23 @ 09:20)      INFECTIOUS DISEASES TESTING  Procalcitonin, Serum: 0.05 (03-20-23 @ 22:07)  Rapid RVP Result: NotDetec (03-20-23 @ 21:25)  COVID-19 PCR: NotDetec (03-19-23 @ 12:47)  COVID-19 PCR: NotDetec (03-15-23 @ 16:23)  strept    INFLAMMATORY MARKERS      RADIOLOGY & ADDITIONAL TESTS:  I have personally reviewed the last available Chest xray  CXR      CT      CARDIOLOGY TESTING  12 Lead ECG:   Ventricular Rate 116 BPM    Atrial Rate 116 BPM    P-R Interval 124 ms    QRS Duration 64 ms    Q-T Interval 330 ms    QTC Calculation(Bazett) 458 ms    P Axis 60 degrees    R Axis 8 degrees    T Axis 58 degrees    Diagnosis Line Sinus tachycardia  Nonspecific T wave abnormality  Abnormal ECG    Confirmed by Balaji Ruiz ([id number]) on 3/13/2023 2:11:48 PM (03-11-23 @ 18:18)      MEDICATIONS  acetaminophen  Suppository .. 325 Rectal once  ascorbic acid 500 Oral daily  aspirin  chewable 81 Oral daily  atorvastatin 40 Oral at bedtime  baclofen 5 Oral every 12 hours  cefepime   IVPB 2000 IV Intermittent every 8 hours  clopidogrel Tablet 75 Oral daily  dextrose 5% + sodium chloride 0.9%. 1000 IV Continuous <Continuous>  enoxaparin Injectable 40 SubCutaneous every 24 hours  gabapentin 100 Oral three times a day  levETIRAcetam 1500 Oral two times a day  midodrine. 10 Oral three times a day  multivitamin/minerals 1 Oral daily  valproic  acid Syrup 500 Oral three times a day  vitamin A &amp; D Ointment 1 Topical two times a day      WEIGHT  Weight (kg): 68.3 (03-08-23 @ 10:12)      ANTIBIOTICS:  cefepime   IVPB 2000 milliGRAM(s) IV Intermittent every 8 hours      All available historical records have been reviewed

## 2023-03-23 NOTE — CHART NOTE - NSCHARTNOTEFT_GEN_A_CORE
Registered Dietitian Follow-Up  Patient Profile Reviewed                           Yes [x]   No []  Nutrition History Previously Obtained        Yes [x]  No []      Pertinent Medical Interventions:  67 year old lady brought to ED via EMS who per triage note found pt on street. Per triage note, daughter told EMS that pt c/o generalized pain, however no family present.     Nutrition Interval History:   - SLP re-evaluation 3/21 most recent recommendations for minced & moist and thin liquids, 1:1 feeds, thin liquids through straw  - overall PO intake has been further decreasing through admission, supplement order was removed when diet was downgraded  **patient meeting <75% of estimated energy needs in-house [id number]    Diet, DASH/TLC:   Sodium & Cholesterol Restricted  Minced and Moist (MINCEDMOIST) (23 @ 13:28) [Active]    Anthropometrics:  Height (cm): 167.6 (23 @ 16:25)  Weight (kg): 68.3 (23 @ 10:12)  BMI (kg/m2): 24.3 (23 @ 10:12)    OTHER WEIGHTS:   ^ height per patient report  ^ weight obtained 3/6 via bed scale at time of visit, with consideration for edema   UBW: 68.2kg  IBW 52.3kg   patient bed weight higher than reported UBW, patient reported that she has 'some weight loss here or there'  unsure if accurate report considering patient noted with confusion  Daily Weight in k.3 ()    MEDICATIONS  (STANDING):  acetaminophen  Suppository .. 325 milliGRAM(s) Rectal once  ascorbic acid 500 milliGRAM(s) Oral daily  aspirin  chewable 81 milliGRAM(s) Oral daily  atorvastatin 40 milliGRAM(s) Oral at bedtime  baclofen 5 milliGRAM(s) Oral every 12 hours  cefepime   IVPB 2000 milliGRAM(s) IV Intermittent every 8 hours  clopidogrel Tablet 75 milliGRAM(s) Oral daily  dextrose 5% + sodium chloride 0.9%. 1000 milliLiter(s) (75 mL/Hr) IV Continuous <Continuous>  enoxaparin Injectable 40 milliGRAM(s) SubCutaneous every 24 hours  gabapentin 100 milliGRAM(s) Oral three times a day  levETIRAcetam 1500 milliGRAM(s) Oral two times a day  midodrine. 10 milliGRAM(s) Oral three times a day  multivitamin/minerals 1 Tablet(s) Oral daily  valproic  acid Syrup 500 milliGRAM(s) Oral three times a day  vitamin A &amp; D Ointment 1 Application(s) Topical two times a day    MEDICATIONS  (PRN):  acetaminophen     Tablet .. 650 milliGRAM(s) Oral every 6 hours PRN Moderate Pain (4 - 6)  oxycodone    5 mG/acetaminophen 325 mG 1 Tablet(s) Oral every 6 hours PRN Moderate Pain (4 - 6)    LABS:                        8.5    9.40  )-----------( 352      ( 22 Mar 2023 08:13 )             26.7     03-22    140  |  105  |  6<L>  ----------------------------<  93  3.8   |  26  |  <0.5<L>    Ca    8.5      22 Mar 2023 08:13  Mg     1.6     -    TPro  5.0<L>  /  Alb  2.3<L>  /  TBili  <0.2  /  DBili  x   /  AST  14  /  ALT  <5  /  AlkPhos  58  03-22    LIVER FUNCTIONS - ( 22 Mar 2023 08:13 )  Alb: 2.3 g/dL / Pro: 5.0 g/dL / ALK PHOS: 58 U/L / ALT: <5 U/L / AST: 14 U/L / GGT: x           Physical Findings:  - Cognition: disoriented, speaks intermittently-- mild temple/clavicle deletion   - GI function: Last BM not indicated per flow sheets   - Tubes: n/a  - Oral/Mouth cavity: minced & moist   - Skin:   *3/15 WOCN assessment -- High risk for pressure injury development or progression, B/L heel intact , B/l ear healed ulceration & Stage three pressure injury to B/l buttock   *per flow sheets: sacrum stage 3 as of 3/20  - Edema: right arm; left arm 2+     Nutrition Requirements: with consideration for age, weight, BMI, wounds  Weight Used: 68.2 kg dry weight      Estimated Energy Needs    Continue [x]  Adjust [] 3800-2310 kcal/day (MSJ x 1.2-1.5)  Estimated Protein Needs    Continue [x]  Adjust [] 82-102g/day (1.2-1.5g/kg)  Estimated Fluid Needs        Continue [x]  Adjust [] 1700mLday (25mL/kg)    [x] Previous Nutrition Diagnosis:            [x] Ongoing          [] Resolved  #1 Increased Nutrient Needs  Malnutrition (moderate)  Goal/Expected Outcome: meet >/=75% est energy needs within 5 days    Nutrition Intervention: Meals and Snacks, Medical Food Supplement, Vitamin Supplement, Nutrition Related Medication, Coordination of Care  Indicator/Monitoring:  Monitor diet order, energy intake, food and nutrient intake, body composition, weight    Recommendations:  - diet texture deferred to SLP ( minced & moist, thin liquids ) + Ensure HIGH PROTEIN 2x/day (350kcal, 20 g pro/serving) Vanilla/Strawberry only + Magic Cup 1x/day (290kcal, 9g protein/serving)  - consider appetite stimulant if not contraindicated, would avoid marinol as may make patient more lethargic, further alter cognition   - continue MV, ascorbic acid   - recommend 3 day calorie count, patient may benefit from supplemental nutrition support   - if EN recommend: PO diet + jevity 1.2 initiate day1 at 240mL 4x/day, if tolerated increase to goal rate of 360mL 4x/day after meals @7am, 12pm, 5pm, 10pm [provides: 1440mL formula, 1728kcal, 80g protein, 1166mL free water] with Free water flush 30pre/post feeds for additional 240mL free water, total with EN 1406mL    Patient at HIGH nutrition risk   will follow up within 3-5[place]  [id number] or via TEAMS. Registered Dietitian Follow-Up  Patient Profile Reviewed                           Yes [x]   No []  Nutrition History Previously Obtained        Yes [x]  No []      Pertinent Medical Interventions:  67 year old lady brought to ED via EMS who per triage note found pt on street. Per triage note, daughter told EMS that pt c/o generalized pain, however no family present.     Nutrition Interval History:   - SLP re-evaluation 3/21 most recent recommendations for minced & moist and thin liquids, 1:1 feeds, thin liquids through straw  - overall PO intake has been further decreasing through admission, supplement order was removed when diet was downgraded  - today patient with improvement in mental status was able to have 3/4 of breakfast meal tray and 1/2 of lunch tray  - patient was awake and requesting ensure at time of visit    **patient meeting </=75% of estimated energy needs in-house [id number]    Diet, DASH/TLC:   Sodium & Cholesterol Restricted  Minced and Moist (MINCEDMOIST) (23 @ 13:28) [Active]    Anthropometrics:  Height (cm): 167.6 (23 @ 16:25)  Weight (kg): 68.3 (23 @ 10:12)  BMI (kg/m2): 24.3 (23 @ 10:12)    OTHER WEIGHTS:   ^ height per patient report  ^ weight obtained 3/ via bed scale at time of visit, with consideration for edema   UBW: 68.2kg  IBW 52.3kg   patient bed weight higher than reported UBW, patient reported that she has 'some weight loss here or there'  unsure if accurate report considering patient noted with confusion  Daily Weight in k.3 ()    MEDICATIONS  (STANDING):  acetaminophen  Suppository .. 325 milliGRAM(s) Rectal once  ascorbic acid 500 milliGRAM(s) Oral daily  aspirin  chewable 81 milliGRAM(s) Oral daily  atorvastatin 40 milliGRAM(s) Oral at bedtime  baclofen 5 milliGRAM(s) Oral every 12 hours  cefepime   IVPB 2000 milliGRAM(s) IV Intermittent every 8 hours  clopidogrel Tablet 75 milliGRAM(s) Oral daily  dextrose 5% + sodium chloride 0.9%. 1000 milliLiter(s) (75 mL/Hr) IV Continuous <Continuous>  enoxaparin Injectable 40 milliGRAM(s) SubCutaneous every 24 hours  gabapentin 100 milliGRAM(s) Oral three times a day  levETIRAcetam 1500 milliGRAM(s) Oral two times a day  midodrine. 10 milliGRAM(s) Oral three times a day  multivitamin/minerals 1 Tablet(s) Oral daily  valproic  acid Syrup 500 milliGRAM(s) Oral three times a day  vitamin A &amp; D Ointment 1 Application(s) Topical two times a day    MEDICATIONS  (PRN):  acetaminophen     Tablet .. 650 milliGRAM(s) Oral every 6 hours PRN Moderate Pain (4 - 6)  oxycodone    5 mG/acetaminophen 325 mG 1 Tablet(s) Oral every 6 hours PRN Moderate Pain (4 - 6)    LABS:                      8.5    9.40  )-----------( 352      ( 22 Mar 2023 08:13 )             26.7     03-    140  |  105  |  6<L>  ----------------------------<  93  3.8   |  26  |  <0.5<L>    Ca    8.5      22 Mar 2023 08:13  Mg     1.6         TPro  5.0<L>  /  Alb  2.3<L>  /  TBili  <0.2  /  DBili  x   /  AST  14  /  ALT  <5  /  AlkPhos  58      LIVER FUNCTIONS - ( 22 Mar 2023 08:13 )  Alb: 2.3 g/dL / Pro: 5.0 g/dL / ALK PHOS: 58 U/L / ALT: <5 U/L / AST: 14 U/L / GGT: x           Physical Findings:  - Cognition: disoriented, speaks intermittently-- mild temple/clavicle deletion   - GI function: Last BM not indicated per flow sheets   - Tubes: n/a  - Oral/Mouth cavity: minced & moist   - Skin:   *3/15 WOCN assessment -- High risk for pressure injury development or progression, B/L heel intact , B/l ear healed ulceration & Stage three pressure injury to B/l buttock   *per flow sheets: sacrum stage 3 as of 3/20  - Edema: right arm; left arm 2+     Nutrition Requirements: with consideration for age, weight, BMI, wounds  Weight Used: 68.2 kg dry weight      Estimated Energy Needs    Continue [x]  Adjust [] 9039-7533 kcal/day (MSJ x 1.2-1.5)  Estimated Protein Needs    Continue [x]  Adjust [] 82-102g/day (1.2-1.5g/kg)  Estimated Fluid Needs        Continue [x]  Adjust [] 1700mLday (25mL/kg)    [x] Previous Nutrition Diagnosis:            [x] Ongoing          [] Resolved  #1 Increased Nutrient Needs  Malnutrition (moderate)  Goal/Expected Outcome: meet >/=75% est energy needs within 5 days    Nutrition Intervention: Meals and Snacks, Medical Food Supplement, Vitamin Supplement, Nutrition Related Medication, Coordination of Care  Indicator/Monitoring:  Monitor diet order, energy intake, food and nutrient intake, body composition, weight    Recommendations:  - diet texture deferred to SLP ( minced & moist, thin liquids ) + Ensure HIGH PROTEIN 2x/day (350kcal, 20 g pro/serving) Vanilla/Strawberry only + Magic Cup 1x/day (290kcal, 9g protein/serving)  - consider appetite stimulant if not contraindicated, would avoid marinol as may make patient more lethargic, further alter cognition   - continue MV, ascorbic acid   - recommend 3 day calorie count, patient may benefit from supplemental nutrition support if mental status continues to waxing and waning    Patient at HIGH nutrition risk   will follow up within 3-5days  Cesario Abbott RD  [id number] or via TEAMS. Registered Dietitian Follow-Up  Patient Profile Reviewed                           Yes [x]   No []  Nutrition History Previously Obtained        Yes [x]  No []      Pertinent Medical Interventions:  67 year old lady brought to ED via EMS who per triage note found pt on street. Per triage note, daughter told EMS that pt c/o generalized pain, however no family present.     Nutrition Interval History:   - SLP re-evaluation 3/21 most recent recommendations for minced & moist and thin liquids, 1:1 feeds, thin liquids through straw  - overall PO intake has been variable, supplement order was removed when diet was downgraded  - today patient with improvement in mental status was able to have 3/4 of breakfast meal tray and 1/2 of lunch tray  - patient was awake and requesting ensure at time of visit    **patient meeting </=75% of estimated energy needs in-house [id number]    Diet, DASH/TLC:   Sodium & Cholesterol Restricted  Minced and Moist (MINCEDMOIST) (23 @ 13:28) [Active]    Anthropometrics:  Height (cm): 167.6 (23 @ 16:25)  Weight (kg): 68.3 (23 @ 10:12)  BMI (kg/m2): 24.3 (23 @ 10:12)    OTHER WEIGHTS:   ^ height per patient report  ^ weight obtained 3/ via bed scale at time of visit, with consideration for edema   UBW: 68.2kg  IBW 52.3kg   patient bed weight higher than reported UBW, patient reported that she has 'some weight loss here or there'  unsure if accurate report considering patient noted with confusion  Daily Weight in k.3 ()    MEDICATIONS  (STANDING):  acetaminophen  Suppository .. 325 milliGRAM(s) Rectal once  ascorbic acid 500 milliGRAM(s) Oral daily  aspirin  chewable 81 milliGRAM(s) Oral daily  atorvastatin 40 milliGRAM(s) Oral at bedtime  baclofen 5 milliGRAM(s) Oral every 12 hours  cefepime   IVPB 2000 milliGRAM(s) IV Intermittent every 8 hours  clopidogrel Tablet 75 milliGRAM(s) Oral daily  dextrose 5% + sodium chloride 0.9%. 1000 milliLiter(s) (75 mL/Hr) IV Continuous <Continuous>  enoxaparin Injectable 40 milliGRAM(s) SubCutaneous every 24 hours  gabapentin 100 milliGRAM(s) Oral three times a day  levETIRAcetam 1500 milliGRAM(s) Oral two times a day  midodrine. 10 milliGRAM(s) Oral three times a day  multivitamin/minerals 1 Tablet(s) Oral daily  valproic  acid Syrup 500 milliGRAM(s) Oral three times a day  vitamin A &amp; D Ointment 1 Application(s) Topical two times a day    MEDICATIONS  (PRN):  acetaminophen     Tablet .. 650 milliGRAM(s) Oral every 6 hours PRN Moderate Pain (4 - 6)  oxycodone    5 mG/acetaminophen 325 mG 1 Tablet(s) Oral every 6 hours PRN Moderate Pain (4 - 6)    LABS:                      8.5    9.40  )-----------( 352      ( 22 Mar 2023 08:13 )             26.7     03    140  |  105  |  6<L>  ----------------------------<  93  3.8   |  26  |  <0.5<L>    Ca    8.5      22 Mar 2023 08:13  Mg     1.6         TPro  5.0<L>  /  Alb  2.3<L>  /  TBili  <0.2  /  DBili  x   /  AST  14  /  ALT  <5  /  AlkPhos  58      LIVER FUNCTIONS - ( 22 Mar 2023 08:13 )  Alb: 2.3 g/dL / Pro: 5.0 g/dL / ALK PHOS: 58 U/L / ALT: <5 U/L / AST: 14 U/L / GGT: x           Physical Findings:  - Cognition: disoriented, speaks intermittently-- mild temple/clavicle deletion   - GI function: Last BM not indicated per flow sheets   - Tubes: n/a  - Oral/Mouth cavity: minced & moist   - Skin:   *3/15 WOCN assessment -- High risk for pressure injury development or progression, B/L heel intact , B/l ear healed ulceration & Stage three pressure injury to B/l buttock   *per flow sheets: sacrum stage 3 as of 3/20  - Edema: right arm; left arm 2+     Nutrition Requirements: with consideration for age, weight, BMI, wounds  Weight Used: 68.2 kg dry weight      Estimated Energy Needs    Continue [x]  Adjust [] 1053-9312 kcal/day (MSJ x 1.2-1.5)  Estimated Protein Needs    Continue [x]  Adjust [] 82-102g/day (1.2-1.5g/kg)  Estimated Fluid Needs        Continue [x]  Adjust [] 1700mLday (25mL/kg)    [x] Previous Nutrition Diagnosis:            [x] Ongoing          [] Resolved  #1 Increased Nutrient Needs  Malnutrition (moderate)  Goal/Expected Outcome: meet >/=75% est energy needs within 5 days    Nutrition Intervention: Meals and Snacks, Medical Food Supplement, Vitamin Supplement, Nutrition Related Medication, Coordination of Care  Indicator/Monitoring:  Monitor diet order, energy intake, food and nutrient intake, body composition, weight    Recommendations:  - diet texture deferred to SLP ( minced & moist, thin liquids ) + Ensure HIGH PROTEIN 2x/day (350kcal, 20 g pro/serving) Vanilla/Strawberry only + Magic Cup 1x/day (290kcal, 9g protein/serving)  - consider appetite stimulant if not contraindicated, would avoid marinol as may make patient more lethargic, further alter cognition   - continue MV, ascorbic acid   - recommend 3 day calorie count, patient may benefit from supplemental nutrition support if mental status continues to waxing and waning    Patient at HIGH nutrition risk   will follow up within 3-5days  Cesario Abbott RD  [id number] or via TEAMS.

## 2023-03-23 NOTE — PROGRESS NOTE ADULT - SUBJECTIVE AND OBJECTIVE BOX
JOSEPH OBRIEN 67y Female  MRN#: [id number]     Hospital Day:     CC: Altered Mental Status likely due to Metabolic Encephalopathy from UTI, sepsis and seizure in the context of stroke with left hemiparesis    HOSPITAL COURSE:  67F. PMH: Seizures, Rt MCA Stroke  BIBEMS 3/3 after being found on the street, daughter reported pt c/o generalized pain, had FS of 96. No family present at admission/no contact information. Pt reported feeling "okay".  In ED, hemodynamically stable with Fever. Labs: WBC 19K, elevated lactate, UA +ve  She was pan-scanned. no evidence of new stroke, trauma, or infection.  CTH showed old stroke in the territory of the Rt MCA.  Neuro eval'd 3/8-3/16: For seizure/stroke 3/8: L gaze preference, OP med review: on low dose Lamictal and Depakote in addition to Keppra (possible mood disorder/ seizures), MRi brain (+) acute subcortical infarct within same M1 territory. The cause of stroke is most likely hypotension as she has a diminutive Rt M1. However, MRI findings doesn't explain her B/L upper extremity spasticity. So, it is important to rule. vEEG 3/9: showed focal slowing. DAPT for 21 days followed by ASA 81 mg daily; High intensity statin, Can f/u in stroke clinic in 4 weeks following discharge.  Podiatry following for foot pain 2/ foot drop- found to have L foot avulsion fracture: WBAT w/ surgical shoe, ankle brace, PT  BH eval'd 3/15: Delirium  Pt was treated for UTI, placed on appropriate medical management for stroke. 3/20 pt was noted to be less responsive and have forced gaze during her speech evaluation. Concern for seizure. Neurology was recalled- keppra was increased. Pt made NPO, and AEDs made IV. Overnight pt was noted to be febrile, and again hypotensive requiring 2L fluid bolus, despite maintenance fluids. Septic workup sent. Leukocytosis noted, BCx NGTD, CXR looks clear.  Decubitus ulcer noted on sacrum, burn consulted. Id consulted for abx recs: empiric cefepime    SUBJECTIVE     Overnight events  None    Subjective complaints                                               ----------------------------------------------------------  OBJECTIVE  PAST MEDICAL & SURGICAL HISTORY                                            -----------------------------------------------------------  ALLERGIES:  Allergy Status Unknown                                            ------------------------------------------------------------    HOME MEDICATIONS  Home Medications:  acetaminophen 325 mg oral tablet: 2 tab(s) orally every 6 hours, As needed, Moderate Pain (4 - 6) (19 Mar 2023 10:42)  ascorbic acid 500 mg oral tablet: 1 tab(s) orally once a day (19 Mar 2023 10:42)  aspirin 81 mg oral tablet, chewable: 1 tab(s) orally once a day (19 Mar 2023 10:42)  atorvastatin 40 mg oral tablet: 1 tab(s) orally once a day (at bedtime) (19 Mar 2023 10:42)  baclofen 5 mg oral tablet: 1 tab(s) orally every 12 hours (19 Mar 2023 10:42)  clopidogrel 75 mg oral tablet: 1 tab(s) orally once a day x 18 more days and then stop (19 Mar 2023 10:42)  enoxaparin: 40 unit(s) subcutaneous once a day (19 Mar 2023 10:42)  levETIRAcetam 100 mg/mL oral solution: 10 milliliter(s) orally 2 times a day (19 Mar 2023 10:42)  morphine 15 mg oral tablet: 1 tab(s) orally every 6 hours, As needed, Severe Pain (7 - 10) (19 Mar 2023 10:42)  Multiple Vitamins with Minerals oral tablet: 1 tab(s) orally once a day (19 Mar 2023 10:42)  pantoprazole 40 mg oral delayed release tablet: 1 tab(s) orally once a day (09 Mar 2023 19:56)  valproic acid 250 mg/5 mL oral liquid: 500 milligram(s) orally 3 times a day (19 Mar 2023 10:42)  vitamin A and D topical ointment: 1 application topically 2 times a day (19 Mar 2023 10:42)                           MEDICATIONS:  STANDING MEDICATIONS  acetaminophen  Suppository .. 325 milliGRAM(s) Rectal once  ascorbic acid 500 milliGRAM(s) Oral daily  aspirin  chewable 81 milliGRAM(s) Oral daily  atorvastatin 40 milliGRAM(s) Oral at bedtime  baclofen 5 milliGRAM(s) Oral every 12 hours  cefepime   IVPB 2000 milliGRAM(s) IV Intermittent every 8 hours  clopidogrel Tablet 75 milliGRAM(s) Oral daily  dextrose 5% + sodium chloride 0.9%. 1000 milliLiter(s) IV Continuous <Continuous>  enoxaparin Injectable 40 milliGRAM(s) SubCutaneous every 24 hours  gabapentin 100 milliGRAM(s) Oral three times a day  levETIRAcetam 1500 milliGRAM(s) Oral two times a day  midodrine. 10 milliGRAM(s) Oral three times a day  multivitamin/minerals 1 Tablet(s) Oral daily  valproic  acid Syrup 500 milliGRAM(s) Oral three times a day  vitamin A &amp; D Ointment 1 Application(s) Topical two times a day    PRN MEDICATIONS  acetaminophen     Tablet .. 650 milliGRAM(s) Oral every 6 hours PRN  oxycodone    5 mG/acetaminophen 325 mG 1 Tablet(s) Oral every 6 hours PRN                                            ------------------------------------------------------------  VITAL SIGNS: Last 24 Hours  T(C): 36.8 (23 Mar 2023 04:40), Max: 37.3 (22 Mar 2023 14:30)  T(F): 98.2 (23 Mar 2023 04:40), Max: 99.1 (22 Mar 2023 14:30)  HR: 86 (23 Mar 2023 04:40) (86 - 92)  BP: 126/63 (23 Mar 2023 04:40) (108/67 - 147/61)  BP(mean): --  RR: 18 (23 Mar 2023 04:40) (18 - 18)  SpO2: 98% (23 Mar 2023 04:40) (98% - 98%)      23 @ 07:01  -  23 @ 07:00  --------------------------------------------------------  IN: 0 mL / OUT: 1480 mL / NET: -1480 mL                                             --------------------------------------------------------------  LABS:                        8.5    9.40  )-----------( 352      ( 22 Mar 2023 08:13 )             26.7     03-22    140  |  105  |  6<L>  ----------------------------<  93  3.8   |  26  |  <0.5<L>    Ca    8.5      22 Mar 2023 08:13  Mg     1.6         TPro  5.0<L>  /  Alb  2.3<L>  /  TBili  <0.2  /  DBili  x   /  AST  14  /  ALT  <5  /  AlkPhos  58  03-22      Urinalysis Basic - ( 21 Mar 2023 11:57 )    Color: Colorless / Appearance: Clear / S.007 / pH: x  Gluc: x / Ketone: Small  / Bili: Negative / Urobili: <2 mg/dL   Blood: x / Protein: Negative / Nitrite: Negative   Leuk Esterase: Negative / RBC: x / WBC x   Sq Epi: x / Non Sq Epi: x / Bacteria: x              Culture - Urine (collected 21 Mar 2023 11:57)  Source: Catheterized Catheterized  Final Report (22 Mar 2023 17:54):    No growth    Culture - Blood (collected 20 Mar 2023 22:07)  Source: .Blood Blood  Preliminary Report (22 Mar 2023 02:02):    No growth to date.                                                    -------------------------------------------------------------  RADIOLOGY:                                            --------------------------------------------------------------    PHYSICAL EXAM:                                             --------------------------------------------------------------                 JOSEPH OBRIEN 67y Female  MRN#: [id number]     Hospital Day:     CC: Altered Mental Status likely due to Metabolic Encephalopathy from UTI, sepsis and seizure in the context of stroke with left hemiparesis    HOSPITAL COURSE:  67F. PMH: Seizures, Rt MCA Stroke  BIBEMS 3/3 after being found on the street, daughter reported pt c/o generalized pain, had FS of 96. No family present at admission/no contact information. Pt reported feeling "okay".  In ED, hemodynamically stable with Fever. Labs: WBC 19K, elevated lactate, UA +ve  She was pan-scanned. no evidence of new stroke, trauma, or infection.  CTH showed old stroke in the territory of the Rt MCA.  Neuro eval'd 3/8-3/16: For seizure/stroke 3/8: L gaze preference, OP med review: on low dose Lamictal and Depakote in addition to Keppra (possible mood disorder/ seizures), MRi brain (+) acute subcortical infarct within same M1 territory. The cause of stroke is most likely hypotension as she has a diminutive Rt M1. However, MRI findings doesn't explain her B/L upper extremity spasticity. So, it is important to rule. vEEG 3/9: showed focal slowing. DAPT for 21 days followed by ASA 81 mg daily; High intensity statin, Can f/u in stroke clinic in 4 weeks following discharge.  Podiatry following for foot pain 2/ foot drop- found to have L foot avulsion fracture: WBAT w/ surgical shoe, ankle brace, PT  BH eval'd 3/15: Delirium  Pt was treated for UTI, placed on appropriate medical management for stroke. 3/20 pt was noted to be less responsive and have forced gaze during her speech evaluation. Concern for seizure. Neurology was recalled- keppra was increased. Pt made NPO, and AEDs made IV. Overnight pt was noted to be febrile, and again hypotensive requiring 2L fluid bolus, despite maintenance fluids. Septic workup sent. Leukocytosis noted, BCx NGTD, CXR looks clear.  Decubitus ulcer noted on sacrum, burn consulted. Id consulted for abx recs: empiric cefepime    SUBJECTIVE     Overnight events  None    Subjective complaints   Pt evaluated at bedside. No active complaints.                                            ----------------------------------------------------------  OBJECTIVE  PAST MEDICAL & SURGICAL HISTORY                                            -----------------------------------------------------------  ALLERGIES:  Allergy Status Unknown                                            ------------------------------------------------------------    HOME MEDICATIONS  Home Medications:  acetaminophen 325 mg oral tablet: 2 tab(s) orally every 6 hours, As needed, Moderate Pain (4 - 6) (19 Mar 2023 10:42)  ascorbic acid 500 mg oral tablet: 1 tab(s) orally once a day (19 Mar 2023 10:42)  aspirin 81 mg oral tablet, chewable: 1 tab(s) orally once a day (19 Mar 2023 10:42)  atorvastatin 40 mg oral tablet: 1 tab(s) orally once a day (at bedtime) (19 Mar 2023 10:42)  baclofen 5 mg oral tablet: 1 tab(s) orally every 12 hours (19 Mar 2023 10:42)  clopidogrel 75 mg oral tablet: 1 tab(s) orally once a day x 18 more days and then stop (19 Mar 2023 10:42)  enoxaparin: 40 unit(s) subcutaneous once a day (19 Mar 2023 10:42)  levETIRAcetam 100 mg/mL oral solution: 10 milliliter(s) orally 2 times a day (19 Mar 2023 10:42)  morphine 15 mg oral tablet: 1 tab(s) orally every 6 hours, As needed, Severe Pain (7 - 10) (19 Mar 2023 10:42)  Multiple Vitamins with Minerals oral tablet: 1 tab(s) orally once a day (19 Mar 2023 10:42)  pantoprazole 40 mg oral delayed release tablet: 1 tab(s) orally once a day (09 Mar 2023 19:56)  valproic acid 250 mg/5 mL oral liquid: 500 milligram(s) orally 3 times a day (19 Mar 2023 10:42)  vitamin A and D topical ointment: 1 application topically 2 times a day (19 Mar 2023 10:42)                           MEDICATIONS:  STANDING MEDICATIONS  acetaminophen  Suppository .. 325 milliGRAM(s) Rectal once  ascorbic acid 500 milliGRAM(s) Oral daily  aspirin  chewable 81 milliGRAM(s) Oral daily  atorvastatin 40 milliGRAM(s) Oral at bedtime  baclofen 5 milliGRAM(s) Oral every 12 hours  cefepime   IVPB 2000 milliGRAM(s) IV Intermittent every 8 hours  clopidogrel Tablet 75 milliGRAM(s) Oral daily  dextrose 5% + sodium chloride 0.9%. 1000 milliLiter(s) IV Continuous <Continuous>  enoxaparin Injectable 40 milliGRAM(s) SubCutaneous every 24 hours  gabapentin 100 milliGRAM(s) Oral three times a day  levETIRAcetam 1500 milliGRAM(s) Oral two times a day  midodrine. 10 milliGRAM(s) Oral three times a day  multivitamin/minerals 1 Tablet(s) Oral daily  valproic  acid Syrup 500 milliGRAM(s) Oral three times a day  vitamin A &amp; D Ointment 1 Application(s) Topical two times a day    PRN MEDICATIONS  acetaminophen     Tablet .. 650 milliGRAM(s) Oral every 6 hours PRN  oxycodone    5 mG/acetaminophen 325 mG 1 Tablet(s) Oral every 6 hours PRN                                            ------------------------------------------------------------  VITAL SIGNS: Last 24 Hours  T(C): 36.8 (23 Mar 2023 04:40), Max: 37.3 (22 Mar 2023 14:30)  T(F): 98.2 (23 Mar 2023 04:40), Max: 99.1 (22 Mar 2023 14:30)  HR: 86 (23 Mar 2023 04:40) (86 - 92)  BP: 126/63 (23 Mar 2023 04:40) (108/67 - 147/61)  BP(mean): --  RR: 18 (23 Mar 2023 04:40) (18 - 18)  SpO2: 98% (23 Mar 2023 04:40) (98% - 98%)      23 @ 07:01  -  23 @ 07:00  --------------------------------------------------------  IN: 0 mL / OUT: 1480 mL / NET: -1480 mL                                             --------------------------------------------------------------  LABS:                        8.5    9.40  )-----------( 352      ( 22 Mar 2023 08:13 )             26.7     03-    140  |  105  |  6<L>  ----------------------------<  93  3.8   |  26  |  <0.5<L>    Ca    8.5      22 Mar 2023 08:13  Mg     1.6         TPro  5.0<L>  /  Alb  2.3<L>  /  TBili  <0.2  /  DBili  x   /  AST  14  /  ALT  <5  /  AlkPhos  58  03-22      Urinalysis Basic - ( 21 Mar 2023 11:57 )    Color: Colorless / Appearance: Clear / S.007 / pH: x  Gluc: x / Ketone: Small  / Bili: Negative / Urobili: <2 mg/dL   Blood: x / Protein: Negative / Nitrite: Negative   Leuk Esterase: Negative / RBC: x / WBC x   Sq Epi: x / Non Sq Epi: x / Bacteria: x              Culture - Urine (collected 21 Mar 2023 11:57)  Source: Catheterized Catheterized  Final Report (22 Mar 2023 17:54):    No growth    Culture - Blood (collected 20 Mar 2023 22:07)  Source: .Blood Blood  Preliminary Report (22 Mar 2023 02:02):    No growth to date.                                                    -------------------------------------------------------------  RADIOLOGY:                                            --------------------------------------------------------------    PHYSICAL EXAM:  GEN: NAD  NEURO: Awake and alert, L hemiparesis, speaks in short sentences, does not participate in exam, BLUE spasticity and contracted tone  HEENT: nontraumatic, normocephalic, neck supple  CARD: RRR  PULM: B/L decreased breath sounds  ABD: Soft, nondistended, nontender  EXTR: No clubbing, cyanosis, edema. 2+ pulses b/l LE, left LE in CAM boot  Skin: Sacral ulcer                                           --------------------------------------------------------------

## 2023-03-23 NOTE — PROGRESS NOTE ADULT - ASSESSMENT
68 y/o woman with PMH of Right MCA stroke with left sided weakness was brought to the ED via EMS. In the ED, she had altered mental status and sepsis.    #Seizures  #Acute R Frontal Lobe Infarct likely 2/ hypotension   #Chronic R MCA infarct  - per chart: at baseline she is alert, able to communicate and is oriented. Ambulation at baseline: uses a wheelchair. She was in a hospital in [facility] last fall and was lethargic for months  - 3/8: s/p rapid response for L gaze preference  - 3/20: Pt had event with L gaze preference during SS eval, pt made NPO. Examined by resident, no gaze preference present any longer. Pt responsive but less than baseline. Developed seizures post-event. Septic workup obtained. CXR appears clear, rest pending. --> 3/21 Pt more responsive this AM, expressing herself and in line with current baseline.   - Depakote level (3/4) 87 (WNL) -> (3/21) 74 (WNL)   - CTH/CTA of head/neck (3/4): no acute pathology, no evidence of occlusion, aneurysm or stenosis  - EEG (3/8) and video EEG (3/9): Focal slowing  - MRI Brain (3/15): new subcortical infarct in the right frontal lobe. Redemonstrated chronic infarct in the right MCA territory.  - MRI of C spine (3/16): Significantly motion limited study. No gross cord compression or spinal cord edema demonstrated. Multilevel small disc bulges.  *****  - Neuro eval'd 3/8-3/20: For L gaze preference/stroke, OP med review: on low dose Lamictal and Depakote in addition to Keppra (possible mood disorder/ seizures), Her MRI of brain showing subcortical infarct within same M1 territory. The casue of stroke is most likely hypotension as she has a diminutive Rt M1. However, MRI findings doesn't explain her B/L upper extremity spasticity. So, it is important to rule out cervical cord pathology. DAPT for 21 days followed by ASA 81 mg daily, Increased keppra post 3/20 possible seizure event  - BH eval'd 3/15: Delirium; pt does not have the capacity to choose a Health care agent  - SS re-juliette 3/21: Minced and moist, 1:1 feeds, thin liquids through straw  - Seizure precautions  - C/w Depakote 500mg TID and Keppra to 1500mg BID   - C/w baclofen 5mg bid (for UE spasticity)  - C/w ASA, Plavix (3/17-4/6), and high dose statin for new stroke - DAPT for 21 days followed by ASA 81 mg daily  - C/w Midodrine 10mg TID  - C. D5+NS 75cc/hr  *****  - OP stroke clinic in 4 weeks per neurology    #Metabolic Encephalopathy from UTI  #Febrile 3/20  #Unstagable Sacral Ulcer  - 3/20: Pt became febrile after possible seizure episode, Cefepime, levaquin (x1 3/20), vancomycin ON  - Duplex LUE (3/10): (-) DVT (+) superficial cephalic vein thrombosis  - Duplex BLLE (3/7): (-) DVT  - F/u WBC (3/21) 12.26 (up from 8.23), BCx (3/20) NGTD, Procal (3/20) 0.05, RVP (3/20) COVID (-), Cortisol level, Lactate (3/21) WNL, UA nitrite (-), LEC (-), pending WBCs/Bacteria, UCx (3/21) NGTD  - F/u Duplex BLLE (rpt)  - s/p Vanc x1 (3/4), Cefepime (x1 3/4, x1 3/8), Ceftriaxone (3/4-3/6) (for UTI)  - ID eval'd 3/22 - Empiric 5-7d empiric abx course, R/o DVT  - Burn eval'd 3/22 - No intervention, wound care recs noted  - C/w Cefepime (3/20-present) and vancomycin (3/20-present)    #Left avulsion fracture of medial malleolus  - Duplex BL LE (3/7): (-) for DVT  - CT Foot (3/11): acute minimally displaced avulsion fracture at the medial malleolus  *****  - Podiatry following for foot pain: WBAT w/ surgical shoe, ankle brace, PT  - fall precautions  - pain control regimen - if pain uncontrolled consider increasing percocet to 2tabs.  - C/w gabapentin at 100mg TID (Lowered dose from home, increase as pt tolerates)    #Sinus tachycardia - now resolved - attributed to pain  - TSH (3/8) WNL, - EKG reviewed    #Left cephalic vein thrombosis  - Duplex LUE (3/10): No DVT however there is superficial thrombosis noted in the left cephalic vein  - s/p warm compress    #Anemia - normocytic - stable  - possibly due to frequent phlebotomy, monitor for bleeding    #Misc  - DVT ppx - lovenox  - Diet: Soft and bite sized (SS 3/7)  - full code, overall guarded prognosis  - PT eval'd 3/7   68 y/o woman with PMH of Right MCA stroke with left sided weakness was brought to the ED via EMS. In the ED, she had altered mental status and sepsis.    #Seizures  #Acute R Frontal Lobe Infarct likely 2/ hypotension   #Chronic R MCA infarct  - per chart: at baseline she is alert, able to communicate and is oriented. Ambulation at baseline: uses a wheelchair. She was in a hospital in [facility] last fall and was lethargic for months  - 3/8: s/p rapid response for L gaze preference  - 3/20: Pt had event with L gaze preference during SS eval, pt made NPO. Examined by resident, no gaze preference present any longer. Pt responsive but less than baseline. Developed seizures post-event. Septic workup obtained. CXR appears clear, rest pending. --> 3/21 Pt more responsive this AM, expressing herself and in line with current baseline.  *****   - Depakote level (3/4) 87 (WNL) -> (3/21) 74 (WNL)   - CTH/CTA of head/neck (3/4): no acute pathology, no evidence of occlusion, aneurysm or stenosis  - EEG (3/8) and video EEG (3/9): Focal slowing  - MRI Brain (3/15): new subcortical infarct in the right frontal lobe. Redemonstrated chronic infarct in the right MCA territory.  - MRI of C spine (3/16): Significantly motion limited study. No gross cord compression or spinal cord edema demonstrated. Multilevel small disc bulges.  *****  - Neuro eval'd 3/8-3/20: For L gaze preference/stroke, OP med review: on low dose Lamictal and Depakote in addition to Keppra (possible mood disorder/ seizures), Her MRI of brain showing subcortical infarct within same M1 territory. The casue of stroke is most likely hypotension as she has a diminutive Rt M1. However, MRI findings doesn't explain her B/L upper extremity spasticity. So, it is important to rule out cervical cord pathology. DAPT for 21 days followed by ASA 81 mg daily, Increased keppra post 3/20 possible seizure event  - BH eval'd 3/15: Delirium; pt does not have the capacity to choose a Health care agent  - SS re-juliette 3/21: Minced and moist, 1:1 feeds, thin liquids through straw  - Seizure precautions  - C/w Depakote 500mg TID and Keppra to 1500mg BID   - C/w baclofen 5mg bid (for UE spasticity)  - C/w ASA, Plavix (3/17-4/6), and high dose statin for new stroke - DAPT for 21 days followed by ASA 81 mg daily  - C/w Midodrine 10mg TID  - C/w. D5+NS 75cc/hr - dc if eating/drinking appropriately   *****  - OP stroke clinic in 4 weeks per neurology    #Metabolic Encephalopathy from UTI  #Febrile 3/20  #Unstagable Sacral Ulcer  - 3/20: Pt became febrile after possible seizure episode, Cefepime, levaquin (x1 3/20), vancomycin ON  *****  - 3/20 Septic workup: WBC (3/21) 12.26 (up from 8.23), BCx (3/20) NGTD, Procal (3/20) 0.05, RVP (3/20) COVID (-), Cortisol level, Lactate (3/21) WNL, UA nitrite (-), LEC (-), pending WBCs/Bacteria, UCx (3/21) NGTD  - Duplex LUE (3/10): (-) DVT (+) superficial cephalic vein thrombosis  - Duplex BLLE (3/7): (-) DVT  - s/p Vanc (3/4x1, 3/20-3/22), Cefepime (x1 3/4, x1 3/8), Ceftriaxone (3/4-3/6) (for UTI)  *****  - F/u Duplex BLLE (rpt)  - ID eval'd 3/22   - Burn eval'd 3/22 - No intervention, wound care recs noted  - C/w Cefepime (3/20-present) Empiric 5-7d (3/26-3/28)    #Left avulsion fracture of medial malleolus  - Duplex BL LE (3/7): (-) for DVT  - CT Foot (3/11): acute minimally displaced avulsion fracture at the medial malleolus  *****  - Podiatry following for foot pain: WBAT w/ surgical shoe, ankle brace, PT  - fall precautions  - pain control regimen - if pain uncontrolled consider increasing percocet to 2tabs.  - C/w gabapentin at 100mg TID (Lowered dose from home, increase as pt tolerates)    #Sinus tachycardia - now resolved - attributed to pain  - TSH (3/8) WNL, - EKG reviewed    #Left cephalic vein thrombosis  - Duplex LUE (3/10): No DVT however there is superficial thrombosis noted in the left cephalic vein  - s/p warm compress    #Anemia - normocytic - stable  - possibly due to frequent phlebotomy, monitor for bleeding    #Misc  - DVT ppx - lovenox  - Diet: Soft and bite sized (SS 3/7)  - full code, overall guarded prognosis  - PT eval'd 3/7 - needs re-eval   66 y/o woman with PMH of Right MCA stroke with left sided weakness was brought to the ED via EMS. In the ED, she had altered mental status and sepsis.    #Seizures  #Acute R Frontal Lobe Infarct likely 2/ hypotension   #Chronic R MCA infarct  - per chart: at baseline she is alert, able to communicate and is oriented. Ambulation at baseline: uses a wheelchair. She was in a hospital in [facility] last fall and was lethargic for months  - 3/8: s/p rapid response for L gaze preference  - 3/20: Pt had event with L gaze preference during SS eval, pt made NPO. Examined by resident, no gaze preference present any longer. Pt responsive but less than baseline. Developed seizures post-event. Septic workup obtained. CXR appears clear, rest pending. --> 3/21 Pt more responsive this AM, expressing herself and in line with current baseline.  *****   - Depakote level (3/4) 87 (WNL) -> (3/21) 74 (WNL)   - CTH/CTA of head/neck (3/4): no acute pathology, no evidence of occlusion, aneurysm or stenosis  - EEG (3/8) and video EEG (3/9): Focal slowing  - MRI Brain (3/15): new subcortical infarct in the right frontal lobe. Redemonstrated chronic infarct in the right MCA territory.  - MRI of C spine (3/16): Significantly motion limited study. No gross cord compression or spinal cord edema demonstrated. Multilevel small disc bulges.  *****  - Neuro eval'd 3/8-3/20: For L gaze preference/stroke, OP med review: on low dose Lamictal and Depakote in addition to Keppra (possible mood disorder/ seizures), Her MRI of brain showing subcortical infarct within same M1 territory. The casue of stroke is most likely hypotension as she has a diminutive Rt M1. However, MRI findings doesn't explain her B/L upper extremity spasticity. So, it is important to rule out cervical cord pathology. DAPT for 21 days followed by ASA 81 mg daily, Increased keppra post 3/20 possible seizure event  - BH eval'd 3/15: Delirium; pt does not have the capacity to choose a Health care agent  - SS re-juliette 3/21: Minced and moist, 1:1 feeds, thin liquids through straw  - Seizure precautions  - C/w Depakote 500mg TID and Keppra to 1500mg BID   - C/w baclofen 5mg bid (for UE spasticity)  - C/w ASA, Plavix (3/17-4/6), and high dose statin for new stroke - DAPT for 21 days followed by ASA 81 mg daily  - C/w Midodrine 10mg TID  - C/w. D5+NS 75cc/hr - dc if eating/drinking appropriately   *****  - OP stroke clinic in 4 weeks per neurology    #Metabolic Encephalopathy from UTI  #Febrile 3/20  #Unstagable Sacral Ulcer  - 3/20: Pt became febrile after possible seizure episode, Cefepime, levaquin (x1 3/20), vancomycin ON  *****  - 3/20 Septic workup: WBC (3/21) 12.26 (up from 8.23), BCx (3/20) NGTD, Procal (3/20) 0.05, RVP (3/20) COVID (-), Cortisol level, Lactate (3/21) WNL, UA nitrite (-), LEC (-), pending WBCs/Bacteria, UCx (3/21) NGTD  - Duplex LUE (3/10): (-) DVT (+) superficial cephalic vein thrombosis  - Duplex BLLE (3/7): (-) DVT  - s/p Vanc (3/4x1, 3/20-3/22), Cefepime (x1 3/4, x1 3/8), Ceftriaxone (3/4-3/6) (for UTI)  *****  - F/u Duplex BLLE (rpt)  - ID eval'd 3/22   - Burn eval'd 3/22 - No intervention, wound care recs noted  - C/w Cefepime (3/20-present) Empiric 5-7d (3/26-3/28)    #Left avulsion fracture of medial malleolus  - Duplex BL LE (3/7): (-) for DVT  - CT Foot (3/11): acute minimally displaced avulsion fracture at the medial malleolus  *****  - Podiatry following for foot pain: WBAT w/ surgical shoe, ankle brace, PT  - fall precautions  - pain control regimen - if pain uncontrolled consider increasing percocet to 2tabs.  - C/w gabapentin at 100mg TID (Lowered dose from home, increase as pt tolerates)    #Sinus tachycardia - now resolved - attributed to pain  - TSH (3/8) WNL, - EKG reviewed    #Left cephalic vein thrombosis  - Duplex LUE (3/10): No DVT however there is superficial thrombosis noted in the left cephalic vein  - s/p warm compress    #Anemia - normocytic - stable  - possibly due to frequent phlebotomy, monitor for bleeding    #Misc  - DVT ppx - lovenox  - Diet: Soft and bite sized (SS 3/7)  - full code, overall guarded prognosis  - PT eval'd 3/7 - needs re-eval  - Family Discussion: Discussed POC with pt's sister Wanda ([phone]) at bedside

## 2023-03-24 LAB
ALBUMIN SERPL ELPH-MCNC: 2.4 G/DL — LOW (ref 3.5–5.2)
ALP SERPL-CCNC: 73 U/L — SIGNIFICANT CHANGE UP (ref 30–115)
ALT FLD-CCNC: 6 U/L — SIGNIFICANT CHANGE UP (ref 0–41)
ANION GAP SERPL CALC-SCNC: 10 MMOL/L — SIGNIFICANT CHANGE UP (ref 7–14)
AST SERPL-CCNC: 12 U/L — SIGNIFICANT CHANGE UP (ref 0–41)
BASOPHILS # BLD AUTO: 0.04 K/UL — SIGNIFICANT CHANGE UP (ref 0–0.2)
BASOPHILS NFR BLD AUTO: 0.4 % — SIGNIFICANT CHANGE UP (ref 0–1)
BILIRUB SERPL-MCNC: <0.2 MG/DL — SIGNIFICANT CHANGE UP (ref 0.2–1.2)
BUN SERPL-MCNC: 6 MG/DL — LOW (ref 10–20)
CALCIUM SERPL-MCNC: 8.7 MG/DL — SIGNIFICANT CHANGE UP (ref 8.4–10.4)
CHLORIDE SERPL-SCNC: 104 MMOL/L — SIGNIFICANT CHANGE UP (ref 98–110)
CO2 SERPL-SCNC: 26 MMOL/L — SIGNIFICANT CHANGE UP (ref 17–32)
CREAT SERPL-MCNC: <0.5 MG/DL — LOW (ref 0.7–1.5)
EGFR: 117 ML/MIN/1.73M2 — SIGNIFICANT CHANGE UP
EOSINOPHIL # BLD AUTO: 0.13 K/UL — SIGNIFICANT CHANGE UP (ref 0–0.7)
EOSINOPHIL NFR BLD AUTO: 1.4 % — SIGNIFICANT CHANGE UP (ref 0–8)
GLUCOSE BLDC GLUCOMTR-MCNC: 79 MG/DL — SIGNIFICANT CHANGE UP (ref 70–99)
GLUCOSE BLDC GLUCOMTR-MCNC: 87 MG/DL — SIGNIFICANT CHANGE UP (ref 70–99)
GLUCOSE BLDC GLUCOMTR-MCNC: 98 MG/DL — SIGNIFICANT CHANGE UP (ref 70–99)
GLUCOSE BLDC GLUCOMTR-MCNC: 98 MG/DL — SIGNIFICANT CHANGE UP (ref 70–99)
GLUCOSE SERPL-MCNC: 105 MG/DL — HIGH (ref 70–99)
HCT VFR BLD CALC: 25.9 % — LOW (ref 37–47)
HGB BLD-MCNC: 8.5 G/DL — LOW (ref 12–16)
IMM GRANULOCYTES NFR BLD AUTO: 0.7 % — HIGH (ref 0.1–0.3)
LYMPHOCYTES # BLD AUTO: 1.8 K/UL — SIGNIFICANT CHANGE UP (ref 1.2–3.4)
LYMPHOCYTES # BLD AUTO: 19.7 % — LOW (ref 20.5–51.1)
MAGNESIUM SERPL-MCNC: 2.1 MG/DL — SIGNIFICANT CHANGE UP (ref 1.8–2.4)
MCHC RBC-ENTMCNC: 29.3 PG — SIGNIFICANT CHANGE UP (ref 27–31)
MCHC RBC-ENTMCNC: 32.8 G/DL — SIGNIFICANT CHANGE UP (ref 32–37)
MCV RBC AUTO: 89.3 FL — SIGNIFICANT CHANGE UP (ref 81–99)
MONOCYTES # BLD AUTO: 1.03 K/UL — HIGH (ref 0.1–0.6)
MONOCYTES NFR BLD AUTO: 11.3 % — HIGH (ref 1.7–9.3)
NEUTROPHILS # BLD AUTO: 6.08 K/UL — SIGNIFICANT CHANGE UP (ref 1.4–6.5)
NEUTROPHILS NFR BLD AUTO: 66.5 % — SIGNIFICANT CHANGE UP (ref 42.2–75.2)
NRBC # BLD: 0 /100 WBCS — SIGNIFICANT CHANGE UP (ref 0–0)
PLATELET # BLD AUTO: 396 K/UL — SIGNIFICANT CHANGE UP (ref 130–400)
POTASSIUM SERPL-MCNC: 3.8 MMOL/L — SIGNIFICANT CHANGE UP (ref 3.5–5)
POTASSIUM SERPL-SCNC: 3.8 MMOL/L — SIGNIFICANT CHANGE UP (ref 3.5–5)
PROT SERPL-MCNC: 5.4 G/DL — LOW (ref 6–8)
RBC # BLD: 2.9 M/UL — LOW (ref 4.2–5.4)
RBC # FLD: 13.3 % — SIGNIFICANT CHANGE UP (ref 11.5–14.5)
SODIUM SERPL-SCNC: 140 MMOL/L — SIGNIFICANT CHANGE UP (ref 135–146)
WBC # BLD: 9.14 K/UL — SIGNIFICANT CHANGE UP (ref 4.8–10.8)
WBC # FLD AUTO: 9.14 K/UL — SIGNIFICANT CHANGE UP (ref 4.8–10.8)

## 2023-03-24 PROCEDURE — 99232 SBSQ HOSP IP/OBS MODERATE 35: CPT

## 2023-03-24 RX ADMIN — Medication 500 MILLIGRAM(S): at 11:42

## 2023-03-24 RX ADMIN — Medication 81 MILLIGRAM(S): at 11:42

## 2023-03-24 RX ADMIN — GABAPENTIN 100 MILLIGRAM(S): 400 CAPSULE ORAL at 13:32

## 2023-03-24 RX ADMIN — Medication 1 APPLICATION(S): at 17:16

## 2023-03-24 RX ADMIN — Medication 500 MILLIGRAM(S): at 13:32

## 2023-03-24 RX ADMIN — Medication 5 MILLIGRAM(S): at 05:01

## 2023-03-24 RX ADMIN — ENOXAPARIN SODIUM 40 MILLIGRAM(S): 100 INJECTION SUBCUTANEOUS at 21:30

## 2023-03-24 RX ADMIN — Medication 1 APPLICATION(S): at 05:01

## 2023-03-24 RX ADMIN — LEVETIRACETAM 1500 MILLIGRAM(S): 250 TABLET, FILM COATED ORAL at 17:21

## 2023-03-24 RX ADMIN — Medication 5 MILLIGRAM(S): at 17:21

## 2023-03-24 RX ADMIN — Medication 500 MILLIGRAM(S): at 21:13

## 2023-03-24 RX ADMIN — CEFEPIME 100 MILLIGRAM(S): 1 INJECTION, POWDER, FOR SOLUTION INTRAMUSCULAR; INTRAVENOUS at 13:32

## 2023-03-24 RX ADMIN — Medication 500 MILLIGRAM(S): at 05:00

## 2023-03-24 RX ADMIN — GABAPENTIN 100 MILLIGRAM(S): 400 CAPSULE ORAL at 05:00

## 2023-03-24 RX ADMIN — ATORVASTATIN CALCIUM 40 MILLIGRAM(S): 80 TABLET, FILM COATED ORAL at 21:13

## 2023-03-24 RX ADMIN — GABAPENTIN 100 MILLIGRAM(S): 400 CAPSULE ORAL at 21:13

## 2023-03-24 RX ADMIN — CEFEPIME 100 MILLIGRAM(S): 1 INJECTION, POWDER, FOR SOLUTION INTRAMUSCULAR; INTRAVENOUS at 21:14

## 2023-03-24 RX ADMIN — MIDODRINE HYDROCHLORIDE 10 MILLIGRAM(S): 2.5 TABLET ORAL at 05:00

## 2023-03-24 RX ADMIN — CLOPIDOGREL BISULFATE 75 MILLIGRAM(S): 75 TABLET, FILM COATED ORAL at 11:42

## 2023-03-24 RX ADMIN — LEVETIRACETAM 1500 MILLIGRAM(S): 250 TABLET, FILM COATED ORAL at 05:00

## 2023-03-24 RX ADMIN — Medication 1 TABLET(S): at 11:42

## 2023-03-24 RX ADMIN — CEFEPIME 100 MILLIGRAM(S): 1 INJECTION, POWDER, FOR SOLUTION INTRAMUSCULAR; INTRAVENOUS at 05:01

## 2023-03-24 NOTE — PROGRESS NOTE ADULT - SUBJECTIVE AND OBJECTIVE BOX
JOSEPH OBRIEN 67y Female  MRN#: [id number]     Hospital Day: 20d    CC: Altered Mental Status likely due to Metabolic Encephalopathy from UTI, sepsis and seizure in the context of stroke with left hemiparesis    HOSPITAL COURSE:  67F. PMH: Seizures, Rt MCA Stroke  BIBEMS 3/3 after being found on the street, daughter reported pt c/o generalized pain, had FS of 96. No family present at admission/no contact information. Pt reported feeling "okay".  In ED, hemodynamically stable with Fever. Labs: WBC 19K, elevated lactate, UA +ve  She was pan-scanned. no evidence of new stroke, trauma, or infection.  CTH showed old stroke in the territory of the Rt MCA.  Neuro eval'd 3/8-3/16: For seizure/stroke 3/8: L gaze preference, OP med review: on low dose Lamictal and Depakote in addition to Keppra (possible mood disorder/ seizures), MRi brain (+) acute subcortical infarct within same M1 territory. The cause of stroke is most likely hypotension as she has a diminutive Rt M1. However, MRI findings doesn't explain her B/L upper extremity spasticity. So, it is important to rule. vEEG 3/9: showed focal slowing. DAPT for 21 days followed by ASA 81 mg daily; High intensity statin, Can f/u in stroke clinic in 4 weeks following discharge.  Podiatry following for foot pain 2/ foot drop- found to have L foot avulsion fracture: WBAT w/ surgical shoe, ankle brace, PT  BH eval'd 3/15: Delirium  Pt was treated for UTI, placed on appropriate medical management for stroke. 3/20 pt was noted to be less responsive and have forced gaze during her speech evaluation. Concern for seizure. Neurology was recalled- keppra was increased. Pt made NPO, and AEDs made IV. Overnight pt was noted to be febrile, and again hypotensive requiring 2L fluid bolus, despite maintenance fluids. Septic workup sent. Leukocytosis noted, BCx NGTD, CXR looks clear.  Decubitus ulcer noted on sacrum, burn consulted - no intervention, local wound care. Id consulted for abx recs: empiric cefepime x5d    SUBJECTIVE     Overnight events  None    Subjective complaints                                               ----------------------------------------------------------  OBJECTIVE  PAST MEDICAL & SURGICAL HISTORY                                            -----------------------------------------------------------  ALLERGIES:  Allergy Status Unknown                                            ------------------------------------------------------------    HOME MEDICATIONS  Home Medications:  acetaminophen 325 mg oral tablet: 2 tab(s) orally every 6 hours, As needed, Moderate Pain (4 - 6) (19 Mar 2023 10:42)  ascorbic acid 500 mg oral tablet: 1 tab(s) orally once a day (19 Mar 2023 10:42)  aspirin 81 mg oral tablet, chewable: 1 tab(s) orally once a day (19 Mar 2023 10:42)  atorvastatin 40 mg oral tablet: 1 tab(s) orally once a day (at bedtime) (19 Mar 2023 10:42)  baclofen 5 mg oral tablet: 1 tab(s) orally every 12 hours (19 Mar 2023 10:42)  clopidogrel 75 mg oral tablet: 1 tab(s) orally once a day x 18 more days and then stop (19 Mar 2023 10:42)  enoxaparin: 40 unit(s) subcutaneous once a day (19 Mar 2023 10:42)  levETIRAcetam 100 mg/mL oral solution: 10 milliliter(s) orally 2 times a day (19 Mar 2023 10:42)  morphine 15 mg oral tablet: 1 tab(s) orally every 6 hours, As needed, Severe Pain (7 - 10) (19 Mar 2023 10:42)  Multiple Vitamins with Minerals oral tablet: 1 tab(s) orally once a day (19 Mar 2023 10:42)  pantoprazole 40 mg oral delayed release tablet: 1 tab(s) orally once a day (09 Mar 2023 19:56)  valproic acid 250 mg/5 mL oral liquid: 500 milligram(s) orally 3 times a day (19 Mar 2023 10:42)  vitamin A and D topical ointment: 1 application topically 2 times a day (19 Mar 2023 10:42)                           MEDICATIONS:  STANDING MEDICATIONS  acetaminophen  Suppository .. 325 milliGRAM(s) Rectal once  ascorbic acid 500 milliGRAM(s) Oral daily  aspirin  chewable 81 milliGRAM(s) Oral daily  atorvastatin 40 milliGRAM(s) Oral at bedtime  baclofen 5 milliGRAM(s) Oral every 12 hours  cefepime   IVPB 2000 milliGRAM(s) IV Intermittent every 8 hours  clopidogrel Tablet 75 milliGRAM(s) Oral daily  enoxaparin Injectable 40 milliGRAM(s) SubCutaneous every 24 hours  gabapentin 100 milliGRAM(s) Oral three times a day  levETIRAcetam 1500 milliGRAM(s) Oral two times a day  midodrine. 10 milliGRAM(s) Oral three times a day  multivitamin/minerals 1 Tablet(s) Oral daily  valproic  acid Syrup 500 milliGRAM(s) Oral three times a day  vitamin A &amp; D Ointment 1 Application(s) Topical two times a day    PRN MEDICATIONS  acetaminophen     Tablet .. 650 milliGRAM(s) Oral every 6 hours PRN  oxycodone    5 mG/acetaminophen 325 mG 1 Tablet(s) Oral every 6 hours PRN                                            ------------------------------------------------------------  VITAL SIGNS: Last 24 Hours  T(C): 36.4 (24 Mar 2023 05:00), Max: 36.4 (23 Mar 2023 21:01)  T(F): 97.6 (24 Mar 2023 05:00), Max: 97.6 (23 Mar 2023 21:01)  HR: 92 (24 Mar 2023 05:00) (79 - 92)  BP: 111/62 (24 Mar 2023 05:00) (111/62 - 121/66)  BP(mean): --  RR: 18 (24 Mar 2023 05:00) (18 - 18)  SpO2: 98% (24 Mar 2023 05:00) (98% - 98%)                                             --------------------------------------------------------------  LABS:                        8.5    9.40  )-----------( 352      ( 22 Mar 2023 08:13 )             26.7     03-22    140  |  105  |  6<L>  ----------------------------<  93  3.8   |  26  |  <0.5<L>    Ca    8.5      22 Mar 2023 08:13  Mg     1.6     03-22    TPro  5.0<L>  /  Alb  2.3<L>  /  TBili  <0.2  /  DBili  x   /  AST  14  /  ALT  <5  /  AlkPhos  58  03-22                Culture - Urine (collected 21 Mar 2023 11:57)  Source: Catheterized Catheterized  Final Report (22 Mar 2023 17:54):    No growth                                                    -------------------------------------------------------------  RADIOLOGY:                                            --------------------------------------------------------------    PHYSICAL EXAM:                                             --------------------------------------------------------------                 JOSEPH OBRIEN 67y Female  MRN#: [id number]     Hospital Day: 20d    CC: Altered Mental Status likely due to Metabolic Encephalopathy from UTI, sepsis and seizure in the context of stroke with left hemiparesis    HOSPITAL COURSE:  67F. PMH: Seizures, Rt MCA Stroke  BIBEMS 3/3 after being found on the street, daughter reported pt c/o generalized pain, had FS of 96. No family present at admission/no contact information. Pt reported feeling "okay".  In ED, hemodynamically stable with Fever. Labs: WBC 19K, elevated lactate, UA +ve  She was pan-scanned. no evidence of new stroke, trauma, or infection.  CTH showed old stroke in the territory of the Rt MCA.  Neuro eval'd 3/8-3/16: For seizure/stroke 3/8: L gaze preference, OP med review: on low dose Lamictal and Depakote in addition to Keppra (possible mood disorder/ seizures), MRi brain (+) acute subcortical infarct within same M1 territory. The cause of stroke is most likely hypotension as she has a diminutive Rt M1. However, MRI findings doesn't explain her B/L upper extremity spasticity. So, it is important to rule. vEEG 3/9: showed focal slowing. DAPT for 21 days followed by ASA 81 mg daily; High intensity statin, Can f/u in stroke clinic in 4 weeks following discharge.  Podiatry following for foot pain 2/ foot drop- found to have L foot avulsion fracture: WBAT w/ surgical shoe, ankle brace, PT  BH eval'd 3/15: Delirium  Pt was treated for UTI, placed on appropriate medical management for stroke. 3/20 pt was noted to be less responsive and have forced gaze during her speech evaluation. Concern for seizure. Neurology was recalled- keppra was increased. Pt made NPO, and AEDs made IV. Overnight pt was noted to be febrile, and again hypotensive requiring 2L fluid bolus, despite maintenance fluids. Septic workup sent. Leukocytosis noted, BCx NGTD, CXR looks clear.  Decubitus ulcer noted on sacrum, burn consulted - no intervention, local wound care. Id consulted for abx recs: empiric cefepime x5d    SUBJECTIVE     Overnight events  None    Subjective complaints                                               ----------------------------------------------------------  OBJECTIVE  PAST MEDICAL & SURGICAL HISTORY                                            -----------------------------------------------------------  ALLERGIES:  Allergy Status Unknown                                            ------------------------------------------------------------    HOME MEDICATIONS  Home Medications:  acetaminophen 325 mg oral tablet: 2 tab(s) orally every 6 hours, As needed, Moderate Pain (4 - 6) (19 Mar 2023 10:42)  ascorbic acid 500 mg oral tablet: 1 tab(s) orally once a day (19 Mar 2023 10:42)  aspirin 81 mg oral tablet, chewable: 1 tab(s) orally once a day (19 Mar 2023 10:42)  atorvastatin 40 mg oral tablet: 1 tab(s) orally once a day (at bedtime) (19 Mar 2023 10:42)  baclofen 5 mg oral tablet: 1 tab(s) orally every 12 hours (19 Mar 2023 10:42)  clopidogrel 75 mg oral tablet: 1 tab(s) orally once a day x 18 more days and then stop (19 Mar 2023 10:42)  enoxaparin: 40 unit(s) subcutaneous once a day (19 Mar 2023 10:42)  levETIRAcetam 100 mg/mL oral solution: 10 milliliter(s) orally 2 times a day (19 Mar 2023 10:42)  morphine 15 mg oral tablet: 1 tab(s) orally every 6 hours, As needed, Severe Pain (7 - 10) (19 Mar 2023 10:42)  Multiple Vitamins with Minerals oral tablet: 1 tab(s) orally once a day (19 Mar 2023 10:42)  pantoprazole 40 mg oral delayed release tablet: 1 tab(s) orally once a day (09 Mar 2023 19:56)  valproic acid 250 mg/5 mL oral liquid: 500 milligram(s) orally 3 times a day (19 Mar 2023 10:42)  vitamin A and D topical ointment: 1 application topically 2 times a day (19 Mar 2023 10:42)                           MEDICATIONS:  STANDING MEDICATIONS  acetaminophen  Suppository .. 325 milliGRAM(s) Rectal once  ascorbic acid 500 milliGRAM(s) Oral daily  aspirin  chewable 81 milliGRAM(s) Oral daily  atorvastatin 40 milliGRAM(s) Oral at bedtime  baclofen 5 milliGRAM(s) Oral every 12 hours  cefepime   IVPB 2000 milliGRAM(s) IV Intermittent every 8 hours  clopidogrel Tablet 75 milliGRAM(s) Oral daily  enoxaparin Injectable 40 milliGRAM(s) SubCutaneous every 24 hours  gabapentin 100 milliGRAM(s) Oral three times a day  levETIRAcetam 1500 milliGRAM(s) Oral two times a day  midodrine. 10 milliGRAM(s) Oral three times a day  multivitamin/minerals 1 Tablet(s) Oral daily  valproic  acid Syrup 500 milliGRAM(s) Oral three times a day  vitamin A &amp; D Ointment 1 Application(s) Topical two times a day    PRN MEDICATIONS  acetaminophen     Tablet .. 650 milliGRAM(s) Oral every 6 hours PRN  oxycodone    5 mG/acetaminophen 325 mG 1 Tablet(s) Oral every 6 hours PRN                                            ------------------------------------------------------------  VITAL SIGNS: Last 24 Hours  T(C): 36.4 (24 Mar 2023 05:00), Max: 36.4 (23 Mar 2023 21:01)  T(F): 97.6 (24 Mar 2023 05:00), Max: 97.6 (23 Mar 2023 21:01)  HR: 92 (24 Mar 2023 05:00) (79 - 92)  BP: 111/62 (24 Mar 2023 05:00) (111/62 - 121/66)  BP(mean): --  RR: 18 (24 Mar 2023 05:00) (18 - 18)  SpO2: 98% (24 Mar 2023 05:00) (98% - 98%)                                             --------------------------------------------------------------  LABS:                        8.5    9.40  )-----------( 352      ( 22 Mar 2023 08:13 )             26.7     03-22    140  |  105  |  6<L>  ----------------------------<  93  3.8   |  26  |  <0.5<L>    Ca    8.5      22 Mar 2023 08:13  Mg     1.6     03-22    TPro  5.0<L>  /  Alb  2.3<L>  /  TBili  <0.2  /  DBili  x   /  AST  14  /  ALT  <5  /  AlkPhos  58  03-22                Culture - Urine (collected 21 Mar 2023 11:57)  Source: Catheterized Catheterized  Final Report (22 Mar 2023 17:54):    No growth                                                    -------------------------------------------------------------  RADIOLOGY:                                            --------------------------------------------------------------    PHYSICAL EXAM:  GEN: NAD  NEURO: Awake and alert, L hemiparesis, speaks in short sentences, does not participate in exam, BLUE spasticity and contracted tone  HEENT: nontraumatic, normocephalic, neck supple  CARD: RRR  PULM: B/L decreased breath sounds  ABD: Soft, nondistended, nontender  EXTR: No clubbing, cyanosis, edema. 2+ pulses b/l LE, left LE in CAM boot  Skin: Sacral ulcer                                           --------------------------------------------------------------                 JOSEPH OBRIEN 67y Female  MRN#: [id number]     Hospital Day: 20d    CC: Altered Mental Status likely due to Metabolic Encephalopathy from UTI, sepsis and seizure in the context of stroke with left hemiparesis    HOSPITAL COURSE:  67F. PMH: Seizures, Rt MCA Stroke  BIBEMS 3/3 after being found on the street, daughter reported pt c/o generalized pain, had FS of 96. No family present at admission/no contact information. Pt reported feeling "okay".  In ED, hemodynamically stable with Fever. Labs: WBC 19K, elevated lactate, UA +ve  She was pan-scanned. no evidence of new stroke, trauma, or infection.  CTH showed old stroke in the territory of the Rt MCA.  Neuro eval'd 3/8-3/16: For seizure/stroke 3/8: L gaze preference, OP med review: on low dose Lamictal and Depakote in addition to Keppra (possible mood disorder/ seizures), MRi brain (+) acute subcortical infarct within same M1 territory. The cause of stroke is most likely hypotension as she has a diminutive Rt M1. However, MRI findings doesn't explain her B/L upper extremity spasticity. So, it is important to rule. vEEG 3/9: showed focal slowing. DAPT for 21 days followed by ASA 81 mg daily; High intensity statin, Can f/u in stroke clinic in 4 weeks following discharge.  Podiatry following for foot pain 2/ foot drop- found to have L foot avulsion fracture: WBAT w/ surgical shoe, ankle brace, PT  BH eval'd 3/15: Delirium  Pt was treated for UTI, placed on appropriate medical management for stroke. 3/20 pt was noted to be less responsive and have forced gaze during her speech evaluation. Concern for seizure. Neurology was recalled- keppra was increased. Pt made NPO, and AEDs made IV. Overnight pt was noted to be febrile, and again hypotensive requiring 2L fluid bolus, despite maintenance fluids. Septic workup sent. Leukocytosis noted, BCx NGTD, CXR looks clear.  Decubitus ulcer noted on sacrum, burn consulted - no intervention, local wound care. Id consulted for abx recs: empiric cefepime x5d    SUBJECTIVE     Overnight events  None    Subjective complaints   Pt evaluated at bedside.                                             ----------------------------------------------------------  OBJECTIVE  PAST MEDICAL & SURGICAL HISTORY                                            -----------------------------------------------------------  ALLERGIES:  Allergy Status Unknown                                            ------------------------------------------------------------    HOME MEDICATIONS  Home Medications:  acetaminophen 325 mg oral tablet: 2 tab(s) orally every 6 hours, As needed, Moderate Pain (4 - 6) (19 Mar 2023 10:42)  ascorbic acid 500 mg oral tablet: 1 tab(s) orally once a day (19 Mar 2023 10:42)  aspirin 81 mg oral tablet, chewable: 1 tab(s) orally once a day (19 Mar 2023 10:42)  atorvastatin 40 mg oral tablet: 1 tab(s) orally once a day (at bedtime) (19 Mar 2023 10:42)  baclofen 5 mg oral tablet: 1 tab(s) orally every 12 hours (19 Mar 2023 10:42)  clopidogrel 75 mg oral tablet: 1 tab(s) orally once a day x 18 more days and then stop (19 Mar 2023 10:42)  enoxaparin: 40 unit(s) subcutaneous once a day (19 Mar 2023 10:42)  levETIRAcetam 100 mg/mL oral solution: 10 milliliter(s) orally 2 times a day (19 Mar 2023 10:42)  morphine 15 mg oral tablet: 1 tab(s) orally every 6 hours, As needed, Severe Pain (7 - 10) (19 Mar 2023 10:42)  Multiple Vitamins with Minerals oral tablet: 1 tab(s) orally once a day (19 Mar 2023 10:42)  pantoprazole 40 mg oral delayed release tablet: 1 tab(s) orally once a day (09 Mar 2023 19:56)  valproic acid 250 mg/5 mL oral liquid: 500 milligram(s) orally 3 times a day (19 Mar 2023 10:42)  vitamin A and D topical ointment: 1 application topically 2 times a day (19 Mar 2023 10:42)                           MEDICATIONS:  STANDING MEDICATIONS  acetaminophen  Suppository .. 325 milliGRAM(s) Rectal once  ascorbic acid 500 milliGRAM(s) Oral daily  aspirin  chewable 81 milliGRAM(s) Oral daily  atorvastatin 40 milliGRAM(s) Oral at bedtime  baclofen 5 milliGRAM(s) Oral every 12 hours  cefepime   IVPB 2000 milliGRAM(s) IV Intermittent every 8 hours  clopidogrel Tablet 75 milliGRAM(s) Oral daily  enoxaparin Injectable 40 milliGRAM(s) SubCutaneous every 24 hours  gabapentin 100 milliGRAM(s) Oral three times a day  levETIRAcetam 1500 milliGRAM(s) Oral two times a day  midodrine. 10 milliGRAM(s) Oral three times a day  multivitamin/minerals 1 Tablet(s) Oral daily  valproic  acid Syrup 500 milliGRAM(s) Oral three times a day  vitamin A &amp; D Ointment 1 Application(s) Topical two times a day    PRN MEDICATIONS  acetaminophen     Tablet .. 650 milliGRAM(s) Oral every 6 hours PRN  oxycodone    5 mG/acetaminophen 325 mG 1 Tablet(s) Oral every 6 hours PRN                                            ------------------------------------------------------------  VITAL SIGNS: Last 24 Hours  T(C): 36.4 (24 Mar 2023 05:00), Max: 36.4 (23 Mar 2023 21:01)  T(F): 97.6 (24 Mar 2023 05:00), Max: 97.6 (23 Mar 2023 21:01)  HR: 92 (24 Mar 2023 05:00) (79 - 92)  BP: 111/62 (24 Mar 2023 05:00) (111/62 - 121/66)  BP(mean): --  RR: 18 (24 Mar 2023 05:00) (18 - 18)  SpO2: 98% (24 Mar 2023 05:00) (98% - 98%)                                             --------------------------------------------------------------  LABS:                        8.5    9.40  )-----------( 352      ( 22 Mar 2023 08:13 )             26.7     03-22    140  |  105  |  6<L>  ----------------------------<  93  3.8   |  26  |  <0.5<L>    Ca    8.5      22 Mar 2023 08:13  Mg     1.6     03-22    TPro  5.0<L>  /  Alb  2.3<L>  /  TBili  <0.2  /  DBili  x   /  AST  14  /  ALT  <5  /  AlkPhos  58  03-22                Culture - Urine (collected 21 Mar 2023 11:57)  Source: Catheterized Catheterized  Final Report (22 Mar 2023 17:54):    No growth                                                    -------------------------------------------------------------  RADIOLOGY:                                            --------------------------------------------------------------    PHYSICAL EXAM:  GEN: NAD  NEURO: Awake and alert, L hemiparesis, speaks in short sentences, does not participate in exam, BLUE spasticity and contracted tone  HEENT: nontraumatic, normocephalic, neck supple  CARD: RRR  PULM: B/L decreased breath sounds  ABD: Soft, nondistended, nontender  EXTR: No clubbing, cyanosis, edema. 2+ pulses b/l LE, left LE in CAM boot  Skin: Sacral ulcer                                           --------------------------------------------------------------

## 2023-03-24 NOTE — PROGRESS NOTE ADULT - ASSESSMENT
66 y/o woman with PMH of Right MCA stroke with left sided weakness was brought to the ED via EMS. In the ED, she had altered mental status and sepsis.    #Seizures  #Acute R Frontal Lobe Infarct likely 2/ hypotension   #Chronic R MCA infarct  - per chart: at baseline she is alert, able to communicate and is oriented. Ambulation at baseline: uses a wheelchair. She was in a hospital in [facility] last fall and was lethargic for months  - 3/8: s/p rapid response for L gaze preference  - 3/20: Pt had event with L gaze preference during SS eval, pt made NPO. Examined by resident, no gaze preference present any longer. Pt responsive but less than baseline. Developed seizures post-event. Septic workup obtained. CXR appears clear, rest pending. --> 3/21 Pt more responsive this AM, expressing herself and in line with current baseline.  *****   - Depakote level (3/4) 87 (WNL) -> (3/21) 74 (WNL)   - CTH/CTA of head/neck (3/4): no acute pathology, no evidence of occlusion, aneurysm or stenosis  - EEG (3/8) and video EEG (3/9): Focal slowing  - MRI Brain (3/15): new subcortical infarct in the right frontal lobe. Redemonstrated chronic infarct in the right MCA territory.  - MRI of C spine (3/16): Significantly motion limited study. No gross cord compression or spinal cord edema demonstrated. Multilevel small disc bulges.  *****  - Neuro eval'd 3/8-3/20: For L gaze preference/stroke, OP med review: on low dose Lamictal and Depakote in addition to Keppra (possible mood disorder/ seizures), Her MRI of brain showing subcortical infarct within same M1 territory. The casue of stroke is most likely hypotension as she has a diminutive Rt M1. However, MRI findings doesn't explain her B/L upper extremity spasticity. So, it is important to rule out cervical cord pathology. DAPT for 21 days followed by ASA 81 mg daily, Increased keppra post 3/20 possible seizure event  - BH eval'd 3/15: Delirium; pt does not have the capacity to choose a Health care agent  - SS re-juliette 3/21: Minced and moist, 1:1 feeds, thin liquids through straw  - Seizure precautions  - C/w Depakote 500mg TID and Keppra to 1500mg BID   - C/w baclofen 5mg bid (for UE spasticity)  - C/w ASA, Plavix (3/17-4/6), and high dose statin for new stroke - DAPT for 21 days followed by ASA 81 mg daily  - C/w Midodrine 10mg TID  - C/w. D5+NS 75cc/hr - dc if eating/drinking appropriately   *****  - OP stroke clinic in 4 weeks per neurology    #Metabolic Encephalopathy from UTI  #Febrile 3/20  #Unstagable Sacral Ulcer  - 3/20: Pt became febrile after possible seizure episode, Cefepime, levaquin (x1 3/20), vancomycin ON  *****  - 3/20 Septic workup: WBC (3/21) 12.26 (up from 8.23), BCx (3/20) NGTD, Procal (3/20) 0.05, RVP (3/20) COVID (-), Cortisol level, Lactate (3/21) WNL, UA nitrite (-), LEC (-), pending WBCs/Bacteria, UCx (3/21) NGTD  - Duplex LUE (3/10): (-) DVT (+) superficial cephalic vein thrombosis  - Duplex BLLE (3/7): (-) DVT  - s/p Vanc (3/4x1, 3/20-3/22), Cefepime (x1 3/4, x1 3/8), Ceftriaxone (3/4-3/6) (for UTI)  *****  - F/u Duplex BLLE (rpt): (performed)  - ID eval'd 3/23  - Burn eval'd 3/22 - No intervention, wound care recs noted  - C/w Cefepime (3/20-present) Empiric 5d course End: 3/24 (per ID)    #Left avulsion fracture of medial malleolus  - Duplex BL LE (3/7): (-) for DVT  - CT Foot (3/11): acute minimally displaced avulsion fracture at the medial malleolus  *****  - Podiatry following for foot pain: WBAT w/ surgical shoe, ankle brace, PT  - fall precautions  - pain control regimen - if pain uncontrolled consider increasing percocet to 2tabs.  - C/w gabapentin at 100mg TID (Lowered dose from home, increase as pt tolerates)    #Sinus tachycardia - now resolved - attributed to pain  - TSH (3/8) WNL, - EKG reviewed    #Left cephalic vein thrombosis  - Duplex LUE (3/10): No DVT however there is superficial thrombosis noted in the left cephalic vein  - s/p warm compress    #Anemia - normocytic - stable  - possibly due to frequent phlebotomy, monitor for bleeding    #Misc  - DVT ppx - lovenox  - Diet: Soft and bite sized (SS 3/7)  - full code, overall guarded prognosis  - PT eval'd 3/7 - needs re-eval 66 y/o woman with PMH of Right MCA stroke with left sided weakness was brought to the ED via EMS. In the ED, she had altered mental status and sepsis.    #Seizures  #Acute R Frontal Lobe Infarct likely 2/ hypotension   #Chronic R MCA infarct  - per chart: at baseline she is alert, able to communicate and is oriented. Ambulation at baseline: uses a wheelchair. She was in a hospital in [facility] last fall and was lethargic for months  - 3/8: s/p rapid response for L gaze preference  - 3/20: Pt had event with L gaze preference during SS eval, pt made NPO. Examined by resident, no gaze preference present any longer. Pt responsive but less than baseline. Developed seizures post-event. Septic workup obtained. CXR appears clear, rest pending. --> 3/21 Pt more responsive this AM, expressing herself and in line with current baseline.  *****   - Depakote level (3/4) 87 (WNL) -> (3/21) 74 (WNL)   - CTH/CTA of head/neck (3/4): no acute pathology, no evidence of occlusion, aneurysm or stenosis  - EEG (3/8) and video EEG (3/9): Focal slowing  - MRI Brain (3/15): new subcortical infarct in the right frontal lobe. Redemonstrated chronic infarct in the right MCA territory.  - MRI of C spine (3/16): Significantly motion limited study. No gross cord compression or spinal cord edema demonstrated. Multilevel small disc bulges.  *****  - Neuro eval'd 3/8-3/20: For L gaze preference/stroke, OP med review: on low dose Lamictal and Depakote in addition to Keppra (possible mood disorder/ seizures), Her MRI of brain showing subcortical infarct within same M1 territory. The casue of stroke is most likely hypotension as she has a diminutive Rt M1. However, MRI findings doesn't explain her B/L upper extremity spasticity. So, it is important to rule out cervical cord pathology. DAPT for 21 days followed by ASA 81 mg daily, Increased keppra post 3/20 possible seizure event  - BH eval'd 3/15: Delirium; pt does not have the capacity to choose a Health care agent  - SS re-juliette 3/21: Minced and moist, 1:1 feeds, thin liquids through straw  - Seizure precautions  - C/w Depakote 500mg TID and Keppra to 1500mg BID   - C/w baclofen 5mg bid (for UE spasticity)  - C/w ASA, Plavix (3/17-4/6), and high dose statin for new stroke - DAPT for 21 days followed by ASA 81 mg daily  - C/w Midodrine 10mg TID  *****  - OP stroke clinic in 4 weeks per neurology    #Metabolic Encephalopathy from UTI  #Febrile 3/20  #Unstagable Sacral Ulcer  - 3/20: Pt became febrile after possible seizure episode, Cefepime, levaquin (x1 3/20), vancomycin ON  *****  - 3/20 Septic workup: WBC (3/21) 12.26 (up from 8.23), BCx (3/20) NGTD, Procal (3/20) 0.05, RVP (3/20) COVID (-), Cortisol level, Lactate (3/21) WNL, UA nitrite (-), LEC (-), pending WBCs/Bacteria, UCx (3/21) NGTD  - Duplex LUE (3/10): (-) DVT (+) superficial cephalic vein thrombosis  - Duplex BLLE (3/7): (-) DVT  - s/p Vanc (3/4x1, 3/20-3/22), Cefepime (x1 3/4, x1 3/8), Ceftriaxone (3/4-3/6) (for UTI)  *****  - F/u Duplex BLLE (rpt): (performed)  - ID eval'd 3/23  - Burn eval'd 3/22 - No intervention, wound care recs noted  - C/w Cefepime (3/20-present) Empiric 5d course End: 3/24 (per ID)    #Left avulsion fracture of medial malleolus  - Duplex BL LE (3/7): (-) for DVT  - CT Foot (3/11): acute minimally displaced avulsion fracture at the medial malleolus  *****  - Podiatry following for foot pain: WBAT w/ surgical shoe, ankle brace, PT  - fall precautions  - pain control regimen - if pain uncontrolled consider increasing percocet to 2tabs.  - C/w gabapentin at 100mg TID (Lowered dose from home, increase as pt tolerates)    #Sinus tachycardia - now resolved - attributed to pain  - TSH (3/8) WNL, - EKG reviewed    #Left cephalic vein thrombosis  - Duplex LUE (3/10): No DVT however there is superficial thrombosis noted in the left cephalic vein  - s/p warm compress    #Anemia - normocytic - stable  - possibly due to frequent phlebotomy, monitor for bleeding    #Misc  - DVT ppx - lovenox  - Diet: Soft and bite sized (SS 3/7)  - full code, overall guarded prognosis  - Dispo: Stable, dispo planning  - PT eval'd 3/7 - needs re-eval

## 2023-03-24 NOTE — PROGRESS NOTE ADULT - ATTENDING COMMENTS
68 y/o woman with PMH of Right MCA stroke with left sided weakness was brought to the ED via EMS. In the ED, she had altered mental status and sepsis. Patient has a h/o prior hospitalization  in a hospital in [facility] last fall due to persistent metabolic encephalopathy for months     #  Altered Mental Status likely due to Metabolic Encephalopathy from UTI, sepsis and seizure in the context of acute and chronic stroke with left hemiparesis   s/p course of abx for E. coli UTI  MRI of brain: Small patchy subcortical infarct in the right frontal lobe. Redemonstrated chronic infarct in the right MCA territory.  Mild chronic microvascular ischemic changes.  CTA of head/neck: no evidence of occlusion, aneurysm or stenosis  s/p rapid response for seizure- s/p EEG and video EEG: continue on Depakote 500 mg TID and Keppra 1000 mg BID  seizure precautions  Neuro f/u appreciated - stroke likely due to hypotension - has diminutive caliber of right M1 - avoid hypotension  Added ASA, Plavix and statin for new stroke -  DAPT for 21 days followed by ASA 81 mg daily - f/u in stroke clinic in 4 weeks per neurology  pt with UE spasticity: MRI of C spine: Significantly motion limited study. No gross cord compression or spinal cord edema demonstrated. Multilevel small disc bulges. - continue baclofen 5mg bid  behavioral health eval appreciated: pt does not have the capacity to choose a Health care agent  -clinically patient is not following verbal commands, persistent left sided paralysis, dysarthria , oropharyngeal dysphagia , patient is in chronic bed confinement status.    # Seizures- 3/20- patient had gaze deviation to the left- resolved, Neuro f/up rec- increase Keppra dose tx.   - if patient will have recurrent neuro event -consider repeating EEG    # New onset fevers on 3/20 at night and 3/21 in am: Maybe 2/2   # Unstagable sacral wound - Per Burn eval: [facility] only.  3/20 procal = 0.05   Per ID, Cefepime (ends 3/24)  -repeated CXR- personally reviewed, no acute infiltrates, effusions. f/up blood cxs, obtain u/a, urine cxs,     #Left avulsion fracture of medial malleolus  evaluated by podiatry - no surgical intervention - WB with CAM boot  continue PT/fall precautions, pain control    # Sinus tachycardia   now resolved - attributed to pain  EKG reviewed, TSH WNL    #Left cephalic vein thrombosis - s/p warm compress    # Anemia - normocytic  labs reviewed - Hgb in 9-10 range and now stable    # DVT prophylaxis    full code, overall patient's prognosis guarded   Family discussion: yes, medical team     #Progress Note Handoff: fever, f/up septic work up, feed with assistance, maintain aspiration precautions   able to answer questions. move RUE. Has a gaze preference.   MEDICALLY READY. Pending SNf

## 2023-03-25 PROCEDURE — 99231 SBSQ HOSP IP/OBS SF/LOW 25: CPT

## 2023-03-25 RX ADMIN — ATORVASTATIN CALCIUM 40 MILLIGRAM(S): 80 TABLET, FILM COATED ORAL at 21:06

## 2023-03-25 RX ADMIN — Medication 500 MILLIGRAM(S): at 05:27

## 2023-03-25 RX ADMIN — MIDODRINE HYDROCHLORIDE 10 MILLIGRAM(S): 2.5 TABLET ORAL at 05:30

## 2023-03-25 RX ADMIN — Medication 5 MILLIGRAM(S): at 05:28

## 2023-03-25 RX ADMIN — CLOPIDOGREL BISULFATE 75 MILLIGRAM(S): 75 TABLET, FILM COATED ORAL at 12:55

## 2023-03-25 RX ADMIN — LEVETIRACETAM 1500 MILLIGRAM(S): 250 TABLET, FILM COATED ORAL at 14:30

## 2023-03-25 RX ADMIN — ENOXAPARIN SODIUM 40 MILLIGRAM(S): 100 INJECTION SUBCUTANEOUS at 21:06

## 2023-03-25 RX ADMIN — MIDODRINE HYDROCHLORIDE 10 MILLIGRAM(S): 2.5 TABLET ORAL at 17:40

## 2023-03-25 RX ADMIN — Medication 1 TABLET(S): at 12:55

## 2023-03-25 RX ADMIN — Medication 500 MILLIGRAM(S): at 21:07

## 2023-03-25 RX ADMIN — Medication 500 MILLIGRAM(S): at 14:30

## 2023-03-25 RX ADMIN — LEVETIRACETAM 1500 MILLIGRAM(S): 250 TABLET, FILM COATED ORAL at 05:27

## 2023-03-25 RX ADMIN — Medication 500 MILLIGRAM(S): at 12:54

## 2023-03-25 RX ADMIN — GABAPENTIN 100 MILLIGRAM(S): 400 CAPSULE ORAL at 19:50

## 2023-03-25 RX ADMIN — GABAPENTIN 100 MILLIGRAM(S): 400 CAPSULE ORAL at 05:29

## 2023-03-25 RX ADMIN — GABAPENTIN 100 MILLIGRAM(S): 400 CAPSULE ORAL at 21:07

## 2023-03-25 RX ADMIN — Medication 5 MILLIGRAM(S): at 19:42

## 2023-03-25 RX ADMIN — Medication 81 MILLIGRAM(S): at 12:54

## 2023-03-25 RX ADMIN — Medication 1 APPLICATION(S): at 17:40

## 2023-03-25 RX ADMIN — Medication 1 APPLICATION(S): at 05:32

## 2023-03-25 NOTE — PROGRESS NOTE ADULT - ASSESSMENT
66 y/o woman with PMH of Right MCA stroke with left sided weakness was brought to the ED via EMS. In the ED, she had altered mental status and sepsis. Patient has a h/o prior hospitalization  in a hospital in [facility] last fall due to persistent metabolic encephalopathy for months     #  Altered Mental Status likely due to Metabolic Encephalopathy from UTI, sepsis and seizure in the context of acute and chronic stroke with left hemiparesis   s/p course of abx for E. coli UTI  MRI of brain: Small patchy subcortical infarct in the right frontal lobe. Redemonstrated chronic infarct in the right MCA territory.  Mild chronic microvascular ischemic changes.  CTA of head/neck: no evidence of occlusion, aneurysm or stenosis  s/p rapid response for seizure- s/p EEG and video EEG: continue on Depakote 500 mg TID and Keppra 1000 mg BID  seizure precautions  Neuro f/u appreciated - stroke likely due to hypotension - has diminutive caliber of right M1 - avoid hypotension  Added ASA, Plavix and statin for new stroke -  DAPT for 21 days followed by ASA 81 mg daily - f/u in stroke clinic in 4 weeks per neurology  pt with UE spasticity: MRI of C spine: Significantly motion limited study. No gross cord compression or spinal cord edema demonstrated. Multilevel small disc bulges. - continue baclofen 5mg bid  behavioral health eval appreciated: pt does not have the capacity to choose a Health care agent  -clinically patient is not following verbal commands, persistent left sided paralysis, dysarthria , oropharyngeal dysphagia , patient is in chronic bed confinement status.    # Seizures- 3/20- patient had gaze deviation to the left- resolved, Neuro f/up rec- increase Keppra dose tx.   - if patient will have recurrent neuro event -consider repeating EEG    # New onset fevers on 3/20 at night and 3/21 in am: Maybe 2/2   # Unstagable sacral wound - Per Burn eval: [facility] only.  3/20 procal = 0.05   Per ID, Cefepime (ends 3/24)  -repeated CXR- personally reviewed, no acute infiltrates, effusions. f/up blood cxs, obtain u/a, urine cxs,     #Left avulsion fracture of medial malleolus  evaluated by podiatry - no surgical intervention - WB with CAM boot  continue PT/fall precautions, pain control    # Sinus tachycardia   now resolved - attributed to pain  EKG reviewed, TSH WNL    #Left cephalic vein thrombosis - s/p warm compress    # Anemia - normocytic  labs reviewed - Hgb in 9-10 range and now stable    # DVT prophylaxis    full code, overall patient's prognosis guarded   Family discussion: yes, medical team     #Progress Note Handoff: fever, f/up septic work up, feed with assistance, maintain aspiration precautions   able to answer questions. move RUE. Has a gaze preference.   MEDICALLY READY. Pending SNf .

## 2023-03-25 NOTE — PROGRESS NOTE ADULT - SUBJECTIVE AND OBJECTIVE BOX
S: No new events/complatins      All other pertinent ROS negative.      03-24-23 @ 07:01  -  03-25-23 @ 07:00  --------------------------------------------------------  IN: 0 mL / OUT: 850 mL / NET: -850 mL      Vital Signs Last 24 Hrs  T(C): 36.1 (25 Mar 2023 05:10), Max: 36.7 (24 Mar 2023 19:45)  T(F): 97 (25 Mar 2023 05:10), Max: 98.1 (24 Mar 2023 19:45)  HR: 85 (25 Mar 2023 05:10) (83 - 90)  BP: 111/84 (25 Mar 2023 05:10) (111/84 - 130/69)  BP(mean): --  RR: 18 (25 Mar 2023 05:10) (16 - 18)  SpO2: 96% (25 Mar 2023 05:10) (95% - 96%)    Parameters below as of 25 Mar 2023 05:10  Patient On (Oxygen Delivery Method): room air      PHYSICAL EXAM:    Constitutional: gaze preference,   HEENT: PERR, EOMI, Normal Hearing, MMM  Neck: Soft and supple, No LAD, No JVD  Respiratory: Breath sounds are clear bilaterally, No wheezing, rales or rhonchi  Cardiovascular: S1 and S2, regular rate and rhythm, no Murmurs, gallops or rubs  Gastrointestinal: Bowel Sounds present, soft, nontender, nondistended, no guarding, no rebound  Extremities: No peripheral edema  Neuro: RUE:3/5, LUE:2/5, LLE:2/5, RLE:3/5      MEDICATIONS:  MEDICATIONS  (STANDING):  acetaminophen  Suppository .. 325 milliGRAM(s) Rectal once  ascorbic acid 500 milliGRAM(s) Oral daily  aspirin  chewable 81 milliGRAM(s) Oral daily  atorvastatin 40 milliGRAM(s) Oral at bedtime  baclofen 5 milliGRAM(s) Oral every 12 hours  clopidogrel Tablet 75 milliGRAM(s) Oral daily  enoxaparin Injectable 40 milliGRAM(s) SubCutaneous every 24 hours  gabapentin 100 milliGRAM(s) Oral three times a day  levETIRAcetam 1500 milliGRAM(s) Oral two times a day  midodrine. 10 milliGRAM(s) Oral three times a day  multivitamin/minerals 1 Tablet(s) Oral daily  valproic  acid Syrup 500 milliGRAM(s) Oral three times a day  vitamin A &amp; D Ointment 1 Application(s) Topical two times a day      LABS: All Labs Reviewed:                        8.5    9.14  )-----------( 396      ( 24 Mar 2023 20:51 )             25.9     03-24    140  |  104  |  6<L>  ----------------------------<  105<H>  3.8   |  26  |  <0.5<L>    Ca    8.7      24 Mar 2023 20:51  Mg     2.1     03-24    TPro  5.4<L>  /  Alb  2.4<L>  /  TBili  <0.2  /  DBili  x   /  AST  12  /  ALT  6   /  AlkPhos  73  03-24          Blood Culture: 03-21 @ 11:57  Organism --  Gram Stain Blood -- Gram Stain --  Specimen Source Catheterized Catheterized  Culture-Blood --    03-20 @ 22:07  Organism --  Gram Stain Blood -- Gram Stain --  Specimen Source .Blood Blood  Culture-Blood --        Radiology: reviewed

## 2023-03-26 LAB
ALBUMIN SERPL ELPH-MCNC: 2.2 G/DL — LOW (ref 3.5–5.2)
ALP SERPL-CCNC: 67 U/L — SIGNIFICANT CHANGE UP (ref 30–115)
ALT FLD-CCNC: <5 U/L — SIGNIFICANT CHANGE UP (ref 0–41)
ANION GAP SERPL CALC-SCNC: 11 MMOL/L — SIGNIFICANT CHANGE UP (ref 7–14)
AST SERPL-CCNC: 12 U/L — SIGNIFICANT CHANGE UP (ref 0–41)
BASOPHILS # BLD AUTO: 0.03 K/UL — SIGNIFICANT CHANGE UP (ref 0–0.2)
BASOPHILS NFR BLD AUTO: 0.3 % — SIGNIFICANT CHANGE UP (ref 0–1)
BILIRUB SERPL-MCNC: <0.2 MG/DL — SIGNIFICANT CHANGE UP (ref 0.2–1.2)
BUN SERPL-MCNC: 7 MG/DL — LOW (ref 10–20)
CALCIUM SERPL-MCNC: 8.4 MG/DL — SIGNIFICANT CHANGE UP (ref 8.4–10.4)
CHLORIDE SERPL-SCNC: 102 MMOL/L — SIGNIFICANT CHANGE UP (ref 98–110)
CO2 SERPL-SCNC: 27 MMOL/L — SIGNIFICANT CHANGE UP (ref 17–32)
CREAT SERPL-MCNC: <0.5 MG/DL — LOW (ref 0.7–1.5)
CULTURE RESULTS: SIGNIFICANT CHANGE UP
EGFR: 117 ML/MIN/1.73M2 — SIGNIFICANT CHANGE UP
EOSINOPHIL # BLD AUTO: 0.16 K/UL — SIGNIFICANT CHANGE UP (ref 0–0.7)
EOSINOPHIL NFR BLD AUTO: 1.6 % — SIGNIFICANT CHANGE UP (ref 0–8)
GLUCOSE SERPL-MCNC: 84 MG/DL — SIGNIFICANT CHANGE UP (ref 70–99)
HCT VFR BLD CALC: 25.1 % — LOW (ref 37–47)
HGB BLD-MCNC: 8 G/DL — LOW (ref 12–16)
IMM GRANULOCYTES NFR BLD AUTO: 1.2 % — HIGH (ref 0.1–0.3)
LYMPHOCYTES # BLD AUTO: 2.57 K/UL — SIGNIFICANT CHANGE UP (ref 1.2–3.4)
LYMPHOCYTES # BLD AUTO: 25.5 % — SIGNIFICANT CHANGE UP (ref 20.5–51.1)
MAGNESIUM SERPL-MCNC: 2.1 MG/DL — SIGNIFICANT CHANGE UP (ref 1.8–2.4)
MCHC RBC-ENTMCNC: 28.6 PG — SIGNIFICANT CHANGE UP (ref 27–31)
MCHC RBC-ENTMCNC: 31.9 G/DL — LOW (ref 32–37)
MCV RBC AUTO: 89.6 FL — SIGNIFICANT CHANGE UP (ref 81–99)
MONOCYTES # BLD AUTO: 1.1 K/UL — HIGH (ref 0.1–0.6)
MONOCYTES NFR BLD AUTO: 10.9 % — HIGH (ref 1.7–9.3)
NEUTROPHILS # BLD AUTO: 6.1 K/UL — SIGNIFICANT CHANGE UP (ref 1.4–6.5)
NEUTROPHILS NFR BLD AUTO: 60.5 % — SIGNIFICANT CHANGE UP (ref 42.2–75.2)
NRBC # BLD: 0 /100 WBCS — SIGNIFICANT CHANGE UP (ref 0–0)
PLATELET # BLD AUTO: 424 K/UL — HIGH (ref 130–400)
POTASSIUM SERPL-MCNC: 3.9 MMOL/L — SIGNIFICANT CHANGE UP (ref 3.5–5)
POTASSIUM SERPL-SCNC: 3.9 MMOL/L — SIGNIFICANT CHANGE UP (ref 3.5–5)
PROT SERPL-MCNC: 5 G/DL — LOW (ref 6–8)
RBC # BLD: 2.8 M/UL — LOW (ref 4.2–5.4)
RBC # FLD: 13.6 % — SIGNIFICANT CHANGE UP (ref 11.5–14.5)
SODIUM SERPL-SCNC: 140 MMOL/L — SIGNIFICANT CHANGE UP (ref 135–146)
SPECIMEN SOURCE: SIGNIFICANT CHANGE UP
WBC # BLD: 10.08 K/UL — SIGNIFICANT CHANGE UP (ref 4.8–10.8)
WBC # FLD AUTO: 10.08 K/UL — SIGNIFICANT CHANGE UP (ref 4.8–10.8)

## 2023-03-26 PROCEDURE — 99231 SBSQ HOSP IP/OBS SF/LOW 25: CPT

## 2023-03-26 RX ORDER — LEVETIRACETAM 250 MG/1
2 TABLET, FILM COATED ORAL
Qty: 0 | Refills: 0 | DISCHARGE
Start: 2023-03-26

## 2023-03-26 RX ORDER — MIDODRINE HYDROCHLORIDE 2.5 MG/1
1 TABLET ORAL
Qty: 0 | Refills: 0 | DISCHARGE
Start: 2023-03-26

## 2023-03-26 RX ORDER — OXYCODONE AND ACETAMINOPHEN 5; 325 MG/1; MG/1
1 TABLET ORAL
Qty: 0 | Refills: 0 | DISCHARGE
Start: 2023-03-26

## 2023-03-26 RX ORDER — GABAPENTIN 400 MG/1
1 CAPSULE ORAL
Qty: 0 | Refills: 0 | DISCHARGE
Start: 2023-03-26

## 2023-03-26 RX ADMIN — Medication 500 MILLIGRAM(S): at 05:08

## 2023-03-26 RX ADMIN — Medication 5 MILLIGRAM(S): at 05:08

## 2023-03-26 RX ADMIN — LEVETIRACETAM 1500 MILLIGRAM(S): 250 TABLET, FILM COATED ORAL at 17:13

## 2023-03-26 RX ADMIN — MIDODRINE HYDROCHLORIDE 10 MILLIGRAM(S): 2.5 TABLET ORAL at 11:11

## 2023-03-26 RX ADMIN — Medication 1 TABLET(S): at 11:12

## 2023-03-26 RX ADMIN — Medication 500 MILLIGRAM(S): at 21:25

## 2023-03-26 RX ADMIN — Medication 1 APPLICATION(S): at 05:08

## 2023-03-26 RX ADMIN — LEVETIRACETAM 1500 MILLIGRAM(S): 250 TABLET, FILM COATED ORAL at 05:08

## 2023-03-26 RX ADMIN — GABAPENTIN 100 MILLIGRAM(S): 400 CAPSULE ORAL at 13:17

## 2023-03-26 RX ADMIN — ENOXAPARIN SODIUM 40 MILLIGRAM(S): 100 INJECTION SUBCUTANEOUS at 21:25

## 2023-03-26 RX ADMIN — Medication 5 MILLIGRAM(S): at 17:18

## 2023-03-26 RX ADMIN — GABAPENTIN 100 MILLIGRAM(S): 400 CAPSULE ORAL at 05:08

## 2023-03-26 RX ADMIN — Medication 81 MILLIGRAM(S): at 11:11

## 2023-03-26 RX ADMIN — CLOPIDOGREL BISULFATE 75 MILLIGRAM(S): 75 TABLET, FILM COATED ORAL at 11:12

## 2023-03-26 RX ADMIN — GABAPENTIN 100 MILLIGRAM(S): 400 CAPSULE ORAL at 21:25

## 2023-03-26 RX ADMIN — Medication 1 APPLICATION(S): at 17:18

## 2023-03-26 RX ADMIN — Medication 500 MILLIGRAM(S): at 11:10

## 2023-03-26 RX ADMIN — ATORVASTATIN CALCIUM 40 MILLIGRAM(S): 80 TABLET, FILM COATED ORAL at 21:24

## 2023-03-26 RX ADMIN — MIDODRINE HYDROCHLORIDE 10 MILLIGRAM(S): 2.5 TABLET ORAL at 05:08

## 2023-03-26 RX ADMIN — Medication 500 MILLIGRAM(S): at 13:15

## 2023-03-26 NOTE — DISCHARGE NOTE PROVIDER - HOSPITAL COURSE
CC: Altered Mental Status likely due to Metabolic Encephalopathy from UTI, sepsis and seizure in the context of stroke with left hemiparesis    HOSPITAL COURSE:  67F. PMH: Seizures, Rt MCA Stroke, R Frontal lobe stroke (3/2023)  BIBEMS 3/3 after being found on the street, daughter reported pt c/o generalized pain, had FS of 96. No family present at admission/no contact information. Pt reported feeling "okay". In ED, hemodynamically stable with Fever. Labs: WBC 19K, elevated lactate, UA +ve She was pan-scanned. no evidence of new stroke, trauma, or infection. CTH showed old stroke in the territory of the Rt MCA.  Neuro eval'd 3/8-3/16: For seizure/stroke 3/8: L gaze preference, OP med review: on low dose Lamictal and Depakote in addition to Keppra (possible mood disorder/ seizures), MRi brain (+) acute subcortical infarct within same M1 territory. The cause of stroke is most likely hypotension as she has a diminutive Rt M1. However, MRI findings doesn't explain her B/L upper extremity spasticity. So, it is important to rule. vEEG 3/9: showed focal slowing. DAPT for 21 days followed by ASA 81 mg daily; High intensity statin, Can f/u in stroke clinic in 4 weeks following discharge.  Podiatry following for foot pain 2/ foot drop- found to have L foot avulsion fracture: WBAT w/ surgical shoe, ankle brace, PT  BH eval'd 3/15: Delirium  Pt was treated for UTI, placed on appropriate medical management for stroke. 3/20 pt was noted to be less responsive and have forced gaze during her speech evaluation. Concern for seizure. Neurology was recalled- keppra was increased. Pt made NPO, and AEDs made IV. Overnight pt was noted to be febrile, and again hypotensive requiring 2L fluid bolus, despite maintenance fluids. Septic workup sent. Leukocytosis noted, BCx NGTD, CXR looks clear.  Decubitus ulcer noted on sacrum, burn consulted - no intervention, local wound care. Id consulted for abx recs: empiric cefepime x5d    #Seizures  #Acute R Frontal Lobe Infarct likely 2/ hypotension   #Chronic R MCA infarct  - per chart: at baseline she is alert, able to communicate and is oriented. Ambulation at baseline: uses a wheelchair. She was in a hospital in [facility] last fall and was lethargic for months  - 3/8: s/p rapid response for L gaze preference  - 3/20: Pt had event with L gaze preference during SS eval, pt made NPO. Examined by resident, no gaze preference present any longer. Pt responsive but less than baseline. Developed seizures post-event. Septic workup obtained. CXR appears clear, rest pending. --> 3/21 Pt more responsive this AM, expressing herself and in line with current baseline.  *****   - Depakote level (3/4) 87 (WNL) -> (3/21) 74 (WNL)   - CTH/CTA of head/neck (3/4): no acute pathology, no evidence of occlusion, aneurysm or stenosis  - EEG (3/8) and vEEG (3/9): Focal slowing  - MRI Brain (3/15): new subcortical infarct in the right frontal lobe. Redemonstrated chronic infarct in the right MCA territory.  - MRI of C spine (3/16): Significantly motion limited study. No gross cord compression or spinal cord edema demonstrated. Multilevel small disc bulges.  *****  - Neuro eval'd 3/8-3/20: For L gaze preference/stroke, OP med review: on low dose Lamictal and Depakote in addition to Keppra (possible mood disorder/ seizures), Her MRI of brain showing subcortical infarct within same M1 territory. The casue of stroke is most likely hypotension as she has a diminutive Rt M1. However, MRI findings doesn't explain her B/L upper extremity spasticity. So, it is important to rule out cervical cord pathology. DAPT for 21 days followed by ASA 81 mg daily, Increased keppra post 3/20 possible seizure event  - BH eval'd 3/15: Delirium; pt does not have the capacity to choose a Health care agent  On Discharge:  - SS re-juliette 3/21: Minced and moist, 1:1 feeds, thin liquids through straw  - C/w Depakote 500mg TID and Keppra to 1500mg BID   - C/w baclofen 5mg bid (for UE spasticity)  - C/w ASA, Plavix (3/17-4/6), and high dose statin for new stroke - DAPT for 21 days followed by ASA 81 mg daily  - C/w Midodrine 10mg TID  *****  - OP stroke clinic in 4 weeks per neurology    #Metabolic Encephalopathy from UTI  #Febrile 3/20  #Unstagable Sacral Ulcer  - 3/20: Pt became febrile after possible seizure episode, Cefepime, levaquin (x1 3/20), vancomycin ON  *****  - Septic workup (3/20): WBC (3/21) 12.26 (up from 8.23), BCx (3/20) NGTD, Procal (3/20) 0.05, RVP (3/20) COVID (-), Lactate (3/21) WNL, UA nitrite (-), LEC (-), UCx (3/21) NGTD  - Duplex LUE (3/10): (-) DVT (+) superficial cephalic vein thrombosis  - Duplex BLLE (3/7): (-) DVT, (3/23-rpt): (-) DVT  - s/p Vanc (3/4x1, 3/20-3/22), Cefepime (x1 3/4, x1 3/8, 3/20-3/24 (Empiric 5d course per ID)), Ceftriaxone (3/4-3/6) (for UTI)  *****  - ID eval'd 3/23  - Burn eval'd 3/22 - No intervention, wound care recs: wash with soap and water, apply hydrogel, cover with allevyn pad    #Left avulsion fracture of medial malleolus  - Duplex BL LE (3/7): (-) for DVT  - CT Foot (3/11): acute minimally displaced avulsion fracture at the medial malleolus  *****  - Podiatry following for foot pain: WBAT w/ surgical shoe, ankle brace, PT  On Discharge  - C/w gabapentin home dose    #Sinus tachycardia - attributed to pain - now resolved   - TSH (3/8) WNL, - EKG reviewed    #Left cephalic vein thrombosis  - Duplex LUE (3/10): No DVT however there is superficial thrombosis noted in the left cephalic vein  - s/p warm compress    #Anemia - normocytic - stable  - possibly due to frequent phlebotomy,    CC: Altered Mental Status likely due to Metabolic Encephalopathy from UTI, sepsis and seizure in the context of stroke with left hemiparesis    HOSPITAL COURSE:  67F. PMH: Seizures, Rt MCA Stroke, R Frontal lobe stroke (3/2023)  BIBEMS 3/3 after being found on the street, daughter reported pt c/o generalized pain, had FS of 96. No family present at admission/no contact information. Pt reported feeling "okay". In ED, hemodynamically stable with Fever. Labs: WBC 19K, elevated lactate, UA +ve She was pan-scanned. no evidence of new stroke, trauma, or infection. CTH showed old stroke in the territory of the Rt MCA.  Neuro eval'd 3/8-3/16: For seizure/stroke 3/8: L gaze preference, OP med review: on low dose Lamictal and Depakote in addition to Keppra (possible mood disorder/ seizures), MRi brain (+) acute subcortical infarct within same M1 territory. The cause of stroke is most likely hypotension as she has a diminutive Rt M1. However, MRI findings doesn't explain her B/L upper extremity spasticity. So, it is important to rule. vEEG 3/9: showed focal slowing. DAPT for 21 days followed by ASA 81 mg daily; High intensity statin, Can f/u in stroke clinic in 4 weeks following discharge.  Podiatry following for foot pain 2/ foot drop- found to have L foot avulsion fracture: WBAT w/ surgical shoe, ankle brace, PT  BH eval'd 3/15: Delirium  Pt was treated for UTI, placed on appropriate medical management for stroke. 3/20 pt was noted to be less responsive and have forced gaze during her speech evaluation. Concern for seizure. Neurology was recalled- keppra was increased. Pt made NPO, and AEDs made IV. Overnight pt was noted to be febrile, and again hypotensive requiring 2L fluid bolus, despite maintenance fluids. Septic workup sent. Leukocytosis noted, BCx NGTD, CXR looks clear.  Decubitus ulcer noted on sacrum, burn consulted - no intervention, local wound care. Id consulted for abx recs: empiric cefepime x5d    #Seizures  #Acute R Frontal Lobe Infarct likely 2/ hypotension   #Chronic R MCA infarct  - per chart: at baseline she is alert, able to communicate and is oriented. Ambulation at baseline: uses a wheelchair. She was in a hospital in [facility] last fall and was lethargic for months  - 3/8: s/p rapid response for L gaze preference  - 3/20: Pt had event with L gaze preference during SS eval, pt made NPO. Examined by resident, no gaze preference present any longer. Pt responsive but less than baseline. Developed seizures post-event. Septic workup obtained. CXR appears clear, rest pending. --> 3/21 Pt more responsive this AM, expressing herself and in line with current baseline.  *****   - Depakote level (3/4) 87 (WNL) -> (3/21) 74 (WNL)   - CTH/CTA of head/neck (3/4): no acute pathology, no evidence of occlusion, aneurysm or stenosis  - EEG (3/8) and vEEG (3/9): Focal slowing  - MRI Brain (3/15): new subcortical infarct in the right frontal lobe. Redemonstrated chronic infarct in the right MCA territory.  - MRI of C spine (3/16): Significantly motion limited study. No gross cord compression or spinal cord edema demonstrated. Multilevel small disc bulges.  *****  - Neuro eval'd 3/8-3/20: For L gaze preference/stroke, OP med review: on low dose Lamictal and Depakote in addition to Keppra (possible mood disorder/ seizures), Her MRI of brain showing subcortical infarct within same M1 territory. The casue of stroke is most likely hypotension as she has a diminutive Rt M1. However, MRI findings doesn't explain her B/L upper extremity spasticity. So, it is important to rule out cervical cord pathology. DAPT for 21 days followed by ASA 81 mg daily, Increased keppra post 3/20 possible seizure event  - BH eval'd 3/15: Delirium; pt does not have the capacity to choose a Health care agent  On Discharge:  - SS re-juliette 3/21: Minced and moist, 1:1 feeds, thin liquids through straw  - C/w Depakote 500mg TID and Keppra to 1500mg BID   - C/w baclofen 5mg bid (for UE spasticity)  - C/w ASA, Plavix (3/17-4/6), and high dose statin for new stroke - DAPT for 21 days followed by ASA 81 mg daily  - C/w Midodrine 10mg TID  *****  - OP stroke clinic in 4 weeks per neurology    #Metabolic Encephalopathy from UTI  #Febrile 3/20  #Unstagable Sacral Ulcer  - 3/20: Pt became febrile after possible seizure episode, Cefepime, levaquin (x1 3/20), vancomycin ON  *****  - Septic workup (3/20): WBC (3/21) 12.26 (up from 8.23), BCx (3/20) NGTD, Procal (3/20) 0.05, RVP (3/20) COVID (-), Lactate (3/21) WNL, UA nitrite (-), LEC (-), UCx (3/21) NGTD  - Duplex LUE (3/10): (-) DVT (+) superficial cephalic vein thrombosis  - Duplex BLLE (3/7): (-) DVT, (3/23-rpt): (-) DVT  - s/p Vanc (3/4x1, 3/20-3/22), Cefepime (x1 3/4, x1 3/8, 3/20-3/24 (Empiric 5d course per ID)), Ceftriaxone (3/4-3/6) (for UTI)  *****  - ID eval'd 3/23  - Burn eval'd 3/22 - No intervention, wound care recs: wash with soap and water, apply hydrogel, cover with allevyn pad    #Left avulsion fracture of medial malleolus  - Duplex BL LE (3/7): (-) for DVT  - CT Foot (3/11): acute minimally displaced avulsion fracture at the medial malleolus  *****  - Podiatry following for foot pain: WBAT w/ surgical shoe, ankle brace, PT  On Discharge  - C/w gabapentin home dose    #Sinus tachycardia - attributed to pain - now resolved   - TSH (3/8) WNL, - EKG reviewed    #Left cephalic vein thrombosis  - Duplex LUE (3/10): No DVT however there is superficial thrombosis noted in the left cephalic vein  - s/p warm compress    #Anemia - normocytic - stable  - possibly due to frequent phlebotomy,    CC: Altered Mental Status likely due to Metabolic Encephalopathy from UTI, sepsis and seizure in the context of stroke with left hemiparesis    HOSPITAL COURSE:  67F. PMH: Seizures, Rt MCA Stroke, R Frontal lobe stroke (3/2023)  BIBEMS 3/3 after being found on the street, daughter reported pt c/o generalized pain, had FS of 96. No family present at admission/no contact information. Pt reported feeling "okay". In ED, hemodynamically stable with Fever. Labs: WBC 19K, elevated lactate, UA +ve She was pan-scanned. no evidence of new stroke, trauma, or infection. CTH showed old stroke in the territory of the Rt MCA.  Neuro eval'd 3/8-3/16: For seizure/stroke 3/8: L gaze preference, OP med review: on low dose Lamictal and Depakote in addition to Keppra (possible mood disorder/ seizures), MRi brain (+) acute subcortical infarct within same M1 territory. The cause of stroke is most likely hypotension as she has a diminutive Rt M1. However, MRI findings doesn't explain her B/L upper extremity spasticity. So, it is important to rule. vEEG 3/9: showed focal slowing. DAPT for 21 days followed by ASA 81 mg daily; High intensity statin, Can f/u in stroke clinic in 4 weeks following discharge.  Podiatry following for foot pain 2/ foot drop- found to have L foot avulsion fracture: WBAT w/ surgical shoe, ankle brace, PT  BH eval'd 3/15: Delirium  Pt was treated for UTI, placed on appropriate medical management for stroke. 3/20 pt was noted to be less responsive and have forced gaze during her speech evaluation. Concern for seizure. Neurology was recalled- keppra was increased. Pt made NPO, and AEDs made IV. Overnight pt was noted to be febrile, and again hypotensive requiring 2L fluid bolus, despite maintenance fluids. Septic workup sent. Leukocytosis noted, BCx NGTD, CXR looks clear.  Decubitus ulcer noted on sacrum, burn consulted - no intervention, local wound care. Id consulted for abx recs: empiric cefepime x5d    #Seizures  #Acute R Frontal Lobe Infarct likely 2/ hypotension   #Chronic R MCA infarct  - per chart: at baseline she is alert, able to communicate and is oriented. Ambulation at baseline: uses a wheelchair. She was in a hospital in [facility] last fall and was lethargic for months  - 3/8: s/p rapid response for L gaze preference  - 3/20: Pt had event with L gaze preference during SS eval, pt made NPO. Examined by resident, no gaze preference present any longer. Pt responsive but less than baseline. Developed seizures post-event. Septic workup obtained. CXR appears clear, rest pending. --> 3/21 Pt more responsive this AM, expressing herself and in line with current baseline.  *****   - Depakote level (3/4) 87 (WNL) -> (3/21) 74 (WNL)   - CTH/CTA of head/neck (3/4): no acute pathology, no evidence of occlusion, aneurysm or stenosis  - EEG (3/8) and vEEG (3/9): Focal slowing  - MRI Brain (3/15): new subcortical infarct in the right frontal lobe. Redemonstrated chronic infarct in the right MCA territory.  - MRI of C spine (3/16): Significantly motion limited study. No gross cord compression or spinal cord edema demonstrated. Multilevel small disc bulges.  *****  - Neuro eval'd 3/8-3/20: For L gaze preference/stroke, OP med review: on low dose Lamictal and Depakote in addition to Keppra (possible mood disorder/ seizures), Her MRI of brain showing subcortical infarct within same M1 territory. The casue of stroke is most likely hypotension as she has a diminutive Rt M1. However, MRI findings doesn't explain her B/L upper extremity spasticity. So, it is important to rule out cervical cord pathology. DAPT for 21 days followed by ASA 81 mg daily, Increased keppra post 3/20 possible seizure event  - BH eval'd 3/15: Delirium; pt does not have the capacity to choose a Health care agent  On Discharge:  - SS re-juliette 3/21: Minced and moist, 1:1 feeds, thin liquids through straw  - C/w Depakote 500mg TID and Keppra to 1500mg BID   - C/w baclofen 5mg bid (for UE spasticity)  - C/w ASA, Plavix (3/17-4/6), and high dose statin for new stroke - DAPT for 21 days followed by ASA 81 mg daily  - C/w Midodrine 10mg TID  *****  - OP stroke clinic in 4 weeks per neurology    #Metabolic Encephalopathy from UTI  #Febrile 3/20  #Unstagable Sacral Ulcer  - 3/20: Pt became febrile after possible seizure episode, Cefepime, levaquin (x1 3/20), vancomycin ON  *****  - Septic workup (3/20): WBC (3/21) 12.26 (up from 8.23), BCx (3/20) NGTD, Procal (3/20) 0.05, RVP (3/20) COVID (-), Lactate (3/21) WNL, UA nitrite (-), LEC (-), UCx (3/21) NGTD  - Duplex LUE (3/10): (-) DVT (+) superficial cephalic vein thrombosis  - Duplex BLLE (3/7): (-) DVT, (3/23-rpt): (-) DVT  - s/p Vanc (3/4x1, 3/20-3/22), Cefepime (x1 3/4, x1 3/8, 3/20-3/24 (Empiric 5d course per ID)), Ceftriaxone (3/4-3/6) (for UTI)  *****  - ID eval'd 3/23  - Burn eval'd 3/22 - No intervention, wound care recs: wash with soap and water, apply hydrogel, cover with allevyn pad    #Febrile again 3/26  - BCX NGTD, UA neg, no leukocytosis  - ID recs appreciated, monitor off ABx  - RVP - pending   - No fever now for 24hrs    #Left avulsion fracture of medial malleolus  - Duplex BL LE (3/7): (-) for DVT  - CT Foot (3/11): acute minimally displaced avulsion fracture at the medial malleolus  *****  - Podiatry following for foot pain: WBAT w/ surgical shoe, ankle brace, PT  On Discharge  - C/w gabapentin home dose    #Sinus tachycardia - attributed to pain - now resolved   - TSH (3/8) WNL, - EKG reviewed    #Left cephalic vein thrombosis  - Duplex LUE (3/10): No DVT however there is superficial thrombosis noted in the left cephalic vein  - s/p warm compress    #Anemia - normocytic - stable  - possibly due to frequent phlebotomy,    CC: Altered Mental Status likely due to Metabolic Encephalopathy from UTI, sepsis and seizure in the context of stroke with left hemiparesis    HOSPITAL COURSE:  67F. PMH: Seizures, Rt MCA Stroke, R Frontal lobe stroke (3/2023)  BIBEMS 3/3 after being found on the street, daughter reported pt c/o generalized pain, had FS of 96. No family present at admission/no contact information. Pt reported feeling "okay". In ED, hemodynamically stable with Fever. Labs: WBC 19K, elevated lactate, UA +ve She was pan-scanned. no evidence of new stroke, trauma, or infection. CTH showed old stroke in the territory of the Rt MCA.  Neuro eval'd 3/8-3/16: For seizure/stroke 3/8: L gaze preference, OP med review: on low dose Lamictal and Depakote in addition to Keppra (possible mood disorder/ seizures), MRi brain (+) acute subcortical infarct within same M1 territory. The cause of stroke is most likely hypotension as she has a diminutive Rt M1. However, MRI findings doesn't explain her B/L upper extremity spasticity. So, it is important to rule. vEEG 3/9: showed focal slowing. DAPT for 21 days followed by ASA 81 mg daily; High intensity statin, Can f/u in stroke clinic in 4 weeks following discharge.  Podiatry following for foot pain 2/ foot drop- found to have L foot avulsion fracture: WBAT w/ surgical shoe, ankle brace, PT  BH eval'd 3/15: Delirium  Pt was treated for UTI, placed on appropriate medical management for stroke. 3/20 pt was noted to be less responsive and have forced gaze during her speech evaluation. Concern for seizure. Neurology was recalled- keppra was increased. Pt made NPO, and AEDs made IV. Overnight pt was noted to be febrile, and again hypotensive requiring 2L fluid bolus, despite maintenance fluids. Septic workup sent. Leukocytosis noted, BCx NGTD, CXR looks clear.  Decubitus ulcer noted on sacrum, burn consulted - no intervention, local wound care. Id consulted for abx recs: empiric cefepime x5d    68 y/o woman with PMH of Right MCA stroke with left sided weakness was brought to the ED via EMS. In the ED, she had altered mental status and sepsis.    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 No acute intra-abdominal pathology. Unchanged parapelvic cysts bilaterally with a 4 mm stone in the left mid ureter without evidence of obstruction.  - CXR: no new infiltrates  - procal level 0.14  - UA +LE, 14 WBC, few bacteria, epithelial cells 21  - Ucx normal urogenital cesar  - Bcx ngtd  - ID: PO levaquin 500mg daily and PO flagyl 500mg TID and PO doxy 100mg BID to complete 21 days of therapy end 4/24    #Seizures  #Acute R Frontal Lobe Infarct likely 2/2 hypotension   #Chronic R MCA infarct- chronic bed confinement status, dysarthria- answers on/off with 1 word yes, no.    - EEG (3/8) and video EEG (3/9): Focal slowing  - MRI Brain (3/15): new subcortical infarct in the right frontal lobe. Re-demonstrated chronic infarct in the right MCA territory.  - MRI of C spine (3/16): Significantly motion limited study. No gross cord compression or spinal cord edema demonstrated. Multilevel small disc bulges.  - Neuro eval'd 3/8-3/20: For L gaze preference/stroke, OP med review: on low dose Lamictal and Depakote in addition to Keppra (possible mood disorder/ seizures), Her MRI of brain showing subcortical infarct within same M1 territory. The cause of stroke is most likely hypotension as she has a diminutive Rt M1. However, MRI findings doesn't explain her B/L upper extremity spasticity. So, it is important to rule out cervical cord pathology. DAPT for 21 days followed by ASA 81 mg daily, Increased keppra post 3/20 possible seizure event  - SS re-juliette 3/21: Minced and moist, 1:1 feeds, thin liquids through straw  - c/w Depakote 500mg TID and Keppra 1500mg BID   - c/w baclofen 5mg bid (for UE spasticity)  - c/w ASA and high dose statin for new stroke. completed course of plavix (3/17-4/6)  - OP stroke clinic in 4 weeks after d/c per neurology    #Persistent Hypotension   - c/w Midodrine 10mg TID    #Left avulsion fracture of medial malleolus  - Duplex BL LE (3/7): (-) for DVT  - CT Foot (3/11): acute minimally displaced avulsion fracture at the medial malleolus  - Podiatry following for foot pain: WBAT w/ surgical shoe, ankle brace, PT  - fall precautions  - pain control regimen  - c/w gabapentin at 100mg TID (Lowered dose from home, increase as pt tolerates)  - Podiatry f/u, removed left foot immobilizer     #Left cephalic vein thrombosis  - Duplex LUE (3/10): No DVT however there is superficial thrombosis noted in the left cephalic vein  - Repeat duplex ordered 3/27 - negative for UE and LE DVT    #Normocytic anemia   - hb dropped on 4/13, s/p 1 unit PRBC, current HG stable   - possibly due to frequent phlebotomy, no signs of active bleed    pt is medically stable for discharge

## 2023-03-26 NOTE — DISCHARGE NOTE PROVIDER - PROVIDER TOKENS
PROVIDER:[TOKEN:[[id number]],FOLLOWUP:[1 week]],PROVIDER:[TOKEN:[[id number]],FOLLOWUP:[2 weeks]]

## 2023-03-26 NOTE — DISCHARGE NOTE PROVIDER - NSDCMRMEDTOKEN_GEN_ALL_CORE_FT
acetaminophen 325 mg oral tablet: 2 tab(s) orally every 6 hours, As needed, Moderate Pain (4 - 6)  ascorbic acid 500 mg oral tablet: 1 tab(s) orally once a day  aspirin 81 mg oral tablet, chewable: 1 tab(s) orally once a day  atorvastatin 40 mg oral tablet: 1 tab(s) orally once a day (at bedtime)  baclofen 5 mg oral tablet: 1 tab(s) orally every 12 hours  clopidogrel 75 mg oral tablet: 1 tab(s) orally once a day x 18 more days and then stop  enoxaparin: 40 unit(s) subcutaneous once a day  levETIRAcetam 100 mg/mL oral solution: 10 milliliter(s) orally 2 times a day  morphine 15 mg oral tablet: 1 tab(s) orally every 6 hours, As needed, Severe Pain (7 - 10)  Multiple Vitamins with Minerals oral tablet: 1 tab(s) orally once a day  pantoprazole 40 mg oral delayed release tablet: 1 tab(s) orally once a day  valproic acid 250 mg/5 mL oral liquid: 500 milligram(s) orally 3 times a day  vitamin A and D topical ointment: 1 application topically 2 times a day   acetaminophen 325 mg oral tablet: 2 tab(s) orally every 6 hours, As needed, Moderate Pain (4 - 6)  ascorbic acid 500 mg oral tablet: 1 tab(s) orally once a day  aspirin 81 mg oral tablet, chewable: 1 tab(s) orally once a day  atorvastatin 40 mg oral tablet: 1 tab(s) orally once a day (at bedtime)  baclofen 5 mg oral tablet: 1 tab(s) orally every 12 hours  clopidogrel 75 mg oral tablet: 1 tab(s) orally once a day x 18 more days and then stop  enoxaparin: 40 unit(s) subcutaneous once a day  gabapentin 100 mg oral capsule: 1 cap(s) orally 3 times a day  levETIRAcetam 750 mg oral tablet: 2 tab(s) orally 2 times a day  midodrine 10 mg oral tablet: 1 tab(s) orally 3 times a day Hold if Systolic BP &gt;100  Multiple Vitamins with Minerals oral tablet: 1 tab(s) orally once a day  oxycodone-acetaminophen 5 mg-325 mg oral tablet: 1 tab(s) orally every 6 hours As needed Moderate Pain (4 - 6)  pantoprazole 40 mg oral delayed release tablet: 1 tab(s) orally once a day  valproic acid 250 mg/5 mL oral liquid: 500 milligram(s) orally 3 times a day  vitamin A and D topical ointment: 1 application topically 2 times a day   acetaminophen 325 mg oral tablet: 2 tab(s) orally every 6 hours, As needed, Moderate Pain (4 - 6)  ascorbic acid 500 mg oral tablet: 1 tab(s) orally once a day  aspirin 81 mg oral tablet, chewable: 1 tab(s) orally once a day  atorvastatin 40 mg oral tablet: 1 tab(s) orally once a day (at bedtime)  baclofen 5 mg oral tablet: 1 tab(s) orally every 12 hours  clopidogrel 75 mg oral tablet: 1 tab(s) orally once a day END DATE 4/4/2023 then stop  enoxaparin: 40 unit(s) subcutaneous once a day  gabapentin 100 mg oral capsule: 1 cap(s) orally 3 times a day  levETIRAcetam 750 mg oral tablet: 2 tab(s) orally 2 times a day  midodrine 10 mg oral tablet: 1 tab(s) orally 3 times a day Hold if Systolic BP &gt;100  Multiple Vitamins with Minerals oral tablet: 1 tab(s) orally once a day  oxycodone-acetaminophen 5 mg-325 mg oral tablet: 1 tab(s) orally every 6 hours As needed Moderate Pain (4 - 6)  pantoprazole 40 mg oral delayed release tablet: 1 tab(s) orally once a day  valproic acid 250 mg/5 mL oral liquid: 500 milligram(s) orally 3 times a day  vitamin A and D topical ointment: 1 application topically 2 times a day   acetaminophen 325 mg oral tablet: 2 tab(s) orally every 6 hours, As needed, Moderate Pain (4 - 6)  ascorbic acid 500 mg oral tablet: 1 tab(s) orally once a day  aspirin 81 mg oral tablet, chewable: 1 tab(s) orally once a day  atorvastatin 40 mg oral tablet: 1 tab(s) orally once a day (at bedtime)  baclofen 5 mg oral tablet: 1 tab(s) orally every 12 hours  doxycycline monohydrate 50 mg oral capsule: 2 cap(s) orally every 12 hours  enoxaparin: 40 unit(s) subcutaneous once a day  gabapentin 100 mg oral capsule: 1 cap(s) orally 3 times a day  levoFLOXacin 500 mg oral tablet: 1 tab(s) orally every 24 hours  metroNIDAZOLE 500 mg oral tablet: 1 tab(s) orally every 8 hours  midodrine 10 mg oral tablet: 1 tab(s) orally 3 times a day Hold if Systolic BP &gt;100  Multiple Vitamins with Minerals oral tablet: 1 tab(s) orally once a day  oxycodone-acetaminophen 5 mg-325 mg oral tablet: 1 tab(s) orally every 6 hours As needed Moderate Pain (4 - 6)  pantoprazole 40 mg oral delayed release tablet: 1 tab(s) orally once a day  vitamin A and D topical ointment: 1 application topically 2 times a day

## 2023-03-26 NOTE — DISCHARGE NOTE PROVIDER - NSDCACTIVITY_GEN_ALL_CORE
L foot Weight Bearing As Tolerated with heel touch w/ surgical shoe   Activity as tolerated L foot Weight Bearing As Tolerated with heel touch w/ surgical shoe   Activity as tolerated/No restrictions L foot Weight Bearing As Tolerated with heel touch w/ surgical shoe   Activity as tolerated/No heavy lifting/straining

## 2023-03-26 NOTE — DISCHARGE NOTE PROVIDER - CARE PROVIDERS DIRECT ADDRESSES
[email or web address]

## 2023-03-26 NOTE — PROGRESS NOTE ADULT - SUBJECTIVE AND OBJECTIVE BOX
S: No new events/complaints      All other pertinent ROS negative.      03-25-23 @ 07:01  -  03-26-23 @ 07:00  --------------------------------------------------------  IN: 0 mL / OUT: 300 mL / NET: -300 mL    03-26-23 @ 07:01  -  03-26-23 @ 15:08  --------------------------------------------------------  IN: 0 mL / OUT: 200 mL / NET: -200 mL      Vital Signs Last 24 Hrs  T(C): 36.2 (26 Mar 2023 11:50), Max: 37.2 (26 Mar 2023 04:49)  T(F): 97.2 (26 Mar 2023 11:50), Max: 98.9 (26 Mar 2023 04:49)  HR: 94 (26 Mar 2023 11:50) (83 - 95)  BP: 121/72 (26 Mar 2023 11:50) (102/64 - 121/72)  BP(mean): --  RR: 18 (26 Mar 2023 11:50) (18 - 18)  SpO2: 99% (26 Mar 2023 11:50) (99% - 100%)    Parameters below as of 26 Mar 2023 04:49  Patient On (Oxygen Delivery Method): room air      PHYSICAL EXAM:    Constitutional: bed bound. gaze preference.   HEENT: PERR, EOMI, Normal Hearing, MMM  Neck: Soft and supple, No LAD, No JVD  Respiratory: Breath sounds are clear bilaterally, No wheezing, rales or rhonchi  Cardiovascular: S1 and S2, regular rate and rhythm, no Murmurs, gallops or rubs  Gastrointestinal: Bowel Sounds present, soft, nontender, nondistended, no guarding, no rebound  Extremities: No peripheral edema  Neuro: RUE:3/5, LUE:2/5, LLE:2/5, RLE:3/5      MEDICATIONS:  MEDICATIONS  (STANDING):  acetaminophen  Suppository .. 325 milliGRAM(s) Rectal once  ascorbic acid 500 milliGRAM(s) Oral daily  aspirin  chewable 81 milliGRAM(s) Oral daily  atorvastatin 40 milliGRAM(s) Oral at bedtime  baclofen 5 milliGRAM(s) Oral every 12 hours  clopidogrel Tablet 75 milliGRAM(s) Oral daily  enoxaparin Injectable 40 milliGRAM(s) SubCutaneous every 24 hours  gabapentin 100 milliGRAM(s) Oral three times a day  levETIRAcetam 1500 milliGRAM(s) Oral two times a day  midodrine. 10 milliGRAM(s) Oral three times a day  multivitamin/minerals 1 Tablet(s) Oral daily  valproic  acid Syrup 500 milliGRAM(s) Oral three times a day  vitamin A &amp; D Ointment 1 Application(s) Topical two times a day      LABS: All Labs Reviewed:                        8.0    10.08 )-----------( 424      ( 26 Mar 2023 07:41 )             25.1     03-26    140  |  102  |  7<L>  ----------------------------<  84  3.9   |  27  |  <0.5<L>    Ca    8.4      26 Mar 2023 07:41  Mg     2.1     03-26    TPro  5.0<L>  /  Alb  2.2<L>  /  TBili  <0.2  /  DBili  x   /  AST  12  /  ALT  <5  /  AlkPhos  67  03-26          Blood Culture:     Radiology: reviewed

## 2023-03-26 NOTE — PROGRESS NOTE ADULT - ASSESSMENT
66 y/o woman with PMH of Right MCA stroke with left sided weakness was brought to the ED via EMS. In the ED, she had altered mental status and sepsis. Patient has a h/o prior hospitalization  in a hospital in [facility] last fall due to persistent metabolic encephalopathy for months     #  Altered Mental Status likely due to Metabolic Encephalopathy from UTI, sepsis and seizure in the context of acute and chronic stroke with left hemiparesis   s/p course of abx for E. coli UTI  MRI of brain: Small patchy subcortical infarct in the right frontal lobe. Redemonstrated chronic infarct in the right MCA territory.  Mild chronic microvascular ischemic changes.  CTA of head/neck: no evidence of occlusion, aneurysm or stenosis  s/p rapid response for seizure- s/p EEG and video EEG: continue on Depakote 500 mg TID and Keppra 1000 mg BID  seizure precautions  Neuro f/u appreciated - stroke likely due to hypotension - has diminutive caliber of right M1 - avoid hypotension  Added ASA, Plavix and statin for new stroke -  DAPT for 21 days followed by ASA 81 mg daily - f/u in stroke clinic in 4 weeks per neurology  pt with UE spasticity: MRI of C spine: Significantly motion limited study. No gross cord compression or spinal cord edema demonstrated. Multilevel small disc bulges. - continue baclofen 5mg bid  behavioral health eval appreciated: pt does not have the capacity to choose a Health care agent  -clinically patient is not following verbal commands, persistent left sided paralysis, dysarthria , oropharyngeal dysphagia , patient is in chronic bed confinement status.    # Seizures- 3/20- patient had gaze deviation to the left- resolved, Neuro f/up rec- increase Keppra dose tx.   - if patient will have recurrent neuro event -consider repeating EEG    # New onset fevers on 3/20 at night and 3/21 in am: Maybe 2/2   # Unstagable sacral wound - Per Burn eval: LWC only.  3/20 procal = 0.05   Per ID, Cefepime (ended 3/24)  -repeated CXR- personally reviewed, no acute infiltrates, effusions. f/up blood cxs, obtain u/a, urine cxs,     #Left avulsion fracture of medial malleolus  evaluated by podiatry - no surgical intervention - WB with CAM boot  continue PT/fall precautions, pain control    # Sinus tachycardia   now resolved - attributed to pain  EKG reviewed, TSH WNL    #Left cephalic vein thrombosis - s/p warm compress    # Anemia - normocytic  labs reviewed - Hgb in 9-10 range and now stable    # DVT prophylaxis    full code, overall patient's prognosis guarded   Family discussion: yes, medical team     #Progress Note Handoff:  able to answer questions. move RUE. Has a gaze preference.   MEDICALLY READY. Pending SNf .

## 2023-03-26 NOTE — DISCHARGE NOTE PROVIDER - NSDCCPCAREPLAN_GEN_ALL_CORE_FT
PRINCIPAL DISCHARGE DIAGNOSIS  Diagnosis: Stroke  Assessment and Plan of Treatment: You came to the hospital with pain and were found to have a bladder infection. During your hospital stay your blood pressure was noted to be low. This lead to a lack of blood and oxygen going to the brain causing a stroke. You were seen by the neurology team and were put on a medication regimen for stroke. Your seizure medications were also adjusted to better control your seizure disorder. You were noted to have a wound on your sacrum. Please wash with soap and water, apply hydrogel, and cover with allevyn pad.  Please take all medications as prescribed and follow up with your outpatient providers, including a stroke specialist, for further management of your care. If you experience worsening of symptoms or new symptoms please return to the ED.  To view your hospital results create a Patient Portal at the following link: [email or web address] or search "Patient Portal [facility]" and follow the instructions on the FollowHealth page.      SECONDARY DISCHARGE DIAGNOSES  Diagnosis: Elevated troponin  Assessment and Plan of Treatment:      PRINCIPAL DISCHARGE DIAGNOSIS  Diagnosis: Stroke  Assessment and Plan of Treatment: You came to the hospital with pain and were found to have a bladder infection. During your hospital stay your blood pressure was noted to be low. This lead to a lack of blood and oxygen going to the brain causing a stroke. You were seen by the neurology team and were put on a medication regimen for stroke. Your seizure medications were also adjusted to better control your seizure disorder. You were noted to have a wound on your sacrum. Please wash with soap and water, apply hydrogel, and cover with allevyn pad.  Please take all medications as prescribed and follow up with your outpatient providers, including a stroke specialist, for further management of your care. If you experience worsening of symptoms or new symptoms please return to the ED.  To view your hospital results create a Patient Portal at the following link: [email or web address] or search "Patient Portal [facility]" and follow the instructions on the FollowHealth page.      SECONDARY DISCHARGE DIAGNOSES  Diagnosis: Osteomyelitis of coccyx  Assessment and Plan of Treatment: you had an infection of the coccyx. you developed sepsis. burn cleaned it out on 4/13. you were treated with IV antibiotics. you were switched to oral antibiotics that you will continue until 4/24/2023. please follow up with your pcp    Diagnosis: Avulsion fracture  Assessment and Plan of Treatment: you had a fracture of the inside of your left ankle. you were seen by podiatry. your pain was being managed. please follow up with podiatry    Diagnosis: Hypotension  Assessment and Plan of Treatment: you had low blood pressure. you were given medication to raise it. please follow up with your pcp

## 2023-03-26 NOTE — DISCHARGE NOTE PROVIDER - CARE PROVIDER_API CALL
José Torre)  EEGEpilepsy; Neurology  [address], [address]  [place] [address]  Phone: [phone]  Fax: [phone]  Follow Up Time: 1 week    Ihsan Ellis ()  Internal Medicine  [address]  [place] [address]  Phone: [phone]  Fax: [phone]  Follow Up Time: 2 weeks

## 2023-03-27 LAB
ALBUMIN SERPL ELPH-MCNC: 2.4 G/DL — LOW (ref 3.5–5.2)
ALP SERPL-CCNC: 68 U/L — SIGNIFICANT CHANGE UP (ref 30–115)
ALT FLD-CCNC: 5 U/L — SIGNIFICANT CHANGE UP (ref 0–41)
ANION GAP SERPL CALC-SCNC: 7 MMOL/L — SIGNIFICANT CHANGE UP (ref 7–14)
APPEARANCE UR: CLEAR — SIGNIFICANT CHANGE UP
APTT BLD: 31.9 SEC — SIGNIFICANT CHANGE UP (ref 27–39.2)
AST SERPL-CCNC: 13 U/L — SIGNIFICANT CHANGE UP (ref 0–41)
BACTERIA # UR AUTO: NEGATIVE — SIGNIFICANT CHANGE UP
BASOPHILS # BLD AUTO: 0.02 K/UL — SIGNIFICANT CHANGE UP (ref 0–0.2)
BASOPHILS NFR BLD AUTO: 0.2 % — SIGNIFICANT CHANGE UP (ref 0–1)
BILIRUB SERPL-MCNC: <0.2 MG/DL — SIGNIFICANT CHANGE UP (ref 0.2–1.2)
BILIRUB UR-MCNC: NEGATIVE — SIGNIFICANT CHANGE UP
BLD GP AB SCN SERPL QL: SIGNIFICANT CHANGE UP
BUN SERPL-MCNC: 8 MG/DL — LOW (ref 10–20)
CALCIUM SERPL-MCNC: 8.3 MG/DL — LOW (ref 8.4–10.5)
CHLORIDE SERPL-SCNC: 104 MMOL/L — SIGNIFICANT CHANGE UP (ref 98–110)
CO2 SERPL-SCNC: 28 MMOL/L — SIGNIFICANT CHANGE UP (ref 17–32)
COLOR SPEC: YELLOW — SIGNIFICANT CHANGE UP
CREAT SERPL-MCNC: <0.5 MG/DL — LOW (ref 0.7–1.5)
DIFF PNL FLD: NEGATIVE — SIGNIFICANT CHANGE UP
EGFR: 117 ML/MIN/1.73M2 — SIGNIFICANT CHANGE UP
EOSINOPHIL # BLD AUTO: 0.05 K/UL — SIGNIFICANT CHANGE UP (ref 0–0.7)
EOSINOPHIL NFR BLD AUTO: 0.5 % — SIGNIFICANT CHANGE UP (ref 0–8)
EPI CELLS # UR: 3 /HPF — SIGNIFICANT CHANGE UP (ref 0–5)
GLUCOSE SERPL-MCNC: 88 MG/DL — SIGNIFICANT CHANGE UP (ref 70–99)
GLUCOSE UR QL: NEGATIVE — SIGNIFICANT CHANGE UP
HCT VFR BLD CALC: 24.1 % — LOW (ref 37–47)
HGB BLD-MCNC: 8 G/DL — LOW (ref 12–16)
HYALINE CASTS # UR AUTO: 7 /LPF — SIGNIFICANT CHANGE UP (ref 0–7)
IMM GRANULOCYTES NFR BLD AUTO: 0.8 % — HIGH (ref 0.1–0.3)
INR BLD: 1.14 RATIO — SIGNIFICANT CHANGE UP (ref 0.65–1.3)
KETONES UR-MCNC: SIGNIFICANT CHANGE UP
LACTATE SERPL-SCNC: 0.7 MMOL/L — SIGNIFICANT CHANGE UP (ref 0.7–2)
LEUKOCYTE ESTERASE UR-ACNC: NEGATIVE — SIGNIFICANT CHANGE UP
LYMPHOCYTES # BLD AUTO: 1.01 K/UL — LOW (ref 1.2–3.4)
LYMPHOCYTES # BLD AUTO: 9.9 % — LOW (ref 20.5–51.1)
MAGNESIUM SERPL-MCNC: 2.1 MG/DL — SIGNIFICANT CHANGE UP (ref 1.8–2.4)
MCHC RBC-ENTMCNC: 29.2 PG — SIGNIFICANT CHANGE UP (ref 27–31)
MCHC RBC-ENTMCNC: 33.2 G/DL — SIGNIFICANT CHANGE UP (ref 32–37)
MCV RBC AUTO: 88 FL — SIGNIFICANT CHANGE UP (ref 81–99)
MONOCYTES # BLD AUTO: 1.1 K/UL — HIGH (ref 0.1–0.6)
MONOCYTES NFR BLD AUTO: 10.8 % — HIGH (ref 1.7–9.3)
NEUTROPHILS # BLD AUTO: 7.91 K/UL — HIGH (ref 1.4–6.5)
NEUTROPHILS NFR BLD AUTO: 77.8 % — HIGH (ref 42.2–75.2)
NITRITE UR-MCNC: NEGATIVE — SIGNIFICANT CHANGE UP
NRBC # BLD: 0 /100 WBCS — SIGNIFICANT CHANGE UP (ref 0–0)
PH UR: 7 — SIGNIFICANT CHANGE UP (ref 5–8)
PHOSPHATE SERPL-MCNC: 3.2 MG/DL — SIGNIFICANT CHANGE UP (ref 2.1–4.9)
PLATELET # BLD AUTO: 420 K/UL — HIGH (ref 130–400)
POTASSIUM SERPL-MCNC: 3.9 MMOL/L — SIGNIFICANT CHANGE UP (ref 3.5–5)
POTASSIUM SERPL-SCNC: 3.9 MMOL/L — SIGNIFICANT CHANGE UP (ref 3.5–5)
PROT SERPL-MCNC: 5.2 G/DL — LOW (ref 6–8)
PROT UR-MCNC: ABNORMAL
PROTHROM AB SERPL-ACNC: 13.1 SEC — HIGH (ref 9.95–12.87)
RBC # BLD: 2.74 M/UL — LOW (ref 4.2–5.4)
RBC # FLD: 13.9 % — SIGNIFICANT CHANGE UP (ref 11.5–14.5)
RBC CASTS # UR COMP ASSIST: 1 /HPF — SIGNIFICANT CHANGE UP (ref 0–4)
SODIUM SERPL-SCNC: 139 MMOL/L — SIGNIFICANT CHANGE UP (ref 135–146)
SP GR SPEC: 1.02 — SIGNIFICANT CHANGE UP (ref 1.01–1.03)
UROBILINOGEN FLD QL: ABNORMAL
WBC # BLD: 10.17 K/UL — SIGNIFICANT CHANGE UP (ref 4.8–10.8)
WBC # FLD AUTO: 10.17 K/UL — SIGNIFICANT CHANGE UP (ref 4.8–10.8)
WBC UR QL: 1 /HPF — SIGNIFICANT CHANGE UP (ref 0–5)

## 2023-03-27 PROCEDURE — 71045 X-RAY EXAM CHEST 1 VIEW: CPT | Mod: 26

## 2023-03-27 PROCEDURE — 93970 EXTREMITY STUDY: CPT | Mod: 26

## 2023-03-27 PROCEDURE — 99233 SBSQ HOSP IP/OBS HIGH 50: CPT

## 2023-03-27 RX ORDER — ACETAMINOPHEN 500 MG
1000 TABLET ORAL ONCE
Refills: 0 | Status: COMPLETED | OUTPATIENT
Start: 2023-03-27 | End: 2023-03-27

## 2023-03-27 RX ADMIN — Medication 5 MILLIGRAM(S): at 05:48

## 2023-03-27 RX ADMIN — Medication 500 MILLIGRAM(S): at 12:04

## 2023-03-27 RX ADMIN — MIDODRINE HYDROCHLORIDE 10 MILLIGRAM(S): 2.5 TABLET ORAL at 05:48

## 2023-03-27 RX ADMIN — Medication 1 APPLICATION(S): at 17:45

## 2023-03-27 RX ADMIN — ATORVASTATIN CALCIUM 40 MILLIGRAM(S): 80 TABLET, FILM COATED ORAL at 22:00

## 2023-03-27 RX ADMIN — Medication 500 MILLIGRAM(S): at 21:53

## 2023-03-27 RX ADMIN — GABAPENTIN 100 MILLIGRAM(S): 400 CAPSULE ORAL at 13:00

## 2023-03-27 RX ADMIN — Medication 1 APPLICATION(S): at 06:31

## 2023-03-27 RX ADMIN — Medication 500 MILLIGRAM(S): at 05:49

## 2023-03-27 RX ADMIN — GABAPENTIN 100 MILLIGRAM(S): 400 CAPSULE ORAL at 21:52

## 2023-03-27 RX ADMIN — Medication 1 TABLET(S): at 12:02

## 2023-03-27 RX ADMIN — Medication 1000 MILLIGRAM(S): at 01:37

## 2023-03-27 RX ADMIN — ENOXAPARIN SODIUM 40 MILLIGRAM(S): 100 INJECTION SUBCUTANEOUS at 21:52

## 2023-03-27 RX ADMIN — ATORVASTATIN CALCIUM 40 MILLIGRAM(S): 80 TABLET, FILM COATED ORAL at 21:52

## 2023-03-27 RX ADMIN — LEVETIRACETAM 1500 MILLIGRAM(S): 250 TABLET, FILM COATED ORAL at 17:45

## 2023-03-27 RX ADMIN — LEVETIRACETAM 1500 MILLIGRAM(S): 250 TABLET, FILM COATED ORAL at 05:49

## 2023-03-27 RX ADMIN — Medication 500 MILLIGRAM(S): at 13:01

## 2023-03-27 RX ADMIN — Medication 81 MILLIGRAM(S): at 12:03

## 2023-03-27 RX ADMIN — CLOPIDOGREL BISULFATE 75 MILLIGRAM(S): 75 TABLET, FILM COATED ORAL at 12:02

## 2023-03-27 RX ADMIN — GABAPENTIN 100 MILLIGRAM(S): 400 CAPSULE ORAL at 05:48

## 2023-03-27 RX ADMIN — Medication 400 MILLIGRAM(S): at 01:07

## 2023-03-27 RX ADMIN — Medication 5 MILLIGRAM(S): at 17:45

## 2023-03-27 NOTE — CHART NOTE - NSCHARTNOTEFT_GEN_A_CORE
Registered Dietitian Follow-Up  Patient Profile Reviewed                           Yes [x]   No []  Nutrition History Previously Obtained        Yes [x]  No []      Pertinent Medical Interventions:  68 y/o woman with PMH of Right MCA stroke with left sided weakness was brought to the ED via EMS. In the ED, she had altered mental status and sepsis. Patient has a h/o prior hospitalization  in a hospital in [facility] last fall due to persistent metabolic encephalopathy for months   #  Altered Mental Status likely due to Metabolic Encephalopathy from UTI, sepsis and seizure in the context of acute and chronic stroke with left hemiparesis   pending discharge to SNF    Nutrition Interval History:   - SLP re-evaluation 3/21 most recent recommendations for minced & moist and thin liquids, 1:1 feeds, thin liquids through straw  - overall PO intake has been variable, supplement order was removed when diet was downgraded  - waxing and waning intake, some days has </=25% of meal trays and others has fair intake 50-75% of tray completion though patient enjoys oral supplements when they are ordered  **patient meeting </=75% of estimated energy needs in-house    Diet, DASH/TLC:   Sodium & Cholesterol Restricted  Minced and Moist (MINCEDMOIST) (23 @ 13:28) [Active]    Anthropometrics:  Height (cm): 167.6 (23 @ 16:25)  Weight (kg): 68.3 (23 @ 10:12)  BMI (kg/m2): 24.3 (23 @ 10:12)    OTHER WEIGHTS:   ^ height per patient report  ^ weight obtained 3/6 via bed scale at time of visit, with consideration for edema   UBW: 68.2kg  IBW 52.3kg   patient bed weight higher than reported UBW, patient reported that she has 'some weight loss here or there'  unsure if accurate report considering patient noted with confusion  Daily Weight in k.3 ()    MEDICATIONS  (STANDING):  acetaminophen  Suppository .. 325 milliGRAM(s) Rectal once  ascorbic acid 500 milliGRAM(s) Oral daily  aspirin  chewable 81 milliGRAM(s) Oral daily  atorvastatin 40 milliGRAM(s) Oral at bedtime  baclofen 5 milliGRAM(s) Oral every 12 hours  clopidogrel Tablet 75 milliGRAM(s) Oral daily  enoxaparin Injectable 40 milliGRAM(s) SubCutaneous every 24 hours  gabapentin 100 milliGRAM(s) Oral three times a day  levETIRAcetam 1500 milliGRAM(s) Oral two times a day  midodrine. 10 milliGRAM(s) Oral three times a day  multivitamin/minerals 1 Tablet(s) Oral daily  valproic  acid Syrup 500 milliGRAM(s) Oral three times a day  vitamin A &amp; D Ointment 1 Application(s) Topical two times a day    MEDICATIONS  (PRN):  acetaminophen     Tablet .. 650 milliGRAM(s) Oral every 6 hours PRN Moderate Pain (4 - 6)  oxycodone    5 mG/acetaminophen 325 mG 1 Tablet(s) Oral every 6 hours PRN Moderate Pain (4 - 6)    LABS                     8.0    10.17 )-----------( 420      ( 27 Mar 2023 10:39 )             24.1         139  |  104  |  8<L>  ----------------------------<  88  3.9   |  28  |  <0.5<L>    Ca    8.3<L>      27 Mar 2023 10:39  Phos  3.2       Mg     2.1         TPro  5.2<L>  /  Alb  2.4<L>  /  TBili  <0.2  /  DBili  x   /  AST  13  /  ALT  5   /  AlkPhos  68      LIVER FUNCTIONS - ( 27 Mar 2023 10:39 )  Alb: 2.4 g/dL / Pro: 5.2 g/dL / ALK PHOS: 68 U/L / ALT: 5 U/L / AST: 13 U/L / GGT: x           Physical Findings:  - Cognition: disoriented-- mild temple/clavicle deletion   - GI function: Last BM not indicated per flow sheets   - Tubes: n/a  - Oral/Mouth cavity: minced & moist   - Skin:   *3/15 WOCN assessment -- High risk for pressure injury development or progression, B/L heel intact , B/l ear healed ulceration & Stage three pressure injury to B/l buttock   *per flow sheets: sacrum stage 3 as of 3/20  - Edema: right arm; left arm 2+     Nutrition Requirements: with consideration for age, weight, BMI, wounds  Weight Used: 68.2 kg dry weight      Estimated Energy Needs    Continue [x]  Adjust [] 0579-6135 kcal/day (MSJ x 1.2-1.5)  Estimated Protein Needs    Continue [x]  Adjust [] 82-102g/day (1.2-1.5g/kg)  Estimated Fluid Needs        Continue [x]  Adjust [] 1700mLday (25mL/kg)    [x] Previous Nutrition Diagnosis:            [x] Ongoing          [] Resolved  #1 Increased Nutrient Needs  Malnutrition (moderate)  Goal/Expected Outcome: meet >/=75% est energy needs within 5 days    Nutrition Intervention: Meals and Snacks, Medical Food Supplement, Vitamin Supplement, Nutrition Related Medication, Coordination of Care  Indicator/Monitoring:  Monitor diet order, energy intake, food and nutrient intake, body composition, weight    Recommendations:  - diet texture deferred to SLP ( minced & moist, thin liquids ) re adjust if needed for safest least restrictive diet texture and maintain aspiration precautions aspiration precautions  - Re-initiate Ensure HIGH PROTEIN 2x/day (350kcal, 20 g pro/serving) Vanilla/Strawberry only + Magic Cup 1x/day (290kcal, 9g protein/serving)  - consider appetite stimulant if not contraindicated, would avoid marinol as may make patient more lethargic, further alter cognition   - continue MV, ascorbic acid   - recommend 3 day calorie count, patient may benefit from supplemental nutrition support if mental status continues to waxing and waning    Patient at HIGH nutrition risk   will follow up within 5days  Cesario Abbott, RD  [phone] or via TEAMS

## 2023-03-27 NOTE — PROGRESS NOTE ADULT - ASSESSMENT
66 y/o woman with PMH of Right MCA stroke with left sided weakness was brought to the ED via EMS. In the ED, she had altered mental status and sepsis.    #Febrile      #Seizures  #Acute R Frontal Lobe Infarct likely 2/ hypotension   #Chronic R MCA infarct  - per chart: at baseline she is alert, able to communicate and is oriented. Ambulation at baseline: uses a wheelchair. She was in a hospital in [facility] last fall and was lethargic for months  - 3/8: s/p rapid response for L gaze preference  - 3/20: Pt had event with L gaze preference during SS eval, pt made NPO. Examined by resident, no gaze preference present any longer. Pt responsive but less than baseline. Developed seizures post-event. Septic workup obtained. CXR appears clear, rest pending. --> 3/21 Pt more responsive this AM, expressing herself and in line with current baseline.  - Depakote level (3/4) 87 (WNL) -> (3/21) 74 (WNL)   - CTH/CTA of head/neck (3/4): no acute pathology, no evidence of occlusion, aneurysm or stenosis  - EEG (3/8) and video EEG (3/9): Focal slowing  - MRI Brain (3/15): new subcortical infarct in the right frontal lobe. Redemonstrated chronic infarct in the right MCA territory.  - MRI of C spine (3/16): Significantly motion limited study. No gross cord compression or spinal cord edema demonstrated. Multilevel small disc bulges.  - Neuro eval'd 3/8-3/20: For L gaze preference/stroke, OP med review: on low dose Lamictal and Depakote in addition to Keppra (possible mood disorder/ seizures), Her MRI of brain showing subcortical infarct within same M1 territory. The casue of stroke is most likely hypotension as she has a diminutive Rt M1. However, MRI findings doesn't explain her B/L upper extremity spasticity. So, it is important to rule out cervical cord pathology. DAPT for 21 days followed by ASA 81 mg daily, Increased keppra post 3/20 possible seizure event  - BH eval'd 3/15: Delirium; pt does not have the capacity to choose a Health care agent  - SS re-juliette 3/21: Minced and moist, 1:1 feeds, thin liquids through straw  - Seizure precautions  - C/w Depakote 500mg TID and Keppra to 1500mg BID   - C/w baclofen 5mg bid (for UE spasticity)  - C/w ASA, Plavix (3/17-4/6), and high dose statin for new stroke - DAPT for 21 days followed by ASA 81 mg daily  - C/w Midodrine 10mg TID  - OP stroke clinic in 4 weeks per neurology    #Metabolic Encephalopathy from UTI  #Febrile 3/20  #Unstagable Sacral Ulcer  - 3/20: Pt became febrile after possible seizure episode, Cefepime, levaquin (x1 3/20), vancomycin  - 3/20 Septic workup: WBC (3/21) 12.26 (up from 8.23), BCx (3/20) NGTD, Procal (3/20) 0.05, RVP (3/20) COVID (-), Cortisol level, Lactate (3/21) WNL, UA nitrite (-), LEC (-), pending WBCs/Bacteria, UCx (3/21) NGTD  - Duplex LUE (3/10): (-) DVT (+) superficial cephalic vein thrombosis  - Duplex BLLE (3/7): (-) DVT  - s/p Vanc (3/4x1, 3/20-3/22), Cefepime (x1 3/4, x1 3/8), Ceftriaxone (3/4-3/6) (for UTI)  - Duplex BLLE: negative for DVT  - ID eval'd 3/23  - Burn eval'd 3/22 - No intervention, wound care recs noted  - Cefepime completed 3/24 (per ID)    #Left avulsion fracture of medial malleolus  - Duplex BL LE (3/7): (-) for DVT  - CT Foot (3/11): acute minimally displaced avulsion fracture at the medial malleolus  - Podiatry following for foot pain: WBAT w/ surgical shoe, ankle brace, PT  - fall precautions  - pain control regimen - if pain uncontrolled consider increasing percocet to 2tabs.  - C/w gabapentin at 100mg TID (Lowered dose from home, increase as pt tolerates)    #Sinus tachycardia - now resolved - attributed to pain  - TSH (3/8) WNL, - EKG reviewed    #Left cephalic vein thrombosis  - Duplex LUE (3/10): No DVT however there is superficial thrombosis noted in the left cephalic vein  - s/p warm compress  - FU repeat duplex ordered 3/27    #Anemia - normocytic - stable  - possibly due to frequent phlebotomy, monitor for bleeding    #Misc  - DVT ppx - lovenox  - Diet: Soft and bite sized (SS 3/7)  - full code, overall guarded prognosis  - Dispo: pending sepsis re-workup  - PT - needs re-eval   66 y/o woman with PMH of Right MCA stroke with left sided weakness was brought to the ED via EMS. In the ED, she had altered mental status and sepsis.    #Febrile 3/26 overnight  - Sepsis w/up pending (CXR, UA, BCX) if UA + send UCx  - Pending ID re-eval    #Seizures  #Acute R Frontal Lobe Infarct likely 2/ hypotension   #Chronic R MCA infarct  - per chart: at baseline she is alert, able to communicate and is oriented. Ambulation at baseline: uses a wheelchair. She was in a hospital in [facility] last fall and was lethargic for months  - 3/8: s/p rapid response for L gaze preference  - 3/20: Pt had event with L gaze preference during SS eval, pt made NPO. Examined by resident, no gaze preference present any longer. Pt responsive but less than baseline. Developed seizures post-event. Septic workup obtained. CXR appears clear, rest pending. --> 3/21 Pt more responsive this AM, expressing herself and in line with current baseline.  - Depakote level (3/4) 87 (WNL) -> (3/21) 74 (WNL)   - CTH/CTA of head/neck (3/4): no acute pathology, no evidence of occlusion, aneurysm or stenosis  - EEG (3/8) and video EEG (3/9): Focal slowing  - MRI Brain (3/15): new subcortical infarct in the right frontal lobe. Redemonstrated chronic infarct in the right MCA territory.  - MRI of C spine (3/16): Significantly motion limited study. No gross cord compression or spinal cord edema demonstrated. Multilevel small disc bulges.  - Neuro eval'd 3/8-3/20: For L gaze preference/stroke, OP med review: on low dose Lamictal and Depakote in addition to Keppra (possible mood disorder/ seizures), Her MRI of brain showing subcortical infarct within same M1 territory. The casue of stroke is most likely hypotension as she has a diminutive Rt M1. However, MRI findings doesn't explain her B/L upper extremity spasticity. So, it is important to rule out cervical cord pathology. DAPT for 21 days followed by ASA 81 mg daily, Increased keppra post 3/20 possible seizure event  - BH eval'd 3/15: Delirium; pt does not have the capacity to choose a Health care agent  - SS re-juliette 3/21: Minced and moist, 1:1 feeds, thin liquids through straw  - Seizure precautions  - C/w Depakote 500mg TID and Keppra to 1500mg BID   - C/w baclofen 5mg bid (for UE spasticity)  - C/w ASA, Plavix (3/17-4/6), and high dose statin for new stroke - DAPT for 21 days followed by ASA 81 mg daily  - C/w Midodrine 10mg TID  - OP stroke clinic in 4 weeks per neurology    #Metabolic Encephalopathy from UTI  #Febrile 3/20  #Unstagable Sacral Ulcer  - 3/20: Pt became febrile after possible seizure episode, Cefepime, levaquin (x1 3/20), vancomycin  - 3/20 Septic workup: WBC (3/21) 12.26 (up from 8.23), BCx (3/20) NGTD, Procal (3/20) 0.05, RVP (3/20) COVID (-), Cortisol level, Lactate (3/21) WNL, UA nitrite (-), LEC (-), pending WBCs/Bacteria, UCx (3/21) NGTD  - Duplex LUE (3/10): (-) DVT (+) superficial cephalic vein thrombosis  - Duplex BLLE (3/7): (-) DVT  - s/p Vanc (3/4x1, 3/20-3/22), Cefepime (x1 3/4, x1 3/8), Ceftriaxone (3/4-3/6) (for UTI)  - Duplex BLLE: negative for DVT  - ID eval'd 3/23  - Burn eval'd 3/22 - No intervention, wound care recs noted  - Cefepime completed 3/24 (per ID)    #Left avulsion fracture of medial malleolus  - Duplex BL LE (3/7): (-) for DVT  - CT Foot (3/11): acute minimally displaced avulsion fracture at the medial malleolus  - Podiatry following for foot pain: WBAT w/ surgical shoe, ankle brace, PT  - fall precautions  - pain control regimen - if pain uncontrolled consider increasing percocet to 2tabs.  - C/w gabapentin at 100mg TID (Lowered dose from home, increase as pt tolerates)    #Sinus tachycardia - now resolved - attributed to pain  - TSH (3/8) WNL, - EKG reviewed    #Left cephalic vein thrombosis  - Duplex LUE (3/10): No DVT however there is superficial thrombosis noted in the left cephalic vein  - s/p warm compress  - FU repeat duplex ordered 3/27    #Anemia - normocytic - stable  - possibly due to frequent phlebotomy, monitor for bleeding    #Misc  - DVT ppx - lovenox  - Diet: Soft and bite sized (SS 3/7)  - full code, overall guarded prognosis  - Dispo: pending sepsis re-workup  - PT - needs re-eval

## 2023-03-27 NOTE — PROGRESS NOTE ADULT - SUBJECTIVE AND OBJECTIVE BOX
JOSEPH OBRIEN 67y Female  MRN#: [id number]   Hospital Day: 23d    SUBJECTIVE  Patient is a 67y old Female who presents with a chief complaint of acute stroke  Currently admitted to medicine with the primary diagnosis of Stroke      Hospital Course  67F. PMH: Seizures, Rt MCA Stroke  BIBEMS 3/3 after being found on the street, daughter reported pt c/o generalized pain, had FS of 96. No family present at admission/no contact information. Pt reported feeling "okay".  In ED, hemodynamically stable with Fever. Labs: WBC 19K, elevated lactate, UA +ve  She was pan-scanned. no evidence of new stroke, trauma, or infection.  CTH showed old stroke in the territory of the Rt MCA.  Neuro eval'd 3/8-3/16: For seizure/stroke 3/8: L gaze preference, OP med review: on low dose Lamictal and Depakote in addition to Keppra (possible mood disorder/ seizures), MRi brain (+) acute subcortical infarct within same M1 territory. The cause of stroke is most likely hypotension as she has a diminutive Rt M1. However, MRI findings doesn't explain her B/L upper extremity spasticity. So, it is important to rule. vEEG 3/9: showed focal slowing. DAPT for 21 days followed by ASA 81 mg daily; High intensity statin, Can f/u in stroke clinic in 4 weeks following discharge.  Podiatry following for foot pain 2/ foot drop- found to have L foot avulsion fracture: WBAT w/ surgical shoe, ankle brace, PT  BH eval'd 3/15: Delirium  Pt was treated for UTI, placed on appropriate medical management for stroke. 3/20 pt was noted to be less responsive and have forced gaze during her speech evaluation. Concern for seizure. Neurology was recalled- keppra was increased. Pt made NPO, and AEDs made IV. Overnight pt was noted to be febrile, and again hypotensive requiring 2L fluid bolus, despite maintenance fluids. Septic workup sent. Leukocytosis noted, BCx NGTD, CXR looks clear.  Decubitus ulcer noted on sacrum, burn consulted - no intervention, local wound care. Id consulted for abx recs: empiric cefepime x5d    On 3/26 overnight pt again febrile to 102.  Septic workup resent.           INTERVAL HPI AND OVERNIGHT EVENTS:  Patient was examined and seen at bedside. This morning she is resting comfortably in bed and reports no issues or overnight events.    OBJECTIVE  PAST MEDICAL & SURGICAL HISTORY    ALLERGIES:  Allergy Status Unknown    MEDICATIONS:  STANDING MEDICATIONS  acetaminophen  Suppository .. 325 milliGRAM(s) Rectal once  ascorbic acid 500 milliGRAM(s) Oral daily  aspirin  chewable 81 milliGRAM(s) Oral daily  atorvastatin 40 milliGRAM(s) Oral at bedtime  baclofen 5 milliGRAM(s) Oral every 12 hours  clopidogrel Tablet 75 milliGRAM(s) Oral daily  enoxaparin Injectable 40 milliGRAM(s) SubCutaneous every 24 hours  gabapentin 100 milliGRAM(s) Oral three times a day  levETIRAcetam 1500 milliGRAM(s) Oral two times a day  midodrine. 10 milliGRAM(s) Oral three times a day  multivitamin/minerals 1 Tablet(s) Oral daily  valproic  acid Syrup 500 milliGRAM(s) Oral three times a day  vitamin A &amp; D Ointment 1 Application(s) Topical two times a day    PRN MEDICATIONS  acetaminophen     Tablet .. 650 milliGRAM(s) Oral every 6 hours PRN  oxycodone    5 mG/acetaminophen 325 mG 1 Tablet(s) Oral every 6 hours PRN      VITAL SIGNS: Last 24 Hours  T(C): 37.4 (27 Mar 2023 04:31), Max: 39.4 (27 Mar 2023 00:30)  T(F): 99.3 (27 Mar 2023 04:31), Max: 102.9 (27 Mar 2023 00:30)  HR: 91 (27 Mar 2023 05:50) (91 - 121)  BP: 102/55 (27 Mar 2023 05:50) (92/54 - 145/83)  BP(mean): --  RR: 20 (27 Mar 2023 04:31) (18 - 20)  SpO2: 100% (27 Mar 2023 04:31) (99% - 100%)    LABS:                        8.0    10.08 )-----------( 424      ( 26 Mar 2023 07:41 )             25.1     03-26    140  |  102  |  7<L>  ----------------------------<  84  3.9   |  27  |  <0.5<L>    Ca    8.4      26 Mar 2023 07:41  Mg     2.1     03-26    TPro  5.0<L>  /  Alb  2.2<L>  /  TBili  <0.2  /  DBili  x   /  AST  12  /  ALT  <5  /  AlkPhos  67  03-26          PHYSICAL EXAM:  CONSTITUTIONAL: No acute distress, laying in bed comfortably this AM  HEENT: Atraumatic, normocephalic, neck supple, moist mucous membranes  PULMONARY: Clear to auscultation bilaterally  CARDIOVASCULAR: Regular rate and rhythm  GASTROINTESTINAL: Soft, non-tender, non-distended; bowel sounds present  MUSCULOSKELETAL: no LE edema, 2+ pulses b/l LE, left LE in CAM boot  NEURO: L hemiparesis, speaks in short sentences, does not participate in exam, B/L UE spasticity, contracted tone  SKIN: Sacral ulcer

## 2023-03-27 NOTE — PROGRESS NOTE ADULT - ATTENDING COMMENTS
68 y/o woman with PMH of Right MCA stroke with left sided weakness was brought to the ED via EMS. In the ED, she had altered mental status and sepsis. Patient has a h/o prior hospitalization  in a hospital in [facility] last fall due to persistent metabolic encephalopathy for months     #  Altered Mental Status likely due to Metabolic Encephalopathy from UTI, sepsis and seizure in the context of acute and chronic stroke with left hemiparesis   s/p course of abx for E. coli UTI  MRI of brain: Small patchy subcortical infarct in the right frontal lobe. Redemonstrated chronic infarct in the right MCA territory.  Mild chronic microvascular ischemic changes.  CTA of head/neck: no evidence of occlusion, aneurysm or stenosis  s/p rapid response for seizure- s/p EEG and video EEG: continue on Depakote 500 mg TID and Keppra 1000 mg BID  seizure precautions  Neuro f/u appreciated - stroke likely due to hypotension - has diminutive caliber of right M1 - avoid hypotension  Added ASA, Plavix and statin for new stroke -  DAPT for 21 days followed by ASA 81 mg daily - f/u in stroke clinic in 4 weeks per neurology  pt with UE spasticity: MRI of C spine: Significantly motion limited study. No gross cord compression or spinal cord edema demonstrated. Multilevel small disc bulges. - continue baclofen 5mg bid  behavioral health eval appreciated: pt does not have the capacity to choose a Health care agent  -clinically patient is not following verbal commands, persistent left sided paralysis, dysarthria , oropharyngeal dysphagia , patient is in chronic bed confinement status.    # Seizures- 3/20- patient had gaze deviation to the left- resolved, Neuro f/up rec- increase Keppra dose tx.   - if patient will have recurrent neuro event -consider repeating EEG    # New onset fevers on 3/20 at night and 3/21 in am: Maybe 2/2   # Unstagable sacral wound - Per Burn eval: LWC only.  3/20 procal = 0.05   Per ID, Cefepime (ended 3/24)  -repeated CXR- personally reviewed, no acute infiltrates, effusions. f/up blood cxs, obtain u/a, urine cxs,     #Left avulsion fracture of medial malleolus  evaluated by podiatry - no surgical intervention - WB with CAM boot  continue PT/fall precautions, pain control    # Sinus tachycardia   now resolved - attributed to pain  EKG reviewed, TSH WNL    #Left cephalic vein thrombosis - s/p warm compress    # Anemia - normocytic  labs reviewed - Hgb in 9-10 range and now stable    # DVT prophylaxis    full code, overall patient's prognosis guarded   Family discussion: yes, medical team     #Progress Note Handoff:  able to answer questions. move RUE. Has a gaze preference.   MEDICALLY READY. Pending SNf .But on 3/27:   Febrile 102 overnight. Sacral wound doesn't seem infected. Burn can re-eval.   CXR NACPD, Check UA, blood culture. ID informed. Watching off Abx today. If fever recurs > Cefepime again??   b/l UE duplex

## 2023-03-27 NOTE — PROGRESS NOTE ADULT - SUBJECTIVE AND OBJECTIVE BOX
CAMILLE, JOSEPH  67y, Female  Allergy: Allergy Status Unknown      LOS  23d    CHIEF COMPLAINT: acute stroke (21 Mar 2023 06:17)      INTERVAL EVENTS/HPI  - No acute events overnight  - T(F): , Max: 102.9 (03-27-23 @ 00:30)  - ID consulted for fevers on 3/27  - WBC Count: 10.17 (03-27-23 @ 10:39)  WBC Count: 10.08 (03-26-23 @ 07:41)     - Creatinine, Serum: <0.5 (03-27-23 @ 10:39)  Creatinine, Serum: <0.5 (03-26-23 @ 07:41)       ROS  General: Denies rigors, nightsweats  HEENT: Denies headache, rhinorrhea, sore throat, eye pain  CV: Denies CP, palpitations  PULM: Denies wheezing, hemoptysis  GI: Denies hematemesis, hematochezia, melena  : Denies discharge, hematuria  MSK: Denies arthralgias, myalgias  SKIN: Denies rash, lesions  NEURO: Denies paresthesias, weakness  PSYCH: Denies depression, anxiety    VITALS:  T(F): 99.3, Max: 102.9 (03-27-23 @ 00:30)  HR: 106  BP: 134/61  RR: 20Vital Signs Last 24 Hrs  T(C): 37.4 (27 Mar 2023 04:31), Max: 39.4 (27 Mar 2023 00:30)  T(F): 99.3 (27 Mar 2023 04:31), Max: 102.9 (27 Mar 2023 00:30)  HR: 106 (27 Mar 2023 11:59) (91 - 121)  BP: 134/61 (27 Mar 2023 11:59) (92/54 - 134/61)  BP(mean): --  RR: 20 (27 Mar 2023 04:31) (18 - 20)  SpO2: 100% (27 Mar 2023 11:59) (99% - 100%)    Parameters below as of 27 Mar 2023 11:59  Patient On (Oxygen Delivery Method): room air        PHYSICAL EXAM:  Gen: NAD, resting in bed  HEENT: Normocephalic, atraumatic  Neck: supple, no lymphadenopathy  CV: Regular rate & regular rhythm  Lungs: decreased BS at bases, no fremitus  Abdomen: Soft, BS present  Ext: Warm, well perfused  Neuro: non focal, awake  Skin: no rash, no erythema  Lines: no phlebitis    FH: Non-contributory  Social Hx: Non-contributory    TESTS & MEASUREMENTS:                        8.0    10.17 )-----------( 420      ( 27 Mar 2023 10:39 )             24.1     03-27    139  |  104  |  8<L>  ----------------------------<  88  3.9   |  28  |  <0.5<L>    Ca    8.3<L>      27 Mar 2023 10:39  Phos  3.2     03-27  Mg     2.1     03-27    TPro  5.2<L>  /  Alb  2.4<L>  /  TBili  <0.2  /  DBili  x   /  AST  13  /  ALT  5   /  AlkPhos  68  03-27      LIVER FUNCTIONS - ( 27 Mar 2023 10:39 )  Alb: 2.4 g/dL / Pro: 5.2 g/dL / ALK PHOS: 68 U/L / ALT: 5 U/L / AST: 13 U/L / GGT: x               Culture - Urine (collected 03-21-23 @ 11:57)  Source: Catheterized Catheterized  Final Report (03-22-23 @ 17:54):    No growth    Culture - Blood (collected 03-20-23 @ 22:07)  Source: .Blood Blood  Final Report (03-26-23 @ 02:00):    No Growth Final    Culture - Blood (collected 03-09-23 @ 11:46)  Source: .Blood None  Final Report (03-14-23 @ 23:00):    No Growth Final    Culture - Blood (collected 03-07-23 @ 07:10)  Source: .Blood None  Final Report (03-12-23 @ 18:00):    No Growth Final    Culture - Urine (collected 03-04-23 @ 05:10)  Source: Catheterized Catheterized  Final Report (03-07-23 @ 10:35):    >100,000 CFU/ml Escherichia coli  Organism: Escherichia coli (03-07-23 @ 10:35)  Organism: Escherichia coli (03-07-23 @ 10:35)      Method Type: JULIANA      -  Amikacin: S <=16      -  Amoxicillin/Clavulanic Acid: S <=8/4      -  Ampicillin: S <=8 These ampicillin results predict results for amoxicillin      -  Ampicillin/Sulbactam: S <=4/2 Enterobacter, Klebsiella aerogenes, Citrobacter, and Serratia may develop resistance during prolonged therapy (3-4 days)      -  Aztreonam: S <=4      -  Cefazolin: S <=2 For uncomplicated UTI with K. pneumoniae, E. coli, or P. mirablis: JULIANA <=16 is sensitive and JULIANA >=32 is resistant. This also predicts results for oral agents cefaclor, cefdinir, cefpodoxime, cefprozil, cefuroxime axetil, cephalexin and locarbef for uncomplicated UTI. Note that some isolates may be susceptible to these agents while testing resistant to cefazolin.      -  Cefepime: S <=2      -  Cefoxitin: S <=8      -  Ceftriaxone: S <=1 Enterobacter, Klebsiella aerogenes, Citrobacter, and Serratia may develop resistance during prolonged therapy      -  Cefuroxime: S <=4      -  Ciprofloxacin: S <=0.25      -  Ertapenem: S <=0.5      -  Gentamicin: S <=2      -  Imipenem: S <=1      -  Levofloxacin: S <=0.5      -  Meropenem: S <=1      -  Nitrofurantoin: S <=32 Should not be used to treat pyelonephritis      -  Piperacillin/Tazobactam: S <=8      -  Tobramycin: S <=2      -  Trimethoprim/Sulfamethoxazole: S <=0.5/9.5    Culture - Blood (collected 03-04-23 @ 03:28)  Source: .Blood Blood-Peripheral  Final Report (03-09-23 @ 09:00):    No Growth Final    Culture - Blood (collected 03-04-23 @ 03:28)  Source: .Blood Blood-Peripheral  Final Report (03-09-23 @ 09:00):    No Growth Final        Lactate, Blood: 0.7 mmol/L (03-27-23 @ 10:39)      INFECTIOUS DISEASES TESTING  Procalcitonin, Serum: 0.05 (03-20-23 @ 22:07)  Rapid RVP Result: NotDetec (03-20-23 @ 21:25)  COVID-19 PCR: NotDetec (03-19-23 @ 12:47)  COVID-19 PCR: NotDetec (03-15-23 @ 16:23)      INFLAMMATORY MARKERS      RADIOLOGY & ADDITIONAL TESTS:  I have personally reviewed the last available Chest xray  CXR      CT      CARDIOLOGY TESTING      MEDICATIONS  acetaminophen  Suppository .. 325 Rectal once  ascorbic acid 500 Oral daily  aspirin  chewable 81 Oral daily  atorvastatin 40 Oral at bedtime  baclofen 5 Oral every 12 hours  clopidogrel Tablet 75 Oral daily  enoxaparin Injectable 40 SubCutaneous every 24 hours  gabapentin 100 Oral three times a day  levETIRAcetam 1500 Oral two times a day  midodrine. 10 Oral three times a day  multivitamin/minerals 1 Oral daily  valproic  acid Syrup 500 Oral three times a day  vitamin A &amp; D Ointment 1 Topical two times a day      WEIGHT  Weight (kg): 68.3 (03-08-23 @ 10:12)  Creatinine, Serum: <0.5 mg/dL (03-27-23 @ 10:39)      ANTIBIOTICS:      All available historical records have been reviewed

## 2023-03-27 NOTE — PROGRESS NOTE ADULT - ASSESSMENT
ASSESSMENT   68 y/o woman with PMH of Right MCA stroke with left sided weakness was brought to the ED via EMS. In the ED, she had altered mental status and sepsis, with new onset fevers.     IMPRESSION  Complicated and prolonged hospital course, admitted 3/3 for R MCA CVA, ID consulted 3/22 for Fever  3/21 Tm 101.7 P>90 low BP- Sepsis   WBC 19 on admission, UCX 3/4 panS ecoli, UA , s/p ceftriaxone 3/4-6, cefepime 3/8  Skin: two approx. 3x2 cm wounds to sacral region with pink moist base. No active bleeding, purulence or surrounding erythema   3/21 UCX NG   3/20 BCX NGTD   3/20 COVID/RVP NEGATIVE   CXR no PNA  No diarrhea  No obvious phlebitis  Recent MRI stable  cannot obtain further history from the patient secondary to altered mental status or sedation     RECOMMENDATIONS  - completed empiric 5 day course of cefepime   - isolated fever on 3/27 -- would continue to monitor off antibiotics  - trend fever curve for now -- check COVID PCR if febrile again   - follow-up cultures  - Guarded prognosis GOC    Please call or message on [employer] Teams if with any questions.  Spectra [phone]

## 2023-03-28 LAB
ALBUMIN SERPL ELPH-MCNC: 2.6 G/DL — LOW (ref 3.5–5.2)
ALP SERPL-CCNC: 81 U/L — SIGNIFICANT CHANGE UP (ref 30–115)
ALT FLD-CCNC: 7 U/L — SIGNIFICANT CHANGE UP (ref 0–41)
ANION GAP SERPL CALC-SCNC: 11 MMOL/L — SIGNIFICANT CHANGE UP (ref 7–14)
AST SERPL-CCNC: 15 U/L — SIGNIFICANT CHANGE UP (ref 0–41)
BASOPHILS # BLD AUTO: 0.03 K/UL — SIGNIFICANT CHANGE UP (ref 0–0.2)
BASOPHILS NFR BLD AUTO: 0.3 % — SIGNIFICANT CHANGE UP (ref 0–1)
BILIRUB SERPL-MCNC: <0.2 MG/DL — SIGNIFICANT CHANGE UP (ref 0.2–1.2)
BUN SERPL-MCNC: 7 MG/DL — LOW (ref 10–20)
CALCIUM SERPL-MCNC: 8.6 MG/DL — SIGNIFICANT CHANGE UP (ref 8.4–10.5)
CHLORIDE SERPL-SCNC: 101 MMOL/L — SIGNIFICANT CHANGE UP (ref 98–110)
CO2 SERPL-SCNC: 26 MMOL/L — SIGNIFICANT CHANGE UP (ref 17–32)
CREAT SERPL-MCNC: <0.5 MG/DL — LOW (ref 0.7–1.5)
EGFR: 117 ML/MIN/1.73M2 — SIGNIFICANT CHANGE UP
EOSINOPHIL # BLD AUTO: 0.05 K/UL — SIGNIFICANT CHANGE UP (ref 0–0.7)
EOSINOPHIL NFR BLD AUTO: 0.4 % — SIGNIFICANT CHANGE UP (ref 0–8)
GLUCOSE SERPL-MCNC: 90 MG/DL — SIGNIFICANT CHANGE UP (ref 70–99)
HCT VFR BLD CALC: 27.3 % — LOW (ref 37–47)
HGB BLD-MCNC: 8.7 G/DL — LOW (ref 12–16)
IMM GRANULOCYTES NFR BLD AUTO: 0.9 % — HIGH (ref 0.1–0.3)
LYMPHOCYTES # BLD AUTO: 19.4 % — LOW (ref 20.5–51.1)
LYMPHOCYTES # BLD AUTO: 2.31 K/UL — SIGNIFICANT CHANGE UP (ref 1.2–3.4)
MAGNESIUM SERPL-MCNC: 1.9 MG/DL — SIGNIFICANT CHANGE UP (ref 1.8–2.4)
MCHC RBC-ENTMCNC: 28.8 PG — SIGNIFICANT CHANGE UP (ref 27–31)
MCHC RBC-ENTMCNC: 31.9 G/DL — LOW (ref 32–37)
MCV RBC AUTO: 90.4 FL — SIGNIFICANT CHANGE UP (ref 81–99)
MONOCYTES # BLD AUTO: 1.86 K/UL — HIGH (ref 0.1–0.6)
MONOCYTES NFR BLD AUTO: 15.6 % — HIGH (ref 1.7–9.3)
NEUTROPHILS # BLD AUTO: 7.56 K/UL — HIGH (ref 1.4–6.5)
NEUTROPHILS NFR BLD AUTO: 63.4 % — SIGNIFICANT CHANGE UP (ref 42.2–75.2)
NRBC # BLD: 0 /100 WBCS — SIGNIFICANT CHANGE UP (ref 0–0)
PLATELET # BLD AUTO: 458 K/UL — HIGH (ref 130–400)
POTASSIUM SERPL-MCNC: 4.4 MMOL/L — SIGNIFICANT CHANGE UP (ref 3.5–5)
POTASSIUM SERPL-SCNC: 4.4 MMOL/L — SIGNIFICANT CHANGE UP (ref 3.5–5)
PROT SERPL-MCNC: 5.7 G/DL — LOW (ref 6–8)
RBC # BLD: 3.02 M/UL — LOW (ref 4.2–5.4)
RBC # FLD: 13.9 % — SIGNIFICANT CHANGE UP (ref 11.5–14.5)
SODIUM SERPL-SCNC: 138 MMOL/L — SIGNIFICANT CHANGE UP (ref 135–146)
WBC # BLD: 11.92 K/UL — HIGH (ref 4.8–10.8)
WBC # FLD AUTO: 11.92 K/UL — HIGH (ref 4.8–10.8)

## 2023-03-28 PROCEDURE — 99232 SBSQ HOSP IP/OBS MODERATE 35: CPT

## 2023-03-28 RX ADMIN — Medication 1 APPLICATION(S): at 06:00

## 2023-03-28 RX ADMIN — Medication 5 MILLIGRAM(S): at 17:13

## 2023-03-28 RX ADMIN — Medication 1 APPLICATION(S): at 17:14

## 2023-03-28 RX ADMIN — Medication 500 MILLIGRAM(S): at 11:28

## 2023-03-28 RX ADMIN — Medication 5 MILLIGRAM(S): at 06:00

## 2023-03-28 RX ADMIN — ENOXAPARIN SODIUM 40 MILLIGRAM(S): 100 INJECTION SUBCUTANEOUS at 21:43

## 2023-03-28 RX ADMIN — LEVETIRACETAM 1500 MILLIGRAM(S): 250 TABLET, FILM COATED ORAL at 06:00

## 2023-03-28 RX ADMIN — Medication 81 MILLIGRAM(S): at 11:27

## 2023-03-28 RX ADMIN — Medication 1 TABLET(S): at 11:26

## 2023-03-28 RX ADMIN — LEVETIRACETAM 1500 MILLIGRAM(S): 250 TABLET, FILM COATED ORAL at 17:13

## 2023-03-28 RX ADMIN — CLOPIDOGREL BISULFATE 75 MILLIGRAM(S): 75 TABLET, FILM COATED ORAL at 11:27

## 2023-03-28 RX ADMIN — Medication 500 MILLIGRAM(S): at 13:09

## 2023-03-28 RX ADMIN — MIDODRINE HYDROCHLORIDE 10 MILLIGRAM(S): 2.5 TABLET ORAL at 17:12

## 2023-03-28 RX ADMIN — GABAPENTIN 100 MILLIGRAM(S): 400 CAPSULE ORAL at 13:10

## 2023-03-28 RX ADMIN — Medication 500 MILLIGRAM(S): at 06:10

## 2023-03-28 RX ADMIN — GABAPENTIN 100 MILLIGRAM(S): 400 CAPSULE ORAL at 21:16

## 2023-03-28 RX ADMIN — GABAPENTIN 100 MILLIGRAM(S): 400 CAPSULE ORAL at 06:00

## 2023-03-28 RX ADMIN — MIDODRINE HYDROCHLORIDE 10 MILLIGRAM(S): 2.5 TABLET ORAL at 11:26

## 2023-03-28 RX ADMIN — Medication 500 MILLIGRAM(S): at 21:16

## 2023-03-28 NOTE — PROGRESS NOTE ADULT - ATTENDING COMMENTS
66 y/o woman with PMH of Right MCA stroke with left sided weakness was brought to the ED via EMS. In the ED, she had altered mental status and sepsis. Patient has a h/o prior hospitalization  in a hospital in [facility] last fall due to persistent metabolic encephalopathy for months     #  Altered Mental Status likely due to Metabolic Encephalopathy from UTI, sepsis and seizure in the context of acute and chronic stroke with left hemiparesis   s/p course of abx for E. coli UTI  MRI of brain: Small patchy subcortical infarct in the right frontal lobe. Redemonstrated chronic infarct in the right MCA territory.  Mild chronic microvascular ischemic changes.  CTA of head/neck: no evidence of occlusion, aneurysm or stenosis  s/p rapid response for seizure- s/p EEG and video EEG: continue on Depakote 500 mg TID and Keppra 1000 mg BID  seizure precautions  Neuro f/u appreciated - stroke likely due to hypotension - has diminutive caliber of right M1 - avoid hypotension  Added ASA, Plavix (On March 17th) and statin for new stroke -  DAPT for 21 days (From March 17th); followed by ASA 81 mg daily - f/u in stroke clinic in 4 weeks per neurology  pt with UE spasticity: MRI of C spine: Significantly motion limited study. No gross cord compression or spinal cord edema demonstrated. Multilevel small disc bulges. - continue baclofen 5mg bid  behavioral health eval appreciated: pt does not have the capacity to choose a Health care agent  -clinically patient is not following verbal commands, persistent left sided paralysis, dysarthria , oropharyngeal dysphagia , patient is in chronic bed confinement status.    # Seizures- 3/20- patient had gaze deviation to the left- resolved, Neuro f/up rec- increased Keppra dose tx.   - if patient will have recurrent neuro event -consider repeating EEG    # New onset fevers on 3/20 at night and 3/21 in am: Maybe 2/2   # Unstagable sacral wound - Per Burn eval: LWC only.  3/20 procal = 0.05   Per ID, Cefepime (ended 3/24)  -repeated CXR- personally reviewed, no acute infiltrates, effusions. f/up blood cxs, obtain u/a, urine cxs,     #After being afebrile for many days, new fever again (102 F on 3/26 night):   Sacral wound doesn't seem infected. Burn can re-eval.   CXR NACPD, UA neg, blood culture PEnding. ID informed. Watching off Abx for now.     #Left avulsion fracture of medial malleolus  evaluated by podiatry - no surgical intervention - WB with CAM boot  continue PT/fall precautions, pain control    # Sinus tachycardia   now resolved - attributed to pain  EKG reviewed, TSH WNL    #Left cephalic vein thrombosis - s/p warm compress  b/l UE duplex neg for any DVT.     # Anemia - normocytic  labs reviewed - Hgb in 9-10 range and now stable    # DVT prophylaxis    full code, overall patient's prognosis guarded   Family discussion: yes, medical team     #Progress Note Handoff:  If fever workup negative, and remains afebrile, then will be medically  ready for NH placement.

## 2023-03-28 NOTE — PROGRESS NOTE ADULT - SUBJECTIVE AND OBJECTIVE BOX
JOSEPH OBRIEN 67y Female  MRN#: [id number]   Hospital Day: 24d    SUBJECTIVE  Patient is a 67y old Female who presents with a chief complaint of acute stroke  Currently admitted to medicine with the primary diagnosis of Stroke      INTERVAL HPI AND OVERNIGHT EVENTS:  Patient was examined and seen at bedside. This morning she is resting comfortably in bed and reports no issues or overnight events.    OBJECTIVE  PAST MEDICAL & SURGICAL HISTORY    ALLERGIES:  Allergy Status Unknown    MEDICATIONS:  STANDING MEDICATIONS  acetaminophen  Suppository .. 325 milliGRAM(s) Rectal once  ascorbic acid 500 milliGRAM(s) Oral daily  aspirin  chewable 81 milliGRAM(s) Oral daily  atorvastatin 40 milliGRAM(s) Oral at bedtime  baclofen 5 milliGRAM(s) Oral every 12 hours  clopidogrel Tablet 75 milliGRAM(s) Oral daily  enoxaparin Injectable 40 milliGRAM(s) SubCutaneous every 24 hours  gabapentin 100 milliGRAM(s) Oral three times a day  levETIRAcetam 1500 milliGRAM(s) Oral two times a day  midodrine. 10 milliGRAM(s) Oral three times a day  multivitamin/minerals 1 Tablet(s) Oral daily  valproic  acid Syrup 500 milliGRAM(s) Oral three times a day  vitamin A &amp; D Ointment 1 Application(s) Topical two times a day    PRN MEDICATIONS  acetaminophen     Tablet .. 650 milliGRAM(s) Oral every 6 hours PRN      VITAL SIGNS: Last 24 Hours  T(C): --  T(F): --  HR: 106 (27 Mar 2023 11:59) (106 - 106)  BP: 134/61 (27 Mar 2023 11:59) (134/61 - 134/61)  BP(mean): --  RR: 17 (28 Mar 2023 08:06) (17 - 17)  SpO2: 100% (28 Mar 2023 08:06) (100% - 100%)    LABS:                        8.0    10.17 )-----------( 420      ( 27 Mar 2023 10:39 )             24.1         139  |  104  |  8<L>  ----------------------------<  88  3.9   |  28  |  <0.5<L>    Ca    8.3<L>      27 Mar 2023 10:39  Phos  3.2       Mg     2.1         TPro  5.2<L>  /  Alb  2.4<L>  /  TBili  <0.2  /  DBili  x   /  AST  13  /  ALT  5   /  AlkPhos  68      PT/INR - ( 27 Mar 2023 10:43 )   PT: 13.10 sec;   INR: 1.14 ratio         PTT - ( 27 Mar 2023 10:43 )  PTT:31.9 sec  Urinalysis Basic - ( 27 Mar 2023 17:33 )    Color: Yellow / Appearance: Clear / S.025 / pH: x  Gluc: x / Ketone: Trace  / Bili: Negative / Urobili: 6 mg/dL   Blood: x / Protein: 30 mg/dL / Nitrite: Negative   Leuk Esterase: Negative / RBC: 1 /HPF / WBC 1 /HPF   Sq Epi: x / Non Sq Epi: 3 /HPF / Bacteria: Negative        Lactate, Blood: 0.7 mmol/L (23 @ 10:39)          PHYSICAL EXAM:  CONSTITUTIONAL: No acute distress, laying in bed comfortably this AM  HEENT: Atraumatic, normocephalic, neck supple, moist mucous membranes  PULMONARY: Clear to auscultation bilaterally  CARDIOVASCULAR: Regular rate and rhythm  GASTROINTESTINAL: Soft, non-tender, non-distended; bowel sounds present  MUSCULOSKELETAL: no LE edema, 2+ pulses b/l LE, left LE in CAM boot  NEURO: L hemiparesis, speaks in short sentences, B/L UE spasticity, contracted tone  SKIN: Clean Sacral ulcer - wound wrapped and clean

## 2023-03-28 NOTE — DOWNTIME INTERRUPTION NOTE - WHICH MANUAL FORMS INITIATED?
See attached patient notes in a chart due to [facility] downtime.
See paper records for clinical information entered during [facility] downtime
See paper records for clinical information entered during [facility] downtime.

## 2023-03-28 NOTE — PROGRESS NOTE ADULT - ASSESSMENT
68 y/o woman with PMH of Right MCA stroke with left sided weakness was brought to the ED via EMS. In the ED, she had altered mental status and sepsis.    #Febrile 3/26 overnight  - Sepsis w/up pending (CXR, UA, BCX) if UA + send UCx  - ID recs appreciated - monitor off ABx - repeat COVID if febrile    #Seizures  #Acute R Frontal Lobe Infarct likely 2/ hypotension   #Chronic R MCA infarct  - per chart: at baseline she is alert, able to communicate and is oriented. Ambulation at baseline: uses a wheelchair. She was in a hospital in [facility] last fall and was lethargic for months  - 3/8: s/p rapid response for L gaze preference  - 3/20: Pt had event with L gaze preference during SS eval, pt made NPO. Examined by resident, no gaze preference present any longer. Pt responsive but less than baseline. Developed seizures post-event. Septic workup obtained. CXR appears clear, rest pending. --> 3/21 Pt more responsive this AM, expressing herself and in line with current baseline.  - Depakote level (3/4) 87 (WNL) -> (3/21) 74 (WNL)   - CTH/CTA of head/neck (3/4): no acute pathology, no evidence of occlusion, aneurysm or stenosis  - EEG (3/8) and video EEG (3/9): Focal slowing  - MRI Brain (3/15): new subcortical infarct in the right frontal lobe. Redemonstrated chronic infarct in the right MCA territory.  - MRI of C spine (3/16): Significantly motion limited study. No gross cord compression or spinal cord edema demonstrated. Multilevel small disc bulges.  - Neuro eval'd 3/8-3/20: For L gaze preference/stroke, OP med review: on low dose Lamictal and Depakote in addition to Keppra (possible mood disorder/ seizures), Her MRI of brain showing subcortical infarct within same M1 territory. The casue of stroke is most likely hypotension as she has a diminutive Rt M1. However, MRI findings doesn't explain her B/L upper extremity spasticity. So, it is important to rule out cervical cord pathology. DAPT for 21 days followed by ASA 81 mg daily, Increased keppra post 3/20 possible seizure event  - BH eval'd 3/15: Delirium; pt does not have the capacity to choose a Health care agent  - SS re-juliette 3/21: Minced and moist, 1:1 feeds, thin liquids through straw  - Seizure precautions  - C/w Depakote 500mg TID and Keppra to 1500mg BID   - C/w baclofen 5mg bid (for UE spasticity)  - C/w ASA, Plavix (3/17-4/6), and high dose statin for new stroke - DAPT for 21 days followed by ASA 81 mg daily  - C/w Midodrine 10mg TID  - OP stroke clinic in 4 weeks per neurology    #Metabolic Encephalopathy from UTI  #Febrile 3/20  #Unstagable Sacral Ulcer  - 3/20: Pt became febrile after possible seizure episode, Cefepime, levaquin (x1 3/20), vancomycin  - 3/20 Septic workup: WBC (3/21) 12.26 (up from 8.23), BCx (3/20) NGTD, Procal (3/20) 0.05, RVP (3/20) COVID (-), Cortisol level, Lactate (3/21) WNL, UA nitrite (-), LEC (-), pending WBCs/Bacteria, UCx (3/21) NGTD  - Duplex LUE (3/10): (-) DVT (+) superficial cephalic vein thrombosis  - Duplex BLLE (3/7): (-) DVT  - s/p Vanc (3/4x1, 3/20-3/22), Cefepime (x1 3/4, x1 3/8), Ceftriaxone (3/4-3/6) (for UTI)  - Duplex BLLE: negative for DVT  - ID eval'd 3/23  - Burn eval'd 3/22 - No intervention, wound care recs noted  - Cefepime completed 3/24 (per ID)    #Left avulsion fracture of medial malleolus  - Duplex BL LE (3/7): (-) for DVT  - CT Foot (3/11): acute minimally displaced avulsion fracture at the medial malleolus  - Podiatry following for foot pain: WBAT w/ surgical shoe, ankle brace, PT  - fall precautions  - pain control regimen - if pain uncontrolled consider increasing percocet to 2tabs.  - C/w gabapentin at 100mg TID (Lowered dose from home, increase as pt tolerates)    #Sinus tachycardia - now resolved - attributed to pain  - TSH (3/8) WNL, - EKG reviewed    #Left cephalic vein thrombosis  - Duplex LUE (3/10): No DVT however there is superficial thrombosis noted in the left cephalic vein  - s/p warm compress  - Repeat duplex ordered 3/27 - negative for DVT    #Anemia - normocytic - stable  - possibly due to frequent phlebotomy, no signs of active bleed - monitor    #Misc  - DVT ppx - lovenox  - Diet: Soft and bite sized (SS 3/7)  - full code, overall guarded prognosis  - Dispo: pending sepsis re-workup     68 y/o woman with PMH of Right MCA stroke with left sided weakness was brought to the ED via EMS. In the ED, she had altered mental status and sepsis.    #Febrile 3/26 overnight  - Sepsis w/up pending (CXR, UA, BCX) if UA + send UCx  - ID recs appreciated - monitor off ABx - repeat COVID if febrile    #Seizures  #Acute R Frontal Lobe Infarct likely 2/ hypotension   #Chronic R MCA infarct  - per chart: at baseline she is alert, able to communicate and is oriented. Ambulation at baseline: uses a wheelchair. She was in a hospital in [facility] last fall and was lethargic for months  - 3/8: s/p rapid response for L gaze preference  - 3/20: Pt had event with L gaze preference during SS eval, pt made NPO. Examined by resident, no gaze preference present any longer. Pt responsive but less than baseline. Developed seizures post-event. Septic workup obtained. CXR appears clear, rest pending. --> 3/21 Pt more responsive this AM, expressing herself and in line with current baseline.  - Depakote level (3/4) 87 (WNL) -> (3/21) 74 (WNL)   - CTH/CTA of head/neck (3/4): no acute pathology, no evidence of occlusion, aneurysm or stenosis  - EEG (3/8) and video EEG (3/9): Focal slowing  - MRI Brain (3/15): new subcortical infarct in the right frontal lobe. Re-demonstrated chronic infarct in the right MCA territory.  - MRI of C spine (3/16): Significantly motion limited study. No gross cord compression or spinal cord edema demonstrated. Multilevel small disc bulges.  - Neuro eval'd 3/8-3/20: For L gaze preference/stroke, OP med review: on low dose Lamictal and Depakote in addition to Keppra (possible mood disorder/ seizures), Her MRI of brain showing subcortical infarct within same M1 territory. The casue of stroke is most likely hypotension as she has a diminutive Rt M1. However, MRI findings doesn't explain her B/L upper extremity spasticity. So, it is important to rule out cervical cord pathology. DAPT for 21 days followed by ASA 81 mg daily, Increased keppra post 3/20 possible seizure event  - BH eval'd 3/15: Delirium; pt does not have the capacity to choose a Health care agent  - SS re-juliette 3/21: Minced and moist, 1:1 feeds, thin liquids through straw  - Seizure precautions  - C/w Depakote 500mg TID and Keppra to 1500mg BID   - C/w baclofen 5mg bid (for UE spasticity)  - C/w ASA, Plavix (3/17-4/6), and high dose statin for new stroke - DAPT for 21 days followed by ASA 81 mg daily  - C/w Midodrine 10mg TID  - OP stroke clinic in 4 weeks per neurology    #Metabolic Encephalopathy from UTI  #Febrile 3/20  #Unstageable Sacral Ulcer  - 3/20: Pt became febrile after possible seizure episode, Cefepime, levaquin (x1 3/20), vancomycin  - 3/20 Septic workup: WBC (3/21) 12.26 (up from 8.23), BCx (3/20) NGTD, Procal (3/20) 0.05, RVP (3/20) COVID (-), Cortisol level, Lactate (3/21) WNL, UA nitrite (-), LEC (-), pending WBCs/Bacteria, UCx (3/21) NGTD  - Duplex LUE (3/10): (-) DVT (+) superficial cephalic vein thrombosis  - Duplex BLLE (3/7): (-) DVT  - s/p Vanc (3/4x1, 3/20-3/22), Cefepime (x1 3/4, x1 3/8), Ceftriaxone (3/4-3/6) (for UTI)  - Duplex BLLE: negative for DVT  - ID eval'd 3/23  - Burn eval'd 3/22 - No intervention, wound care recs noted  - Cefepime completed 3/24 (per ID)    #Left avulsion fracture of medial malleolus  - Duplex BL LE (3/7): (-) for DVT  - CT Foot (3/11): acute minimally displaced avulsion fracture at the medial malleolus  - Podiatry following for foot pain: WBAT w/ surgical shoe, ankle brace, PT  - fall precautions  - pain control regimen - if pain uncontrolled consider increasing percocet to 2tabs.  - C/w gabapentin at 100mg TID (Lowered dose from home, increase as pt tolerates)    #Sinus tachycardia - now resolved - attributed to pain  - TSH (3/8) WNL, - EKG reviewed    #Left cephalic vein thrombosis  - Duplex LUE (3/10): No DVT however there is superficial thrombosis noted in the left cephalic vein  - s/p warm compress  - Repeat duplex ordered 3/27 - negative for DVT    #Anemia - normocytic - stable  - possibly due to frequent phlebotomy, no signs of active bleed - monitor    #Misc  - DVT ppx - lovenox  - Diet: Soft and bite sized (SS 3/7)  - full code, overall guarded prognosis  - Dispo: pending BCx results

## 2023-03-29 LAB
ALBUMIN SERPL ELPH-MCNC: 2.3 G/DL — LOW (ref 3.5–5.2)
ALP SERPL-CCNC: 75 U/L — SIGNIFICANT CHANGE UP (ref 30–115)
ALT FLD-CCNC: 6 U/L — SIGNIFICANT CHANGE UP (ref 0–41)
ANION GAP SERPL CALC-SCNC: 9 MMOL/L — SIGNIFICANT CHANGE UP (ref 7–14)
AST SERPL-CCNC: 14 U/L — SIGNIFICANT CHANGE UP (ref 0–41)
BASOPHILS # BLD AUTO: 0.04 K/UL — SIGNIFICANT CHANGE UP (ref 0–0.2)
BASOPHILS NFR BLD AUTO: 0.4 % — SIGNIFICANT CHANGE UP (ref 0–1)
BILIRUB SERPL-MCNC: <0.2 MG/DL — SIGNIFICANT CHANGE UP (ref 0.2–1.2)
BUN SERPL-MCNC: 7 MG/DL — LOW (ref 10–20)
CALCIUM SERPL-MCNC: 8.4 MG/DL — SIGNIFICANT CHANGE UP (ref 8.4–10.5)
CHLORIDE SERPL-SCNC: 102 MMOL/L — SIGNIFICANT CHANGE UP (ref 98–110)
CO2 SERPL-SCNC: 27 MMOL/L — SIGNIFICANT CHANGE UP (ref 17–32)
CREAT SERPL-MCNC: <0.5 MG/DL — LOW (ref 0.7–1.5)
EGFR: 117 ML/MIN/1.73M2 — SIGNIFICANT CHANGE UP
EOSINOPHIL # BLD AUTO: 0.12 K/UL — SIGNIFICANT CHANGE UP (ref 0–0.7)
EOSINOPHIL NFR BLD AUTO: 1.1 % — SIGNIFICANT CHANGE UP (ref 0–8)
GLUCOSE SERPL-MCNC: 87 MG/DL — SIGNIFICANT CHANGE UP (ref 70–99)
HCT VFR BLD CALC: 25 % — LOW (ref 37–47)
HGB BLD-MCNC: 8.3 G/DL — LOW (ref 12–16)
IMM GRANULOCYTES NFR BLD AUTO: 1.8 % — HIGH (ref 0.1–0.3)
LYMPHOCYTES # BLD AUTO: 2.74 K/UL — SIGNIFICANT CHANGE UP (ref 1.2–3.4)
LYMPHOCYTES # BLD AUTO: 24.1 % — SIGNIFICANT CHANGE UP (ref 20.5–51.1)
MAGNESIUM SERPL-MCNC: 2 MG/DL — SIGNIFICANT CHANGE UP (ref 1.8–2.4)
MCHC RBC-ENTMCNC: 29.3 PG — SIGNIFICANT CHANGE UP (ref 27–31)
MCHC RBC-ENTMCNC: 33.2 G/DL — SIGNIFICANT CHANGE UP (ref 32–37)
MCV RBC AUTO: 88.3 FL — SIGNIFICANT CHANGE UP (ref 81–99)
MONOCYTES # BLD AUTO: 1.86 K/UL — HIGH (ref 0.1–0.6)
MONOCYTES NFR BLD AUTO: 16.3 % — HIGH (ref 1.7–9.3)
NEUTROPHILS # BLD AUTO: 6.43 K/UL — SIGNIFICANT CHANGE UP (ref 1.4–6.5)
NEUTROPHILS NFR BLD AUTO: 56.3 % — SIGNIFICANT CHANGE UP (ref 42.2–75.2)
NRBC # BLD: 0 /100 WBCS — SIGNIFICANT CHANGE UP (ref 0–0)
PLATELET # BLD AUTO: 449 K/UL — HIGH (ref 130–400)
POTASSIUM SERPL-MCNC: 4.3 MMOL/L — SIGNIFICANT CHANGE UP (ref 3.5–5)
POTASSIUM SERPL-SCNC: 4.3 MMOL/L — SIGNIFICANT CHANGE UP (ref 3.5–5)
PROT SERPL-MCNC: 5.2 G/DL — LOW (ref 6–8)
RAPID RVP RESULT: SIGNIFICANT CHANGE UP
RBC # BLD: 2.83 M/UL — LOW (ref 4.2–5.4)
RBC # FLD: 13.8 % — SIGNIFICANT CHANGE UP (ref 11.5–14.5)
SARS-COV-2 RNA SPEC QL NAA+PROBE: SIGNIFICANT CHANGE UP
SODIUM SERPL-SCNC: 138 MMOL/L — SIGNIFICANT CHANGE UP (ref 135–146)
WBC # BLD: 11.39 K/UL — HIGH (ref 4.8–10.8)
WBC # FLD AUTO: 11.39 K/UL — HIGH (ref 4.8–10.8)

## 2023-03-29 PROCEDURE — 99232 SBSQ HOSP IP/OBS MODERATE 35: CPT

## 2023-03-29 RX ADMIN — Medication 1 APPLICATION(S): at 17:44

## 2023-03-29 RX ADMIN — Medication 500 MILLIGRAM(S): at 13:41

## 2023-03-29 RX ADMIN — GABAPENTIN 100 MILLIGRAM(S): 400 CAPSULE ORAL at 21:37

## 2023-03-29 RX ADMIN — Medication 5 MILLIGRAM(S): at 17:18

## 2023-03-29 RX ADMIN — Medication 5 MILLIGRAM(S): at 05:08

## 2023-03-29 RX ADMIN — Medication 1 APPLICATION(S): at 05:29

## 2023-03-29 RX ADMIN — Medication 81 MILLIGRAM(S): at 11:11

## 2023-03-29 RX ADMIN — MIDODRINE HYDROCHLORIDE 10 MILLIGRAM(S): 2.5 TABLET ORAL at 17:18

## 2023-03-29 RX ADMIN — ATORVASTATIN CALCIUM 40 MILLIGRAM(S): 80 TABLET, FILM COATED ORAL at 21:38

## 2023-03-29 RX ADMIN — LEVETIRACETAM 1500 MILLIGRAM(S): 250 TABLET, FILM COATED ORAL at 17:18

## 2023-03-29 RX ADMIN — Medication 500 MILLIGRAM(S): at 05:06

## 2023-03-29 RX ADMIN — MIDODRINE HYDROCHLORIDE 10 MILLIGRAM(S): 2.5 TABLET ORAL at 05:08

## 2023-03-29 RX ADMIN — CLOPIDOGREL BISULFATE 75 MILLIGRAM(S): 75 TABLET, FILM COATED ORAL at 11:12

## 2023-03-29 RX ADMIN — Medication 500 MILLIGRAM(S): at 11:12

## 2023-03-29 RX ADMIN — LEVETIRACETAM 1500 MILLIGRAM(S): 250 TABLET, FILM COATED ORAL at 05:07

## 2023-03-29 RX ADMIN — GABAPENTIN 100 MILLIGRAM(S): 400 CAPSULE ORAL at 05:08

## 2023-03-29 RX ADMIN — Medication 500 MILLIGRAM(S): at 21:37

## 2023-03-29 RX ADMIN — MIDODRINE HYDROCHLORIDE 10 MILLIGRAM(S): 2.5 TABLET ORAL at 11:12

## 2023-03-29 RX ADMIN — Medication 1 TABLET(S): at 11:12

## 2023-03-29 RX ADMIN — ENOXAPARIN SODIUM 40 MILLIGRAM(S): 100 INJECTION SUBCUTANEOUS at 21:37

## 2023-03-29 RX ADMIN — GABAPENTIN 100 MILLIGRAM(S): 400 CAPSULE ORAL at 13:41

## 2023-03-29 NOTE — PROGRESS NOTE ADULT - SUBJECTIVE AND OBJECTIVE BOX
JOSEPH OBRIEN  [facility] 3A (Back) 019 A [facility]      Patient was evaluated and examined  by bedside, no fever today as per covering nurse report          REVIEW OF SYSTEMS: unable to obtain due to patients confusion        T(C): , Max: 37.7 (03-28-23 @ 12:01)  HR: 92 (03-29-23 @ 05:00)  BP: 107/64 (03-29-23 @ 05:00)  RR: 18 (03-29-23 @ 05:00)  SpO2: 100% (03-29-23 @ 05:00)  CAPILLARY BLOOD GLUCOSE          PHYSICAL EXAM:  General: NAD, Awake, patient is laying comfortably in bed  HEENT: AT, NC, Supple, NO JVD, NO CB  Lungs: CTA B/L, no wheezing, no rhonchi  CVS: normal S1, S2, RRR, NO M/G/R  Abdomen: soft, bowel sounds present, non-tender, non-distended  Extremities: no edema, no clubbing, no cyanosis, positive peripheral pulses b/l  Neuro: confused, diffuse generalized body weakness, expressive aphasia, pt. not following verbal commands  Skin: sacral wound covered with dressing      LAB  CBC  Date: 03-29-23 @ 08:14  Mean cell Behphhnxsm82.3  Mean cell Hemoglobin Conc33.2  Mean cell Volum 88.3  Platelet count-Automate 449  RBC Count 2.83  Red Cell Distrib Width13.8  WBC Count11.39  % Albumin, Urine--  Hematocrit 25.0  Hemoglobin 8.3  CBC  Date: 03-28-23 @ 12:05  Mean cell Mcohpvrgaf69.8  Mean cell Hemoglobin Conc31.9  Mean cell Volum 90.4  Platelet count-Automate 458  RBC Count 3.02  Red Cell Distrib Width13.9  WBC Count11.92  % Albumin, Urine--  Hematocrit 27.3  Hemoglobin 8.7  CBC  Date: 03-27-23 @ 10:39  Mean cell Yluurilvca27.2  Mean cell Hemoglobin Conc33.2  Mean cell Volum 88.0  Platelet count-Automate 420  RBC Count 2.74  Red Cell Distrib Width13.9  WBC Count10.17  % Albumin, Urine--  Hematocrit 24.1  Hemoglobin 8.0  CBC  Date: 03-26-23 @ 07:41  Mean cell Quelrloxyk71.6  Mean cell Hemoglobin Conc31.9  Mean cell Volum 89.6  Platelet count-Automate 424  RBC Count 2.80  Red Cell Distrib Width13.6  WBC Count10.08  % Albumin, Urine--  Hematocrit 25.1  Hemoglobin 8.0  CBC  Date: 03-24-23 @ 20:51  Mean cell Hjpyhddwwl75.3  Mean cell Hemoglobin Conc32.8  Mean cell Volum 89.3  Platelet count-Automate 396  RBC Count 2.90  Red Cell Distrib Width13.3  WBC Count9.14  % Albumin, Urine--  Hematocrit 25.9  Hemoglobin 8.5    [facility]  03-29-23 @ 08:14  Blood Gas Arterial-Calcium,Ionized--  Blood Urea Nitrogen, Serum 7 mg/dL<L> [10 - 20]  Carbon Dioxide, Serum27 mmol/L [17 - 32]  Chloride, [id number] mmol/L [98 - 110]  Creatinie, Serum<0.5 mg/dL<L> [0.7 - 1.5]  Glucose, Serum87 mg/dL [70 - 99]  Potassium, Serum4.3 mmol/L [3.5 - 5.0]  Sodium, Serum 138 mmol/L [135 - 146]  [facility]  03-28-23 @ 12:05  Blood Gas Arterial-Calcium,Ionized--  Blood Urea Nitrogen, Serum 7 mg/dL<L> [10 - 20]  Carbon Dioxide, Serum26 mmol/L [17 - 32]  Chloride, [id number] mmol/L [98 - 110]  Creatinie, Serum<0.5 mg/dL<L> [0.7 - 1.5]  Glucose, Serum90 mg/dL [70 - 99]  Potassium, Serum4.4 mmol/L [3.5 - 5.0]  Sodium, Serum 138 mmol/L [135 - 146]  [facility]  03-27-23 @ 10:39  Blood Gas Arterial-Calcium,Ionized--  Blood Urea Nitrogen, Serum 8 mg/dL<L> [10 - 20]  Carbon Dioxide, Serum28 mmol/L [17 - 32]  Chloride, Secbq764 mmol/L [98 - 110]  Creatinie, Serum<0.5 mg/dL<L> [0.7 - 1.5]  Glucose, Serum88 mg/dL [70 - 99]  Potassium, Serum3.9 mmol/L [3.5 - 5.0]  Sodium, Serum 139 mmol/L [135 - 146]  [facility]  03-26-23 @ 07:41  Blood Gas Arterial-Calcium,Ionized--  Blood Urea Nitrogen, Serum 7 mg/dL<L> [10 - 20]  Carbon Dioxide, Serum27 mmol/L [17 - 32]  Chloride, [id number] mmol/L [98 - 110]  Creatinie, Serum<0.5 mg/dL<L> [0.7 - 1.5]  Glucose, Serum84 mg/dL [70 - 99]  Potassium, Serum3.9 mmol/L [3.5 - 5.0]  Sodium, Serum 140 mmol/L [135 - 146]  [facility]  03-24-23 @ 20:51  Blood Gas Arterial-Calcium,Ionized--  Blood Urea Nitrogen, Serum 6 mg/dL<L> [10 - 20]  Carbon Dioxide, Serum26 mmol/L [17 - 32]  Chloride, Nmzku201 mmol/L [98 - 110]  Creatinie, Serum<0.5 mg/dL<L> [0.7 - 1.5]  Glucose, Crhya579 mg/dL<H> [70 - 99]  Potassium, Serum3.8 mmol/L [3.5 - 5.0]  Sodium, Serum 140 mmol/L [135 - 146]              Microbiology:    Culture - Blood (collected 03-27-23 @ 10:39)  Source: .Blood Blood-Peripheral  Preliminary Report (03-28-23 @ 18:01):    No growth to date.    Culture - Urine (collected 03-21-23 @ 11:57)  Source: Catheterized Catheterized  Final Report (03-22-23 @ 17:54):    No growth    Culture - Blood (collected 03-20-23 @ 22:07)  Source: .Blood Blood  Final Report (03-26-23 @ 02:00):    No Growth Final    Culture - Blood (collected 03-09-23 @ 11:46)  Source: .Blood None  Final Report (03-14-23 @ 23:00):    No Growth Final    Culture - Blood (collected 03-07-23 @ 07:10)  Source: .Blood None  Final Report (03-12-23 @ 18:00):    No Growth Final    Culture - Urine (collected 03-04-23 @ 05:10)  Source: Catheterized Catheterized  Final Report (03-07-23 @ 10:35):    >100,000 CFU/ml Escherichia coli  Organism: Escherichia coli (03-07-23 @ 10:35)  Organism: Escherichia coli (03-07-23 @ 10:35)      Method Type: JULIANA      -  Amikacin: S <=16      -  Amoxicillin/Clavulanic Acid: S <=8/4      -  Ampicillin: S <=8 These ampicillin results predict results for amoxicillin      -  Ampicillin/Sulbactam: S <=4/2 Enterobacter, Klebsiella aerogenes, Citrobacter, and Serratia may develop resistance during prolonged therapy (3-4 days)      -  Aztreonam: S <=4      -  Cefazolin: S <=2 For uncomplicated UTI with K. pneumoniae, E. coli, or P. mirablis: JULIANA <=16 is sensitive and JULIANA >=32 is resistant. This also predicts results for oral agents cefaclor, cefdinir, cefpodoxime, cefprozil, cefuroxime axetil, cephalexin and locarbef for uncomplicated UTI. Note that some isolates may be susceptible to these agents while testing resistant to cefazolin.      -  Cefepime: S <=2      -  Cefoxitin: S <=8      -  Ceftriaxone: S <=1 Enterobacter, Klebsiella aerogenes, Citrobacter, and Serratia may develop resistance during prolonged therapy      -  Cefuroxime: S <=4      -  Ciprofloxacin: S <=0.25      -  Ertapenem: S <=0.5      -  Gentamicin: S <=2      -  Imipenem: S <=1      -  Levofloxacin: S <=0.5      -  Meropenem: S <=1      -  Nitrofurantoin: S <=32 Should not be used to treat pyelonephritis      -  Piperacillin/Tazobactam: S <=8      -  Tobramycin: S <=2      -  Trimethoprim/Sulfamethoxazole: S <=0.5/9.5    Culture - Blood (collected 03-04-23 @ 03:28)  Source: .Blood Blood-Peripheral  Final Report (03-09-23 @ 09:00):    No Growth Final    Culture - Blood (collected 03-04-23 @ 03:28)  Source: .Blood Blood-Peripheral  Final Report (03-09-23 @ 09:00):    No Growth Final        Medications:  acetaminophen     Tablet .. 650 milliGRAM(s) Oral every 6 hours PRN  acetaminophen  Suppository .. 325 milliGRAM(s) Rectal once  ascorbic acid 500 milliGRAM(s) Oral daily  aspirin  chewable 81 milliGRAM(s) Oral daily  atorvastatin 40 milliGRAM(s) Oral at bedtime  baclofen 5 milliGRAM(s) Oral every 12 hours  clopidogrel Tablet 75 milliGRAM(s) Oral daily  enoxaparin Injectable 40 milliGRAM(s) SubCutaneous every 24 hours  gabapentin 100 milliGRAM(s) Oral three times a day  levETIRAcetam 1500 milliGRAM(s) Oral two times a day  midodrine. 10 milliGRAM(s) Oral three times a day  multivitamin/minerals 1 Tablet(s) Oral daily  valproic  acid Syrup 500 milliGRAM(s) Oral three times a day  vitamin A &amp; D Ointment 1 Application(s) Topical two times a day        Assessment and Plan:  Patient is a 66 y/o woman with PMH of Right MCA stroke with left sided weakness was brought to the ED via EMS. In the ED, she had altered mental status and sepsis. Patient has a h/o prior hospitalization  in a hospital in [facility] last fall due to persistent metabolic encephalopathy for months     #  Altered Mental Status likely due to Metabolic Encephalopathy from UTI, sepsis and seizure in the context of acute and chronic stroke with left hemiparesis   s/p course of abx for E. coli UTI  MRI of brain: Small patchy subcortical infarct in the right frontal lobe. Redemonstrated chronic infarct in the right MCA territory.  Mild chronic microvascular ischemic changes.  CTA of head/neck: no evidence of occlusion, aneurysm or stenosis  s/p rapid response for seizure- s/p EEG and video EEG: continue on Depakote 500 mg TID and Keppra 1000 mg BID  seizure precautions  Neuro f/u appreciated - stroke likely due to hypotension - has diminutive caliber of right M1 - avoid hypotension  Added ASA, Plavix (On March 17th) and statin for new stroke -  DAPT for 21 days (From March 17th); followed by ASA 81 mg daily - f/u in stroke clinic in 4 weeks per neurology  pt with UE spasticity: MRI of C spine: Significantly motion limited study. No gross cord compression or spinal cord edema demonstrated. Multilevel small disc bulges. - continue baclofen 5mg bid  behavioral health eval appreciated: pt does not have the capacity to choose a Health care agent  -clinically patient is not following verbal commands, persistent left sided paralysis, dysarthria , oropharyngeal dysphagia , patient is in chronic bed confinement status.    # Seizures- 3/20- patient had gaze deviation to the left- resolved, Neuro f/up rec- increased Keppra dose tx.   -outpatient Neuro f/up     # New onset fevers on 3/20 at night and 3/21 in am: Maybe 2/2   # Unstagable sacral wound - Per Burn eval: LWC only.  3/20 procal = 0.05   Per ID, Cefepime (ended 3/24)      #After being afebrile for many days, new fever again (102 F on 3/26 night):   Sacral wound doesn't seem infected. Burn can re-eval.   CXR NACPD, UA neg, blood culture prelim negative . ID informed. Watching off Abx for now.  f/up viral panel    #Left avulsion fracture of medial malleolus  evaluated by podiatry - no surgical intervention - WB with CAM boot  continue PT/fall precautions, pain control    # Sinus tachycardia   now resolved - attributed to pain  EKG reviewed, TSH WNL    #Left cephalic vein thrombosis - s/p warm compress  b/l UE duplex neg for any DVT.     # Anemia - normocytic  labs reviewed - Hgb in 9-10 range and now stable    # DVT prophylaxis    full code, overall patient's prognosis guarded   Family discussion: yes, medical team     #Progress Note Handoff: Patient was medically optimized, start d/c to SNF planning , f/up RVP- was completed for fever work up  Family discussion: yes, medical team Disposition: SNF possibly today    Total time spent to complete patient's bedside assessment, review medical chart, discuss medical plan of care with covering medical team was more than 35 minutes with >50% of time spent face to face with patient, discussion with patient/family and/or coordination of care

## 2023-03-29 NOTE — PROGRESS NOTE ADULT - ASSESSMENT
66 y/o woman with PMH of Right MCA stroke with left sided weakness was brought to the ED via EMS. In the ED, she had altered mental status and sepsis.    #Febrile 3/26 overnight  - Sepsis w/up pending (CXR, UA, BCX) if UA + send UCx  - ID recs appreciated - monitor off ABx - repeat COVID if febrile  - Repeat RVP pending, no other fever since    #Seizures  #Acute R Frontal Lobe Infarct likely 2/ hypotension   #Chronic R MCA infarct  - per chart: at baseline she is alert, able to communicate and is oriented. Ambulation at baseline: uses a wheelchair. She was in a hospital in [facility] last fall and was lethargic for months  - 3/8: s/p rapid response for L gaze preference  - 3/20: Pt had event with L gaze preference during SS eval, pt made NPO. Examined by resident, no gaze preference present any longer. Pt responsive but less than baseline. Developed seizures post-event. Septic workup obtained. CXR appears clear, rest pending. --> 3/21 Pt more responsive this AM, expressing herself and in line with current baseline.  - Depakote level (3/4) 87 (WNL) -> (3/21) 74 (WNL)   - CTH/CTA of head/neck (3/4): no acute pathology, no evidence of occlusion, aneurysm or stenosis  - EEG (3/8) and video EEG (3/9): Focal slowing  - MRI Brain (3/15): new subcortical infarct in the right frontal lobe. Re-demonstrated chronic infarct in the right MCA territory.  - MRI of C spine (3/16): Significantly motion limited study. No gross cord compression or spinal cord edema demonstrated. Multilevel small disc bulges.  - Neuro eval'd 3/8-3/20: For L gaze preference/stroke, OP med review: on low dose Lamictal and Depakote in addition to Keppra (possible mood disorder/ seizures), Her MRI of brain showing subcortical infarct within same M1 territory. The casue of stroke is most likely hypotension as she has a diminutive Rt M1. However, MRI findings doesn't explain her B/L upper extremity spasticity. So, it is important to rule out cervical cord pathology. DAPT for 21 days followed by ASA 81 mg daily, Increased keppra post 3/20 possible seizure event  -  eval'd 3/15: Delirium; pt does not have the capacity to choose a Health care agent  - SS re-juliette 3/21: Minced and moist, 1:1 feeds, thin liquids through straw  - Seizure precautions  - C/w Depakote 500mg TID and Keppra to 1500mg BID   - C/w baclofen 5mg bid (for UE spasticity)  - C/w ASA, Plavix (3/17-4/6), and high dose statin for new stroke - DAPT for 21 days followed by ASA 81 mg daily  - C/w Midodrine 10mg TID  - OP stroke clinic in 4 weeks per neurology    #Metabolic Encephalopathy from UTI  #Febrile 3/20  #Unstageable Sacral Ulcer  - 3/20: Pt became febrile after possible seizure episode, Cefepime, levaquin (x1 3/20), vancomycin  - 3/20 Septic workup: WBC (3/21) 12.26 (up from 8.23), BCx (3/20) NGTD, Procal (3/20) 0.05, RVP (3/20) COVID (-), Cortisol level, Lactate (3/21) WNL, UA nitrite (-), LEC (-), pending WBCs/Bacteria, UCx (3/21) NGTD  - Duplex LUE (3/10): (-) DVT (+) superficial cephalic vein thrombosis  - Duplex BLLE (3/7): (-) DVT  - s/p Vanc (3/4x1, 3/20-3/22), Cefepime (x1 3/4, x1 3/8), Ceftriaxone (3/4-3/6) (for UTI)  - Duplex BLLE: negative for DVT  - ID eval'd 3/23  - Burn eval'd 3/22 - No intervention, wound care recs noted  - Cefepime completed 3/24 (per ID)    #Left avulsion fracture of medial malleolus  - Duplex BL LE (3/7): (-) for DVT  - CT Foot (3/11): acute minimally displaced avulsion fracture at the medial malleolus  - Podiatry following for foot pain: WBAT w/ surgical shoe, ankle brace, PT  - fall precautions  - pain control regimen - if pain uncontrolled consider increasing percocet to 2tabs.  - C/w gabapentin at 100mg TID (Lowered dose from home, increase as pt tolerates)    #Sinus tachycardia - now resolved - attributed to pain  - TSH (3/8) WNL, - EKG reviewed    #Left cephalic vein thrombosis  - Duplex LUE (3/10): No DVT however there is superficial thrombosis noted in the left cephalic vein  - s/p warm compress  - Repeat duplex ordered 3/27 - negative for DVT    #Anemia - normocytic - stable  - possibly due to frequent phlebotomy, no signs of active bleed - monitor    #Misc  - DVT ppx - lovenox  - Diet: Soft and bite sized (SS 3/7)  - full code, overall guarded prognosis  - Dispo: dc planning

## 2023-03-29 NOTE — PROGRESS NOTE ADULT - SUBJECTIVE AND OBJECTIVE BOX
JOSEPH OBRIEN 67y Female  MRN#: [id number]   Hospital Day:     SUBJECTIVE  Patient is a 67y old Female who presents with a chief complaint of AMS  Currently admitted to medicine with the primary diagnosis of Stroke      INTERVAL HPI AND OVERNIGHT EVENTS:  Patient was examined and seen at bedside. This morning she is resting comfortably in bed and reports no issues or overnight events.    OBJECTIVE  PAST MEDICAL & SURGICAL HISTORY    ALLERGIES:  Allergy Status Unknown    MEDICATIONS:  STANDING MEDICATIONS  acetaminophen  Suppository .. 325 milliGRAM(s) Rectal once  ascorbic acid 500 milliGRAM(s) Oral daily  aspirin  chewable 81 milliGRAM(s) Oral daily  atorvastatin 40 milliGRAM(s) Oral at bedtime  baclofen 5 milliGRAM(s) Oral every 12 hours  clopidogrel Tablet 75 milliGRAM(s) Oral daily  enoxaparin Injectable 40 milliGRAM(s) SubCutaneous every 24 hours  gabapentin 100 milliGRAM(s) Oral three times a day  levETIRAcetam 1500 milliGRAM(s) Oral two times a day  midodrine. 10 milliGRAM(s) Oral three times a day  multivitamin/minerals 1 Tablet(s) Oral daily  valproic  acid Syrup 500 milliGRAM(s) Oral three times a day  vitamin A &amp; D Ointment 1 Application(s) Topical two times a day    PRN MEDICATIONS  acetaminophen     Tablet .. 650 milliGRAM(s) Oral every 6 hours PRN      VITAL SIGNS: Last 24 Hours  T(C): 37.3 (29 Mar 2023 12:10), Max: 37.7 (29 Mar 2023 05:00)  T(F): 99.2 (29 Mar 2023 12:10), Max: 99.8 (29 Mar 2023 05:00)  HR: 87 (29 Mar 2023 12:10) (87 - 101)  BP: 113/53 (29 Mar 2023 12:10) (107/64 - 113/53)  BP(mean): --  RR: 17 (29 Mar 2023 12:10) (17 - 18)  SpO2: 99% (29 Mar 2023 12:10) (99% - 100%)    LABS:                        8.3    11.39 )-----------( 449      ( 29 Mar 2023 08:14 )             25.0     03-29    138  |  102  |  7<L>  ----------------------------<  87  4.3   |  27  |  <0.5<L>    Ca    8.4      29 Mar 2023 08:14  Mg     2.0         TPro  5.2<L>  /  Alb  2.3<L>  /  TBili  <0.2  /  DBili  x   /  AST  14  /  ALT  6   /  AlkPhos  75        Urinalysis Basic - ( 27 Mar 2023 17:33 )    Color: Yellow / Appearance: Clear / S.025 / pH: x  Gluc: x / Ketone: Trace  / Bili: Negative / Urobili: 6 mg/dL   Blood: x / Protein: 30 mg/dL / Nitrite: Negative   Leuk Esterase: Negative / RBC: 1 /HPF / WBC 1 /HPF   Sq Epi: x / Non Sq Epi: 3 /HPF / Bacteria: Negative        Culture - Blood (collected 27 Mar 2023 10:39)  Source: .Blood Blood-Peripheral  Preliminary Report (28 Mar 2023 18:01):    No growth to date.        PHYSICAL EXAM:  CONSTITUTIONAL: No acute distress, laying in bed comfortably this AM  HEENT: Atraumatic, normocephalic, neck supple, moist mucous membranes  PULMONARY: Clear to auscultation bilaterally  CARDIOVASCULAR: Regular rate and rhythm  GASTROINTESTINAL: Soft, non-tender, non-distended; bowel sounds present  MUSCULOSKELETAL: no LE edema, 2+ pulses b/l LE, left LE in CAM boot  NEURO: L hemiparesis, speaks in short sentences, B/L UE spasticity, contracted tone  SKIN: Clean Sacral ulcer - wound wrapped and clean

## 2023-03-30 LAB
ALBUMIN SERPL ELPH-MCNC: 2.3 G/DL — LOW (ref 3.5–5.2)
ALP SERPL-CCNC: 72 U/L — SIGNIFICANT CHANGE UP (ref 30–115)
ALT FLD-CCNC: 6 U/L — SIGNIFICANT CHANGE UP (ref 0–41)
ANION GAP SERPL CALC-SCNC: 11 MMOL/L — SIGNIFICANT CHANGE UP (ref 7–14)
AST SERPL-CCNC: 15 U/L — SIGNIFICANT CHANGE UP (ref 0–41)
BASOPHILS # BLD AUTO: 0.02 K/UL — SIGNIFICANT CHANGE UP (ref 0–0.2)
BASOPHILS NFR BLD AUTO: 0.2 % — SIGNIFICANT CHANGE UP (ref 0–1)
BILIRUB SERPL-MCNC: 0.3 MG/DL — SIGNIFICANT CHANGE UP (ref 0.2–1.2)
BUN SERPL-MCNC: 7 MG/DL — LOW (ref 10–20)
CALCIUM SERPL-MCNC: 7.9 MG/DL — LOW (ref 8.4–10.5)
CHLORIDE SERPL-SCNC: 102 MMOL/L — SIGNIFICANT CHANGE UP (ref 98–110)
CO2 SERPL-SCNC: 25 MMOL/L — SIGNIFICANT CHANGE UP (ref 17–32)
CREAT SERPL-MCNC: <0.5 MG/DL — LOW (ref 0.7–1.5)
EGFR: 109 ML/MIN/1.73M2 — SIGNIFICANT CHANGE UP
EOSINOPHIL # BLD AUTO: 0.09 K/UL — SIGNIFICANT CHANGE UP (ref 0–0.7)
EOSINOPHIL NFR BLD AUTO: 1 % — SIGNIFICANT CHANGE UP (ref 0–8)
GLUCOSE SERPL-MCNC: 111 MG/DL — HIGH (ref 70–99)
HCT VFR BLD CALC: 23.3 % — LOW (ref 37–47)
HGB BLD-MCNC: 7.5 G/DL — LOW (ref 12–16)
IMM GRANULOCYTES NFR BLD AUTO: 1.5 % — HIGH (ref 0.1–0.3)
LYMPHOCYTES # BLD AUTO: 2.7 K/UL — SIGNIFICANT CHANGE UP (ref 1.2–3.4)
LYMPHOCYTES # BLD AUTO: 29.5 % — SIGNIFICANT CHANGE UP (ref 20.5–51.1)
MAGNESIUM SERPL-MCNC: 1.9 MG/DL — SIGNIFICANT CHANGE UP (ref 1.8–2.4)
MCHC RBC-ENTMCNC: 29 PG — SIGNIFICANT CHANGE UP (ref 27–31)
MCHC RBC-ENTMCNC: 32.2 G/DL — SIGNIFICANT CHANGE UP (ref 32–37)
MCV RBC AUTO: 90 FL — SIGNIFICANT CHANGE UP (ref 81–99)
MONOCYTES # BLD AUTO: 1.17 K/UL — HIGH (ref 0.1–0.6)
MONOCYTES NFR BLD AUTO: 12.8 % — HIGH (ref 1.7–9.3)
NEUTROPHILS # BLD AUTO: 5.04 K/UL — SIGNIFICANT CHANGE UP (ref 1.4–6.5)
NEUTROPHILS NFR BLD AUTO: 55 % — SIGNIFICANT CHANGE UP (ref 42.2–75.2)
NRBC # BLD: 0 /100 WBCS — SIGNIFICANT CHANGE UP (ref 0–0)
PLATELET # BLD AUTO: 458 K/UL — HIGH (ref 130–400)
POTASSIUM SERPL-MCNC: 4.6 MMOL/L — SIGNIFICANT CHANGE UP (ref 3.5–5)
POTASSIUM SERPL-SCNC: 4.6 MMOL/L — SIGNIFICANT CHANGE UP (ref 3.5–5)
PROT SERPL-MCNC: 5.2 G/DL — LOW (ref 6–8)
RBC # BLD: 2.59 M/UL — LOW (ref 4.2–5.4)
RBC # FLD: 13.6 % — SIGNIFICANT CHANGE UP (ref 11.5–14.5)
SODIUM SERPL-SCNC: 138 MMOL/L — SIGNIFICANT CHANGE UP (ref 135–146)
WBC # BLD: 9.16 K/UL — SIGNIFICANT CHANGE UP (ref 4.8–10.8)
WBC # FLD AUTO: 9.16 K/UL — SIGNIFICANT CHANGE UP (ref 4.8–10.8)

## 2023-03-30 PROCEDURE — 99232 SBSQ HOSP IP/OBS MODERATE 35: CPT

## 2023-03-30 RX ADMIN — Medication 5 MILLIGRAM(S): at 17:41

## 2023-03-30 RX ADMIN — Medication 1 APPLICATION(S): at 17:45

## 2023-03-30 RX ADMIN — GABAPENTIN 100 MILLIGRAM(S): 400 CAPSULE ORAL at 13:36

## 2023-03-30 RX ADMIN — LEVETIRACETAM 1500 MILLIGRAM(S): 250 TABLET, FILM COATED ORAL at 06:12

## 2023-03-30 RX ADMIN — Medication 5 MILLIGRAM(S): at 06:12

## 2023-03-30 RX ADMIN — Medication 500 MILLIGRAM(S): at 11:52

## 2023-03-30 RX ADMIN — MIDODRINE HYDROCHLORIDE 10 MILLIGRAM(S): 2.5 TABLET ORAL at 16:33

## 2023-03-30 RX ADMIN — MIDODRINE HYDROCHLORIDE 10 MILLIGRAM(S): 2.5 TABLET ORAL at 06:11

## 2023-03-30 RX ADMIN — MIDODRINE HYDROCHLORIDE 10 MILLIGRAM(S): 2.5 TABLET ORAL at 11:52

## 2023-03-30 RX ADMIN — Medication 1 TABLET(S): at 11:52

## 2023-03-30 RX ADMIN — GABAPENTIN 100 MILLIGRAM(S): 400 CAPSULE ORAL at 21:38

## 2023-03-30 RX ADMIN — ATORVASTATIN CALCIUM 40 MILLIGRAM(S): 80 TABLET, FILM COATED ORAL at 21:41

## 2023-03-30 RX ADMIN — ENOXAPARIN SODIUM 40 MILLIGRAM(S): 100 INJECTION SUBCUTANEOUS at 21:39

## 2023-03-30 RX ADMIN — LEVETIRACETAM 1500 MILLIGRAM(S): 250 TABLET, FILM COATED ORAL at 17:41

## 2023-03-30 RX ADMIN — Medication 81 MILLIGRAM(S): at 11:52

## 2023-03-30 RX ADMIN — Medication 500 MILLIGRAM(S): at 13:36

## 2023-03-30 RX ADMIN — CLOPIDOGREL BISULFATE 75 MILLIGRAM(S): 75 TABLET, FILM COATED ORAL at 11:52

## 2023-03-30 RX ADMIN — GABAPENTIN 100 MILLIGRAM(S): 400 CAPSULE ORAL at 06:12

## 2023-03-30 RX ADMIN — Medication 500 MILLIGRAM(S): at 21:39

## 2023-03-30 RX ADMIN — Medication 500 MILLIGRAM(S): at 05:45

## 2023-03-30 ASSESSMENT — ACTIVITIES OF DAILY LIVING (ADL)
CHRONIC_PAIN_PRESENT: YES, CHRONIC
DESCRIBE HOW PAIN IMPACTS YOUR LIFE: MOBILITY;PHYSICAL ACTIVITY
ADL_BEFORE_ADMISSION: INDEPENDENT
HISTORY OF FALLING IN THE LAST YEAR (PRIOR TO ADMISSION): NO
RECENT_DECLINE_ADL: NO
ADL_SHORT_OF_BREATH: NO
MOBILITY_ASSIST_DEVICES: CANE;STANDARD WALKER
ADL_SCORE: 12
NEEDS_ASSIST: NO

## 2023-03-30 ASSESSMENT — COGNITIVE AND FUNCTIONAL STATUS - GENERAL
ARE YOU DEAF OR DO YOU HAVE SERIOUS DIFFICULTY  HEARING: NO
ARE YOU BLIND OR DO YOU HAVE SERIOUS DIFFICULTY SEEING, EVEN WHEN WEARING GLASSES: NO

## 2023-03-30 NOTE — PROGRESS NOTE ADULT - ASSESSMENT
66 y/o woman with PMH of Right MCA stroke with left sided weakness was brought to the ED via EMS. In the ED, she had altered mental status and sepsis.    #Febrile 3/26 overnight  - Sepsis w/up negative (CXR clear, BCx NGTD, UA neg)  - ID recs appreciated - monitor off ABx - repeat COVID if febrile  - Repeat RVP negative    #Seizures  #Acute R Frontal Lobe Infarct likely 2/ hypotension   #Chronic R MCA infarct  - per chart: at baseline she is alert, able to communicate and is oriented. Ambulation at baseline: uses a wheelchair. She was in a hospital in [place] last fall and was lethargic for months  - 3/8: s/p rapid response for L gaze preference  - 3/20: Pt had event with L gaze preference during SS eval, pt made NPO. Examined by resident, no gaze preference present any longer. Pt responsive but less than baseline. Developed seizures post-event. Septic workup obtained. CXR appears clear, rest pending. --> 3/21 Pt more responsive this AM, expressing herself and in line with current baseline.  - Depakote level (3/4) 87 (WNL) -> (3/21) 74 (WNL)   - CTH/CTA of head/neck (3/4): no acute pathology, no evidence of occlusion, aneurysm or stenosis  - EEG (3/8) and video EEG (3/9): Focal slowing  - MRI Brain (3/15): new subcortical infarct in the right frontal lobe. Re-demonstrated chronic infarct in the right MCA territory.  - MRI of C spine (3/16): Significantly motion limited study. No gross cord compression or spinal cord edema demonstrated. Multilevel small disc bulges.  - Neuro eval'd 3/8-3/20: For L gaze preference/stroke, OP med review: on low dose Lamictal and Depakote in addition to Keppra (possible mood disorder/ seizures), Her MRI of brain showing subcortical infarct within same M1 territory. The casue of stroke is most likely hypotension as she has a diminutive Rt M1. However, MRI findings doesn't explain her B/L upper extremity spasticity. So, it is important to rule out cervical cord pathology. DAPT for 21 days followed by ASA 81 mg daily, Increased keppra post 3/20 possible seizure event  - BH eval'd 3/15: Delirium; pt does not have the capacity to choose a Health care agent  - SS re-juliette 3/21: Minced and moist, 1:1 feeds, thin liquids through straw  - Seizure precautions  - C/w Depakote 500mg TID and Keppra to 1500mg BID   - C/w baclofen 5mg bid (for UE spasticity)  - C/w ASA, Plavix (3/17-4/6), and high dose statin for new stroke - DAPT for 21 days followed by ASA 81 mg daily  - C/w Midodrine 10mg TID  - OP stroke clinic in 4 weeks per neurology    #Metabolic Encephalopathy from UTI  #Febrile 3/20  #Unstageable Sacral Ulcer  - 3/20: Pt became febrile after possible seizure episode, Cefepime, levaquin (x1 3/20), vancomycin  - 3/20 Septic workup: WBC (3/21) 12.26 (up from 8.23), BCx (3/20) NGTD, Procal (3/20) 0.05, RVP (3/20) COVID (-), Cortisol level, Lactate (3/21) WNL, UA nitrite (-), LEC (-), pending WBCs/Bacteria, UCx (3/21) NGTD  - Duplex LUE (3/10): (-) DVT (+) superficial cephalic vein thrombosis  - Duplex BLLE (3/7): (-) DVT  - s/p Vanc (3/4x1, 3/20-3/22), Cefepime (x1 3/4, x1 3/8), Ceftriaxone (3/4-3/6) (for UTI)  - Duplex BLLE: negative for DVT  - ID eval'd 3/23  - Burn eval'd 3/22 - No intervention, wound care recs noted  - Cefepime completed 3/24 (per ID)    #Left avulsion fracture of medial malleolus  - Duplex BL LE (3/7): (-) for DVT  - CT Foot (3/11): acute minimally displaced avulsion fracture at the medial malleolus  - Podiatry following for foot pain: WBAT w/ surgical shoe, ankle brace, PT  - fall precautions  - pain control regimen - if pain uncontrolled consider increasing percocet to 2tabs.  - C/w gabapentin at 100mg TID (Lowered dose from home, increase as pt tolerates)    #Sinus tachycardia - now resolved - attributed to pain  - TSH (3/8) WNL, - EKG reviewed    #Left cephalic vein thrombosis  - Duplex LUE (3/10): No DVT however there is superficial thrombosis noted in the left cephalic vein  - s/p warm compress  - Repeat duplex ordered 3/27 - negative for DVT    #Anemia - normocytic - stable  - possibly due to frequent phlebotomy, no signs of active bleed - monitor    #Misc  - DVT ppx - lovenox  - Diet: Soft and bite sized (SS 3/7)  - full code, overall guarded prognosis  - Dispo: dc planning

## 2023-03-30 NOTE — PROGRESS NOTE ADULT - SUBJECTIVE AND OBJECTIVE BOX
JOSEPH OBRIEN  [facility] 3[facility] (Back) 019 A [facility]            Patient was evaluated and examined  by bedside, no active events over night as per covering nurse report, no fever         REVIEW OF SYSTEMS: unable to obtain,  patient has expressive aphasia      T(C): , Max: 37.3 (03-29-23 @ 12:10)  HR: 92 (03-30-23 @ 05:00)  BP: 113/68 (03-30-23 @ 05:00)  RR: 18 (03-30-23 @ 05:00)  SpO2: 100% (03-30-23 @ 07:55)  CAPILLARY BLOOD GLUCOSE          PHYSICAL EXAM:  General: NAD, Awake, patient is laying comfortably in bed  HEENT: AT, NC, Supple  Lungs: CTA B/L, no wheezing, no rhonchi  CVS: normal S1, S2, RRR, NO M/G/R  Abdomen: soft, bowel sounds present, non-tender, non-distended  Extremities: no edema, no clubbing, no cyanosis, positive peripheral pulses b/l  Neuro: left sided paralysis, right upper ext motor weakness 2/5, sensory intact b/l, patient following verbal commands, answers in 1 word yes/no, expressive aphasia  Skin: no rash, no ecchymosis      LAB  CBC  Date: 03-30-23 @ 06:32  Mean cell Rvmfwukmfv86.0  Mean cell Hemoglobin Conc32.2  Mean cell Volum 90.0  Platelet count-Automate 458  RBC Count 2.59  Red Cell Distrib Width13.6  WBC Count9.16  % Albumin, Urine--  Hematocrit 23.3  Hemoglobin 7.5  CBC  Date: 03-29-23 @ 08:14  Mean cell Fkxsoeofir57.3  Mean cell Hemoglobin Conc33.2  Mean cell Volum 88.3  Platelet count-Automate 449  RBC Count 2.83  Red Cell Distrib Width13.8  WBC Count11.39  % Albumin, Urine--  Hematocrit 25.0  Hemoglobin 8.3  CBC  Date: 03-28-23 @ 12:05  Mean cell Qjybpvkbbn05.8  Mean cell Hemoglobin Conc31.9  Mean cell Volum 90.4  Platelet count-Automate 458  RBC Count 3.02  Red Cell Distrib Width13.9  WBC Count11.92  % Albumin, Urine--  Hematocrit 27.3  Hemoglobin 8.7  CBC  Date: 03-27-23 @ 10:39  Mean cell Vletpmvnun84.2  Mean cell Hemoglobin Conc33.2  Mean cell Volum 88.0  Platelet count-Automate 420  RBC Count 2.74  Red Cell Distrib Width13.9  WBC Count10.17  % Albumin, Urine--  Hematocrit 24.1  Hemoglobin 8.0  CBC  Date: 03-26-23 @ 07:41  Mean cell Wrrfeaxata35.6  Mean cell Hemoglobin Conc31.9  Mean cell Volum 89.6  Platelet count-Automate 424  RBC Count 2.80  Red Cell Distrib Width13.6  WBC Count10.08  % Albumin, Urine--  Hematocrit 25.1  Hemoglobin 8.0  CBC  Date: 03-24-23 @ 20:51  Mean cell Jfuzefxrfg75.3  Mean cell Hemoglobin Conc32.8  Mean cell Volum 89.3  Platelet count-Automate 396  RBC Count 2.90  Red Cell Distrib Width13.3  WBC Count9.14  % Albumin, Urine--  Hematocrit 25.9  Hemoglobin 8.5    [facility]  03-30-23 @ 06:32  Blood Gas Arterial-Calcium,Ionized--  Blood Urea Nitrogen, Serum 7 mg/dL<L> [10 - 20]  Carbon Dioxide, Serum25 mmol/L [17 - 32]  Chloride, Vjexl687 mmol/L [98 - 110]  Creatinie, Serum<0.5 mg/dL<L> [0.7 - 1.5]  Glucose, Wyqol205 mg/dL<H> [70 - 99]  Potassium, Serum4.6 mmol/L [3.5 - 5.0]  Sodium, Serum 138 mmol/L [135 - 146]  [facility]  03-29-23 @ 08:14  Blood Gas Arterial-Calcium,Ionized--  Blood Urea Nitrogen, Serum 7 mg/dL<L> [10 - 20]  Carbon Dioxide, Serum27 mmol/L [17 - 32]  Chloride, Zkhck131 mmol/L [98 - 110]  Creatinie, Serum<0.5 mg/dL<L> [0.7 - 1.5]  Glucose, Serum87 mg/dL [70 - 99]  Potassium, Serum4.3 mmol/L [3.5 - 5.0]  Sodium, Serum 138 mmol/L [135 - 146]  [facility]  03-28-23 @ 12:05  Blood Gas Arterial-Calcium,Ionized--  Blood Urea Nitrogen, Serum 7 mg/dL<L> [10 - 20]  Carbon Dioxide, Serum26 mmol/L [17 - 32]  Chloride, Pdbyz202 mmol/L [98 - 110]  Creatinie, Serum<0.5 mg/dL<L> [0.7 - 1.5]  Glucose, Serum90 mg/dL [70 - 99]  Potassium, Serum4.4 mmol/L [3.5 - 5.0]  Sodium, Serum 138 mmol/L [135 - 146]  BMP  03-27-23 @ 10:39  Blood Gas Arterial-Calcium,Ionized--  Blood Urea Nitrogen, Serum 8 mg/dL<L> [10 - 20]  Carbon Dioxide, Serum28 mmol/L [17 - 32]  Chloride, Towuh584 mmol/L [98 - 110]  Creatinie, Serum<0.5 mg/dL<L> [0.7 - 1.5]  Glucose, Serum88 mg/dL [70 - 99]  Potassium, Serum3.9 mmol/L [3.5 - 5.0]  Sodium, Serum 139 mmol/L [135 - 146]  BMP  03-26-23 @ 07:41  Blood Gas Arterial-Calcium,Ionized--  Blood Urea Nitrogen, Serum 7 mg/dL<L> [10 - 20]  Carbon Dioxide, Serum27 mmol/L [17 - 32]  Chloride, Lfddx481 mmol/L [98 - 110]  Creatinie, Serum<0.5 mg/dL<L> [0.7 - 1.5]  Glucose, Serum84 mg/dL [70 - 99]  Potassium, Serum3.9 mmol/L [3.5 - 5.0]  Sodium, Serum 140 mmol/L [135 - 146]              Microbiology:    Culture - Blood (collected 03-28-23 @ 07:32)  Source: .Blood None  Preliminary Report (03-29-23 @ 18:02):    No growth to date.    Culture - Blood (collected 03-27-23 @ 10:39)  Source: .Blood Blood-Peripheral  Preliminary Report (03-28-23 @ 18:01):    No growth to date.    Culture - Urine (collected 03-21-23 @ 11:57)  Source: Catheterized Catheterized  Final Report (03-22-23 @ 17:54):    No growth    Culture - Blood (collected 03-20-23 @ 22:07)  Source: .Blood Blood  Final Report (03-26-23 @ 02:00):    No Growth Final    Culture - Blood (collected 03-09-23 @ 11:46)  Source: .Blood None  Final Report (03-14-23 @ 23:00):    No Growth Final    Culture - Blood (collected 03-07-23 @ 07:10)  Source: .Blood None  Final Report (03-12-23 @ 18:00):    No Growth Final    Culture - Urine (collected 03-04-23 @ 05:10)  Source: Catheterized Catheterized  Final Report (03-07-23 @ 10:35):    >100,000 CFU/ml Escherichia coli  Organism: Escherichia coli (03-07-23 @ 10:35)  Organism: Escherichia coli (03-07-23 @ 10:35)      Method Type: JULIANA      -  Amikacin: S <=16      -  Amoxicillin/Clavulanic Acid: S <=8/4      -  Ampicillin: S <=8 These ampicillin results predict results for amoxicillin      -  Ampicillin/Sulbactam: S <=4/2 Enterobacter, Klebsiella aerogenes, Citrobacter, and Serratia may develop resistance during prolonged therapy (3-4 days)      -  Aztreonam: S <=4      -  Cefazolin: S <=2 For uncomplicated UTI with K. pneumoniae, E. coli, or P. mirablis: JULIANA <=16 is sensitive and JULIANA >=32 is resistant. This also predicts results for oral agents cefaclor, cefdinir, cefpodoxime, cefprozil, cefuroxime axetil, cephalexin and locarbef for uncomplicated UTI. Note that some isolates may be susceptible to these agents while testing resistant to cefazolin.      -  Cefepime: S <=2      -  Cefoxitin: S <=8      -  Ceftriaxone: S <=1 Enterobacter, Klebsiella aerogenes, Citrobacter, and Serratia may develop resistance during prolonged therapy      -  Cefuroxime: S <=4      -  Ciprofloxacin: S <=0.25      -  Ertapenem: S <=0.5      -  Gentamicin: S <=2      -  Imipenem: S <=1      -  Levofloxacin: S <=0.5      -  Meropenem: S <=1      -  Nitrofurantoin: S <=32 Should not be used to treat pyelonephritis      -  Piperacillin/Tazobactam: S <=8      -  Tobramycin: S <=2      -  Trimethoprim/Sulfamethoxazole: S <=0.5/9.5    Culture - Blood (collected 03-04-23 @ 03:28)  Source: .Blood Blood-Peripheral  Final Report (03-09-23 @ 09:00):    No Growth Final    Culture - Blood (collected 03-04-23 @ 03:28)  Source: .Blood Blood-Peripheral  Final Report (03-09-23 @ 09:00):    No Growth Final      Medications:  acetaminophen     Tablet .. 650 milliGRAM(s) Oral every 6 hours PRN  acetaminophen  Suppository .. 325 milliGRAM(s) Rectal once  ascorbic acid 500 milliGRAM(s) Oral daily  aspirin  chewable 81 milliGRAM(s) Oral daily  atorvastatin 40 milliGRAM(s) Oral at bedtime  baclofen 5 milliGRAM(s) Oral every 12 hours  clopidogrel Tablet 75 milliGRAM(s) Oral daily  enoxaparin Injectable 40 milliGRAM(s) SubCutaneous every 24 hours  gabapentin 100 milliGRAM(s) Oral three times a day  levETIRAcetam 1500 milliGRAM(s) Oral two times a day  midodrine. 10 milliGRAM(s) Oral three times a day  multivitamin/minerals 1 Tablet(s) Oral daily  valproic  acid Syrup 500 milliGRAM(s) Oral three times a day  vitamin A &amp; D Ointment 1 Application(s) Topical two times a day        Assessment and Plan:  Patient is a 68 y/o woman with PMH of Right MCA stroke with left sided weakness was brought to the ED via EMS. In the ED, she had altered mental status and sepsis. Patient has a h/o prior hospitalization  in a hospital in [facility] last fall due to persistent metabolic encephalopathy for months     #  Altered Mental Status likely due to Metabolic Encephalopathy from UTI, sepsis and seizure in the context of acute and chronic stroke with left hemiparesis   s/p course of abx for E. coli UTI  MRI of brain: Small patchy subcortical infarct in the right frontal lobe. Redemonstrated chronic infarct in the right MCA territory.  Mild chronic microvascular ischemic changes.  CTA of head/neck: no evidence of occlusion, aneurysm or stenosis  s/p rapid response for seizure- s/p EEG and video EEG: continue on Depakote 500 mg TID and Keppra 1000 mg BID  seizure precautions  Neuro f/u appreciated - stroke likely due to hypotension - has diminutive caliber of right M1 - avoid hypotension  Added ASA, Plavix (On March 17th) and statin for new stroke -  DAPT for 21 days (From March 17th); followed by ASA 81 mg daily - f/u in stroke clinic in 4 weeks per neurology  pt with UE spasticity: MRI of C spine: Significantly motion limited study. No gross cord compression or spinal cord edema demonstrated. Multilevel small disc bulges. - continue baclofen 5mg bid  behavioral health eval appreciated: pt does not have the capacity to choose a Health care agent  -clinically patient is  following verbal commands, persistent left sided paralysis, dysarthria , oropharyngeal dysphagia , patient is in chronic bed confinement status.    # Seizures- 3/20- patient had gaze deviation to the left- resolved, Neuro f/up rec- increased Keppra dose tx.   -outpatient Neuro f/up     # New onset fevers on 3/20 at night and 3/21 in am: Maybe 2/2   # Unstagable sacral wound - Per Burn eval: LWC only.  3/20 procal = 0.05   Per ID, Cefepime (ended 3/24)      #After being afebrile for many days, new fever again (102 F on 3/26 night):   Sacral wound doesn't seem infected. Burn can re-eval.   CXR NACPD, UA neg, blood culture prelim negative . ID informed. Watching off Abx for now.  RVP negative     #Left avulsion fracture of medial malleolus  evaluated by podiatry - no surgical intervention - WB with CAM boot  continue PT/fall precautions, pain control    # Sinus tachycardia   now resolved - attributed to pain  EKG reviewed, TSH WNL    #Left cephalic vein thrombosis - s/p warm compress  b/l UE duplex neg for any DVT.     # Anemia - normocytic  labs reviewed - Hgb in 9-10 range and now stable    # DVT prophylaxis    full code, overall patient's prognosis guarded   Family discussion: yes, medical team     #Progress Note Handoff: Patient was medically optimized, d/c to SNF planning today if bed is available   Family discussion: yes, medical team Disposition: SNF possibly today    Total time spent to complete patient's bedside assessment, review medical chart, discuss discharge plan of care with covering medical team was more than 35 minutes with >50% of time spent face to face with patient, discussion with patient/family and/or coordination of care

## 2023-03-30 NOTE — PROGRESS NOTE ADULT - SUBJECTIVE AND OBJECTIVE BOX
JOSEPH OBRIEN 67y Female  MRN#: [id number]   Hospital Day: 26d    SUBJECTIVE  Patient is a 67y old Female who presents with a chief complaint of AMS (29 Mar 2023 10:47)  Currently admitted to medicine with the primary diagnosis of Stroke      INTERVAL HPI AND OVERNIGHT EVENTS:  Patient was examined and seen at bedside. This morning she is resting comfortably in bed and reports no issues or overnight events.    OBJECTIVE  PAST MEDICAL & SURGICAL HISTORY    ALLERGIES:  Allergy Status Unknown    MEDICATIONS:  STANDING MEDICATIONS  acetaminophen  Suppository .. 325 milliGRAM(s) Rectal once  ascorbic acid 500 milliGRAM(s) Oral daily  aspirin  chewable 81 milliGRAM(s) Oral daily  atorvastatin 40 milliGRAM(s) Oral at bedtime  baclofen 5 milliGRAM(s) Oral every 12 hours  clopidogrel Tablet 75 milliGRAM(s) Oral daily  enoxaparin Injectable 40 milliGRAM(s) SubCutaneous every 24 hours  gabapentin 100 milliGRAM(s) Oral three times a day  levETIRAcetam 1500 milliGRAM(s) Oral two times a day  midodrine. 10 milliGRAM(s) Oral three times a day  multivitamin/minerals 1 Tablet(s) Oral daily  valproic  acid Syrup 500 milliGRAM(s) Oral three times a day  vitamin A &amp; D Ointment 1 Application(s) Topical two times a day    PRN MEDICATIONS  acetaminophen     Tablet .. 650 milliGRAM(s) Oral every 6 hours PRN      VITAL SIGNS: Last 24 Hours  T(C): 37.2 (30 Mar 2023 05:00), Max: 37.3 (29 Mar 2023 12:10)  T(F): 98.9 (30 Mar 2023 05:00), Max: 99.2 (29 Mar 2023 12:10)  HR: 92 (30 Mar 2023 05:00) (87 - 94)  BP: 113/68 (30 Mar 2023 05:00) (113/53 - 119/63)  BP(mean): --  RR: 18 (30 Mar 2023 05:00) (17 - 18)  SpO2: 100% (30 Mar 2023 07:55) (99% - 100%)    LABS:                        7.5    9.16  )-----------( 458      ( 30 Mar 2023 06:32 )             23.3     03-30    138  |  102  |  7<L>  ----------------------------<  111<H>  4.6   |  25  |  <0.5<L>    Ca    7.9<L>      30 Mar 2023 06:32  Mg     1.9     03-30    TPro  5.2<L>  /  Alb  2.3<L>  /  TBili  0.3  /  DBili  x   /  AST  15  /  ALT  6   /  AlkPhos  72  03-30              Culture - Blood (collected 28 Mar 2023 07:32)  Source: .Blood None  Preliminary Report (29 Mar 2023 18:02):    No growth to date.      PHYSICAL EXAM:  CONSTITUTIONAL: No acute distress, laying in bed comfortably this AM  HEENT: Atraumatic, normocephalic, neck supple, moist mucous membranes  PULMONARY: Clear to auscultation bilaterally  CARDIOVASCULAR: Regular rate and rhythm  GASTROINTESTINAL: Soft, non-tender, non-distended; bowel sounds present  MUSCULOSKELETAL: no LE edema, 2+ pulses b/l LE, left LE in CAM boot  NEURO: L hemiparesis, speaks in short sentences, B/L UE spasticity, contracted tone  SKIN: Clean Sacral ulcer - wound wrapped and clean

## 2023-03-31 LAB
ALBUMIN SERPL ELPH-MCNC: 2.5 G/DL — LOW (ref 3.5–5.2)
ALP SERPL-CCNC: 75 U/L — SIGNIFICANT CHANGE UP (ref 30–115)
ALT FLD-CCNC: 6 U/L — SIGNIFICANT CHANGE UP (ref 0–41)
ANION GAP SERPL CALC-SCNC: 12 MMOL/L — SIGNIFICANT CHANGE UP (ref 7–14)
AST SERPL-CCNC: 13 U/L — SIGNIFICANT CHANGE UP (ref 0–41)
BASOPHILS # BLD AUTO: 0.03 K/UL — SIGNIFICANT CHANGE UP (ref 0–0.2)
BASOPHILS NFR BLD AUTO: 0.3 % — SIGNIFICANT CHANGE UP (ref 0–1)
BILIRUB SERPL-MCNC: <0.2 MG/DL — SIGNIFICANT CHANGE UP (ref 0.2–1.2)
BUN SERPL-MCNC: 6 MG/DL — LOW (ref 10–20)
CALCIUM SERPL-MCNC: 8.7 MG/DL — SIGNIFICANT CHANGE UP (ref 8.4–10.5)
CHLORIDE SERPL-SCNC: 101 MMOL/L — SIGNIFICANT CHANGE UP (ref 98–110)
CO2 SERPL-SCNC: 27 MMOL/L — SIGNIFICANT CHANGE UP (ref 17–32)
CREAT SERPL-MCNC: <0.5 MG/DL — LOW (ref 0.7–1.5)
EGFR: 117 ML/MIN/1.73M2 — SIGNIFICANT CHANGE UP
EOSINOPHIL # BLD AUTO: 0.14 K/UL — SIGNIFICANT CHANGE UP (ref 0–0.7)
EOSINOPHIL NFR BLD AUTO: 1.4 % — SIGNIFICANT CHANGE UP (ref 0–8)
GLUCOSE BLDC GLUCOMTR-MCNC: 108 MG/DL — HIGH (ref 70–99)
GLUCOSE BLDC GLUCOMTR-MCNC: 75 MG/DL — SIGNIFICANT CHANGE UP (ref 70–99)
GLUCOSE SERPL-MCNC: 73 MG/DL — SIGNIFICANT CHANGE UP (ref 70–99)
HCT VFR BLD CALC: 26.4 % — LOW (ref 37–47)
HGB BLD-MCNC: 8.4 G/DL — LOW (ref 12–16)
IMM GRANULOCYTES NFR BLD AUTO: 1.9 % — HIGH (ref 0.1–0.3)
LYMPHOCYTES # BLD AUTO: 2.99 K/UL — SIGNIFICANT CHANGE UP (ref 1.2–3.4)
LYMPHOCYTES # BLD AUTO: 30.3 % — SIGNIFICANT CHANGE UP (ref 20.5–51.1)
MAGNESIUM SERPL-MCNC: 2.1 MG/DL — SIGNIFICANT CHANGE UP (ref 1.8–2.4)
MCHC RBC-ENTMCNC: 28.6 PG — SIGNIFICANT CHANGE UP (ref 27–31)
MCHC RBC-ENTMCNC: 31.8 G/DL — LOW (ref 32–37)
MCV RBC AUTO: 89.8 FL — SIGNIFICANT CHANGE UP (ref 81–99)
MONOCYTES # BLD AUTO: 0.94 K/UL — HIGH (ref 0.1–0.6)
MONOCYTES NFR BLD AUTO: 9.5 % — HIGH (ref 1.7–9.3)
NEUTROPHILS # BLD AUTO: 5.57 K/UL — SIGNIFICANT CHANGE UP (ref 1.4–6.5)
NEUTROPHILS NFR BLD AUTO: 56.6 % — SIGNIFICANT CHANGE UP (ref 42.2–75.2)
NRBC # BLD: 0 /100 WBCS — SIGNIFICANT CHANGE UP (ref 0–0)
PLATELET # BLD AUTO: 529 K/UL — HIGH (ref 130–400)
POTASSIUM SERPL-MCNC: 5 MMOL/L — SIGNIFICANT CHANGE UP (ref 3.5–5)
POTASSIUM SERPL-SCNC: 5 MMOL/L — SIGNIFICANT CHANGE UP (ref 3.5–5)
PROT SERPL-MCNC: 5.5 G/DL — LOW (ref 6–8)
RBC # BLD: 2.94 M/UL — LOW (ref 4.2–5.4)
RBC # FLD: 13.6 % — SIGNIFICANT CHANGE UP (ref 11.5–14.5)
SODIUM SERPL-SCNC: 140 MMOL/L — SIGNIFICANT CHANGE UP (ref 135–146)
WBC # BLD: 9.86 K/UL — SIGNIFICANT CHANGE UP (ref 4.8–10.8)
WBC # FLD AUTO: 9.86 K/UL — SIGNIFICANT CHANGE UP (ref 4.8–10.8)

## 2023-03-31 PROCEDURE — 99232 SBSQ HOSP IP/OBS MODERATE 35: CPT

## 2023-03-31 RX ADMIN — GABAPENTIN 100 MILLIGRAM(S): 400 CAPSULE ORAL at 05:02

## 2023-03-31 RX ADMIN — Medication 1 APPLICATION(S): at 17:02

## 2023-03-31 RX ADMIN — Medication 5 MILLIGRAM(S): at 17:01

## 2023-03-31 RX ADMIN — GABAPENTIN 100 MILLIGRAM(S): 400 CAPSULE ORAL at 21:42

## 2023-03-31 RX ADMIN — Medication 650 MILLIGRAM(S): at 16:47

## 2023-03-31 RX ADMIN — Medication 5 MILLIGRAM(S): at 05:00

## 2023-03-31 RX ADMIN — Medication 500 MILLIGRAM(S): at 05:00

## 2023-03-31 RX ADMIN — Medication 650 MILLIGRAM(S): at 17:47

## 2023-03-31 RX ADMIN — Medication 500 MILLIGRAM(S): at 11:16

## 2023-03-31 RX ADMIN — MIDODRINE HYDROCHLORIDE 10 MILLIGRAM(S): 2.5 TABLET ORAL at 16:26

## 2023-03-31 RX ADMIN — LEVETIRACETAM 1500 MILLIGRAM(S): 250 TABLET, FILM COATED ORAL at 17:02

## 2023-03-31 RX ADMIN — MIDODRINE HYDROCHLORIDE 10 MILLIGRAM(S): 2.5 TABLET ORAL at 11:17

## 2023-03-31 RX ADMIN — LEVETIRACETAM 1500 MILLIGRAM(S): 250 TABLET, FILM COATED ORAL at 05:02

## 2023-03-31 RX ADMIN — CLOPIDOGREL BISULFATE 75 MILLIGRAM(S): 75 TABLET, FILM COATED ORAL at 11:17

## 2023-03-31 RX ADMIN — ATORVASTATIN CALCIUM 40 MILLIGRAM(S): 80 TABLET, FILM COATED ORAL at 21:42

## 2023-03-31 RX ADMIN — Medication 1 TABLET(S): at 11:28

## 2023-03-31 RX ADMIN — Medication 500 MILLIGRAM(S): at 13:39

## 2023-03-31 RX ADMIN — ENOXAPARIN SODIUM 40 MILLIGRAM(S): 100 INJECTION SUBCUTANEOUS at 21:43

## 2023-03-31 RX ADMIN — Medication 500 MILLIGRAM(S): at 21:42

## 2023-03-31 RX ADMIN — Medication 81 MILLIGRAM(S): at 11:16

## 2023-03-31 RX ADMIN — GABAPENTIN 100 MILLIGRAM(S): 400 CAPSULE ORAL at 13:39

## 2023-03-31 NOTE — PROGRESS NOTE ADULT - ATTENDING COMMENTS
Patient is a 66 y/o woman with PMH of Right MCA stroke with left sided weakness was brought to the ED via EMS. In the ED, she had altered mental status and sepsis. Patient has a h/o prior hospitalization  in a hospital in [facility] last fall due to persistent metabolic encephalopathy for months     #  Altered Mental Status likely due to Metabolic Encephalopathy from UTI, sepsis and seizure in the context of acute and chronic stroke with left hemiparesis   s/p course of abx for E. coli UTI  MRI of brain: Small patchy subcortical infarct in the right frontal lobe. Redemonstrated chronic infarct in the right MCA territory.  Mild chronic microvascular ischemic changes.  CTA of head/neck: no evidence of occlusion, aneurysm or stenosis  s/p rapid response for seizure- s/p EEG and video EEG: continue on Depakote 500 mg TID and Keppra 1000 mg BID  seizure precautions  Neuro f/u appreciated - stroke likely due to hypotension - has diminutive caliber of right M1 - avoid hypotension  Added ASA, Plavix (On March 17th) and statin for new stroke -  DAPT for 21 days (From March 17th); followed by ASA 81 mg daily - f/u in stroke clinic in 4 weeks per neurology  pt with UE spasticity: MRI of C spine: Significantly motion limited study. No gross cord compression or spinal cord edema demonstrated. Multilevel small disc bulges. - continue baclofen 5mg bid  behavioral health eval appreciated: pt does not have the capacity to choose a Health care agent  -clinically patient is  following verbal commands, persistent left sided paralysis, dysarthria , oropharyngeal dysphagia , patient is in chronic bed confinement status.    # Seizures- 3/20- patient had gaze deviation to the left- resolved, Neuro f/up rec- increased Keppra dose tx.   -outpatient Neuro f/up     # New onset fevers on 3/20 at night and 3/21 in am: Maybe 2/2   # Unstagable sacral wound - Per Burn eval: LWC only.  3/20 procal = 0.05   Per ID, Cefepime (ended 3/24)      #After being afebrile for many days, new fever again (102 F on 3/26 night):   Sacral wound doesn't seem infected. Burn can re-eval.   CXR NACPD, UA neg, blood culture prelim negative . ID informed. Watching off Abx for now.  RVP negative     #Left avulsion fracture of medial malleolus  evaluated by podiatry - no surgical intervention - WB with CAM boot  continue PT/fall precautions, pain control    # Sinus tachycardia   now resolved - attributed to pain  EKG reviewed, TSH WNL    #Left cephalic vein thrombosis - s/p warm compress  b/l UE duplex neg for any DVT.     # Anemia - normocytic  labs reviewed - Hgb in 9-10 range and now stable    # DVT prophylaxis    full code, overall patient's prognosis guarded   Family discussion: yes, medical team     #Progress Note Handoff: Patient was medically optimized, d/c to SNF planning today if bed is available   Family discussion: yes, medical team Disposition: SNF possibly today    Total time spent to complete patient's bedside assessment, review medical chart, discuss discharge plan of care with covering medical team was more than 35 minutes with >50% of time spent face to face with patient, discussion with patient/family and/or coordination of care

## 2023-03-31 NOTE — PROGRESS NOTE ADULT - ASSESSMENT
66 y/o woman with PMH of Right MCA stroke with left sided weakness was brought to the ED via EMS. In the ED, she had altered mental status and sepsis.    #Febrile 3/26 overnight  - Sepsis w/up negative (CXR clear, BCx NGTD, UA neg)  - ID recs appreciated - monitor off ABx - repeat COVID if febrile  - Repeat RVP negative    #Seizures  #Acute R Frontal Lobe Infarct likely 2/ hypotension   #Chronic R MCA infarct  - per chart: at baseline she is alert, able to communicate and is oriented. Ambulation at baseline: uses a wheelchair. She was in a hospital in [facility] last fall and was lethargic for months  - 3/8: s/p rapid response for L gaze preference  - 3/20: Pt had event with L gaze preference during SS eval, pt made NPO. Examined by resident, no gaze preference present any longer. Pt responsive but less than baseline. Developed seizures post-event. Septic workup obtained. CXR appears clear, rest pending. --> 3/21 Pt more responsive this AM, expressing herself and in line with current baseline.  - Depakote level (3/4) 87 (WNL) -> (3/21) 74 (WNL)   - CTH/CTA of head/neck (3/4): no acute pathology, no evidence of occlusion, aneurysm or stenosis  - EEG (3/8) and video EEG (3/9): Focal slowing  - MRI Brain (3/15): new subcortical infarct in the right frontal lobe. Re-demonstrated chronic infarct in the right MCA territory.  - MRI of C spine (3/16): Significantly motion limited study. No gross cord compression or spinal cord edema demonstrated. Multilevel small disc bulges.  - Neuro eval'd 3/8-3/20: For L gaze preference/stroke, OP med review: on low dose Lamictal and Depakote in addition to Keppra (possible mood disorder/ seizures), Her MRI of brain showing subcortical infarct within same M1 territory. The casue of stroke is most likely hypotension as she has a diminutive Rt M1. However, MRI findings doesn't explain her B/L upper extremity spasticity. So, it is important to rule out cervical cord pathology. DAPT for 21 days followed by ASA 81 mg daily, Increased keppra post 3/20 possible seizure event  - BH eval'd 3/15: Delirium; pt does not have the capacity to choose a Health care agent  - SS re-juliette 3/21: Minced and moist, 1:1 feeds, thin liquids through straw  - Seizure precautions  - C/w Depakote 500mg TID and Keppra to 1500mg BID   - C/w baclofen 5mg bid (for UE spasticity)  - C/w ASA, Plavix (3/17-4/6), and high dose statin for new stroke - DAPT for 21 days followed by ASA 81 mg daily  - C/w Midodrine 10mg TID  - OP stroke clinic in 4 weeks per neurology    #Metabolic Encephalopathy from UTI  #Febrile 3/20  #Unstageable Sacral Ulcer  - 3/20: Pt became febrile after possible seizure episode, Cefepime, levaquin (x1 3/20), vancomycin  - 3/20 Septic workup: WBC (3/21) 12.26 (up from 8.23), BCx (3/20) NGTD, Procal (3/20) 0.05, RVP (3/20) COVID (-), Cortisol level, Lactate (3/21) WNL, UA nitrite (-), LEC (-), pending WBCs/Bacteria, UCx (3/21) NGTD  - Duplex LUE (3/10): (-) DVT (+) superficial cephalic vein thrombosis  - Duplex BLLE (3/7): (-) DVT  - s/p Vanc (3/4x1, 3/20-3/22), Cefepime (x1 3/4, x1 3/8), Ceftriaxone (3/4-3/6) (for UTI)  - Duplex BLLE: negative for DVT  - ID eval'd 3/23  - Burn eval'd 3/22 - No intervention, wound care recs noted  - Cefepime completed 3/24 (per ID)    #Left avulsion fracture of medial malleolus  - Duplex BL LE (3/7): (-) for DVT  - CT Foot (3/11): acute minimally displaced avulsion fracture at the medial malleolus  - Podiatry following for foot pain: WBAT w/ surgical shoe, ankle brace, PT  - fall precautions  - pain control regimen - if pain uncontrolled consider increasing percocet to 2tabs.  - C/w gabapentin at 100mg TID (Lowered dose from home, increase as pt tolerates)    #Sinus tachycardia - now resolved - attributed to pain  - TSH (3/8) WNL, - EKG reviewed    #Left cephalic vein thrombosis  - Duplex LUE (3/10): No DVT however there is superficial thrombosis noted in the left cephalic vein  - s/p warm compress  - Repeat duplex ordered 3/27 - negative for DVT    #Anemia - normocytic - stable  - possibly due to frequent phlebotomy, no signs of active bleed - monitor    #Misc  - DVT ppx - lovenox  - Diet: Soft and bite sized (SS 3/7)  - full code, overall guarded prognosis  - Dispo: dc planning

## 2023-03-31 NOTE — PROGRESS NOTE ADULT - SUBJECTIVE AND OBJECTIVE BOX
JOSEPH OBRIEN 67y Female  MRN#: [id number]   Hospital Day: 27d    SUBJECTIVE  Patient is a 67y old Female who presents with a chief complaint of AMS  Currently admitted to medicine with the primary diagnosis of Stroke      INTERVAL HPI AND OVERNIGHT EVENTS:  Patient was examined and seen at bedside. This morning she is resting comfortably in bed and reports no issues or overnight events.    OBJECTIVE  PAST MEDICAL & SURGICAL HISTORY    ALLERGIES:  Allergy Status Unknown    MEDICATIONS:  STANDING MEDICATIONS  acetaminophen  Suppository .. 325 milliGRAM(s) Rectal once  ascorbic acid 500 milliGRAM(s) Oral daily  aspirin  chewable 81 milliGRAM(s) Oral daily  atorvastatin 40 milliGRAM(s) Oral at bedtime  baclofen 5 milliGRAM(s) Oral every 12 hours  clopidogrel Tablet 75 milliGRAM(s) Oral daily  enoxaparin Injectable 40 milliGRAM(s) SubCutaneous every 24 hours  gabapentin 100 milliGRAM(s) Oral three times a day  levETIRAcetam 1500 milliGRAM(s) Oral two times a day  midodrine. 10 milliGRAM(s) Oral three times a day  multivitamin/minerals 1 Tablet(s) Oral daily  valproic  acid Syrup 500 milliGRAM(s) Oral three times a day  vitamin A &amp; D Ointment 1 Application(s) Topical two times a day    PRN MEDICATIONS  acetaminophen     Tablet .. 650 milliGRAM(s) Oral every 6 hours PRN      VITAL SIGNS: Last 24 Hours  T(C): 36.1 (31 Mar 2023 05:00), Max: 37.5 (30 Mar 2023 12:10)  T(F): 96.9 (31 Mar 2023 05:00), Max: 99.5 (30 Mar 2023 12:10)  HR: 89 (31 Mar 2023 05:00) (83 - 101)  BP: 124/64 (31 Mar 2023 05:00) (124/64 - 132/81)  BP(mean): --  RR: 18 (31 Mar 2023 05:00) (17 - 18)  SpO2: 100% (31 Mar 2023 07:35) (100% - 100%)    LABS:                        8.4    9.86  )-----------( 529      ( 31 Mar 2023 07:46 )             26.4     03-31    140  |  101  |  6<L>  ----------------------------<  73  5.0   |  27  |  <0.5<L>    Ca    8.7      31 Mar 2023 07:46  Mg     2.1     03-31    TPro  5.5<L>  /  Alb  2.5<L>  /  TBili  <0.2  /  DBili  x   /  AST  13  /  ALT  6   /  AlkPhos  75  03-31              PHYSICAL EXAM:  CONSTITUTIONAL: No acute distress, laying in bed comfortably this AM  HEENT: Atraumatic, normocephalic, neck supple, moist mucous membranes  PULMONARY: Clear to auscultation bilaterally  CARDIOVASCULAR: Regular rate and rhythm  GASTROINTESTINAL: Soft, non-tender, non-distended; bowel sounds present  MUSCULOSKELETAL: no LE edema, 2+ pulses b/l LE, left LE in CAM boot  NEURO: L hemiparesis, speaks in short sentences, B/L UE spasticity, contracted tone  SKIN: Clean Sacral ulcer - wound wrapped and clean

## 2023-04-01 LAB
CULTURE RESULTS: SIGNIFICANT CHANGE UP
SPECIMEN SOURCE: SIGNIFICANT CHANGE UP

## 2023-04-01 PROCEDURE — 99232 SBSQ HOSP IP/OBS MODERATE 35: CPT

## 2023-04-01 RX ADMIN — Medication 5 MILLIGRAM(S): at 17:06

## 2023-04-01 RX ADMIN — Medication 1 APPLICATION(S): at 05:31

## 2023-04-01 RX ADMIN — Medication 5 MILLIGRAM(S): at 05:30

## 2023-04-01 RX ADMIN — GABAPENTIN 100 MILLIGRAM(S): 400 CAPSULE ORAL at 05:30

## 2023-04-01 RX ADMIN — Medication 1 TABLET(S): at 11:01

## 2023-04-01 RX ADMIN — Medication 500 MILLIGRAM(S): at 05:30

## 2023-04-01 RX ADMIN — Medication 500 MILLIGRAM(S): at 21:35

## 2023-04-01 RX ADMIN — Medication 1 APPLICATION(S): at 17:07

## 2023-04-01 RX ADMIN — ATORVASTATIN CALCIUM 40 MILLIGRAM(S): 80 TABLET, FILM COATED ORAL at 21:35

## 2023-04-01 RX ADMIN — MIDODRINE HYDROCHLORIDE 10 MILLIGRAM(S): 2.5 TABLET ORAL at 11:05

## 2023-04-01 RX ADMIN — MIDODRINE HYDROCHLORIDE 10 MILLIGRAM(S): 2.5 TABLET ORAL at 17:05

## 2023-04-01 RX ADMIN — LEVETIRACETAM 1500 MILLIGRAM(S): 250 TABLET, FILM COATED ORAL at 05:29

## 2023-04-01 RX ADMIN — GABAPENTIN 100 MILLIGRAM(S): 400 CAPSULE ORAL at 14:04

## 2023-04-01 RX ADMIN — ENOXAPARIN SODIUM 40 MILLIGRAM(S): 100 INJECTION SUBCUTANEOUS at 21:34

## 2023-04-01 RX ADMIN — Medication 500 MILLIGRAM(S): at 11:01

## 2023-04-01 RX ADMIN — MIDODRINE HYDROCHLORIDE 10 MILLIGRAM(S): 2.5 TABLET ORAL at 05:30

## 2023-04-01 RX ADMIN — Medication 81 MILLIGRAM(S): at 11:01

## 2023-04-01 RX ADMIN — Medication 500 MILLIGRAM(S): at 14:04

## 2023-04-01 RX ADMIN — CLOPIDOGREL BISULFATE 75 MILLIGRAM(S): 75 TABLET, FILM COATED ORAL at 11:01

## 2023-04-01 RX ADMIN — GABAPENTIN 100 MILLIGRAM(S): 400 CAPSULE ORAL at 21:35

## 2023-04-01 RX ADMIN — LEVETIRACETAM 1500 MILLIGRAM(S): 250 TABLET, FILM COATED ORAL at 17:06

## 2023-04-01 NOTE — PROGRESS NOTE ADULT - SUBJECTIVE AND OBJECTIVE BOX
JOSEPH OBRIEN 67y Female  MRN#: [id number]   Hospital Day: 28d    SUBJECTIVE  Patient is a 67y old Female who presents with a chief complaint of AMS  Currently admitted to medicine with the primary diagnosis of Stroke      INTERVAL HPI AND OVERNIGHT EVENTS:  Patient was examined and seen at bedside. This morning she is resting comfortably in bed and reports no issues or overnight events.    OBJECTIVE  PAST MEDICAL & SURGICAL HISTORY    ALLERGIES:  Allergy Status Unknown    MEDICATIONS:  STANDING MEDICATIONS  acetaminophen  Suppository .. 325 milliGRAM(s) Rectal once  ascorbic acid 500 milliGRAM(s) Oral daily  aspirin  chewable 81 milliGRAM(s) Oral daily  atorvastatin 40 milliGRAM(s) Oral at bedtime  baclofen 5 milliGRAM(s) Oral every 12 hours  clopidogrel Tablet 75 milliGRAM(s) Oral daily  enoxaparin Injectable 40 milliGRAM(s) SubCutaneous every 24 hours  gabapentin 100 milliGRAM(s) Oral three times a day  levETIRAcetam 1500 milliGRAM(s) Oral two times a day  midodrine. 10 milliGRAM(s) Oral three times a day  multivitamin/minerals 1 Tablet(s) Oral daily  valproic  acid Syrup 500 milliGRAM(s) Oral three times a day  vitamin A &amp; D Ointment 1 Application(s) Topical two times a day    PRN MEDICATIONS  acetaminophen     Tablet .. 650 milliGRAM(s) Oral every 6 hours PRN      VITAL SIGNS: Last 24 Hours  T(C): 36.2 (01 Apr 2023 06:20), Max: 36.6 (31 Mar 2023 21:32)  T(F): 97.1 (01 Apr 2023 06:20), Max: 97.8 (31 Mar 2023 21:32)  HR: 87 (01 Apr 2023 11:04) (78 - 100)  BP: 96/62 (01 Apr 2023 11:04) (96/62 - 166/71)  BP(mean): --  RR: 19 (01 Apr 2023 06:20) (19 - 19)  SpO2: 100% (01 Apr 2023 06:20) (100% - 100%)    LABS:                        8.4    9.86  )-----------( 529      ( 31 Mar 2023 07:46 )             26.4     03-31    140  |  101  |  6<L>  ----------------------------<  73  5.0   |  27  |  <0.5<L>    Ca    8.7      31 Mar 2023 07:46  Mg     2.1     03-31    TPro  5.5<L>  /  Alb  2.5<L>  /  TBili  <0.2  /  DBili  x   /  AST  13  /  ALT  6   /  AlkPhos  75  03-31          PHYSICAL EXAM:  CONSTITUTIONAL: No acute distress, laying in bed comfortably this AM  HEENT: Atraumatic, normocephalic, neck supple, moist mucous membranes  PULMONARY: Clear to auscultation bilaterally  CARDIOVASCULAR: Regular rate and rhythm  GASTROINTESTINAL: Soft, non-tender, non-distended; bowel sounds present  MUSCULOSKELETAL: no LE edema, 2+ pulses b/l LE, left LE in CAM boot  NEURO: L hemiparesis, speaks in short sentences, B/L UE spasticity, contracted tone  SKIN: Clean Sacral ulcer - wound wrapped and clean

## 2023-04-01 NOTE — PROGRESS NOTE ADULT - SUBJECTIVE AND OBJECTIVE BOX
JOSEPH OBRIEN  [facility] (Back) 019 A ([facility] (Back))      Patient was evaluated and examined  by bedside, no active events over night time as per covering nurse report      REVIEW OF SYSTEMS: unable to obtain patient has chronic expressive aphasia         T(C): , Max: 36.6 (03-31-23 @ 21:32)  HR: 100 (04-01-23 @ 06:20)  BP: 166/71 (04-01-23 @ 06:20)  RR: 19 (04-01-23 @ 06:20)  SpO2: 100% (04-01-23 @ 06:20)  CAPILLARY BLOOD GLUCOSE      POCT Blood Glucose.: 108 mg/dL (31 Mar 2023 11:18)      PHYSICAL EXAM:  General: NAD, Awake, patient is laying comfortably in bed  HEENT: AT, NC, Supple  Lungs: CTA B/L, no wheezing, no rhonchi  CVS: normal S1, S2, RRR, NO M/G/R  Abdomen: soft, bowel sounds present, non-tender, non-distended  Extremities: no edema, no clubbing, no cyanosis, positive peripheral pulses b/l  Neuro: left sided paralysis, right upper ext motor weakness 2/5, sensory intact b/l, patient following verbal commands, answers in 1 word yes/no, expressive aphasia  Skin: no rash, no ecchymosis        LAB  CBC  Date: 03-31-23 @ 07:46  Mean cell Ulmbdhuprj62.6  Mean cell Hemoglobin Conc31.8  Mean cell Volum 89.8  Platelet count-Automate 529  RBC Count 2.94  Red Cell Distrib Width13.6  WBC Count9.86  % Albumin, Urine--  Hematocrit 26.4  Hemoglobin 8.4  CBC  Date: 03-30-23 @ 06:32  Mean cell Vulopfuicq73.0  Mean cell Hemoglobin Conc32.2  Mean cell Volum 90.0  Platelet count-Automate 458  RBC Count 2.59  Red Cell Distrib Width13.6  WBC Count9.16  % Albumin, Urine--  Hematocrit 23.3  Hemoglobin 7.5  CBC  Date: 03-29-23 @ 08:14  Mean cell Vvwybjshji12.3  Mean cell Hemoglobin Conc33.2  Mean cell Volum 88.3  Platelet count-Automate 449  RBC Count 2.83  Red Cell Distrib Width13.8  WBC Count11.39  % Albumin, Urine--  Hematocrit 25.0  Hemoglobin 8.3  CBC  Date: 03-28-23 @ 12:05  Mean cell Rglspqxeyo02.8  Mean cell Hemoglobin Conc31.9  Mean cell Volum 90.4  Platelet count-Automate 458  RBC Count 3.02  Red Cell Distrib Width13.9  WBC Count11.92  % Albumin, Urine--  Hematocrit 27.3  Hemoglobin 8.7  CBC  Date: 03-27-23 @ 10:39  Mean cell Weycaauzfb36.2  Mean cell Hemoglobin Conc33.2  Mean cell Volum 88.0  Platelet count-Automate 420  RBC Count 2.74  Red Cell Distrib Width13.9  WBC Count10.17  % Albumin, Urine--  Hematocrit 24.1  Hemoglobin 8.0  CBC  Date: 03-26-23 @ 07:41  Mean cell Vbnhsiryhe28.6  Mean cell Hemoglobin Conc31.9  Mean cell Volum 89.6  Platelet count-Automate 424  RBC Count 2.80  Red Cell Distrib Width13.6  WBC Count10.08  % Albumin, Urine--  Hematocrit 25.1  Hemoglobin 8.0    [facility]  03-31-23 @ 07:46  Blood Gas Arterial-Calcium,Ionized--  Blood Urea Nitrogen, Serum 6 mg/dL<L> [10 - 20]  Carbon Dioxide, Serum27 mmol/L [17 - 32]  Chloride, [id number] mmol/L [98 - 110]  Creatinie, Serum<0.5 mg/dL<L> [0.7 - 1.5]  Glucose, Serum73 mg/dL [70 - 99]  Potassium, Serum5.0 mmol/L [3.5 - 5.0]  Sodium, Serum 140 mmol/L [135 - 146]  [facility]  03-30-23 @ 06:32  Blood Gas Arterial-Calcium,Ionized--  Blood Urea Nitrogen, Serum 7 mg/dL<L> [10 - 20]  Carbon Dioxide, Serum25 mmol/L [17 - 32]  Chloride, Zczsf207 mmol/L [98 - 110]  Creatinie, Serum<0.5 mg/dL<L> [0.7 - 1.5]  Glucose, Bqlkc317 mg/dL<H> [70 - 99]  Potassium, Serum4.6 mmol/L [3.5 - 5.0]  Sodium, Serum 138 mmol/L [135 - 146]  [facility]  03-29-23 @ 08:14  Blood Gas Arterial-Calcium,Ionized--  Blood Urea Nitrogen, Serum 7 mg/dL<L> [10 - 20]  Carbon Dioxide, Serum27 mmol/L [17 - 32]  Chloride, Ixutv084 mmol/L [98 - 110]  Creatinie, Serum<0.5 mg/dL<L> [0.7 - 1.5]  Glucose, Serum87 mg/dL [70 - 99]  Potassium, Serum4.3 mmol/L [3.5 - 5.0]  Sodium, Serum 138 mmol/L [135 - 146]  BMP  03-28-23 @ 12:05  Blood Gas Arterial-Calcium,Ionized--  Blood Urea Nitrogen, Serum 7 mg/dL<L> [10 - 20]  Carbon Dioxide, Serum26 mmol/L [17 - 32]  Chloride, Tpkol438 mmol/L [98 - 110]  Creatinie, Serum<0.5 mg/dL<L> [0.7 - 1.5]  Glucose, Serum90 mg/dL [70 - 99]  Potassium, Serum4.4 mmol/L [3.5 - 5.0]  Sodium, Serum 138 mmol/L [135 - 146]  BMP  03-27-23 @ 10:39  Blood Gas Arterial-Calcium,Ionized--  Blood Urea Nitrogen, Serum 8 mg/dL<L> [10 - 20]  Carbon Dioxide, Serum28 mmol/L [17 - 32]  Chloride, Djkxa544 mmol/L [98 - 110]  Creatinie, Serum<0.5 mg/dL<L> [0.7 - 1.5]  Glucose, Serum88 mg/dL [70 - 99]  Potassium, Serum3.9 mmol/L [3.5 - 5.0]  Sodium, Serum 139 mmol/L [135 - 146]              Microbiology:    Culture - Blood (collected 03-28-23 @ 07:32)  Source: .Blood None  Preliminary Report (03-29-23 @ 18:02):    No growth to date.    Culture - Blood (collected 03-27-23 @ 10:39)  Source: .Blood Blood-Peripheral  Preliminary Report (03-28-23 @ 18:01):    No growth to date.    Culture - Urine (collected 03-21-23 @ 11:57)  Source: Catheterized Catheterized  Final Report (03-22-23 @ 17:54):    No growth    Culture - Blood (collected 03-20-23 @ 22:07)  Source: .Blood Blood  Final Report (03-26-23 @ 02:00):    No Growth Final    Culture - Blood (collected 03-09-23 @ 11:46)  Source: .Blood None  Final Report (03-14-23 @ 23:00):    No Growth Final    Culture - Blood (collected 03-07-23 @ 07:10)  Source: .Blood None  Final Report (03-12-23 @ 18:00):    No Growth Final    Culture - Urine (collected 03-04-23 @ 05:10)  Source: Catheterized Catheterized  Final Report (03-07-23 @ 10:35):    >100,000 CFU/ml Escherichia coli  Organism: Escherichia coli (03-07-23 @ 10:35)  Organism: Escherichia coli (03-07-23 @ 10:35)      Method Type: JULIANA      -  Amikacin: S <=16      -  Amoxicillin/Clavulanic Acid: S <=8/4      -  Ampicillin: S <=8 These ampicillin results predict results for amoxicillin      -  Ampicillin/Sulbactam: S <=4/2 Enterobacter, Klebsiella aerogenes, Citrobacter, and Serratia may develop resistance during prolonged therapy (3-4 days)      -  Aztreonam: S <=4      -  Cefazolin: S <=2 For uncomplicated UTI with K. pneumoniae, E. coli, or P. mirablis: JULIANA <=16 is sensitive and JULIANA >=32 is resistant. This also predicts results for oral agents cefaclor, cefdinir, cefpodoxime, cefprozil, cefuroxime axetil, cephalexin and locarbef for uncomplicated UTI. Note that some isolates may be susceptible to these agents while testing resistant to cefazolin.      -  Cefepime: S <=2      -  Cefoxitin: S <=8      -  Ceftriaxone: S <=1 Enterobacter, Klebsiella aerogenes, Citrobacter, and Serratia may develop resistance during prolonged therapy      -  Cefuroxime: S <=4      -  Ciprofloxacin: S <=0.25      -  Ertapenem: S <=0.5      -  Gentamicin: S <=2      -  Imipenem: S <=1      -  Levofloxacin: S <=0.5      -  Meropenem: S <=1      -  Nitrofurantoin: S <=32 Should not be used to treat pyelonephritis      -  Piperacillin/Tazobactam: S <=8      -  Tobramycin: S <=2      -  Trimethoprim/Sulfamethoxazole: S <=0.5/9.5    Culture - Blood (collected 03-04-23 @ 03:28)  Source: .Blood Blood-Peripheral  Final Report (03-09-23 @ 09:00):    No Growth Final    Culture - Blood (collected 03-04-23 @ 03:28)  Source: .Blood Blood-Peripheral  Final Report (03-09-23 @ 09:00):    No Growth Final        Medications:  acetaminophen     Tablet .. 650 milliGRAM(s) Oral every 6 hours PRN  acetaminophen  Suppository .. 325 milliGRAM(s) Rectal once  ascorbic acid 500 milliGRAM(s) Oral daily  aspirin  chewable 81 milliGRAM(s) Oral daily  atorvastatin 40 milliGRAM(s) Oral at bedtime  baclofen 5 milliGRAM(s) Oral every 12 hours  clopidogrel Tablet 75 milliGRAM(s) Oral daily  enoxaparin Injectable 40 milliGRAM(s) SubCutaneous every 24 hours  gabapentin 100 milliGRAM(s) Oral three times a day  levETIRAcetam 1500 milliGRAM(s) Oral two times a day  midodrine. 10 milliGRAM(s) Oral three times a day  multivitamin/minerals 1 Tablet(s) Oral daily  valproic  acid Syrup 500 milliGRAM(s) Oral three times a day  vitamin A &amp; D Ointment 1 Application(s) Topical two times a day        Assessment and Plan:  Patient is a 68 y/o woman with PMH of Right MCA stroke with left sided weakness was brought to the ED via EMS. In the ED, she had altered mental status and sepsis. Patient has a h/o prior hospitalization  in a hospital in [facility] last fall due to persistent metabolic encephalopathy for months     #  Altered Mental Status likely due to Metabolic Encephalopathy from UTI, sepsis and seizure in the context of acute and chronic stroke with left hemiparesis   s/p course of abx for E. coli UTI  MRI of brain: Small patchy subcortical infarct in the right frontal lobe. Redemonstrated chronic infarct in the right MCA territory.  Mild chronic microvascular ischemic changes.  CTA of head/neck: no evidence of occlusion, aneurysm or stenosis  s/p rapid response for seizure- s/p EEG and video EEG: continue on Depakote 500 mg TID and Keppra 1000 mg BID  seizure precautions  Neuro f/u appreciated - stroke likely due to hypotension - has diminutive caliber of right M1 - avoid hypotension  Added ASA, Plavix (On March 17th) and statin for new stroke -  DAPT for 21 days (From March 17th); followed by ASA 81 mg daily - f/u in stroke clinic in 4 weeks per neurology  pt with UE spasticity: MRI of C spine: Significantly motion limited study. No gross cord compression or spinal cord edema demonstrated. Multilevel small disc bulges. - continue baclofen 5mg bid  behavioral health eval appreciated: pt does not have the capacity to choose a Health care agent  -clinically patient is  following verbal commands, persistent left sided paralysis, dysarthria , oropharyngeal dysphagia , patient is in chronic bed confinement status.    # Seizures- 3/20- patient had gaze deviation to the left- resolved, Neuro f/up rec- increased Keppra dose tx.   -outpatient Neuro f/up     # New onset fevers on 3/20 at night and 3/21 in am: Maybe 2/2   # Unstagable sacral wound - Per Burn eval: LWC only.  3/20 procal = 0.05   Per ID, Cefepime (ended 3/24)      #After being afebrile for many days, new fever again (102 F on 3/26 night):   Sacral wound doesn't seem infected. Burn can re-eval.   CXR NACPD, UA neg, blood culture prelim negative . ID informed. Watching off Abx for now.  RVP negative     #Left avulsion fracture of medial malleolus  evaluated by podiatry - no surgical intervention - WB with CAM boot  continue PT/fall precautions, pain control    # Sinus tachycardia   now resolved - attributed to pain  EKG reviewed, TSH WNL    #Left cephalic vein thrombosis - s/p warm compress  b/l UE duplex neg for any DVT.     # Anemia - normocytic  labs reviewed - Hgb in 9-10 range and now stable    # DVT prophylaxis    full code, overall patient's prognosis guarded   Family discussion: yes, medical team     #Progress Note Handoff: Patient was medically optimized, d/c to SNF planning today if bed is available   Family discussion: yes, medical team Disposition: SNF possibly today    Total time spent to complete patient's bedside assessment, review medical chart, discuss discharge plan of care with covering medical team was more than 35 minutes with >50% of time spent face to face with patient, discussion with patient/family and/or coordination of care

## 2023-04-01 NOTE — PROGRESS NOTE ADULT - ASSESSMENT
66 y/o woman with PMH of Right MCA stroke with left sided weakness was brought to the ED via EMS. In the ED, she had altered mental status and sepsis.    #Febrile 3/26 overnight  - Sepsis w/up negative (CXR clear, BCx NGTD, UA neg)  - ID recs appreciated - monitor off ABx - repeat COVID if febrile  - Repeat RVP negative    #Seizures  #Acute R Frontal Lobe Infarct likely 2/ hypotension   #Chronic R MCA infarct  - per chart: at baseline she is alert, able to communicate and is oriented. Ambulation at baseline: uses a wheelchair. She was in a hospital in [facility] last fall and was lethargic for months  - 3/8: s/p rapid response for L gaze preference  - 3/20: Pt had event with L gaze preference during SS eval, pt made NPO. Examined by resident, no gaze preference present any longer. Pt responsive but less than baseline. Developed seizures post-event. Septic workup obtained. CXR appears clear, rest pending. --> 3/21 Pt more responsive this AM, expressing herself and in line with current baseline.  - Depakote level (3/4) 87 (WNL) -> (3/21) 74 (WNL)   - CTH/CTA of head/neck (3/4): no acute pathology, no evidence of occlusion, aneurysm or stenosis  - EEG (3/8) and video EEG (3/9): Focal slowing  - MRI Brain (3/15): new subcortical infarct in the right frontal lobe. Re-demonstrated chronic infarct in the right MCA territory.  - MRI of C spine (3/16): Significantly motion limited study. No gross cord compression or spinal cord edema demonstrated. Multilevel small disc bulges.  - Neuro eval'd 3/8-3/20: For L gaze preference/stroke, OP med review: on low dose Lamictal and Depakote in addition to Keppra (possible mood disorder/ seizures), Her MRI of brain showing subcortical infarct within same M1 territory. The casue of stroke is most likely hypotension as she has a diminutive Rt M1. However, MRI findings doesn't explain her B/L upper extremity spasticity. So, it is important to rule out cervical cord pathology. DAPT for 21 days followed by ASA 81 mg daily, Increased keppra post 3/20 possible seizure event  - BH eval'd 3/15: Delirium; pt does not have the capacity to choose a Health care agent  - SS re-juliette 3/21: Minced and moist, 1:1 feeds, thin liquids through straw  - Seizure precautions  - C/w Depakote 500mg TID and Keppra to 1500mg BID   - C/w baclofen 5mg bid (for UE spasticity)  - C/w ASA, Plavix (3/17-4/6), and high dose statin for new stroke - DAPT for 21 days followed by ASA 81 mg daily  - C/w Midodrine 10mg TID  - OP stroke clinic in 4 weeks after d/c per neurology    #Metabolic Encephalopathy from UTI  #Febrile 3/20  #Unstageable Sacral Ulcer  - 3/20: Pt became febrile after possible seizure episode, Cefepime, levaquin (x1 3/20), vancomycin  - 3/20 Septic workup: WBC (3/21) 12.26 (up from 8.23), BCx (3/20) NGTD, Procal (3/20) 0.05, RVP (3/20) COVID (-), Cortisol level, Lactate (3/21) WNL, UA nitrite (-), LEC (-), pending WBCs/Bacteria, UCx (3/21) NGTD  - Duplex LUE (3/10): (-) DVT (+) superficial cephalic vein thrombosis  - Duplex BLLE (3/7): (-) DVT  - s/p Vanc (3/4x1, 3/20-3/22), Cefepime (x1 3/4, x1 3/8), Ceftriaxone (3/4-3/6) (for UTI)  - Duplex BLLE: negative for DVT  - ID eval'd 3/23  - Burn eval'd 3/22 - No intervention, wound care recs noted  - Cefepime completed 3/24 (per ID)    #Left avulsion fracture of medial malleolus  - Duplex BL LE (3/7): (-) for DVT  - CT Foot (3/11): acute minimally displaced avulsion fracture at the medial malleolus  - Podiatry following for foot pain: WBAT w/ surgical shoe, ankle brace, PT  - fall precautions  - pain control regimen - if pain uncontrolled consider increasing percocet to 2tabs.  - C/w gabapentin at 100mg TID (Lowered dose from home, increase as pt tolerates)    #Sinus tachycardia - now resolved - attributed to pain  - TSH (3/8) WNL, - EKG reviewed    #Left cephalic vein thrombosis  - Duplex LUE (3/10): No DVT however there is superficial thrombosis noted in the left cephalic vein  - s/p warm compress  - Repeat duplex ordered 3/27 - negative for DVT    #Anemia - normocytic - stable  - possibly due to frequent phlebotomy, no signs of active bleed - monitor    #Misc  - DVT ppx - lovenox  - Diet: Soft and bite sized (SS 3/7)  - full code, overall guarded prognosis  - Dispo: dc planning - medically ready for dc pending placement

## 2023-04-02 LAB
ALBUMIN SERPL ELPH-MCNC: 2.8 G/DL — LOW (ref 3.5–5.2)
ALP SERPL-CCNC: 91 U/L — SIGNIFICANT CHANGE UP (ref 30–115)
ALT FLD-CCNC: <5 U/L — SIGNIFICANT CHANGE UP (ref 0–41)
ANION GAP SERPL CALC-SCNC: 12 MMOL/L — SIGNIFICANT CHANGE UP (ref 7–14)
ANION GAP SERPL CALC-SCNC: 15 MMOL/L — HIGH (ref 7–14)
AST SERPL-CCNC: 20 U/L — SIGNIFICANT CHANGE UP (ref 0–41)
BASOPHILS # BLD AUTO: 0.04 K/UL — SIGNIFICANT CHANGE UP (ref 0–0.2)
BASOPHILS NFR BLD AUTO: 0.3 % — SIGNIFICANT CHANGE UP (ref 0–1)
BILIRUB SERPL-MCNC: 0.2 MG/DL — SIGNIFICANT CHANGE UP (ref 0.2–1.2)
BUN SERPL-MCNC: 9 MG/DL — LOW (ref 10–20)
BUN SERPL-MCNC: 9 MG/DL — LOW (ref 10–20)
CALCIUM SERPL-MCNC: 8.9 MG/DL — SIGNIFICANT CHANGE UP (ref 8.4–10.5)
CALCIUM SERPL-MCNC: 9.1 MG/DL — SIGNIFICANT CHANGE UP (ref 8.4–10.5)
CHLORIDE SERPL-SCNC: 102 MMOL/L — SIGNIFICANT CHANGE UP (ref 98–110)
CHLORIDE SERPL-SCNC: 97 MMOL/L — LOW (ref 98–110)
CO2 SERPL-SCNC: 21 MMOL/L — SIGNIFICANT CHANGE UP (ref 17–32)
CO2 SERPL-SCNC: 25 MMOL/L — SIGNIFICANT CHANGE UP (ref 17–32)
CREAT SERPL-MCNC: <0.5 MG/DL — LOW (ref 0.7–1.5)
CREAT SERPL-MCNC: <0.5 MG/DL — LOW (ref 0.7–1.5)
CULTURE RESULTS: SIGNIFICANT CHANGE UP
EGFR: 109 ML/MIN/1.73M2 — SIGNIFICANT CHANGE UP
EGFR: 109 ML/MIN/1.73M2 — SIGNIFICANT CHANGE UP
EOSINOPHIL # BLD AUTO: 0.07 K/UL — SIGNIFICANT CHANGE UP (ref 0–0.7)
EOSINOPHIL NFR BLD AUTO: 0.5 % — SIGNIFICANT CHANGE UP (ref 0–8)
GLUCOSE SERPL-MCNC: 107 MG/DL — HIGH (ref 70–99)
GLUCOSE SERPL-MCNC: 81 MG/DL — SIGNIFICANT CHANGE UP (ref 70–99)
HCT VFR BLD CALC: 30.9 % — LOW (ref 37–47)
HGB BLD-MCNC: 9.8 G/DL — LOW (ref 12–16)
IMM GRANULOCYTES NFR BLD AUTO: 1.2 % — HIGH (ref 0.1–0.3)
LYMPHOCYTES # BLD AUTO: 26.4 % — SIGNIFICANT CHANGE UP (ref 20.5–51.1)
LYMPHOCYTES # BLD AUTO: 3.95 K/UL — HIGH (ref 1.2–3.4)
MAGNESIUM SERPL-MCNC: 2.1 MG/DL — SIGNIFICANT CHANGE UP (ref 1.8–2.4)
MCHC RBC-ENTMCNC: 28.3 PG — SIGNIFICANT CHANGE UP (ref 27–31)
MCHC RBC-ENTMCNC: 31.7 G/DL — LOW (ref 32–37)
MCV RBC AUTO: 89.3 FL — SIGNIFICANT CHANGE UP (ref 81–99)
MONOCYTES # BLD AUTO: 1.36 K/UL — HIGH (ref 0.1–0.6)
MONOCYTES NFR BLD AUTO: 9.1 % — SIGNIFICANT CHANGE UP (ref 1.7–9.3)
NEUTROPHILS # BLD AUTO: 9.35 K/UL — HIGH (ref 1.4–6.5)
NEUTROPHILS NFR BLD AUTO: 62.5 % — SIGNIFICANT CHANGE UP (ref 42.2–75.2)
NRBC # BLD: 0 /100 WBCS — SIGNIFICANT CHANGE UP (ref 0–0)
PLATELET # BLD AUTO: 502 K/UL — HIGH (ref 130–400)
POTASSIUM SERPL-MCNC: 4.7 MMOL/L — SIGNIFICANT CHANGE UP (ref 3.5–5)
POTASSIUM SERPL-MCNC: 5.9 MMOL/L — HIGH (ref 3.5–5)
POTASSIUM SERPL-SCNC: 4.7 MMOL/L — SIGNIFICANT CHANGE UP (ref 3.5–5)
POTASSIUM SERPL-SCNC: 5.9 MMOL/L — HIGH (ref 3.5–5)
PROT SERPL-MCNC: 6.3 G/DL — SIGNIFICANT CHANGE UP (ref 6–8)
RBC # BLD: 3.46 M/UL — LOW (ref 4.2–5.4)
RBC # FLD: 13.9 % — SIGNIFICANT CHANGE UP (ref 11.5–14.5)
SODIUM SERPL-SCNC: 134 MMOL/L — LOW (ref 135–146)
SODIUM SERPL-SCNC: 138 MMOL/L — SIGNIFICANT CHANGE UP (ref 135–146)
SPECIMEN SOURCE: SIGNIFICANT CHANGE UP
WBC # BLD: 14.95 K/UL — HIGH (ref 4.8–10.8)
WBC # FLD AUTO: 14.95 K/UL — HIGH (ref 4.8–10.8)

## 2023-04-02 PROCEDURE — 99232 SBSQ HOSP IP/OBS MODERATE 35: CPT

## 2023-04-02 RX ADMIN — GABAPENTIN 100 MILLIGRAM(S): 400 CAPSULE ORAL at 05:03

## 2023-04-02 RX ADMIN — ENOXAPARIN SODIUM 40 MILLIGRAM(S): 100 INJECTION SUBCUTANEOUS at 21:35

## 2023-04-02 RX ADMIN — Medication 1 TABLET(S): at 11:02

## 2023-04-02 RX ADMIN — MIDODRINE HYDROCHLORIDE 10 MILLIGRAM(S): 2.5 TABLET ORAL at 17:34

## 2023-04-02 RX ADMIN — Medication 500 MILLIGRAM(S): at 21:34

## 2023-04-02 RX ADMIN — MIDODRINE HYDROCHLORIDE 10 MILLIGRAM(S): 2.5 TABLET ORAL at 11:02

## 2023-04-02 RX ADMIN — Medication 500 MILLIGRAM(S): at 11:02

## 2023-04-02 RX ADMIN — Medication 5 MILLIGRAM(S): at 05:04

## 2023-04-02 RX ADMIN — Medication 500 MILLIGRAM(S): at 05:03

## 2023-04-02 RX ADMIN — Medication 500 MILLIGRAM(S): at 13:02

## 2023-04-02 RX ADMIN — LEVETIRACETAM 1500 MILLIGRAM(S): 250 TABLET, FILM COATED ORAL at 17:34

## 2023-04-02 RX ADMIN — MIDODRINE HYDROCHLORIDE 10 MILLIGRAM(S): 2.5 TABLET ORAL at 05:04

## 2023-04-02 RX ADMIN — GABAPENTIN 100 MILLIGRAM(S): 400 CAPSULE ORAL at 13:01

## 2023-04-02 RX ADMIN — Medication 81 MILLIGRAM(S): at 11:02

## 2023-04-02 RX ADMIN — ATORVASTATIN CALCIUM 40 MILLIGRAM(S): 80 TABLET, FILM COATED ORAL at 21:34

## 2023-04-02 RX ADMIN — GABAPENTIN 100 MILLIGRAM(S): 400 CAPSULE ORAL at 21:34

## 2023-04-02 RX ADMIN — Medication 1 APPLICATION(S): at 17:35

## 2023-04-02 RX ADMIN — CLOPIDOGREL BISULFATE 75 MILLIGRAM(S): 75 TABLET, FILM COATED ORAL at 11:02

## 2023-04-02 RX ADMIN — Medication 5 MILLIGRAM(S): at 17:34

## 2023-04-02 RX ADMIN — LEVETIRACETAM 1500 MILLIGRAM(S): 250 TABLET, FILM COATED ORAL at 05:03

## 2023-04-02 RX ADMIN — Medication 1 APPLICATION(S): at 05:01

## 2023-04-02 NOTE — PROGRESS NOTE ADULT - ASSESSMENT
Assessment and Plan:  Patient is a 66 y/o woman with PMH of Right MCA stroke with left sided weakness was brought to the ED via EMS. In the ED, she had altered mental status and sepsis. Patient has a h/o prior hospitalization  in a hospital in [facility] last fall due to persistent metabolic encephalopathy for months     #  Altered Mental Status likely due to Metabolic Encephalopathy from UTI, sepsis and seizure in the context of acute and chronic stroke with left hemiparesis -improving  s/p course of abx for E. coli UTI  MRI of brain: Small patchy subcortical infarct in the right frontal lobe. Redemonstrated chronic infarct in the right MCA territory.  Mild chronic microvascular ischemic changes.  CTA of head/neck: no evidence of occlusion, aneurysm or stenosis  s/p rapid response for seizure- s/p EEG and video EEG: continue on Depakote 500 mg TID and Keppra 1000 mg BID  seizure precautions  Neuro f/u appreciated - stroke likely due to hypotension - has diminutive caliber of right M1 - avoid hypotension  CW ASA, Plavix (started on March 17th) and statin for new stroke -  DAPT for 21 days (From March 17th); followed by ASA 81 mg daily - f/u in stroke clinic in 4 weeks per neurology  pt with UE spasticity: MRI of C spine: Significantly motion limited study. No gross cord compression or spinal cord edema demonstrated. Multilevel small disc bulges. - continue baclofen 5mg bid  behavioral health eval appreciated: pt does not have the capacity to choose a Health care agent  -clinically patient is  following verbal commands, persistent left sided paralysis, dysarthria , oropharyngeal dysphagia , patient is in chronic bed confinement status.    # Seizures- 3/20- patient had gaze deviation to the left- resolved, Neuro f/up rec- increased Keppra dose tx.   -outpatient Neuro f/up     # New onset fevers on 3/20 at night and 3/21 in am:  # Unstagable sacral wound - Per Burn eval: LWC only.  3/20 procal = 0.05   Per ID, Cefepime (ended 3/24)      #After being afebrile for many days, new fever again (102 F on 3/26 night): -currently afebrile  Sacral wound doesn't seem infected.    CXR NACPD, UA neg, blood culture prelim negative . ID on board. Watching off Abx for now.  RVP negative     #Left avulsion fracture of medial malleolus  evaluated by podiatry - no surgical intervention - WB with CAM boot  continue PT/fall precautions, pain control    # Sinus tachycardia -resolved  now resolved - attributed to pain  EKG reviewed, TSH WNL    #Left cephalic vein thrombosis - s/p warm compress  b/l UE duplex neg for any DVT.     # Anemia - normocytic  labs reviewed - Hgb in 9-10 range and now stable    # DVT prophylaxis    full code, overall patient's prognosis guarded   Family discussion: yes, medical team     #Progress Note Handoff: Patient was medically optimized, d/c to SNF when bed available

## 2023-04-02 NOTE — PROGRESS NOTE ADULT - SUBJECTIVE AND OBJECTIVE BOX
JOSEPH OBRIEN  67y Female    CHIEF COMPLAINT:    Patient is a 67y old  Female who presents with a chief complaint of AMS (01 Apr 2023 10:04)      INTERVAL HPI/OVERNIGHT EVENTS:    Patient seen and examined. No acute events overnight.     ROS: no complaints    Vital Signs:    T(F): 98.5 (04-02-23 @ 05:00), Max: 98.5 (04-02-23 @ 05:00)  HR: 95 (04-02-23 @ 05:00) (83 - 95)  BP: 113/77 (04-02-23 @ 05:00) (109/58 - 127/71)  RR: 18 (04-02-23 @ 05:00) (18 - 20)  SpO2: 100% (04-02-23 @ 05:00) (100% - 100%)  I&O's Summary    Daily     Daily   CAPILLARY BLOOD GLUCOSE          PHYSICAL EXAM:    GENERAL:  NAD  SKIN: No rashes or lesions  HEENT: Atraumatic. Normocephalic. PERRL. Moist membranes.  NECK: Supple, No JVD. No lymphadenopathy.  PULMONARY: CTA B/L. No wheezing. No rales  CVS: Normal S1, S2. Rate and Rhythm are regular. No murmurs.  ABDOMEN/GI: Soft, Nontender, Nondistended; BS present  MSK:  dependednt edema  NEUROLOGIC:  left hemiparesis, right sided weakness  PSYCH: Alert , awake, can follow simple commands    Consultant(s) Notes Reviewed:  [x ] YES  [ ] NO  Care Discussed with Consultants/Other Providers [ x] YES  [ ] NO    LABS:                        9.8    14.95 )-----------( 502      ( 02 Apr 2023 07:32 )             30.9     04-02    138  |  102  |  9<L>  ----------------------------<  81  5.9<H>   |  21  |  <0.5<L>    Ca    9.1      02 Apr 2023 07:32  Mg     2.1     04-02    TPro  6.3  /  Alb  2.8<L>  /  TBili  0.2  /  DBili  x   /  AST  20  /  ALT  <5  /  AlkPhos  91  04-02              RADIOLOGY & ADDITIONAL TESTS:      Imaging or report Personally Reviewed:  [x] YES  [ ] NO  EKG reviewed: [x] YES  [ ] NO    Medications:  Standing  acetaminophen  Suppository .. 325 milliGRAM(s) Rectal once  ascorbic acid 500 milliGRAM(s) Oral daily  aspirin  chewable 81 milliGRAM(s) Oral daily  atorvastatin 40 milliGRAM(s) Oral at bedtime  baclofen 5 milliGRAM(s) Oral every 12 hours  clopidogrel Tablet 75 milliGRAM(s) Oral daily  enoxaparin Injectable 40 milliGRAM(s) SubCutaneous every 24 hours  gabapentin 100 milliGRAM(s) Oral three times a day  levETIRAcetam 1500 milliGRAM(s) Oral two times a day  midodrine. 10 milliGRAM(s) Oral three times a day  multivitamin/minerals 1 Tablet(s) Oral daily  valproic  acid Syrup 500 milliGRAM(s) Oral three times a day  vitamin A &amp; D Ointment 1 Application(s) Topical two times a day    PRN Meds  acetaminophen     Tablet .. 650 milliGRAM(s) Oral every 6 hours PRN

## 2023-04-03 LAB
ALBUMIN SERPL ELPH-MCNC: 2.9 G/DL — LOW (ref 3.5–5.2)
ALP SERPL-CCNC: 94 U/L — SIGNIFICANT CHANGE UP (ref 30–115)
ALT FLD-CCNC: 6 U/L — SIGNIFICANT CHANGE UP (ref 0–41)
ANION GAP SERPL CALC-SCNC: 17 MMOL/L — HIGH (ref 7–14)
APPEARANCE UR: CLEAR — SIGNIFICANT CHANGE UP
AST SERPL-CCNC: 17 U/L — SIGNIFICANT CHANGE UP (ref 0–41)
BASOPHILS # BLD AUTO: 0.07 K/UL — SIGNIFICANT CHANGE UP (ref 0–0.2)
BASOPHILS NFR BLD AUTO: 0.3 % — SIGNIFICANT CHANGE UP (ref 0–1)
BILIRUB SERPL-MCNC: 0.3 MG/DL — SIGNIFICANT CHANGE UP (ref 0.2–1.2)
BILIRUB UR-MCNC: NEGATIVE — SIGNIFICANT CHANGE UP
BUN SERPL-MCNC: 6 MG/DL — LOW (ref 10–20)
CALCIUM SERPL-MCNC: 9.3 MG/DL — SIGNIFICANT CHANGE UP (ref 8.4–10.4)
CHLORIDE SERPL-SCNC: 97 MMOL/L — LOW (ref 98–110)
CO2 SERPL-SCNC: 22 MMOL/L — SIGNIFICANT CHANGE UP (ref 17–32)
COLOR SPEC: YELLOW — SIGNIFICANT CHANGE UP
CREAT SERPL-MCNC: <0.5 MG/DL — LOW (ref 0.7–1.5)
DIFF PNL FLD: NEGATIVE — SIGNIFICANT CHANGE UP
EGFR: 109 ML/MIN/1.73M2 — SIGNIFICANT CHANGE UP
EOSINOPHIL # BLD AUTO: 0.03 K/UL — SIGNIFICANT CHANGE UP (ref 0–0.7)
EOSINOPHIL NFR BLD AUTO: 0.1 % — SIGNIFICANT CHANGE UP (ref 0–8)
GLUCOSE SERPL-MCNC: 95 MG/DL — SIGNIFICANT CHANGE UP (ref 70–99)
GLUCOSE UR QL: NEGATIVE — SIGNIFICANT CHANGE UP
HCT VFR BLD CALC: 29.8 % — LOW (ref 37–47)
HGB BLD-MCNC: 9.6 G/DL — LOW (ref 12–16)
IMM GRANULOCYTES NFR BLD AUTO: 1.3 % — HIGH (ref 0.1–0.3)
KETONES UR-MCNC: SIGNIFICANT CHANGE UP
LEUKOCYTE ESTERASE UR-ACNC: NEGATIVE — SIGNIFICANT CHANGE UP
LYMPHOCYTES # BLD AUTO: 18.2 % — LOW (ref 20.5–51.1)
LYMPHOCYTES # BLD AUTO: 3.64 K/UL — HIGH (ref 1.2–3.4)
MAGNESIUM SERPL-MCNC: 2 MG/DL — SIGNIFICANT CHANGE UP (ref 1.8–2.4)
MCHC RBC-ENTMCNC: 28.7 PG — SIGNIFICANT CHANGE UP (ref 27–31)
MCHC RBC-ENTMCNC: 32.2 G/DL — SIGNIFICANT CHANGE UP (ref 32–37)
MCV RBC AUTO: 89.2 FL — SIGNIFICANT CHANGE UP (ref 81–99)
MONOCYTES # BLD AUTO: 1.99 K/UL — HIGH (ref 0.1–0.6)
MONOCYTES NFR BLD AUTO: 9.9 % — HIGH (ref 1.7–9.3)
NEUTROPHILS # BLD AUTO: 14.01 K/UL — HIGH (ref 1.4–6.5)
NEUTROPHILS NFR BLD AUTO: 70.2 % — SIGNIFICANT CHANGE UP (ref 42.2–75.2)
NITRITE UR-MCNC: NEGATIVE — SIGNIFICANT CHANGE UP
NRBC # BLD: 0 /100 WBCS — SIGNIFICANT CHANGE UP (ref 0–0)
PH UR: 6 — SIGNIFICANT CHANGE UP (ref 5–8)
PLATELET # BLD AUTO: 691 K/UL — HIGH (ref 130–400)
POTASSIUM SERPL-MCNC: 5.3 MMOL/L — HIGH (ref 3.5–5)
POTASSIUM SERPL-SCNC: 5.3 MMOL/L — HIGH (ref 3.5–5)
PROT SERPL-MCNC: 6.6 G/DL — SIGNIFICANT CHANGE UP (ref 6–8)
PROT UR-MCNC: SIGNIFICANT CHANGE UP
RBC # BLD: 3.34 M/UL — LOW (ref 4.2–5.4)
RBC # FLD: 14.2 % — SIGNIFICANT CHANGE UP (ref 11.5–14.5)
SARS-COV-2 RNA SPEC QL NAA+PROBE: SIGNIFICANT CHANGE UP
SODIUM SERPL-SCNC: 136 MMOL/L — SIGNIFICANT CHANGE UP (ref 135–146)
SP GR SPEC: 1.02 — SIGNIFICANT CHANGE UP (ref 1.01–1.03)
UROBILINOGEN FLD QL: ABNORMAL
WBC # BLD: 20.01 K/UL — HIGH (ref 4.8–10.8)
WBC # FLD AUTO: 20.01 K/UL — HIGH (ref 4.8–10.8)

## 2023-04-03 PROCEDURE — 99232 SBSQ HOSP IP/OBS MODERATE 35: CPT

## 2023-04-03 PROCEDURE — 93010 ELECTROCARDIOGRAM REPORT: CPT

## 2023-04-03 RX ADMIN — Medication 500 MILLIGRAM(S): at 11:28

## 2023-04-03 RX ADMIN — LEVETIRACETAM 1500 MILLIGRAM(S): 250 TABLET, FILM COATED ORAL at 19:03

## 2023-04-03 RX ADMIN — Medication 650 MILLIGRAM(S): at 21:17

## 2023-04-03 RX ADMIN — Medication 1 APPLICATION(S): at 17:04

## 2023-04-03 RX ADMIN — Medication 81 MILLIGRAM(S): at 11:28

## 2023-04-03 RX ADMIN — Medication 500 MILLIGRAM(S): at 21:18

## 2023-04-03 RX ADMIN — GABAPENTIN 100 MILLIGRAM(S): 400 CAPSULE ORAL at 21:18

## 2023-04-03 RX ADMIN — ATORVASTATIN CALCIUM 40 MILLIGRAM(S): 80 TABLET, FILM COATED ORAL at 21:17

## 2023-04-03 RX ADMIN — CLOPIDOGREL BISULFATE 75 MILLIGRAM(S): 75 TABLET, FILM COATED ORAL at 11:28

## 2023-04-03 RX ADMIN — MIDODRINE HYDROCHLORIDE 10 MILLIGRAM(S): 2.5 TABLET ORAL at 11:28

## 2023-04-03 RX ADMIN — MIDODRINE HYDROCHLORIDE 10 MILLIGRAM(S): 2.5 TABLET ORAL at 05:54

## 2023-04-03 RX ADMIN — GABAPENTIN 100 MILLIGRAM(S): 400 CAPSULE ORAL at 13:24

## 2023-04-03 RX ADMIN — ENOXAPARIN SODIUM 40 MILLIGRAM(S): 100 INJECTION SUBCUTANEOUS at 23:12

## 2023-04-03 RX ADMIN — MIDODRINE HYDROCHLORIDE 10 MILLIGRAM(S): 2.5 TABLET ORAL at 19:03

## 2023-04-03 RX ADMIN — GABAPENTIN 100 MILLIGRAM(S): 400 CAPSULE ORAL at 05:54

## 2023-04-03 RX ADMIN — LEVETIRACETAM 1500 MILLIGRAM(S): 250 TABLET, FILM COATED ORAL at 05:54

## 2023-04-03 RX ADMIN — Medication 650 MILLIGRAM(S): at 22:15

## 2023-04-03 RX ADMIN — Medication 1 TABLET(S): at 11:28

## 2023-04-03 RX ADMIN — Medication 5 MILLIGRAM(S): at 05:53

## 2023-04-03 RX ADMIN — Medication 5 MILLIGRAM(S): at 19:03

## 2023-04-03 RX ADMIN — Medication 500 MILLIGRAM(S): at 05:53

## 2023-04-03 RX ADMIN — Medication 1 APPLICATION(S): at 05:46

## 2023-04-03 RX ADMIN — Medication 500 MILLIGRAM(S): at 13:24

## 2023-04-03 NOTE — PROGRESS NOTE ADULT - SUBJECTIVE AND OBJECTIVE BOX
JOSEPH OBRIEN 67y Female  MRN#: [id number]   Hospital Day: 30d    SUBJECTIVE  Patient is a 67y old Female who presents with a chief complaint of AMS  Currently admitted to medicine with the primary diagnosis of Stroke      INTERVAL HPI AND OVERNIGHT EVENTS:  Patient was examined and seen at bedside. This morning she is resting comfortably in bed and reports no issues or overnight events.    OBJECTIVE  PAST MEDICAL & SURGICAL HISTORY    ALLERGIES:  Allergy Status Unknown    MEDICATIONS:  STANDING MEDICATIONS  acetaminophen  Suppository .. 325 milliGRAM(s) Rectal once  ascorbic acid 500 milliGRAM(s) Oral daily  aspirin  chewable 81 milliGRAM(s) Oral daily  atorvastatin 40 milliGRAM(s) Oral at bedtime  baclofen 5 milliGRAM(s) Oral every 12 hours  clopidogrel Tablet 75 milliGRAM(s) Oral daily  enoxaparin Injectable 40 milliGRAM(s) SubCutaneous every 24 hours  gabapentin 100 milliGRAM(s) Oral three times a day  levETIRAcetam 1500 milliGRAM(s) Oral two times a day  midodrine. 10 milliGRAM(s) Oral three times a day  multivitamin/minerals 1 Tablet(s) Oral daily  valproic  acid Syrup 500 milliGRAM(s) Oral three times a day  vitamin A &amp; D Ointment 1 Application(s) Topical two times a day    PRN MEDICATIONS  acetaminophen     Tablet .. 650 milliGRAM(s) Oral every 6 hours PRN      VITAL SIGNS: Last 24 Hours  T(C): 37.1 (03 Apr 2023 05:47), Max: 37.9 (02 Apr 2023 21:00)  T(F): 98.7 (03 Apr 2023 05:47), Max: 100.3 (02 Apr 2023 21:00)  HR: 107 (03 Apr 2023 05:47) (99 - 107)  BP: 139/63 (03 Apr 2023 05:47) (108/55 - 139/63)  BP(mean): --  RR: 18 (03 Apr 2023 05:47) (18 - 18)  SpO2: 100% (03 Apr 2023 07:32) (97% - 100%)    LABS:                        9.6    20.01 )-----------( 691      ( 03 Apr 2023 06:28 )             29.8     04-03    136  |  97<L>  |  6<L>  ----------------------------<  95  5.3<H>   |  22  |  <0.5<L>    Ca    9.3      03 Apr 2023 06:28  Mg     2.0     04-03    TPro  6.6  /  Alb  2.9<L>  /  TBili  0.3  /  DBili  x   /  AST  17  /  ALT  6   /  AlkPhos  94  04-03        PHYSICAL EXAM:  CONSTITUTIONAL: No acute distress, laying in bed comfortably this AM  HEENT: Atraumatic, normocephalic, neck supple, moist mucous membranes  PULMONARY: Clear to auscultation bilaterally  CARDIOVASCULAR: Regular rate and rhythm  GASTROINTESTINAL: Soft, non-tender, non-distended; bowel sounds present  MUSCULOSKELETAL: no LE edema, 2+ pulses b/l LE, left LE in CAM boot  NEURO: L hemiparesis, speaks in short sentences, B/L UE spasticity, contracted tone  SKIN: Sacral ulcer - wound wrapped

## 2023-04-03 NOTE — PROGRESS NOTE ADULT - ASSESSMENT
Assessment and Plan:  Patient is a 66 y/o woman with PMH of Right MCA stroke with left sided weakness was brought to the ED via EMS. In the ED, she had altered mental status and sepsis. Patient has a h/o prior hospitalization  in a hospital in [facility] last fall due to persistent metabolic encephalopathy for months     #  Altered Mental Status likely due to Metabolic Encephalopathy from UTI, sepsis and seizure in the context of acute and chronic stroke with left hemiparesis -improving  s/p course of abx for E. coli UTI  MRI of brain: Small patchy subcortical infarct in the right frontal lobe. Redemonstrated chronic infarct in the right MCA territory.  Mild chronic microvascular ischemic changes.  CTA of head/neck: no evidence of occlusion, aneurysm or stenosis  s/p rapid response for seizure- s/p EEG and video EEG: continue on Depakote 500 mg TID and Keppra 1000 mg BID  seizure precautions  Neuro f/u appreciated - stroke likely due to hypotension - has diminutive caliber of right M1 - avoid hypotension  CW ASA, Plavix (started on March 17th) and statin for new stroke -  DAPT for 21 days (From March 17th); followed by ASA 81 mg daily - f/u in stroke clinic in 4 weeks per neurology  pt with UE spasticity: MRI of C spine: Significantly motion limited study. No gross cord compression or spinal cord edema demonstrated. Multilevel small disc bulges. - continue baclofen 5mg bid  behavioral health eval appreciated: pt does not have the capacity to choose a Health care agent  -clinically patient is  following verbal commands, persistent left sided paralysis, dysarthria , oropharyngeal dysphagia , patient is in chronic bed confinement status.    # Seizures- 3/20- patient had gaze deviation to the left- resolved, Neuro f/up rec- increased Keppra dose tx.   -outpatient Neuro f/up     # New onset fevers on 3/20 at night and 3/21 in am:  # Unstagable sacral wound - Per Burn eval: LWC only.  3/20 procal = 0.05   Per ID, Cefepime (ended 3/24)    #Left avulsion fracture of medial malleolus  evaluated by podiatry - no surgical intervention - WB with CAM boot  continue PT/fall precautions, pain control    # Sinus tachycardia -resolved  now resolved - attributed to pain  EKG reviewed, TSH WNL    #Left cephalic vein thrombosis - s/p warm compress  b/l UE duplex neg for any DVT.     # Leucocytosis >> with foul smelling sacral decub     >> will f/u Burn for possible intervention.     # Anemia - normocytic  labs reviewed - Hgb in 9-10 range and now stable    # DVT prophylaxis    full code, overall patient's prognosis guarded   Family discussion: yes, medical team     pending: Burn / Leucocytosis  Dispo: SNF

## 2023-04-03 NOTE — CHART NOTE - NSCHARTNOTEFT_GEN_A_CORE
Registered Dietitian Follow-Up    Patient Profile Reviewed                           Yes [x]   No []    Nutrition History Previously Obtained        Yes [x]  No []      Pertinent Medical Interventions: Pt noted with Altered Mental Status likely due to Metabolic Encephalopathy from UTI, sepsis and seizure in the context of acute and chronic stroke with left hemiparesis -improving as per progress notes.    Latest SLP eval 3/21 notes + toleration of thin liquids via straw sip, puree, and soft and bite size textures. Pt with mild - mod oral dysphagia characterized by delayed oral transit and swallow initiation. Recommended minced and moist and thin liquids , 1:1 feed.    Diet, DASH/TLC Minced & Moist diet with Ensure Plus High Protein twice daily (350 kcal, 20 g protein per serving)    Anthropometrics:  Height (cm): 167.6 (23 @ 16:25)  Weight (kg): 68.3 (23 @ 10:12)  BMI (kg/m2): 24.3 (23 @ 10:12)    OTHER WEIGHTS:   ^ height per patient report  ^ weight obtained 3/6 via bed scale at time of visit, with consideration for edema   UBW: 68.2kg  IBW 52.3kg     As per 3/27 RD assessment: patient bed weight higher than reported UBW, patient reported that she has 'some weight loss here or there'  unsure if accurate report considering patient noted with confusion    Daily Weight in k.3 ()    No new wt at this time    MEDICATIONS  (STANDING):  acetaminophen  Suppository .. 325 milliGRAM(s) Rectal once  ascorbic acid 500 milliGRAM(s) Oral daily  aspirin  chewable 81 milliGRAM(s) Oral daily  atorvastatin 40 milliGRAM(s) Oral at bedtime  baclofen 5 milliGRAM(s) Oral every 12 hours  clopidogrel Tablet 75 milliGRAM(s) Oral daily  enoxaparin Injectable 40 milliGRAM(s) SubCutaneous every 24 hours  gabapentin 100 milliGRAM(s) Oral three times a day  levETIRAcetam 1500 milliGRAM(s) Oral two times a day  midodrine. 10 milliGRAM(s) Oral three times a day  multivitamin/minerals 1 Tablet(s) Oral daily  valproic  acid Syrup 500 milliGRAM(s) Oral three times a day  vitamin A & D Ointment 1 Application(s) Topical two times a day    MEDICATIONS  (PRN):  acetaminophen     Tablet .. 650 milliGRAM(s) Oral every 6 hours PRN Moderate Pain (4 - 6)    Pertinent Labs: - @ 06:28: Na 136, BUN 6<L>, Cr <0.5<L>, BG 95, K+ 5.3<H>, Phos --, Mg 2.0, Alk Phos 94, ALT/SGPT 6, AST/SGOT 17       Physical Findings:  - Cognition: confused, disoriented at time of RD visit  - GI function: Last BM reported 3/31; no GI issues reported at this time  - Tubes: no feeding tubes  - Oral/Mouth cavity: minced & moist; reportedly tolerating at this time per RN  - Skin: 3/15 WOCN assessment -- High risk for pressure injury development or progression, B/L heel intact , B/l ear healed ulceration & Stage three pressure injury to B/l buttock   *per flow sheets: sacrum stage 3 as of 3/20  - Edema: 1+ generalized edema     Nutrition Requirements: with consideration for age, weight, BMI, wounds  Weight Used: 68.2 kg dry weight      Estimated Energy Needs    Continue [x]  Adjust [] 7834-7703 kcal/day (MSJ x 1.2-1.5)    Estimated Protein Needs    Continue [x]  Adjust [] 82-102g/day (1.2-1.5g/kg)    Estimated Fluid Needs        Continue [x]  Adjust [] 1700mLday (25mL/kg)    [x] Previous Nutrition Diagnosis: Increased Nutrient Needs            [x] Ongoing          [] Resolved    [x] Previous Nutrition Diagnosis: Malnutrition              [x] Ongoing          [] Resolved  Goal/Expected Outcome: meet >/=50% po intake over next 3-5 days.    Nutrition Education: Deferred in setting of confusion.    Recommendations:  (1) Continue minced & moist diet as per SLP recommendations. Would consult SLP to re-evaluate swallow function to monitor if diet order texture/consistency change is needed.  (2) Order an appetite stimulant.  (3) Order Magic Cup once daily (290 kcal, 9 g protein)  (4) Order Prosource Gelatein Plus once daily (160 kcal, 20 g protein)

## 2023-04-03 NOTE — PROGRESS NOTE ADULT - ASSESSMENT
66 y/o woman with PMH of Right MCA stroke with left sided weakness was brought to the ED via EMS. In the ED, she had altered mental status and sepsis.    #Febrile 3/26 overnight  - Sepsis w/up negative (CXR clear, BCx NGTD, UA neg)  - ID recs appreciated - monitor off ABx - repeat COVID if febrile  - Repeat RVP negative  - 4/2 sacral wound noted to be malodorous - low grade fever overnight as well as leukocytosis - Burn eval for wound pending    #Seizures  #Acute R Frontal Lobe Infarct likely 2/ hypotension   #Chronic R MCA infarct  - per chart: at baseline she is alert, able to communicate and is oriented. Ambulation at baseline: uses a wheelchair. She was in a hospital in [facility] last fall and was lethargic for months  - 3/8: s/p rapid response for L gaze preference  - 3/20: Pt had event with L gaze preference during SS eval, pt made NPO. Examined by resident, no gaze preference present any longer. Pt responsive but less than baseline. Developed seizures post-event. Septic workup obtained. CXR appears clear, rest pending. --> 3/21 Pt more responsive this AM, expressing herself and in line with current baseline.  - Depakote level (3/4) 87 (WNL) -> (3/21) 74 (WNL)   - CTH/CTA of head/neck (3/4): no acute pathology, no evidence of occlusion, aneurysm or stenosis  - EEG (3/8) and video EEG (3/9): Focal slowing  - MRI Brain (3/15): new subcortical infarct in the right frontal lobe. Re-demonstrated chronic infarct in the right MCA territory.  - MRI of C spine (3/16): Significantly motion limited study. No gross cord compression or spinal cord edema demonstrated. Multilevel small disc bulges.  - Neuro eval'd 3/8-3/20: For L gaze preference/stroke, OP med review: on low dose Lamictal and Depakote in addition to Keppra (possible mood disorder/ seizures), Her MRI of brain showing subcortical infarct within same M1 territory. The casue of stroke is most likely hypotension as she has a diminutive Rt M1. However, MRI findings doesn't explain her B/L upper extremity spasticity. So, it is important to rule out cervical cord pathology. DAPT for 21 days followed by ASA 81 mg daily, Increased keppra post 3/20 possible seizure event  - BH eval'd 3/15: Delirium; pt does not have the capacity to choose a Health care agent  - SS re-juliette 3/21: Minced and moist, 1:1 feeds, thin liquids through straw  - Seizure precautions  - C/w Depakote 500mg TID and Keppra to 1500mg BID   - C/w baclofen 5mg bid (for UE spasticity)  - C/w ASA, Plavix (3/17-4/6), and high dose statin for new stroke - DAPT for 21 days followed by ASA 81 mg daily  - C/w Midodrine 10mg TID  - OP stroke clinic in 4 weeks after d/c per neurology    #Metabolic Encephalopathy from UTI  #Febrile 3/20  #Unstageable Sacral Ulcer  - 3/20: Pt became febrile after possible seizure episode, Cefepime, levaquin (x1 3/20), vancomycin  - 3/20 Septic workup: WBC (3/21) 12.26 (up from 8.23), BCx (3/20) NGTD, Procal (3/20) 0.05, RVP (3/20) COVID (-), Cortisol level, Lactate (3/21) WNL, UA nitrite (-), LEC (-), pending WBCs/Bacteria, UCx (3/21) NGTD  - Duplex LUE (3/10): (-) DVT (+) superficial cephalic vein thrombosis  - Duplex BLLE (3/7): (-) DVT  - s/p Vanc (3/4x1, 3/20-3/22), Cefepime (x1 3/4, x1 3/8), Ceftriaxone (3/4-3/6) (for UTI)  - Duplex BLLE: negative for DVT  - ID eval'd 3/23  - Burn eval'd 3/22 - No intervention, wound care recs noted  - Cefepime completed 3/24 (per ID)    #Left avulsion fracture of medial malleolus  - Duplex BL LE (3/7): (-) for DVT  - CT Foot (3/11): acute minimally displaced avulsion fracture at the medial malleolus  - Podiatry following for foot pain: WBAT w/ surgical shoe, ankle brace, PT  - fall precautions  - pain control regimen - if pain uncontrolled consider increasing percocet to 2tabs.  - C/w gabapentin at 100mg TID (Lowered dose from home, increase as pt tolerates)    #Sinus tachycardia - now resolved - attributed to pain  - TSH (3/8) WNL, - EKG reviewed    #Left cephalic vein thrombosis  - Duplex LUE (3/10): No DVT however there is superficial thrombosis noted in the left cephalic vein  - s/p warm compress  - Repeat duplex ordered 3/27 - negative for DVT    #Anemia - normocytic - stable  - possibly due to frequent phlebotomy, no signs of active bleed - monitor    #Misc  - DVT ppx - lovenox  - Diet: Soft and bite sized (SS 3/7)  - full code, overall guarded prognosis     66 y/o woman with PMH of Right MCA stroke with left sided weakness was brought to the ED via EMS. In the ED, she had altered mental status and sepsis.    #Febrile 3/26 overnight  - Sepsis w/up negative (CXR clear, BCx NGTD, UA neg)  - ID recs appreciated - monitor off ABx - repeat COVID if febrile  - Repeat RVP negative  - 4/2 sacral wound noted to be malodorous - low grade fever overnight as well as leukocytosis - Burn eval for wound pending    #Seizures  #Acute R Frontal Lobe Infarct likely 2/ hypotension   #Chronic R MCA infarct  - per chart: at baseline she is alert, able to communicate and is oriented. Ambulation at baseline: uses a wheelchair. She was in a hospital in [facility] last fall and was lethargic for months  - 3/8: s/p rapid response for L gaze preference  - 3/20: Pt had event with L gaze preference during SS eval, pt made NPO. Examined by resident, no gaze preference present any longer. Pt responsive but less than baseline. Developed seizures post-event. Septic workup obtained. CXR appears clear, rest pending. --> 3/21 Pt more responsive this AM, expressing herself and in line with current baseline.  - Depakote level (3/4) 87 (WNL) -> (3/21) 74 (WNL)   - CTH/CTA of head/neck (3/4): no acute pathology, no evidence of occlusion, aneurysm or stenosis  - EEG (3/8) and video EEG (3/9): Focal slowing  - MRI Brain (3/15): new subcortical infarct in the right frontal lobe. Re-demonstrated chronic infarct in the right MCA territory.  - MRI of C spine (3/16): Significantly motion limited study. No gross cord compression or spinal cord edema demonstrated. Multilevel small disc bulges.  - Neuro eval'd 3/8-3/20: For L gaze preference/stroke, OP med review: on low dose Lamictal and Depakote in addition to Keppra (possible mood disorder/ seizures), Her MRI of brain showing subcortical infarct within same M1 territory. The casue of stroke is most likely hypotension as she has a diminutive Rt M1. However, MRI findings doesn't explain her B/L upper extremity spasticity. So, it is important to rule out cervical cord pathology. DAPT for 21 days followed by ASA 81 mg daily, Increased keppra post 3/20 possible seizure event  - BH eval'd 3/15: Delirium; pt does not have the capacity to choose a Health care agent  - SS re-juliette 3/21: Minced and moist, 1:1 feeds, thin liquids through straw  - Seizure precautions  - C/w Depakote 500mg TID and Keppra to 1500mg BID   - C/w baclofen 5mg bid (for UE spasticity)  - C/w ASA, Plavix (3/17-4/6), and high dose statin for new stroke - DAPT for 21 days followed by ASA 81 mg daily  - C/w Midodrine 10mg TID  - OP stroke clinic in 4 weeks after d/c per neurology    #Metabolic Encephalopathy from UTI  #Febrile 3/20  #Unstageable Sacral Ulcer  - 3/20: Pt became febrile after possible seizure episode, Cefepime, levaquin (x1 3/20), vancomycin  - 3/20 Septic workup: WBC (3/21) 12.26 (up from 8.23), BCx (3/20) NGTD, Procal (3/20) 0.05, RVP (3/20) COVID (-), Cortisol level, Lactate (3/21) WNL, UA nitrite (-), LEC (-), pending WBCs/Bacteria, UCx (3/21) NGTD  - Duplex LUE (3/10): (-) DVT (+) superficial cephalic vein thrombosis  - Duplex BLLE (3/7): (-) DVT  - s/p Vanc (3/4x1, 3/20-3/22), Cefepime (x1 3/4, x1 3/8), Ceftriaxone (3/4-3/6) (for UTI)  - Duplex BLLE: negative for DVT  - ID eval'd 3/23  - Burn eval'd 3/22 - No intervention, wound care recs noted  - Cefepime completed 3/24 (per ID)    #Left avulsion fracture of medial malleolus  - Duplex BL LE (3/7): (-) for DVT  - CT Foot (3/11): acute minimally displaced avulsion fracture at the medial malleolus  - Podiatry following for foot pain: WBAT w/ surgical shoe, ankle brace, PT  - fall precautions  - pain control regimen - if pain uncontrolled consider increasing percocet to 2tabs.  - C/w gabapentin at 100mg TID (Lowered dose from home, increase as pt tolerates)    #Sinus tachycardia - now resolved - attributed to pain  - TSH (3/8) WNL, - EKG reviewed    #Left cephalic vein thrombosis  - Duplex LUE (3/10): No DVT however there is superficial thrombosis noted in the left cephalic vein  - s/p warm compress  - Repeat duplex ordered 3/27 - negative for DVT    #Anemia - normocytic - stable  - possibly due to frequent phlebotomy, no signs of active bleed - monitor    #Misc  - DVT ppx - lovenox  - Diet: Soft and bite sized (SS 3/7)  - full code, overall guarded prognosis    PENDING: Burn eval, UA

## 2023-04-03 NOTE — PROGRESS NOTE ADULT - SUBJECTIVE AND OBJECTIVE BOX
CAMILLE JOSEPH  67y  Female      Patient is a 67y old  Female who presents with a chief complaint of AMS       INTERVAL HPI/OVERNIGHT EVENTS:      ******************************* REVIEW OF SYSTEMS:**********************************************    All other review of systems negative    *********************** VITALS ******************************************    T(F): 99.1 (04-03-23 @ 12:29)  HR: 97 (04-03-23 @ 12:29) (97 - 107)  BP: 125/67 (04-03-23 @ 12:29) (125/67 - 139/63)  RR: 18 (04-03-23 @ 12:29) (18 - 18)  SpO2: 98% (04-03-23 @ 12:29) (97% - 100%)            ******************************** PHYSICAL EXAM:**************************************************  GENERAL: NAD    PSYCH: no agitation, baseline mentation  HEENT:     NERVOUS SYSTEM:  Alert & Oriented X3,     PULMONARY: KHADAR, CTA    CARDIOVASCULAR: S1S2 RRR    GI: Soft, NT, ND; BS present.    EXTREMITIES:  2+ Peripheral Pulses, No clubbing, cyanosis, or edema    LYMPH: No lymphadenopathy noted    SKIN: sacral decubitus       **************************** LABS *******************************************************                          9.6    20.01 )-----------( 691      ( 03 Apr 2023 06:28 )             29.8     04-03    136  |  97<L>  |  6<L>  ----------------------------<  95  5.3<H>   |  22  |  <0.5<L>    Ca    9.3      03 Apr 2023 06:28  Mg     2.0     04-03    TPro  6.6  /  Alb  2.9<L>  /  TBili  0.3  /  DBili  x   /  AST  17  /  ALT  6   /  AlkPhos  94  04-03          Lactate Trend        CAPILLARY BLOOD GLUCOSE              **************************Active Medications *******************************************  Allergy Status Unknown      acetaminophen     Tablet .. 650 milliGRAM(s) Oral every 6 hours PRN  acetaminophen  Suppository .. 325 milliGRAM(s) Rectal once  ascorbic acid 500 milliGRAM(s) Oral daily  aspirin  chewable 81 milliGRAM(s) Oral daily  atorvastatin 40 milliGRAM(s) Oral at bedtime  baclofen 5 milliGRAM(s) Oral every 12 hours  clopidogrel Tablet 75 milliGRAM(s) Oral daily  enoxaparin Injectable 40 milliGRAM(s) SubCutaneous every 24 hours  gabapentin 100 milliGRAM(s) Oral three times a day  levETIRAcetam 1500 milliGRAM(s) Oral two times a day  midodrine. 10 milliGRAM(s) Oral three times a day  multivitamin/minerals 1 Tablet(s) Oral daily  valproic  acid Syrup 500 milliGRAM(s) Oral three times a day  vitamin A &amp; D Ointment 1 Application(s) Topical two times a day      ***************************************************  RADIOLOGY & ADDITIONAL TESTS:    Imaging Personally Reviewed:  [ ] YES  [ ] NO    HEALTH ISSUES - PROBLEM Dx:

## 2023-04-04 ENCOUNTER — ANESTHESIA EVENT (OUTPATIENT)
Dept: SURGERY | Age: 68
End: 2023-04-04

## 2023-04-04 LAB
ALBUMIN SERPL ELPH-MCNC: 2.8 G/DL — LOW (ref 3.5–5.2)
ALP SERPL-CCNC: 77 U/L — SIGNIFICANT CHANGE UP (ref 30–115)
ALT FLD-CCNC: <5 U/L — SIGNIFICANT CHANGE UP (ref 0–41)
ANION GAP SERPL CALC-SCNC: 10 MMOL/L — SIGNIFICANT CHANGE UP (ref 7–14)
AST SERPL-CCNC: 12 U/L — SIGNIFICANT CHANGE UP (ref 0–41)
BASOPHILS # BLD AUTO: 0.06 K/UL — SIGNIFICANT CHANGE UP (ref 0–0.2)
BASOPHILS NFR BLD AUTO: 0.3 % — SIGNIFICANT CHANGE UP (ref 0–1)
BILIRUB SERPL-MCNC: 0.4 MG/DL — SIGNIFICANT CHANGE UP (ref 0.2–1.2)
BUN SERPL-MCNC: 9 MG/DL — LOW (ref 10–20)
CALCIUM SERPL-MCNC: 8.8 MG/DL — SIGNIFICANT CHANGE UP (ref 8.4–10.5)
CHLORIDE SERPL-SCNC: 99 MMOL/L — SIGNIFICANT CHANGE UP (ref 98–110)
CO2 SERPL-SCNC: 26 MMOL/L — SIGNIFICANT CHANGE UP (ref 17–32)
CREAT SERPL-MCNC: <0.5 MG/DL — LOW (ref 0.7–1.5)
EGFR: 117 ML/MIN/1.73M2 — SIGNIFICANT CHANGE UP
EOSINOPHIL # BLD AUTO: 0.07 K/UL — SIGNIFICANT CHANGE UP (ref 0–0.7)
EOSINOPHIL NFR BLD AUTO: 0.3 % — SIGNIFICANT CHANGE UP (ref 0–8)
GLUCOSE SERPL-MCNC: 95 MG/DL — SIGNIFICANT CHANGE UP (ref 70–99)
GRAM STN FLD: SIGNIFICANT CHANGE UP
HCT VFR BLD CALC: 24.7 % — LOW (ref 37–47)
HGB BLD-MCNC: 7.9 G/DL — LOW (ref 12–16)
IMM GRANULOCYTES NFR BLD AUTO: 1.3 % — HIGH (ref 0.1–0.3)
LYMPHOCYTES # BLD AUTO: 11.9 % — LOW (ref 20.5–51.1)
LYMPHOCYTES # BLD AUTO: 2.55 K/UL — SIGNIFICANT CHANGE UP (ref 1.2–3.4)
MAGNESIUM SERPL-MCNC: 2 MG/DL — SIGNIFICANT CHANGE UP (ref 1.8–2.4)
MCHC RBC-ENTMCNC: 28.2 PG — SIGNIFICANT CHANGE UP (ref 27–31)
MCHC RBC-ENTMCNC: 32 G/DL — SIGNIFICANT CHANGE UP (ref 32–37)
MCV RBC AUTO: 88.2 FL — SIGNIFICANT CHANGE UP (ref 81–99)
MONOCYTES # BLD AUTO: 2.29 K/UL — HIGH (ref 0.1–0.6)
MONOCYTES NFR BLD AUTO: 10.7 % — HIGH (ref 1.7–9.3)
NEUTROPHILS # BLD AUTO: 16.17 K/UL — HIGH (ref 1.4–6.5)
NEUTROPHILS NFR BLD AUTO: 75.5 % — HIGH (ref 42.2–75.2)
NRBC # BLD: 0 /100 WBCS — SIGNIFICANT CHANGE UP (ref 0–0)
PLATELET # BLD AUTO: 693 K/UL — HIGH (ref 130–400)
POTASSIUM SERPL-MCNC: 4.3 MMOL/L — SIGNIFICANT CHANGE UP (ref 3.5–5)
POTASSIUM SERPL-SCNC: 4.3 MMOL/L — SIGNIFICANT CHANGE UP (ref 3.5–5)
PROT SERPL-MCNC: 6.2 G/DL — SIGNIFICANT CHANGE UP (ref 6–8)
RBC # BLD: 2.8 M/UL — LOW (ref 4.2–5.4)
RBC # FLD: 14 % — SIGNIFICANT CHANGE UP (ref 11.5–14.5)
SODIUM SERPL-SCNC: 135 MMOL/L — SIGNIFICANT CHANGE UP (ref 135–146)
SPECIMEN SOURCE: SIGNIFICANT CHANGE UP
WBC # BLD: 21.41 K/UL — HIGH (ref 4.8–10.8)
WBC # FLD AUTO: 21.41 K/UL — HIGH (ref 4.8–10.8)

## 2023-04-04 PROCEDURE — 99231 SBSQ HOSP IP/OBS SF/LOW 25: CPT | Mod: FS

## 2023-04-04 PROCEDURE — 99232 SBSQ HOSP IP/OBS MODERATE 35: CPT

## 2023-04-04 RX ORDER — CEFEPIME 1 G/1
2000 INJECTION, POWDER, FOR SOLUTION INTRAMUSCULAR; INTRAVENOUS ONCE
Refills: 0 | Status: COMPLETED | OUTPATIENT
Start: 2023-04-04 | End: 2023-04-04

## 2023-04-04 RX ORDER — CEFEPIME 1 G/1
2000 INJECTION, POWDER, FOR SOLUTION INTRAMUSCULAR; INTRAVENOUS EVERY 12 HOURS
Refills: 0 | Status: DISCONTINUED | OUTPATIENT
Start: 2023-04-04 | End: 2023-04-05

## 2023-04-04 RX ORDER — VANCOMYCIN HCL 1 G
1000 VIAL (EA) INTRAVENOUS EVERY 12 HOURS
Refills: 0 | Status: DISCONTINUED | OUTPATIENT
Start: 2023-04-04 | End: 2023-04-05

## 2023-04-04 RX ORDER — COLLAGENASE CLOSTRIDIUM HIST. 250 UNIT/G
1 OINTMENT (GRAM) TOPICAL
Refills: 0 | Status: DISCONTINUED | OUTPATIENT
Start: 2023-04-04 | End: 2023-04-18

## 2023-04-04 RX ORDER — SODIUM HYPOCHLORITE 0.125 %
1 SOLUTION, NON-ORAL MISCELLANEOUS
Refills: 0 | Status: DISCONTINUED | OUTPATIENT
Start: 2023-04-04 | End: 2023-04-18

## 2023-04-04 RX ORDER — METRONIDAZOLE 500 MG
500 TABLET ORAL EVERY 8 HOURS
Refills: 0 | Status: DISCONTINUED | OUTPATIENT
Start: 2023-04-04 | End: 2023-04-05

## 2023-04-04 RX ORDER — SODIUM CHLORIDE 9 MG/ML
1000 INJECTION, SOLUTION INTRAVENOUS ONCE
Refills: 0 | Status: COMPLETED | OUTPATIENT
Start: 2023-04-04 | End: 2023-04-04

## 2023-04-04 RX ORDER — CEFEPIME 1 G/1
INJECTION, POWDER, FOR SOLUTION INTRAMUSCULAR; INTRAVENOUS
Refills: 0 | Status: DISCONTINUED | OUTPATIENT
Start: 2023-04-04 | End: 2023-04-05

## 2023-04-04 RX ADMIN — GABAPENTIN 100 MILLIGRAM(S): 400 CAPSULE ORAL at 13:03

## 2023-04-04 RX ADMIN — SODIUM CHLORIDE 1000 MILLILITER(S): 9 INJECTION, SOLUTION INTRAVENOUS at 10:58

## 2023-04-04 RX ADMIN — CLOPIDOGREL BISULFATE 75 MILLIGRAM(S): 75 TABLET, FILM COATED ORAL at 10:57

## 2023-04-04 RX ADMIN — ATORVASTATIN CALCIUM 40 MILLIGRAM(S): 80 TABLET, FILM COATED ORAL at 21:28

## 2023-04-04 RX ADMIN — CEFEPIME 100 MILLIGRAM(S): 1 INJECTION, POWDER, FOR SOLUTION INTRAMUSCULAR; INTRAVENOUS at 21:27

## 2023-04-04 RX ADMIN — MIDODRINE HYDROCHLORIDE 10 MILLIGRAM(S): 2.5 TABLET ORAL at 17:01

## 2023-04-04 RX ADMIN — Medication 1 TABLET(S): at 10:57

## 2023-04-04 RX ADMIN — Medication 650 MILLIGRAM(S): at 21:45

## 2023-04-04 RX ADMIN — Medication 81 MILLIGRAM(S): at 10:56

## 2023-04-04 RX ADMIN — Medication 500 MILLIGRAM(S): at 21:28

## 2023-04-04 RX ADMIN — Medication 650 MILLIGRAM(S): at 22:53

## 2023-04-04 RX ADMIN — Medication 250 MILLIGRAM(S): at 09:12

## 2023-04-04 RX ADMIN — Medication 1 APPLICATION(S): at 17:01

## 2023-04-04 RX ADMIN — Medication 500 MILLIGRAM(S): at 21:27

## 2023-04-04 RX ADMIN — Medication 5 MILLIGRAM(S): at 05:11

## 2023-04-04 RX ADMIN — Medication 500 MILLIGRAM(S): at 13:03

## 2023-04-04 RX ADMIN — MIDODRINE HYDROCHLORIDE 10 MILLIGRAM(S): 2.5 TABLET ORAL at 05:11

## 2023-04-04 RX ADMIN — Medication 250 MILLIGRAM(S): at 16:55

## 2023-04-04 RX ADMIN — Medication 1 APPLICATION(S): at 05:05

## 2023-04-04 RX ADMIN — Medication 5 MILLIGRAM(S): at 17:00

## 2023-04-04 RX ADMIN — Medication 500 MILLIGRAM(S): at 10:56

## 2023-04-04 RX ADMIN — LEVETIRACETAM 1500 MILLIGRAM(S): 250 TABLET, FILM COATED ORAL at 05:11

## 2023-04-04 RX ADMIN — Medication 500 MILLIGRAM(S): at 05:10

## 2023-04-04 RX ADMIN — GABAPENTIN 100 MILLIGRAM(S): 400 CAPSULE ORAL at 05:11

## 2023-04-04 RX ADMIN — Medication 650 MILLIGRAM(S): at 05:11

## 2023-04-04 RX ADMIN — MIDODRINE HYDROCHLORIDE 10 MILLIGRAM(S): 2.5 TABLET ORAL at 10:57

## 2023-04-04 RX ADMIN — ENOXAPARIN SODIUM 40 MILLIGRAM(S): 100 INJECTION SUBCUTANEOUS at 21:30

## 2023-04-04 RX ADMIN — Medication 500 MILLIGRAM(S): at 13:04

## 2023-04-04 RX ADMIN — LEVETIRACETAM 1500 MILLIGRAM(S): 250 TABLET, FILM COATED ORAL at 17:01

## 2023-04-04 RX ADMIN — CEFEPIME 100 MILLIGRAM(S): 1 INJECTION, POWDER, FOR SOLUTION INTRAMUSCULAR; INTRAVENOUS at 08:47

## 2023-04-04 RX ADMIN — Medication 500 MILLIGRAM(S): at 17:01

## 2023-04-04 RX ADMIN — GABAPENTIN 100 MILLIGRAM(S): 400 CAPSULE ORAL at 21:28

## 2023-04-04 NOTE — PROGRESS NOTE ADULT - SUBJECTIVE AND OBJECTIVE BOX
JOSEPH OBRIEN  67y, Female  Allergy: Allergy Status Unknown      LOS  31d    CHIEF COMPLAINT: AMS (2023 10:04)      INTERVAL EVENTS/HPI  - No acute events overnight, no fever, rising WBC and PLT. Team concerned for infected decub, started on vanc/cefepime  - T(F): , Max: 100.2 (23 @ 05:23)  - Tolerating medication  - WBC Count: 21.41 (23 @ 06:35)  WBC Count: 20.01 (23 @ 06:28)     - Creatinine, Serum: <0.5 (23 @ 06:35)  Creatinine, Serum: <0.5 (23 @ 06:28)       ROS  unable to obtain history secondary to patient's mental status and/or sedation     VITALS:  T(F): 100.2, Max: 100.2 (23 @ 05:23)  HR: 108  BP: 142/77  RR: 19Vital Signs Last 24 Hrs  T(C): 37.9 (2023 05:23), Max: 37.9 (2023 05:23)  T(F): 100.2 (2023 05:23), Max: 100.2 (2023 05:23)  HR: 108 (2023 05:23) (94 - 108)  BP: 142/77 (2023 05:23) (124/78 - 142/77)  BP(mean): --  RR: 19 (2023 05:23) (18 - 19)  SpO2: 100% (2023 05:23) (97% - 100%)    Parameters below as of 2023 05:23  Patient On (Oxygen Delivery Method): room air        PHYSICAL EXAM:  ***    FH: Non-contributory  Social Hx: Non-contributory    TESTS & MEASUREMENTS:                        7.9    21.41 )-----------( 693      ( 2023 06:35 )             24.7         135  |  99  |  9<L>  ----------------------------<  95  4.3   |  26  |  <0.5<L>    Ca    8.8      2023 06:35  Mg     2.0     -04    TPro  6.2  /  Alb  2.8<L>  /  TBili  0.4  /  DBili  x   /  AST  12  /  ALT  <5  /  AlkPhos  77  04-04      LIVER FUNCTIONS - ( 2023 06:35 )  Alb: 2.8 g/dL / Pro: 6.2 g/dL / ALK PHOS: 77 U/L / ALT: <5 U/L / AST: 12 U/L / GGT: x           Urinalysis Basic - ( 2023 21:10 )    Color: Yellow / Appearance: Clear / S.019 / pH: x  Gluc: x / Ketone: Trace  / Bili: Negative / Urobili: 3 mg/dL   Blood: x / Protein: Trace / Nitrite: Negative   Leuk Esterase: Negative / RBC: x / WBC x   Sq Epi: x / Non Sq Epi: x / Bacteria: x        Culture - Blood (collected 23 @ 07:32)  Source: .Blood None  Final Report (23 @ 18:00):    No Growth Final    Culture - Blood (collected 23 @ 10:39)  Source: .Blood Blood-Peripheral  Final Report (23 @ 18:00):    No Growth Final    Culture - Urine (collected 23 @ 11:57)  Source: Catheterized Catheterized  Final Report (23 @ 17:54):    No growth    Culture - Blood (collected 23 @ 22:07)  Source: .Blood Blood  Final Report (23 @ 02:00):    No Growth Final    Culture - Blood (collected 23 @ 11:46)  Source: .Blood None  Final Report (23 @ 23:00):    No Growth Final    Culture - Blood (collected 23 @ 07:10)  Source: .Blood None  Final Report (23 @ 18:00):    No Growth Final            INFECTIOUS DISEASES TESTING  COVID-19 PCR: NotDetec (23 @ 18:03)  Rapid RVP Result: NotDetec (23 @ 09:55)  Procalcitonin, Serum: 0.05 (23 @ 22:07)  Rapid RVP Result: NotDetec (23 @ 21:25)  COVID-19 PCR: NotDetec (23 @ 12:47)  COVID-19 PCR: NotDetec (03-15-23 @ 16:23)  strept    INFLAMMATORY MARKERS      RADIOLOGY & ADDITIONAL TESTS:  I have personally reviewed the last available Chest xray  CXR      CT      CARDIOLOGY TESTING      MEDICATIONS  acetaminophen  Suppository .. 325 Rectal once  ascorbic acid 500 Oral daily  aspirin  chewable 81 Oral daily  atorvastatin 40 Oral at bedtime  baclofen 5 Oral every 12 hours  cefepime   IVPB 2000 IV Intermittent every 12 hours  cefepime   IVPB     clopidogrel Tablet 75 Oral daily  enoxaparin Injectable 40 SubCutaneous every 24 hours  gabapentin 100 Oral three times a day  levETIRAcetam 1500 Oral two times a day  midodrine. 10 Oral three times a day  multivitamin/minerals 1 Oral daily  valproic  acid Syrup 500 Oral three times a day  vancomycin  IVPB 1000 IV Intermittent every 12 hours  vitamin A &amp; D Ointment 1 Topical two times a day      WEIGHT  Weight (kg): 68.3 (23 @ 10:12)  Creatinine, Serum: <0.5 mg/dL (23 @ 06:35)      ANTIBIOTICS:  cefepime   IVPB 2000 milliGRAM(s) IV Intermittent every 12 hours  cefepime   IVPB      vancomycin  IVPB 1000 milliGRAM(s) IV Intermittent every 12 hours      All available historical records have been reviewed       JOSEPH OBRIEN  67y, Female  Allergy: Allergy Status Unknown      LOS  31d    CHIEF COMPLAINT: AMS (2023 10:04)      INTERVAL EVENTS/HPI  - No acute events overnight, no fever, rising WBC and PLT. Team concerned for infected decub, started on vanc/cefepime  - T(F): , Max: 100.2 (23 @ 05:23)  - Tolerating medication  - WBC Count: 21.41 (23 @ 06:35)  WBC Count: 20.01 (23 @ 06:28)     - Creatinine, Serum: <0.5 (23 @ 06:35)  Creatinine, Serum: <0.5 (23 @ 06:28)       ROS  unable to obtain history secondary to patient's mental status and/or sedation     VITALS:  T(F): 100.2, Max: 100.2 (23 @ 05:23)  HR: 108  BP: 142/77  RR: 19Vital Signs Last 24 Hrs  T(C): 37.9 (2023 05:23), Max: 37.9 (2023 05:23)  T(F): 100.2 (2023 05:23), Max: 100.2 (2023 05:23)  HR: 108 (2023 05:23) (94 - 108)  BP: 142/77 (2023 05:23) (124/78 - 142/77)  BP(mean): --  RR: 19 (2023 05:23) (18 - 19)  SpO2: 100% (2023 05:23) (97% - 100%)    Parameters below as of 2023 05:23  Patient On (Oxygen Delivery Method): room air        PHYSICAL EXAM:  Gen: chronically ill appearing   HEENT: Normocephalic, atraumatic  Neck: supple, no lymphadenopathy  CV: Regular rate & regular rhythm  Lungs: decreased BS at bases, no fremitus  Abdomen: Soft, BS present  Ext: Warm, well perfused, LUE edema  Neuro: not following commands  Sacrum deferred - no nurse available to assist in turning the patient  Skin: no rash, no erythema  Lines: no phlebitis     FH: Non-contributory  Social Hx: Non-contributory    TESTS & MEASUREMENTS:                        7.9    21.41 )-----------( 693      ( 2023 06:35 )             24.7     04-    135  |  99  |  9<L>  ----------------------------<  95  4.3   |  26  |  <0.5<L>    Ca    8.8      2023 06:35  Mg     2.0     -04    TPro  6.2  /  Alb  2.8<L>  /  TBili  0.4  /  DBili  x   /  AST  12  /  ALT  <5  /  AlkPhos  77  04-04      LIVER FUNCTIONS - ( 2023 06:35 )  Alb: 2.8 g/dL / Pro: 6.2 g/dL / ALK PHOS: 77 U/L / ALT: <5 U/L / AST: 12 U/L / GGT: x           Urinalysis Basic - ( 2023 21:10 )    Color: Yellow / Appearance: Clear / S.019 / pH: x  Gluc: x / Ketone: Trace  / Bili: Negative / Urobili: 3 mg/dL   Blood: x / Protein: Trace / Nitrite: Negative   Leuk Esterase: Negative / RBC: x / WBC x   Sq Epi: x / Non Sq Epi: x / Bacteria: x        Culture - Blood (collected 23 @ 07:32)  Source: .Blood None  Final Report (23 @ 18:00):    No Growth Final    Culture - Blood (collected 23 @ 10:39)  Source: .Blood Blood-Peripheral  Final Report (23 @ 18:00):    No Growth Final    Culture - Urine (collected 23 @ 11:57)  Source: Catheterized Catheterized  Final Report (23 @ 17:54):    No growth    Culture - Blood (collected 23 @ 22:07)  Source: .Blood Blood  Final Report (23 @ 02:00):    No Growth Final    Culture - Blood (collected 23 @ 11:46)  Source: .Blood None  Final Report (23 @ 23:00):    No Growth Final    Culture - Blood (collected 23 @ 07:10)  Source: .Blood None  Final Report (23 @ 18:00):    No Growth Final            INFECTIOUS DISEASES TESTING  COVID-19 PCR: NotDetec (23 @ 18:03)  Rapid RVP Result: NotDetec (23 @ 09:55)  Procalcitonin, Serum: 0.05 (23 @ 22:07)  Rapid RVP Result: NotDetec (23 @ 21:25)  COVID-19 PCR: NotDetec (23 @ 12:47)  COVID-19 PCR: NotDetec (03-15-23 @ 16:23)  strept    INFLAMMATORY MARKERS      RADIOLOGY & ADDITIONAL TESTS:  I have personally reviewed the last available Chest xray  CXR      CT      CARDIOLOGY TESTING      MEDICATIONS  acetaminophen  Suppository .. 325 Rectal once  ascorbic acid 500 Oral daily  aspirin  chewable 81 Oral daily  atorvastatin 40 Oral at bedtime  baclofen 5 Oral every 12 hours  cefepime   IVPB 2000 IV Intermittent every 12 hours  cefepime   IVPB     clopidogrel Tablet 75 Oral daily  enoxaparin Injectable 40 SubCutaneous every 24 hours  gabapentin 100 Oral three times a day  levETIRAcetam 1500 Oral two times a day  midodrine. 10 Oral three times a day  multivitamin/minerals 1 Oral daily  valproic  acid Syrup 500 Oral three times a day  vancomycin  IVPB 1000 IV Intermittent every 12 hours  vitamin A &amp; D Ointment 1 Topical two times a day      WEIGHT  Weight (kg): 68.3 (23 @ 10:12)  Creatinine, Serum: <0.5 mg/dL (23 @ 06:35)      ANTIBIOTICS:  cefepime   IVPB 2000 milliGRAM(s) IV Intermittent every 12 hours  cefepime   IVPB      vancomycin  IVPB 1000 milliGRAM(s) IV Intermittent every 12 hours      All available historical records have been reviewed

## 2023-04-04 NOTE — PROGRESS NOTE ADULT - ASSESSMENT
sacral wound    - burn surgical intervention at this time  - continue local wound care BID: wash with soap and water, apply santyl, pack with Dakins wet to dry, cover with allevyn pad  - offloading/positional changes  - ensure adequate nutrition  - remainder of care per primary care team

## 2023-04-04 NOTE — PROGRESS NOTE ADULT - SUBJECTIVE AND OBJECTIVE BOX
JOSEPH OBRIEN 67y Female  MRN#: [id number]   Hospital Day: 31d    SUBJECTIVE  Patient is a 67y old Female who presents with a chief complaint of Altered Mental status (2023 11:53)  Currently admitted to medicine with the primary diagnosis of Stroke      INTERVAL HPI AND OVERNIGHT EVENTS:  Patient was examined and seen at bedside. This morning she is resting comfortably in bed and reports no issues or overnight events.    OBJECTIVE  PAST MEDICAL & SURGICAL HISTORY    ALLERGIES:  Allergy Status Unknown    MEDICATIONS:  STANDING MEDICATIONS  acetaminophen  Suppository .. 325 milliGRAM(s) Rectal once  ascorbic acid 500 milliGRAM(s) Oral daily  aspirin  chewable 81 milliGRAM(s) Oral daily  atorvastatin 40 milliGRAM(s) Oral at bedtime  baclofen 5 milliGRAM(s) Oral every 12 hours  cefepime   IVPB 2000 milliGRAM(s) IV Intermittent every 12 hours  cefepime   IVPB      clopidogrel Tablet 75 milliGRAM(s) Oral daily  enoxaparin Injectable 40 milliGRAM(s) SubCutaneous every 24 hours  gabapentin 100 milliGRAM(s) Oral three times a day  levETIRAcetam 1500 milliGRAM(s) Oral two times a day  metroNIDAZOLE    Tablet 500 milliGRAM(s) Oral every 8 hours  midodrine. 10 milliGRAM(s) Oral three times a day  multivitamin/minerals 1 Tablet(s) Oral daily  valproic  acid Syrup 500 milliGRAM(s) Oral three times a day  vancomycin  IVPB 1000 milliGRAM(s) IV Intermittent every 12 hours  vitamin A &amp; D Ointment 1 Application(s) Topical two times a day    PRN MEDICATIONS  acetaminophen     Tablet .. 650 milliGRAM(s) Oral every 6 hours PRN      VITAL SIGNS: Last 24 Hours  T(C): 37.4 (2023 12:00), Max: 37.9 (2023 05:23)  T(F): 99.3 (2023 12:00), Max: 100.2 (2023 05:23)  HR: 116 (2023 12:00) (94 - 116)  BP: 136/64 (2023 12:00) (124/78 - 142/77)  BP(mean): --  RR: 17 (2023 12:00) (17 - 19)  SpO2: 100% (2023 12:00) (97% - 100%)    LABS:                        7.9    21.41 )-----------( 693      ( 2023 06:35 )             24.7     04-    135  |  99  |  9<L>  ----------------------------<  95  4.3   |  26  |  <0.5<L>    Ca    8.8      2023 06:35  Mg     2.0     -04    TPro  6.2  /  Alb  2.8<L>  /  TBili  0.4  /  DBili  x   /  AST  12  /  ALT  <5  /  AlkPhos  77  04-04      Urinalysis Basic - ( 2023 21:10 )    Color: Yellow / Appearance: Clear / S.019 / pH: x  Gluc: x / Ketone: Trace  / Bili: Negative / Urobili: 3 mg/dL   Blood: x / Protein: Trace / Nitrite: Negative   Leuk Esterase: Negative / RBC: x / WBC x   Sq Epi: x / Non Sq Epi: x / Bacteria: x      PHYSICAL EXAM:  CONSTITUTIONAL: No acute distress, laying in bed comfortably this AM  HEENT: Atraumatic, normocephalic, neck supple, moist mucous membranes  PULMONARY: Clear to auscultation bilaterally  CARDIOVASCULAR: Regular rate and rhythm  GASTROINTESTINAL: Soft, non-tender, non-distended; bowel sounds present  MUSCULOSKELETAL: no LE edema, 2+ pulses b/l LE, left LE in CAM boot  NEURO: L hemiparesis, speaks in short sentences, B/L UE spasticity, contracted tone  SKIN: Sacral ulcer - wound wrapped     57

## 2023-04-04 NOTE — PROGRESS NOTE ADULT - SUBJECTIVE AND OBJECTIVE BOX
INTERVAL HPI/OVERNIGHT EVENTS:    66 y/o woman with PMH of Right MCA stroke with left sided weakness was brought to the ED via EMS. In the ED, she had altered mental status and sepsis.  wbc increasing  and has mild fever    REVIEW OF SYSTEMS:  CONSTITUTIONAL: fatigue, fever  EYES: No eye pain, visual disturbances, or discharge  ENMT:  No difficulty hearing, tinnitus, vertigo; No sinus or throat pain  NECK: No pain or stiffness  BREASTS: No pain, masses, or nipple discharge  RESPIRATORY: No cough, wheezing, chills or hemoptysis; No shortness of breath  CARDIOVASCULAR: No chest pain, palpitations, dizziness, or leg swelling  GASTROINTESTINAL: No abdominal or epigastric pain. No nausea, vomiting, or hematemesis; No diarrhea or constipation. No melena or hematochezia.  GENITOURINARY: No dysuria, frequency, hematuria, or incontinence  NEUROLOGICAL: No headaches, memory loss, loss of strength, numbness, or tremors  SKIN: No itching, burning, rashes, or lesions   LYMPH NODES: No enlarged glands  ENDOCRINE: No heat or cold intolerance; No hair loss  MUSCULOSKELETAL: No joint pain or swelling; No muscle, back, or extremity pain  PSYCHIATRIC: No depression, anxiety, mood swings, or difficulty sleeping  HEME/LYMPH: No easy bruising, or bleeding gums  ALLERY AND IMMUNOLOGIC: No hives or eczema    VITALS:  T(F): 100.2, Max: 100.2 (23 @ 05:23)  HR: 108  BP: 142/77  RR: 19  SpO2: 100%    PHYSICAL EXAM:  GENERAL: lethargic  HEAD:  Atraumatic, Normocephalic  EYES: EOMI, PERRLA, conjunctiva and sclera clear  ENMT: No tonsillar erythema, exudates, or enlargement; Moist mucous membranes, Good dentition, No lesions  NECK: Supple, No JVD, Normal thyroid  NERVOUS SYSTEM:  lethargic  CHEST/LUNG: decrease breath sounds  HEART: Regular rate and rhythm; No murmurs, rubs, or gallops  ABDOMEN: Soft, Nontender, Nondistended; Bowel sounds present  EXTREMITIES:  no edema  LYMPH: No lymphadenopathy noted  SKIN: No rashes or lesions      LABS:  Urinalysis Basic - ( 2023 21:10 )    Color: Yellow / Appearance: Clear / S.019 / pH: x  Gluc: x / Ketone: Trace  / Bili: Negative / Urobili: 3 mg/dL   Blood: x / Protein: Trace / Nitrite: Negative   Leuk Esterase: Negative / RBC: x / WBC x   Sq Epi: x / Non Sq Epi: x / Bacteria: x          135  |  99  |  9<L>  ----------------------------<  95  4.3   |  26  |  <0.5<L>    Ca    8.8      2023 06:35  Mg     2.0     -    TPro  6.2  /  Alb  2.8<L>  /  TBili  0.4  /  DBili  x   /  AST  12  /  ALT  <5  /  AlkPhos  77  -04                          7.9    21.41 )-----------( 693      ( 2023 06:35 )             24.7           RADIOLOGY & ADDITIONAL TESTS:    Imaging Personally Reviewed:  [ ] YES  [ ] NO    MEDICATIONS:     MEDICATIONS  (STANDING):  acetaminophen  Suppository .. 325 milliGRAM(s) Rectal once  ascorbic acid 500 milliGRAM(s) Oral daily  aspirin  chewable 81 milliGRAM(s) Oral daily  atorvastatin 40 milliGRAM(s) Oral at bedtime  baclofen 5 milliGRAM(s) Oral every 12 hours  cefepime   IVPB 2000 milliGRAM(s) IV Intermittent every 12 hours  cefepime   IVPB      clopidogrel Tablet 75 milliGRAM(s) Oral daily  enoxaparin Injectable 40 milliGRAM(s) SubCutaneous every 24 hours  gabapentin 100 milliGRAM(s) Oral three times a day  levETIRAcetam 1500 milliGRAM(s) Oral two times a day  metroNIDAZOLE    Tablet 500 milliGRAM(s) Oral every 8 hours  midodrine. 10 milliGRAM(s) Oral three times a day  multivitamin/minerals 1 Tablet(s) Oral daily  valproic  acid Syrup 500 milliGRAM(s) Oral three times a day  vancomycin  IVPB 1000 milliGRAM(s) IV Intermittent every 12 hours  vitamin A &amp; D Ointment 1 Application(s) Topical two times a day    MEDICATIONS  (PRN):  acetaminophen     Tablet .. 650 milliGRAM(s) Oral every 6 hours PRN Moderate Pain (4 - 6)

## 2023-04-04 NOTE — PROGRESS NOTE ADULT - ASSESSMENT
Assessment and Plan:  Patient is a 68 y/o woman with PMH of Right MCA stroke with left sided weakness was brought to the ED via EMS. In the ED, she had altered mental status and sepsis. Patient has a h/o prior hospitalization  in a hospital in [facility] last fall due to persistent metabolic encephalopathy for months     #  Altered Mental Status likely due to Metabolic Encephalopathy from UTI, sepsis and seizure in the context of acute and chronic stroke with left hemiparesis -improving  s/p course of abx for E. coli UTI  MRI of brain: Small patchy subcortical infarct in the right frontal lobe. Redemonstrated chronic infarct in the right MCA territory.  Mild chronic microvascular ischemic changes.  CTA of head/neck: no evidence of occlusion, aneurysm or stenosis  s/p rapid response for seizure- s/p EEG and video EEG: continue on Depakote 500 mg TID and Keppra 1000 mg BID  seizure precautions  Neuro f/u appreciated - stroke likely due to hypotension - has diminutive caliber of right M1 - avoid hypotension  CW ASA, Plavix (started on March 17th) and statin for new stroke -  DAPT for 21 days (From March 17th); followed by ASA 81 mg daily - f/u in stroke clinic in 4 weeks per neurology  pt with UE spasticity: MRI of C spine: Significantly motion limited study. No gross cord compression or spinal cord edema demonstrated. Multilevel small disc bulges. - continue baclofen 5mg bid  behavioral health eval appreciated: pt does not have the capacity to choose a Health care agent  -clinically patient is  following verbal commands, persistent left sided paralysis, dysarthria , oropharyngeal dysphagia , patient is in chronic bed confinement status.    # Seizures- 3/20- patient had gaze deviation to the left- resolved, Neuro f/up rec- increased Keppra dose tx.   -outpatient Neuro f/up     # New onset fevers   # Unstagable sacral wound - Per Burn eval: LWC only.  wbc trending up  start Vanco and cefepime  ID follow up today  Burn for sacral ulcer  f/u cultures    #Left avulsion fracture of medial malleolus  evaluated by podiatry - no surgical intervention - WB with CAM boot  continue PT/fall precautions, pain control    # Sinus tachycardia -resolved  now resolved - attributed to pain  EKG reviewed, TSH WNL    #Left cephalic vein thrombosis - s/p warm compress  b/l UE duplex neg for any DVT.     # Leucocytosis >> with foul smelling sacral decub     >> will f/u Burn for possible intervention.     # Anemia - normocytic  labs reviewed - Hgb in 9-10 range and now stable    # DVT prophylaxis    full code, overall patient's prognosis guarded   Family discussion: yes, medical team     pending: Burn / Leucocytosis  Dispo: SNF

## 2023-04-04 NOTE — PROGRESS NOTE ADULT - ASSESSMENT
ASSESSMENT   68 y/o woman with PMH of Right MCA stroke with left sided weakness was brought to the ED via EMS. In the ED, she had altered mental status and sepsis, with new onset fevers.     IMPRESSION  Complicated and prolonged hospital course, admitted 3/3 for R MCA CVA, ID consulted 3/22 for Fever, ID reconsulted for leukocytosis  Afebrile since 3/21, Tm 100.3  WBC elevated to 21 4/4    4/3 UA without significant pyuria     3/28 BCX NGTD     3/27 BCX NGTD   < from: Xray Chest 1 View- PORTABLE-Routine (Xray Chest 1 View- PORTABLE-Routine .) (03.27.23 @ 11:39) >No focal consolidation, pneumothorax or pleural effusion.  #3/21 Tm 101.7 P>90 low BP- Sepsis   WBC 19 on admission, UCX 3/4 panS ecoli, UA , s/p ceftriaxone 3/4-6, cefepime 3/8  Exam at this time Skin: two approx. 3x2 cm wounds to sacral region with pink moist base. No active bleeding, purulence or surrounding erythema   3/21 UCX NG   3/20 BCX NGTD   3/20 COVID/RVP NEGATIVE   CXR no PNA  No diarrhea  No obvious phlebitis  Recent MRI stable  cannot obtain further history from the patient secondary to altered mental status or sedation       RECOMMENDATIONS  This is a preliminary incomplete pended note, all final recommendations to follow after interview and examination of the patient.   - Guarded prognosis GOC    Please call or message on [employer] Teams if with any questions.      ASSESSMENT   68 y/o woman with PMH of Right MCA stroke with left sided weakness was brought to the ED via EMS. In the ED, she had altered mental status and sepsis, with new onset fevers.     IMPRESSION  Complicated and prolonged hospital course, admitted 3/3 for R MCA CVA, ID consulted 3/22 for Fever, ID reconsulted for leukocytosis  #Leukocytosis; Afebrile since 3/21, Tm 100.3    Team concerned about infected decub- no nurse available to assist in turning the patient  WBC elevated to 21 4/4    4/3 UA without significant pyuria     3/28 BCX NGTD     3/27 BCX NGTD   < from: Xray Chest 1 View- PORTABLE-Routine (Xray Chest 1 View- PORTABLE-Routine .) (03.27.23 @ 11:39) >No focal consolidation, pneumothorax or pleural effusion.  #3/21 Tm 101.7 P>90 low BP- Sepsis   WBC 19 on admission, UCX 3/4 panS ecoli, UA , s/p ceftriaxone 3/4-6, cefepime 3/8  Exam at this time Skin: two approx. 3x2 cm wounds to sacral region with pink moist base. No active bleeding, purulence or surrounding erythema   3/21 UCX NG   3/20 BCX NGTD   3/20 COVID/RVP NEGATIVE   CXR no PNA  No diarrhea  No obvious phlebitis  Recent MRI stable  cannot obtain further history from the patient secondary to altered mental status or sedation   Creatinine, Serum: <0.5 mg/dL (04.04.23 @ 06:35)        RECOMMENDATIONS  - Vanc 1g q12h IV  - Please check vanc trough 30 min prior to 4th dose. Goal trough 15-20. If trough results > 20, please HOLD further Vanco dosing and order AM Random Vanco level   - Cefepime 2g q12h IV  - PO flagyl 500mg q8h IV  - f/u BCX  - Change PIV > 7 days old, no evidence of phlebitis on exam   - Burn f/u   - Guarded prognosis GO    Please call or message on [employer] Teams if with any questions.

## 2023-04-04 NOTE — PROGRESS NOTE ADULT - SUBJECTIVE AND OBJECTIVE BOX
Patient is a 67y old  Female who presents with a chief complaint of Altered Mental status (2023 11:53)      AM rounds  Patient seen today.     INTERVAL HPI/OVERNIGHT EVENTS:  ICU Vital Signs Last 24 Hrs  T(C): 37.4 (2023 12:00), Max: 37.9 (2023 05:23)  T(F): 99.3 (2023 12:00), Max: 100.2 (2023 05:23)  HR: 116 (2023 12:00) (94 - 116)  BP: 136/64 (2023 12:00) (124/78 - 142/77)  BP(mean): --  ABP: --  ABP(mean): --  RR: 17 (2023 12:00) (17 - 19)  SpO2: 100% (2023 12:00) (97% - 100%)    O2 Parameters below as of 2023 05:23  Patient On (Oxygen Delivery Method): room air        LABS:                        7.9    21.41 )-----------( 693      ( 2023 06:35 )             24.7     04-04    135  |  99  |  9<L>  ----------------------------<  95  4.3   |  26  |  <0.5<L>    Ca    8.8      2023 06:35  Mg     2.0     04-04    TPro  6.2  /  Alb  2.8<L>  /  TBili  0.4  /  DBili  x   /  AST  12  /  ALT  <5  /  AlkPhos  77  04-04      Urinalysis Basic - ( 2023 21:10 )    Color: Yellow / Appearance: Clear / S.019 / pH: x  Gluc: x / Ketone: Trace  / Bili: Negative / Urobili: 3 mg/dL   Blood: x / Protein: Trace / Nitrite: Negative   Leuk Esterase: Negative / RBC: x / WBC x   Sq Epi: x / Non Sq Epi: x / Bacteria: x      MEDICATIONS  (STANDING):  acetaminophen  Suppository .. 325 milliGRAM(s) Rectal once  ascorbic acid 500 milliGRAM(s) Oral daily  aspirin  chewable 81 milliGRAM(s) Oral daily  atorvastatin 40 milliGRAM(s) Oral at bedtime  baclofen 5 milliGRAM(s) Oral every 12 hours  cefepime   IVPB 2000 milliGRAM(s) IV Intermittent every 12 hours  cefepime   IVPB      clopidogrel Tablet 75 milliGRAM(s) Oral daily  enoxaparin Injectable 40 milliGRAM(s) SubCutaneous every 24 hours  gabapentin 100 milliGRAM(s) Oral three times a day  levETIRAcetam 1500 milliGRAM(s) Oral two times a day  metroNIDAZOLE    Tablet 500 milliGRAM(s) Oral every 8 hours  midodrine. 10 milliGRAM(s) Oral three times a day  multivitamin/minerals 1 Tablet(s) Oral daily  valproic  acid Syrup 500 milliGRAM(s) Oral three times a day  vancomycin  IVPB 1000 milliGRAM(s) IV Intermittent every 12 hours  vitamin A &amp; D Ointment 1 Application(s) Topical two times a day    MEDICATIONS  (PRN):  acetaminophen     Tablet .. 650 milliGRAM(s) Oral every 6 hours PRN Moderate Pain (4 - 6)      PHYSICAL EXAM:    General: Patient laying in bed, in no acute distress.   Wound: sacrum 3x2 cm with dark devitalized tissue and areas of pink moist tissue. Malodorous. Surrounding areas of partial thickness wounds, pink moist. No purulence, drainage, or active bleeding noted

## 2023-04-04 NOTE — PROGRESS NOTE ADULT - ASSESSMENT
68 y/o woman with PMH of Right MCA stroke with left sided weakness was brought to the ED via EMS. In the ED, she had altered mental status and sepsis.    #Sepsis criteria met  #Sacral wound malodorous  - 3/26 - febrile, tachy Sepsis w/up negative (CXR clear, BCx NGTD, UA neg)  - ID recs appreciated - monitor off ABx - repeat COVID if febrile  - Repeat RVP negative  - 4/2 sacral wound noted to be malodorous - low grade fever overnight as well as leukocytosis   - Burn eval appreciated  - ID recs appreciated - Start Vanc, Cefepime, Flagyl - 4/4    #Seizures  #Acute R Frontal Lobe Infarct likely 2/ hypotension   #Chronic R MCA infarct  - per chart: at baseline she is alert, able to communicate and is oriented. Ambulation at baseline: uses a wheelchair. She was in a hospital in [facility] last fall and was lethargic for months  - 3/8: s/p rapid response for L gaze preference  - 3/20: Pt had event with L gaze preference during SS eval, pt made NPO. Examined by resident, no gaze preference present any longer. Pt responsive but less than baseline. Developed seizures post-event. Septic workup obtained. CXR appears clear, rest pending. --> 3/21 Pt more responsive this AM, expressing herself and in line with current baseline.  - Depakote level (3/4) 87 (WNL) -> (3/21) 74 (WNL)   - CTH/CTA of head/neck (3/4): no acute pathology, no evidence of occlusion, aneurysm or stenosis  - EEG (3/8) and video EEG (3/9): Focal slowing  - MRI Brain (3/15): new subcortical infarct in the right frontal lobe. Re-demonstrated chronic infarct in the right MCA territory.  - MRI of C spine (3/16): Significantly motion limited study. No gross cord compression or spinal cord edema demonstrated. Multilevel small disc bulges.  - Neuro eval'd 3/8-3/20: For L gaze preference/stroke, OP med review: on low dose Lamictal and Depakote in addition to Keppra (possible mood disorder/ seizures), Her MRI of brain showing subcortical infarct within same M1 territory. The casue of stroke is most likely hypotension as she has a diminutive Rt M1. However, MRI findings doesn't explain her B/L upper extremity spasticity. So, it is important to rule out cervical cord pathology. DAPT for 21 days followed by ASA 81 mg daily, Increased keppra post 3/20 possible seizure event  - BH eval'd 3/15: Delirium; pt does not have the capacity to choose a Health care agent  - SS re-juliette 3/21: Minced and moist, 1:1 feeds, thin liquids through straw  - Seizure precautions  - C/w Depakote 500mg TID and Keppra to 1500mg BID   - C/w baclofen 5mg bid (for UE spasticity)  - C/w ASA, Plavix (3/17-4/6), and high dose statin for new stroke - DAPT for 21 days followed by ASA 81 mg daily  - C/w Midodrine 10mg TID  - OP stroke clinic in 4 weeks after d/c per neurology    #Metabolic Encephalopathy from UTI  #Febrile 3/20  #Unstageable Sacral Ulcer  - 3/20: Pt became febrile after possible seizure episode, Cefepime, levaquin (x1 3/20), vancomycin  - 3/20 Septic workup: WBC (3/21) 12.26 (up from 8.23), BCx (3/20) NGTD, Procal (3/20) 0.05, RVP (3/20) COVID (-), Cortisol level, Lactate (3/21) WNL, UA nitrite (-), LEC (-), pending WBCs/Bacteria, UCx (3/21) NGTD  - Duplex LUE (3/10): (-) DVT (+) superficial cephalic vein thrombosis  - Duplex BLLE (3/7): (-) DVT  - s/p Vanc (3/4x1, 3/20-3/22), Cefepime (x1 3/4, x1 3/8), Ceftriaxone (3/4-3/6) (for UTI)  - Duplex BLLE: negative for DVT  - ID eval'd 3/23  - Burn eval'd 3/22 - No intervention, wound care recs noted  - Cefepime completed 3/24 (per ID)    #Left avulsion fracture of medial malleolus  - Duplex BL LE (3/7): (-) for DVT  - CT Foot (3/11): acute minimally displaced avulsion fracture at the medial malleolus  - Podiatry following for foot pain: WBAT w/ surgical shoe, ankle brace, PT  - fall precautions  - pain control regimen - if pain uncontrolled consider increasing percocet to 2tabs.  - C/w gabapentin at 100mg TID (Lowered dose from home, increase as pt tolerates)    #Sinus tachycardia - now resolved - attributed to pain  - TSH (3/8) WNL, - EKG reviewed    #Left cephalic vein thrombosis  - Duplex LUE (3/10): No DVT however there is superficial thrombosis noted in the left cephalic vein  - s/p warm compress  - Repeat duplex ordered 3/27 - negative for DVT    #Anemia - normocytic - stable  - possibly due to frequent phlebotomy, no signs of active bleed - monitor    #Misc  - DVT ppx - lovenox  - Diet: Soft and bite sized (SS 3/7)  - full code, overall guarded prognosis

## 2023-04-05 ENCOUNTER — HOSPITAL ENCOUNTER (OUTPATIENT)
Age: 68
Discharge: HOME | End: 2023-04-05
Attending: INTERNAL MEDICINE | Admitting: INTERNAL MEDICINE

## 2023-04-05 ENCOUNTER — ANESTHESIA (OUTPATIENT)
Dept: SURGERY | Age: 68
End: 2023-04-05

## 2023-04-05 VITALS
RESPIRATION RATE: 16 BRPM | SYSTOLIC BLOOD PRESSURE: 123 MMHG | HEIGHT: 62 IN | WEIGHT: 263.4 LBS | OXYGEN SATURATION: 98 % | HEART RATE: 74 BPM | DIASTOLIC BLOOD PRESSURE: 60 MMHG | BODY MASS INDEX: 48.47 KG/M2 | TEMPERATURE: 98.6 F

## 2023-04-05 LAB
ALBUMIN SERPL ELPH-MCNC: 2.6 G/DL — LOW (ref 3.5–5.2)
ALP SERPL-CCNC: 78 U/L — SIGNIFICANT CHANGE UP (ref 30–115)
ALT FLD-CCNC: <5 U/L — SIGNIFICANT CHANGE UP (ref 0–41)
ANION GAP SERPL CALC-SCNC: 12 MMOL/L — SIGNIFICANT CHANGE UP (ref 7–14)
AST SERPL-CCNC: 15 U/L — SIGNIFICANT CHANGE UP (ref 0–41)
BASOPHILS # BLD AUTO: 0.07 K/UL — SIGNIFICANT CHANGE UP (ref 0–0.2)
BASOPHILS NFR BLD AUTO: 0.4 % — SIGNIFICANT CHANGE UP (ref 0–1)
BILIRUB SERPL-MCNC: 0.2 MG/DL — SIGNIFICANT CHANGE UP (ref 0.2–1.2)
BUN SERPL-MCNC: 8 MG/DL — LOW (ref 10–20)
CALCIUM SERPL-MCNC: 9 MG/DL — SIGNIFICANT CHANGE UP (ref 8.4–10.5)
CHLORIDE SERPL-SCNC: 98 MMOL/L — SIGNIFICANT CHANGE UP (ref 98–110)
CO2 SERPL-SCNC: 26 MMOL/L — SIGNIFICANT CHANGE UP (ref 17–32)
CREAT SERPL-MCNC: <0.5 MG/DL — LOW (ref 0.7–1.5)
EGFR: 109 ML/MIN/1.73M2 — SIGNIFICANT CHANGE UP
EOSINOPHIL # BLD AUTO: 0.32 K/UL — SIGNIFICANT CHANGE UP (ref 0–0.7)
EOSINOPHIL NFR BLD AUTO: 1.8 % — SIGNIFICANT CHANGE UP (ref 0–8)
GLUCOSE BLDC GLUCOMTR-MCNC: 164 MG/DL (ref 70–99)
GLUCOSE SERPL-MCNC: 105 MG/DL — HIGH (ref 70–99)
HCT VFR BLD CALC: 24.3 % — LOW (ref 37–47)
HGB BLD-MCNC: 7.8 G/DL — LOW (ref 12–16)
IMM GRANULOCYTES NFR BLD AUTO: 1.1 % — HIGH (ref 0.1–0.3)
LYMPHOCYTES # BLD AUTO: 18.9 % — LOW (ref 20.5–51.1)
LYMPHOCYTES # BLD AUTO: 3.31 K/UL — SIGNIFICANT CHANGE UP (ref 1.2–3.4)
MAGNESIUM SERPL-MCNC: 2 MG/DL — SIGNIFICANT CHANGE UP (ref 1.8–2.4)
MCHC RBC-ENTMCNC: 28.6 PG — SIGNIFICANT CHANGE UP (ref 27–31)
MCHC RBC-ENTMCNC: 32.1 G/DL — SIGNIFICANT CHANGE UP (ref 32–37)
MCV RBC AUTO: 89 FL — SIGNIFICANT CHANGE UP (ref 81–99)
MONOCYTES # BLD AUTO: 2.69 K/UL — HIGH (ref 0.1–0.6)
MONOCYTES NFR BLD AUTO: 15.4 % — HIGH (ref 1.7–9.3)
NEUTROPHILS # BLD AUTO: 10.89 K/UL — HIGH (ref 1.4–6.5)
NEUTROPHILS NFR BLD AUTO: 62.4 % — SIGNIFICANT CHANGE UP (ref 42.2–75.2)
NRBC # BLD: 0 /100 WBCS — SIGNIFICANT CHANGE UP (ref 0–0)
PLATELET # BLD AUTO: 594 K/UL — HIGH (ref 130–400)
POTASSIUM SERPL-MCNC: 4.6 MMOL/L — SIGNIFICANT CHANGE UP (ref 3.5–5)
POTASSIUM SERPL-SCNC: 4.6 MMOL/L — SIGNIFICANT CHANGE UP (ref 3.5–5)
PROT SERPL-MCNC: 6 G/DL — SIGNIFICANT CHANGE UP (ref 6–8)
RBC # BLD: 2.73 M/UL — LOW (ref 4.2–5.4)
RBC # FLD: 14.1 % — SIGNIFICANT CHANGE UP (ref 11.5–14.5)
SODIUM SERPL-SCNC: 136 MMOL/L — SIGNIFICANT CHANGE UP (ref 135–146)
TROPONIN T SERPL-MCNC: 0.03 NG/ML — CRITICAL HIGH
TROPONIN T SERPL-MCNC: 0.03 NG/ML — CRITICAL HIGH
VANCOMYCIN TROUGH SERPL-MCNC: 8 UG/ML — SIGNIFICANT CHANGE UP (ref 5–10)
WBC # BLD: 17.48 K/UL — HIGH (ref 4.8–10.8)
WBC # FLD AUTO: 17.48 K/UL — HIGH (ref 4.8–10.8)

## 2023-04-05 PROCEDURE — 10002362 HB ANESTH MAC IV 1ST 1/2 HR: Performed by: INTERNAL MEDICINE

## 2023-04-05 PROCEDURE — 99233 SBSQ HOSP IP/OBS HIGH 50: CPT

## 2023-04-05 PROCEDURE — 10003428 HB COLONOSCOPY: Performed by: INTERNAL MEDICINE

## 2023-04-05 PROCEDURE — 82962 GLUCOSE BLOOD TEST: CPT

## 2023-04-05 PROCEDURE — A45378 ANES COLONOSCOPY FLEX PROX SPLEN FLEX D: Performed by: ANESTHESIOLOGY

## 2023-04-05 PROCEDURE — 10002800 HB RX 250 W HCPCS: Performed by: ANESTHESIOLOGY

## 2023-04-05 PROCEDURE — 10002801 HB RX 250 W/O HCPCS: Performed by: ANESTHESIOLOGY

## 2023-04-05 PROCEDURE — 93010 ELECTROCARDIOGRAM REPORT: CPT

## 2023-04-05 PROCEDURE — 10002807 HB RX 258: Performed by: ANESTHESIOLOGY

## 2023-04-05 RX ORDER — DEXTROSE MONOHYDRATE 25 G/50ML
25 INJECTION, SOLUTION INTRAVENOUS PRN
Status: DISCONTINUED | OUTPATIENT
Start: 2023-04-05 | End: 2023-04-05 | Stop reason: HOSPADM

## 2023-04-05 RX ORDER — SODIUM CHLORIDE, SODIUM LACTATE, POTASSIUM CHLORIDE, CALCIUM CHLORIDE 600; 310; 30; 20 MG/100ML; MG/100ML; MG/100ML; MG/100ML
INJECTION, SOLUTION INTRAVENOUS CONTINUOUS PRN
Status: DISCONTINUED | OUTPATIENT
Start: 2023-04-05 | End: 2023-04-05

## 2023-04-05 RX ORDER — SODIUM CHLORIDE 9 MG/ML
1000 INJECTION INTRAMUSCULAR; INTRAVENOUS; SUBCUTANEOUS
Refills: 0 | Status: DISCONTINUED | OUTPATIENT
Start: 2023-04-05 | End: 2023-04-07

## 2023-04-05 RX ORDER — MORPHINE SULFATE 50 MG/1
2 CAPSULE, EXTENDED RELEASE ORAL EVERY 6 HOURS
Refills: 0 | Status: DISCONTINUED | OUTPATIENT
Start: 2023-04-05 | End: 2023-04-08

## 2023-04-05 RX ORDER — LEVETIRACETAM 250 MG/1
1500 TABLET, FILM COATED ORAL
Refills: 0 | Status: DISCONTINUED | OUTPATIENT
Start: 2023-04-05 | End: 2023-04-12

## 2023-04-05 RX ORDER — HYDRALAZINE HYDROCHLORIDE 20 MG/ML
5 INJECTION INTRAMUSCULAR; INTRAVENOUS EVERY 10 MIN PRN
Status: DISCONTINUED | OUTPATIENT
Start: 2023-04-05 | End: 2023-04-05 | Stop reason: HOSPADM

## 2023-04-05 RX ORDER — MEROPENEM 1 G/30ML
1000 INJECTION INTRAVENOUS EVERY 8 HOURS
Refills: 0 | Status: DISCONTINUED | OUTPATIENT
Start: 2023-04-05 | End: 2023-04-07

## 2023-04-05 RX ORDER — SODIUM CHLORIDE 9 MG/ML
INJECTION, SOLUTION INTRAVENOUS CONTINUOUS
Status: DISCONTINUED | OUTPATIENT
Start: 2023-04-05 | End: 2023-04-05 | Stop reason: HOSPADM

## 2023-04-05 RX ORDER — DEXTROSE MONOHYDRATE 50 MG/ML
INJECTION, SOLUTION INTRAVENOUS CONTINUOUS PRN
Status: DISCONTINUED | OUTPATIENT
Start: 2023-04-05 | End: 2023-04-05 | Stop reason: HOSPADM

## 2023-04-05 RX ORDER — SODIUM CHLORIDE, SODIUM LACTATE, POTASSIUM CHLORIDE, CALCIUM CHLORIDE 600; 310; 30; 20 MG/100ML; MG/100ML; MG/100ML; MG/100ML
INJECTION, SOLUTION INTRAVENOUS CONTINUOUS
Status: DISCONTINUED | OUTPATIENT
Start: 2023-04-05 | End: 2023-04-05 | Stop reason: HOSPADM

## 2023-04-05 RX ORDER — VANCOMYCIN HCL 1 G
1250 VIAL (EA) INTRAVENOUS EVERY 12 HOURS
Refills: 0 | Status: COMPLETED | OUTPATIENT
Start: 2023-04-06 | End: 2023-04-12

## 2023-04-05 RX ORDER — PROPOFOL 10 MG/ML
INJECTION, EMULSION INTRAVENOUS PRN
Status: DISCONTINUED | OUTPATIENT
Start: 2023-04-05 | End: 2023-04-05

## 2023-04-05 RX ORDER — LIDOCAINE HYDROCHLORIDE 10 MG/ML
5-10 INJECTION, SOLUTION EPIDURAL; INFILTRATION; INTRACAUDAL; PERINEURAL PRN
Status: DISCONTINUED | OUTPATIENT
Start: 2023-04-05 | End: 2023-04-05 | Stop reason: HOSPADM

## 2023-04-05 RX ORDER — 0.9 % SODIUM CHLORIDE 0.9 %
2 VIAL (ML) INJECTION EVERY 12 HOURS SCHEDULED
Status: DISCONTINUED | OUTPATIENT
Start: 2023-04-05 | End: 2023-04-05 | Stop reason: HOSPADM

## 2023-04-05 RX ORDER — NICOTINE POLACRILEX 4 MG
30 LOZENGE BUCCAL
Status: DISCONTINUED | OUTPATIENT
Start: 2023-04-05 | End: 2023-04-05 | Stop reason: HOSPADM

## 2023-04-05 RX ORDER — HUMAN INSULIN 100 [IU]/ML
INJECTION, SOLUTION SUBCUTANEOUS
Status: DISCONTINUED | OUTPATIENT
Start: 2023-04-05 | End: 2023-04-05 | Stop reason: HOSPADM

## 2023-04-05 RX ADMIN — Medication 500 MILLIGRAM(S): at 05:06

## 2023-04-05 RX ADMIN — Medication 1 TABLET(S): at 11:34

## 2023-04-05 RX ADMIN — Medication 81 MILLIGRAM(S): at 11:33

## 2023-04-05 RX ADMIN — Medication 500 MILLIGRAM(S): at 13:18

## 2023-04-05 RX ADMIN — Medication 250 MILLIGRAM(S): at 18:08

## 2023-04-05 RX ADMIN — ENOXAPARIN SODIUM 40 MILLIGRAM(S): 100 INJECTION SUBCUTANEOUS at 21:22

## 2023-04-05 RX ADMIN — Medication 1 APPLICATION(S): at 17:17

## 2023-04-05 RX ADMIN — MEROPENEM 100 MILLIGRAM(S): 1 INJECTION INTRAVENOUS at 16:18

## 2023-04-05 RX ADMIN — FENTANYL CITRATE 20 MCG: 50 INJECTION, SOLUTION INTRAMUSCULAR; INTRAVENOUS at 10:08

## 2023-04-05 RX ADMIN — Medication 500 MILLIGRAM(S): at 21:22

## 2023-04-05 RX ADMIN — Medication 1 APPLICATION(S): at 17:18

## 2023-04-05 RX ADMIN — Medication 650 MILLIGRAM(S): at 06:42

## 2023-04-05 RX ADMIN — Medication 650 MILLIGRAM(S): at 06:51

## 2023-04-05 RX ADMIN — MIDODRINE HYDROCHLORIDE 10 MILLIGRAM(S): 2.5 TABLET ORAL at 05:06

## 2023-04-05 RX ADMIN — Medication 650 MILLIGRAM(S): at 13:25

## 2023-04-05 RX ADMIN — Medication 500 MILLIGRAM(S): at 13:19

## 2023-04-05 RX ADMIN — GABAPENTIN 100 MILLIGRAM(S): 400 CAPSULE ORAL at 13:19

## 2023-04-05 RX ADMIN — ATORVASTATIN CALCIUM 40 MILLIGRAM(S): 80 TABLET, FILM COATED ORAL at 21:22

## 2023-04-05 RX ADMIN — LEVETIRACETAM 1500 MILLIGRAM(S): 250 TABLET, FILM COATED ORAL at 05:06

## 2023-04-05 RX ADMIN — MIDODRINE HYDROCHLORIDE 10 MILLIGRAM(S): 2.5 TABLET ORAL at 17:16

## 2023-04-05 RX ADMIN — FENTANYL CITRATE 20 MCG: 50 INJECTION, SOLUTION INTRAMUSCULAR; INTRAVENOUS at 10:06

## 2023-04-05 RX ADMIN — Medication 1 APPLICATION(S): at 06:18

## 2023-04-05 RX ADMIN — PROPOFOL 90 MG: 10 INJECTION, EMULSION INTRAVENOUS at 09:55

## 2023-04-05 RX ADMIN — FENTANYL CITRATE 20 MCG: 50 INJECTION, SOLUTION INTRAMUSCULAR; INTRAVENOUS at 10:03

## 2023-04-05 RX ADMIN — MORPHINE SULFATE 2 MILLIGRAM(S): 50 CAPSULE, EXTENDED RELEASE ORAL at 09:03

## 2023-04-05 RX ADMIN — LEVETIRACETAM 1500 MILLIGRAM(S): 250 TABLET, FILM COATED ORAL at 17:16

## 2023-04-05 RX ADMIN — Medication 5 MILLIGRAM(S): at 05:06

## 2023-04-05 RX ADMIN — Medication 650 MILLIGRAM(S): at 14:25

## 2023-04-05 RX ADMIN — Medication 5 MILLIGRAM(S): at 17:17

## 2023-04-05 RX ADMIN — MORPHINE SULFATE 2 MILLIGRAM(S): 50 CAPSULE, EXTENDED RELEASE ORAL at 08:48

## 2023-04-05 RX ADMIN — CLOPIDOGREL BISULFATE 75 MILLIGRAM(S): 75 TABLET, FILM COATED ORAL at 11:33

## 2023-04-05 RX ADMIN — SODIUM CHLORIDE 75 MILLILITER(S): 9 INJECTION INTRAMUSCULAR; INTRAVENOUS; SUBCUTANEOUS at 08:48

## 2023-04-05 RX ADMIN — Medication 1 APPLICATION(S): at 05:04

## 2023-04-05 RX ADMIN — SODIUM CHLORIDE, POTASSIUM CHLORIDE, SODIUM LACTATE AND CALCIUM CHLORIDE: 600; 310; 30; 20 INJECTION, SOLUTION INTRAVENOUS at 09:51

## 2023-04-05 RX ADMIN — PROPOFOL 100 MCG/KG/MIN: 10 INJECTION, EMULSION INTRAVENOUS at 09:55

## 2023-04-05 RX ADMIN — FENTANYL CITRATE 40 MCG: 50 INJECTION, SOLUTION INTRAMUSCULAR; INTRAVENOUS at 10:00

## 2023-04-05 RX ADMIN — Medication 500 MILLIGRAM(S): at 05:05

## 2023-04-05 RX ADMIN — MEROPENEM 100 MILLIGRAM(S): 1 INJECTION INTRAVENOUS at 21:22

## 2023-04-05 RX ADMIN — GABAPENTIN 100 MILLIGRAM(S): 400 CAPSULE ORAL at 05:05

## 2023-04-05 RX ADMIN — Medication 250 MILLIGRAM(S): at 05:03

## 2023-04-05 RX ADMIN — Medication 500 MILLIGRAM(S): at 11:33

## 2023-04-05 RX ADMIN — MIDODRINE HYDROCHLORIDE 10 MILLIGRAM(S): 2.5 TABLET ORAL at 11:33

## 2023-04-05 RX ADMIN — CEFEPIME 100 MILLIGRAM(S): 1 INJECTION, POWDER, FOR SOLUTION INTRAMUSCULAR; INTRAVENOUS at 05:03

## 2023-04-05 RX ADMIN — GABAPENTIN 100 MILLIGRAM(S): 400 CAPSULE ORAL at 21:21

## 2023-04-05 ASSESSMENT — PAIN SCALES - GENERAL
PAINLEVEL_OUTOF10: 0

## 2023-04-05 ASSESSMENT — COPD QUESTIONNAIRES: COPD: 1

## 2023-04-05 NOTE — CHART NOTE - NSCHARTNOTEFT_GEN_A_CORE
moaning, reporting pain, when RN asked where, pt said "I hear you"    asked pt if she had pain - replied yes  asked if headache - no, chest? yes, back pain? no, abdominal pain? no.  asked if chest pain felt like reflux/heartburn - nodded  asked if maalox helped in the past - nodded    rx maalox  will order for EKG, trop in am

## 2023-04-05 NOTE — PROGRESS NOTE ADULT - ASSESSMENT
ASSESSMENT   68 y/o woman with PMH of Right MCA stroke with left sided weakness was brought to the ED via EMS. In the ED, she had altered mental status and sepsis, with new onset fevers.     IMPRESSION  Complicated and prolonged hospital course, admitted 3/3 for R MCA CVA, ID consulted 3/22 for Fever, ID reconsulted for leukocytosis  #Leukocytosis; Afebrile since 3/21, Tm 100.3    Team concerned about infected decub- no nurse available to assist in turning the patient  WBC elevated to 21 4/4    4/3 UA without significant pyuria     3/28 BCX NGTD     3/27 BCX NGTD   < from: Xray Chest 1 View- PORTABLE-Routine (Xray Chest 1 View- PORTABLE-Routine .) (03.27.23 @ 11:39) >No focal consolidation, pneumothorax or pleural effusion.  #3/21 Tm 101.7 P>90 low BP- Sepsis   WBC 19 on admission, UCX 3/4 panS ecoli, UA , s/p ceftriaxone 3/4-6, cefepime 3/8  Exam at this time Skin: two approx. 3x2 cm wounds to sacral region with pink moist base. No active bleeding, purulence or surrounding erythema   3/21 UCX NG   3/20 BCX NGTD   3/20 COVID/RVP NEGATIVE   CXR no PNA  No diarrhea  No obvious phlebitis  Recent MRI stable  cannot obtain further history from the patient secondary to altered mental status or sedation   Creatinine, Serum: <0.5 mg/dL (04.04.23 @ 06:35)        RECOMMENDATIONS  - f/u WCX  - Vanc 1g q12h IV, D/C if no staph aureus in WCX   - Please check vanc trough 30 min prior to 4th dose. Goal trough 15-20. If trough results > 20, please HOLD further Vanco dosing and order AM Random Vanco level   - Cefepime 2g q12h IV  - PO flagyl 500mg q8h IV  - Burn f/u appreciated  - Guarded prognosis GOC    Please call or message on [employer] Teams if with any questions.

## 2023-04-05 NOTE — PROGRESS NOTE ADULT - ASSESSMENT
68 y/o woman with PMH of Right MCA stroke with left sided weakness was brought to the ED via EMS. In the ED, she had altered mental status and sepsis.    #Sepsis criteria met  #Sacral wound growing numerous Gram Positive Cocci in Pairs and Chains and numerous Gram Negative Rods  - 3/26 - febrile, tachy Sepsis w/up negative (CXR clear, BCx NGTD, UA neg)  - ID recs appreciated - monitor off ABx - repeat COVID if febrile  - Repeat RVP negative  - 4/2 sacral wound noted to be malodorous - low grade fever overnight as well as leukocytosis   - Burn eval appreciated  - Wound growing gram + cocci in pairs/chains as well as gram neg rods  - ID recs appreciated - Start Vanc, Cefepime, Flagyl - 4/4  - Pt c/o pain in sacral area started morphine 2g q6H if still in pain can increase to 4g q6H  - Pt noted to have CP overnight - EKG sinus tachy - no CP complaints this AM but is complaining of pain in sacral area    #Seizures  #Acute R Frontal Lobe Infarct likely 2/ hypotension   #Chronic R MCA infarct  - per chart: at baseline she is alert, able to communicate and is oriented. Ambulation at baseline: uses a wheelchair. She was in a hospital in [email or web address] last fall and was lethargic for months  - 3/8: s/p rapid response for L gaze preference  - 3/20: Pt had event with L gaze preference during SS eval, pt made NPO. Examined by resident, no gaze preference present any longer. Pt responsive but less than baseline. Developed seizures post-event. Septic workup obtained. CXR appears clear, rest pending. --> 3/21 Pt more responsive this AM, expressing herself and in line with current baseline.  - Depakote level (3/4) 87 (WNL) -> (3/21) 74 (WNL)   - CTH/CTA of head/neck (3/4): no acute pathology, no evidence of occlusion, aneurysm or stenosis  - EEG (3/8) and video EEG (3/9): Focal slowing  - MRI Brain (3/15): new subcortical infarct in the right frontal lobe. Re-demonstrated chronic infarct in the right MCA territory.  - MRI of C spine (3/16): Significantly motion limited study. No gross cord compression or spinal cord edema demonstrated. Multilevel small disc bulges.  - Neuro eval'd 3/8-3/20: For L gaze preference/stroke, OP med review: on low dose Lamictal and Depakote in addition to Keppra (possible mood disorder/ seizures), Her MRI of brain showing subcortical infarct within same M1 territory. The casue of stroke is most likely hypotension as she has a diminutive Rt M1. However, MRI findings doesn't explain her B/L upper extremity spasticity. So, it is important to rule out cervical cord pathology. DAPT for 21 days followed by ASA 81 mg daily, Increased keppra post 3/20 possible seizure event  - BH eval'd 3/15: Delirium; pt does not have the capacity to choose a Health care agent  - SS re-juliette 3/21: Minced and moist, 1:1 feeds, thin liquids through straw  - Seizure precautions  - C/w Depakote 500mg TID and Keppra to 1500mg BID   - C/w baclofen 5mg bid (for UE spasticity)  - C/w ASA, Plavix (3/17-4/6), and high dose statin for new stroke - DAPT for 21 days followed by ASA 81 mg daily  - C/w Midodrine 10mg TID  - OP stroke clinic in 4 weeks after d/c per neurology    #Metabolic Encephalopathy from UTI  #Febrile 3/20  #Unstageable Sacral Ulcer  - 3/20: Pt became febrile after possible seizure episode, Cefepime, levaquin (x1 3/20), vancomycin  - 3/20 Septic workup: WBC (3/21) 12.26 (up from 8.23), BCx (3/20) NGTD, Procal (3/20) 0.05, RVP (3/20) COVID (-), Cortisol level, Lactate (3/21) WNL, UA nitrite (-), LEC (-), pending WBCs/Bacteria, UCx (3/21) NGTD  - Duplex LUE (3/10): (-) DVT (+) superficial cephalic vein thrombosis  - Duplex BLLE (3/7): (-) DVT  - s/p Vanc (3/4x1, 3/20-3/22), Cefepime (x1 3/4, x1 3/8), Ceftriaxone (3/4-3/6) (for UTI)  - Duplex BLLE: negative for DVT  - ID eval'd 3/23  - Burn eval'd 3/22 - No intervention, wound care recs noted  - Cefepime completed 3/24 (per ID)    #Left avulsion fracture of medial malleolus  - Duplex BL LE (3/7): (-) for DVT  - CT Foot (3/11): acute minimally displaced avulsion fracture at the medial malleolus  - Podiatry following for foot pain: WBAT w/ surgical shoe, ankle brace, PT  - fall precautions  - pain control regimen - if pain uncontrolled consider increasing percocet to 2tabs.  - C/w gabapentin at 100mg TID (Lowered dose from home, increase as pt tolerates)    #Sinus tachycardia - now resolved - attributed to pain  - TSH (3/8) WNL, - EKG reviewed    #Left cephalic vein thrombosis  - Duplex LUE (3/10): No DVT however there is superficial thrombosis noted in the left cephalic vein  - s/p warm compress  - Repeat duplex ordered 3/27 - negative for DVT    #Anemia - normocytic - stable  - possibly due to frequent phlebotomy, no signs of active bleed - monitor    #Misc  - DVT ppx - lovenox  - Diet: Soft and bite sized (SS 3/7)  - full code, overall guarded prognosis     66 y/o woman with PMH of Right MCA stroke with left sided weakness was brought to the ED via EMS. In the ED, she had altered mental status and sepsis.    #Sepsis criteria met  #Sacral wound growing numerous Gram Positive Cocci in Pairs and Chains and numerous Gram Negative Rods  - 3/26 - febrile, tachy Sepsis w/up negative (CXR clear, BCx NGTD, UA neg)  - ID recs appreciated - monitor off ABx - repeat COVID if febrile  - Repeat RVP negative  - 4/2 sacral wound noted to be malodorous - low grade fever overnight as well as leukocytosis   - Burn eval appreciated  - Wound growing gram + cocci in pairs/chains as well as gram neg rods  - ID recs appreciated - Start Vanc, Cefepime, Flagyl - 4/4  - Pt c/o pain in sacral area started morphine 2g q6H if still in pain can increase to 4g q6H  - Pt noted to have CP overnight - EKG sinus tach - no CP complaints this AM but is complaining of pain in sacral area    #Seizures  #Acute R Frontal Lobe Infarct likely 2/ hypotension   #Chronic R MCA infarct  - per chart: at baseline she is alert, able to communicate and is oriented. Ambulation at baseline: uses a wheelchair. She was in a hospital in [facility] last fall and was lethargic for months  - 3/8: s/p rapid response for L gaze preference  - 3/20: Pt had event with L gaze preference during SS eval, pt made NPO. Examined by resident, no gaze preference present any longer. Pt responsive but less than baseline. Developed seizures post-event. Septic workup obtained. CXR appears clear, rest pending. --> 3/21 Pt more responsive this AM, expressing herself and in line with current baseline.  - Depakote level (3/4) 87 (WNL) -> (3/21) 74 (WNL)   - CTH/CTA of head/neck (3/4): no acute pathology, no evidence of occlusion, aneurysm or stenosis  - EEG (3/8) and video EEG (3/9): Focal slowing  - MRI Brain (3/15): new subcortical infarct in the right frontal lobe. Re-demonstrated chronic infarct in the right MCA territory.  - MRI of C spine (3/16): Significantly motion limited study. No gross cord compression or spinal cord edema demonstrated. Multilevel small disc bulges.  - Neuro eval'd 3/8-3/20: For L gaze preference/stroke, OP med review: on low dose Lamictal and Depakote in addition to Keppra (possible mood disorder/ seizures), Her MRI of brain showing subcortical infarct within same M1 territory. The casue of stroke is most likely hypotension as she has a diminutive Rt M1. However, MRI findings doesn't explain her B/L upper extremity spasticity. So, it is important to rule out cervical cord pathology. DAPT for 21 days followed by ASA 81 mg daily, Increased keppra post 3/20 possible seizure event  - BH eval'd 3/15: Delirium; pt does not have the capacity to choose a Health care agent  - SS re-juliette 3/21: Minced and moist, 1:1 feeds, thin liquids through straw  - Seizure precautions  - C/w Depakote 500mg TID and Keppra to 1500mg BID   - C/w baclofen 5mg bid (for UE spasticity)  - C/w ASA, Plavix (3/17-4/6), and high dose statin for new stroke - DAPT for 21 days followed by ASA 81 mg daily  - C/w Midodrine 10mg TID  - OP stroke clinic in 4 weeks after d/c per neurology    #Metabolic Encephalopathy from UTI  #Febrile 3/20  #Unstageable Sacral Ulcer  - 3/20: Pt became febrile after possible seizure episode, Cefepime, levaquin (x1 3/20), vancomycin  - 3/20 Septic workup: WBC (3/21) 12.26 (up from 8.23), BCx (3/20) NGTD, Procal (3/20) 0.05, RVP (3/20) COVID (-), Cortisol level, Lactate (3/21) WNL, UA nitrite (-), LEC (-), pending WBCs/Bacteria, UCx (3/21) NGTD  - Duplex LUE (3/10): (-) DVT (+) superficial cephalic vein thrombosis  - Duplex BLLE (3/7): (-) DVT  - s/p Vanc (3/4x1, 3/20-3/22), Cefepime (x1 3/4, x1 3/8), Ceftriaxone (3/4-3/6) (for UTI)  - Duplex BLLE: negative for DVT  - ID eval'd 3/23  - Burn eval'd 3/22 - No intervention, wound care recs noted  - Cefepime completed 3/24 (per ID)    #Left avulsion fracture of medial malleolus  - Duplex BL LE (3/7): (-) for DVT  - CT Foot (3/11): acute minimally displaced avulsion fracture at the medial malleolus  - Podiatry following for foot pain: WBAT w/ surgical shoe, ankle brace, PT  - fall precautions  - pain control regimen - if pain uncontrolled consider increasing percocet to 2tabs.  - C/w gabapentin at 100mg TID (Lowered dose from home, increase as pt tolerates)    #Sinus tachycardia - now resolved - attributed to pain  - TSH (3/8) WNL, - EKG reviewed    #Left cephalic vein thrombosis  - Duplex LUE (3/10): No DVT however there is superficial thrombosis noted in the left cephalic vein  - s/p warm compress  - Repeat duplex ordered 3/27 - negative for DVT    #Anemia - normocytic - stable  - possibly due to frequent phlebotomy, no signs of active bleed - monitor    #Misc  - DVT ppx - lovenox  - Diet: Soft and bite sized (SS 3/7)  - full code, overall guarded prognosis

## 2023-04-05 NOTE — PROGRESS NOTE ADULT - SUBJECTIVE AND OBJECTIVE BOX
JOSEPH OBRIEN 67y Female  MRN#: [id number]   Hospital Day: 32d    SUBJECTIVE  Patient is a 67y old Female who presents with a chief complaint of Altered Mental status  Currently admitted to medicine with the primary diagnosis of Stroke      INTERVAL HPI AND OVERNIGHT EVENTS:  Patient was examined and seen at bedside. This morning she is resting comfortably in bed and reports no issues or overnight events.    OBJECTIVE  PAST MEDICAL & SURGICAL HISTORY    ALLERGIES:  Allergy Status Unknown    MEDICATIONS:  STANDING MEDICATIONS  acetaminophen  Suppository .. 325 milliGRAM(s) Rectal once  ascorbic acid 500 milliGRAM(s) Oral daily  aspirin  chewable 81 milliGRAM(s) Oral daily  atorvastatin 40 milliGRAM(s) Oral at bedtime  baclofen 5 milliGRAM(s) Oral every 12 hours  cefepime   IVPB 2000 milliGRAM(s) IV Intermittent every 12 hours  cefepime   IVPB      clopidogrel Tablet 75 milliGRAM(s) Oral daily  collagenase Ointment 1 Application(s) Topical two times a day  Dakins Solution - 1/2 Strength 1 Application(s) Topical two times a day  enoxaparin Injectable 40 milliGRAM(s) SubCutaneous every 24 hours  gabapentin 100 milliGRAM(s) Oral three times a day  levETIRAcetam 1500 milliGRAM(s) Oral two times a day  metroNIDAZOLE    Tablet 500 milliGRAM(s) Oral every 8 hours  midodrine. 10 milliGRAM(s) Oral three times a day  multivitamin/minerals 1 Tablet(s) Oral daily  sodium chloride 0.9%. 1000 milliLiter(s) IV Continuous <Continuous>  valproic  acid Syrup 500 milliGRAM(s) Oral three times a day  vancomycin  IVPB 1000 milliGRAM(s) IV Intermittent every 12 hours  vitamin A &amp; D Ointment 1 Application(s) Topical two times a day    PRN MEDICATIONS  acetaminophen     Tablet .. 650 milliGRAM(s) Oral every 6 hours PRN  aluminum hydroxide/magnesium hydroxide/simethicone Suspension 30 milliLiter(s) Oral every 6 hours PRN  morphine  - Injectable 2 milliGRAM(s) IV Push every 6 hours PRN      VITAL SIGNS: Last 24 Hours  T(C): 37.9 (2023 04:40), Max: 37.9 (2023 04:40)  T(F): 100.3 (2023 04:40), Max: 100.3 (2023 04:40)  HR: 112 (2023 04:40) (104 - 116)  BP: 115/59 (2023 04:40) (115/59 - 136/64)  BP(mean): --  RR: 19 (2023 04:40) (17 - 19)  SpO2: 100% (2023 07:51) (98% - 100%)    LABS:                        7.9    21.41 )-----------( 693      ( 2023 06:35 )             24.7     04-04    135  |  99  |  9<L>  ----------------------------<  95  4.3   |  26  |  <0.5<L>    Ca    8.8      2023 06:35  Mg     2.0     04-04    TPro  6.2  /  Alb  2.8<L>  /  TBili  0.4  /  DBili  x   /  AST  12  /  ALT  <5  /  AlkPhos  77  04-04      Urinalysis Basic - ( 2023 21:10 )    Color: Yellow / Appearance: Clear / S.019 / pH: x  Gluc: x / Ketone: Trace  / Bili: Negative / Urobili: 3 mg/dL   Blood: x / Protein: Trace / Nitrite: Negative   Leuk Esterase: Negative / RBC: x / WBC x   Sq Epi: x / Non Sq Epi: x / Bacteria: x            Culture - Tissue with Gram Stain (collected 2023 08:40)  Source: .Tissue sacrum  Gram Stain (2023 21:29):    No polymorphonuclear leukocytes per low power field    Numerous Gram Positive Cocci in Pairs and Chains per oil power field    Numerous Gram Negative Rods per oil power field      PHYSICAL EXAM:  CONSTITUTIONAL: No acute distress, laying in bed this AM - uncomfortable 2/2 pain in sacral area  HEENT: Atraumatic, normocephalic, neck supple, moist mucous membranes  PULMONARY: Clear to auscultation bilaterally  CARDIOVASCULAR: Regular rate and rhythm  GASTROINTESTINAL: Soft, non-tender, non-distended; bowel sounds present  MUSCULOSKELETAL: no LE edema, 2+ pulses b/l LE, left LE in CAM boot  NEURO: L hemiparesis, B/L UE spasticity, contracted tone, nods Yes/No  SKIN: Sacral ulcer - wound wrapped

## 2023-04-05 NOTE — PROGRESS NOTE ADULT - SUBJECTIVE AND OBJECTIVE BOX
JOSEPH OBRIEN  67y, Female  Allergy: Allergy Status Unknown      LOS  32d    CHIEF COMPLAINT: Altered Mental status (2023 11:53)      INTERVAL EVENTS/HPI  - No acute events overnight  - T(F): , Max: 100.3 (23 @ 04:40)  - Tolerating medication  - WBC Count: 21.41 (23 @ 06:35)  WBC Count: 20.01 (23 @ 06:28)     - Creatinine, Serum: <0.5 (23 @ 06:35)       ROS  ***    VITALS:  T(F): 100.3, Max: 100.3 (23 @ 04:40)  HR: 112  BP: 115/59  RR: 19Vital Signs Last 24 Hrs  T(C): 37.9 (2023 04:40), Max: 37.9 (2023 04:40)  T(F): 100.3 (2023 04:40), Max: 100.3 (2023 04:40)  HR: 112 (2023 04:40) (104 - 116)  BP: 115/59 (2023 04:40) (115/59 - 136/64)  BP(mean): --  RR: 19 (2023 04:40) (17 - 19)  SpO2: 99% (2023 04:40) (98% - 100%)    Parameters below as of 2023 04:40  Patient On (Oxygen Delivery Method): room air        PHYSICAL EXAM:  ***    FH: Non-contributory  Social Hx: Non-contributory    TESTS & MEASUREMENTS:                        7.9    21.41 )-----------( 693      ( 2023 06:35 )             24.7     04-    135  |  99  |  9<L>  ----------------------------<  95  4.3   |  26  |  <0.5<L>    Ca    8.8      2023 06:35  Mg     2.0     04-04    TPro  6.2  /  Alb  2.8<L>  /  TBili  0.4  /  DBili  x   /  AST  12  /  ALT  <5  /  AlkPhos  77  04-04      LIVER FUNCTIONS - ( 2023 06:35 )  Alb: 2.8 g/dL / Pro: 6.2 g/dL / ALK PHOS: 77 U/L / ALT: <5 U/L / AST: 12 U/L / GGT: x           Urinalysis Basic - ( 2023 21:10 )    Color: Yellow / Appearance: Clear / S.019 / pH: x  Gluc: x / Ketone: Trace  / Bili: Negative / Urobili: 3 mg/dL   Blood: x / Protein: Trace / Nitrite: Negative   Leuk Esterase: Negative / RBC: x / WBC x   Sq Epi: x / Non Sq Epi: x / Bacteria: x        Culture - Tissue with Gram Stain (collected 23 @ 08:40)  Source: .Tissue sacrum  Gram Stain (23 @ 21:29):    No polymorphonuclear leukocytes per low power field    Numerous Gram Positive Cocci in Pairs and Chains per oil power field    Numerous Gram Negative Rods per oil power field    Culture - Blood (collected 23 @ 07:32)  Source: .Blood None  Final Report (23 @ 18:00):    No Growth Final    Culture - Blood (collected 23 @ 10:39)  Source: .Blood Blood-Peripheral  Final Report (23 @ 18:00):    No Growth Final    Culture - Urine (collected 23 @ 11:57)  Source: Catheterized Catheterized  Final Report (23 @ 17:54):    No growth    Culture - Blood (collected 23 @ 22:07)  Source: .Blood Blood  Final Report (23 @ 02:00):    No Growth Final    Culture - Blood (collected 23 @ 11:46)  Source: .Blood None  Final Report (23 @ 23:00):    No Growth Final    Culture - Blood (collected 23 @ 07:10)  Source: .Blood None  Final Report (23 @ 18:00):    No Growth Final            INFECTIOUS DISEASES TESTING  COVID-19 PCR: NotDetec (23 @ 18:03)  Rapid RVP Result: NotDetec (23 @ 09:55)  Procalcitonin, Serum: 0.05 (23 @ 22:07)  Rapid RVP Result: NotDetec (23 @ 21:25)  COVID-19 PCR: NotDetec (23 @ 12:47)  COVID-19 PCR: NotDetec (03-15-23 @ 16:23)  strept    INFLAMMATORY MARKERS      RADIOLOGY & ADDITIONAL TESTS:  I have personally reviewed the last available Chest xray  CXR      CT      CARDIOLOGY TESTING  12 Lead ECG:   Ventricular Rate 103 BPM    Atrial Rate 103 BPM    P-R Interval 120 ms    QRS Duration 66 ms    Q-T Interval 324 ms    QTC Calculation(Bazett) 424 ms    P Axis 40 degrees    R Axis -2 degrees    T Axis 43 degrees    Diagnosis Line Sinus tachycardia  Otherwise normal ECG    Confirmed by Shanti Sage MD ([id number]) on 2023 10:25:13 AM (23 @ 11:37)      MEDICATIONS  acetaminophen  Suppository .. 325 Rectal once  ascorbic acid 500 Oral daily  aspirin  chewable 81 Oral daily  atorvastatin 40 Oral at bedtime  baclofen 5 Oral every 12 hours  cefepime   IVPB 2000 IV Intermittent every 12 hours  cefepime   IVPB     clopidogrel Tablet 75 Oral daily  collagenase Ointment 1 Topical two times a day  Dakins Solution - 1/2 Strength 1 Topical two times a day  enoxaparin Injectable 40 SubCutaneous every 24 hours  gabapentin 100 Oral three times a day  levETIRAcetam 1500 Oral two times a day  metroNIDAZOLE    Tablet 500 Oral every 8 hours  midodrine. 10 Oral three times a day  multivitamin/minerals 1 Oral daily  valproic  acid Syrup 500 Oral three times a day  vancomycin  IVPB 1000 IV Intermittent every 12 hours  vitamin A &amp; D Ointment 1 Topical two times a day      WEIGHT  Weight (kg): 68.3 (23 @ 10:12)      ANTIBIOTICS:  cefepime   IVPB 2000 milliGRAM(s) IV Intermittent every 12 hours  cefepime   IVPB      metroNIDAZOLE    Tablet 500 milliGRAM(s) Oral every 8 hours  vancomycin  IVPB 1000 milliGRAM(s) IV Intermittent every 12 hours      All available historical records have been reviewed       CAMILLE, JOSEPH  67y, Female  Allergy: Allergy Status Unknown      LOS  32d    CHIEF COMPLAINT: Altered Mental status (2023 11:53)      INTERVAL EVENTS/HPI  - No acute events overnight  - T(F): , Max: 100.3 (23 @ 04:40)  - Tolerating medication  - WBC Count: 21.41 (23 @ 06:35)  WBC Count: 20.01 (23 @ 06:28)     - Creatinine, Serum: <0.5 (23 @ 06:35)       ROS  General: Denies rigors, nightsweats  HEENT: Denies headache, rhinorrhea, sore throat, eye pain  CV: Denies CP, palpitations  PULM: Denies wheezing, hemoptysis  GI: Denies hematemesis, hematochezia, melena  : Denies discharge, hematuria  MSK: Denies arthralgias, myalgias  SKIN: Denies rash, lesions  NEURO: Denies paresthesias, weakness  PSYCH: Denies depression, anxiety     VITALS:  T(F): 100.3, Max: 100.3 (23 @ 04:40)  HR: 112  BP: 115/59  RR: 19Vital Signs Last 24 Hrs  T(C): 37.9 (2023 04:40), Max: 37.9 (2023 04:40)  T(F): 100.3 (2023 04:40), Max: 100.3 (2023 04:40)  HR: 112 (2023 04:40) (104 - 116)  BP: 115/59 (2023 04:40) (115/59 - 136/64)  BP(mean): --  RR: 19 (2023 04:40) (17 - 19)  SpO2: 99% (2023 04:40) (98% - 100%)    Parameters below as of 2023 04:40  Patient On (Oxygen Delivery Method): room air        PHYSICAL EXAM:  Gen: NAD, resting in bed chronically ill appearing   HEENT: Normocephalic, atraumatic  Neck: supple, no lymphadenopathy  CV: Regular rate & regular rhythm  Lungs: decreased BS at bases, no fremitus  Abdomen: Soft, BS present  Ext: Warm, well perfused  Neuro: deficits, LUE edema  Skin: no rash, no erythema  Lines: no phlebitis   Sacrum deferred     FH: Non-contributory  Social Hx: Non-contributory    TESTS & MEASUREMENTS:                        7.9    21.41 )-----------( 693      ( 2023 06:35 )             24.7         135  |  99  |  9<L>  ----------------------------<  95  4.3   |  26  |  <0.5<L>    Ca    8.8      2023 06:35  Mg     2.0         TPro  6.2  /  Alb  2.8<L>  /  TBili  0.4  /  DBili  x   /  AST  12  /  ALT  <5  /  AlkPhos  77        LIVER FUNCTIONS - ( 2023 06:35 )  Alb: 2.8 g/dL / Pro: 6.2 g/dL / ALK PHOS: 77 U/L / ALT: <5 U/L / AST: 12 U/L / GGT: x           Urinalysis Basic - ( 2023 21:10 )    Color: Yellow / Appearance: Clear / S.019 / pH: x  Gluc: x / Ketone: Trace  / Bili: Negative / Urobili: 3 mg/dL   Blood: x / Protein: Trace / Nitrite: Negative   Leuk Esterase: Negative / RBC: x / WBC x   Sq Epi: x / Non Sq Epi: x / Bacteria: x        Culture - Tissue with Gram Stain (collected 23 @ 08:40)  Source: .Tissue sacrum  Gram Stain (23 @ 21:29):    No polymorphonuclear leukocytes per low power field    Numerous Gram Positive Cocci in Pairs and Chains per oil power field    Numerous Gram Negative Rods per oil power field    Culture - Blood (collected 23 @ 07:32)  Source: .Blood None  Final Report (23 @ 18:00):    No Growth Final    Culture - Blood (collected 23 @ 10:39)  Source: .Blood Blood-Peripheral  Final Report (23 @ 18:00):    No Growth Final    Culture - Urine (collected 23 @ 11:57)  Source: Catheterized Catheterized  Final Report (23 @ 17:54):    No growth    Culture - Blood (collected 23 @ 22:07)  Source: .Blood Blood  Final Report (23 @ 02:00):    No Growth Final    Culture - Blood (collected 23 @ 11:46)  Source: .Blood None  Final Report (23 @ 23:00):    No Growth Final    Culture - Blood (collected 23 @ 07:10)  Source: .Blood None  Final Report (23 @ 18:00):    No Growth Final            INFECTIOUS DISEASES TESTING  COVID-19 PCR: NotDetec (23 @ 18:03)  Rapid RVP Result: NotDetec (23 @ 09:55)  Procalcitonin, Serum: 0.05 (23 @ 22:07)  Rapid RVP Result: NotDetec (23 @ 21:25)  COVID-19 PCR: NotDetec (23 @ 12:47)  COVID-19 PCR: NotDetec (03-15-23 @ 16:23)  strept    INFLAMMATORY MARKERS      RADIOLOGY & ADDITIONAL TESTS:  I have personally reviewed the last available Chest xray  CXR      CT      CARDIOLOGY TESTING  12 Lead ECG:   Ventricular Rate 103 BPM    Atrial Rate 103 BPM    P-R Interval 120 ms    QRS Duration 66 ms    Q-T Interval 324 ms    QTC Calculation(Bazett) 424 ms    P Axis 40 degrees    R Axis -2 degrees    T Axis 43 degrees    Diagnosis Line Sinus tachycardia  Otherwise normal ECG    Confirmed by Shanti Sage MD ([id number]) on 2023 10:25:13 AM (23 @ 11:37)      MEDICATIONS  acetaminophen  Suppository .. 325 Rectal once  ascorbic acid 500 Oral daily  aspirin  chewable 81 Oral daily  atorvastatin 40 Oral at bedtime  baclofen 5 Oral every 12 hours  cefepime   IVPB 2000 IV Intermittent every 12 hours  cefepime   IVPB     clopidogrel Tablet 75 Oral daily  collagenase Ointment 1 Topical two times a day  Dakins Solution - 1/2 Strength 1 Topical two times a day  enoxaparin Injectable 40 SubCutaneous every 24 hours  gabapentin 100 Oral three times a day  levETIRAcetam 1500 Oral two times a day  metroNIDAZOLE    Tablet 500 Oral every 8 hours  midodrine. 10 Oral three times a day  multivitamin/minerals 1 Oral daily  valproic  acid Syrup 500 Oral three times a day  vancomycin  IVPB 1000 IV Intermittent every 12 hours  vitamin A &amp; D Ointment 1 Topical two times a day      WEIGHT  Weight (kg): 68.3 (23 @ 10:12)      ANTIBIOTICS:  cefepime   IVPB 2000 milliGRAM(s) IV Intermittent every 12 hours  cefepime   IVPB      metroNIDAZOLE    Tablet 500 milliGRAM(s) Oral every 8 hours  vancomycin  IVPB 1000 milliGRAM(s) IV Intermittent every 12 hours      All available historical records have been reviewed

## 2023-04-05 NOTE — PROGRESS NOTE ADULT - ATTENDING COMMENTS
Continues to be febrile.   Per ID changed to LucianaThea now. Check RVP  IV Morphine 2mg Q6 PRN for sacral pain. May increase PRN.     NS @ 75 x 24 hours (still decreased PO intake)

## 2023-04-06 LAB
-  AMIKACIN: SIGNIFICANT CHANGE UP
-  AMIKACIN: SIGNIFICANT CHANGE UP
-  AMOXICILLIN/CLAVULANIC ACID: SIGNIFICANT CHANGE UP
-  AMOXICILLIN/CLAVULANIC ACID: SIGNIFICANT CHANGE UP
-  AMPICILLIN/SULBACTAM: SIGNIFICANT CHANGE UP
-  AMPICILLIN: SIGNIFICANT CHANGE UP
-  AZTREONAM: SIGNIFICANT CHANGE UP
-  AZTREONAM: SIGNIFICANT CHANGE UP
-  CEFAZOLIN: SIGNIFICANT CHANGE UP
-  CEFEPIME: SIGNIFICANT CHANGE UP
-  CEFEPIME: SIGNIFICANT CHANGE UP
-  CEFOXITIN: SIGNIFICANT CHANGE UP
-  CEFOXITIN: SIGNIFICANT CHANGE UP
-  CEFTRIAXONE: SIGNIFICANT CHANGE UP
-  CEFTRIAXONE: SIGNIFICANT CHANGE UP
-  CIPROFLOXACIN: SIGNIFICANT CHANGE UP
-  CIPROFLOXACIN: SIGNIFICANT CHANGE UP
-  CLINDAMYCIN: SIGNIFICANT CHANGE UP
-  ERTAPENEM: SIGNIFICANT CHANGE UP
-  ERTAPENEM: SIGNIFICANT CHANGE UP
-  ERYTHROMYCIN: SIGNIFICANT CHANGE UP
-  GENTAMICIN: SIGNIFICANT CHANGE UP
-  LEVOFLOXACIN: SIGNIFICANT CHANGE UP
-  LEVOFLOXACIN: SIGNIFICANT CHANGE UP
-  MEROPENEM: SIGNIFICANT CHANGE UP
-  MEROPENEM: SIGNIFICANT CHANGE UP
-  OXACILLIN: SIGNIFICANT CHANGE UP
-  PENICILLIN: SIGNIFICANT CHANGE UP
-  PIPERACILLIN/TAZOBACTAM: SIGNIFICANT CHANGE UP
-  PIPERACILLIN/TAZOBACTAM: SIGNIFICANT CHANGE UP
-  RIFAMPIN: SIGNIFICANT CHANGE UP
-  TETRACYCLINE: SIGNIFICANT CHANGE UP
-  TETRACYCLINE: SIGNIFICANT CHANGE UP
-  TOBRAMYCIN: SIGNIFICANT CHANGE UP
-  TOBRAMYCIN: SIGNIFICANT CHANGE UP
-  TRIMETHOPRIM/SULFAMETHOXAZOLE: SIGNIFICANT CHANGE UP
-  VANCOMYCIN: SIGNIFICANT CHANGE UP
-  VANCOMYCIN: SIGNIFICANT CHANGE UP
ALBUMIN SERPL ELPH-MCNC: 2.3 G/DL — LOW (ref 3.5–5.2)
ALP SERPL-CCNC: 78 U/L — SIGNIFICANT CHANGE UP (ref 30–115)
ALT FLD-CCNC: 7 U/L — SIGNIFICANT CHANGE UP (ref 0–41)
ANION GAP SERPL CALC-SCNC: 14 MMOL/L — SIGNIFICANT CHANGE UP (ref 7–14)
AST SERPL-CCNC: 18 U/L — SIGNIFICANT CHANGE UP (ref 0–41)
BASOPHILS # BLD AUTO: 0.06 K/UL — SIGNIFICANT CHANGE UP (ref 0–0.2)
BASOPHILS NFR BLD AUTO: 0.3 % — SIGNIFICANT CHANGE UP (ref 0–1)
BILIRUB SERPL-MCNC: <0.2 MG/DL — SIGNIFICANT CHANGE UP (ref 0.2–1.2)
BLD GP AB SCN SERPL QL: SIGNIFICANT CHANGE UP
BUN SERPL-MCNC: 6 MG/DL — LOW (ref 10–20)
CALCIUM SERPL-MCNC: 8.8 MG/DL — SIGNIFICANT CHANGE UP (ref 8.4–10.5)
CHLORIDE SERPL-SCNC: 101 MMOL/L — SIGNIFICANT CHANGE UP (ref 98–110)
CO2 SERPL-SCNC: 23 MMOL/L — SIGNIFICANT CHANGE UP (ref 17–32)
CREAT SERPL-MCNC: <0.5 MG/DL — LOW (ref 0.7–1.5)
CULTURE RESULTS: SIGNIFICANT CHANGE UP
EGFR: 117 ML/MIN/1.73M2 — SIGNIFICANT CHANGE UP
EOSINOPHIL # BLD AUTO: 0.32 K/UL — SIGNIFICANT CHANGE UP (ref 0–0.7)
EOSINOPHIL NFR BLD AUTO: 1.8 % — SIGNIFICANT CHANGE UP (ref 0–8)
GLUCOSE SERPL-MCNC: 114 MG/DL — HIGH (ref 70–99)
HCT VFR BLD CALC: 22.5 % — LOW (ref 37–47)
HGB BLD-MCNC: 7.2 G/DL — LOW (ref 12–16)
IMM GRANULOCYTES NFR BLD AUTO: 1 % — HIGH (ref 0.1–0.3)
LYMPHOCYTES # BLD AUTO: 13.6 % — LOW (ref 20.5–51.1)
LYMPHOCYTES # BLD AUTO: 2.41 K/UL — SIGNIFICANT CHANGE UP (ref 1.2–3.4)
MAGNESIUM SERPL-MCNC: 1.9 MG/DL — SIGNIFICANT CHANGE UP (ref 1.8–2.4)
MCHC RBC-ENTMCNC: 28.6 PG — SIGNIFICANT CHANGE UP (ref 27–31)
MCHC RBC-ENTMCNC: 32 G/DL — SIGNIFICANT CHANGE UP (ref 32–37)
MCV RBC AUTO: 89.3 FL — SIGNIFICANT CHANGE UP (ref 81–99)
METHOD TYPE: SIGNIFICANT CHANGE UP
MONOCYTES # BLD AUTO: 2.46 K/UL — HIGH (ref 0.1–0.6)
MONOCYTES NFR BLD AUTO: 13.9 % — HIGH (ref 1.7–9.3)
NEUTROPHILS # BLD AUTO: 12.26 K/UL — HIGH (ref 1.4–6.5)
NEUTROPHILS NFR BLD AUTO: 69.4 % — SIGNIFICANT CHANGE UP (ref 42.2–75.2)
NRBC # BLD: 0 /100 WBCS — SIGNIFICANT CHANGE UP (ref 0–0)
ORGANISM # SPEC MICROSCOPIC CNT: SIGNIFICANT CHANGE UP
PLATELET # BLD AUTO: 621 K/UL — HIGH (ref 130–400)
POTASSIUM SERPL-MCNC: 4.4 MMOL/L — SIGNIFICANT CHANGE UP (ref 3.5–5)
POTASSIUM SERPL-SCNC: 4.4 MMOL/L — SIGNIFICANT CHANGE UP (ref 3.5–5)
PROT SERPL-MCNC: 5.5 G/DL — LOW (ref 6–8)
RAPID RVP RESULT: SIGNIFICANT CHANGE UP
RBC # BLD: 2.52 M/UL — LOW (ref 4.2–5.4)
RBC # FLD: 14.2 % — SIGNIFICANT CHANGE UP (ref 11.5–14.5)
SARS-COV-2 RNA SPEC QL NAA+PROBE: SIGNIFICANT CHANGE UP
SODIUM SERPL-SCNC: 138 MMOL/L — SIGNIFICANT CHANGE UP (ref 135–146)
SPECIMEN SOURCE: SIGNIFICANT CHANGE UP
TROPONIN T SERPL-MCNC: 0.03 NG/ML — CRITICAL HIGH
WBC # BLD: 17.69 K/UL — HIGH (ref 4.8–10.8)
WBC # FLD AUTO: 17.69 K/UL — HIGH (ref 4.8–10.8)

## 2023-04-06 PROCEDURE — 71260 CT THORAX DX C+: CPT | Mod: 26

## 2023-04-06 PROCEDURE — 99232 SBSQ HOSP IP/OBS MODERATE 35: CPT

## 2023-04-06 PROCEDURE — 74177 CT ABD & PELVIS W/CONTRAST: CPT | Mod: 26

## 2023-04-06 RX ORDER — DIATRIZOATE MEGLUMINE 180 MG/ML
30 INJECTION, SOLUTION INTRAVESICAL ONCE
Refills: 0 | Status: COMPLETED | OUTPATIENT
Start: 2023-04-06 | End: 2023-04-06

## 2023-04-06 RX ORDER — ACETAMINOPHEN 500 MG
650 TABLET ORAL ONCE
Refills: 0 | Status: COMPLETED | OUTPATIENT
Start: 2023-04-06 | End: 2023-04-06

## 2023-04-06 RX ADMIN — GABAPENTIN 100 MILLIGRAM(S): 400 CAPSULE ORAL at 13:01

## 2023-04-06 RX ADMIN — Medication 166.67 MILLIGRAM(S): at 17:13

## 2023-04-06 RX ADMIN — MORPHINE SULFATE 2 MILLIGRAM(S): 50 CAPSULE, EXTENDED RELEASE ORAL at 04:55

## 2023-04-06 RX ADMIN — Medication 81 MILLIGRAM(S): at 11:41

## 2023-04-06 RX ADMIN — Medication 650 MILLIGRAM(S): at 05:41

## 2023-04-06 RX ADMIN — MORPHINE SULFATE 2 MILLIGRAM(S): 50 CAPSULE, EXTENDED RELEASE ORAL at 22:35

## 2023-04-06 RX ADMIN — Medication 1 APPLICATION(S): at 17:13

## 2023-04-06 RX ADMIN — Medication 166.67 MILLIGRAM(S): at 05:13

## 2023-04-06 RX ADMIN — MIDODRINE HYDROCHLORIDE 10 MILLIGRAM(S): 2.5 TABLET ORAL at 17:11

## 2023-04-06 RX ADMIN — Medication 650 MILLIGRAM(S): at 05:26

## 2023-04-06 RX ADMIN — Medication 1 APPLICATION(S): at 17:12

## 2023-04-06 RX ADMIN — Medication 1 TABLET(S): at 11:42

## 2023-04-06 RX ADMIN — MEROPENEM 100 MILLIGRAM(S): 1 INJECTION INTRAVENOUS at 05:13

## 2023-04-06 RX ADMIN — Medication 500 MILLIGRAM(S): at 13:01

## 2023-04-06 RX ADMIN — Medication 1 APPLICATION(S): at 05:14

## 2023-04-06 RX ADMIN — Medication 1 APPLICATION(S): at 05:11

## 2023-04-06 RX ADMIN — DIATRIZOATE MEGLUMINE 30 MILLILITER(S): 180 INJECTION, SOLUTION INTRAVESICAL at 12:50

## 2023-04-06 RX ADMIN — MIDODRINE HYDROCHLORIDE 10 MILLIGRAM(S): 2.5 TABLET ORAL at 11:42

## 2023-04-06 RX ADMIN — ATORVASTATIN CALCIUM 40 MILLIGRAM(S): 80 TABLET, FILM COATED ORAL at 21:08

## 2023-04-06 RX ADMIN — Medication 500 MILLIGRAM(S): at 11:42

## 2023-04-06 RX ADMIN — Medication 500 MILLIGRAM(S): at 05:08

## 2023-04-06 RX ADMIN — MEROPENEM 100 MILLIGRAM(S): 1 INJECTION INTRAVENOUS at 21:08

## 2023-04-06 RX ADMIN — MORPHINE SULFATE 2 MILLIGRAM(S): 50 CAPSULE, EXTENDED RELEASE ORAL at 22:50

## 2023-04-06 RX ADMIN — Medication 5 MILLIGRAM(S): at 05:09

## 2023-04-06 RX ADMIN — Medication 1 APPLICATION(S): at 05:12

## 2023-04-06 RX ADMIN — Medication 5 MILLIGRAM(S): at 17:12

## 2023-04-06 RX ADMIN — ENOXAPARIN SODIUM 40 MILLIGRAM(S): 100 INJECTION SUBCUTANEOUS at 21:10

## 2023-04-06 RX ADMIN — MIDODRINE HYDROCHLORIDE 10 MILLIGRAM(S): 2.5 TABLET ORAL at 05:09

## 2023-04-06 RX ADMIN — GABAPENTIN 100 MILLIGRAM(S): 400 CAPSULE ORAL at 21:08

## 2023-04-06 RX ADMIN — CLOPIDOGREL BISULFATE 75 MILLIGRAM(S): 75 TABLET, FILM COATED ORAL at 11:41

## 2023-04-06 RX ADMIN — GABAPENTIN 100 MILLIGRAM(S): 400 CAPSULE ORAL at 05:09

## 2023-04-06 RX ADMIN — Medication 500 MILLIGRAM(S): at 21:09

## 2023-04-06 RX ADMIN — MEROPENEM 100 MILLIGRAM(S): 1 INJECTION INTRAVENOUS at 13:01

## 2023-04-06 RX ADMIN — LEVETIRACETAM 1500 MILLIGRAM(S): 250 TABLET, FILM COATED ORAL at 05:08

## 2023-04-06 RX ADMIN — LEVETIRACETAM 1500 MILLIGRAM(S): 250 TABLET, FILM COATED ORAL at 17:11

## 2023-04-06 RX ADMIN — MORPHINE SULFATE 2 MILLIGRAM(S): 50 CAPSULE, EXTENDED RELEASE ORAL at 05:10

## 2023-04-06 NOTE — PROGRESS NOTE ADULT - SUBJECTIVE AND OBJECTIVE BOX
JOSEPH OBRIEN 67y Female  MRN#: [id number]   Hospital Day: 33d    SUBJECTIVE  Patient is a 67y old Female who presents with a chief complaint of Altered Mental status  Currently admitted to medicine with the primary diagnosis of Stroke      INTERVAL HPI AND OVERNIGHT EVENTS:  Patient was examined and seen at bedside. This morning she is resting comfortably in bed and reports no issues or overnight events.    OBJECTIVE  PAST MEDICAL & SURGICAL HISTORY    ALLERGIES:  Allergy Status Unknown    MEDICATIONS:  STANDING MEDICATIONS  acetaminophen  Suppository .. 325 milliGRAM(s) Rectal once  ascorbic acid 500 milliGRAM(s) Oral daily  aspirin  chewable 81 milliGRAM(s) Oral daily  atorvastatin 40 milliGRAM(s) Oral at bedtime  baclofen 5 milliGRAM(s) Oral every 12 hours  clopidogrel Tablet 75 milliGRAM(s) Oral daily  collagenase Ointment 1 Application(s) Topical two times a day  Dakins Solution - 1/2 Strength 1 Application(s) Topical two times a day  diatrizoate meglumine/diatrizoate sodium. 30 milliLiter(s) Oral once  enoxaparin Injectable 40 milliGRAM(s) SubCutaneous every 24 hours  gabapentin 100 milliGRAM(s) Oral three times a day  levETIRAcetam  Solution 1500 milliGRAM(s) Oral two times a day  meropenem  IVPB 1000 milliGRAM(s) IV Intermittent every 8 hours  midodrine. 10 milliGRAM(s) Oral three times a day  multivitamin/minerals 1 Tablet(s) Oral daily  sodium chloride 0.9%. 1000 milliLiter(s) IV Continuous <Continuous>  valproic  acid Syrup 500 milliGRAM(s) Oral three times a day  vancomycin  IVPB 1250 milliGRAM(s) IV Intermittent every 12 hours  vitamin A &amp; D Ointment 1 Application(s) Topical two times a day    PRN MEDICATIONS  acetaminophen     Tablet .. 650 milliGRAM(s) Oral every 6 hours PRN  aluminum hydroxide/magnesium hydroxide/simethicone Suspension 30 milliLiter(s) Oral every 6 hours PRN  morphine  - Injectable 2 milliGRAM(s) IV Push every 6 hours PRN      VITAL SIGNS: Last 24 Hours  T(C): 37.1 (06 Apr 2023 06:36), Max: 38.8 (06 Apr 2023 04:37)  T(F): 98.8 (06 Apr 2023 06:36), Max: 101.8 (06 Apr 2023 04:37)  HR: 113 (06 Apr 2023 04:45) (108 - 113)  BP: 118/75 (06 Apr 2023 04:45) (118/75 - 128/63)  BP(mean): --  RR: 20 (06 Apr 2023 04:45) (18 - 20)  SpO2: 100% (06 Apr 2023 07:17) (99% - 100%)    LABS:                        7.2    17.69 )-----------( 621      ( 06 Apr 2023 06:06 )             22.5     04-06    138  |  101  |  6<L>  ----------------------------<  114<H>  4.4   |  23  |  <0.5<L>    Ca    8.8      06 Apr 2023 06:06  Mg     1.9     04-06    TPro  5.5<L>  /  Alb  2.3<L>  /  TBili  <0.2  /  DBili  x   /  AST  18  /  ALT  7   /  AlkPhos  78  04-06          Troponin T, Serum: 0.03 ng/mL *HH* (04-05-23 @ 21:33)  Troponin T, Serum: 0.03 ng/mL *HH* (04-05-23 @ 12:07)      Culture - Tissue with Gram Stain (collected 04 Apr 2023 08:40)  Source: .Tissue sacrum  Gram Stain (04 Apr 2023 21:29):    No polymorphonuclear leukocytes per low power field    Numerous Gram Positive Cocci in Pairs and Chains per oil power field    Numerous Gram Negative Rods per oil power field  Preliminary Report (05 Apr 2023 17:50):    Numerous Proteus mirabilis    Numerous Morganella morganii    Moderate Enterococcus raffinosus    Culture - Blood (collected 04 Apr 2023 06:35)  Source: .Blood Blood  Preliminary Report (05 Apr 2023 14:02):    No growth to date.      CARDIAC MARKERS ( 05 Apr 2023 21:33 )  x     / 0.03 ng/mL / x     / x     / x      CARDIAC MARKERS ( 05 Apr 2023 12:07 )  x     / 0.03 ng/mL / x     / x     / x            PHYSICAL EXAM:  CONSTITUTIONAL: No acute distress, laying in bed this AM - uncomfortable 2/2 pain in sacral area  HEENT: Atraumatic, normocephalic, neck supple, moist mucous membranes  PULMONARY: Clear to auscultation bilaterally  CARDIOVASCULAR: Regular rate and rhythm  GASTROINTESTINAL: Soft, non-tender, non-distended; bowel sounds present  MUSCULOSKELETAL: no LE edema, 2+ pulses b/l LE, left LE in CAM boot  NEURO: L hemiparesis, B/L UE spasticity, contracted tone, nods Yes/No  SKIN: Sacral ulcer - wound wrapped

## 2023-04-06 NOTE — PROGRESS NOTE ADULT - SUBJECTIVE AND OBJECTIVE BOX
JOSEPH OBRIEN  67y, Female  Allergy: Allergy Status Unknown      LOS  33d    CHIEF COMPLAINT: Altered Mental status (04 Apr 2023 11:53)      INTERVAL EVENTS/HPI  - fever  - T(F): , Max: 101.8 (04-06-23 @ 04:37)  - Tolerating medication  - WBC Count: 17.69 (04-06-23 @ 06:06)  WBC Count: 17.48 (04-05-23 @ 12:07)     - Creatinine, Serum: <0.5 (04-05-23 @ 12:07)       ROS  unable to obtain history secondary to patient's mental status and/or sedation     VITALS:  T(F): 98.8, Max: 101.8 (04-06-23 @ 04:37)  HR: 113  BP: 118/75  RR: 20Vital Signs Last 24 Hrs  T(C): 37.1 (06 Apr 2023 06:36), Max: 38.8 (06 Apr 2023 04:37)  T(F): 98.8 (06 Apr 2023 06:36), Max: 101.8 (06 Apr 2023 04:37)  HR: 113 (06 Apr 2023 04:45) (108 - 113)  BP: 118/75 (06 Apr 2023 04:45) (118/75 - 128/63)  BP(mean): --  RR: 20 (06 Apr 2023 04:45) (18 - 20)  SpO2: 100% (06 Apr 2023 07:17) (99% - 100%)    Parameters below as of 06 Apr 2023 07:17  Patient On (Oxygen Delivery Method): room air        PHYSICAL EXAM:  ***    FH: Non-contributory  Social Hx: Non-contributory    TESTS & MEASUREMENTS:                        7.2    17.69 )-----------( 621      ( 06 Apr 2023 06:06 )             22.5     04-05    136  |  98  |  8<L>  ----------------------------<  105<H>  4.6   |  26  |  <0.5<L>    Ca    9.0      05 Apr 2023 12:07  Mg     2.0     04-05    TPro  6.0  /  Alb  2.6<L>  /  TBili  0.2  /  DBili  x   /  AST  15  /  ALT  <5  /  AlkPhos  78  04-05      LIVER FUNCTIONS - ( 05 Apr 2023 12:07 )  Alb: 2.6 g/dL / Pro: 6.0 g/dL / ALK PHOS: 78 U/L / ALT: <5 U/L / AST: 15 U/L / GGT: x               Culture - Tissue with Gram Stain (collected 04-04-23 @ 08:40)  Source: .Tissue sacrum  Gram Stain (04-04-23 @ 21:29):    No polymorphonuclear leukocytes per low power field    Numerous Gram Positive Cocci in Pairs and Chains per oil power field    Numerous Gram Negative Rods per oil power field  Preliminary Report (04-05-23 @ 17:50):    Numerous Proteus mirabilis    Numerous Morganella morganii    Moderate Enterococcus raffinosus    Culture - Blood (collected 04-04-23 @ 06:35)  Source: .Blood Blood  Preliminary Report (04-05-23 @ 14:02):    No growth to date.    Culture - Blood (collected 03-28-23 @ 07:32)  Source: .Blood None  Final Report (04-02-23 @ 18:00):    No Growth Final    Culture - Blood (collected 03-27-23 @ 10:39)  Source: .Blood Blood-Peripheral  Final Report (04-01-23 @ 18:00):    No Growth Final    Culture - Urine (collected 03-21-23 @ 11:57)  Source: Catheterized Catheterized  Final Report (03-22-23 @ 17:54):    No growth    Culture - Blood (collected 03-20-23 @ 22:07)  Source: .Blood Blood  Final Report (03-26-23 @ 02:00):    No Growth Final    Culture - Blood (collected 03-09-23 @ 11:46)  Source: .Blood None  Final Report (03-14-23 @ 23:00):    No Growth Final            INFECTIOUS DISEASES TESTING  Vancomycin Level, Trough: 8.0 (04-05-23 @ 17:56)  Rapid RVP Result: NotDetec (04-05-23 @ 14:26)  COVID-19 PCR: NotDetec (04-02-23 @ 18:03)  Rapid RVP Result: NotDetec (03-28-23 @ 09:55)  Procalcitonin, Serum: 0.05 (03-20-23 @ 22:07)  Rapid RVP Result: NotDetec (03-20-23 @ 21:25)  COVID-19 PCR: NotDetec (03-19-23 @ 12:47)  COVID-19 PCR: NotDetec (03-15-23 @ 16:23)  strept    INFLAMMATORY MARKERS      RADIOLOGY & ADDITIONAL TESTS:  I have personally reviewed the last available Chest xray  CXR      CT      CARDIOLOGY TESTING  12 Lead ECG:   Ventricular Rate 114 BPM    Atrial Rate 114 BPM    P-R Interval 122 ms    QRS Duration 62 ms    Q-T Interval 312 ms    QTC Calculation(Bazett) 430 ms    P Axis 43 degrees    R Axis 7 degrees    T Axis 43 degrees    Diagnosis Line Sinus tachycardia  Otherwise normal ECG    Confirmed by NANY ANDINO MDFIM [id number]) on 4/5/2023 8:47:49 AM (04-05-23 @ 07:28)  12 Lead ECG:   Ventricular Rate 103 BPM    Atrial Rate 103 BPM    P-R Interval 120 ms    QRS Duration 66 ms    Q-T Interval 324 ms    QTC Calculation(Bazett) 424 ms    P Axis 40 degrees    R Axis -2 degrees    T Axis 43 degrees    Diagnosis Line Sinus tachycardia  Otherwise normal ECG    Confirmed by Shanti Sage MD ([id number]) on 4/4/2023 10:25:13 AM (04-03-23 @ 11:37)      MEDICATIONS  acetaminophen  Suppository .. 325 Rectal once  ascorbic acid 500 Oral daily  aspirin  chewable 81 Oral daily  atorvastatin 40 Oral at bedtime  baclofen 5 Oral every 12 hours  clopidogrel Tablet 75 Oral daily  collagenase Ointment 1 Topical two times a day  Dakins Solution - 1/2 Strength 1 Topical two times a day  enoxaparin Injectable 40 SubCutaneous every 24 hours  gabapentin 100 Oral three times a day  levETIRAcetam  Solution 1500 Oral two times a day  meropenem  IVPB 1000 IV Intermittent every 8 hours  midodrine. 10 Oral three times a day  multivitamin/minerals 1 Oral daily  sodium chloride 0.9%. 1000 IV Continuous <Continuous>  valproic  acid Syrup 500 Oral three times a day  vancomycin  IVPB 1250 IV Intermittent every 12 hours  vitamin A &amp; D Ointment 1 Topical two times a day      WEIGHT  Weight (kg): 68.3 (03-08-23 @ 10:12)  Creatinine, Serum: <0.5 mg/dL (04-05-23 @ 12:07)      ANTIBIOTICS:  meropenem  IVPB 1000 milliGRAM(s) IV Intermittent every 8 hours  vancomycin  IVPB 1250 milliGRAM(s) IV Intermittent every 12 hours      All available historical records have been reviewed       JOSEPH OBRIEN  67y, Female  Allergy: Allergy Status Unknown      LOS  33d    CHIEF COMPLAINT: Altered Mental status (04 Apr 2023 11:53)      INTERVAL EVENTS/HPI  - fever  - denies any complaints  - T(F): , Max: 101.8 (04-06-23 @ 04:37)  - Tolerating medication  - WBC Count: 17.69 (04-06-23 @ 06:06)  WBC Count: 17.48 (04-05-23 @ 12:07)     - Creatinine, Serum: <0.5 (04-05-23 @ 12:07)       ROS  unable to obtain history secondary to patient's mental status and/or sedation     VITALS:  T(F): 98.8, Max: 101.8 (04-06-23 @ 04:37)  HR: 113  BP: 118/75  RR: 20Vital Signs Last 24 Hrs  T(C): 37.1 (06 Apr 2023 06:36), Max: 38.8 (06 Apr 2023 04:37)  T(F): 98.8 (06 Apr 2023 06:36), Max: 101.8 (06 Apr 2023 04:37)  HR: 113 (06 Apr 2023 04:45) (108 - 113)  BP: 118/75 (06 Apr 2023 04:45) (118/75 - 128/63)  BP(mean): --  RR: 20 (06 Apr 2023 04:45) (18 - 20)  SpO2: 100% (06 Apr 2023 07:17) (99% - 100%)    Parameters below as of 06 Apr 2023 07:17  Patient On (Oxygen Delivery Method): room air        PHYSICAL EXAM:  Gen: NAD, resting in bed  HEENT: Normocephalic, atraumatic  Neck: supple, no lymphadenopathy  CV: Regular rate & regular rhythm  Lungs: decreased BS at bases, no fremitus  Abdomen: Soft, BS present  Ext: Warm, well perfused  Neuro: L deficits  Sacrum deferred   Skin: no rash, no erythema  Lines: no phlebitis     FH: Non-contributory  Social Hx: Non-contributory    TESTS & MEASUREMENTS:                        7.2    17.69 )-----------( 621      ( 06 Apr 2023 06:06 )             22.5     04-05    136  |  98  |  8<L>  ----------------------------<  105<H>  4.6   |  26  |  <0.5<L>    Ca    9.0      05 Apr 2023 12:07  Mg     2.0     04-05    TPro  6.0  /  Alb  2.6<L>  /  TBili  0.2  /  DBili  x   /  AST  15  /  ALT  <5  /  AlkPhos  78  04-05      LIVER FUNCTIONS - ( 05 Apr 2023 12:07 )  Alb: 2.6 g/dL / Pro: 6.0 g/dL / ALK PHOS: 78 U/L / ALT: <5 U/L / AST: 15 U/L / GGT: x               Culture - Tissue with Gram Stain (collected 04-04-23 @ 08:40)  Source: .Tissue sacrum  Gram Stain (04-04-23 @ 21:29):    No polymorphonuclear leukocytes per low power field    Numerous Gram Positive Cocci in Pairs and Chains per oil power field    Numerous Gram Negative Rods per oil power field  Preliminary Report (04-05-23 @ 17:50):    Numerous Proteus mirabilis    Numerous Morganella morganii    Moderate Enterococcus raffinosus    Culture - Blood (collected 04-04-23 @ 06:35)  Source: .Blood Blood  Preliminary Report (04-05-23 @ 14:02):    No growth to date.    Culture - Blood (collected 03-28-23 @ 07:32)  Source: .Blood None  Final Report (04-02-23 @ 18:00):    No Growth Final    Culture - Blood (collected 03-27-23 @ 10:39)  Source: .Blood Blood-Peripheral  Final Report (04-01-23 @ 18:00):    No Growth Final    Culture - Urine (collected 03-21-23 @ 11:57)  Source: Catheterized Catheterized  Final Report (03-22-23 @ 17:54):    No growth    Culture - Blood (collected 03-20-23 @ 22:07)  Source: .Blood Blood  Final Report (03-26-23 @ 02:00):    No Growth Final    Culture - Blood (collected 03-09-23 @ 11:46)  Source: .Blood None  Final Report (03-14-23 @ 23:00):    No Growth Final            INFECTIOUS DISEASES TESTING  Vancomycin Level, Trough: 8.0 (04-05-23 @ 17:56)  Rapid RVP Result: NotDetec (04-05-23 @ 14:26)  COVID-19 PCR: NotDetec (04-02-23 @ 18:03)  Rapid RVP Result: NotDetec (03-28-23 @ 09:55)  Procalcitonin, Serum: 0.05 (03-20-23 @ 22:07)  Rapid RVP Result: NotDetec (03-20-23 @ 21:25)  COVID-19 PCR: NotDetec (03-19-23 @ 12:47)  COVID-19 PCR: NotDetec (03-15-23 @ 16:23)  strept    INFLAMMATORY MARKERS      RADIOLOGY & ADDITIONAL TESTS:  I have personally reviewed the last available Chest xray  CXR      CT      CARDIOLOGY TESTING  12 Lead ECG:   Ventricular Rate 114 BPM    Atrial Rate 114 BPM    P-R Interval 122 ms    QRS Duration 62 ms    Q-T Interval 312 ms    QTC Calculation(Bazett) 430 ms    P Axis 43 degrees    R Axis 7 degrees    T Axis 43 degrees    Diagnosis Line Sinus tachycardia  Otherwise normal ECG    Confirmed by NANY ANDINO MDFIM ([id number]) on 4/5/2023 8:47:49 AM (04-05-23 @ 07:28)  12 Lead ECG:   Ventricular Rate 103 BPM    Atrial Rate 103 BPM    P-R Interval 120 ms    QRS Duration 66 ms    Q-T Interval 324 ms    QTC Calculation(Bazett) 424 ms    P Axis 40 degrees    R Axis -2 degrees    T Axis 43 degrees    Diagnosis Line Sinus tachycardia  Otherwise normal ECG    Confirmed by Shanti Sage MD ([id number]) on 4/4/2023 10:25:13 AM (04-03-23 @ 11:37)      MEDICATIONS  acetaminophen  Suppository .. 325 Rectal once  ascorbic acid 500 Oral daily  aspirin  chewable 81 Oral daily  atorvastatin 40 Oral at bedtime  baclofen 5 Oral every 12 hours  clopidogrel Tablet 75 Oral daily  collagenase Ointment 1 Topical two times a day  Dakins Solution - 1/2 Strength 1 Topical two times a day  enoxaparin Injectable 40 SubCutaneous every 24 hours  gabapentin 100 Oral three times a day  levETIRAcetam  Solution 1500 Oral two times a day  meropenem  IVPB 1000 IV Intermittent every 8 hours  midodrine. 10 Oral three times a day  multivitamin/minerals 1 Oral daily  sodium chloride 0.9%. 1000 IV Continuous <Continuous>  valproic  acid Syrup 500 Oral three times a day  vancomycin  IVPB 1250 IV Intermittent every 12 hours  vitamin A &amp; D Ointment 1 Topical two times a day      WEIGHT  Weight (kg): 68.3 (03-08-23 @ 10:12)  Creatinine, Serum: <0.5 mg/dL (04-05-23 @ 12:07)      ANTIBIOTICS:  meropenem  IVPB 1000 milliGRAM(s) IV Intermittent every 8 hours  vancomycin  IVPB 1250 milliGRAM(s) IV Intermittent every 12 hours      All available historical records have been reviewed

## 2023-04-06 NOTE — PROGRESS NOTE ADULT - ASSESSMENT
ASSESSMENT   66 y/o woman with PMH of Right MCA stroke with left sided weakness was brought to the ED via EMS. In the ED, she had altered mental status and sepsis, with new onset fevers.     IMPRESSION  Complicated and prolonged hospital course, admitted 3/3 for R MCA CVA, ID consulted 3/22 for Fever, ID reconsulted for leukocytosis  #Leukocytosis; Afebrile since 3/21, Tm 100.3    Possible infected decub   4/4 WCX   Numerous Proteus mirabilis    Numerous Morganella morganii    Moderate Enterococcus raffinosus  WBC elevated to 21 4/4    4/3 UA without significant pyuria     3/28 BCX NGTD     3/27 BCX NGTD   < from: Xray Chest 1 View- PORTABLE-Routine (Xray Chest 1 View- PORTABLE-Routine .) (03.27.23 @ 11:39) >No focal consolidation, pneumothorax or pleural effusion.  #3/21 Tm 101.7 P>90 low BP- Sepsis   WBC 19 on admission, UCX 3/4 panS ecoli, UA , s/p ceftriaxone 3/4-6, cefepime 3/8  Exam at this time Skin: two approx. 3x2 cm wounds to sacral region with pink moist base. No active bleeding, purulence or surrounding erythema   3/21 UCX NG   3/20 BCX NGTD   3/20 COVID/RVP NEGATIVE   CXR no PNA  No diarrhea  No obvious phlebitis  Recent MRI stable  cannot obtain further history from the patient secondary to altered mental status or sedation   Creatinine, Serum: <0.5 mg/dL (04.04.23 @ 06:35)        RECOMMENDATIONS  - f/u WCX  - Vanc 1250g q12h IV  - Please check vanc trough 30 min prior to 4th dose. Goal trough 15-20. If trough results > 20, please HOLD further Vanco dosing and order AM Random Vanco level   - Luciana 1g q8h IV  - If continues to spike, recommend CT CAP   - Burn f/u appreciated  - Guarded prognosis GOC    Please call or message on [employer] Teams if with any questions.      ASSESSMENT   66 y/o woman with PMH of Right MCA stroke with left sided weakness was brought to the ED via EMS. In the ED, she had altered mental status and sepsis, with new onset fevers.     IMPRESSION  Complicated and prolonged hospital course, admitted 3/3 for R MCA CVA, ID consulted 3/22 for Fever, ID reconsulted for leukocytosis  #Leukocytosis; Afebrile since 3/21, Tm 100.3    Suspected infected decub   4/4 WCX   Numerous Proteus mirabilis    Numerous Morganella morganii    Moderate Enterococcus raffinosus  WBC elevated to 21 4/4    4/3 UA without significant pyuria     3/28 BCX NGTD     3/27 BCX NGTD   < from: Xray Chest 1 View- PORTABLE-Routine (Xray Chest 1 View- PORTABLE-Routine .) (03.27.23 @ 11:39) >No focal consolidation, pneumothorax or pleural effusion.  #3/21 Tm 101.7 P>90 low BP- Sepsis   WBC 19 on admission, UCX 3/4 panS ecoli, UA , s/p ceftriaxone 3/4-6, cefepime 3/8  Exam at this time Skin: two approx. 3x2 cm wounds to sacral region with pink moist base. No active bleeding, purulence or surrounding erythema   3/21 UCX NG   3/20 BCX NGTD   3/20 COVID/RVP NEGATIVE   CXR no PNA  No diarrhea  No obvious phlebitis  Recent MRI stable  cannot obtain further history from the patient secondary to altered mental status or sedation   Creatinine, Serum: <0.5 mg/dL (04.04.23 @ 06:35)        RECOMMENDATIONS  - f/u WCX  - Vanc 1250g q12h IV  - Please check vanc trough 30 min prior to 4th dose. Goal trough 15-20. If trough results > 20, please HOLD further Vanco dosing and order AM Random Vanco level   - Luciana 1g q8h IV  - If continues to spike, recommend CT CAP   - Burn f/u appreciated  - Guarded prognosis GOC    Please call or message on [employer] Teams if with any questions.

## 2023-04-06 NOTE — PROGRESS NOTE ADULT - ATTENDING COMMENTS
Patient is a 66 y/o woman with PMH of Right MCA stroke with left sided weakness was brought to the ED via EMS. In the ED, she had altered mental status and sepsis. Patient has a h/o prior hospitalization  in a hospital in [facility] last fall due to persistent metabolic encephalopathy for months     #  Altered Mental Status likely due to Metabolic Encephalopathy from UTI, sepsis and seizure in the context of acute and chronic stroke with left hemiparesis -improving  s/p course of abx for E. coli UTI  MRI of brain: Small patchy subcortical infarct in the right frontal lobe. Redemonstrated chronic infarct in the right MCA territory.  Mild chronic microvascular ischemic changes.  CTA of head/neck: no evidence of occlusion, aneurysm or stenosis  s/p rapid response for seizure- s/p EEG and video EEG: continue on Depakote 500 mg TID and Keppra 1000 mg BID  seizure precautions  Neuro f/u appreciated - stroke likely due to hypotension - has diminutive caliber of right M1 - avoid hypotension  CW ASA, Plavix (started on March 17th) and statin for new stroke -  DAPT for 21 days (From March 17th); followed by ASA 81 mg daily - f/u in stroke clinic in 4 weeks per neurology  pt with UE spasticity: MRI of C spine: Significantly motion limited study. No gross cord compression or spinal cord edema demonstrated. Multilevel small disc bulges. - continue baclofen 5mg bid  behavioral health eval appreciated: pt does not have the capacity to choose a Health care agent  -clinically patient is  following verbal commands, persistent left sided paralysis, dysarthria , oropharyngeal dysphagia , patient is in chronic bed confinement status.    # Seizures- 3/20- patient had gaze deviation to the left- resolved, Neuro f/up rec- increased Keppra dose tx.   -outpatient Neuro f/up     # New onset fevers   # Unstagable sacral wound - Per Burn eval: LWC only.  wbc trending up  started Vanco and cefepime  4/6: Vanco 1250 Q12 (trough 4/7 PM) & Luciana. CT Abdo/Chest since still febrile.     #Left avulsion fracture of medial malleolus  evaluated by podiatry - no surgical intervention - WB with CAM boot  continue PT/fall precautions, pain control    # Sinus tachycardia -resolved  now resolved - attributed to pain  EKG reviewed, TSH WNL    #Left cephalic vein thrombosis - s/p warm compress  b/l UE duplex neg for any DVT.     # Leucocytosis >> with foul smelling sacral decub     >> will f/u Burn for possible intervention.     # Anemia - normocytic  labs reviewed - Hgb in 9-10 range and now stable    # DVT prophylaxis    full code, overall patient's prognosis guarded   Family discussion: yes, medical team

## 2023-04-06 NOTE — CHART NOTE - NSCHARTNOTEFT_GEN_A_CORE
Spoke with patient daughter Siobhan [phone] about patient's progress and pending tests (CT Chest/Abd/Pelvis and ongoing IV Abx) medical update given and she is aware and agreeable with plan of care

## 2023-04-06 NOTE — PROGRESS NOTE ADULT - ASSESSMENT
66 y/o woman with PMH of Right MCA stroke with left sided weakness was brought to the ED via EMS. In the ED, she had altered mental status and sepsis.    #Sepsis criteria met  #Sacral wound growing numerous Gram Positive Cocci in Pairs and Chains and numerous Gram Negative Rods  - 3/26 - febrile, tachy Sepsis w/up negative (CXR clear, BCx NGTD, UA neg)  - ID recs appreciated - monitor off ABx - repeat COVID if febrile  - Repeat RVP negative  - 4/2 sacral wound noted to be malodorous - low grade fever overnight as well as leukocytosis   - Burn eval appreciated  - Wound growing gram + cocci in pairs/chains as well as gram neg rods  - ID recs appreciated - Start Vanc, Cefepime, Flagyl - 4/4  - Pt c/o pain in sacral area started morphine 2g q6H if still in pain can increase to 4g q6H  - Pt noted to have CP overnight - EKG sinus tach - no CP complaints this AM but is complaining of pain in sacral area    #Seizures  #Acute R Frontal Lobe Infarct likely 2/ hypotension   #Chronic R MCA infarct  - per chart: at baseline she is alert, able to communicate and is oriented. Ambulation at baseline: uses a wheelchair. She was in a hospital in [facility] last fall and was lethargic for months  - 3/8: s/p rapid response for L gaze preference  - 3/20: Pt had event with L gaze preference during SS eval, pt made NPO. Examined by resident, no gaze preference present any longer. Pt responsive but less than baseline. Developed seizures post-event. Septic workup obtained. CXR appears clear, rest pending. --> 3/21 Pt more responsive this AM, expressing herself and in line with current baseline.  - Depakote level (3/4) 87 (WNL) -> (3/21) 74 (WNL)   - CTH/CTA of head/neck (3/4): no acute pathology, no evidence of occlusion, aneurysm or stenosis  - EEG (3/8) and video EEG (3/9): Focal slowing  - MRI Brain (3/15): new subcortical infarct in the right frontal lobe. Re-demonstrated chronic infarct in the right MCA territory.  - MRI of C spine (3/16): Significantly motion limited study. No gross cord compression or spinal cord edema demonstrated. Multilevel small disc bulges.  - Neuro eval'd 3/8-3/20: For L gaze preference/stroke, OP med review: on low dose Lamictal and Depakote in addition to Keppra (possible mood disorder/ seizures), Her MRI of brain showing subcortical infarct within same M1 territory. The casue of stroke is most likely hypotension as she has a diminutive Rt M1. However, MRI findings doesn't explain her B/L upper extremity spasticity. So, it is important to rule out cervical cord pathology. DAPT for 21 days followed by ASA 81 mg daily, Increased keppra post 3/20 possible seizure event  - BH eval'd 3/15: Delirium; pt does not have the capacity to choose a Health care agent  - SS re-juliette 3/21: Minced and moist, 1:1 feeds, thin liquids through straw  - Seizure precautions  - C/w Depakote 500mg TID and Keppra to 1500mg BID   - C/w baclofen 5mg bid (for UE spasticity)  - C/w ASA, Plavix (3/17-4/6), and high dose statin for new stroke - DAPT for 21 days followed by ASA 81 mg daily  - C/w Midodrine 10mg TID  - OP stroke clinic in 4 weeks after d/c per neurology    #Metabolic Encephalopathy from UTI  #Febrile 3/20  #Unstageable Sacral Ulcer  - 3/20: Pt became febrile after possible seizure episode, Cefepime, levaquin (x1 3/20), vancomycin  - 3/20 Septic workup: WBC (3/21) 12.26 (up from 8.23), BCx (3/20) NGTD, Procal (3/20) 0.05, RVP (3/20) COVID (-), Cortisol level, Lactate (3/21) WNL, UA nitrite (-), LEC (-), pending WBCs/Bacteria, UCx (3/21) NGTD  - Duplex LUE (3/10): (-) DVT (+) superficial cephalic vein thrombosis  - Duplex BLLE (3/7): (-) DVT  - s/p Vanc (3/4x1, 3/20-3/22), Cefepime (x1 3/4, x1 3/8), Ceftriaxone (3/4-3/6) (for UTI)  - Duplex BLLE: negative for DVT  - ID eval'd 3/23  - Burn eval'd 3/22 - No intervention, wound care recs noted  - Cefepime completed 3/24 (per ID)    #Left avulsion fracture of medial malleolus  - Duplex BL LE (3/7): (-) for DVT  - CT Foot (3/11): acute minimally displaced avulsion fracture at the medial malleolus  - Podiatry following for foot pain: WBAT w/ surgical shoe, ankle brace, PT  - fall precautions  - pain control regimen - if pain uncontrolled consider increasing percocet to 2tabs.  - C/w gabapentin at 100mg TID (Lowered dose from home, increase as pt tolerates)    #Sinus tachycardia - now resolved - attributed to pain  - TSH (3/8) WNL, - EKG reviewed    #Left cephalic vein thrombosis  - Duplex LUE (3/10): No DVT however there is superficial thrombosis noted in the left cephalic vein  - s/p warm compress  - Repeat duplex ordered 3/27 - negative for DVT    #Anemia - normocytic  - possibly due to frequent phlebotomy, no signs of active bleed - monitor    #Misc  - DVT ppx - lovenox  - Diet: Soft and bite sized (SS 3/7)  - full code, overall guarded prognosis   66 y/o woman with PMH of Right MCA stroke with left sided weakness was brought to the ED via EMS. In the ED, she had altered mental status and sepsis.    #Sepsis - new onset 4/2  #Sacral wound growing numerous Gram Positive Cocci in Pairs and Chains and numerous Gram Negative Rods  - 3/26 - febrile, tachy Sepsis w/up negative (CXR clear, BCx NGTD, UA neg)  - ID recs appreciated - monitor off ABx - repeat COVID if febrile  - Repeat RVP negative  - 4/2 sacral wound noted to be malodorous - low grade fever overnight as well as leukocytosis   - Burn eval appreciated  - Wound growing gram + cocci in pairs/chains as well as gram neg rods  - Prelim: Proteus mirabilis, Morganella morganii, Enterococcus raffinosus  - ID recs appreciated - Start Vanc, Cefepime, Flagyl - 4/4  - Pt c/o pain in sacral area started morphine 2g q6H if still in pain can increase to 4g q6H  - Pt noted to have CP 4/4 overnight - EKG sinus tach - no CP complaints in AM but is complaining of pain in sacral area  - Burn recs appreciated  - Pending: CT Chest/Abd/Pelvis w/w/o Ct    #Seizures  #Acute R Frontal Lobe Infarct likely 2/ hypotension   #Chronic R MCA infarct  - per chart: at baseline she is alert, able to communicate and is oriented. Ambulation at baseline: uses a wheelchair. She was in a hospital in [facility] last fall and was lethargic for months  - 3/8: s/p rapid response for L gaze preference  - 3/20: Pt had event with L gaze preference during SS eval, pt made NPO. Examined by resident, no gaze preference present any longer. Pt responsive but less than baseline. Developed seizures post-event. Septic workup obtained. CXR appears clear, rest pending. --> 3/21 Pt more responsive this AM, expressing herself and in line with current baseline.  - Depakote level (3/4) 87 (WNL) -> (3/21) 74 (WNL)   - CTH/CTA of head/neck (3/4): no acute pathology, no evidence of occlusion, aneurysm or stenosis  - EEG (3/8) and video EEG (3/9): Focal slowing  - MRI Brain (3/15): new subcortical infarct in the right frontal lobe. Re-demonstrated chronic infarct in the right MCA territory.  - MRI of C spine (3/16): Significantly motion limited study. No gross cord compression or spinal cord edema demonstrated. Multilevel small disc bulges.  - Neuro eval'd 3/8-3/20: For L gaze preference/stroke, OP med review: on low dose Lamictal and Depakote in addition to Keppra (possible mood disorder/ seizures), Her MRI of brain showing subcortical infarct within same M1 territory. The casue of stroke is most likely hypotension as she has a diminutive Rt M1. However, MRI findings doesn't explain her B/L upper extremity spasticity. So, it is important to rule out cervical cord pathology. DAPT for 21 days followed by ASA 81 mg daily, Increased keppra post 3/20 possible seizure event  - BH eval'd 3/15: Delirium; pt does not have the capacity to choose a Health care agent  - SS re-juliette 3/21: Minced and moist, 1:1 feeds, thin liquids through straw  - Seizure precautions  - C/w Depakote 500mg TID and Keppra to 1500mg BID   - C/w baclofen 5mg bid (for UE spasticity)  - C/w ASA, Plavix (3/17-4/6), and high dose statin for new stroke - DAPT for 21 days followed by ASA 81 mg daily  - C/w Midodrine 10mg TID  - OP stroke clinic in 4 weeks after d/c per neurology    #Metabolic Encephalopathy from UTI  #Febrile 3/20  #Unstageable Sacral Ulcer  - 3/20: Pt became febrile after possible seizure episode, Cefepime, levaquin (x1 3/20), vancomycin  - 3/20 Septic workup: WBC (3/21) 12.26 (up from 8.23), BCx (3/20) NGTD, Procal (3/20) 0.05, RVP (3/20) COVID (-), Cortisol level, Lactate (3/21) WNL, UA nitrite (-), LEC (-), pending WBCs/Bacteria, UCx (3/21) NGTD  - Duplex LUE (3/10): (-) DVT (+) superficial cephalic vein thrombosis  - Duplex BLLE (3/7): (-) DVT  - s/p Vanc (3/4x1, 3/20-3/22), Cefepime (x1 3/4, x1 3/8), Ceftriaxone (3/4-3/6) (for UTI)  - Duplex BLLE: negative for DVT  - ID eval'd 3/23  - Burn eval'd 3/22 - No intervention, wound care recs noted  - Cefepime completed 3/24 (per ID)    #Left avulsion fracture of medial malleolus  - Duplex BL LE (3/7): (-) for DVT  - CT Foot (3/11): acute minimally displaced avulsion fracture at the medial malleolus  - Podiatry following for foot pain: WBAT w/ surgical shoe, ankle brace, PT  - fall precautions  - pain control regimen - if pain uncontrolled consider increasing percocet to 2tabs.  - C/w gabapentin at 100mg TID (Lowered dose from home, increase as pt tolerates)    #Sinus tachycardia - now resolved - attributed to pain  - TSH (3/8) WNL, - EKG reviewed    #Left cephalic vein thrombosis  - Duplex LUE (3/10): No DVT however there is superficial thrombosis noted in the left cephalic vein  - s/p warm compress  - Repeat duplex ordered 3/27 - negative for DVT    #Anemia - normocytic  - possibly due to frequent phlebotomy, no signs of active bleed - monitor    #Misc  - DVT ppx - lovenox  - Diet: Soft and bite sized (SS 3/7)  - full code, overall guarded prognosis

## 2023-04-07 LAB
ALBUMIN SERPL ELPH-MCNC: 2.5 G/DL — LOW (ref 3.5–5.2)
ALP SERPL-CCNC: 94 U/L — SIGNIFICANT CHANGE UP (ref 30–115)
ALT FLD-CCNC: 6 U/L — SIGNIFICANT CHANGE UP (ref 0–41)
ANION GAP SERPL CALC-SCNC: 9 MMOL/L — SIGNIFICANT CHANGE UP (ref 7–14)
AST SERPL-CCNC: 15 U/L — SIGNIFICANT CHANGE UP (ref 0–41)
BASOPHILS # BLD AUTO: 0.06 K/UL — SIGNIFICANT CHANGE UP (ref 0–0.2)
BASOPHILS NFR BLD AUTO: 0.4 % — SIGNIFICANT CHANGE UP (ref 0–1)
BILIRUB SERPL-MCNC: <0.2 MG/DL — SIGNIFICANT CHANGE UP (ref 0.2–1.2)
BUN SERPL-MCNC: 7 MG/DL — LOW (ref 10–20)
CALCIUM SERPL-MCNC: 9 MG/DL — SIGNIFICANT CHANGE UP (ref 8.4–10.5)
CHLORIDE SERPL-SCNC: 101 MMOL/L — SIGNIFICANT CHANGE UP (ref 98–110)
CO2 SERPL-SCNC: 28 MMOL/L — SIGNIFICANT CHANGE UP (ref 17–32)
CREAT SERPL-MCNC: <0.5 MG/DL — LOW (ref 0.7–1.5)
EGFR: 117 ML/MIN/1.73M2 — SIGNIFICANT CHANGE UP
EOSINOPHIL # BLD AUTO: 0.3 K/UL — SIGNIFICANT CHANGE UP (ref 0–0.7)
EOSINOPHIL NFR BLD AUTO: 2 % — SIGNIFICANT CHANGE UP (ref 0–8)
GLUCOSE SERPL-MCNC: 92 MG/DL — SIGNIFICANT CHANGE UP (ref 70–99)
HCT VFR BLD CALC: 24.7 % — LOW (ref 37–47)
HGB BLD-MCNC: 7.8 G/DL — LOW (ref 12–16)
IMM GRANULOCYTES NFR BLD AUTO: 1.8 % — HIGH (ref 0.1–0.3)
LYMPHOCYTES # BLD AUTO: 22.4 % — SIGNIFICANT CHANGE UP (ref 20.5–51.1)
LYMPHOCYTES # BLD AUTO: 3.41 K/UL — HIGH (ref 1.2–3.4)
MAGNESIUM SERPL-MCNC: 2 MG/DL — SIGNIFICANT CHANGE UP (ref 1.8–2.4)
MCHC RBC-ENTMCNC: 28.2 PG — SIGNIFICANT CHANGE UP (ref 27–31)
MCHC RBC-ENTMCNC: 31.6 G/DL — LOW (ref 32–37)
MCV RBC AUTO: 89.2 FL — SIGNIFICANT CHANGE UP (ref 81–99)
MONOCYTES # BLD AUTO: 1.94 K/UL — HIGH (ref 0.1–0.6)
MONOCYTES NFR BLD AUTO: 12.7 % — HIGH (ref 1.7–9.3)
NEUTROPHILS # BLD AUTO: 9.26 K/UL — HIGH (ref 1.4–6.5)
NEUTROPHILS NFR BLD AUTO: 60.7 % — SIGNIFICANT CHANGE UP (ref 42.2–75.2)
NRBC # BLD: 0 /100 WBCS — SIGNIFICANT CHANGE UP (ref 0–0)
PLATELET # BLD AUTO: 731 K/UL — HIGH (ref 130–400)
POTASSIUM SERPL-MCNC: 5 MMOL/L — SIGNIFICANT CHANGE UP (ref 3.5–5)
POTASSIUM SERPL-SCNC: 5 MMOL/L — SIGNIFICANT CHANGE UP (ref 3.5–5)
PROT SERPL-MCNC: 5.9 G/DL — LOW (ref 6–8)
RBC # BLD: 2.77 M/UL — LOW (ref 4.2–5.4)
RBC # FLD: 14 % — SIGNIFICANT CHANGE UP (ref 11.5–14.5)
SODIUM SERPL-SCNC: 138 MMOL/L — SIGNIFICANT CHANGE UP (ref 135–146)
VANCOMYCIN TROUGH SERPL-MCNC: 19.6 UG/ML — HIGH (ref 5–10)
WBC # BLD: 15.24 K/UL — HIGH (ref 4.8–10.8)
WBC # FLD AUTO: 15.24 K/UL — HIGH (ref 4.8–10.8)

## 2023-04-07 PROCEDURE — 99232 SBSQ HOSP IP/OBS MODERATE 35: CPT

## 2023-04-07 RX ORDER — CEFEPIME 1 G/1
2000 INJECTION, POWDER, FOR SOLUTION INTRAMUSCULAR; INTRAVENOUS ONCE
Refills: 0 | Status: COMPLETED | OUTPATIENT
Start: 2023-04-07 | End: 2023-04-07

## 2023-04-07 RX ORDER — METRONIDAZOLE 500 MG
500 TABLET ORAL EVERY 8 HOURS
Refills: 0 | Status: DISCONTINUED | OUTPATIENT
Start: 2023-04-07 | End: 2023-04-12

## 2023-04-07 RX ORDER — CEFEPIME 1 G/1
INJECTION, POWDER, FOR SOLUTION INTRAMUSCULAR; INTRAVENOUS
Refills: 0 | Status: DISCONTINUED | OUTPATIENT
Start: 2023-04-07 | End: 2023-04-17

## 2023-04-07 RX ORDER — CEFEPIME 1 G/1
2000 INJECTION, POWDER, FOR SOLUTION INTRAMUSCULAR; INTRAVENOUS EVERY 12 HOURS
Refills: 0 | Status: DISCONTINUED | OUTPATIENT
Start: 2023-04-07 | End: 2023-04-17

## 2023-04-07 RX ADMIN — MIDODRINE HYDROCHLORIDE 10 MILLIGRAM(S): 2.5 TABLET ORAL at 17:50

## 2023-04-07 RX ADMIN — GABAPENTIN 100 MILLIGRAM(S): 400 CAPSULE ORAL at 06:14

## 2023-04-07 RX ADMIN — Medication 1 APPLICATION(S): at 06:50

## 2023-04-07 RX ADMIN — Medication 166.67 MILLIGRAM(S): at 06:50

## 2023-04-07 RX ADMIN — MORPHINE SULFATE 2 MILLIGRAM(S): 50 CAPSULE, EXTENDED RELEASE ORAL at 14:51

## 2023-04-07 RX ADMIN — Medication 650 MILLIGRAM(S): at 12:55

## 2023-04-07 RX ADMIN — Medication 500 MILLIGRAM(S): at 12:52

## 2023-04-07 RX ADMIN — GABAPENTIN 100 MILLIGRAM(S): 400 CAPSULE ORAL at 14:43

## 2023-04-07 RX ADMIN — Medication 1 APPLICATION(S): at 06:15

## 2023-04-07 RX ADMIN — Medication 650 MILLIGRAM(S): at 13:25

## 2023-04-07 RX ADMIN — MIDODRINE HYDROCHLORIDE 10 MILLIGRAM(S): 2.5 TABLET ORAL at 12:53

## 2023-04-07 RX ADMIN — LEVETIRACETAM 1500 MILLIGRAM(S): 250 TABLET, FILM COATED ORAL at 06:13

## 2023-04-07 RX ADMIN — Medication 500 MILLIGRAM(S): at 17:42

## 2023-04-07 RX ADMIN — MORPHINE SULFATE 2 MILLIGRAM(S): 50 CAPSULE, EXTENDED RELEASE ORAL at 22:21

## 2023-04-07 RX ADMIN — Medication 81 MILLIGRAM(S): at 12:52

## 2023-04-07 RX ADMIN — Medication 1 APPLICATION(S): at 19:32

## 2023-04-07 RX ADMIN — Medication 1 APPLICATION(S): at 17:55

## 2023-04-07 RX ADMIN — Medication 500 MILLIGRAM(S): at 14:42

## 2023-04-07 RX ADMIN — Medication 500 MILLIGRAM(S): at 21:09

## 2023-04-07 RX ADMIN — Medication 5 MILLIGRAM(S): at 06:13

## 2023-04-07 RX ADMIN — Medication 500 MILLIGRAM(S): at 06:14

## 2023-04-07 RX ADMIN — Medication 1 APPLICATION(S): at 17:54

## 2023-04-07 RX ADMIN — MORPHINE SULFATE 2 MILLIGRAM(S): 50 CAPSULE, EXTENDED RELEASE ORAL at 21:07

## 2023-04-07 RX ADMIN — Medication 500 MILLIGRAM(S): at 09:42

## 2023-04-07 RX ADMIN — ATORVASTATIN CALCIUM 40 MILLIGRAM(S): 80 TABLET, FILM COATED ORAL at 21:09

## 2023-04-07 RX ADMIN — LEVETIRACETAM 1500 MILLIGRAM(S): 250 TABLET, FILM COATED ORAL at 17:43

## 2023-04-07 RX ADMIN — MORPHINE SULFATE 2 MILLIGRAM(S): 50 CAPSULE, EXTENDED RELEASE ORAL at 14:36

## 2023-04-07 RX ADMIN — Medication 1 TABLET(S): at 12:53

## 2023-04-07 RX ADMIN — Medication 500 MILLIGRAM(S): at 21:11

## 2023-04-07 RX ADMIN — CEFEPIME 100 MILLIGRAM(S): 1 INJECTION, POWDER, FOR SOLUTION INTRAMUSCULAR; INTRAVENOUS at 17:44

## 2023-04-07 RX ADMIN — Medication 650 MILLIGRAM(S): at 04:59

## 2023-04-07 RX ADMIN — MEROPENEM 100 MILLIGRAM(S): 1 INJECTION INTRAVENOUS at 06:49

## 2023-04-07 RX ADMIN — Medication 650 MILLIGRAM(S): at 04:44

## 2023-04-07 RX ADMIN — CEFEPIME 100 MILLIGRAM(S): 1 INJECTION, POWDER, FOR SOLUTION INTRAMUSCULAR; INTRAVENOUS at 12:52

## 2023-04-07 RX ADMIN — ENOXAPARIN SODIUM 40 MILLIGRAM(S): 100 INJECTION SUBCUTANEOUS at 21:23

## 2023-04-07 RX ADMIN — Medication 5 MILLIGRAM(S): at 17:47

## 2023-04-07 RX ADMIN — GABAPENTIN 100 MILLIGRAM(S): 400 CAPSULE ORAL at 21:08

## 2023-04-07 NOTE — CHART NOTE - NSCHARTNOTEFT_GEN_A_CORE
Registered Dietitian Follow-Up  Patient Profile Reviewed                           Yes [x]   No []  Nutrition History Previously Obtained        Yes [x]  No []      Pertinent Medical Interventions:  68 y/o woman with PMH of Right MCA stroke with left sided weakness was brought to the ED via EMS. In the ED, she had altered mental status and sepsis.    Nutrition Interval History:   patient with variable PO intake though has been slightly improving through admission. consuming 25-75% of meal trays on average more recently (~1000kcal). overall patient mental status waxing and waning subsequently making her intake good some days and inadequate others. has oral supplements when alert though if more lethargic does not have them per staff. patient reports having ensure supplements though unreliable historian unknown if accurate report  Nutrient Intake: Patient meeting ~75% of estimated energy needs in-house     Diet order:   Diet, DASH/TLC:   Sodium & Cholesterol Restricted  Minced and Moist (MINCEDMOIST)  Free Water Flush Instructions:  VANILLA OR STRAWBERRY  Supplement Feeding Modality:  Oral  Ensure Plus High Protein Cans or Servings Per Day:  2       Frequency:  Daily (03-27-23 @ 14:50) [Active]    Anthropometrics:  68.3kg  167.6cm  24.3kg/m2  ^ weight obtained 3/6 via bed scale at time of visit, with consideration for edema   UBW: 68.2kg  IBW 52.3kg     As per 3/27 RD assessment: patient bed weight higher than reported UBW, patient reported that she has 'some weight loss here or there'  unsure if accurate report considering patient noted with confusion    MEDICATIONS  (STANDING):  acetaminophen  Suppository .. 325 milliGRAM(s) Rectal once  ascorbic acid 500 milliGRAM(s) Oral daily  aspirin  chewable 81 milliGRAM(s) Oral daily  atorvastatin 40 milliGRAM(s) Oral at bedtime  baclofen 5 milliGRAM(s) Oral every 12 hours  cefepime   IVPB      cefepime   IVPB 2000 milliGRAM(s) IV Intermittent once  cefepime   IVPB 2000 milliGRAM(s) IV Intermittent every 12 hours  collagenase Ointment 1 Application(s) Topical two times a day  Dakins Solution - 1/2 Strength 1 Application(s) Topical two times a day  enoxaparin Injectable 40 milliGRAM(s) SubCutaneous every 24 hours  gabapentin 100 milliGRAM(s) Oral three times a day  levETIRAcetam  Solution 1500 milliGRAM(s) Oral two times a day  metroNIDAZOLE    Tablet 500 milliGRAM(s) Oral every 8 hours  midodrine. 10 milliGRAM(s) Oral three times a day  multivitamin/minerals 1 Tablet(s) Oral daily  valproic  acid Syrup 500 milliGRAM(s) Oral three times a day  vancomycin  IVPB 1250 milliGRAM(s) IV Intermittent every 12 hours  vitamin A &amp; D Ointment 1 Application(s) Topical two times a day    MEDICATIONS  (PRN):  acetaminophen     Tablet .. 650 milliGRAM(s) Oral every 6 hours PRN Moderate Pain (4 - 6)  aluminum hydroxide/magnesium hydroxide/simethicone Suspension 30 milliLiter(s) Oral every 6 hours PRN Dyspepsia  morphine  - Injectable 2 milliGRAM(s) IV Push every 6 hours PRN Severe Pain (7 - 10)    Pertinent Labs: 04-07 @ 07:42: Na 138, BUN 7<L>, Cr <0.5<L>, BG 92, K+ 5.0, Phos --, Mg 2.0, Alk Phos 94, ALT/SGPT 6, AST/SGOT 15, HbA1c --    Physical Findings:  - Cognition: confused  - GI function: Last BM 4/4  - Tubes: none   - Oral/Mouth cavity: minced & moist   - Skin: 3/15 WOCN assessment -- High risk for pressure injury development or progression, B/L heel intact , B/l ear healed ulceration & Stage three pressure injury to B/l buttock   - Edema: no edema noted in flow sheets      Nutrition Requirements: with consideration for age, weight, BMI, wounds  Weight Used: 68.2 kg dry weight      Estimated Energy Needs    Continue [x]  Adjust [] 8226-4316 kcal/day (MSJ x 1.2-1.5)  Estimated Protein Needs    Continue [x]  Adjust [] 82-102g/day (1.2-1.5g/kg)  Estimated Fluid Needs        Continue [x]  Adjust [] 1700mLday (25mL/kg)    [x] Previous Nutrition Diagnosis: Increased Nutrient Needs           [x] Ongoing          [] Resolved  [x] Previous Nutrition Diagnosis: Malnutrition            [x] Ongoing          [] Resolved  Goal/Expected Outcome: meet >/=50% po intake over next 3-5 days    Nutrition Intervention: Meals and Snacks, Medical Food Supplement, Vitamin Supplement, Nutrition Related Medication, Coordination of Care  Indicator/Monitoring:  Monitor diet order, energy intake, food and nutrient intake, body composition, weight    Recommendations:  - continue minced & moist thin liquids   1:1 feeding assist  - motivation and encouragement needed  - continue Ensure HIGH PROTEIN 2x/day to optimize pro/kcal intake (350kcal, 20 g pro/serving)   - consider appetite stimulant  - continue multivitamin daily    Cesario Abbott, #[id number] or via TEAMS  Patient is at Moderate Risk, follow up x 5-7days

## 2023-04-07 NOTE — PROGRESS NOTE ADULT - ASSESSMENT
ASSESSMENT   68 y/o woman with PMH of Right MCA stroke with left sided weakness was brought to the ED via EMS. In the ED, she had altered mental status and sepsis, with new onset fevers.     IMPRESSION  Complicated and prolonged hospital course, admitted 3/3 for R MCA CVA, ID consulted 3/22 for Fever, ID reconsulted for leukocytosis  #Leukocytosis; Afebrile since 3/21, Tm 100.3    Suspected infected decub with underlying osteomyelitis    4/4 WCX     Numerous Proteus mirabilis Cefepime: S <=2    Numerous Morganella morganii Cefepime: S <=2    Moderate Enterococcus raffinosus Ampicillin: R >8     Few Staphylococcus aureus- MSSA  CT  Sacral decubitus ulcer with osteomyelitis of the coccyx.  No acute intra-abdominal pathology.  Unchanged parapelvic cysts bilaterally with a 4 mm stone in the left mid ureter without evidence of obstruction.   WBC elevated to 21 4/4    4/3 UA without significant pyuria     3/28 BCX NGTD     3/27 BCX NGTD   < from: Xray Chest 1 View- PORTABLE-Routine (Xray Chest 1 View- PORTABLE-Routine .) (03.27.23 @ 11:39) >No focal consolidation, pneumothorax or pleural effusion.  #3/21 Tm 101.7 P>90 low BP- Sepsis   WBC 19 on admission, UCX 3/4 panS ecoli, UA , s/p ceftriaxone 3/4-6, cefepime 3/8  Exam at this time Skin: two approx. 3x2 cm wounds to sacral region with pink moist base. No active bleeding, purulence or surrounding erythema   3/21 UCX NG   3/20 BCX NGTD   3/20 COVID/RVP NEGATIVE   CXR no PNA  No diarrhea  No obvious phlebitis  Recent MRI stable  cannot obtain further history from the patient secondary to altered mental status or sedation   Creatinine, Serum: <0.5 mg/dL (04.04.23 @ 06:35)        RECOMMENDATIONS  - No ESBL in cultures, D/C meropenem--> Cefepime 2g q12h IV + PO flagyl 500mg TID  - Vanc 1250g q12h IV  - Please check vanc trough 30 min prior to 4th dose. Goal trough 15-20. If trough results > 20, please HOLD further Vanco dosing and order AM Random Vanco level   - Burn f/u appreciated  - No benefit of long term intravenous antibiotics for sacral osteomyelitis. Continue with debridement, nutritional support and offloading   - Guarded prognosis GOC    Please call or message on [employer] Teams if with any questions.

## 2023-04-07 NOTE — PROGRESS NOTE ADULT - SUBJECTIVE AND OBJECTIVE BOX
JOSEPH OBRIEN  67y, Female  Allergy: Allergy Status Unknown      LOS  34d    CHIEF COMPLAINT: Altered Mental status (04 Apr 2023 11:53)      INTERVAL EVENTS/HPI  - No acute events overnight cTAP with sacral osteomyelitis   - T(F): , Max: 100.1 (04-07-23 @ 04:45) Afebrile , fever curve downtrending   - Tolerating medication  - WBC Count: 17.69 (04-06-23 @ 06:06)  WBC Count: 17.48 (04-05-23 @ 12:07)     - Creatinine, Serum: <0.5 (04-06-23 @ 06:06)  Creatinine, Serum: <0.5 (04-05-23 @ 12:07)       ROS  ***    VITALS:  T(F): 100.1, Max: 100.1 (04-07-23 @ 04:45)  HR: 103  BP: 125/75  RR: 19Vital Signs Last 24 Hrs  T(C): 37.8 (07 Apr 2023 04:45), Max: 37.8 (07 Apr 2023 04:45)  T(F): 100.1 (07 Apr 2023 04:45), Max: 100.1 (07 Apr 2023 04:45)  HR: 103 (07 Apr 2023 04:45) (100 - 103)  BP: 125/75 (07 Apr 2023 04:45) (96/55 - 125/75)  BP(mean): --  RR: 19 (07 Apr 2023 04:45) (17 - 19)  SpO2: 99% (07 Apr 2023 04:45) (99% - 100%)    Parameters below as of 07 Apr 2023 04:45  Patient On (Oxygen Delivery Method): room air        PHYSICAL EXAM:  ***    FH: Non-contributory  Social Hx: Non-contributory    TESTS & MEASUREMENTS:                        7.2    17.69 )-----------( 621      ( 06 Apr 2023 06:06 )             22.5     04-06    138  |  101  |  6<L>  ----------------------------<  114<H>  4.4   |  23  |  <0.5<L>    Ca    8.8      06 Apr 2023 06:06  Mg     1.9     04-06    TPro  5.5<L>  /  Alb  2.3<L>  /  TBili  <0.2  /  DBili  x   /  AST  18  /  ALT  7   /  AlkPhos  78  04-06      LIVER FUNCTIONS - ( 06 Apr 2023 06:06 )  Alb: 2.3 g/dL / Pro: 5.5 g/dL / ALK PHOS: 78 U/L / ALT: 7 U/L / AST: 18 U/L / GGT: x               Culture - Tissue with Gram Stain (collected 04-04-23 @ 08:40)  Source: .Tissue sacrum  Gram Stain (04-04-23 @ 21:29):    No polymorphonuclear leukocytes per low power field    Numerous Gram Positive Cocci in Pairs and Chains per oil power field    Numerous Gram Negative Rods per oil power field  Final Report (04-06-23 @ 19:33):    Culture yields >4 types of aerobic and/or anaerobic bacteria    Call client services within 7 days if further workup is clinically    indicated.    Culture includes    Numerous Proteus mirabilis    Numerous Morganella morganii    Moderate Enterococcus raffinosus    Few Staphylococcus aureus  Organism: Proteus mirabilis  Morganella morganii  Enterococcus raffinosus  Staphylococcus aureus (04-06-23 @ 19:33)  Organism: Staphylococcus aureus (04-06-23 @ 19:33)      Method Type: JULIANA      -  Ampicillin/Sulbactam: S <=8/4      -  Cefazolin: S <=4      -  Clindamycin: S <=0.25      -  Erythromycin: S <=0.25      -  Gentamicin: S <=1 Should not be used as monotherapy      -  Oxacillin: S <=0.25 Oxacillin predicts susceptibility for dicloxacillin, methicillin, and nafcillin      -  Penicillin: R 0.25      -  Rifampin: S <=1 Should not be used as monotherapy      -  Tetracycline: S <=1      -  Trimethoprim/Sulfamethoxazole: S <=0.5/9.5      -  Vancomycin: S 1  Organism: Enterococcus raffinosus (04-06-23 @ 19:33)      Method Type: JULIANA      -  Ampicillin: R >8 Predicts results to ampicillin/sulbactam, amoxacillin-clavulanate and  piperacillin-tazobactam.      -  Tetracycline: R >8      -  Vancomycin: S 1  Organism: Morganella morganii (04-06-23 @ 19:33)      Method Type: JULIANA      -  Amikacin: S <=16      -  Amoxicillin/Clavulanic Acid: R >16/8      -  Ampicillin: R >16 These ampicillin results predict results for amoxicillin      -  Ampicillin/Sulbactam: I 16/8 Enterobacter, Klebsiella aerogenes, Citrobacter, and Serratia may develop resistance during prolonged therapy (3-4 days)      -  Aztreonam: S <=4      -  Cefazolin: R >16 Enterobacter, Klebsiella aerogenes, Citrobacter, and Serratia may develop resistance during prolonged therapy (3-4 days)      -  Cefepime: S <=2      -  Cefoxitin: S <=8      -  Ceftriaxone: S <=1 Enterobacter, Klebsiella aerogenes, Citrobacter, and Serratia may develop resistance during prolonged therapy      -  Ciprofloxacin: S <=0.25      -  Ertapenem: S <=0.5      -  Gentamicin: S <=2      -  Levofloxacin: S <=0.5      -  Meropenem: S <=1      -  Piperacillin/Tazobactam: S <=8      -  Tobramycin: S <=2      -  Trimethoprim/Sulfamethoxazole: S <=0.5/9.5  Organism: Proteus mirabilis (04-06-23 @ 19:33)      Method Type: JULIANA      -  Amikacin: S <=16      -  Amoxicillin/Clavulanic Acid: S <=8/4      -  Ampicillin: S <=8 These ampicillin results predict results for amoxicillin      -  Ampicillin/Sulbactam: S <=4/2 Enterobacter, Klebsiella aerogenes, Citrobacter, and Serratia may develop resistance during prolonged therapy (3-4 days)      -  Aztreonam: S <=4      -  Cefazolin: S <=2 Enterobacter, Klebsiella aerogenes, Citrobacter, and Serratia may develop resistance during prolonged therapy (3-4 days)      -  Cefepime: S <=2      -  Cefoxitin: S <=8      -  Ceftriaxone: S <=1 Enterobacter, Klebsiella aerogenes, Citrobacter, and Serratia may develop resistance during prolonged therapy      -  Ciprofloxacin: S <=0.25      -  Ertapenem: S <=0.5      -  Gentamicin: S <=2      -  Levofloxacin: S <=0.5      -  Meropenem: S <=1      -  Piperacillin/Tazobactam: S <=8      -  Tobramycin: S <=2      -  Trimethoprim/Sulfamethoxazole: S <=0.5/9.5    Culture - Blood (collected 04-04-23 @ 06:35)  Source: .Blood Blood  Preliminary Report (04-05-23 @ 14:02):    No growth to date.    Culture - Blood (collected 03-28-23 @ 07:32)  Source: .Blood None  Final Report (04-02-23 @ 18:00):    No Growth Final    Culture - Blood (collected 03-27-23 @ 10:39)  Source: .Blood Blood-Peripheral  Final Report (04-01-23 @ 18:00):    No Growth Final    Culture - Urine (collected 03-21-23 @ 11:57)  Source: Catheterized Catheterized  Final Report (03-22-23 @ 17:54):    No growth    Culture - Blood (collected 03-20-23 @ 22:07)  Source: .Blood Blood  Final Report (03-26-23 @ 02:00):    No Growth Final    Culture - Blood (collected 03-09-23 @ 11:46)  Source: .Blood None  Final Report (03-14-23 @ 23:00):    No Growth Final            INFECTIOUS DISEASES TESTING  Vancomycin Level, Trough: 8.0 (04-05-23 @ 17:56)  Rapid RVP Result: NotDetec (04-05-23 @ 14:26)  COVID-19 PCR: NotDetec (04-02-23 @ 18:03)  Rapid RVP Result: NotDetec (03-28-23 @ 09:55)  Procalcitonin, Serum: 0.05 (03-20-23 @ 22:07)  Rapid RVP Result: NotDetec (03-20-23 @ 21:25)  COVID-19 PCR: NotDetec (03-19-23 @ 12:47)  COVID-19 PCR: NotDetec (03-15-23 @ 16:23)  strept    INFLAMMATORY MARKERS      RADIOLOGY & ADDITIONAL TESTS:  I have personally reviewed the last available Chest xray  CXR      CT  CT Chest w/ IV Cont:   ACC: [id number] EXAM:  CT CHEST IC   ORDERED BY: JEFFY ALLEN     ACC: [id number] EXAM:  CT ABDOMEN AND PELVIS OC IC   ORDERED BY: JEFFY ALLEN     PROCEDURE DATE:  04/06/2023          INTERPRETATION:  CLINICAL STATEMENT: Fever    TECHNIQUE: Contiguous axial CT images were obtained from the lower chest   to the pubic symphysis following administration of 100cc Optiray 320   intravenous contrast.  Oral contrast was not administered.  Reformatted   images in the coronal and sagittal planes were acquired.    COMPARISON CT: March 4, 2023.    OTHER STUDIES USED FOR CORRELATION: None.      FINDINGS:    HEPATOBILIARY: Unremarkable.    SPLEEN: Unremarkable.    PANCREAS: Unremarkable.    ADRENAL GLANDS: Unremarkable.    KIDNEYS: Enhance symmetrically without hydroureteronephrosis. Again seen   are bilateral parapelvic cysts. Again seen is a 4 mm stone in the left   ureter without evidence of obstruction (series 6 image 418).    ABDOMINOPELVIC NODES: Unremarkable.    PELVIC ORGANS: Unremarkable.    PERITONEUM/MESENTERY/BOWEL: No bowel obstruction, pneumoperitoneum or   ascites. A normal appendix is seen in the right lower quadrant.   Nonspecific presacral edema, unchanged.    BONES/SOFT TISSUES: Degenerative changes of the sacroiliac joints,   bilateral hips and spine.  Sacral decubitus ulcer extending down to the coccyx with osseous erosions   consistent with osteomyelitis.    OTHER: Atherosclerotic calcifications of the aorta and iliac vessels.      IMPRESSION:    Sacral decubitus ulcer with osteomyelitis of the coccyx.    No acute intra-abdominal pathology.    Unchanged parapelvic cysts bilaterally with a 4 mm stone in the left mid   ureter without evidence of obstruction.    --- End of Report ---            JUDE DANIELS MD; Attending Radiologist  This document has been electronically signed. Apr 6 2023  4:55PM (04-06-23 @ 16:06)      CARDIOLOGY TESTING  12 Lead ECG:   Ventricular Rate 114 BPM    Atrial Rate 114 BPM    P-R Interval 122 ms    QRS Duration 62 ms    Q-T Interval 312 ms    QTC Calculation(Bazett) 430 ms    P Axis 43 degrees    R Axis 7 degrees    T Axis 43 degrees    Diagnosis Line Sinus tachycardia  Otherwise normal ECG    Confirmed by NANY ANDINO MDFIM ([id number]) on 4/5/2023 8:47:49 AM (04-05-23 @ 07:28)  12 Lead ECG:   Ventricular Rate 103 BPM    Atrial Rate 103 BPM    P-R Interval 120 ms    QRS Duration 66 ms    Q-T Interval 324 ms    QTC Calculation(Bazett) 424 ms    P Axis 40 degrees    R Axis -2 degrees    T Axis 43 degrees    Diagnosis Line Sinus tachycardia  Otherwise normal ECG    Confirmed by Shanti Sage MD ([id number]) on 4/4/2023 10:25:13 AM (04-03-23 @ 11:37)      MEDICATIONS  acetaminophen  Suppository .. 325 Rectal once  ascorbic acid 500 Oral daily  aspirin  chewable 81 Oral daily  atorvastatin 40 Oral at bedtime  baclofen 5 Oral every 12 hours  collagenase Ointment 1 Topical two times a day  Dakins Solution - 1/2 Strength 1 Topical two times a day  enoxaparin Injectable 40 SubCutaneous every 24 hours  gabapentin 100 Oral three times a day  levETIRAcetam  Solution 1500 Oral two times a day  meropenem  IVPB 1000 IV Intermittent every 8 hours  midodrine. 10 Oral three times a day  multivitamin/minerals 1 Oral daily  sodium chloride 0.9%. 1000 IV Continuous <Continuous>  valproic  acid Syrup 500 Oral three times a day  vancomycin  IVPB 1250 IV Intermittent every 12 hours  vitamin A &amp; D Ointment 1 Topical two times a day      WEIGHT  Weight (kg): 68.3 (03-08-23 @ 10:12)      ANTIBIOTICS:  meropenem  IVPB 1000 milliGRAM(s) IV Intermittent every 8 hours  vancomycin  IVPB 1250 milliGRAM(s) IV Intermittent every 12 hours      All available historical records have been reviewed       JOSEPH OBRIEN  67y, Female  Allergy: Allergy Status Unknown      LOS  34d    CHIEF COMPLAINT: Altered Mental status (04 Apr 2023 11:53)      INTERVAL EVENTS/HPI  - No acute events overnight cTAP with sacral osteomyelitis   - T(F): , Max: 100.1 (04-07-23 @ 04:45) Afebrile , fever curve downtrending   - Tolerating medication  - WBC Count: 17.69 (04-06-23 @ 06:06)  WBC Count: 17.48 (04-05-23 @ 12:07)     - Creatinine, Serum: <0.5 (04-06-23 @ 06:06)  Creatinine, Serum: <0.5 (04-05-23 @ 12:07)       ROS  unable to obtain history secondary to patient's mental status and/or sedation     VITALS:  T(F): 100.1, Max: 100.1 (04-07-23 @ 04:45)  HR: 103  BP: 125/75  RR: 19Vital Signs Last 24 Hrs  T(C): 37.8 (07 Apr 2023 04:45), Max: 37.8 (07 Apr 2023 04:45)  T(F): 100.1 (07 Apr 2023 04:45), Max: 100.1 (07 Apr 2023 04:45)  HR: 103 (07 Apr 2023 04:45) (100 - 103)  BP: 125/75 (07 Apr 2023 04:45) (96/55 - 125/75)  BP(mean): --  RR: 19 (07 Apr 2023 04:45) (17 - 19)  SpO2: 99% (07 Apr 2023 04:45) (99% - 100%)    Parameters below as of 07 Apr 2023 04:45  Patient On (Oxygen Delivery Method): room air        PHYSICAL EXAM:  Gen: chronically ill appearing   HEENT: Normocephalic, atraumatic  Neck: supple, no lymphadenopathy  CV: Regular rate & regular rhythm  Lungs: decreased BS at bases, no fremitus  Abdomen: Soft, BS present  Ext: Warm, well perfused  Neuro: L deficits, LUE edema  Sacrum deferred   Skin: no rash, no erythema  Lines: no phlebitis     FH: Non-contributory  Social Hx: Non-contributory    TESTS & MEASUREMENTS:                        7.2    17.69 )-----------( 621      ( 06 Apr 2023 06:06 )             22.5     04-06    138  |  101  |  6<L>  ----------------------------<  114<H>  4.4   |  23  |  <0.5<L>    Ca    8.8      06 Apr 2023 06:06  Mg     1.9     04-06    TPro  5.5<L>  /  Alb  2.3<L>  /  TBili  <0.2  /  DBili  x   /  AST  18  /  ALT  7   /  AlkPhos  78  04-06      LIVER FUNCTIONS - ( 06 Apr 2023 06:06 )  Alb: 2.3 g/dL / Pro: 5.5 g/dL / ALK PHOS: 78 U/L / ALT: 7 U/L / AST: 18 U/L / GGT: x               Culture - Tissue with Gram Stain (collected 04-04-23 @ 08:40)  Source: .Tissue sacrum  Gram Stain (04-04-23 @ 21:29):    No polymorphonuclear leukocytes per low power field    Numerous Gram Positive Cocci in Pairs and Chains per oil power field    Numerous Gram Negative Rods per oil power field  Final Report (04-06-23 @ 19:33):    Culture yields >4 types of aerobic and/or anaerobic bacteria    Call client services within 7 days if further workup is clinically    indicated.    Culture includes    Numerous Proteus mirabilis    Numerous Morganella morganii    Moderate Enterococcus raffinosus    Few Staphylococcus aureus  Organism: Proteus mirabilis  Morganella morganii  Enterococcus raffinosus  Staphylococcus aureus (04-06-23 @ 19:33)  Organism: Staphylococcus aureus (04-06-23 @ 19:33)      Method Type: JULIANA      -  Ampicillin/Sulbactam: S <=8/4      -  Cefazolin: S <=4      -  Clindamycin: S <=0.25      -  Erythromycin: S <=0.25      -  Gentamicin: S <=1 Should not be used as monotherapy      -  Oxacillin: S <=0.25 Oxacillin predicts susceptibility for dicloxacillin, methicillin, and nafcillin      -  Penicillin: R 0.25      -  Rifampin: S <=1 Should not be used as monotherapy      -  Tetracycline: S <=1      -  Trimethoprim/Sulfamethoxazole: S <=0.5/9.5      -  Vancomycin: S 1  Organism: Enterococcus raffinosus (04-06-23 @ 19:33)      Method Type: JULIANA      -  Ampicillin: R >8 Predicts results to ampicillin/sulbactam, amoxacillin-clavulanate and  piperacillin-tazobactam.      -  Tetracycline: R >8      -  Vancomycin: S 1  Organism: Morganella morganii (04-06-23 @ 19:33)      Method Type: JULIANA      -  Amikacin: S <=16      -  Amoxicillin/Clavulanic Acid: R >16/8      -  Ampicillin: R >16 These ampicillin results predict results for amoxicillin      -  Ampicillin/Sulbactam: I 16/8 Enterobacter, Klebsiella aerogenes, Citrobacter, and Serratia may develop resistance during prolonged therapy (3-4 days)      -  Aztreonam: S <=4      -  Cefazolin: R >16 Enterobacter, Klebsiella aerogenes, Citrobacter, and Serratia may develop resistance during prolonged therapy (3-4 days)      -  Cefepime: S <=2      -  Cefoxitin: S <=8      -  Ceftriaxone: S <=1 Enterobacter, Klebsiella aerogenes, Citrobacter, and Serratia may develop resistance during prolonged therapy      -  Ciprofloxacin: S <=0.25      -  Ertapenem: S <=0.5      -  Gentamicin: S <=2      -  Levofloxacin: S <=0.5      -  Meropenem: S <=1      -  Piperacillin/Tazobactam: S <=8      -  Tobramycin: S <=2      -  Trimethoprim/Sulfamethoxazole: S <=0.5/9.5  Organism: Proteus mirabilis (04-06-23 @ 19:33)      Method Type: JULIANA      -  Amikacin: S <=16      -  Amoxicillin/Clavulanic Acid: S <=8/4      -  Ampicillin: S <=8 These ampicillin results predict results for amoxicillin      -  Ampicillin/Sulbactam: S <=4/2 Enterobacter, Klebsiella aerogenes, Citrobacter, and Serratia may develop resistance during prolonged therapy (3-4 days)      -  Aztreonam: S <=4      -  Cefazolin: S <=2 Enterobacter, Klebsiella aerogenes, Citrobacter, and Serratia may develop resistance during prolonged therapy (3-4 days)      -  Cefepime: S <=2      -  Cefoxitin: S <=8      -  Ceftriaxone: S <=1 Enterobacter, Klebsiella aerogenes, Citrobacter, and Serratia may develop resistance during prolonged therapy      -  Ciprofloxacin: S <=0.25      -  Ertapenem: S <=0.5      -  Gentamicin: S <=2      -  Levofloxacin: S <=0.5      -  Meropenem: S <=1      -  Piperacillin/Tazobactam: S <=8      -  Tobramycin: S <=2      -  Trimethoprim/Sulfamethoxazole: S <=0.5/9.5    Culture - Blood (collected 04-04-23 @ 06:35)  Source: .Blood Blood  Preliminary Report (04-05-23 @ 14:02):    No growth to date.    Culture - Blood (collected 03-28-23 @ 07:32)  Source: .Blood None  Final Report (04-02-23 @ 18:00):    No Growth Final    Culture - Blood (collected 03-27-23 @ 10:39)  Source: .Blood Blood-Peripheral  Final Report (04-01-23 @ 18:00):    No Growth Final    Culture - Urine (collected 03-21-23 @ 11:57)  Source: Catheterized Catheterized  Final Report (03-22-23 @ 17:54):    No growth    Culture - Blood (collected 03-20-23 @ 22:07)  Source: .Blood Blood  Final Report (03-26-23 @ 02:00):    No Growth Final    Culture - Blood (collected 03-09-23 @ 11:46)  Source: .Blood None  Final Report (03-14-23 @ 23:00):    No Growth Final            INFECTIOUS DISEASES TESTING  Vancomycin Level, Trough: 8.0 (04-05-23 @ 17:56)  Rapid RVP Result: NotDetec (04-05-23 @ 14:26)  COVID-19 PCR: NotDetec (04-02-23 @ 18:03)  Rapid RVP Result: NotDetec (03-28-23 @ 09:55)  Procalcitonin, Serum: 0.05 (03-20-23 @ 22:07)  Rapid RVP Result: NotDetec (03-20-23 @ 21:25)  COVID-19 PCR: NotDetec (03-19-23 @ 12:47)  COVID-19 PCR: NotDetec (03-15-23 @ 16:23)  strept    INFLAMMATORY MARKERS      RADIOLOGY & ADDITIONAL TESTS:  I have personally reviewed the last available Chest xray  CXR      CT  CT Chest w/ IV Cont:   ACC: [id number] EXAM:  CT CHEST IC   ORDERED BY: JEFFY ALLEN     ACC: [id number] EXAM:  CT ABDOMEN AND PELVIS OC IC   ORDERED BY: JEFFY ALLEN     PROCEDURE DATE:  04/06/2023          INTERPRETATION:  CLINICAL STATEMENT: Fever    TECHNIQUE: Contiguous axial CT images were obtained from the lower chest   to the pubic symphysis following administration of 100cc Optiray 320   intravenous contrast.  Oral contrast was not administered.  Reformatted   images in the coronal and sagittal planes were acquired.    COMPARISON CT: March 4, 2023.    OTHER STUDIES USED FOR CORRELATION: None.      FINDINGS:    HEPATOBILIARY: Unremarkable.    SPLEEN: Unremarkable.    PANCREAS: Unremarkable.    ADRENAL GLANDS: Unremarkable.    KIDNEYS: Enhance symmetrically without hydroureteronephrosis. Again seen   are bilateral parapelvic cysts. Again seen is a 4 mm stone in the left   ureter without evidence of obstruction (series 6 image 418).    ABDOMINOPELVIC NODES: Unremarkable.    PELVIC ORGANS: Unremarkable.    PERITONEUM/MESENTERY/BOWEL: No bowel obstruction, pneumoperitoneum or   ascites. A normal appendix is seen in the right lower quadrant.   Nonspecific presacral edema, unchanged.    BONES/SOFT TISSUES: Degenerative changes of the sacroiliac joints,   bilateral hips and spine.  Sacral decubitus ulcer extending down to the coccyx with osseous erosions   consistent with osteomyelitis.    OTHER: Atherosclerotic calcifications of the aorta and iliac vessels.      IMPRESSION:    Sacral decubitus ulcer with osteomyelitis of the coccyx.    No acute intra-abdominal pathology.    Unchanged parapelvic cysts bilaterally with a 4 mm stone in the left mid   ureter without evidence of obstruction.    --- End of Report ---            JUDE DANIELS MD; Attending Radiologist  This document has been electronically signed. Apr 6 2023  4:55PM (04-06-23 @ 16:06)      CARDIOLOGY TESTING  12 Lead ECG:   Ventricular Rate 114 BPM    Atrial Rate 114 BPM    P-R Interval 122 ms    QRS Duration 62 ms    Q-T Interval 312 ms    QTC Calculation(Bazett) 430 ms    P Axis 43 degrees    R Axis 7 degrees    T Axis 43 degrees    Diagnosis Line Sinus tachycardia  Otherwise normal ECG    Confirmed by SINA GREEN, [facility] ([id number]) on 4/5/2023 8:47:49 AM (04-05-23 @ 07:28)  12 Lead ECG:   Ventricular Rate 103 BPM    Atrial Rate 103 BPM    P-R Interval 120 ms    QRS Duration 66 ms    Q-T Interval 324 ms    QTC Calculation(Bazett) 424 ms    P Axis 40 degrees    R Axis -2 degrees    T Axis 43 degrees    Diagnosis Line Sinus tachycardia  Otherwise normal ECG    Confirmed by Shanti Sage MD ([id number]) on 4/4/2023 10:25:13 AM (04-03-23 @ 11:37)      MEDICATIONS  acetaminophen  Suppository .. 325 Rectal once  ascorbic acid 500 Oral daily  aspirin  chewable 81 Oral daily  atorvastatin 40 Oral at bedtime  baclofen 5 Oral every 12 hours  collagenase Ointment 1 Topical two times a day  Dakins Solution - 1/2 Strength 1 Topical two times a day  enoxaparin Injectable 40 SubCutaneous every 24 hours  gabapentin 100 Oral three times a day  levETIRAcetam  Solution 1500 Oral two times a day  meropenem  IVPB 1000 IV Intermittent every 8 hours  midodrine. 10 Oral three times a day  multivitamin/minerals 1 Oral daily  sodium chloride 0.9%. 1000 IV Continuous <Continuous>  valproic  acid Syrup 500 Oral three times a day  vancomycin  IVPB 1250 IV Intermittent every 12 hours  vitamin A &amp; D Ointment 1 Topical two times a day      WEIGHT  Weight (kg): 68.3 (03-08-23 @ 10:12)      ANTIBIOTICS:  meropenem  IVPB 1000 milliGRAM(s) IV Intermittent every 8 hours  vancomycin  IVPB 1250 milliGRAM(s) IV Intermittent every 12 hours      All available historical records have been reviewed

## 2023-04-07 NOTE — PROGRESS NOTE ADULT - SUBJECTIVE AND OBJECTIVE BOX
JOSEPH OBRIEN 67y Female  MRN#: [id number]   Hospital Day: 34d    SUBJECTIVE  Patient is a 67y old Female who presents with a chief complaint of Altered Mental status  Currently admitted to medicine with the primary diagnosis of Stroke      INTERVAL HPI AND OVERNIGHT EVENTS:  Patient was examined and seen at bedside. This morning she is resting comfortably in bed and reports no issues or overnight events.    OBJECTIVE  PAST MEDICAL & SURGICAL HISTORY    ALLERGIES:  Allergy Status Unknown    MEDICATIONS:  STANDING MEDICATIONS  acetaminophen  Suppository .. 325 milliGRAM(s) Rectal once  ascorbic acid 500 milliGRAM(s) Oral daily  aspirin  chewable 81 milliGRAM(s) Oral daily  atorvastatin 40 milliGRAM(s) Oral at bedtime  baclofen 5 milliGRAM(s) Oral every 12 hours  cefepime   IVPB      cefepime   IVPB 2000 milliGRAM(s) IV Intermittent once  cefepime   IVPB 2000 milliGRAM(s) IV Intermittent every 12 hours  collagenase Ointment 1 Application(s) Topical two times a day  Dakins Solution - 1/2 Strength 1 Application(s) Topical two times a day  enoxaparin Injectable 40 milliGRAM(s) SubCutaneous every 24 hours  gabapentin 100 milliGRAM(s) Oral three times a day  levETIRAcetam  Solution 1500 milliGRAM(s) Oral two times a day  metroNIDAZOLE    Tablet 500 milliGRAM(s) Oral every 8 hours  midodrine. 10 milliGRAM(s) Oral three times a day  multivitamin/minerals 1 Tablet(s) Oral daily  sodium chloride 0.9%. 1000 milliLiter(s) IV Continuous <Continuous>  valproic  acid Syrup 500 milliGRAM(s) Oral three times a day  vancomycin  IVPB 1250 milliGRAM(s) IV Intermittent every 12 hours  vitamin A &amp; D Ointment 1 Application(s) Topical two times a day    PRN MEDICATIONS  acetaminophen     Tablet .. 650 milliGRAM(s) Oral every 6 hours PRN  aluminum hydroxide/magnesium hydroxide/simethicone Suspension 30 milliLiter(s) Oral every 6 hours PRN  morphine  - Injectable 2 milliGRAM(s) IV Push every 6 hours PRN      VITAL SIGNS: Last 24 Hours  T(C): 37.8 (07 Apr 2023 04:45), Max: 37.8 (07 Apr 2023 04:45)  T(F): 100.1 (07 Apr 2023 04:45), Max: 100.1 (07 Apr 2023 04:45)  HR: 103 (07 Apr 2023 04:45) (100 - 103)  BP: 125/75 (07 Apr 2023 04:45) (96/55 - 125/75)  BP(mean): --  RR: 19 (07 Apr 2023 04:45) (17 - 19)  SpO2: 99% (07 Apr 2023 04:45) (99% - 100%)    LABS:                        7.2    17.69 )-----------( 621      ( 06 Apr 2023 06:06 )             22.5     04-06    138  |  101  |  6<L>  ----------------------------<  114<H>  4.4   |  23  |  <0.5<L>    Ca    8.8      06 Apr 2023 06:06  Mg     1.9     04-06    TPro  5.5<L>  /  Alb  2.3<L>  /  TBili  <0.2  /  DBili  x   /  AST  18  /  ALT  7   /  AlkPhos  78  04-06          Troponin T, Serum: 0.03 ng/mL *HH* (04-06-23 @ 11:32)      CARDIAC MARKERS ( 06 Apr 2023 11:32 )  x     / 0.03 ng/mL / x     / x     / x      CARDIAC MARKERS ( 05 Apr 2023 21:33 )  x     / 0.03 ng/mL / x     / x     / x      CARDIAC MARKERS ( 05 Apr 2023 12:07 )  x     / 0.03 ng/mL / x     / x     / x          RADIOLOGY:  < from: CT Abdomen and Pelvis w/ Oral Cont and w/ IV Cont (04.06.23 @ 16:06) >    IMPRESSION:    Sacral decubitus ulcer with osteomyelitis of the coccyx.    No acute intra-abdominal pathology.    Unchanged parapelvic cysts bilaterally with a 4 mm stone in the left mid   ureter without evidence of obstruction.    --- End of Report ---        JUDE DANIELS MD; Attending Radiologist  This document has been electronically signed. Apr 6 2023  4:55PM    < end of copied text >  < from: CT Chest w/ IV Cont (04.06.23 @ 16:06) >    IMPRESSION:    Sacral decubitus ulcer with osteomyelitis of the coccyx.    No acute intra-abdominal pathology.    Unchanged parapelvic cysts bilaterally with a 4 mm stone in the left mid   ureter without evidence of obstruction.    --- End of Report ---        JUDE DANIELS MD; Attending Radiologist  This document has been electronically signed. Apr 6 2023  4:55PM    < end of copied text >      PHYSICAL EXAM:  CONSTITUTIONAL: No acute distress, sitting up in bed this AM, conversant  HEENT: Atraumatic, normocephalic, neck supple, moist mucous membranes  PULMONARY: Clear to auscultation bilaterally  CARDIOVASCULAR: Regular rate and rhythm  GASTROINTESTINAL: Soft, non-tender, non-distended; bowel sounds present  MUSCULOSKELETAL: no LE edema, 2+ pulses b/l LE, left LE in CAM boot  NEURO: L hemiparesis, B/L UE spasticity  SKIN: Sacral ulcer - wound wrapped       JOSEPH OBRIEN 67y Female  MRN#: [id number]   Hospital Day: 34d    SUBJECTIVE  Patient is a 67y old Female who presents with a chief complaint of Altered Mental status  Currently admitted to medicine with the primary diagnosis of Stroke    HOSPITAL COURSE:    67F. PMH: Seizures, Rt MCA Stroke  BIBEMS 3/3 after being found on the street, daughter reported pt c/o generalized pain, had FS of 96. No family present at admission/no contact information. Pt reported feeling "okay".  In ED, hemodynamically stable with Fever. Labs: WBC 19K, elevated lactate, UA +ve  She was pan-scanned. no evidence of new stroke, trauma, or infection.  CTH showed old stroke in the territory of the Rt MCA.  Neuro eval'd 3/8-3/16: For seizure/stroke 3/8: L gaze preference, OP med review: on low dose Lamictal and Depakote in addition to Keppra (possible mood disorder/ seizures), MRi brain (+) acute subcortical infarct within same M1 territory. The cause of stroke is most likely hypotension as she has a diminutive Rt M1. However, MRI findings doesn't explain her B/L upper extremity spasticity. So, it is important to rule. vEEG 3/9: showed focal slowing. DAPT for 21 days followed by ASA 81 mg daily; High intensity statin, Can f/u in stroke clinic in 4 weeks following discharge.  Podiatry following for foot pain 2/ foot drop- found to have L foot avulsion fracture: WBAT w/ surgical shoe, ankle brace, PT  BH eval'd 3/15: Delirium  Pt was treated for UTI, placed on appropriate medical management for stroke. 3/20 pt was noted to be less responsive and have forced gaze during her speech evaluation. Concern for seizure. Neurology was recalled- keppra was increased. Pt made NPO, and AEDs made IV. Overnight pt was noted to be febrile, and again hypotensive requiring 2L fluid bolus, despite maintenance fluids. Septic workup sent. Leukocytosis noted, BCx NGTD, CXR looks clear.  Decubitus ulcer noted on sacrum, burn consulted - no intervention, local wound care. Id consulted for abx recs: empiric cefepime x5d    On 3/26 overnight pt again febrile to 102.  Septic workup resent - BCx and UA negative. Wound cultures grew Proteus mirabilis, Morganella morganii, Enterococcus raffinosus, Staph aureus. Per ID pt on: Vanc, cefepime, and flagyl. Abx started 4/4. CT Chest/Abd/Pelvis w/w/o Ct: Sacral decubitus ulcer with osteomyelitis of the coccyx. No acute intra-abdominal pathology. Unchanged parapelvic cysts bilaterally with a 4 mm stone in the left mid ureter without evidence of obstruction.          INTERVAL HPI AND OVERNIGHT EVENTS:  Patient was examined and seen at bedside. This morning she is resting comfortably in bed and reports no issues or overnight events.    OBJECTIVE  PAST MEDICAL & SURGICAL HISTORY    ALLERGIES:  Allergy Status Unknown    MEDICATIONS:  STANDING MEDICATIONS  acetaminophen  Suppository .. 325 milliGRAM(s) Rectal once  ascorbic acid 500 milliGRAM(s) Oral daily  aspirin  chewable 81 milliGRAM(s) Oral daily  atorvastatin 40 milliGRAM(s) Oral at bedtime  baclofen 5 milliGRAM(s) Oral every 12 hours  cefepime   IVPB      cefepime   IVPB 2000 milliGRAM(s) IV Intermittent once  cefepime   IVPB 2000 milliGRAM(s) IV Intermittent every 12 hours  collagenase Ointment 1 Application(s) Topical two times a day  Dakins Solution - 1/2 Strength 1 Application(s) Topical two times a day  enoxaparin Injectable 40 milliGRAM(s) SubCutaneous every 24 hours  gabapentin 100 milliGRAM(s) Oral three times a day  levETIRAcetam  Solution 1500 milliGRAM(s) Oral two times a day  metroNIDAZOLE    Tablet 500 milliGRAM(s) Oral every 8 hours  midodrine. 10 milliGRAM(s) Oral three times a day  multivitamin/minerals 1 Tablet(s) Oral daily  sodium chloride 0.9%. 1000 milliLiter(s) IV Continuous <Continuous>  valproic  acid Syrup 500 milliGRAM(s) Oral three times a day  vancomycin  IVPB 1250 milliGRAM(s) IV Intermittent every 12 hours  vitamin A &amp; D Ointment 1 Application(s) Topical two times a day    PRN MEDICATIONS  acetaminophen     Tablet .. 650 milliGRAM(s) Oral every 6 hours PRN  aluminum hydroxide/magnesium hydroxide/simethicone Suspension 30 milliLiter(s) Oral every 6 hours PRN  morphine  - Injectable 2 milliGRAM(s) IV Push every 6 hours PRN      VITAL SIGNS: Last 24 Hours  T(C): 37.8 (07 Apr 2023 04:45), Max: 37.8 (07 Apr 2023 04:45)  T(F): 100.1 (07 Apr 2023 04:45), Max: 100.1 (07 Apr 2023 04:45)  HR: 103 (07 Apr 2023 04:45) (100 - 103)  BP: 125/75 (07 Apr 2023 04:45) (96/55 - 125/75)  BP(mean): --  RR: 19 (07 Apr 2023 04:45) (17 - 19)  SpO2: 99% (07 Apr 2023 04:45) (99% - 100%)    LABS:                        7.2    17.69 )-----------( 621      ( 06 Apr 2023 06:06 )             22.5     04-06    138  |  101  |  6<L>  ----------------------------<  114<H>  4.4   |  23  |  <0.5<L>    Ca    8.8      06 Apr 2023 06:06  Mg     1.9     04-06    TPro  5.5<L>  /  Alb  2.3<L>  /  TBili  <0.2  /  DBili  x   /  AST  18  /  ALT  7   /  AlkPhos  78  04-06          Troponin T, Serum: 0.03 ng/mL *HH* (04-06-23 @ 11:32)      CARDIAC MARKERS ( 06 Apr 2023 11:32 )  x     / 0.03 ng/mL / x     / x     / x      CARDIAC MARKERS ( 05 Apr 2023 21:33 )  x     / 0.03 ng/mL / x     / x     / x      CARDIAC MARKERS ( 05 Apr 2023 12:07 )  x     / 0.03 ng/mL / x     / x     / x          RADIOLOGY:  < from: CT Abdomen and Pelvis w/ Oral Cont and w/ IV Cont (04.06.23 @ 16:06) >    IMPRESSION:    Sacral decubitus ulcer with osteomyelitis of the coccyx.    No acute intra-abdominal pathology.    Unchanged parapelvic cysts bilaterally with a 4 mm stone in the left mid   ureter without evidence of obstruction.    --- End of Report ---        JUDE DANIELS MD; Attending Radiologist  This document has been electronically signed. Apr  6 2023  4:55PM    < end of copied text >  < from: CT Chest w/ IV Cont (04.06.23 @ 16:06) >    IMPRESSION:    Sacral decubitus ulcer with osteomyelitis of the coccyx.    No acute intra-abdominal pathology.    Unchanged parapelvic cysts bilaterally with a 4 mm stone in the left mid   ureter without evidence of obstruction.    --- End of Report ---        JUDE DANIELS MD; Attending Radiologist  This document has been electronically signed. Apr 6 2023  4:55PM    < end of copied text >      PHYSICAL EXAM:  CONSTITUTIONAL: No acute distress, sitting up in bed this AM, conversant  HEENT: Atraumatic, normocephalic, neck supple, moist mucous membranes  PULMONARY: Clear to auscultation bilaterally  CARDIOVASCULAR: Regular rate and rhythm  GASTROINTESTINAL: Soft, non-tender, non-distended; bowel sounds present  MUSCULOSKELETAL: no LE edema, 2+ pulses b/l LE, left LE in CAM boot  NEURO: L hemiparesis, B/L UE spasticity  SKIN: Sacral ulcer - wound wrapped

## 2023-04-07 NOTE — CHART NOTE - NSCHARTNOTEFT_GEN_A_CORE
Spoke with pt's sister Emanuel [phone], gave her a medical update including most recent test results of CT and plan of care to continue ABx.

## 2023-04-07 NOTE — PROGRESS NOTE ADULT - ATTENDING COMMENTS
#Sepsis: c/w cefepime. flagyl, Vanco. Trough today evening.   CT abdo noted for SacralOM. No plan for surgical interevention

## 2023-04-07 NOTE — PROGRESS NOTE ADULT - ASSESSMENT
68 y/o woman with PMH of Right MCA stroke with left sided weakness was brought to the ED via EMS. In the ED, she had altered mental status and sepsis.    #Sepsis - new onset 4/2  #Sacral wound growing numerous Gram Positive Cocci in Pairs and Chains and numerous Gram Negative Rods  - 3/26 - febrile, tachy Sepsis w/up negative (CXR clear, BCx NGTD, UA neg)  - ID recs appreciated - monitor off ABx - repeat COVID if febrile  - Repeat RVP negative  - 4/2 sacral wound noted to be malodorous - low grade fever overnight as well as leukocytosis   - Burn eval appreciated  - Wound growing gram + cocci in pairs/chains as well as gram neg rods  - Prelim: Proteus mirabilis, Morganella morganii, Enterococcus raffinosus, Staph aureus  - ID recs appreciated - Start Vanc, Cefepime, Flagyl - 4/4  - Pt c/o pain in sacral area started morphine 2g q6H if still in pain can increase to 4g q6H  - Pt noted to have CP 4/4 overnight - EKG sinus tach - no CP complaints in AM but is complaining of pain in sacral area  - Burn recs appreciated  - CT Chest/Abd/Pelvis w/w/o Ct:     #Seizures  #Acute R Frontal Lobe Infarct likely 2/ hypotension   #Chronic R MCA infarct  - per chart: at baseline she is alert, able to communicate and is oriented. Ambulation at baseline: uses a wheelchair. She was in a hospital in [facility], [place] last fall and was lethargic for months  - 3/8: s/p rapid response for L gaze preference  - 3/20: Pt had event with L gaze preference during SS eval, pt made NPO. Examined by resident, no gaze preference present any longer. Pt responsive but less than baseline. Developed seizures post-event. Septic workup obtained. CXR appears clear, rest pending. --> 3/21 Pt more responsive this AM, expressing herself and in line with current baseline.  - Depakote level (3/4) 87 (WNL) -> (3/21) 74 (WNL)   - CTH/CTA of head/neck (3/4): no acute pathology, no evidence of occlusion, aneurysm or stenosis  - EEG (3/8) and video EEG (3/9): Focal slowing  - MRI Brain (3/15): new subcortical infarct in the right frontal lobe. Re-demonstrated chronic infarct in the right MCA territory.  - MRI of C spine (3/16): Significantly motion limited study. No gross cord compression or spinal cord edema demonstrated. Multilevel small disc bulges.  - Neuro eval'd 3/8-3/20: For L gaze preference/stroke, OP med review: on low dose Lamictal and Depakote in addition to Keppra (possible mood disorder/ seizures), Her MRI of brain showing subcortical infarct within same M1 territory. The casue of stroke is most likely hypotension as she has a diminutive Rt M1. However, MRI findings doesn't explain her B/L upper extremity spasticity. So, it is important to rule out cervical cord pathology. DAPT for 21 days followed by ASA 81 mg daily, Increased keppra post 3/20 possible seizure event  - BH eval'd 3/15: Delirium; pt does not have the capacity to choose a Health care agent  - SS re-juliette 3/21: Minced and moist, 1:1 feeds, thin liquids through straw  - Seizure precautions  - C/w Depakote 500mg TID and Keppra to 1500mg BID   - C/w baclofen 5mg bid (for UE spasticity)  - C/w ASA, Plavix (3/17-4/6 - completed), and high dose statin for new stroke - DAPT for 21 days followed by ASA 81 mg daily  - C/w Midodrine 10mg TID  - OP stroke clinic in 4 weeks after d/c per neurology    #Metabolic Encephalopathy from UTI  #Febrile 3/20  #Unstageable Sacral Ulcer  - 3/20: Pt became febrile after possible seizure episode, Cefepime, levaquin (x1 3/20), vancomycin  - 3/20 Septic workup: WBC (3/21) 12.26 (up from 8.23), BCx (3/20) NGTD, Procal (3/20) 0.05, RVP (3/20) COVID (-), Cortisol level, Lactate (3/21) WNL, UA nitrite (-), LEC (-), pending WBCs/Bacteria, UCx (3/21) NGTD  - Duplex LUE (3/10): (-) DVT (+) superficial cephalic vein thrombosis  - Duplex BLLE (3/7): (-) DVT  - s/p Vanc (3/4x1, 3/20-3/22), Cefepime (x1 3/4, x1 3/8), Ceftriaxone (3/4-3/6) (for UTI)  - Duplex BLLE: negative for DVT  - ID eval'd 3/23  - Burn eval'd 3/22 - No intervention, wound care recs noted  - Cefepime completed 3/24 (per ID)    #Left avulsion fracture of medial malleolus  - Duplex BL LE (3/7): (-) for DVT  - CT Foot (3/11): acute minimally displaced avulsion fracture at the medial malleolus  - Podiatry following for foot pain: WBAT w/ surgical shoe, ankle brace, PT  - fall precautions  - pain control regimen - if pain uncontrolled consider increasing percocet to 2tabs.  - C/w gabapentin at 100mg TID (Lowered dose from home, increase as pt tolerates)    #Sinus tachycardia - now resolved - attributed to pain  - TSH (3/8) WNL, - EKG reviewed    #Left cephalic vein thrombosis  - Duplex LUE (3/10): No DVT however there is superficial thrombosis noted in the left cephalic vein  - s/p warm compress  - Repeat duplex ordered 3/27 - negative for UE and LE DVT    #Anemia - normocytic  - possibly due to frequent phlebotomy, no signs of active bleed - monitor    #Misc  - DVT ppx - lovenox  - Diet: Soft and bite sized (SS 3/7)  - full code, overall guarded prognosis    INCOMPLETE 68 y/o woman with PMH of Right MCA stroke with left sided weakness was brought to the ED via EMS. In the ED, she had altered mental status and sepsis.    #Sepsis - new onset 4/2  #Sacral wound growing numerous Gram Positive Cocci in Pairs and Chains and numerous Gram Negative Rods  - 3/26 - febrile, tachy Sepsis w/up negative (CXR clear, BCx NGTD, UA neg)  - ID recs appreciated - monitor off ABx - repeat COVID if febrile  - Repeat RVP negative  - 4/2 sacral wound noted to be malodorous - low grade fever overnight as well as leukocytosis   - Burn eval appreciated  - Wound growing gram + cocci in pairs/chains as well as gram neg rods  - Prelim: Proteus mirabilis, Morganella morganii, Enterococcus raffinosus, Staph aureus  - ID recs appreciated - Start Vanc, Cefepime, Flagyl - 4/4  - Pt c/o pain in sacral area started morphine 2g q6H if still in pain can increase to 4g q6H  - Pt noted to have CP 4/4 overnight - EKG sinus tach - no CP complaints in AM but is complaining of pain in sacral area  - Burn recs appreciated  - CT Chest/Abd/Pelvis w/w/o Ct: Sacral decubitus ulcer with osteomyelitis of the coccyx. No acute intra-abdominal pathology. Unchanged parapelvic cysts bilaterally with a 4 mm stone in the left mid ureter without evidence of obstruction.  - ID FU appreciated switched from meropenem back for cefepime and flagyl 4/7 as no ESBL in wound cx, No benefit of long term intravenous antibiotics for sacral osteomyelitis. Continue with debridement, nutritional support and offloading     #Seizures  #Acute R Frontal Lobe Infarct likely 2/ hypotension   #Chronic R MCA infarct  - per chart: at baseline she is alert, able to communicate and is oriented. Ambulation at baseline: uses a wheelchair. She was in a hospital in [facility] last fall and was lethargic for months  - 3/8: s/p rapid response for L gaze preference  - 3/20: Pt had event with L gaze preference during SS eval, pt made NPO. Examined by resident, no gaze preference present any longer. Pt responsive but less than baseline. Developed seizures post-event. Septic workup obtained. CXR appears clear, rest pending. --> 3/21 Pt more responsive this AM, expressing herself and in line with current baseline.  - Depakote level (3/4) 87 (WNL) -> (3/21) 74 (WNL)   - CTH/CTA of head/neck (3/4): no acute pathology, no evidence of occlusion, aneurysm or stenosis  - EEG (3/8) and video EEG (3/9): Focal slowing  - MRI Brain (3/15): new subcortical infarct in the right frontal lobe. Re-demonstrated chronic infarct in the right MCA territory.  - MRI of C spine (3/16): Significantly motion limited study. No gross cord compression or spinal cord edema demonstrated. Multilevel small disc bulges.  - Neuro eval'd 3/8-3/20: For L gaze preference/stroke, OP med review: on low dose Lamictal and Depakote in addition to Keppra (possible mood disorder/ seizures), Her MRI of brain showing subcortical infarct within same M1 territory. The casue of stroke is most likely hypotension as she has a diminutive Rt M1. However, MRI findings doesn't explain her B/L upper extremity spasticity. So, it is important to rule out cervical cord pathology. DAPT for 21 days followed by ASA 81 mg daily, Increased keppra post 3/20 possible seizure event  - BH eval'd 3/15: Delirium; pt does not have the capacity to choose a Health care agent  - SS re-juliette 3/21: Minced and moist, 1:1 feeds, thin liquids through straw  - Seizure precautions  - C/w Depakote 500mg TID and Keppra to 1500mg BID   - C/w baclofen 5mg bid (for UE spasticity)  - C/w ASA, Plavix (3/17-4/6 - completed), and high dose statin for new stroke - DAPT for 21 days followed by ASA 81 mg daily  - C/w Midodrine 10mg TID  - OP stroke clinic in 4 weeks after d/c per neurology    #Metabolic Encephalopathy from UTI  #Febrile 3/20  #Unstageable Sacral Ulcer  - 3/20: Pt became febrile after possible seizure episode, Cefepime, levaquin (x1 3/20), vancomycin  - 3/20 Septic workup: WBC (3/21) 12.26 (up from 8.23), BCx (3/20) NGTD, Procal (3/20) 0.05, RVP (3/20) COVID (-), Cortisol level, Lactate (3/21) WNL, UA nitrite (-), LEC (-), pending WBCs/Bacteria, UCx (3/21) NGTD  - Duplex LUE (3/10): (-) DVT (+) superficial cephalic vein thrombosis  - Duplex BLLE (3/7): (-) DVT  - s/p Vanc (3/4x1, 3/20-3/22), Cefepime (x1 3/4, x1 3/8), Ceftriaxone (3/4-3/6) (for UTI)  - Duplex BLLE: negative for DVT  - ID eval'd 3/23  - Burn eval'd 3/22 - No intervention, wound care recs noted  - Cefepime completed 3/24 (per ID)    #Left avulsion fracture of medial malleolus  - Duplex BL LE (3/7): (-) for DVT  - CT Foot (3/11): acute minimally displaced avulsion fracture at the medial malleolus  - Podiatry following for foot pain: WBAT w/ surgical shoe, ankle brace, PT  - fall precautions  - pain control regimen - if pain uncontrolled consider increasing percocet to 2tabs.  - C/w gabapentin at 100mg TID (Lowered dose from home, increase as pt tolerates)    #Sinus tachycardia - now resolved - attributed to pain  - TSH (3/8) WNL, - EKG reviewed    #Left cephalic vein thrombosis  - Duplex LUE (3/10): No DVT however there is superficial thrombosis noted in the left cephalic vein  - s/p warm compress  - Repeat duplex ordered 3/27 - negative for UE and LE DVT    #Anemia - normocytic  - possibly due to frequent phlebotomy, no signs of active bleed - monitor    #Misc  - DVT ppx - lovenox  - Diet: Soft and bite sized (SS 3/7)  - full code, overall guarded prognosis

## 2023-04-08 LAB
ALBUMIN SERPL ELPH-MCNC: 2.5 G/DL — LOW (ref 3.5–5.2)
ALP SERPL-CCNC: 91 U/L — SIGNIFICANT CHANGE UP (ref 30–115)
ALT FLD-CCNC: <5 U/L — SIGNIFICANT CHANGE UP (ref 0–41)
ANION GAP SERPL CALC-SCNC: 10 MMOL/L — SIGNIFICANT CHANGE UP (ref 7–14)
AST SERPL-CCNC: 13 U/L — SIGNIFICANT CHANGE UP (ref 0–41)
BASOPHILS # BLD AUTO: 0.09 K/UL — SIGNIFICANT CHANGE UP (ref 0–0.2)
BASOPHILS NFR BLD AUTO: 0.5 % — SIGNIFICANT CHANGE UP (ref 0–1)
BILIRUB SERPL-MCNC: <0.2 MG/DL — SIGNIFICANT CHANGE UP (ref 0.2–1.2)
BLD GP AB SCN SERPL QL: SIGNIFICANT CHANGE UP
BUN SERPL-MCNC: 10 MG/DL — SIGNIFICANT CHANGE UP (ref 10–20)
CALCIUM SERPL-MCNC: 8.9 MG/DL — SIGNIFICANT CHANGE UP (ref 8.4–10.5)
CHLORIDE SERPL-SCNC: 102 MMOL/L — SIGNIFICANT CHANGE UP (ref 98–110)
CO2 SERPL-SCNC: 26 MMOL/L — SIGNIFICANT CHANGE UP (ref 17–32)
CREAT SERPL-MCNC: <0.5 MG/DL — LOW (ref 0.7–1.5)
EGFR: 117 ML/MIN/1.73M2 — SIGNIFICANT CHANGE UP
EOSINOPHIL # BLD AUTO: 0.31 K/UL — SIGNIFICANT CHANGE UP (ref 0–0.7)
EOSINOPHIL NFR BLD AUTO: 1.8 % — SIGNIFICANT CHANGE UP (ref 0–8)
GLUCOSE BLDC GLUCOMTR-MCNC: 119 MG/DL — HIGH (ref 70–99)
GLUCOSE SERPL-MCNC: 101 MG/DL — HIGH (ref 70–99)
HCT VFR BLD CALC: 22.1 % — LOW (ref 37–47)
HGB BLD-MCNC: 7 G/DL — LOW (ref 12–16)
IMM GRANULOCYTES NFR BLD AUTO: 0.9 % — HIGH (ref 0.1–0.3)
LYMPHOCYTES # BLD AUTO: 23.3 % — SIGNIFICANT CHANGE UP (ref 20.5–51.1)
LYMPHOCYTES # BLD AUTO: 4.08 K/UL — HIGH (ref 1.2–3.4)
MAGNESIUM SERPL-MCNC: 2 MG/DL — SIGNIFICANT CHANGE UP (ref 1.8–2.4)
MCHC RBC-ENTMCNC: 28.2 PG — SIGNIFICANT CHANGE UP (ref 27–31)
MCHC RBC-ENTMCNC: 31.7 G/DL — LOW (ref 32–37)
MCV RBC AUTO: 89.1 FL — SIGNIFICANT CHANGE UP (ref 81–99)
MONOCYTES # BLD AUTO: 2.26 K/UL — HIGH (ref 0.1–0.6)
MONOCYTES NFR BLD AUTO: 12.9 % — HIGH (ref 1.7–9.3)
NEUTROPHILS # BLD AUTO: 10.64 K/UL — HIGH (ref 1.4–6.5)
NEUTROPHILS NFR BLD AUTO: 60.6 % — SIGNIFICANT CHANGE UP (ref 42.2–75.2)
NRBC # BLD: 0 /100 WBCS — SIGNIFICANT CHANGE UP (ref 0–0)
PLATELET # BLD AUTO: 737 K/UL — HIGH (ref 130–400)
POTASSIUM SERPL-MCNC: 5.1 MMOL/L — HIGH (ref 3.5–5)
POTASSIUM SERPL-SCNC: 5.1 MMOL/L — HIGH (ref 3.5–5)
PROT SERPL-MCNC: 5.5 G/DL — LOW (ref 6–8)
RBC # BLD: 2.48 M/UL — LOW (ref 4.2–5.4)
RBC # FLD: 14.3 % — SIGNIFICANT CHANGE UP (ref 11.5–14.5)
SODIUM SERPL-SCNC: 138 MMOL/L — SIGNIFICANT CHANGE UP (ref 135–146)
WBC # BLD: 17.54 K/UL — HIGH (ref 4.8–10.8)
WBC # FLD AUTO: 17.54 K/UL — HIGH (ref 4.8–10.8)

## 2023-04-08 PROCEDURE — 99232 SBSQ HOSP IP/OBS MODERATE 35: CPT

## 2023-04-08 RX ORDER — MORPHINE SULFATE 50 MG/1
4 CAPSULE, EXTENDED RELEASE ORAL EVERY 6 HOURS
Refills: 0 | Status: DISCONTINUED | OUTPATIENT
Start: 2023-04-08 | End: 2023-04-15

## 2023-04-08 RX ADMIN — ATORVASTATIN CALCIUM 40 MILLIGRAM(S): 80 TABLET, FILM COATED ORAL at 21:28

## 2023-04-08 RX ADMIN — LEVETIRACETAM 1500 MILLIGRAM(S): 250 TABLET, FILM COATED ORAL at 05:21

## 2023-04-08 RX ADMIN — Medication 1 APPLICATION(S): at 05:08

## 2023-04-08 RX ADMIN — Medication 500 MILLIGRAM(S): at 05:06

## 2023-04-08 RX ADMIN — GABAPENTIN 100 MILLIGRAM(S): 400 CAPSULE ORAL at 05:06

## 2023-04-08 RX ADMIN — Medication 1 APPLICATION(S): at 05:16

## 2023-04-08 RX ADMIN — Medication 1 TABLET(S): at 12:37

## 2023-04-08 RX ADMIN — Medication 1 APPLICATION(S): at 18:22

## 2023-04-08 RX ADMIN — Medication 166.67 MILLIGRAM(S): at 18:11

## 2023-04-08 RX ADMIN — ENOXAPARIN SODIUM 40 MILLIGRAM(S): 100 INJECTION SUBCUTANEOUS at 21:28

## 2023-04-08 RX ADMIN — MORPHINE SULFATE 4 MILLIGRAM(S): 50 CAPSULE, EXTENDED RELEASE ORAL at 13:11

## 2023-04-08 RX ADMIN — MIDODRINE HYDROCHLORIDE 10 MILLIGRAM(S): 2.5 TABLET ORAL at 12:37

## 2023-04-08 RX ADMIN — Medication 1 APPLICATION(S): at 18:30

## 2023-04-08 RX ADMIN — GABAPENTIN 100 MILLIGRAM(S): 400 CAPSULE ORAL at 13:11

## 2023-04-08 RX ADMIN — Medication 5 MILLIGRAM(S): at 18:14

## 2023-04-08 RX ADMIN — CEFEPIME 100 MILLIGRAM(S): 1 INJECTION, POWDER, FOR SOLUTION INTRAMUSCULAR; INTRAVENOUS at 05:09

## 2023-04-08 RX ADMIN — MIDODRINE HYDROCHLORIDE 10 MILLIGRAM(S): 2.5 TABLET ORAL at 18:12

## 2023-04-08 RX ADMIN — MORPHINE SULFATE 4 MILLIGRAM(S): 50 CAPSULE, EXTENDED RELEASE ORAL at 22:36

## 2023-04-08 RX ADMIN — Medication 500 MILLIGRAM(S): at 05:09

## 2023-04-08 RX ADMIN — GABAPENTIN 100 MILLIGRAM(S): 400 CAPSULE ORAL at 21:28

## 2023-04-08 RX ADMIN — Medication 166.67 MILLIGRAM(S): at 06:16

## 2023-04-08 RX ADMIN — Medication 5 MILLIGRAM(S): at 05:06

## 2023-04-08 RX ADMIN — Medication 500 MILLIGRAM(S): at 12:38

## 2023-04-08 RX ADMIN — CEFEPIME 100 MILLIGRAM(S): 1 INJECTION, POWDER, FOR SOLUTION INTRAMUSCULAR; INTRAVENOUS at 18:12

## 2023-04-08 RX ADMIN — MORPHINE SULFATE 4 MILLIGRAM(S): 50 CAPSULE, EXTENDED RELEASE ORAL at 22:06

## 2023-04-08 RX ADMIN — Medication 500 MILLIGRAM(S): at 13:11

## 2023-04-08 RX ADMIN — Medication 500 MILLIGRAM(S): at 21:29

## 2023-04-08 RX ADMIN — LEVETIRACETAM 1500 MILLIGRAM(S): 250 TABLET, FILM COATED ORAL at 18:12

## 2023-04-08 RX ADMIN — Medication 650 MILLIGRAM(S): at 07:29

## 2023-04-08 RX ADMIN — Medication 81 MILLIGRAM(S): at 12:37

## 2023-04-08 RX ADMIN — Medication 500 MILLIGRAM(S): at 21:28

## 2023-04-08 RX ADMIN — Medication 1 APPLICATION(S): at 05:12

## 2023-04-08 RX ADMIN — MORPHINE SULFATE 2 MILLIGRAM(S): 50 CAPSULE, EXTENDED RELEASE ORAL at 05:19

## 2023-04-08 RX ADMIN — MORPHINE SULFATE 2 MILLIGRAM(S): 50 CAPSULE, EXTENDED RELEASE ORAL at 05:14

## 2023-04-08 NOTE — PROGRESS NOTE ADULT - ASSESSMENT
66 y/o woman with PMH of Right MCA stroke with left sided weakness was brought to the ED via EMS. In the ED, she had altered mental status and sepsis.    #Sepsis - new onset 4/2  #Sacral wound growing numerous Gram Positive Cocci in Pairs and Chains and numerous Gram Negative Rods  - 3/26 - febrile, tachy Sepsis w/up negative (CXR clear, BCx NGTD, UA neg)  - ID recs appreciated - monitor off ABx - repeat COVID if febrile  - Repeat RVP negative  - 4/2 sacral wound noted to be malodorous - low grade fever overnight as well as leukocytosis   - Burn eval appreciated  - Wound growing gram + cocci in pairs/chains as well as gram neg rods  - Prelim: Proteus mirabilis, Morganella morganii, Enterococcus raffinosus, Staph aureus  - ID recs appreciated - Start Vanc, Cefepime, Flagyl - 4/4  - Pt c/o pain in sacral area started morphine 2g q6H if still in pain can increase to 4g q6H  - Pt noted to have CP 4/4 overnight - EKG sinus tach - no CP complaints in AM but is complaining of pain in sacral area  - Burn recs appreciated  - CT Chest/Abd/Pelvis w/w/o Ct: Sacral decubitus ulcer with osteomyelitis of the coccyx. No acute intra-abdominal pathology. Unchanged parapelvic cysts bilaterally with a 4 mm stone in the left mid ureter without evidence of obstruction.  - ID FU appreciated switched from meropenem back for cefepime and flagyl 4/7 as no ESBL in wound cx, No benefit of long term intravenous antibiotics for sacral osteomyelitis. Continue with debridement, nutritional support and offloading   4/8: c/w Cefepime/flagyl/Vanco. WBC still high. monitor anemia.     #Seizures  #Acute R Frontal Lobe Infarct likely 2/ hypotension   #Chronic R MCA infarct  - per chart: at baseline she is alert, able to communicate and is oriented. Ambulation at baseline: uses a wheelchair. She was in a hospital in [facility] last fall and was lethargic for months  - 3/8: s/p rapid response for L gaze preference  - 3/20: Pt had event with L gaze preference during SS eval, pt made NPO. Examined by resident, no gaze preference present any longer. Pt responsive but less than baseline. Developed seizures post-event. Septic workup obtained. CXR appears clear, rest pending. --> 3/21 Pt more responsive this AM, expressing herself and in line with current baseline.  - Depakote level (3/4) 87 (WNL) -> (3/21) 74 (WNL)   - CTH/CTA of head/neck (3/4): no acute pathology, no evidence of occlusion, aneurysm or stenosis  - EEG (3/8) and video EEG (3/9): Focal slowing  - MRI Brain (3/15): new subcortical infarct in the right frontal lobe. Re-demonstrated chronic infarct in the right MCA territory.  - MRI of C spine (3/16): Significantly motion limited study. No gross cord compression or spinal cord edema demonstrated. Multilevel small disc bulges.  - Neuro eval'd 3/8-3/20: For L gaze preference/stroke, OP med review: on low dose Lamictal and Depakote in addition to Keppra (possible mood disorder/ seizures), Her MRI of brain showing subcortical infarct within same M1 territory. The casue of stroke is most likely hypotension as she has a diminutive Rt M1. However, MRI findings doesn't explain her B/L upper extremity spasticity. So, it is important to rule out cervical cord pathology. DAPT for 21 days followed by ASA 81 mg daily, Increased keppra post 3/20 possible seizure event  -  eval'd 3/15: Delirium; pt does not have the capacity to choose a Health care agent  - SS re-juliette 3/21: Minced and moist, 1:1 feeds, thin liquids through straw  - Seizure precautions  - C/w Depakote 500mg TID and Keppra to 1500mg BID   - C/w baclofen 5mg bid (for UE spasticity)  - C/w ASA, Plavix (3/17-4/6 - completed), and high dose statin for new stroke - DAPT for 21 days followed by ASA 81 mg daily  - C/w Midodrine 10mg TID  - OP stroke clinic in 4 weeks after d/c per neurology    #Metabolic Encephalopathy from UTI  #Febrile 3/20  #Unstageable Sacral Ulcer  - 3/20: Pt became febrile after possible seizure episode, Cefepime, levaquin (x1 3/20), vancomycin  - 3/20 Septic workup: WBC (3/21) 12.26 (up from 8.23), BCx (3/20) NGTD, Procal (3/20) 0.05, RVP (3/20) COVID (-), Cortisol level, Lactate (3/21) WNL, UA nitrite (-), LEC (-), pending WBCs/Bacteria, UCx (3/21) NGTD  - Duplex LUE (3/10): (-) DVT (+) superficial cephalic vein thrombosis  - Duplex BLLE (3/7): (-) DVT  - s/p Vanc (3/4x1, 3/20-3/22), Cefepime (x1 3/4, x1 3/8), Ceftriaxone (3/4-3/6) (for UTI)  - Duplex BLLE: negative for DVT  - ID eval'd 3/23  - Burn eval'd 3/22 - No intervention, wound care recs noted  - Cefepime completed 3/24 (per ID)    #Left avulsion fracture of medial malleolus  - Duplex BL LE (3/7): (-) for DVT  - CT Foot (3/11): acute minimally displaced avulsion fracture at the medial malleolus  - Podiatry following for foot pain: WBAT w/ surgical shoe, ankle brace, PT  - fall precautions  - pain control regimen - if pain uncontrolled consider increasing percocet to 2tabs.  - C/w gabapentin at 100mg TID (Lowered dose from home, increase as pt tolerates)    #Sinus tachycardia - now resolved - attributed to pain  - TSH (3/8) WNL, - EKG reviewed    #Left cephalic vein thrombosis  - Duplex LUE (3/10): No DVT however there is superficial thrombosis noted in the left cephalic vein  - s/p warm compress  - Repeat duplex ordered 3/27 - negative for UE and LE DVT    #Anemia - normocytic  - possibly due to frequent phlebotomy, no signs of active bleed - monitor    #Misc  - DVT ppx - lovenox  - Diet: Soft and bite sized (SS 3/7)  - full code, overall guarded prognosis    c/w cefepime. flagyl, Vanco. monitor CBC. pain control   no

## 2023-04-08 NOTE — PROGRESS NOTE ADULT - SUBJECTIVE AND OBJECTIVE BOX
S: seems withdrawn/depressed. eyes open.  answers questions.      All other pertinent ROS negative.      Vital Signs Last 24 Hrs  T(C): 37.4 (08 Apr 2023 13:00), Max: 37.5 (07 Apr 2023 20:00)  T(F): 99.4 (08 Apr 2023 13:00), Max: 99.5 (07 Apr 2023 20:00)  HR: 120 (08 Apr 2023 07:04) (89 - 139)  BP: 138/67 (08 Apr 2023 05:30) (99/56 - 138/67)  BP(mean): --  RR: 18 (08 Apr 2023 05:30) (17 - 18)  SpO2: 99% (08 Apr 2023 05:30) (98% - 99%)    Parameters below as of 08 Apr 2023 05:30  Patient On (Oxygen Delivery Method): room air      PHYSICAL EXAM:    Constitutional: NAD, as above  HEENT: PERR, EOMI, Normal Hearing, MMM  Neck: Soft and supple, No LAD, No JVD  Respiratory: Breath sounds are clear bilaterally, No wheezing, rales or rhonchi  Cardiovascular: S1 and S2, regular rate and rhythm, no Murmurs, gallops or rubs  Gastrointestinal: Bowel Sounds present, soft, nontender, nondistended, no guarding, no rebound  Extremities: No peripheral edema  Neuro exam: unchanged.    MEDICATIONS:  MEDICATIONS  (STANDING):  acetaminophen  Suppository .. 325 milliGRAM(s) Rectal once  ascorbic acid 500 milliGRAM(s) Oral daily  aspirin  chewable 81 milliGRAM(s) Oral daily  atorvastatin 40 milliGRAM(s) Oral at bedtime  baclofen 5 milliGRAM(s) Oral every 12 hours  cefepime   IVPB      cefepime   IVPB 2000 milliGRAM(s) IV Intermittent every 12 hours  collagenase Ointment 1 Application(s) Topical two times a day  Dakins Solution - 1/2 Strength 1 Application(s) Topical two times a day  enoxaparin Injectable 40 milliGRAM(s) SubCutaneous every 24 hours  gabapentin 100 milliGRAM(s) Oral three times a day  levETIRAcetam  Solution 1500 milliGRAM(s) Oral two times a day  metroNIDAZOLE    Tablet 500 milliGRAM(s) Oral every 8 hours  midodrine. 10 milliGRAM(s) Oral three times a day  multivitamin/minerals 1 Tablet(s) Oral daily  valproic  acid Syrup 500 milliGRAM(s) Oral three times a day  vancomycin  IVPB 1250 milliGRAM(s) IV Intermittent every 12 hours  vitamin A &amp; D Ointment 1 Application(s) Topical two times a day      LABS: All Labs Reviewed:                        7.0    17.54 )-----------( 737      ( 08 Apr 2023 05:56 )             22.1     04-08    138  |  102  |  10  ----------------------------<  101<H>  5.1<H>   |  26  |  <0.5<L>    Ca    8.9      08 Apr 2023 05:56  Mg     2.0     04-08    TPro  5.5<L>  /  Alb  2.5<L>  /  TBili  <0.2  /  DBili  x   /  AST  13  /  ALT  <5  /  AlkPhos  91  04-08          Blood Culture: 04-04 @ 08:40  Organism Proteus mirabilis  Gram Stain Blood -- Gram Stain   No polymorphonuclear leukocytes per low power field  Numerous Gram Positive Cocci in Pairs and Chains per oil power field  Numerous Gram Negative Rods per oil power field  Specimen Source .Tissue sacrum  Culture-Blood --    04-04 @ 06:35  Organism --  Gram Stain Blood -- Gram Stain --  Specimen Source .Blood Blood  Culture-Blood --        Radiology: reviewed

## 2023-04-09 LAB
ALBUMIN SERPL ELPH-MCNC: 2.4 G/DL — LOW (ref 3.5–5.2)
ALP SERPL-CCNC: 95 U/L — SIGNIFICANT CHANGE UP (ref 30–115)
ALT FLD-CCNC: <5 U/L — SIGNIFICANT CHANGE UP (ref 0–41)
ANION GAP SERPL CALC-SCNC: 9 MMOL/L — SIGNIFICANT CHANGE UP (ref 7–14)
AST SERPL-CCNC: 14 U/L — SIGNIFICANT CHANGE UP (ref 0–41)
BASOPHILS # BLD AUTO: 0.1 K/UL — SIGNIFICANT CHANGE UP (ref 0–0.2)
BASOPHILS NFR BLD AUTO: 0.5 % — SIGNIFICANT CHANGE UP (ref 0–1)
BILIRUB SERPL-MCNC: <0.2 MG/DL — SIGNIFICANT CHANGE UP (ref 0.2–1.2)
BUN SERPL-MCNC: 11 MG/DL — SIGNIFICANT CHANGE UP (ref 10–20)
CALCIUM SERPL-MCNC: 9.1 MG/DL — SIGNIFICANT CHANGE UP (ref 8.4–10.5)
CHLORIDE SERPL-SCNC: 100 MMOL/L — SIGNIFICANT CHANGE UP (ref 98–110)
CO2 SERPL-SCNC: 27 MMOL/L — SIGNIFICANT CHANGE UP (ref 17–32)
CREAT SERPL-MCNC: <0.5 MG/DL — LOW (ref 0.7–1.5)
CULTURE RESULTS: SIGNIFICANT CHANGE UP
EGFR: 117 ML/MIN/1.73M2 — SIGNIFICANT CHANGE UP
EOSINOPHIL # BLD AUTO: 0.19 K/UL — SIGNIFICANT CHANGE UP (ref 0–0.7)
EOSINOPHIL NFR BLD AUTO: 1 % — SIGNIFICANT CHANGE UP (ref 0–8)
GLUCOSE SERPL-MCNC: 118 MG/DL — HIGH (ref 70–99)
HCT VFR BLD CALC: 22.4 % — LOW (ref 37–47)
HGB BLD-MCNC: 7.2 G/DL — LOW (ref 12–16)
IMM GRANULOCYTES NFR BLD AUTO: 0.9 % — HIGH (ref 0.1–0.3)
LYMPHOCYTES # BLD AUTO: 21.5 % — SIGNIFICANT CHANGE UP (ref 20.5–51.1)
LYMPHOCYTES # BLD AUTO: 3.97 K/UL — HIGH (ref 1.2–3.4)
MAGNESIUM SERPL-MCNC: 2 MG/DL — SIGNIFICANT CHANGE UP (ref 1.8–2.4)
MCHC RBC-ENTMCNC: 28.6 PG — SIGNIFICANT CHANGE UP (ref 27–31)
MCHC RBC-ENTMCNC: 32.1 G/DL — SIGNIFICANT CHANGE UP (ref 32–37)
MCV RBC AUTO: 88.9 FL — SIGNIFICANT CHANGE UP (ref 81–99)
MONOCYTES # BLD AUTO: 1.95 K/UL — HIGH (ref 0.1–0.6)
MONOCYTES NFR BLD AUTO: 10.6 % — HIGH (ref 1.7–9.3)
NEUTROPHILS # BLD AUTO: 12.08 K/UL — HIGH (ref 1.4–6.5)
NEUTROPHILS NFR BLD AUTO: 65.5 % — SIGNIFICANT CHANGE UP (ref 42.2–75.2)
NRBC # BLD: 0 /100 WBCS — SIGNIFICANT CHANGE UP (ref 0–0)
PLATELET # BLD AUTO: 734 K/UL — HIGH (ref 130–400)
POTASSIUM SERPL-MCNC: 4.7 MMOL/L — SIGNIFICANT CHANGE UP (ref 3.5–5)
POTASSIUM SERPL-SCNC: 4.7 MMOL/L — SIGNIFICANT CHANGE UP (ref 3.5–5)
PROT SERPL-MCNC: 5.6 G/DL — LOW (ref 6–8)
RBC # BLD: 2.52 M/UL — LOW (ref 4.2–5.4)
RBC # FLD: 14.2 % — SIGNIFICANT CHANGE UP (ref 11.5–14.5)
SODIUM SERPL-SCNC: 136 MMOL/L — SIGNIFICANT CHANGE UP (ref 135–146)
SPECIMEN SOURCE: SIGNIFICANT CHANGE UP
WBC # BLD: 18.45 K/UL — HIGH (ref 4.8–10.8)
WBC # FLD AUTO: 18.45 K/UL — HIGH (ref 4.8–10.8)

## 2023-04-09 PROCEDURE — 99232 SBSQ HOSP IP/OBS MODERATE 35: CPT

## 2023-04-09 RX ADMIN — Medication 500 MILLIGRAM(S): at 21:27

## 2023-04-09 RX ADMIN — Medication 5 MILLIGRAM(S): at 18:39

## 2023-04-09 RX ADMIN — Medication 166.67 MILLIGRAM(S): at 05:43

## 2023-04-09 RX ADMIN — ATORVASTATIN CALCIUM 40 MILLIGRAM(S): 80 TABLET, FILM COATED ORAL at 21:28

## 2023-04-09 RX ADMIN — MIDODRINE HYDROCHLORIDE 10 MILLIGRAM(S): 2.5 TABLET ORAL at 18:55

## 2023-04-09 RX ADMIN — Medication 5 MILLIGRAM(S): at 05:26

## 2023-04-09 RX ADMIN — Medication 1 APPLICATION(S): at 18:55

## 2023-04-09 RX ADMIN — LEVETIRACETAM 1500 MILLIGRAM(S): 250 TABLET, FILM COATED ORAL at 18:42

## 2023-04-09 RX ADMIN — CEFEPIME 100 MILLIGRAM(S): 1 INJECTION, POWDER, FOR SOLUTION INTRAMUSCULAR; INTRAVENOUS at 05:28

## 2023-04-09 RX ADMIN — MORPHINE SULFATE 4 MILLIGRAM(S): 50 CAPSULE, EXTENDED RELEASE ORAL at 22:01

## 2023-04-09 RX ADMIN — Medication 500 MILLIGRAM(S): at 05:27

## 2023-04-09 RX ADMIN — Medication 1 APPLICATION(S): at 05:28

## 2023-04-09 RX ADMIN — LEVETIRACETAM 1500 MILLIGRAM(S): 250 TABLET, FILM COATED ORAL at 05:26

## 2023-04-09 RX ADMIN — Medication 500 MILLIGRAM(S): at 05:26

## 2023-04-09 RX ADMIN — Medication 81 MILLIGRAM(S): at 12:53

## 2023-04-09 RX ADMIN — Medication 500 MILLIGRAM(S): at 12:54

## 2023-04-09 RX ADMIN — Medication 500 MILLIGRAM(S): at 13:07

## 2023-04-09 RX ADMIN — CEFEPIME 100 MILLIGRAM(S): 1 INJECTION, POWDER, FOR SOLUTION INTRAMUSCULAR; INTRAVENOUS at 18:43

## 2023-04-09 RX ADMIN — Medication 500 MILLIGRAM(S): at 13:04

## 2023-04-09 RX ADMIN — MIDODRINE HYDROCHLORIDE 10 MILLIGRAM(S): 2.5 TABLET ORAL at 12:53

## 2023-04-09 RX ADMIN — GABAPENTIN 100 MILLIGRAM(S): 400 CAPSULE ORAL at 13:07

## 2023-04-09 RX ADMIN — MIDODRINE HYDROCHLORIDE 10 MILLIGRAM(S): 2.5 TABLET ORAL at 05:27

## 2023-04-09 RX ADMIN — Medication 1 TABLET(S): at 12:53

## 2023-04-09 RX ADMIN — Medication 166.67 MILLIGRAM(S): at 18:43

## 2023-04-09 RX ADMIN — Medication 1 APPLICATION(S): at 05:44

## 2023-04-09 RX ADMIN — Medication 1 APPLICATION(S): at 18:39

## 2023-04-09 RX ADMIN — Medication 1 APPLICATION(S): at 18:53

## 2023-04-09 RX ADMIN — GABAPENTIN 100 MILLIGRAM(S): 400 CAPSULE ORAL at 05:26

## 2023-04-09 RX ADMIN — ENOXAPARIN SODIUM 40 MILLIGRAM(S): 100 INJECTION SUBCUTANEOUS at 21:27

## 2023-04-09 RX ADMIN — MORPHINE SULFATE 4 MILLIGRAM(S): 50 CAPSULE, EXTENDED RELEASE ORAL at 21:46

## 2023-04-09 RX ADMIN — GABAPENTIN 100 MILLIGRAM(S): 400 CAPSULE ORAL at 21:28

## 2023-04-09 NOTE — PROGRESS NOTE ADULT - SUBJECTIVE AND OBJECTIVE BOX
S: withdrawn. O x 2      All other pertinent ROS negative.      Vital Signs Last 24 Hrs  T(C): 37.3 (09 Apr 2023 13:00), Max: 37.4 (08 Apr 2023 17:30)  T(F): 99.1 (09 Apr 2023 13:00), Max: 99.3 (08 Apr 2023 17:30)  HR: 100 (09 Apr 2023 12:00) (82 - 100)  BP: 98/54 (09 Apr 2023 12:00) (90/54 - 104/55)  BP(mean): --  RR: 17 (09 Apr 2023 12:00) (17 - 18)  SpO2: 96% (09 Apr 2023 12:00) (96% - 98%)    Parameters below as of 09 Apr 2023 05:24  Patient On (Oxygen Delivery Method): room air      PHYSICAL EXAM:    Constitutional: bed bound. Unchanged neuro exam w.r.t weakness.   HEENT: PERR, EOMI, Normal Hearing, MMM  Neck: Soft and supple, No LAD, No JVD  Respiratory: Breath sounds are clear bilaterally, No wheezing, rales or rhonchi  Cardiovascular: S1 and S2, regular rate and rhythm, no Murmurs, gallops or rubs  Gastrointestinal: Bowel Sounds present, soft, nontender, nondistended, no guarding, no rebound  Extremities: peripheral edema      MEDICATIONS:  MEDICATIONS  (STANDING):  acetaminophen  Suppository .. 325 milliGRAM(s) Rectal once  ascorbic acid 500 milliGRAM(s) Oral daily  aspirin  chewable 81 milliGRAM(s) Oral daily  atorvastatin 40 milliGRAM(s) Oral at bedtime  baclofen 5 milliGRAM(s) Oral every 12 hours  cefepime   IVPB      cefepime   IVPB 2000 milliGRAM(s) IV Intermittent every 12 hours  collagenase Ointment 1 Application(s) Topical two times a day  Dakins Solution - 1/2 Strength 1 Application(s) Topical two times a day  enoxaparin Injectable 40 milliGRAM(s) SubCutaneous every 24 hours  gabapentin 100 milliGRAM(s) Oral three times a day  levETIRAcetam  Solution 1500 milliGRAM(s) Oral two times a day  metroNIDAZOLE    Tablet 500 milliGRAM(s) Oral every 8 hours  midodrine. 10 milliGRAM(s) Oral three times a day  multivitamin/minerals 1 Tablet(s) Oral daily  valproic  acid Syrup 500 milliGRAM(s) Oral three times a day  vancomycin  IVPB 1250 milliGRAM(s) IV Intermittent every 12 hours  vitamin A &amp; D Ointment 1 Application(s) Topical two times a day      LABS: All Labs Reviewed:                        7.2    18.45 )-----------( 734      ( 09 Apr 2023 06:40 )             22.4     04-09    136  |  100  |  11  ----------------------------<  118<H>  4.7   |  27  |  <0.5<L>    Ca    9.1      09 Apr 2023 06:40  Mg     2.0     04-09    TPro  5.6<L>  /  Alb  2.4<L>  /  TBili  <0.2  /  DBili  x   /  AST  14  /  ALT  <5  /  AlkPhos  95  04-09          Blood Culture:     Radiology: reviewed

## 2023-04-09 NOTE — PROGRESS NOTE ADULT - ASSESSMENT
68 y/o woman with PMH of Right MCA stroke with left sided weakness was brought to the ED via EMS. In the ED, she had altered mental status and sepsis.    #Sepsis - new onset 4/2  #Sacral wound growing numerous Gram Positive Cocci in Pairs and Chains and numerous Gram Negative Rods  - 3/26 - febrile, tachy Sepsis w/up negative (CXR clear, BCx NGTD, UA neg)  - ID recs appreciated - monitor off ABx - repeat COVID if febrile  - Repeat RVP negative  - 4/2 sacral wound noted to be malodorous - low grade fever overnight as well as leukocytosis   - Burn eval appreciated  - Wound growing gram + cocci in pairs/chains as well as gram neg rods  - Prelim: Proteus mirabilis, Morganella morganii, Enterococcus raffinosus, Staph aureus  - ID recs appreciated - Start Vanc, Cefepime, Flagyl - 4/4  - Pt c/o pain in sacral area started morphine 2g q6H if still in pain can increase to 4g q6H  - Pt noted to have CP 4/4 overnight - EKG sinus tach - no CP complaints in AM but is complaining of pain in sacral area  - Burn recs appreciated  - CT Chest/Abd/Pelvis w/w/o Ct: Sacral decubitus ulcer with osteomyelitis of the coccyx. No acute intra-abdominal pathology. Unchanged parapelvic cysts bilaterally with a 4 mm stone in the left mid ureter without evidence of obstruction.  - ID FU appreciated switched from meropenem back for cefepime and flagyl 4/7 as no ESBL in wound cx, No benefit of long term intravenous antibiotics for sacral osteomyelitis. Continue with debridement, nutritional support and offloading   4/8: c/w Cefepime/flagyl/Vanco. WBC still high. monitor anemia.     #Seizures  #Acute R Frontal Lobe Infarct likely 2/ hypotension   #Chronic R MCA infarct  - per chart: at baseline she is alert, able to communicate and is oriented. Ambulation at baseline: uses a wheelchair. She was in a hospital in [facility] last fall and was lethargic for months  - 3/8: s/p rapid response for L gaze preference  - 3/20: Pt had event with L gaze preference during SS eval, pt made NPO. Examined by resident, no gaze preference present any longer. Pt responsive but less than baseline. Developed seizures post-event. Septic workup obtained. CXR appears clear, rest pending. --> 3/21 Pt more responsive this AM, expressing herself and in line with current baseline.  - Depakote level (3/4) 87 (WNL) -> (3/21) 74 (WNL)   - CTH/CTA of head/neck (3/4): no acute pathology, no evidence of occlusion, aneurysm or stenosis  - EEG (3/8) and video EEG (3/9): Focal slowing  - MRI Brain (3/15): new subcortical infarct in the right frontal lobe. Re-demonstrated chronic infarct in the right MCA territory.  - MRI of C spine (3/16): Significantly motion limited study. No gross cord compression or spinal cord edema demonstrated. Multilevel small disc bulges.  - Neuro eval'd 3/8-3/20: For L gaze preference/stroke, OP med review: on low dose Lamictal and Depakote in addition to Keppra (possible mood disorder/ seizures), Her MRI of brain showing subcortical infarct within same M1 territory. The casue of stroke is most likely hypotension as she has a diminutive Rt M1. However, MRI findings doesn't explain her B/L upper extremity spasticity. So, it is important to rule out cervical cord pathology. DAPT for 21 days followed by ASA 81 mg daily, Increased keppra post 3/20 possible seizure event  -  eval'd 3/15: Delirium; pt does not have the capacity to choose a Health care agent  - SS re-juliette 3/21: Minced and moist, 1:1 feeds, thin liquids through straw  - Seizure precautions  - C/w Depakote 500mg TID and Keppra to 1500mg BID   - C/w baclofen 5mg bid (for UE spasticity)  - C/w ASA, Plavix (3/17-4/6 - completed), and high dose statin for new stroke - DAPT for 21 days followed by ASA 81 mg daily  - C/w Midodrine 10mg TID  - OP stroke clinic in 4 weeks after d/c per neurology    #Metabolic Encephalopathy from UTI  #Febrile 3/20  #Unstageable Sacral Ulcer  - 3/20: Pt became febrile after possible seizure episode, Cefepime, levaquin (x1 3/20), vancomycin  - 3/20 Septic workup: WBC (3/21) 12.26 (up from 8.23), BCx (3/20) NGTD, Procal (3/20) 0.05, RVP (3/20) COVID (-), Cortisol level, Lactate (3/21) WNL, UA nitrite (-), LEC (-), pending WBCs/Bacteria, UCx (3/21) NGTD  - Duplex LUE (3/10): (-) DVT (+) superficial cephalic vein thrombosis  - Duplex BLLE (3/7): (-) DVT  - s/p Vanc (3/4x1, 3/20-3/22), Cefepime (x1 3/4, x1 3/8), Ceftriaxone (3/4-3/6) (for UTI)  - Duplex BLLE: negative for DVT  - ID eval'd 3/23  - Burn eval'd 3/22 - No intervention, wound care recs noted  - Cefepime completed 3/24 (per ID)    #Left avulsion fracture of medial malleolus  - Duplex BL LE (3/7): (-) for DVT  - CT Foot (3/11): acute minimally displaced avulsion fracture at the medial malleolus  - Podiatry following for foot pain: WBAT w/ surgical shoe, ankle brace, PT  - fall precautions  - pain control regimen - if pain uncontrolled consider increasing percocet to 2tabs.  - C/w gabapentin at 100mg TID (Lowered dose from home, increase as pt tolerates)    #Sinus tachycardia - now resolved - attributed to pain  - TSH (3/8) WNL, - EKG reviewed    #Left cephalic vein thrombosis  - Duplex LUE (3/10): No DVT however there is superficial thrombosis noted in the left cephalic vein  - s/p warm compress  - Repeat duplex ordered 3/27 - negative for UE and LE DVT    #Anemia - normocytic  - possibly due to frequent phlebotomy, no signs of active bleed - monitor    #Misc  - DVT ppx - lovenox  - Diet: Soft and bite sized (SS 3/7)  - full code, overall guarded prognosis    c/w cefepime. flagyl, Vanco. monitor CBC. pain control

## 2023-04-10 LAB
ALBUMIN SERPL ELPH-MCNC: 2.5 G/DL — LOW (ref 3.5–5.2)
ALP SERPL-CCNC: 96 U/L — SIGNIFICANT CHANGE UP (ref 30–115)
ALT FLD-CCNC: 8 U/L — SIGNIFICANT CHANGE UP (ref 0–41)
ANION GAP SERPL CALC-SCNC: 11 MMOL/L — SIGNIFICANT CHANGE UP (ref 7–14)
AST SERPL-CCNC: 19 U/L — SIGNIFICANT CHANGE UP (ref 0–41)
BASOPHILS # BLD AUTO: 0.11 K/UL — SIGNIFICANT CHANGE UP (ref 0–0.2)
BASOPHILS NFR BLD AUTO: 0.6 % — SIGNIFICANT CHANGE UP (ref 0–1)
BILIRUB SERPL-MCNC: <0.2 MG/DL — SIGNIFICANT CHANGE UP (ref 0.2–1.2)
BUN SERPL-MCNC: 10 MG/DL — SIGNIFICANT CHANGE UP (ref 10–20)
CALCIUM SERPL-MCNC: 9.4 MG/DL — SIGNIFICANT CHANGE UP (ref 8.4–10.5)
CHLORIDE SERPL-SCNC: 101 MMOL/L — SIGNIFICANT CHANGE UP (ref 98–110)
CO2 SERPL-SCNC: 27 MMOL/L — SIGNIFICANT CHANGE UP (ref 17–32)
CREAT SERPL-MCNC: <0.5 MG/DL — LOW (ref 0.7–1.5)
EGFR: 109 ML/MIN/1.73M2 — SIGNIFICANT CHANGE UP
EOSINOPHIL # BLD AUTO: 0.16 K/UL — SIGNIFICANT CHANGE UP (ref 0–0.7)
EOSINOPHIL NFR BLD AUTO: 0.9 % — SIGNIFICANT CHANGE UP (ref 0–8)
GLUCOSE SERPL-MCNC: 94 MG/DL — SIGNIFICANT CHANGE UP (ref 70–99)
HCT VFR BLD CALC: 23.6 % — LOW (ref 37–47)
HGB BLD-MCNC: 7.5 G/DL — LOW (ref 12–16)
IMM GRANULOCYTES NFR BLD AUTO: 0.8 % — HIGH (ref 0.1–0.3)
LYMPHOCYTES # BLD AUTO: 22.7 % — SIGNIFICANT CHANGE UP (ref 20.5–51.1)
LYMPHOCYTES # BLD AUTO: 3.91 K/UL — HIGH (ref 1.2–3.4)
MAGNESIUM SERPL-MCNC: 2.1 MG/DL — SIGNIFICANT CHANGE UP (ref 1.8–2.4)
MCHC RBC-ENTMCNC: 28.6 PG — SIGNIFICANT CHANGE UP (ref 27–31)
MCHC RBC-ENTMCNC: 31.8 G/DL — LOW (ref 32–37)
MCV RBC AUTO: 90.1 FL — SIGNIFICANT CHANGE UP (ref 81–99)
MONOCYTES # BLD AUTO: 1.9 K/UL — HIGH (ref 0.1–0.6)
MONOCYTES NFR BLD AUTO: 11 % — HIGH (ref 1.7–9.3)
NEUTROPHILS # BLD AUTO: 11.04 K/UL — HIGH (ref 1.4–6.5)
NEUTROPHILS NFR BLD AUTO: 64 % — SIGNIFICANT CHANGE UP (ref 42.2–75.2)
NRBC # BLD: 0 /100 WBCS — SIGNIFICANT CHANGE UP (ref 0–0)
PLATELET # BLD AUTO: 735 K/UL — HIGH (ref 130–400)
POTASSIUM SERPL-MCNC: 4.6 MMOL/L — SIGNIFICANT CHANGE UP (ref 3.5–5)
POTASSIUM SERPL-SCNC: 4.6 MMOL/L — SIGNIFICANT CHANGE UP (ref 3.5–5)
PROT SERPL-MCNC: 5.8 G/DL — LOW (ref 6–8)
RBC # BLD: 2.62 M/UL — LOW (ref 4.2–5.4)
RBC # FLD: 14.3 % — SIGNIFICANT CHANGE UP (ref 11.5–14.5)
SODIUM SERPL-SCNC: 139 MMOL/L — SIGNIFICANT CHANGE UP (ref 135–146)
WBC # BLD: 17.26 K/UL — HIGH (ref 4.8–10.8)
WBC # FLD AUTO: 17.26 K/UL — HIGH (ref 4.8–10.8)

## 2023-04-10 PROCEDURE — 99232 SBSQ HOSP IP/OBS MODERATE 35: CPT

## 2023-04-10 RX ADMIN — Medication 5 MILLIGRAM(S): at 17:06

## 2023-04-10 RX ADMIN — LEVETIRACETAM 1500 MILLIGRAM(S): 250 TABLET, FILM COATED ORAL at 05:16

## 2023-04-10 RX ADMIN — ATORVASTATIN CALCIUM 40 MILLIGRAM(S): 80 TABLET, FILM COATED ORAL at 21:26

## 2023-04-10 RX ADMIN — GABAPENTIN 100 MILLIGRAM(S): 400 CAPSULE ORAL at 21:27

## 2023-04-10 RX ADMIN — Medication 500 MILLIGRAM(S): at 13:24

## 2023-04-10 RX ADMIN — Medication 1 APPLICATION(S): at 17:06

## 2023-04-10 RX ADMIN — Medication 500 MILLIGRAM(S): at 21:26

## 2023-04-10 RX ADMIN — Medication 500 MILLIGRAM(S): at 05:17

## 2023-04-10 RX ADMIN — Medication 1 APPLICATION(S): at 05:18

## 2023-04-10 RX ADMIN — GABAPENTIN 100 MILLIGRAM(S): 400 CAPSULE ORAL at 13:20

## 2023-04-10 RX ADMIN — GABAPENTIN 100 MILLIGRAM(S): 400 CAPSULE ORAL at 05:17

## 2023-04-10 RX ADMIN — Medication 166.67 MILLIGRAM(S): at 05:17

## 2023-04-10 RX ADMIN — Medication 1 APPLICATION(S): at 17:09

## 2023-04-10 RX ADMIN — Medication 81 MILLIGRAM(S): at 11:13

## 2023-04-10 RX ADMIN — LEVETIRACETAM 1500 MILLIGRAM(S): 250 TABLET, FILM COATED ORAL at 17:07

## 2023-04-10 RX ADMIN — MIDODRINE HYDROCHLORIDE 10 MILLIGRAM(S): 2.5 TABLET ORAL at 11:14

## 2023-04-10 RX ADMIN — Medication 166.67 MILLIGRAM(S): at 17:11

## 2023-04-10 RX ADMIN — MIDODRINE HYDROCHLORIDE 10 MILLIGRAM(S): 2.5 TABLET ORAL at 05:17

## 2023-04-10 RX ADMIN — Medication 1 APPLICATION(S): at 17:07

## 2023-04-10 RX ADMIN — CEFEPIME 100 MILLIGRAM(S): 1 INJECTION, POWDER, FOR SOLUTION INTRAMUSCULAR; INTRAVENOUS at 17:11

## 2023-04-10 RX ADMIN — MIDODRINE HYDROCHLORIDE 10 MILLIGRAM(S): 2.5 TABLET ORAL at 17:07

## 2023-04-10 RX ADMIN — ENOXAPARIN SODIUM 40 MILLIGRAM(S): 100 INJECTION SUBCUTANEOUS at 21:26

## 2023-04-10 RX ADMIN — Medication 1 TABLET(S): at 11:14

## 2023-04-10 RX ADMIN — Medication 500 MILLIGRAM(S): at 13:20

## 2023-04-10 RX ADMIN — Medication 5 MILLIGRAM(S): at 05:16

## 2023-04-10 RX ADMIN — Medication 500 MILLIGRAM(S): at 11:14

## 2023-04-10 RX ADMIN — Medication 500 MILLIGRAM(S): at 05:16

## 2023-04-10 RX ADMIN — CEFEPIME 100 MILLIGRAM(S): 1 INJECTION, POWDER, FOR SOLUTION INTRAMUSCULAR; INTRAVENOUS at 05:17

## 2023-04-10 RX ADMIN — MORPHINE SULFATE 4 MILLIGRAM(S): 50 CAPSULE, EXTENDED RELEASE ORAL at 13:23

## 2023-04-10 RX ADMIN — Medication 1 APPLICATION(S): at 05:19

## 2023-04-10 RX ADMIN — MORPHINE SULFATE 4 MILLIGRAM(S): 50 CAPSULE, EXTENDED RELEASE ORAL at 13:38

## 2023-04-10 NOTE — PROGRESS NOTE ADULT - ASSESSMENT
ASSESSMENT   68 y/o woman with PMH of Right MCA stroke with left sided weakness was brought to the ED via EMS. In the ED, she had altered mental status and sepsis, with new onset fevers.     IMPRESSION  Complicated and prolonged hospital course, admitted 3/3 for R MCA CVA, ID consulted 3/22 for Fever, ID reconsulted for leukocytosis  #Leukocytosis; Afebrile since 3/21, Tm 100.3    Suspected infected decub with underlying osteomyelitis    4/4 WCX     Numerous Proteus mirabilis Cefepime: S <=2    Numerous Morganella morganii Cefepime: S <=2    Moderate Enterococcus raffinosus Ampicillin: R >8     Few Staphylococcus aureus- MSSA  CT  Sacral decubitus ulcer with osteomyelitis of the coccyx.  No acute intra-abdominal pathology.  Unchanged parapelvic cysts bilaterally with a 4 mm stone in the left mid ureter without evidence of obstruction.   WBC elevated to 21 4/4    4/3 UA without significant pyuria     3/28 BCX NGTD     3/27 BCX NGTD   < from: Xray Chest 1 View- PORTABLE-Routine (Xray Chest 1 View- PORTABLE-Routine .) (03.27.23 @ 11:39) >No focal consolidation, pneumothorax or pleural effusion.  #3/21 Tm 101.7 P>90 low BP- Sepsis   WBC 19 on admission, UCX 3/4 panS ecoli, UA , s/p ceftriaxone 3/4-6, cefepime 3/8  Exam at this time Skin: two approx. 3x2 cm wounds to sacral region with pink moist base. No active bleeding, purulence or surrounding erythema   3/21 UCX NG   3/20 BCX NGTD   3/20 COVID/RVP NEGATIVE   CXR no PNA  No diarrhea  No obvious phlebitis  Recent MRI stable  cannot obtain further history from the patient secondary to altered mental status or sedation   Creatinine, Serum: <0.5 mg/dL (04.04.23 @ 06:35)        RECOMMENDATIONS  - Please check vancomycin trough prior to next dose, last level 19  - Cefepime 2g q12h IV + PO flagyl 500mg TID  - Vanc 1250g q12h IV  - On antibiotics since 4/4-  - Burn f/u appreciated  - No benefit of long term intravenous antibiotics for sacral osteomyelitis. Continue with debridement, nutritional support and offloading   - Guarded prognosis GOC    Please call or message on [employer] Teams if with any questions.

## 2023-04-10 NOTE — PROGRESS NOTE ADULT - SUBJECTIVE AND OBJECTIVE BOX
JOSEPH OBRIEN 67y Female  MRN#: [id number]   Hospital Day: 37d    SUBJECTIVE  Patient is a 67y old Female who presents with a chief complaint of Altered Mental status (04 Apr 2023 11:53)  Currently admitted to medicine with the primary diagnosis of Stroke      INTERVAL HPI AND OVERNIGHT EVENTS:  Patient was examined and seen at bedside. This morning she is resting comfortably in bed and reports no issues or overnight events.    OBJECTIVE  PAST MEDICAL & SURGICAL HISTORY    ALLERGIES:  Allergy Status Unknown    MEDICATIONS:  STANDING MEDICATIONS  acetaminophen  Suppository .. 325 milliGRAM(s) Rectal once  ascorbic acid 500 milliGRAM(s) Oral daily  aspirin  chewable 81 milliGRAM(s) Oral daily  atorvastatin 40 milliGRAM(s) Oral at bedtime  baclofen 5 milliGRAM(s) Oral every 12 hours  cefepime   IVPB      cefepime   IVPB 2000 milliGRAM(s) IV Intermittent every 12 hours  collagenase Ointment 1 Application(s) Topical two times a day  Dakins Solution - 1/2 Strength 1 Application(s) Topical two times a day  enoxaparin Injectable 40 milliGRAM(s) SubCutaneous every 24 hours  gabapentin 100 milliGRAM(s) Oral three times a day  levETIRAcetam  Solution 1500 milliGRAM(s) Oral two times a day  metroNIDAZOLE    Tablet 500 milliGRAM(s) Oral every 8 hours  midodrine. 10 milliGRAM(s) Oral three times a day  multivitamin/minerals 1 Tablet(s) Oral daily  valproic  acid Syrup 500 milliGRAM(s) Oral three times a day  vancomycin  IVPB 1250 milliGRAM(s) IV Intermittent every 12 hours  vitamin A &amp; D Ointment 1 Application(s) Topical two times a day    PRN MEDICATIONS  acetaminophen     Tablet .. 650 milliGRAM(s) Oral every 6 hours PRN  aluminum hydroxide/magnesium hydroxide/simethicone Suspension 30 milliLiter(s) Oral every 6 hours PRN  morphine  - Injectable 4 milliGRAM(s) IV Push every 6 hours PRN      VITAL SIGNS: Last 24 Hours  T(C): 36.5 (10 Apr 2023 05:06), Max: 37.3 (09 Apr 2023 13:00)  T(F): 97.7 (10 Apr 2023 05:06), Max: 99.1 (09 Apr 2023 13:00)  HR: 106 (10 Apr 2023 05:06) (96 - 106)  BP: 103/53 (10 Apr 2023 05:06) (98/54 - 108/62)  BP(mean): --  RR: 19 (10 Apr 2023 05:06) (17 - 19)  SpO2: 97% (10 Apr 2023 05:06) (96% - 97%)    LABS:                        7.5    17.26 )-----------( 735      ( 10 Apr 2023 06:07 )             23.6     04-10    139  |  101  |  10  ----------------------------<  94  4.6   |  27  |  <0.5<L>    Ca    9.4      10 Apr 2023 06:07  Mg     2.1     04-10    TPro  5.8<L>  /  Alb  2.5<L>  /  TBili  <0.2  /  DBili  x   /  AST  19  /  ALT  8   /  AlkPhos  96  04-10                  RADIOLOGY:      PHYSICAL EXAM:  CONSTITUTIONAL: No acute distress, AAOx3  HEAD: Atraumatic, normocephalic  EYES: EOM intact, PERRLA, conjunctiva and sclera clear  ENT: moist mucous membranes  PULMONARY: Clear to auscultation bilaterally  CARDIOVASCULAR: Regular rate and rhythm  GASTROINTESTINAL: Soft, non-tender, non-distended; bowel sounds present  MUSCULOSKELETAL: no edema  NEUROLOGY: non-focal  SKIN: warm and dry

## 2023-04-10 NOTE — PROGRESS NOTE ADULT - SUBJECTIVE AND OBJECTIVE BOX
JOSEPH OBRIEN  67y, Female  Allergy: Allergy Status Unknown      LOS  37d    CHIEF COMPLAINT: Multiple Strokes (10 Apr 2023 08:27)      INTERVAL EVENTS/HPI  - No acute events overnight  - T(F): , Max: 99.1 (04-09-23 @ 13:00)  - Tolerating medication  - WBC Count: 17.26 (04-10-23 @ 06:07)  WBC Count: 18.45 (04-09-23 @ 06:40)     - Creatinine, Serum: <0.5 (04-10-23 @ 06:07)  Creatinine, Serum: <0.5 (04-09-23 @ 06:40)       ROS  ***    VITALS:  T(F): 97.7, Max: 99.1 (04-09-23 @ 13:00)  HR: 106  BP: 103/53  RR: 19Vital Signs Last 24 Hrs  T(C): 36.5 (10 Apr 2023 05:06), Max: 37.3 (09 Apr 2023 13:00)  T(F): 97.7 (10 Apr 2023 05:06), Max: 99.1 (09 Apr 2023 13:00)  HR: 106 (10 Apr 2023 05:06) (96 - 106)  BP: 103/53 (10 Apr 2023 05:06) (98/54 - 108/62)  BP(mean): --  RR: 19 (10 Apr 2023 05:06) (17 - 19)  SpO2: 97% (10 Apr 2023 05:06) (96% - 97%)    Parameters below as of 10 Apr 2023 05:06  Patient On (Oxygen Delivery Method): room air        PHYSICAL EXAM:  ***    FH: Non-contributory  Social Hx: Non-contributory    TESTS & MEASUREMENTS:                        7.5    17.26 )-----------( 735      ( 10 Apr 2023 06:07 )             23.6     04-10    139  |  101  |  10  ----------------------------<  94  4.6   |  27  |  <0.5<L>    Ca    9.4      10 Apr 2023 06:07  Mg     2.1     04-10    TPro  5.8<L>  /  Alb  2.5<L>  /  TBili  <0.2  /  DBili  x   /  AST  19  /  ALT  8   /  AlkPhos  96  04-10      LIVER FUNCTIONS - ( 10 Apr 2023 06:07 )  Alb: 2.5 g/dL / Pro: 5.8 g/dL / ALK PHOS: 96 U/L / ALT: 8 U/L / AST: 19 U/L / GGT: x               Culture - Tissue with Gram Stain (collected 04-04-23 @ 08:40)  Source: .Tissue sacrum  Gram Stain (04-04-23 @ 21:29):    No polymorphonuclear leukocytes per low power field    Numerous Gram Positive Cocci in Pairs and Chains per oil power field    Numerous Gram Negative Rods per oil power field  Final Report (04-06-23 @ 19:33):    Culture yields >4 types of aerobic and/or anaerobic bacteria    Call client services within 7 days if further workup is clinically    indicated.    Culture includes    Numerous Proteus mirabilis    Numerous Morganella morganii    Moderate Enterococcus raffinosus    Few Staphylococcus aureus  Organism: Proteus mirabilis  Morganella morganii  Enterococcus raffinosus  Staphylococcus aureus (04-06-23 @ 19:33)  Organism: Staphylococcus aureus (04-06-23 @ 19:33)      Method Type: JULIANA      -  Ampicillin/Sulbactam: S <=8/4      -  Cefazolin: S <=4      -  Clindamycin: S <=0.25      -  Erythromycin: S <=0.25      -  Gentamicin: S <=1 Should not be used as monotherapy      -  Oxacillin: S <=0.25 Oxacillin predicts susceptibility for dicloxacillin, methicillin, and nafcillin      -  Penicillin: R 0.25      -  Rifampin: S <=1 Should not be used as monotherapy      -  Tetracycline: S <=1      -  Trimethoprim/Sulfamethoxazole: S <=0.5/9.5      -  Vancomycin: S 1  Organism: Enterococcus raffinosus (04-06-23 @ 19:33)      Method Type: JULIANA      -  Ampicillin: R >8 Predicts results to ampicillin/sulbactam, amoxacillin-clavulanate and  piperacillin-tazobactam.      -  Tetracycline: R >8      -  Vancomycin: S 1  Organism: Morganella morganii (04-06-23 @ 19:33)      Method Type: JULIANA      -  Amikacin: S <=16      -  Amoxicillin/Clavulanic Acid: R >16/8      -  Ampicillin: R >16 These ampicillin results predict results for amoxicillin      -  Ampicillin/Sulbactam: I 16/8 Enterobacter, Klebsiella aerogenes, Citrobacter, and Serratia may develop resistance during prolonged therapy (3-4 days)      -  Aztreonam: S <=4      -  Cefazolin: R >16 Enterobacter, Klebsiella aerogenes, Citrobacter, and Serratia may develop resistance during prolonged therapy (3-4 days)      -  Cefepime: S <=2      -  Cefoxitin: S <=8      -  Ceftriaxone: S <=1 Enterobacter, Klebsiella aerogenes, Citrobacter, and Serratia may develop resistance during prolonged therapy      -  Ciprofloxacin: S <=0.25      -  Ertapenem: S <=0.5      -  Gentamicin: S <=2      -  Levofloxacin: S <=0.5      -  Meropenem: S <=1      -  Piperacillin/Tazobactam: S <=8      -  Tobramycin: S <=2      -  Trimethoprim/Sulfamethoxazole: S <=0.5/9.5  Organism: Proteus mirabilis (04-06-23 @ 19:33)      Method Type: JULIANA      -  Amikacin: S <=16      -  Amoxicillin/Clavulanic Acid: S <=8/4      -  Ampicillin: S <=8 These ampicillin results predict results for amoxicillin      -  Ampicillin/Sulbactam: S <=4/2 Enterobacter, Klebsiella aerogenes, Citrobacter, and Serratia may develop resistance during prolonged therapy (3-4 days)      -  Aztreonam: S <=4      -  Cefazolin: S <=2 Enterobacter, Klebsiella aerogenes, Citrobacter, and Serratia may develop resistance during prolonged therapy (3-4 days)      -  Cefepime: S <=2      -  Cefoxitin: S <=8      -  Ceftriaxone: S <=1 Enterobacter, Klebsiella aerogenes, Citrobacter, and Serratia may develop resistance during prolonged therapy      -  Ciprofloxacin: S <=0.25      -  Ertapenem: S <=0.5      -  Gentamicin: S <=2      -  Levofloxacin: S <=0.5      -  Meropenem: S <=1      -  Piperacillin/Tazobactam: S <=8      -  Tobramycin: S <=2      -  Trimethoprim/Sulfamethoxazole: S <=0.5/9.5    Culture - Blood (collected 04-04-23 @ 06:35)  Source: .Blood Blood  Final Report (04-09-23 @ 14:00):    No Growth Final    Culture - Blood (collected 03-28-23 @ 07:32)  Source: .Blood None  Final Report (04-02-23 @ 18:00):    No Growth Final    Culture - Blood (collected 03-27-23 @ 10:39)  Source: .Blood Blood-Peripheral  Final Report (04-01-23 @ 18:00):    No Growth Final    Culture - Urine (collected 03-21-23 @ 11:57)  Source: Catheterized Catheterized  Final Report (03-22-23 @ 17:54):    No growth    Culture - Blood (collected 03-20-23 @ 22:07)  Source: .Blood Blood  Final Report (03-26-23 @ 02:00):    No Growth Final            INFECTIOUS DISEASES TESTING  Vancomycin Level, Trough: 19.6 (04-07-23 @ 18:35)  Vancomycin Level, Trough: 8.0 (04-05-23 @ 17:56)  Rapid RVP Result: NotDetec (04-05-23 @ 14:26)  COVID-19 PCR: NotDetec (04-02-23 @ 18:03)  Rapid RVP Result: NotDetec (03-28-23 @ 09:55)  Procalcitonin, Serum: 0.05 (03-20-23 @ 22:07)  Rapid RVP Result: NotDetec (03-20-23 @ 21:25)  COVID-19 PCR: NotDetec (03-19-23 @ 12:47)  COVID-19 PCR: NotDetec (03-15-23 @ 16:23)  strept    INFLAMMATORY MARKERS      RADIOLOGY & ADDITIONAL TESTS:  I have personally reviewed the last available Chest xray  CXR      CT      CARDIOLOGY TESTING  12 Lead ECG:   Ventricular Rate 114 BPM    Atrial Rate 114 BPM    P-R Interval 122 ms    QRS Duration 62 ms    Q-T Interval 312 ms    QTC Calculation(Bazett) 430 ms    P Axis 43 degrees    R Axis 7 degrees    T Axis 43 degrees    Diagnosis Line Sinus tachycardia  Otherwise normal ECG    Confirmed by SINA GREEN, [facility] ([id number]) on 4/5/2023 8:47:49 AM (04-05-23 @ 07:28)  12 Lead ECG:   Ventricular Rate 103 BPM    Atrial Rate 103 BPM    P-R Interval 120 ms    QRS Duration 66 ms    Q-T Interval 324 ms    QTC Calculation(Bazett) 424 ms    P Axis 40 degrees    R Axis -2 degrees    T Axis 43 degrees    Diagnosis Line Sinus tachycardia  Otherwise normal ECG    Confirmed by Shanti Sage MD ([id number]) on 4/4/2023 10:25:13 AM (04-03-23 @ 11:37)      MEDICATIONS  acetaminophen  Suppository .. 325 Rectal once  ascorbic acid 500 Oral daily  aspirin  chewable 81 Oral daily  atorvastatin 40 Oral at bedtime  baclofen 5 Oral every 12 hours  cefepime   IVPB     cefepime   IVPB 2000 IV Intermittent every 12 hours  collagenase Ointment 1 Topical two times a day  Dakins Solution - 1/2 Strength 1 Topical two times a day  enoxaparin Injectable 40 SubCutaneous every 24 hours  gabapentin 100 Oral three times a day  levETIRAcetam  Solution 1500 Oral two times a day  metroNIDAZOLE    Tablet 500 Oral every 8 hours  midodrine. 10 Oral three times a day  multivitamin/minerals 1 Oral daily  valproic  acid Syrup 500 Oral three times a day  vancomycin  IVPB 1250 IV Intermittent every 12 hours  vitamin A &amp; D Ointment 1 Topical two times a day      WEIGHT  Weight (kg): 68.3 (03-08-23 @ 10:12)  Creatinine, Serum: <0.5 mg/dL (04-10-23 @ 06:07)      ANTIBIOTICS:  cefepime   IVPB      cefepime   IVPB 2000 milliGRAM(s) IV Intermittent every 12 hours  metroNIDAZOLE    Tablet 500 milliGRAM(s) Oral every 8 hours  vancomycin  IVPB 1250 milliGRAM(s) IV Intermittent every 12 hours      All available historical records have been reviewed       JOSEPH OBRIEN  67y, Female  Allergy: Allergy Status Unknown      LOS  37d    CHIEF COMPLAINT: Multiple Strokes (10 Apr 2023 08:27)      INTERVAL EVENTS/HPI  - No acute events overnight  - T(F): , Max: 99.1 (04-09-23 @ 13:00)  - Tolerating medication  - WBC Count: 17.26 (04-10-23 @ 06:07)  WBC Count: 18.45 (04-09-23 @ 06:40)     - Creatinine, Serum: <0.5 (04-10-23 @ 06:07)  Creatinine, Serum: <0.5 (04-09-23 @ 06:40)       ROS  unable to obtain history secondary to patient's mental status and/or sedation     VITALS:  T(F): 97.7, Max: 99.1 (04-09-23 @ 13:00)  HR: 106  BP: 103/53  RR: 19Vital Signs Last 24 Hrs  T(C): 36.5 (10 Apr 2023 05:06), Max: 37.3 (09 Apr 2023 13:00)  T(F): 97.7 (10 Apr 2023 05:06), Max: 99.1 (09 Apr 2023 13:00)  HR: 106 (10 Apr 2023 05:06) (96 - 106)  BP: 103/53 (10 Apr 2023 05:06) (98/54 - 108/62)  BP(mean): --  RR: 19 (10 Apr 2023 05:06) (17 - 19)  SpO2: 97% (10 Apr 2023 05:06) (96% - 97%)    Parameters below as of 10 Apr 2023 05:06  Patient On (Oxygen Delivery Method): room air        PHYSICAL EXAM:  Gen: chronically ill appearing   HEENT: Normocephalic, atraumatic  Neck: supple, no lymphadenopathy  CV: Regular rate & regular rhythm  Lungs: decreased BS at bases, no fremitus  Abdomen: Soft, BS present  Ext: Warm, well perfused  Neuro: L weakness  Skin: no rash, no erythema  Lines: no phlebitis     FH: Non-contributory  Social Hx: Non-contributory    TESTS & MEASUREMENTS:                        7.5    17.26 )-----------( 735      ( 10 Apr 2023 06:07 )             23.6     04-10    139  |  101  |  10  ----------------------------<  94  4.6   |  27  |  <0.5<L>    Ca    9.4      10 Apr 2023 06:07  Mg     2.1     04-10    TPro  5.8<L>  /  Alb  2.5<L>  /  TBili  <0.2  /  DBili  x   /  AST  19  /  ALT  8   /  AlkPhos  96  04-10      LIVER FUNCTIONS - ( 10 Apr 2023 06:07 )  Alb: 2.5 g/dL / Pro: 5.8 g/dL / ALK PHOS: 96 U/L / ALT: 8 U/L / AST: 19 U/L / GGT: x               Culture - Tissue with Gram Stain (collected 04-04-23 @ 08:40)  Source: .Tissue sacrum  Gram Stain (04-04-23 @ 21:29):    No polymorphonuclear leukocytes per low power field    Numerous Gram Positive Cocci in Pairs and Chains per oil power field    Numerous Gram Negative Rods per oil power field  Final Report (04-06-23 @ 19:33):    Culture yields >4 types of aerobic and/or anaerobic bacteria    Call client services within 7 days if further workup is clinically    indicated.    Culture includes    Numerous Proteus mirabilis    Numerous Morganella morganii    Moderate Enterococcus raffinosus    Few Staphylococcus aureus  Organism: Proteus mirabilis  Morganella morganii  Enterococcus raffinosus  Staphylococcus aureus (04-06-23 @ 19:33)  Organism: Staphylococcus aureus (04-06-23 @ 19:33)      Method Type: JULIANA      -  Ampicillin/Sulbactam: S <=8/4      -  Cefazolin: S <=4      -  Clindamycin: S <=0.25      -  Erythromycin: S <=0.25      -  Gentamicin: S <=1 Should not be used as monotherapy      -  Oxacillin: S <=0.25 Oxacillin predicts susceptibility for dicloxacillin, methicillin, and nafcillin      -  Penicillin: R 0.25      -  Rifampin: S <=1 Should not be used as monotherapy      -  Tetracycline: S <=1      -  Trimethoprim/Sulfamethoxazole: S <=0.5/9.5      -  Vancomycin: S 1  Organism: Enterococcus raffinosus (04-06-23 @ 19:33)      Method Type: JULIANA      -  Ampicillin: R >8 Predicts results to ampicillin/sulbactam, amoxacillin-clavulanate and  piperacillin-tazobactam.      -  Tetracycline: R >8      -  Vancomycin: S 1  Organism: Morganella morganii (04-06-23 @ 19:33)      Method Type: JULIANA      -  Amikacin: S <=16      -  Amoxicillin/Clavulanic Acid: R >16/8      -  Ampicillin: R >16 These ampicillin results predict results for amoxicillin      -  Ampicillin/Sulbactam: I 16/8 Enterobacter, Klebsiella aerogenes, Citrobacter, and Serratia may develop resistance during prolonged therapy (3-4 days)      -  Aztreonam: S <=4      -  Cefazolin: R >16 Enterobacter, Klebsiella aerogenes, Citrobacter, and Serratia may develop resistance during prolonged therapy (3-4 days)      -  Cefepime: S <=2      -  Cefoxitin: S <=8      -  Ceftriaxone: S <=1 Enterobacter, Klebsiella aerogenes, Citrobacter, and Serratia may develop resistance during prolonged therapy      -  Ciprofloxacin: S <=0.25      -  Ertapenem: S <=0.5      -  Gentamicin: S <=2      -  Levofloxacin: S <=0.5      -  Meropenem: S <=1      -  Piperacillin/Tazobactam: S <=8      -  Tobramycin: S <=2      -  Trimethoprim/Sulfamethoxazole: S <=0.5/9.5  Organism: Proteus mirabilis (04-06-23 @ 19:33)      Method Type: JULIANA      -  Amikacin: S <=16      -  Amoxicillin/Clavulanic Acid: S <=8/4      -  Ampicillin: S <=8 These ampicillin results predict results for amoxicillin      -  Ampicillin/Sulbactam: S <=4/2 Enterobacter, Klebsiella aerogenes, Citrobacter, and Serratia may develop resistance during prolonged therapy (3-4 days)      -  Aztreonam: S <=4      -  Cefazolin: S <=2 Enterobacter, Klebsiella aerogenes, Citrobacter, and Serratia may develop resistance during prolonged therapy (3-4 days)      -  Cefepime: S <=2      -  Cefoxitin: S <=8      -  Ceftriaxone: S <=1 Enterobacter, Klebsiella aerogenes, Citrobacter, and Serratia may develop resistance during prolonged therapy      -  Ciprofloxacin: S <=0.25      -  Ertapenem: S <=0.5      -  Gentamicin: S <=2      -  Levofloxacin: S <=0.5      -  Meropenem: S <=1      -  Piperacillin/Tazobactam: S <=8      -  Tobramycin: S <=2      -  Trimethoprim/Sulfamethoxazole: S <=0.5/9.5    Culture - Blood (collected 04-04-23 @ 06:35)  Source: .Blood Blood  Final Report (04-09-23 @ 14:00):    No Growth Final    Culture - Blood (collected 03-28-23 @ 07:32)  Source: .Blood None  Final Report (04-02-23 @ 18:00):    No Growth Final    Culture - Blood (collected 03-27-23 @ 10:39)  Source: .Blood Blood-Peripheral  Final Report (04-01-23 @ 18:00):    No Growth Final    Culture - Urine (collected 03-21-23 @ 11:57)  Source: Catheterized Catheterized  Final Report (03-22-23 @ 17:54):    No growth    Culture - Blood (collected 03-20-23 @ 22:07)  Source: .Blood Blood  Final Report (03-26-23 @ 02:00):    No Growth Final            INFECTIOUS DISEASES TESTING  Vancomycin Level, Trough: 19.6 (04-07-23 @ 18:35)  Vancomycin Level, Trough: 8.0 (04-05-23 @ 17:56)  Rapid RVP Result: NotDetec (04-05-23 @ 14:26)  COVID-19 PCR: NotDetec (04-02-23 @ 18:03)  Rapid RVP Result: NotDetec (03-28-23 @ 09:55)  Procalcitonin, Serum: 0.05 (03-20-23 @ 22:07)  Rapid RVP Result: NotDetec (03-20-23 @ 21:25)  COVID-19 PCR: NotDetec (03-19-23 @ 12:47)  COVID-19 PCR: NotDetec (03-15-23 @ 16:23)  strept    INFLAMMATORY MARKERS      RADIOLOGY & ADDITIONAL TESTS:  I have personally reviewed the last available Chest xray  CXR      CT      CARDIOLOGY TESTING  12 Lead ECG:   Ventricular Rate 114 BPM    Atrial Rate 114 BPM    P-R Interval 122 ms    QRS Duration 62 ms    Q-T Interval 312 ms    QTC Calculation(Bazett) 430 ms    P Axis 43 degrees    R Axis 7 degrees    T Axis 43 degrees    Diagnosis Line Sinus tachycardia  Otherwise normal ECG    Confirmed by YOCASTA ANDINO MD ([id number]) on 4/5/2023 8:47:49 AM (04-05-23 @ 07:28)  12 Lead ECG:   Ventricular Rate 103 BPM    Atrial Rate 103 BPM    P-R Interval 120 ms    QRS Duration 66 ms    Q-T Interval 324 ms    QTC Calculation(Bazett) 424 ms    P Axis 40 degrees    R Axis -2 degrees    T Axis 43 degrees    Diagnosis Line Sinus tachycardia  Otherwise normal ECG    Confirmed by Shanti Sage MD ([id number]) on 4/4/2023 10:25:13 AM (04-03-23 @ 11:37)      MEDICATIONS  acetaminophen  Suppository .. 325 Rectal once  ascorbic acid 500 Oral daily  aspirin  chewable 81 Oral daily  atorvastatin 40 Oral at bedtime  baclofen 5 Oral every 12 hours  cefepime   IVPB     cefepime   IVPB 2000 IV Intermittent every 12 hours  collagenase Ointment 1 Topical two times a day  Dakins Solution - 1/2 Strength 1 Topical two times a day  enoxaparin Injectable 40 SubCutaneous every 24 hours  gabapentin 100 Oral three times a day  levETIRAcetam  Solution 1500 Oral two times a day  metroNIDAZOLE    Tablet 500 Oral every 8 hours  midodrine. 10 Oral three times a day  multivitamin/minerals 1 Oral daily  valproic  acid Syrup 500 Oral three times a day  vancomycin  IVPB 1250 IV Intermittent every 12 hours  vitamin A &amp; D Ointment 1 Topical two times a day      WEIGHT  Weight (kg): 68.3 (03-08-23 @ 10:12)  Creatinine, Serum: <0.5 mg/dL (04-10-23 @ 06:07)      ANTIBIOTICS:  cefepime   IVPB      cefepime   IVPB 2000 milliGRAM(s) IV Intermittent every 12 hours  metroNIDAZOLE    Tablet 500 milliGRAM(s) Oral every 8 hours  vancomycin  IVPB 1250 milliGRAM(s) IV Intermittent every 12 hours      All available historical records have been reviewed

## 2023-04-10 NOTE — PROGRESS NOTE ADULT - ATTENDING COMMENTS
68 y/o woman with PMH of Right MCA stroke with left sided weakness was brought to the ED via EMS. In the ED, she had altered mental status and sepsis.    #Sepsis - new onset 4/2  #Sacral wound growing numerous Gram Positive Cocci in Pairs and Chains and numerous Gram Negative Rods  - 3/26 - febrile, tachy Sepsis w/up negative (CXR clear, BCx NGTD, UA neg)  - ID recs appreciated - monitor off ABx - repeat COVID if febrile  - Repeat RVP negative  - 4/2 sacral wound noted to be malodorous - low grade fever overnight as well as leukocytosis   - Burn eval appreciated  - Wound growing gram + cocci in pairs/chains as well as gram neg rods  - Prelim: Proteus mirabilis, Morganella morganii, Enterococcus raffinosus, Staph aureus  - ID recs appreciated - Start Vanc, Cefepime, Flagyl - 4/4  - Pt c/o pain in sacral area started morphine 2g q6H if still in pain can increase to 4g q6H  - Pt noted to have CP 4/4 overnight - EKG sinus tach - no CP complaints in AM but is complaining of pain in sacral area  - Burn recs appreciated  - CT Chest/Abd/Pelvis w/w/o Ct: Sacral decubitus ulcer with osteomyelitis of the coccyx. No acute intra-abdominal pathology. Unchanged parapelvic cysts bilaterally with a 4 mm stone in the left mid ureter without evidence of obstruction.  - ID FU appreciated switched from meropenem back for cefepime and flagyl 4/7 as no ESBL in wound cx, No benefit of long term intravenous antibiotics for sacral osteomyelitis. Continue with debridement, nutritional support and offloading   4/8: c/w Cefepime/flagyl/Vanco. WBC still high. monitor anemia.     #Seizures  #Acute R Frontal Lobe Infarct likely 2/ hypotension   #Chronic R MCA infarct  - per chart: at baseline she is alert, able to communicate and is oriented. Ambulation at baseline: uses a wheelchair. She was in a hospital in [facility] last fall and was lethargic for months  - 3/8: s/p rapid response for L gaze preference  - 3/20: Pt had event with L gaze preference during SS eval, pt made NPO. Examined by resident, no gaze preference present any longer. Pt responsive but less than baseline. Developed seizures post-event. Septic workup obtained. CXR appears clear, rest pending. --> 3/21 Pt more responsive this AM, expressing herself and in line with current baseline.  - Depakote level (3/4) 87 (WNL) -> (3/21) 74 (WNL)   - CTH/CTA of head/neck (3/4): no acute pathology, no evidence of occlusion, aneurysm or stenosis  - EEG (3/8) and video EEG (3/9): Focal slowing  - MRI Brain (3/15): new subcortical infarct in the right frontal lobe. Re-demonstrated chronic infarct in the right MCA territory.  - MRI of C spine (3/16): Significantly motion limited study. No gross cord compression or spinal cord edema demonstrated. Multilevel small disc bulges.  - Neuro eval'd 3/8-3/20: For L gaze preference/stroke, OP med review: on low dose Lamictal and Depakote in addition to Keppra (possible mood disorder/ seizures), Her MRI of brain showing subcortical infarct within same M1 territory. The casue of stroke is most likely hypotension as she has a diminutive Rt M1. However, MRI findings doesn't explain her B/L upper extremity spasticity. So, it is important to rule out cervical cord pathology. DAPT for 21 days followed by ASA 81 mg daily, Increased keppra post 3/20 possible seizure event  -  eval'd 3/15: Delirium; pt does not have the capacity to choose a Health care agent  - SS re-juliette 3/21: Minced and moist, 1:1 feeds, thin liquids through straw  - Seizure precautions  - C/w Depakote 500mg TID and Keppra to 1500mg BID   - C/w baclofen 5mg bid (for UE spasticity)  - C/w ASA, Plavix (3/17-4/6 - completed), and high dose statin for new stroke - DAPT for 21 days followed by ASA 81 mg daily  - C/w Midodrine 10mg TID  - OP stroke clinic in 4 weeks after d/c per neurology    #Metabolic Encephalopathy from UTI  #Febrile 3/20  #Unstageable Sacral Ulcer  - 3/20: Pt became febrile after possible seizure episode, Cefepime, levaquin (x1 3/20), vancomycin  - 3/20 Septic workup: WBC (3/21) 12.26 (up from 8.23), BCx (3/20) NGTD, Procal (3/20) 0.05, RVP (3/20) COVID (-), Cortisol level, Lactate (3/21) WNL, UA nitrite (-), LEC (-), pending WBCs/Bacteria, UCx (3/21) NGTD  - Duplex LUE (3/10): (-) DVT (+) superficial cephalic vein thrombosis  - Duplex BLLE (3/7): (-) DVT  - s/p Vanc (3/4x1, 3/20-3/22), Cefepime (x1 3/4, x1 3/8), Ceftriaxone (3/4-3/6) (for UTI)  - Duplex BLLE: negative for DVT  - ID eval'd 3/23  - Burn eval'd 3/22 - No intervention, wound care recs noted  - Cefepime completed 3/24 (per ID)    #Left avulsion fracture of medial malleolus  - Duplex BL LE (3/7): (-) for DVT  - CT Foot (3/11): acute minimally displaced avulsion fracture at the medial malleolus  - Podiatry following for foot pain: WBAT w/ surgical shoe, ankle brace, PT  - fall precautions  - pain control regimen - if pain uncontrolled consider increasing percocet to 2tabs.  - C/w gabapentin at 100mg TID (Lowered dose from home, increase as pt tolerates)    #Sinus tachycardia - now resolved - attributed to pain  - TSH (3/8) WNL, - EKG reviewed    #Left cephalic vein thrombosis  - Duplex LUE (3/10): No DVT however there is superficial thrombosis noted in the left cephalic vein  - s/p warm compress  - Repeat duplex ordered 3/27 - negative for UE and LE DVT  #Anemia - normocytic  - possibly due to frequent phlebotomy, no signs of active bleed - monitor  #Misc  - DVT ppx - lovenox  - Diet: Soft and bite sized (SS 3/7)  - full code, overall guarded prognosis  c/w cefepime. flagyl, Vanco. monitor CBC. pain control

## 2023-04-10 NOTE — PROGRESS NOTE ADULT - ASSESSMENT
68 y/o woman with PMH of Right MCA stroke with left sided weakness was brought to the ED via EMS. In the ED, she had altered mental status and sepsis.    #Sepsis - new onset 4/2  #Sacral wound growing numerous Gram Positive Cocci in Pairs and Chains and numerous Gram Negative Rods  - 3/26 - febrile, tachy Sepsis w/up negative (CXR clear, BCx NGTD, UA neg)  - ID recs appreciated - monitor off ABx - repeat COVID if febrile  - Repeat RVP negative  - 4/2 sacral wound noted to be malodorous - low grade fever overnight as well as leukocytosis   - Burn eval appreciated  - Wound growing gram + cocci in pairs/chains as well as gram neg rods  - Prelim: Proteus mirabilis, Morganella morganii, Enterococcus raffinosus, Staph aureus  - ID recs appreciated - Start Vanc, Cefepime, Flagyl - 4/4  - Pt c/o pain in sacral area started morphine 2g q6H if still in pain can increase to 4g q6H  - Pt noted to have CP 4/4 overnight - EKG sinus tach - no CP complaints in AM but is complaining of pain in sacral area  - Burn recs appreciated  - CT Chest/Abd/Pelvis w/w/o Ct: Sacral decubitus ulcer with osteomyelitis of the coccyx. No acute intra-abdominal pathology. Unchanged parapelvic cysts bilaterally with a 4 mm stone in the left mid ureter without evidence of obstruction.  - ID FU appreciated switched from meropenem back for cefepime and flagyl 4/7 as no ESBL in wound cx, No benefit of long term intravenous antibiotics for sacral osteomyelitis. Continue with debridement, nutritional support and offloading   4/8: c/w Cefepime/flagyl/Vanco. WBC still high. monitor anemia.     #Seizures  #Acute R Frontal Lobe Infarct likely 2/ hypotension   #Chronic R MCA infarct  - per chart: at baseline she is alert, able to communicate and is oriented. Ambulation at baseline: uses a wheelchair. She was in a hospital in [facility] last fall and was lethargic for months  - 3/8: s/p rapid response for L gaze preference  - 3/20: Pt had event with L gaze preference during SS eval, pt made NPO. Examined by resident, no gaze preference present any longer. Pt responsive but less than baseline. Developed seizures post-event. Septic workup obtained. CXR appears clear, rest pending. --> 3/21 Pt more responsive this AM, expressing herself and in line with current baseline.  - Depakote level (3/4) 87 (WNL) -> (3/21) 74 (WNL)   - CTH/CTA of head/neck (3/4): no acute pathology, no evidence of occlusion, aneurysm or stenosis  - EEG (3/8) and video EEG (3/9): Focal slowing  - MRI Brain (3/15): new subcortical infarct in the right frontal lobe. Re-demonstrated chronic infarct in the right MCA territory.  - MRI of C spine (3/16): Significantly motion limited study. No gross cord compression or spinal cord edema demonstrated. Multilevel small disc bulges.  - Neuro eval'd 3/8-3/20: For L gaze preference/stroke, OP med review: on low dose Lamictal and Depakote in addition to Keppra (possible mood disorder/ seizures), Her MRI of brain showing subcortical infarct within same M1 territory. The casue of stroke is most likely hypotension as she has a diminutive Rt M1. However, MRI findings doesn't explain her B/L upper extremity spasticity. So, it is important to rule out cervical cord pathology. DAPT for 21 days followed by ASA 81 mg daily, Increased keppra post 3/20 possible seizure event  -  eval'd 3/15: Delirium; pt does not have the capacity to choose a Health care agent  - SS re-juliette 3/21: Minced and moist, 1:1 feeds, thin liquids through straw  - Seizure precautions  - C/w Depakote 500mg TID and Keppra to 1500mg BID   - C/w baclofen 5mg bid (for UE spasticity)  - C/w ASA, Plavix (3/17-4/6 - completed), and high dose statin for new stroke - DAPT for 21 days followed by ASA 81 mg daily  - C/w Midodrine 10mg TID  - OP stroke clinic in 4 weeks after d/c per neurology    #Metabolic Encephalopathy from UTI  #Febrile 3/20  #Unstageable Sacral Ulcer  - 3/20: Pt became febrile after possible seizure episode, Cefepime, levaquin (x1 3/20), vancomycin  - 3/20 Septic workup: WBC (3/21) 12.26 (up from 8.23), BCx (3/20) NGTD, Procal (3/20) 0.05, RVP (3/20) COVID (-), Cortisol level, Lactate (3/21) WNL, UA nitrite (-), LEC (-), pending WBCs/Bacteria, UCx (3/21) NGTD  - Duplex LUE (3/10): (-) DVT (+) superficial cephalic vein thrombosis  - Duplex BLLE (3/7): (-) DVT  - s/p Vanc (3/4x1, 3/20-3/22), Cefepime (x1 3/4, x1 3/8), Ceftriaxone (3/4-3/6) (for UTI)  - Duplex BLLE: negative for DVT  - ID eval'd 3/23  - Burn eval'd 3/22 - No intervention, wound care recs noted  - Cefepime completed 3/24 (per ID)    #Left avulsion fracture of medial malleolus  - Duplex BL LE (3/7): (-) for DVT  - CT Foot (3/11): acute minimally displaced avulsion fracture at the medial malleolus  - Podiatry following for foot pain: WBAT w/ surgical shoe, ankle brace, PT  - fall precautions  - pain control regimen - if pain uncontrolled consider increasing percocet to 2tabs.  - C/w gabapentin at 100mg TID (Lowered dose from home, increase as pt tolerates)    #Sinus tachycardia - now resolved - attributed to pain  - TSH (3/8) WNL, - EKG reviewed    #Left cephalic vein thrombosis  - Duplex LUE (3/10): No DVT however there is superficial thrombosis noted in the left cephalic vein  - s/p warm compress  - Repeat duplex ordered 3/27 - negative for UE and LE DVT    #Anemia - normocytic  - possibly due to frequent phlebotomy, no signs of active bleed - monitor    #Misc  - DVT ppx - lovenox  - Diet: Soft and bite sized (SS 3/7)  - full code, overall guarded prognosis    c/w cefepime. flagyl, Vanco. monitor CBC. pain control

## 2023-04-11 PROCEDURE — 99232 SBSQ HOSP IP/OBS MODERATE 35: CPT

## 2023-04-11 RX ORDER — VALPROIC ACID (AS SODIUM SALT) 250 MG/5ML
500 SOLUTION, ORAL ORAL THREE TIMES A DAY
Refills: 0 | Status: DISCONTINUED | OUTPATIENT
Start: 2023-04-11 | End: 2023-04-18

## 2023-04-11 RX ADMIN — Medication 500 MILLIGRAM(S): at 05:11

## 2023-04-11 RX ADMIN — MIDODRINE HYDROCHLORIDE 10 MILLIGRAM(S): 2.5 TABLET ORAL at 17:07

## 2023-04-11 RX ADMIN — LEVETIRACETAM 1500 MILLIGRAM(S): 250 TABLET, FILM COATED ORAL at 05:29

## 2023-04-11 RX ADMIN — Medication 1 APPLICATION(S): at 17:08

## 2023-04-11 RX ADMIN — Medication 5 MILLIGRAM(S): at 05:11

## 2023-04-11 RX ADMIN — GABAPENTIN 100 MILLIGRAM(S): 400 CAPSULE ORAL at 13:12

## 2023-04-11 RX ADMIN — GABAPENTIN 100 MILLIGRAM(S): 400 CAPSULE ORAL at 05:11

## 2023-04-11 RX ADMIN — Medication 166.67 MILLIGRAM(S): at 05:22

## 2023-04-11 RX ADMIN — ATORVASTATIN CALCIUM 40 MILLIGRAM(S): 80 TABLET, FILM COATED ORAL at 22:07

## 2023-04-11 RX ADMIN — Medication 500 MILLIGRAM(S): at 13:11

## 2023-04-11 RX ADMIN — Medication 5 MILLIGRAM(S): at 17:07

## 2023-04-11 RX ADMIN — LEVETIRACETAM 1500 MILLIGRAM(S): 250 TABLET, FILM COATED ORAL at 17:07

## 2023-04-11 RX ADMIN — MIDODRINE HYDROCHLORIDE 10 MILLIGRAM(S): 2.5 TABLET ORAL at 05:11

## 2023-04-11 RX ADMIN — Medication 500 MILLIGRAM(S): at 22:09

## 2023-04-11 RX ADMIN — ENOXAPARIN SODIUM 40 MILLIGRAM(S): 100 INJECTION SUBCUTANEOUS at 22:08

## 2023-04-11 RX ADMIN — CEFEPIME 100 MILLIGRAM(S): 1 INJECTION, POWDER, FOR SOLUTION INTRAMUSCULAR; INTRAVENOUS at 05:22

## 2023-04-11 RX ADMIN — Medication 1 APPLICATION(S): at 05:29

## 2023-04-11 RX ADMIN — Medication 1 APPLICATION(S): at 05:15

## 2023-04-11 RX ADMIN — MORPHINE SULFATE 4 MILLIGRAM(S): 50 CAPSULE, EXTENDED RELEASE ORAL at 11:28

## 2023-04-11 RX ADMIN — MORPHINE SULFATE 4 MILLIGRAM(S): 50 CAPSULE, EXTENDED RELEASE ORAL at 06:06

## 2023-04-11 RX ADMIN — Medication 500 MILLIGRAM(S): at 11:29

## 2023-04-11 RX ADMIN — Medication 500 MILLIGRAM(S): at 05:12

## 2023-04-11 RX ADMIN — CEFEPIME 100 MILLIGRAM(S): 1 INJECTION, POWDER, FOR SOLUTION INTRAMUSCULAR; INTRAVENOUS at 17:06

## 2023-04-11 RX ADMIN — GABAPENTIN 100 MILLIGRAM(S): 400 CAPSULE ORAL at 22:07

## 2023-04-11 RX ADMIN — Medication 166.67 MILLIGRAM(S): at 17:07

## 2023-04-11 RX ADMIN — Medication 1 APPLICATION(S): at 05:12

## 2023-04-11 RX ADMIN — Medication 1 TABLET(S): at 11:28

## 2023-04-11 RX ADMIN — MORPHINE SULFATE 4 MILLIGRAM(S): 50 CAPSULE, EXTENDED RELEASE ORAL at 22:07

## 2023-04-11 RX ADMIN — MIDODRINE HYDROCHLORIDE 10 MILLIGRAM(S): 2.5 TABLET ORAL at 11:28

## 2023-04-11 RX ADMIN — MORPHINE SULFATE 4 MILLIGRAM(S): 50 CAPSULE, EXTENDED RELEASE ORAL at 11:47

## 2023-04-11 RX ADMIN — Medication 81 MILLIGRAM(S): at 11:28

## 2023-04-11 RX ADMIN — Medication 1 APPLICATION(S): at 18:57

## 2023-04-11 RX ADMIN — Medication 500 MILLIGRAM(S): at 13:13

## 2023-04-11 RX ADMIN — MORPHINE SULFATE 4 MILLIGRAM(S): 50 CAPSULE, EXTENDED RELEASE ORAL at 05:28

## 2023-04-11 RX ADMIN — Medication 1 APPLICATION(S): at 17:07

## 2023-04-11 NOTE — PROGRESS NOTE ADULT - SUBJECTIVE AND OBJECTIVE BOX
JOSEPH OBRIEN 67y Female  MRN#: [id number]   Hospital Day: 38d    SUBJECTIVE  Patient is a 67y old Female who presents with a chief complaint of Multiple Strokes (10 Apr 2023 08:27)  Currently admitted to medicine with the primary diagnosis of Stroke      INTERVAL HPI AND OVERNIGHT EVENTS:  Patient was examined and seen at bedside. This morning she is resting comfortably in bed and reports no issues or overnight events.    OBJECTIVE  PAST MEDICAL & SURGICAL HISTORY    ALLERGIES:  Allergy Status Unknown    MEDICATIONS:  STANDING MEDICATIONS  acetaminophen  Suppository .. 325 milliGRAM(s) Rectal once  ascorbic acid 500 milliGRAM(s) Oral daily  aspirin  chewable 81 milliGRAM(s) Oral daily  atorvastatin 40 milliGRAM(s) Oral at bedtime  baclofen 5 milliGRAM(s) Oral every 12 hours  cefepime   IVPB      cefepime   IVPB 2000 milliGRAM(s) IV Intermittent every 12 hours  collagenase Ointment 1 Application(s) Topical two times a day  Dakins Solution - 1/2 Strength 1 Application(s) Topical two times a day  enoxaparin Injectable 40 milliGRAM(s) SubCutaneous every 24 hours  gabapentin 100 milliGRAM(s) Oral three times a day  levETIRAcetam  Solution 1500 milliGRAM(s) Oral two times a day  metroNIDAZOLE    Tablet 500 milliGRAM(s) Oral every 8 hours  midodrine. 10 milliGRAM(s) Oral three times a day  multivitamin/minerals 1 Tablet(s) Oral daily  valproic  acid Syrup 500 milliGRAM(s) Oral three times a day  vancomycin  IVPB 1250 milliGRAM(s) IV Intermittent every 12 hours  vitamin A &amp; D Ointment 1 Application(s) Topical two times a day    PRN MEDICATIONS  acetaminophen     Tablet .. 650 milliGRAM(s) Oral every 6 hours PRN  aluminum hydroxide/magnesium hydroxide/simethicone Suspension 30 milliLiter(s) Oral every 6 hours PRN  morphine  - Injectable 4 milliGRAM(s) IV Push every 6 hours PRN      VITAL SIGNS: Last 24 Hours  T(C): 36.7 (11 Apr 2023 05:00), Max: 36.7 (11 Apr 2023 05:00)  T(F): 98.1 (11 Apr 2023 05:00), Max: 98.1 (11 Apr 2023 05:00)  HR: 107 (11 Apr 2023 05:00) (101 - 107)  BP: 105/57 (11 Apr 2023 05:00) (105/57 - 119/64)  BP(mean): --  RR: 18 (11 Apr 2023 05:00) (18 - 18)  SpO2: 100% (10 Apr 2023 20:00) (98% - 100%)    LABS:                        7.5    17.26 )-----------( 735      ( 10 Apr 2023 06:07 )             23.6     04-10    139  |  101  |  10  ----------------------------<  94  4.6   |  27  |  <0.5<L>    Ca    9.4      10 Apr 2023 06:07  Mg     2.1     04-10    TPro  5.8<L>  /  Alb  2.5<L>  /  TBili  <0.2  /  DBili  x   /  AST  19  /  ALT  8   /  AlkPhos  96  04-10                  RADIOLOGY:      PHYSICAL EXAM:  CONSTITUTIONAL: No acute distress,   HEAD: Atraumatic, normocephalic  EYES: EOM intact, PERRLA, conjunctiva and sclera clear  ENT: moist mucous membranes  PULMONARY: Clear to auscultation bilaterally  CARDIOVASCULAR: Regular rate and rhythm  GASTROINTESTINAL: Soft, non-tender, non-distended; bowel sounds present  MUSCULOSKELETAL: no edema  NEUROLOGY: non-focal  SKIN: warm and dry

## 2023-04-12 LAB
ANION GAP SERPL CALC-SCNC: 21 MMOL/L — HIGH (ref 7–14)
BUN SERPL-MCNC: 14 MG/DL — SIGNIFICANT CHANGE UP (ref 10–20)
CALCIUM SERPL-MCNC: 9.1 MG/DL — SIGNIFICANT CHANGE UP (ref 8.4–10.5)
CHLORIDE SERPL-SCNC: 98 MMOL/L — SIGNIFICANT CHANGE UP (ref 98–110)
CO2 SERPL-SCNC: 17 MMOL/L — SIGNIFICANT CHANGE UP (ref 17–32)
CREAT SERPL-MCNC: 0.6 MG/DL — LOW (ref 0.7–1.5)
EGFR: 99 ML/MIN/1.73M2 — SIGNIFICANT CHANGE UP
GLUCOSE SERPL-MCNC: 72 MG/DL — SIGNIFICANT CHANGE UP (ref 70–99)
HCT VFR BLD CALC: 28.8 % — LOW (ref 37–47)
HGB BLD-MCNC: 9 G/DL — LOW (ref 12–16)
MAGNESIUM SERPL-MCNC: 2.2 MG/DL — SIGNIFICANT CHANGE UP (ref 1.8–2.4)
MCHC RBC-ENTMCNC: 28.1 PG — SIGNIFICANT CHANGE UP (ref 27–31)
MCHC RBC-ENTMCNC: 31.3 G/DL — LOW (ref 32–37)
MCV RBC AUTO: 90 FL — SIGNIFICANT CHANGE UP (ref 81–99)
NRBC # BLD: 0 /100 WBCS — SIGNIFICANT CHANGE UP (ref 0–0)
PLATELET # BLD AUTO: 720 K/UL — HIGH (ref 130–400)
PMV BLD: 9.2 FL — SIGNIFICANT CHANGE UP (ref 7.4–10.4)
POTASSIUM SERPL-MCNC: 4.9 MMOL/L — SIGNIFICANT CHANGE UP (ref 3.5–5)
POTASSIUM SERPL-SCNC: 4.9 MMOL/L — SIGNIFICANT CHANGE UP (ref 3.5–5)
RBC # BLD: 3.2 M/UL — LOW (ref 4.2–5.4)
RBC # FLD: 14.6 % — HIGH (ref 11.5–14.5)
SODIUM SERPL-SCNC: 136 MMOL/L — SIGNIFICANT CHANGE UP (ref 135–146)
VANCOMYCIN TROUGH SERPL-MCNC: 31.9 UG/ML — HIGH (ref 5–10)
WBC # BLD: 22.97 K/UL — HIGH (ref 4.8–10.8)
WBC # FLD AUTO: 22.97 K/UL — HIGH (ref 4.8–10.8)

## 2023-04-12 PROCEDURE — 99233 SBSQ HOSP IP/OBS HIGH 50: CPT

## 2023-04-12 RX ORDER — METRONIDAZOLE 500 MG
500 TABLET ORAL EVERY 8 HOURS
Refills: 0 | Status: DISCONTINUED | OUTPATIENT
Start: 2023-04-12 | End: 2023-04-13

## 2023-04-12 RX ORDER — LEVETIRACETAM 250 MG/1
1500 TABLET, FILM COATED ORAL EVERY 12 HOURS
Refills: 0 | Status: DISCONTINUED | OUTPATIENT
Start: 2023-04-12 | End: 2023-04-18

## 2023-04-12 RX ORDER — VALPROIC ACID (AS SODIUM SALT) 250 MG/5ML
500 SOLUTION, ORAL ORAL ONCE
Refills: 0 | Status: COMPLETED | OUTPATIENT
Start: 2023-04-12 | End: 2023-04-12

## 2023-04-12 RX ORDER — ACETAMINOPHEN 500 MG
325 TABLET ORAL ONCE
Refills: 0 | Status: DISCONTINUED | OUTPATIENT
Start: 2023-04-12 | End: 2023-04-18

## 2023-04-12 RX ORDER — METRONIDAZOLE 500 MG
500 TABLET ORAL EVERY 8 HOURS
Refills: 0 | Status: DISCONTINUED | OUTPATIENT
Start: 2023-04-12 | End: 2023-04-12

## 2023-04-12 RX ADMIN — ATORVASTATIN CALCIUM 40 MILLIGRAM(S): 80 TABLET, FILM COATED ORAL at 21:15

## 2023-04-12 RX ADMIN — Medication 5 MILLIGRAM(S): at 17:15

## 2023-04-12 RX ADMIN — Medication 1 APPLICATION(S): at 05:31

## 2023-04-12 RX ADMIN — Medication 500 MILLIGRAM(S): at 21:14

## 2023-04-12 RX ADMIN — GABAPENTIN 100 MILLIGRAM(S): 400 CAPSULE ORAL at 05:31

## 2023-04-12 RX ADMIN — Medication 81 MILLIGRAM(S): at 11:14

## 2023-04-12 RX ADMIN — Medication 500 MILLIGRAM(S): at 05:31

## 2023-04-12 RX ADMIN — Medication 100 MILLIGRAM(S): at 13:21

## 2023-04-12 RX ADMIN — Medication 500 MILLIGRAM(S): at 11:14

## 2023-04-12 RX ADMIN — ENOXAPARIN SODIUM 40 MILLIGRAM(S): 100 INJECTION SUBCUTANEOUS at 21:28

## 2023-04-12 RX ADMIN — Medication 1 APPLICATION(S): at 17:19

## 2023-04-12 RX ADMIN — Medication 50 MILLIGRAM(S): at 21:15

## 2023-04-12 RX ADMIN — CEFEPIME 100 MILLIGRAM(S): 1 INJECTION, POWDER, FOR SOLUTION INTRAMUSCULAR; INTRAVENOUS at 05:30

## 2023-04-12 RX ADMIN — GABAPENTIN 100 MILLIGRAM(S): 400 CAPSULE ORAL at 21:15

## 2023-04-12 RX ADMIN — Medication 1 APPLICATION(S): at 05:32

## 2023-04-12 RX ADMIN — Medication 50 MILLIGRAM(S): at 02:56

## 2023-04-12 RX ADMIN — MIDODRINE HYDROCHLORIDE 10 MILLIGRAM(S): 2.5 TABLET ORAL at 17:18

## 2023-04-12 RX ADMIN — GABAPENTIN 100 MILLIGRAM(S): 400 CAPSULE ORAL at 13:21

## 2023-04-12 RX ADMIN — LEVETIRACETAM 1500 MILLIGRAM(S): 250 TABLET, FILM COATED ORAL at 05:30

## 2023-04-12 RX ADMIN — LEVETIRACETAM 400 MILLIGRAM(S): 250 TABLET, FILM COATED ORAL at 08:10

## 2023-04-12 RX ADMIN — MORPHINE SULFATE 4 MILLIGRAM(S): 50 CAPSULE, EXTENDED RELEASE ORAL at 22:33

## 2023-04-12 RX ADMIN — Medication 5 MILLIGRAM(S): at 05:31

## 2023-04-12 RX ADMIN — MIDODRINE HYDROCHLORIDE 10 MILLIGRAM(S): 2.5 TABLET ORAL at 11:15

## 2023-04-12 RX ADMIN — CEFEPIME 100 MILLIGRAM(S): 1 INJECTION, POWDER, FOR SOLUTION INTRAMUSCULAR; INTRAVENOUS at 17:17

## 2023-04-12 RX ADMIN — LEVETIRACETAM 400 MILLIGRAM(S): 250 TABLET, FILM COATED ORAL at 17:16

## 2023-04-12 RX ADMIN — Medication 1 APPLICATION(S): at 17:15

## 2023-04-12 RX ADMIN — Medication 1 TABLET(S): at 11:15

## 2023-04-12 RX ADMIN — Medication 100 MILLIGRAM(S): at 06:10

## 2023-04-12 RX ADMIN — Medication 50 MILLIGRAM(S): at 15:48

## 2023-04-12 RX ADMIN — Medication 50 MILLIGRAM(S): at 05:30

## 2023-04-12 RX ADMIN — Medication 650 MILLIGRAM(S): at 08:23

## 2023-04-12 NOTE — PHARMACOTHERAPY INTERVENTION NOTE - COMMENTS
Recommended switching from metronidazole 500mg IV q8h to metronidazole 500mg PO q8h due to ~100% bioavailability of the oral tablets, which is consistent with ID consult recommendations.    Josemanuel Naqvi, PharmD  Clinical Pharmacy Specialist, Infectious Diseases  Tele-Antimicrobial Stewardship Program (Tele-[facility])  Tele[facility] Phone: [phone]

## 2023-04-12 NOTE — PROGRESS NOTE ADULT - ASSESSMENT
ASSESSMENT   68 y/o woman with PMH of Right MCA stroke with left sided weakness was brought to the ED via EMS. In the ED, she had altered mental status and sepsis, with new onset fevers.     IMPRESSION  Complicated and prolonged hospital course, admitted 3/3 for R MCA CVA, ID initially consulted 3/22 for Fever  #Recurrent Fever 4/12  #Leukocytosis; Afebrile since 3/21, Tm 100.3    Suspected infected decub with underlying osteomyelitis    4/4 WCX     Numerous Proteus mirabilis Cefepime: S <=2    Numerous Morganella morganii Cefepime: S <=2    Moderate Enterococcus raffinosus Ampicillin: R >8     Few Staphylococcus aureus- MSSA  CT  Sacral decubitus ulcer with osteomyelitis of the coccyx.  No acute intra-abdominal pathology.  Unchanged parapelvic cysts bilaterally with a 4 mm stone in the left mid ureter without evidence of obstruction.   WBC elevated to 21 4/4    4/3 UA without significant pyuria     3/28 BCX NGTD     3/27 BCX NGTD   < from: Xray Chest 1 View- PORTABLE-Routine (Xray Chest 1 View- PORTABLE-Routine .) (03.27.23 @ 11:39) >No focal consolidation, pneumothorax or pleural effusion.  #3/21 Tm 101.7 P>90 low BP- Sepsis   WBC 19 on admission, UCX 3/4 panS ecoli, UA , s/p ceftriaxone 3/4-6, cefepime 3/8  Exam at this time Skin: two approx. 3x2 cm wounds to sacral region with pink moist base. No active bleeding, purulence or surrounding erythema   3/21 UCX NG   3/20 BCX NGTD   3/20 COVID/RVP NEGATIVE   CXR no PNA  No diarrhea  No obvious phlebitis  Recent MRI stable  cannot obtain further history from the patient secondary to altered mental status or sedation   Creatinine, Serum: <0.5 mg/dL (04.04.23 @ 06:35)        RECOMMENDATIONS  - Please send BCX as fever  - Please repeat Vanc trough 30min prior to next dose   - Cefepime 2g q12h IV + PO flagyl 500mg TID  - Vanc 1250g q12h IV  - On antibiotics since 4/4-  - Midline 4/6-  - Burn f/u appreciated  - No benefit of long term intravenous antibiotics for sacral osteomyelitis. Continue with debridement, nutritional support and offloading   - Guarded prognosis GOC    Please call or message on [employer] Teams if with any questions.      ASSESSMENT   66 y/o woman with PMH of Right MCA stroke with left sided weakness was brought to the ED via EMS. In the ED, she had altered mental status and sepsis, with new onset fevers.     IMPRESSION  Complicated and prolonged hospital course, admitted 3/3 for R MCA CVA, ID initially consulted 3/22 for Fever  #Recurrent Fever 4/12  #Leukocytosis; Afebrile since 3/21, Tm 100.3    Suspected infected decub with underlying osteomyelitis    4/4 WCX     Numerous Proteus mirabilis Cefepime: S <=2    Numerous Morganella morganii Cefepime: S <=2    Moderate Enterococcus raffinosus Ampicillin: R >8     Few Staphylococcus aureus- MSSA  CT  Sacral decubitus ulcer with osteomyelitis of the coccyx.  No acute intra-abdominal pathology.  Unchanged parapelvic cysts bilaterally with a 4 mm stone in the left mid ureter without evidence of obstruction.   WBC elevated to 21 4/4    4/3 UA without significant pyuria     3/28 BCX NGTD     3/27 BCX NGTD   < from: Xray Chest 1 View- PORTABLE-Routine (Xray Chest 1 View- PORTABLE-Routine .) (03.27.23 @ 11:39) >No focal consolidation, pneumothorax or pleural effusion.  #3/21 Tm 101.7 P>90 low BP- Sepsis   WBC 19 on admission, UCX 3/4 panS ecoli, UA , s/p ceftriaxone 3/4-6, cefepime 3/8  Exam at this time Skin: two approx. 3x2 cm wounds to sacral region with pink moist base. No active bleeding, purulence or surrounding erythema   3/21 UCX NG   3/20 BCX NGTD   3/20 COVID/RVP NEGATIVE   CXR no PNA  No diarrhea  No obvious phlebitis  Recent MRI stable  cannot obtain further history from the patient secondary to altered mental status or sedation   Creatinine, Serum: <0.5 mg/dL (04.04.23 @ 06:35)        RECOMMENDATIONS  - Please send BCX as fever  - Please repeat Vanc trough 30min prior to next dose   - Monitor PLTs, rising  - Cefepime 2g q12h IV + PO flagyl 500mg TID  - Vanc 1250g q12h IV  - On antibiotics since 4/4-  - Midline 4/6-  - If hemodynamic compromise, change cefepime/flagyl to meropenem 1g q8h iv   - Burn f/u appreciated  - No benefit of long term intravenous antibiotics for sacral osteomyelitis. Continue with debridement, nutritional support and offloading   - Guarded prognosis GOC    Please call or message on [employer] Teams if with any questions.

## 2023-04-12 NOTE — PROGRESS NOTE ADULT - ATTENDING COMMENTS
68 y/o woman with PMH of Right MCA stroke with left sided weakness was brought to the ED via EMS. In the ED, she had altered mental status and sepsis.    #Sepsis - new onset 4/2  #Sacral unstagble wound with osteomyelitis of coccyx  - Burn team f/up   - s/p CT Chest/Abd/Pelvis w/w/o Ct: Sacral decubitus ulcer with osteomyelitis of the coccyx. No acute intra-abdominal pathology. Unchanged parapelvic cysts bilaterally with a 4 mm stone in the left mid ureter without evidence of obstruction.  - as per ID -  c/w Cefepime/flagyl/Vanco.   -4/12- pesistent leukocytosis, repeated blood cxs today, f/up Vanco level    #Seizures  #Acute R Frontal Lobe Infarct likely 2/ hypotension   #Chronic R MCA infarct- chronic bed confinement status, dysarthria- answers on/off with 1 word yes, no.    - EEG (3/8) and video EEG (3/9): Focal slowing  - MRI Brain (3/15): new subcortical infarct in the right frontal lobe. Re-demonstrated chronic infarct in the right MCA territory.  - MRI of C spine (3/16): Significantly motion limited study. No gross cord compression or spinal cord edema demonstrated. Multilevel small disc bulges.  - Neuro eval'd 3/8-3/20: For L gaze preference/stroke, OP med review: on low dose Lamictal and Depakote in addition to Keppra (possible mood disorder/ seizures), Her MRI of brain showing subcortical infarct within same M1 territory. The cause of stroke is most likely hypotension as she has a diminutive Rt M1. However, MRI findings doesn't explain her B/L upper extremity spasticity. So, it is important to rule out cervical cord pathology. DAPT for 21 days followed by ASA 81 mg daily, Increased keppra post 3/20 possible seizure event  - SS re-juliette 3/21: Minced and moist, 1:1 feeds, thin liquids through straw  - C/w Depakote 500mg TID and Keppra to 1500mg BID   - C/w baclofen 5mg bid (for UE spasticity)  - C/w ASA, Plavix (3/17-4/6 - completed), and high dose statin for new stroke - DAPT for 21 days followed by ASA 81 mg daily  - - OP stroke clinic in 4 weeks after d/c per neurology    #Persistent Hypotension - c/w Midodrine 10mg TID      #Left avulsion fracture of medial malleolus  - Duplex BL LE (3/7): (-) for DVT  - CT Foot (3/11): acute minimally displaced avulsion fracture at the medial malleolus  - Podiatry following for foot pain: WBAT w/ surgical shoe, ankle brace, PT  - fall precautions  - pain control regimen - if pain uncontrolled consider increasing percocet to 2tabs.  - C/w gabapentin at 100mg TID (Lowered dose from home, increase as pt tolerates)    #Left cephalic vein thrombosis  - Duplex LUE (3/10): No DVT however there is superficial thrombosis noted in the left cephalic vein  - s/p warm compress  - Repeat duplex ordered 3/27 - negative for UE and LE DVT  #Anemia - normocytic  - possibly due to frequent phlebotomy, no signs of active bleed - monitor       DVT ppx - lovenox  - Diet: Soft and bite sized (SS 3/7)  - full code, overall guarded prognosis    #Progress Note Handoff: Pending blood cxs, vanco level, continue IV abx, f/up CBC in am  for resolution of leukocytosis, GOC d/w family   Family discussion: yes, medical team Disposition: SNF once medically stable    Total time spent to complete patient's bedside assessment, review medical chart, discuss medical plan of care with covering medical team was more than 50 minutes with >50% of time spent face to face with patient, discussion with patient/family and/or coordination of care

## 2023-04-12 NOTE — PROGRESS NOTE ADULT - SUBJECTIVE AND OBJECTIVE BOX
JOSEPH OBRIEN  67y, Female  Allergy: Allergy Status Unknown      LOS  39d    CHIEF COMPLAINT: Sepsis (11 Apr 2023 12:35)      INTERVAL EVENTS/HPI  - fever this AM   - T(F): , Max: 100.6 (04-12-23 @ 07:48)  - Tolerating medication  - WBC Count: 22.97 (04-12-23 @ 06:45) uptrending   WBC Count: 17.26 (04-10-23 @ 06:07)     - Creatinine, Serum: 0.6 (04-12-23 @ 06:45)       ROS  ***    VITALS:  T(F): 100.6, Max: 100.6 (04-12-23 @ 07:48)  HR: 108  BP: 114/73  RR: 16Vital Signs Last 24 Hrs  T(C): 38.1 (12 Apr 2023 07:48), Max: 38.1 (12 Apr 2023 07:48)  T(F): 100.6 (12 Apr 2023 07:48), Max: 100.6 (12 Apr 2023 07:48)  HR: 108 (12 Apr 2023 05:00) (94 - 108)  BP: 114/73 (12 Apr 2023 05:00) (101/56 - 114/73)  BP(mean): --  RR: 16 (12 Apr 2023 05:00) (16 - 18)  SpO2: 99% (11 Apr 2023 21:22) (99% - 99%)    Parameters below as of 11 Apr 2023 14:23  Patient On (Oxygen Delivery Method): room air        PHYSICAL EXAM:  ***    FH: Non-contributory  Social Hx: Non-contributory    TESTS & MEASUREMENTS:                        9.0    22.97 )-----------( 720      ( 12 Apr 2023 06:45 )             28.8     04-12    136  |  98  |  14  ----------------------------<  72  4.9   |  17  |  0.6<L>    Ca    9.1      12 Apr 2023 06:45  Mg     2.2     04-12              Culture - Tissue with Gram Stain (collected 04-04-23 @ 08:40)  Source: .Tissue sacrum  Gram Stain (04-04-23 @ 21:29):    No polymorphonuclear leukocytes per low power field    Numerous Gram Positive Cocci in Pairs and Chains per oil power field    Numerous Gram Negative Rods per oil power field  Final Report (04-06-23 @ 19:33):    Culture yields >4 types of aerobic and/or anaerobic bacteria    Call client services within 7 days if further workup is clinically    indicated.    Culture includes    Numerous Proteus mirabilis    Numerous Morganella morganii    Moderate Enterococcus raffinosus    Few Staphylococcus aureus  Organism: Proteus mirabilis  Morganella morganii  Enterococcus raffinosus  Staphylococcus aureus (04-06-23 @ 19:33)  Organism: Staphylococcus aureus (04-06-23 @ 19:33)      Method Type: JULIANA      -  Ampicillin/Sulbactam: S <=8/4      -  Cefazolin: S <=4      -  Clindamycin: S <=0.25      -  Erythromycin: S <=0.25      -  Gentamicin: S <=1 Should not be used as monotherapy      -  Oxacillin: S <=0.25 Oxacillin predicts susceptibility for dicloxacillin, methicillin, and nafcillin      -  Penicillin: R 0.25      -  Rifampin: S <=1 Should not be used as monotherapy      -  Tetracycline: S <=1      -  Trimethoprim/Sulfamethoxazole: S <=0.5/9.5      -  Vancomycin: S 1  Organism: Enterococcus raffinosus (04-06-23 @ 19:33)      Method Type: JULIANA      -  Ampicillin: R >8 Predicts results to ampicillin/sulbactam, amoxacillin-clavulanate and  piperacillin-tazobactam.      -  Tetracycline: R >8      -  Vancomycin: S 1  Organism: Morganella morganii (04-06-23 @ 19:33)      Method Type: JULIANA      -  Amikacin: S <=16      -  Amoxicillin/Clavulanic Acid: R >16/8      -  Ampicillin: R >16 These ampicillin results predict results for amoxicillin      -  Ampicillin/Sulbactam: I 16/8 Enterobacter, Klebsiella aerogenes, Citrobacter, and Serratia may develop resistance during prolonged therapy (3-4 days)      -  Aztreonam: S <=4      -  Cefazolin: R >16 Enterobacter, Klebsiella aerogenes, Citrobacter, and Serratia may develop resistance during prolonged therapy (3-4 days)      -  Cefepime: S <=2      -  Cefoxitin: S <=8      -  Ceftriaxone: S <=1 Enterobacter, Klebsiella aerogenes, Citrobacter, and Serratia may develop resistance during prolonged therapy      -  Ciprofloxacin: S <=0.25      -  Ertapenem: S <=0.5      -  Gentamicin: S <=2      -  Levofloxacin: S <=0.5      -  Meropenem: S <=1      -  Piperacillin/Tazobactam: S <=8      -  Tobramycin: S <=2      -  Trimethoprim/Sulfamethoxazole: S <=0.5/9.5  Organism: Proteus mirabilis (04-06-23 @ 19:33)      Method Type: JULIANA      -  Amikacin: S <=16      -  Amoxicillin/Clavulanic Acid: S <=8/4      -  Ampicillin: S <=8 These ampicillin results predict results for amoxicillin      -  Ampicillin/Sulbactam: S <=4/2 Enterobacter, Klebsiella aerogenes, Citrobacter, and Serratia may develop resistance during prolonged therapy (3-4 days)      -  Aztreonam: S <=4      -  Cefazolin: S <=2 Enterobacter, Klebsiella aerogenes, Citrobacter, and Serratia may develop resistance during prolonged therapy (3-4 days)      -  Cefepime: S <=2      -  Cefoxitin: S <=8      -  Ceftriaxone: S <=1 Enterobacter, Klebsiella aerogenes, Citrobacter, and Serratia may develop resistance during prolonged therapy      -  Ciprofloxacin: S <=0.25      -  Ertapenem: S <=0.5      -  Gentamicin: S <=2      -  Levofloxacin: S <=0.5      -  Meropenem: S <=1      -  Piperacillin/Tazobactam: S <=8      -  Tobramycin: S <=2      -  Trimethoprim/Sulfamethoxazole: S <=0.5/9.5    Culture - Blood (collected 04-04-23 @ 06:35)  Source: .Blood Blood  Final Report (04-09-23 @ 14:00):    No Growth Final    Culture - Blood (collected 03-28-23 @ 07:32)  Source: .Blood None  Final Report (04-02-23 @ 18:00):    No Growth Final    Culture - Blood (collected 03-27-23 @ 10:39)  Source: .Blood Blood-Peripheral  Final Report (04-01-23 @ 18:00):    No Growth Final    Culture - Urine (collected 03-21-23 @ 11:57)  Source: Catheterized Catheterized  Final Report (03-22-23 @ 17:54):    No growth    Culture - Blood (collected 03-20-23 @ 22:07)  Source: .Blood Blood  Final Report (03-26-23 @ 02:00):    No Growth Final            INFECTIOUS DISEASES TESTING  Vancomycin Level, Trough: 19.6 (04-07-23 @ 18:35)  Vancomycin Level, Trough: 8.0 (04-05-23 @ 17:56)  Rapid RVP Result: NotDetec (04-05-23 @ 14:26)  COVID-19 PCR: NotDetec (04-02-23 @ 18:03)  Rapid RVP Result: NotDetec (03-28-23 @ 09:55)  Procalcitonin, Serum: 0.05 (03-20-23 @ 22:07)  Rapid RVP Result: NotDetec (03-20-23 @ 21:25)  COVID-19 PCR: NotDetec (03-19-23 @ 12:47)  COVID-19 PCR: NotDetec (03-15-23 @ 16:23)  strept    INFLAMMATORY MARKERS      RADIOLOGY & ADDITIONAL TESTS:  I have personally reviewed the last available Chest xray  CXR      CT      CARDIOLOGY TESTING  12 Lead ECG:   Ventricular Rate 114 BPM    Atrial Rate 114 BPM    P-R Interval 122 ms    QRS Duration 62 ms    Q-T Interval 312 ms    QTC Calculation(Bazett) 430 ms    P Axis 43 degrees    R Axis 7 degrees    T Axis 43 degrees    Diagnosis Line Sinus tachycardia  Otherwise normal ECG    Confirmed by SINA GREEN [facility] ([id number]) on 4/5/2023 8:47:49 AM (04-05-23 @ 07:28)  12 Lead ECG:   Ventricular Rate 103 BPM    Atrial Rate 103 BPM    P-R Interval 120 ms    QRS Duration 66 ms    Q-T Interval 324 ms    QTC Calculation(Bazett) 424 ms    P Axis 40 degrees    R Axis -2 degrees    T Axis 43 degrees    Diagnosis Line Sinus tachycardia  Otherwise normal ECG    Confirmed by Luly Sage MDfer ([id number]) on 4/4/2023 10:25:13 AM (04-03-23 @ 11:37)      MEDICATIONS  acetaminophen  Suppository .. 325 Rectal once  ascorbic acid 500 Oral daily  aspirin  chewable 81 Oral daily  atorvastatin 40 Oral at bedtime  baclofen 5 Oral every 12 hours  cefepime   IVPB 2000 IV Intermittent every 12 hours  cefepime   IVPB     collagenase Ointment 1 Topical two times a day  Dakins Solution - 1/2 Strength 1 Topical two times a day  enoxaparin Injectable 40 SubCutaneous every 24 hours  gabapentin 100 Oral three times a day  levETIRAcetam  IVPB 1500 IV Intermittent every 12 hours  metroNIDAZOLE  IVPB 500 IV Intermittent every 8 hours  midodrine. 10 Oral three times a day  multivitamin/minerals 1 Oral daily  valproate sodium  IVPB 500 IV Intermittent three times a day  vancomycin  IVPB 1250 IV Intermittent every 12 hours  vitamin A &amp; D Ointment 1 Topical two times a day      WEIGHT  Weight (kg): 68.3 (03-08-23 @ 10:12)  Creatinine, Serum: 0.6 mg/dL (04-12-23 @ 06:45)      ANTIBIOTICS:  cefepime   IVPB 2000 milliGRAM(s) IV Intermittent every 12 hours  cefepime   IVPB      metroNIDAZOLE  IVPB 500 milliGRAM(s) IV Intermittent every 8 hours  vancomycin  IVPB 1250 milliGRAM(s) IV Intermittent every 12 hours      All available historical records have been reviewed       CAMILLE, JOSEPH  67y, Female  Allergy: Allergy Status Unknown      LOS  39d    CHIEF COMPLAINT: Sepsis (11 Apr 2023 12:35)      INTERVAL EVENTS/HPI  - fever this AM   - T(F): , Max: 100.6 (04-12-23 @ 07:48)  - Tolerating medication  - WBC Count: 22.97 (04-12-23 @ 06:45) uptrending   WBC Count: 17.26 (04-10-23 @ 06:07)     - Creatinine, Serum: 0.6 (04-12-23 @ 06:45)       ROS  General: Denies rigors, nightsweats  HEENT: Denies headache, rhinorrhea, sore throat, eye pain  CV: Denies CP, palpitations  PULM: Denies wheezing, hemoptysis  GI: Denies hematemesis, hematochezia, melena  : Denies discharge, hematuria  MSK: Denies arthralgias, myalgias  SKIN: Denies rash, lesions  NEURO: Denies paresthesias, weakness  PSYCH: Denies depression, anxiety     VITALS:  T(F): 100.6, Max: 100.6 (04-12-23 @ 07:48)  HR: 108  BP: 114/73  RR: 16Vital Signs Last 24 Hrs  T(C): 38.1 (12 Apr 2023 07:48), Max: 38.1 (12 Apr 2023 07:48)  T(F): 100.6 (12 Apr 2023 07:48), Max: 100.6 (12 Apr 2023 07:48)  HR: 108 (12 Apr 2023 05:00) (94 - 108)  BP: 114/73 (12 Apr 2023 05:00) (101/56 - 114/73)  BP(mean): --  RR: 16 (12 Apr 2023 05:00) (16 - 18)  SpO2: 99% (11 Apr 2023 21:22) (99% - 99%)    Parameters below as of 11 Apr 2023 14:23  Patient On (Oxygen Delivery Method): room air        PHYSICAL EXAM:  Gen: chronically ill appearing   HEENT: Normocephalic, atraumatic  Neck: supple, no lymphadenopathy  CV: Regular rate & regular rhythm  Lungs: decreased BS at bases, no fremitus  Abdomen: Soft, BS present  Ext: Warm, well perfused  Neuro: L deficits  Sacrum deferred   Skin: no rash, no erythema  Lines: no phlebitis     FH: Non-contributory  Social Hx: Non-contributory    TESTS & MEASUREMENTS:                        9.0    22.97 )-----------( 720      ( 12 Apr 2023 06:45 )             28.8     04-12    136  |  98  |  14  ----------------------------<  72  4.9   |  17  |  0.6<L>    Ca    9.1      12 Apr 2023 06:45  Mg     2.2     04-12              Culture - Tissue with Gram Stain (collected 04-04-23 @ 08:40)  Source: .Tissue sacrum  Gram Stain (04-04-23 @ 21:29):    No polymorphonuclear leukocytes per low power field    Numerous Gram Positive Cocci in Pairs and Chains per oil power field    Numerous Gram Negative Rods per oil power field  Final Report (04-06-23 @ 19:33):    Culture yields >4 types of aerobic and/or anaerobic bacteria    Call client services within 7 days if further workup is clinically    indicated.    Culture includes    Numerous Proteus mirabilis    Numerous Morganella morganii    Moderate Enterococcus raffinosus    Few Staphylococcus aureus  Organism: Proteus mirabilis  Morganella morganii  Enterococcus raffinosus  Staphylococcus aureus (04-06-23 @ 19:33)  Organism: Staphylococcus aureus (04-06-23 @ 19:33)      Method Type: JULIANA      -  Ampicillin/Sulbactam: S <=8/4      -  Cefazolin: S <=4      -  Clindamycin: S <=0.25      -  Erythromycin: S <=0.25      -  Gentamicin: S <=1 Should not be used as monotherapy      -  Oxacillin: S <=0.25 Oxacillin predicts susceptibility for dicloxacillin, methicillin, and nafcillin      -  Penicillin: R 0.25      -  Rifampin: S <=1 Should not be used as monotherapy      -  Tetracycline: S <=1      -  Trimethoprim/Sulfamethoxazole: S <=0.5/9.5      -  Vancomycin: S 1  Organism: Enterococcus raffinosus (04-06-23 @ 19:33)      Method Type: JULIANA      -  Ampicillin: R >8 Predicts results to ampicillin/sulbactam, amoxacillin-clavulanate and  piperacillin-tazobactam.      -  Tetracycline: R >8      -  Vancomycin: S 1  Organism: Morganella morganii (04-06-23 @ 19:33)      Method Type: JULIANA      -  Amikacin: S <=16      -  Amoxicillin/Clavulanic Acid: R >16/8      -  Ampicillin: R >16 These ampicillin results predict results for amoxicillin      -  Ampicillin/Sulbactam: I 16/8 Enterobacter, Klebsiella aerogenes, Citrobacter, and Serratia may develop resistance during prolonged therapy (3-4 days)      -  Aztreonam: S <=4      -  Cefazolin: R >16 Enterobacter, Klebsiella aerogenes, Citrobacter, and Serratia may develop resistance during prolonged therapy (3-4 days)      -  Cefepime: S <=2      -  Cefoxitin: S <=8      -  Ceftriaxone: S <=1 Enterobacter, Klebsiella aerogenes, Citrobacter, and Serratia may develop resistance during prolonged therapy      -  Ciprofloxacin: S <=0.25      -  Ertapenem: S <=0.5      -  Gentamicin: S <=2      -  Levofloxacin: S <=0.5      -  Meropenem: S <=1      -  Piperacillin/Tazobactam: S <=8      -  Tobramycin: S <=2      -  Trimethoprim/Sulfamethoxazole: S <=0.5/9.5  Organism: Proteus mirabilis (04-06-23 @ 19:33)      Method Type: JULIANA      -  Amikacin: S <=16      -  Amoxicillin/Clavulanic Acid: S <=8/4      -  Ampicillin: S <=8 These ampicillin results predict results for amoxicillin      -  Ampicillin/Sulbactam: S <=4/2 Enterobacter, Klebsiella aerogenes, Citrobacter, and Serratia may develop resistance during prolonged therapy (3-4 days)      -  Aztreonam: S <=4      -  Cefazolin: S <=2 Enterobacter, Klebsiella aerogenes, Citrobacter, and Serratia may develop resistance during prolonged therapy (3-4 days)      -  Cefepime: S <=2      -  Cefoxitin: S <=8      -  Ceftriaxone: S <=1 Enterobacter, Klebsiella aerogenes, Citrobacter, and Serratia may develop resistance during prolonged therapy      -  Ciprofloxacin: S <=0.25      -  Ertapenem: S <=0.5      -  Gentamicin: S <=2      -  Levofloxacin: S <=0.5      -  Meropenem: S <=1      -  Piperacillin/Tazobactam: S <=8      -  Tobramycin: S <=2      -  Trimethoprim/Sulfamethoxazole: S <=0.5/9.5    Culture - Blood (collected 04-04-23 @ 06:35)  Source: .Blood Blood  Final Report (04-09-23 @ 14:00):    No Growth Final    Culture - Blood (collected 03-28-23 @ 07:32)  Source: .Blood None  Final Report (04-02-23 @ 18:00):    No Growth Final    Culture - Blood (collected 03-27-23 @ 10:39)  Source: .Blood Blood-Peripheral  Final Report (04-01-23 @ 18:00):    No Growth Final    Culture - Urine (collected 03-21-23 @ 11:57)  Source: Catheterized Catheterized  Final Report (03-22-23 @ 17:54):    No growth    Culture - Blood (collected 03-20-23 @ 22:07)  Source: .Blood Blood  Final Report (03-26-23 @ 02:00):    No Growth Final            INFECTIOUS DISEASES TESTING  Vancomycin Level, Trough: 19.6 (04-07-23 @ 18:35)  Vancomycin Level, Trough: 8.0 (04-05-23 @ 17:56)  Rapid RVP Result: NotDetec (04-05-23 @ 14:26)  COVID-19 PCR: NotDetec (04-02-23 @ 18:03)  Rapid RVP Result: NotDetec (03-28-23 @ 09:55)  Procalcitonin, Serum: 0.05 (03-20-23 @ 22:07)  Rapid RVP Result: NotDetec (03-20-23 @ 21:25)  COVID-19 PCR: NotDetec (03-19-23 @ 12:47)  COVID-19 PCR: NotDetec (03-15-23 @ 16:23)  strept    INFLAMMATORY MARKERS      RADIOLOGY & ADDITIONAL TESTS:  I have personally reviewed the last available Chest xray  CXR      CT      CARDIOLOGY TESTING  12 Lead ECG:   Ventricular Rate 114 BPM    Atrial Rate 114 BPM    P-R Interval 122 ms    QRS Duration 62 ms    Q-T Interval 312 ms    QTC Calculation(Bazett) 430 ms    P Axis 43 degrees    R Axis 7 degrees    T Axis 43 degrees    Diagnosis Line Sinus tachycardia  Otherwise normal ECG    Confirmed by SINA GREEN, YOCASTA ([id number]) on 4/5/2023 8:47:49 AM (04-05-23 @ 07:28)  12 Lead ECG:   Ventricular Rate 103 BPM    Atrial Rate 103 BPM    P-R Interval 120 ms    QRS Duration 66 ms    Q-T Interval 324 ms    QTC Calculation(Bazett) 424 ms    P Axis 40 degrees    R Axis -2 degrees    T Axis 43 degrees    Diagnosis Line Sinus tachycardia  Otherwise normal ECG    Confirmed by Shanti Sage MD ([id number]) on 4/4/2023 10:25:13 AM (04-03-23 @ 11:37)      MEDICATIONS  acetaminophen  Suppository .. 325 Rectal once  ascorbic acid 500 Oral daily  aspirin  chewable 81 Oral daily  atorvastatin 40 Oral at bedtime  baclofen 5 Oral every 12 hours  cefepime   IVPB 2000 IV Intermittent every 12 hours  cefepime   IVPB     collagenase Ointment 1 Topical two times a day  Dakins Solution - 1/2 Strength 1 Topical two times a day  enoxaparin Injectable 40 SubCutaneous every 24 hours  gabapentin 100 Oral three times a day  levETIRAcetam  IVPB 1500 IV Intermittent every 12 hours  metroNIDAZOLE  IVPB 500 IV Intermittent every 8 hours  midodrine. 10 Oral three times a day  multivitamin/minerals 1 Oral daily  valproate sodium  IVPB 500 IV Intermittent three times a day  vancomycin  IVPB 1250 IV Intermittent every 12 hours  vitamin A &amp; D Ointment 1 Topical two times a day      WEIGHT  Weight (kg): 68.3 (03-08-23 @ 10:12)  Creatinine, Serum: 0.6 mg/dL (04-12-23 @ 06:45)      ANTIBIOTICS:  cefepime   IVPB 2000 milliGRAM(s) IV Intermittent every 12 hours  cefepime   IVPB      metroNIDAZOLE  IVPB 500 milliGRAM(s) IV Intermittent every 8 hours  vancomycin  IVPB 1250 milliGRAM(s) IV Intermittent every 12 hours      All available historical records have been reviewed

## 2023-04-12 NOTE — PROGRESS NOTE ADULT - SUBJECTIVE AND OBJECTIVE BOX
JOSEPH OBRIEN 67y Female  MRN#: [id number]   Hospital Day: 39d    SUBJECTIVE  Patient is a 67y old Female who presents with a chief complaint of Sepsis (11 Apr 2023 12:35)  Currently admitted to medicine with the primary diagnosis of Stroke      INTERVAL HPI AND OVERNIGHT EVENTS:  Patient was examined and seen at bedside. This morning she is resting comfortably in bed and reports fever overnight.    OBJECTIVE  PAST MEDICAL & SURGICAL HISTORY    ALLERGIES:  Allergy Status Unknown    MEDICATIONS:  STANDING MEDICATIONS  acetaminophen  Suppository .. 325 milliGRAM(s) Rectal once  ascorbic acid 500 milliGRAM(s) Oral daily  aspirin  chewable 81 milliGRAM(s) Oral daily  atorvastatin 40 milliGRAM(s) Oral at bedtime  baclofen 5 milliGRAM(s) Oral every 12 hours  cefepime   IVPB      cefepime   IVPB 2000 milliGRAM(s) IV Intermittent every 12 hours  collagenase Ointment 1 Application(s) Topical two times a day  Dakins Solution - 1/2 Strength 1 Application(s) Topical two times a day  enoxaparin Injectable 40 milliGRAM(s) SubCutaneous every 24 hours  gabapentin 100 milliGRAM(s) Oral three times a day  levETIRAcetam  IVPB 1500 milliGRAM(s) IV Intermittent every 12 hours  metroNIDAZOLE  IVPB 500 milliGRAM(s) IV Intermittent every 8 hours  midodrine. 10 milliGRAM(s) Oral three times a day  multivitamin/minerals 1 Tablet(s) Oral daily  valproate sodium  IVPB 500 milliGRAM(s) IV Intermittent three times a day  vancomycin  IVPB 1250 milliGRAM(s) IV Intermittent every 12 hours  vitamin A &amp; D Ointment 1 Application(s) Topical two times a day    PRN MEDICATIONS  acetaminophen     Tablet .. 650 milliGRAM(s) Oral every 6 hours PRN  acetaminophen  Suppository .. 325 milliGRAM(s) Rectal once PRN  aluminum hydroxide/magnesium hydroxide/simethicone Suspension 30 milliLiter(s) Oral every 6 hours PRN  morphine  - Injectable 4 milliGRAM(s) IV Push every 6 hours PRN      VITAL SIGNS: Last 24 Hours  T(C): 38.1 (12 Apr 2023 07:48), Max: 38.1 (12 Apr 2023 07:48)  T(F): 100.6 (12 Apr 2023 07:48), Max: 100.6 (12 Apr 2023 07:48)  HR: 108 (12 Apr 2023 05:00) (94 - 108)  BP: 114/73 (12 Apr 2023 05:00) (101/56 - 114/73)  BP(mean): --  RR: 16 (12 Apr 2023 05:00) (16 - 18)  SpO2: 99% (11 Apr 2023 21:22) (99% - 99%)    LABS:                        9.0    22.97 )-----------( 720      ( 12 Apr 2023 06:45 )             28.8     04-12    136  |  98  |  14  ----------------------------<  72  4.9   |  17  |  0.6<L>    Ca    9.1      12 Apr 2023 06:45  Mg     2.2     04-12                    RADIOLOGY:      PHYSICAL EXAM:  CONSTITUTIONAL: chronically ill appearing  HEAD: Atraumatic, normocephalic  EYES: EOM intact, PERRLA, conjunctiva and sclera clear  ENT: moist mucous membranes  PULMONARY: decrease breath sounds at the bases  CARDIOVASCULAR: Regular rate and rhythm  GASTROINTESTINAL: Soft, non-tender, non-distended; bowel sounds present  MUSCULOSKELETAL: Unstageable Sacral decubitus ulcer with osteomyelitis of the coccyx.  NEUROLOGY: MCA stoke with weakness on the left side  SKIN: warm and dry

## 2023-04-12 NOTE — PROGRESS NOTE ADULT - ASSESSMENT
66 y/o woman with PMH of Right MCA stroke with left sided weakness was brought to the ED via EMS. In the ED, she had altered mental status and sepsis.    #Sepsis - new onset 4/2  #Sacral wound growing numerous Gram Positive Cocci in Pairs and Chains and numerous Gram Negative Rods  - 3/26 - febrile, tachy Sepsis w/up negative (CXR clear, BCx NGTD, UA neg)  - Repeat RVP negative  - 4/2 sacral wound noted to be malodorous   - Wound growing gram + cocci in pairs/chains as well as gram neg rods  - Prelim: Proteus mirabilis, Morganella morganii, Enterococcus raffinosus, Staph aureus  - ID recs appreciated - Start Vanc, Cefepime, Flagyl - 4/4  - Pt c/o pain in sacral area started morphine 2g q6H if still in pain can increase to 4g q6H  - CT Chest/Abd/Pelvis w/w/o Ct: Sacral decubitus ulcer with osteomyelitis of the coccyx. No acute intra-abdominal pathology. Unchanged parapelvic cysts bilaterally with a 4 mm stone in the left mid ureter without evidence of obstruction.  - ID FU appreciated switched from meropenem back for cefepime and flagyl 4/7 as no ESBL in wound cx, No benefit of long term intravenous antibiotics for sacral osteomyelitis. Continue with debridement, nutritional support and offloading       #Seizures  #Acute R Frontal Lobe Infarct likely 2/ hypotension   #Chronic R MCA infarct  - per chart: at baseline she is alert, able to communicate and is oriented. Ambulation at baseline: uses a wheelchair.   - 3/8: s/p rapid response for L gaze preference  - 3/20: Pt had event with L gaze preference  - Depakote level (3/4) 87 (WNL) -> (3/21) 74 (WNL)   - CTH/CTA of head/neck (3/4): no acute pathology, no evidence of occlusion, aneurysm or stenosis  - EEG (3/8) and video EEG (3/9): Focal slowing  - MRI Brain (3/15): new subcortical infarct in the right frontal lobe. Re-demonstrated chronic infarct in the right MCA territory.  - MRI of C spine (3/16): Significantly motion limited study. No gross cord compression or spinal cord edema demonstrated. Multilevel small disc bulges.  - Neuro eval'd 3/8-3/20: For L gaze preference/stroke,   - BH eval'd 3/15: Delirium; pt does not have the capacity to choose a Health care agent  - SS re-juliette 3/21: Minced and moist, 1:1 feeds, thin liquids through straw  - C/w Depakote 500mg TID and Keppra to 1500mg BID   - C/w baclofen 5mg bid (for UE spasticity)  - C/w ASA, Plavix (3/17-4/6 - completed), and high dose statin for new stroke - DAPT for 21 days followed by ASA 81 mg daily  - C/w Midodrine 10mg TID  - OP stroke clinic in 4 weeks after d/c per neurology    #Metabolic Encephalopathy from UTI  #Febrile 3/20  #Unstageable Sacral Ulcer  - 3/20: Pt became febrile after possible seizure episode, Cefepime, levaquin (x1 3/20), vancomycin  - 3/20 Septic workup: WBC (3/21) 12.26 (up from 8.23), BCx (3/20) NGTD, Procal (3/20) 0.05, RVP (3/20) COVID (-), Cortisol level, Lactate (3/21) WNL, UA nitrite (-), LEC (-), pending WBCs/Bacteria, UCx (3/21) NGTD  - Duplex LUE (3/10): (-) DVT (+) superficial cephalic vein thrombosis  - Duplex BLLE (3/7): (-) DVT  - s/p Vanc (3/4x1, 3/20-3/22), Cefepime (x1 3/4, x1 3/8), Ceftriaxone (3/4-3/6) (for UTI)  - Duplex BLLE: negative for DVT    #Left avulsion fracture of medial malleolus  - Duplex BL LE (3/7): (-) for DVT  - CT Foot (3/11): acute minimally displaced avulsion fracture at the medial malleolus  - Podiatry following for foot pain: WBAT w/ surgical shoe, ankle brace, PT  - fall precautions  - pain control regimen - if pain uncontrolled consider increasing percocet to 2 tabs.  - C/w gabapentin at 100mg TID (Lowered dose from home, increase as pt tolerates)    #Left cephalic vein thrombosis  - Duplex LUE (3/10): No DVT however there is superficial thrombosis noted in the left cephalic vein  - s/p warm compress  - Repeat duplex ordered 3/27 - negative for UE and LE DVT    #Misc  - DVT ppx - lovenox  - Diet: Soft and bite sized (SS 3/7)  - full code, overall guarded prognosis    Pending: Vanco through, Code status?, Burn f/u, sent blood culture

## 2023-04-13 LAB
ANION GAP SERPL CALC-SCNC: 8 MMOL/L — SIGNIFICANT CHANGE UP (ref 7–14)
BLD GP AB SCN SERPL QL: SIGNIFICANT CHANGE UP
BUN SERPL-MCNC: 19 MG/DL — SIGNIFICANT CHANGE UP (ref 10–20)
CALCIUM SERPL-MCNC: 8.6 MG/DL — SIGNIFICANT CHANGE UP (ref 8.4–10.5)
CHLORIDE SERPL-SCNC: 98 MMOL/L — SIGNIFICANT CHANGE UP (ref 98–110)
CO2 SERPL-SCNC: 26 MMOL/L — SIGNIFICANT CHANGE UP (ref 17–32)
CREAT SERPL-MCNC: 0.7 MG/DL — SIGNIFICANT CHANGE UP (ref 0.7–1.5)
EGFR: 95 ML/MIN/1.73M2 — SIGNIFICANT CHANGE UP
GLUCOSE SERPL-MCNC: 84 MG/DL — SIGNIFICANT CHANGE UP (ref 70–99)
HCT VFR BLD CALC: 21.2 % — LOW (ref 37–47)
HCT VFR BLD CALC: 21.8 % — LOW (ref 37–47)
HGB BLD-MCNC: 6.7 G/DL — CRITICAL LOW (ref 12–16)
HGB BLD-MCNC: 6.8 G/DL — CRITICAL LOW (ref 12–16)
MAGNESIUM SERPL-MCNC: 2.2 MG/DL — SIGNIFICANT CHANGE UP (ref 1.8–2.4)
MCHC RBC-ENTMCNC: 27.8 PG — SIGNIFICANT CHANGE UP (ref 27–31)
MCHC RBC-ENTMCNC: 27.9 PG — SIGNIFICANT CHANGE UP (ref 27–31)
MCHC RBC-ENTMCNC: 31.2 G/DL — LOW (ref 32–37)
MCHC RBC-ENTMCNC: 31.6 G/DL — LOW (ref 32–37)
MCV RBC AUTO: 88.3 FL — SIGNIFICANT CHANGE UP (ref 81–99)
MCV RBC AUTO: 89 FL — SIGNIFICANT CHANGE UP (ref 81–99)
NRBC # BLD: 0 /100 WBCS — SIGNIFICANT CHANGE UP (ref 0–0)
NRBC # BLD: 0 /100 WBCS — SIGNIFICANT CHANGE UP (ref 0–0)
PLATELET # BLD AUTO: 505 K/UL — HIGH (ref 130–400)
PLATELET # BLD AUTO: 614 K/UL — HIGH (ref 130–400)
PMV BLD: 9.1 FL — SIGNIFICANT CHANGE UP (ref 7.4–10.4)
PMV BLD: 9.8 FL — SIGNIFICANT CHANGE UP (ref 7.4–10.4)
POTASSIUM SERPL-MCNC: 4.2 MMOL/L — SIGNIFICANT CHANGE UP (ref 3.5–5)
POTASSIUM SERPL-SCNC: 4.2 MMOL/L — SIGNIFICANT CHANGE UP (ref 3.5–5)
RBC # BLD: 2.4 M/UL — LOW (ref 4.2–5.4)
RBC # BLD: 2.45 M/UL — LOW (ref 4.2–5.4)
RBC # FLD: 14.6 % — HIGH (ref 11.5–14.5)
RBC # FLD: 14.6 % — HIGH (ref 11.5–14.5)
SODIUM SERPL-SCNC: 132 MMOL/L — LOW (ref 135–146)
WBC # BLD: 19.56 K/UL — HIGH (ref 4.8–10.8)
WBC # BLD: 20.94 K/UL — HIGH (ref 4.8–10.8)
WBC # FLD AUTO: 19.56 K/UL — HIGH (ref 4.8–10.8)
WBC # FLD AUTO: 20.94 K/UL — HIGH (ref 4.8–10.8)

## 2023-04-13 PROCEDURE — 11043 DBRDMT MUSC&/FSCA 1ST 20/<: CPT

## 2023-04-13 PROCEDURE — 99233 SBSQ HOSP IP/OBS HIGH 50: CPT

## 2023-04-13 PROCEDURE — 11046 DBRDMT MUSC&/FSCA EA ADDL: CPT

## 2023-04-13 RX ORDER — METRONIDAZOLE 500 MG
500 TABLET ORAL EVERY 8 HOURS
Refills: 0 | Status: DISCONTINUED | OUTPATIENT
Start: 2023-04-13 | End: 2023-04-17

## 2023-04-13 RX ORDER — VANCOMYCIN HCL 1 G
750 VIAL (EA) INTRAVENOUS EVERY 12 HOURS
Refills: 0 | Status: DISCONTINUED | OUTPATIENT
Start: 2023-04-13 | End: 2023-04-14

## 2023-04-13 RX ADMIN — Medication 50 MILLIGRAM(S): at 13:24

## 2023-04-13 RX ADMIN — Medication 250 MILLIGRAM(S): at 17:29

## 2023-04-13 RX ADMIN — MIDODRINE HYDROCHLORIDE 10 MILLIGRAM(S): 2.5 TABLET ORAL at 17:26

## 2023-04-13 RX ADMIN — Medication 500 MILLIGRAM(S): at 05:45

## 2023-04-13 RX ADMIN — Medication 1 APPLICATION(S): at 17:27

## 2023-04-13 RX ADMIN — GABAPENTIN 100 MILLIGRAM(S): 400 CAPSULE ORAL at 05:45

## 2023-04-13 RX ADMIN — Medication 5 MILLIGRAM(S): at 17:26

## 2023-04-13 RX ADMIN — ATORVASTATIN CALCIUM 40 MILLIGRAM(S): 80 TABLET, FILM COATED ORAL at 21:18

## 2023-04-13 RX ADMIN — LEVETIRACETAM 400 MILLIGRAM(S): 250 TABLET, FILM COATED ORAL at 17:36

## 2023-04-13 RX ADMIN — Medication 1 TABLET(S): at 11:37

## 2023-04-13 RX ADMIN — CEFEPIME 100 MILLIGRAM(S): 1 INJECTION, POWDER, FOR SOLUTION INTRAMUSCULAR; INTRAVENOUS at 05:42

## 2023-04-13 RX ADMIN — MORPHINE SULFATE 4 MILLIGRAM(S): 50 CAPSULE, EXTENDED RELEASE ORAL at 08:22

## 2023-04-13 RX ADMIN — Medication 5 MILLIGRAM(S): at 05:45

## 2023-04-13 RX ADMIN — Medication 100 MILLIGRAM(S): at 13:24

## 2023-04-13 RX ADMIN — MIDODRINE HYDROCHLORIDE 10 MILLIGRAM(S): 2.5 TABLET ORAL at 05:46

## 2023-04-13 RX ADMIN — Medication 50 MILLIGRAM(S): at 05:45

## 2023-04-13 RX ADMIN — Medication 1 APPLICATION(S): at 05:46

## 2023-04-13 RX ADMIN — GABAPENTIN 100 MILLIGRAM(S): 400 CAPSULE ORAL at 21:20

## 2023-04-13 RX ADMIN — Medication 250 MILLIGRAM(S): at 09:13

## 2023-04-13 RX ADMIN — Medication 81 MILLIGRAM(S): at 11:39

## 2023-04-13 RX ADMIN — GABAPENTIN 100 MILLIGRAM(S): 400 CAPSULE ORAL at 13:55

## 2023-04-13 RX ADMIN — LEVETIRACETAM 400 MILLIGRAM(S): 250 TABLET, FILM COATED ORAL at 05:43

## 2023-04-13 RX ADMIN — MORPHINE SULFATE 4 MILLIGRAM(S): 50 CAPSULE, EXTENDED RELEASE ORAL at 09:10

## 2023-04-13 RX ADMIN — Medication 1 APPLICATION(S): at 05:55

## 2023-04-13 RX ADMIN — Medication 100 MILLIGRAM(S): at 21:20

## 2023-04-13 RX ADMIN — Medication 500 MILLIGRAM(S): at 11:38

## 2023-04-13 RX ADMIN — Medication 50 MILLIGRAM(S): at 21:21

## 2023-04-13 RX ADMIN — CEFEPIME 100 MILLIGRAM(S): 1 INJECTION, POWDER, FOR SOLUTION INTRAMUSCULAR; INTRAVENOUS at 17:29

## 2023-04-13 RX ADMIN — ENOXAPARIN SODIUM 40 MILLIGRAM(S): 100 INJECTION SUBCUTANEOUS at 21:19

## 2023-04-13 RX ADMIN — MIDODRINE HYDROCHLORIDE 10 MILLIGRAM(S): 2.5 TABLET ORAL at 11:37

## 2023-04-13 NOTE — PROGRESS NOTE ADULT - SUBJECTIVE AND OBJECTIVE BOX
JOSEPH OBRIEN  [facility]N 3A (Back) 0[facility]      Patient was evaluated and examined  by bedside, remains afebrile, tolerates diet with assisted feeding      REVIEW OF SYSTEMS: unable to obtain, patient has expressive aphasia      T(C): , Max: 37.1 (04-13-23 @ 04:05)  HR: 108 (04-13-23 @ 08:43)  BP: 107/50 (04-13-23 @ 08:43)  RR: 18 (04-13-23 @ 04:05)  SpO2: 99% (04-13-23 @ 04:05)  CAPILLARY BLOOD GLUCOSE          PHYSICAL EXAM:  General: NAD, Awake, patient is laying comfortably in bed  HEENT: AT, NC, Supple, NO JVD, NO CB  Lungs: good breath sounds  B/L, no wheezing, no rhonchi  CVS: normal S1, S2, RRR, NO M/G/R  Abdomen: soft, bowel sounds present, non-tender, non-distended  Extremities: no edema, no clubbing, no cyanosis, positive peripheral pulses b/l  Neuro: expressive aphasia with dysarthria, left sided weakness, chronic bed-confinement status  Skin: unstageble sacral decubiti      LAB  CBC  Date: 04-12-23 @ 06:45  Mean cell Cuaroqvknz26.1  Mean cell Hemoglobin Conc31.3  Mean cell Volum 90.0  Platelet count-Automate 720  RBC Count 3.20  Red Cell Distrib Width14.6  WBC Count22.97  % Albumin, Urine--  Hematocrit 28.8  Hemoglobin 9.0  CBC  Date: 04-10-23 @ 06:07  Mean cell Szbwzojbnm21.6  Mean cell Hemoglobin Conc31.8  Mean cell Volum 90.1  Platelet count-Automate 735  RBC Count 2.62  Red Cell Distrib Width14.3  WBC Count17.26  % Albumin, Urine--  Hematocrit 23.6  Hemoglobin 7.5  CBC  Date: 04-09-23 @ 06:40  Mean cell Lszwcejulq07.6  Mean cell Hemoglobin Conc32.1  Mean cell Volum 88.9  Platelet count-Automate 734  RBC Count 2.52  Red Cell Distrib Width14.2  WBC Count18.45  % Albumin, Urine--  Hematocrit 22.4  Hemoglobin 7.2  CBC  Date: 04-08-23 @ 05:56  Mean cell Ylllzobani26.2  Mean cell Hemoglobin Conc31.7  Mean cell Volum 89.1  Platelet count-Automate 737  RBC Count 2.48  Red Cell Distrib Width14.3  WBC Count17.54  % Albumin, Urine--  Hematocrit 22.1  Hemoglobin 7.0  CBC  Date: 04-07-23 @ 07:42  Mean cell Tlgdwdfhiu95.2  Mean cell Hemoglobin Conc31.6  Mean cell Volum 89.2  Platelet count-Automate 731  RBC Count 2.77  Red Cell Distrib Width14.0  WBC Count15.24  % Albumin, Urine--  Hematocrit 24.7  Hemoglobin 7.8    [facility]  04-12-23 @ 06:45  Blood Gas Arterial-Calcium,Ionized--  Blood Urea Nitrogen, Serum 14 mg/dL [10 - 20]  Carbon Dioxide, Serum17 mmol/L [17 - 32]  Chloride, Serum98 mmol/L [98 - 110]  Creatinie, Serum0.6 mg/dL<L> [0.7 - 1.5]  Glucose, Serum72 mg/dL [70 - 99]  Potassium, Serum4.9 mmol/L [3.5 - 5.0]  Sodium, Serum 136 mmol/L [135 - 146]  [facility]  04-10-23 @ 06:07  Blood Gas Arterial-Calcium,Ionized--  Blood Urea Nitrogen, Serum 10 mg/dL [10 - 20]  Carbon Dioxide, Serum27 mmol/L [17 - 32]  Chloride, Llmzk956 mmol/L [98 - 110]  Creatinie, Serum<0.5 mg/dL<L> [0.7 - 1.5]  Glucose, Serum94 mg/dL [70 - 99]  Potassium, Serum4.6 mmol/L [3.5 - 5.0]  Sodium, Serum 139 mmol/L [135 - 146]  [facility]  04-09-23 @ 06:40  Blood Gas Arterial-Calcium,Ionized--  Blood Urea Nitrogen, Serum 11 mg/dL [10 - 20]  Carbon Dioxide, Serum27 mmol/L [17 - 32]  Chloride, Sqgkx861 mmol/L [98 - 110]  Creatinie, Serum<0.5 mg/dL<L> [0.7 - 1.5]  Glucose, [id number] mg/dL<H> [70 - 99]  Potassium, Serum4.7 mmol/L [3.5 - 5.0]  Sodium, Serum 136 mmol/L [135 - 146]              Microbiology:    Culture - Tissue with Gram Stain (collected 04-04-23 @ 08:40)  Source: .Tissue sacrum  Gram Stain (04-04-23 @ 21:29):    No polymorphonuclear leukocytes per low power field    Numerous Gram Positive Cocci in Pairs and Chains per oil power field    Numerous Gram Negative Rods per oil power field  Final Report (04-06-23 @ 19:33):    Culture yields >4 types of aerobic and/or anaerobic bacteria    Call client services within 7 days if further workup is clinically    indicated.    Culture includes    Numerous Proteus mirabilis    Numerous Morganella morganii    Moderate Enterococcus raffinosus    Few Staphylococcus aureus  Organism: Proteus mirabilis  Morganella morganii  Enterococcus raffinosus  Staphylococcus aureus (04-06-23 @ 19:33)  Organism: Staphylococcus aureus (04-06-23 @ 19:33)      Method Type: JULIANA      -  Ampicillin/Sulbactam: S <=8/4      -  Cefazolin: S <=4      -  Clindamycin: S <=0.25      -  Erythromycin: S <=0.25      -  Gentamicin: S <=1 Should not be used as monotherapy      -  Oxacillin: S <=0.25 Oxacillin predicts susceptibility for dicloxacillin, methicillin, and nafcillin      -  Penicillin: R 0.25      -  Rifampin: S <=1 Should not be used as monotherapy      -  Tetracycline: S <=1      -  Trimethoprim/Sulfamethoxazole: S <=0.5/9.5      -  Vancomycin: S 1  Organism: Enterococcus raffinosus (04-06-23 @ 19:33)      Method Type: JULIANA      -  Ampicillin: R >8 Predicts results to ampicillin/sulbactam, amoxacillin-clavulanate and  piperacillin-tazobactam.      -  Tetracycline: R >8      -  Vancomycin: S 1  Organism: Morganella morganii (04-06-23 @ 19:33)      Method Type: JULIANA      -  Amikacin: S <=16      -  Amoxicillin/Clavulanic Acid: R >16/8      -  Ampicillin: R >16 These ampicillin results predict results for amoxicillin      -  Ampicillin/Sulbactam: I 16/8 Enterobacter, Klebsiella aerogenes, Citrobacter, and Serratia may develop resistance during prolonged therapy (3-4 days)      -  Aztreonam: S <=4      -  Cefazolin: R >16 Enterobacter, Klebsiella aerogenes, Citrobacter, and Serratia may develop resistance during prolonged therapy (3-4 days)      -  Cefepime: S <=2      -  Cefoxitin: S <=8      -  Ceftriaxone: S <=1 Enterobacter, Klebsiella aerogenes, Citrobacter, and Serratia may develop resistance during prolonged therapy      -  Ciprofloxacin: S <=0.25      -  Ertapenem: S <=0.5      -  Gentamicin: S <=2      -  Levofloxacin: S <=0.5      -  Meropenem: S <=1      -  Piperacillin/Tazobactam: S <=8      -  Tobramycin: S <=2      -  Trimethoprim/Sulfamethoxazole: S <=0.5/9.5  Organism: Proteus mirabilis (04-06-23 @ 19:33)      Method Type: JULIANA      -  Amikacin: S <=16      -  Amoxicillin/Clavulanic Acid: S <=8/4      -  Ampicillin: S <=8 These ampicillin results predict results for amoxicillin      -  Ampicillin/Sulbactam: S <=4/2 Enterobacter, Klebsiella aerogenes, Citrobacter, and Serratia may develop resistance during prolonged therapy (3-4 days)      -  Aztreonam: S <=4      -  Cefazolin: S <=2 Enterobacter, Klebsiella aerogenes, Citrobacter, and Serratia may develop resistance during prolonged therapy (3-4 days)      -  Cefepime: S <=2      -  Cefoxitin: S <=8      -  Ceftriaxone: S <=1 Enterobacter, Klebsiella aerogenes, Citrobacter, and Serratia may develop resistance during prolonged therapy      -  Ciprofloxacin: S <=0.25      -  Ertapenem: S <=0.5      -  Gentamicin: S <=2      -  Levofloxacin: S <=0.5      -  Meropenem: S <=1      -  Piperacillin/Tazobactam: S <=8      -  Tobramycin: S <=2      -  Trimethoprim/Sulfamethoxazole: S <=0.5/9.5    Culture - Blood (collected 04-04-23 @ 06:35)  Source: .Blood Blood  Final Report (04-09-23 @ 14:00):    No Growth Final    Culture - Blood (collected 03-28-23 @ 07:32)  Source: .Blood None  Final Report (04-02-23 @ 18:00):    No Growth Final    Culture - Blood (collected 03-27-23 @ 10:39)  Source: .Blood Blood-Peripheral  Final Report (04-01-23 @ 18:00):    No Growth Final    Culture - Urine (collected 03-21-23 @ 11:57)  Source: Catheterized Catheterized  Final Report (03-22-23 @ 17:54):    No growth    Culture - Blood (collected 03-20-23 @ 22:07)  Source: .Blood Blood  Final Report (03-26-23 @ 02:00):    No Growth Final        Medications:  acetaminophen     Tablet .. 650 milliGRAM(s) Oral every 6 hours PRN  acetaminophen  Suppository .. 325 milliGRAM(s) Rectal once PRN  acetaminophen  Suppository .. 325 milliGRAM(s) Rectal once  aluminum hydroxide/magnesium hydroxide/simethicone Suspension 30 milliLiter(s) Oral every 6 hours PRN  ascorbic acid 500 milliGRAM(s) Oral daily  aspirin  chewable 81 milliGRAM(s) Oral daily  atorvastatin 40 milliGRAM(s) Oral at bedtime  baclofen 5 milliGRAM(s) Oral every 12 hours  cefepime   IVPB 2000 milliGRAM(s) IV Intermittent every 12 hours  cefepime   IVPB      collagenase Ointment 1 Application(s) Topical two times a day  Dakins Solution - 1/2 Strength 1 Application(s) Topical two times a day  enoxaparin Injectable 40 milliGRAM(s) SubCutaneous every 24 hours  gabapentin 100 milliGRAM(s) Oral three times a day  levETIRAcetam  IVPB 1500 milliGRAM(s) IV Intermittent every 12 hours  metroNIDAZOLE  IVPB 500 milliGRAM(s) IV Intermittent every 8 hours  midodrine. 10 milliGRAM(s) Oral three times a day  morphine  - Injectable 4 milliGRAM(s) IV Push every 6 hours PRN  multivitamin/minerals 1 Tablet(s) Oral daily  valproate sodium  IVPB 500 milliGRAM(s) IV Intermittent three times a day  vancomycin  IVPB 750 milliGRAM(s) IV Intermittent every 12 hours  vitamin A &amp; D Ointment 1 Application(s) Topical two times a day        Assessment and Plan:  68 y/o woman with PMH of Right MCA stroke with left sided weakness was brought to the ED via EMS. In the ED, she had altered mental status and sepsis.    #Sepsis - new onset 4/2  #Sacral unstagble wound with osteomyelitis of coccyx  - Burn team f/up on 4/13  - s/p CT Chest/Abd/Pelvis w/w/o Ct: Sacral decubitus ulcer with osteomyelitis of the coccyx. No acute intra-abdominal pathology. Unchanged parapelvic cysts bilaterally with a 4 mm stone in the left mid ureter without evidence of obstruction.  - as per ID -  c/w Cefepime/flagyl/Vanco.   -4/12- pesistent leukocytosis, f/up  blood cxs , elevated  Vanco level- resumed on lower dose of IV Vanco on 4/13- next Vanco level 4/14- 4pm    #Seizures  #Acute R Frontal Lobe Infarct likely 2/ hypotension   #Chronic R MCA infarct- chronic bed confinement status, dysarthria- answers on/off with 1 word yes, no.    - EEG (3/8) and video EEG (3/9): Focal slowing  - MRI Brain (3/15): new subcortical infarct in the right frontal lobe. Re-demonstrated chronic infarct in the right MCA territory.  - MRI of C spine (3/16): Significantly motion limited study. No gross cord compression or spinal cord edema demonstrated. Multilevel small disc bulges.  - Neuro eval'd 3/8-3/20: For L gaze preference/stroke, OP med review: on low dose Lamictal and Depakote in addition to Keppra (possible mood disorder/ seizures), Her MRI of brain showing subcortical infarct within same M1 territory. The cause of stroke is most likely hypotension as she has a diminutive Rt M1. However, MRI findings doesn't explain her B/L upper extremity spasticity. So, it is important to rule out cervical cord pathology. DAPT for 21 days followed by ASA 81 mg daily, Increased keppra post 3/20 possible seizure event  - SS re-juliette 3/21: Minced and moist, 1:1 feeds, thin liquids through straw  - C/w Depakote 500mg TID and Keppra to 1500mg BID   - C/w baclofen 5mg bid (for UE spasticity)  - C/w ASA, Plavix (3/17-4/6 - completed), and high dose statin for new stroke - DAPT for 21 days followed by ASA 81 mg daily  - - OP stroke clinic in 4 weeks after d/c per neurology    #Persistent Hypotension - c/w Midodrine 10mg TID      #Left avulsion fracture of medial malleolus  - Duplex BL LE (3/7): (-) for DVT  - CT Foot (3/11): acute minimally displaced avulsion fracture at the medial malleolus  - Podiatry following for foot pain: WBAT w/ surgical shoe, ankle brace, PT  - fall precautions  - pain control regimen - if pain uncontrolled consider increasing percocet to 2tabs.  - C/w gabapentin at 100mg TID (Lowered dose from home, increase as pt tolerates)    #Left cephalic vein thrombosis  - Duplex LUE (3/10): No DVT however there is superficial thrombosis noted in the left cephalic vein  - s/p warm compress  - Repeat duplex ordered 3/27 - negative for UE and LE DVT  #Anemia - normocytic  - possibly due to frequent phlebotomy, no signs of active bleed - monitor       DVT ppx - lovenox  - Diet: Soft and bite sized (SS 3/7)  - full code, overall guarded prognosis    #Progress Note Handoff: Pending blood cxs,  continue IV abx, f/up CBC in am  for resolution of leukocytosis, GOC d/w family, f/up Burn team for sacral decub tx.  Family discussion: yes, medical team Disposition: SNF once medically stable    Total time spent to complete patient's bedside assessment, review medical chart, discuss medical plan of care with covering medical team was more than 50 minutes with >50% of time spent face to face with patient, discussion with patient/family and/or coordination of care .

## 2023-04-13 NOTE — PROGRESS NOTE ADULT - ASSESSMENT
68 y/o woman with PMH of Right MCA stroke with left sided weakness was brought to the ED via EMS. In the ED, she had altered mental status and sepsis.    #Sepsis - new onset 4/2  #Sacral unstagble wound with osteomyelitis of coccyx  - Burn team f/up on 4/13  - s/p CT Chest/Abd/Pelvis w/w/o Ct: Sacral decubitus ulcer with osteomyelitis of the coccyx. No acute intra-abdominal pathology. Unchanged parapelvic cysts bilaterally with a 4 mm stone in the left mid ureter without evidence of obstruction.  - as per ID -  c/w Cefepime/flagyl/Vanco.   -4/12- pesistent leukocytosis, f/up  blood cxs , elevated  Vanco level- resumed on lower dose of IV Vanco on 4/13- next Vanco level 4/14- 4pm    #Seizures  #Acute R Frontal Lobe Infarct likely 2/ hypotension   #Chronic R MCA infarct- chronic bed confinement status, dysarthria- answers on/off with 1 word yes, no.    - EEG (3/8) and video EEG (3/9): Focal slowing  - MRI Brain (3/15): new subcortical infarct in the right frontal lobe. Re-demonstrated chronic infarct in the right MCA territory.  - MRI of C spine (3/16): Significantly motion limited study. No gross cord compression or spinal cord edema demonstrated. Multilevel small disc bulges.  - Neuro eval'd 3/8-3/20: For L gaze preference/stroke, OP med review: on low dose Lamictal and Depakote in addition to Keppra (possible mood disorder/ seizures), Her MRI of brain showing subcortical infarct within same M1 territory. The cause of stroke is most likely hypotension as she has a diminutive Rt M1. However, MRI findings doesn't explain her B/L upper extremity spasticity. So, it is important to rule out cervical cord pathology. DAPT for 21 days followed by ASA 81 mg daily, Increased keppra post 3/20 possible seizure event  - SS re-juliette 3/21: Minced and moist, 1:1 feeds, thin liquids through straw  - C/w Depakote 500mg TID and Keppra to 1500mg BID   - C/w baclofen 5mg bid (for UE spasticity)  - C/w ASA, Plavix (3/17-4/6 - completed), and high dose statin for new stroke - DAPT for 21 days followed by ASA 81 mg daily  - - OP stroke clinic in 4 weeks after d/c per neurology    #Persistent Hypotension   - c/w Midodrine 10mg TID      #Left avulsion fracture of medial malleolus  - Duplex BL LE (3/7): (-) for DVT  - CT Foot (3/11): acute minimally displaced avulsion fracture at the medial malleolus  - Podiatry following for foot pain: WBAT w/ surgical shoe, ankle brace, PT  - fall precautions  - pain control regimen - if pain uncontrolled consider increasing percocet to 2tabs.  - C/w gabapentin at 100mg TID (Lowered dose from home, increase as pt tolerates)    #Left cephalic vein thrombosis  - Duplex LUE (3/10): No DVT however there is superficial thrombosis noted in the left cephalic vein  - s/p warm compress  - Repeat duplex ordered 3/27 - negative for UE and LE DVT  #Anemia - normocytic  - possibly due to frequent phlebotomy, no signs of active bleed - monitor       DVT ppx - lovenox  - Diet: Soft and bite sized (SS 3/7)  - full code, overall guarded prognosis

## 2023-04-13 NOTE — PROGRESS NOTE ADULT - ASSESSMENT
sacral wound    - burn surgical intervention at this time  - continue local wound care BID: wash with soap and water, apply santyl, pack with Dakins wet to dry, cover with allevyn pad  - offloading/positional changes  - ensure adequate nutrition  - remainder of care per primary care team   sacral wound    - no further burn surgical intervention at this time  - continue local wound care BID: wash with soap and water, apply santyl, pack with Dakins wet to dry, cover with allevyn pad  - offloading/positional changes  - ensure adequate nutrition  - remainder of care per primary care team

## 2023-04-13 NOTE — PROGRESS NOTE ADULT - SUBJECTIVE AND OBJECTIVE BOX
JOSEPH OBRIEN 67y Female  MRN#: [id number]   Hospital Day: 40d    SUBJECTIVE  Patient is a 67y old Female who presents with a chief complaint of ams (13 Apr 2023 11:37)  Currently admitted to medicine with the primary diagnosis of Stroke      INTERVAL HPI AND OVERNIGHT EVENTS:  Patient was examined and seen at bedside. This morning she is resting comfortably in bed and reports no issues or overnight events.    OBJECTIVE  PAST MEDICAL & SURGICAL HISTORY    ALLERGIES:  Allergy Status Unknown    MEDICATIONS:  STANDING MEDICATIONS  acetaminophen  Suppository .. 325 milliGRAM(s) Rectal once  ascorbic acid 500 milliGRAM(s) Oral daily  aspirin  chewable 81 milliGRAM(s) Oral daily  atorvastatin 40 milliGRAM(s) Oral at bedtime  baclofen 5 milliGRAM(s) Oral every 12 hours  cefepime   IVPB      cefepime   IVPB 2000 milliGRAM(s) IV Intermittent every 12 hours  collagenase Ointment 1 Application(s) Topical two times a day  Dakins Solution - 1/2 Strength 1 Application(s) Topical two times a day  enoxaparin Injectable 40 milliGRAM(s) SubCutaneous every 24 hours  gabapentin 100 milliGRAM(s) Oral three times a day  levETIRAcetam  IVPB 1500 milliGRAM(s) IV Intermittent every 12 hours  metroNIDAZOLE  IVPB 500 milliGRAM(s) IV Intermittent every 8 hours  midodrine. 10 milliGRAM(s) Oral three times a day  multivitamin/minerals 1 Tablet(s) Oral daily  valproate sodium  IVPB 500 milliGRAM(s) IV Intermittent three times a day  vancomycin  IVPB 750 milliGRAM(s) IV Intermittent every 12 hours  vitamin A &amp; D Ointment 1 Application(s) Topical two times a day    PRN MEDICATIONS  acetaminophen     Tablet .. 650 milliGRAM(s) Oral every 6 hours PRN  acetaminophen  Suppository .. 325 milliGRAM(s) Rectal once PRN  aluminum hydroxide/magnesium hydroxide/simethicone Suspension 30 milliLiter(s) Oral every 6 hours PRN  morphine  - Injectable 4 milliGRAM(s) IV Push every 6 hours PRN      VITAL SIGNS: Last 24 Hours  T(C): 37.1 (13 Apr 2023 04:05), Max: 37.1 (13 Apr 2023 04:05)  T(F): 98.8 (13 Apr 2023 04:05), Max: 98.8 (13 Apr 2023 04:05)  HR: 108 (13 Apr 2023 08:43) (83 - 108)  BP: 107/50 (13 Apr 2023 08:43) (98/54 - 109/52)  BP(mean): 69 (13 Apr 2023 08:43) (69 - 69)  RR: 18 (13 Apr 2023 04:05) (16 - 18)  SpO2: 99% (13 Apr 2023 04:05) (99% - 100%)    LABS:                        9.0    22.97 )-----------( 720      ( 12 Apr 2023 06:45 )             28.8     04-12    136  |  98  |  14  ----------------------------<  72  4.9   |  17  |  0.6<L>    Ca    9.1      12 Apr 2023 06:45  Mg     2.2     04-12                    RADIOLOGY:      PHYSICAL EXAM:  CONSTITUTIONAL:   HEAD: Atraumatic, normocephalic  EYES: EOM intact, PERRLA, conjunctiva and sclera clear  ENT: moist mucous membranes  PULMONARY: Clear to auscultation bilaterally  CARDIOVASCULAR: Regular rate and rhythm  GASTROINTESTINAL: Soft, non-tender, non-distended; bowel sounds present  MUSCULOSKELETAL: no edema  NEUROLOGY: non-focal  SKIN: warm and dry

## 2023-04-13 NOTE — BRIEF OPERATIVE NOTE - NSICDXBRIEFPROCEDURE_GEN_ALL_CORE_FT
PROCEDURES:  Selective debridement 13-Apr-2023 22:31:58 excisional debridement with scissors sacrum Reinaldo Darden

## 2023-04-13 NOTE — PROGRESS NOTE ADULT - SUBJECTIVE AND OBJECTIVE BOX
Patient is a 67y old  Female who presents with a chief complaint of ams (13 Apr 2023 11:37)  AM ROUNDS  Burn recalled for sacral wound evaluation    Vital Signs Last 24 Hrs  T(C): 37.1 (13 Apr 2023 14:08), Max: 37.1 (13 Apr 2023 04:05)  T(F): 98.8 (13 Apr 2023 14:08), Max: 98.8 (13 Apr 2023 04:05)  HR: 96 (13 Apr 2023 14:08) (83 - 108)  BP: 107/55 (13 Apr 2023 14:08) (98/54 - 109/52)  BP(mean): 69 (13 Apr 2023 08:43) (69 - 69)  RR: 18 (13 Apr 2023 14:08) (16 - 18)  SpO2: 100% (13 Apr 2023 14:08) (99% - 100%)    O2 Parameters below as of 13 Apr 2023 14:08  Patient On (Oxygen Delivery Method): room air    LABS:                        6.7    19.56 )-----------( 614      ( 13 Apr 2023 13:00 )             21.2     04-13    132<L>  |  98  |  19  ----------------------------<  84  4.2   |  26  |  0.7    Ca    8.6      13 Apr 2023 13:00  Mg     2.2     04-13    MEDICATIONS  (STANDING):  acetaminophen  Suppository .. 325 milliGRAM(s) Rectal once  ascorbic acid 500 milliGRAM(s) Oral daily  aspirin  chewable 81 milliGRAM(s) Oral daily  atorvastatin 40 milliGRAM(s) Oral at bedtime  baclofen 5 milliGRAM(s) Oral every 12 hours  cefepime   IVPB      cefepime   IVPB 2000 milliGRAM(s) IV Intermittent every 12 hours  collagenase Ointment 1 Application(s) Topical two times a day  Dakins Solution - 1/2 Strength 1 Application(s) Topical two times a day  enoxaparin Injectable 40 milliGRAM(s) SubCutaneous every 24 hours  gabapentin 100 milliGRAM(s) Oral three times a day  levETIRAcetam  IVPB 1500 milliGRAM(s) IV Intermittent every 12 hours  metroNIDAZOLE  IVPB 500 milliGRAM(s) IV Intermittent every 8 hours  midodrine. 10 milliGRAM(s) Oral three times a day  multivitamin/minerals 1 Tablet(s) Oral daily  valproate sodium  IVPB 500 milliGRAM(s) IV Intermittent three times a day  vancomycin  IVPB 750 milliGRAM(s) IV Intermittent every 12 hours  vitamin A &amp; D Ointment 1 Application(s) Topical two times a day    MEDICATIONS  (PRN):  acetaminophen     Tablet .. 650 milliGRAM(s) Oral every 6 hours PRN Moderate Pain (4 - 6)  acetaminophen  Suppository .. 325 milliGRAM(s) Rectal once PRN Temp greater or equal to 38C (100.4F)  aluminum hydroxide/magnesium hydroxide/simethicone Suspension 30 milliLiter(s) Oral every 6 hours PRN Dyspepsia  morphine  - Injectable 4 milliGRAM(s) IV Push every 6 hours PRN Severe Pain (7 - 10)    PHYSICAL EXAM:  General: Patient laying in bed, in no acute distress.   Wound: sacrum 3x2 cm with dark devitalized tissue and areas of pink moist tissue. Malodorous. Surrounding areas of partial thickness wounds, pink moist. No purulence, drainage, or active bleeding noted Patient is a 67y old  Female who presents with a chief complaint of ams (13 Apr 2023 11:37)  AM ROUNDS  Burn recalled for sacral wound evaluation    Vital Signs Last 24 Hrs  T(C): 37.1 (13 Apr 2023 14:08), Max: 37.1 (13 Apr 2023 04:05)  T(F): 98.8 (13 Apr 2023 14:08), Max: 98.8 (13 Apr 2023 04:05)  HR: 96 (13 Apr 2023 14:08) (83 - 108)  BP: 107/55 (13 Apr 2023 14:08) (98/54 - 109/52)  BP(mean): 69 (13 Apr 2023 08:43) (69 - 69)  RR: 18 (13 Apr 2023 14:08) (16 - 18)  SpO2: 100% (13 Apr 2023 14:08) (99% - 100%)    O2 Parameters below as of 13 Apr 2023 14:08  Patient On (Oxygen Delivery Method): room air    LABS:                        6.7    19.56 )-----------( 614      ( 13 Apr 2023 13:00 )             21.2     04-13    132<L>  |  98  |  19  ----------------------------<  84  4.2   |  26  |  0.7    Ca    8.6      13 Apr 2023 13:00  Mg     2.2     04-13    MEDICATIONS  (STANDING):  acetaminophen  Suppository .. 325 milliGRAM(s) Rectal once  ascorbic acid 500 milliGRAM(s) Oral daily  aspirin  chewable 81 milliGRAM(s) Oral daily  atorvastatin 40 milliGRAM(s) Oral at bedtime  baclofen 5 milliGRAM(s) Oral every 12 hours  cefepime   IVPB      cefepime   IVPB 2000 milliGRAM(s) IV Intermittent every 12 hours  collagenase Ointment 1 Application(s) Topical two times a day  Dakins Solution - 1/2 Strength 1 Application(s) Topical two times a day  enoxaparin Injectable 40 milliGRAM(s) SubCutaneous every 24 hours  gabapentin 100 milliGRAM(s) Oral three times a day  levETIRAcetam  IVPB 1500 milliGRAM(s) IV Intermittent every 12 hours  metroNIDAZOLE  IVPB 500 milliGRAM(s) IV Intermittent every 8 hours  midodrine. 10 milliGRAM(s) Oral three times a day  multivitamin/minerals 1 Tablet(s) Oral daily  valproate sodium  IVPB 500 milliGRAM(s) IV Intermittent three times a day  vancomycin  IVPB 750 milliGRAM(s) IV Intermittent every 12 hours  vitamin A &amp; D Ointment 1 Application(s) Topical two times a day    MEDICATIONS  (PRN):  acetaminophen     Tablet .. 650 milliGRAM(s) Oral every 6 hours PRN Moderate Pain (4 - 6)  acetaminophen  Suppository .. 325 milliGRAM(s) Rectal once PRN Temp greater or equal to 38C (100.4F)  aluminum hydroxide/magnesium hydroxide/simethicone Suspension 30 milliLiter(s) Oral every 6 hours PRN Dyspepsia  morphine  - Injectable 4 milliGRAM(s) IV Push every 6 hours PRN Severe Pain (7 - 10)    PHYSICAL EXAM:  General: Patient laying in bed, in no acute distress.   Wound: sacrum 6x5 cm with dark devitalized tissue and areas of pink moist tissue. Surrounding areas of partial thickness wounds, pink moist. No purulence, drainage, or active bleeding noted  +devitalized tissue excised with scissors

## 2023-04-14 LAB
ANION GAP SERPL CALC-SCNC: 13 MMOL/L — SIGNIFICANT CHANGE UP (ref 7–14)
BUN SERPL-MCNC: 19 MG/DL — SIGNIFICANT CHANGE UP (ref 10–20)
CALCIUM SERPL-MCNC: 8.8 MG/DL — SIGNIFICANT CHANGE UP (ref 8.4–10.5)
CHLORIDE SERPL-SCNC: 102 MMOL/L — SIGNIFICANT CHANGE UP (ref 98–110)
CO2 SERPL-SCNC: 22 MMOL/L — SIGNIFICANT CHANGE UP (ref 17–32)
CREAT SERPL-MCNC: 0.8 MG/DL — SIGNIFICANT CHANGE UP (ref 0.7–1.5)
EGFR: 81 ML/MIN/1.73M2 — SIGNIFICANT CHANGE UP
GLUCOSE SERPL-MCNC: 84 MG/DL — SIGNIFICANT CHANGE UP (ref 70–99)
HCT VFR BLD CALC: 27.3 % — LOW (ref 37–47)
HCT VFR BLD CALC: 27.5 % — LOW (ref 37–47)
HGB BLD-MCNC: 9 G/DL — LOW (ref 12–16)
HGB BLD-MCNC: 9.1 G/DL — LOW (ref 12–16)
MAGNESIUM SERPL-MCNC: 2.2 MG/DL — SIGNIFICANT CHANGE UP (ref 1.8–2.4)
MCHC RBC-ENTMCNC: 28.9 PG — SIGNIFICANT CHANGE UP (ref 27–31)
MCHC RBC-ENTMCNC: 29.2 PG — SIGNIFICANT CHANGE UP (ref 27–31)
MCHC RBC-ENTMCNC: 33 G/DL — SIGNIFICANT CHANGE UP (ref 32–37)
MCHC RBC-ENTMCNC: 33.1 G/DL — SIGNIFICANT CHANGE UP (ref 32–37)
MCV RBC AUTO: 87.8 FL — SIGNIFICANT CHANGE UP (ref 81–99)
MCV RBC AUTO: 88.1 FL — SIGNIFICANT CHANGE UP (ref 81–99)
NRBC # BLD: 0 /100 WBCS — SIGNIFICANT CHANGE UP (ref 0–0)
NRBC # BLD: 0 /100 WBCS — SIGNIFICANT CHANGE UP (ref 0–0)
PLATELET # BLD AUTO: 531 K/UL — HIGH (ref 130–400)
PLATELET # BLD AUTO: 575 K/UL — HIGH (ref 130–400)
PMV BLD: 9.1 FL — SIGNIFICANT CHANGE UP (ref 7.4–10.4)
PMV BLD: 9.2 FL — SIGNIFICANT CHANGE UP (ref 7.4–10.4)
POTASSIUM SERPL-MCNC: 4.3 MMOL/L — SIGNIFICANT CHANGE UP (ref 3.5–5)
POTASSIUM SERPL-SCNC: 4.3 MMOL/L — SIGNIFICANT CHANGE UP (ref 3.5–5)
RBC # BLD: 3.11 M/UL — LOW (ref 4.2–5.4)
RBC # BLD: 3.12 M/UL — LOW (ref 4.2–5.4)
RBC # FLD: 14.3 % — SIGNIFICANT CHANGE UP (ref 11.5–14.5)
RBC # FLD: 14.6 % — HIGH (ref 11.5–14.5)
SODIUM SERPL-SCNC: 137 MMOL/L — SIGNIFICANT CHANGE UP (ref 135–146)
VANCOMYCIN TROUGH SERPL-MCNC: 24.6 UG/ML — HIGH (ref 5–10)
WBC # BLD: 15.34 K/UL — HIGH (ref 4.8–10.8)
WBC # BLD: 19.23 K/UL — HIGH (ref 4.8–10.8)
WBC # FLD AUTO: 15.34 K/UL — HIGH (ref 4.8–10.8)
WBC # FLD AUTO: 19.23 K/UL — HIGH (ref 4.8–10.8)

## 2023-04-14 PROCEDURE — 71045 X-RAY EXAM CHEST 1 VIEW: CPT | Mod: 26

## 2023-04-14 PROCEDURE — 99232 SBSQ HOSP IP/OBS MODERATE 35: CPT

## 2023-04-14 RX ADMIN — Medication 81 MILLIGRAM(S): at 12:12

## 2023-04-14 RX ADMIN — Medication 1 APPLICATION(S): at 17:23

## 2023-04-14 RX ADMIN — LEVETIRACETAM 400 MILLIGRAM(S): 250 TABLET, FILM COATED ORAL at 17:33

## 2023-04-14 RX ADMIN — Medication 5 MILLIGRAM(S): at 05:53

## 2023-04-14 RX ADMIN — Medication 1 APPLICATION(S): at 17:33

## 2023-04-14 RX ADMIN — Medication 1 APPLICATION(S): at 05:55

## 2023-04-14 RX ADMIN — Medication 5 MILLIGRAM(S): at 17:31

## 2023-04-14 RX ADMIN — ATORVASTATIN CALCIUM 40 MILLIGRAM(S): 80 TABLET, FILM COATED ORAL at 22:37

## 2023-04-14 RX ADMIN — Medication 500 MILLIGRAM(S): at 12:12

## 2023-04-14 RX ADMIN — CEFEPIME 100 MILLIGRAM(S): 1 INJECTION, POWDER, FOR SOLUTION INTRAMUSCULAR; INTRAVENOUS at 17:32

## 2023-04-14 RX ADMIN — Medication 100 MILLIGRAM(S): at 22:37

## 2023-04-14 RX ADMIN — Medication 50 MILLIGRAM(S): at 14:00

## 2023-04-14 RX ADMIN — LEVETIRACETAM 400 MILLIGRAM(S): 250 TABLET, FILM COATED ORAL at 05:32

## 2023-04-14 RX ADMIN — GABAPENTIN 100 MILLIGRAM(S): 400 CAPSULE ORAL at 22:37

## 2023-04-14 RX ADMIN — Medication 100 MILLIGRAM(S): at 05:54

## 2023-04-14 RX ADMIN — MIDODRINE HYDROCHLORIDE 10 MILLIGRAM(S): 2.5 TABLET ORAL at 12:12

## 2023-04-14 RX ADMIN — MIDODRINE HYDROCHLORIDE 10 MILLIGRAM(S): 2.5 TABLET ORAL at 05:54

## 2023-04-14 RX ADMIN — Medication 100 MILLIGRAM(S): at 14:00

## 2023-04-14 RX ADMIN — Medication 250 MILLIGRAM(S): at 05:32

## 2023-04-14 RX ADMIN — GABAPENTIN 100 MILLIGRAM(S): 400 CAPSULE ORAL at 05:53

## 2023-04-14 RX ADMIN — Medication 1 TABLET(S): at 12:09

## 2023-04-14 RX ADMIN — Medication 50 MILLIGRAM(S): at 22:37

## 2023-04-14 RX ADMIN — Medication 50 MILLIGRAM(S): at 05:32

## 2023-04-14 RX ADMIN — MIDODRINE HYDROCHLORIDE 10 MILLIGRAM(S): 2.5 TABLET ORAL at 17:31

## 2023-04-14 RX ADMIN — CEFEPIME 100 MILLIGRAM(S): 1 INJECTION, POWDER, FOR SOLUTION INTRAMUSCULAR; INTRAVENOUS at 05:32

## 2023-04-14 RX ADMIN — Medication 1 APPLICATION(S): at 05:53

## 2023-04-14 RX ADMIN — MORPHINE SULFATE 4 MILLIGRAM(S): 50 CAPSULE, EXTENDED RELEASE ORAL at 20:16

## 2023-04-14 NOTE — PROGRESS NOTE ADULT - ASSESSMENT
ASSESSMENT   66 y/o woman with PMH of Right MCA stroke with left sided weakness was brought to the ED via EMS. In the ED, she had altered mental status and sepsis, with new onset fevers.     IMPRESSION  Complicated and prolonged hospital course, admitted 3/3 for R MCA CVA, ID initially consulted 3/22 for Fever  #Recurrent Fever 4/12 4/12 BCX NGTD     4/13 Burn--Wound: sacrum 6x5 cm with dark devitalized tissue and areas of pink moist tissue. Surrounding areas of partial thickness wounds, pink moist. No purulence, drainage, or active bleeding noted+devitalized tissue excised with scissors  #Leukocytosis; Afebrile since 3/21, Tm 100.3    Suspected infected decub with underlying osteomyelitis    4/4 WCX     Numerous Proteus mirabilis Cefepime: S <=2    Numerous Morganella morganii Cefepime: S <=2    Moderate Enterococcus raffinosus Ampicillin: R >8     Few Staphylococcus aureus- MSSA  CT  Sacral decubitus ulcer with osteomyelitis of the coccyx.  No acute intra-abdominal pathology.  Unchanged parapelvic cysts bilaterally with a 4 mm stone in the left mid ureter without evidence of obstruction.   WBC elevated to 21 4/4    4/3 UA without significant pyuria     3/28 BCX NGTD     3/27 BCX NGTD   < from: Xray Chest 1 View- PORTABLE-Routine (Xray Chest 1 View- PORTABLE-Routine .) (03.27.23 @ 11:39) >No focal consolidation, pneumothorax or pleural effusion.  #3/21 Tm 101.7 P>90 low BP- Sepsis   WBC 19 on admission, UCX 3/4 panS ecoli, UA , s/p ceftriaxone 3/4-6, cefepime 3/8  Exam at this time Skin: two approx. 3x2 cm wounds to sacral region with pink moist base. No active bleeding, purulence or surrounding erythema   3/21 UCX NG   3/20 BCX NGTD   3/20 COVID/RVP NEGATIVE   CXR no PNA  No diarrhea  No obvious phlebitis  Recent MRI stable  cannot obtain further history from the patient secondary to altered mental status or sedation   Creatinine, Serum: <0.5 mg/dL (04.04.23 @ 06:35)        RECOMMENDATIONS  - Vancomycin Level, Trough: 31.9 (04-12-23 @ 12:06), HOLD VANCO- no trough < 15 in system---  please check random AM LEVEL until < 15  - Cefepime 2g q12h IV + PO flagyl 500mg TID  - On antibiotics since 4/4-  - Midline 4/6-  - If hemodynamic compromise, change cefepime/flagyl to meropenem 1g q8h iv   - Burn f/u appreciated  - No benefit of long term intravenous antibiotics for sacral osteomyelitis. Continue with debridement, nutritional support and offloading   - Guarded prognosis GO    Please call or message on [employer] Teams if with any questions.

## 2023-04-14 NOTE — GOALS OF CARE CONVERSATION - ADVANCED CARE PLANNING - CONVERSATION DETAILS
Spoke with the daughter regarding the GOC. She specifically mentioned that her mother told her that if incase her heart and lungs stop functioning she shouldn't be resuscitated or intubated. The daughter will coming to hospital arounf 4 pm to sign the MOLST form. House staff will be made aware of the new updates. Spoke with the daughter regarding the GOC. She specifically mentioned that her mother told her that if incase her heart and lungs stop functioning she shouldn't be resuscitated or intubated. The daughter will coming to hospital arounf 4 pm to sign the MOLST form. House staff will be made aware of the new updates.    update: Discussed at bedside MOLST form and DNR/DNI order. Explained that DNR would mean that we would not attempt resuscitation if the patient's heart or breathing were to stop and that DNI would mean that we would not attempt to insert a breathing tube and attach to breathing machine if her breathing we to stop or become insufficient to maintain life.

## 2023-04-14 NOTE — PROGRESS NOTE ADULT - ATTENDING COMMENTS
66 y/o woman with PMH of Right MCA stroke with left sided weakness was brought to the ED via EMS. In the ED, she had altered mental status and sepsis.    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 No acute intra-abdominal pathology. Unchanged parapelvic cysts bilaterally with a 4 mm stone in the left mid ureter without evidence of obstruction.  - as per ID -  c/w Cefepime/flagyl/Vanco.   -4/12- pesistent leukocytosis, f/up  blood cxs , elevated  Vanco level- held vanco tx. on 4/12, resumed on lower dose of IV Vanco on 4/13- next Vanco level 4/14- 4pm    #Seizures  #Acute R Frontal Lobe Infarct likely 2/ hypotension   #Chronic R MCA infarct- chronic bed confinement status, dysarthria- answers on/off with 1 word yes, no.    - EEG (3/8) and video EEG (3/9): Focal slowing  - MRI Brain (3/15): new subcortical infarct in the right frontal lobe. Re-demonstrated chronic infarct in the right MCA territory.  - MRI of C spine (3/16): Significantly motion limited study. No gross cord compression or spinal cord edema demonstrated. Multilevel small disc bulges.  - Neuro eval'd 3/8-3/20: For L gaze preference/stroke, OP med review: on low dose Lamictal and Depakote in addition to Keppra (possible mood disorder/ seizures), Her MRI of brain showing subcortical infarct within same M1 territory. The cause of stroke is most likely hypotension as she has a diminutive Rt M1. However, MRI findings doesn't explain her B/L upper extremity spasticity. So, it is important to rule out cervical cord pathology. DAPT for 21 days followed by ASA 81 mg daily, Increased keppra post 3/20 possible seizure event  - SS re-juliette 3/21: Minced and moist, 1:1 feeds, thin liquids through straw  - C/w Depakote 500mg TID and Keppra to 1500mg BID   - C/w baclofen 5mg bid (for UE spasticity)  - C/w ASA, Plavix (3/17-4/6 - completed), and high dose statin for new stroke - DAPT for 21 days followed by ASA 81 mg daily  - - OP stroke clinic in 4 weeks after d/c per neurology    #Persistent Hypotension - c/w Midodrine 10mg TID      #Left avulsion fracture of medial malleolus  - Duplex BL LE (3/7): (-) for DVT  - CT Foot (3/11): acute minimally displaced avulsion fracture at the medial malleolus  - Podiatry following for foot pain: WBAT w/ surgical shoe, ankle brace, PT  - fall precautions  - pain control regimen  - C/w gabapentin at 100mg TID (Lowered dose from home, increase as pt tolerates)  - f/up with Podiatry if we can remove ankle brace     #Left cephalic vein thrombosis  - Duplex LUE (3/10): No DVT however there is superficial thrombosis noted in the left cephalic vein  - s/p warm compress  - Repeat duplex ordered 3/27 - negative for UE and LE DVT    #Anemia - normocytic, hg dropped on 4/12, s/p 1 unit PRBC, current HG- 9  - possibly due to frequent phlebotomy, no signs of active bleed - monitor       DVT ppx - lovenox- held today   - Diet: Soft and bite sized (SS 3/7)  - full code, overall guarded prognosis    #Progress Note Handoff: Pending blood cxs,  continue IV abx, f/up vanco level at 4 pm today, next h/h at 4 pm, hold lovenox today .  Family discussion: yes, medical team Disposition: SNF once medically stable    Total time spent to complete patient's bedside assessment, review medical chart, discuss medical plan of care with covering medical team was more than 50 minutes with >50% of time spent face to face with patient, discussion with patient/family and/or coordination of care . 68 y/o woman with PMH of Right MCA stroke with left sided weakness was brought to the ED via EMS. In the ED, she had altered mental status and sepsis.    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 No acute intra-abdominal pathology. Unchanged parapelvic cysts bilaterally with a 4 mm stone in the left mid ureter without evidence of obstruction.  - as per ID -  c/w Cefepime/flagyl/Vanco.   -4/12- pesistent leukocytosis, f/up  blood cxs , elevated  Vanco level- held vanco tx. on 4/12, resumed on lower dose of IV Vanco on 4/13- next Vanco level 4/14- 4pm    #Seizures  #Acute R Frontal Lobe Infarct likely 2/ hypotension   #Chronic R MCA infarct- chronic bed confinement status, dysarthria- answers on/off with 1 word yes, no.    - EEG (3/8) and video EEG (3/9): Focal slowing  - MRI Brain (3/15): new subcortical infarct in the right frontal lobe. Re-demonstrated chronic infarct in the right MCA territory.  - MRI of C spine (3/16): Significantly motion limited study. No gross cord compression or spinal cord edema demonstrated. Multilevel small disc bulges.  - Neuro eval'd 3/8-3/20: For L gaze preference/stroke, OP med review: on low dose Lamictal and Depakote in addition to Keppra (possible mood disorder/ seizures), Her MRI of brain showing subcortical infarct within same M1 territory. The cause of stroke is most likely hypotension as she has a diminutive Rt M1. However, MRI findings doesn't explain her B/L upper extremity spasticity. So, it is important to rule out cervical cord pathology. DAPT for 21 days followed by ASA 81 mg daily, Increased keppra post 3/20 possible seizure event  - SS re-juliette 3/21: Minced and moist, 1:1 feeds, thin liquids through straw  - C/w Depakote 500mg TID and Keppra to 1500mg BID   - C/w baclofen 5mg bid (for UE spasticity)  - C/w ASA, Plavix (3/17-4/6 - completed), and high dose statin for new stroke - DAPT for 21 days followed by ASA 81 mg daily  - - OP stroke clinic in 4 weeks after d/c per neurology    #Persistent Hypotension - c/w Midodrine 10mg TID      #Left avulsion fracture of medial malleolus  - Duplex BL LE (3/7): (-) for DVT  - CT Foot (3/11): acute minimally displaced avulsion fracture at the medial malleolus  - Podiatry following for foot pain: WBAT w/ surgical shoe, ankle brace, PT  - fall precautions  - pain control regimen  - C/w gabapentin at 100mg TID (Lowered dose from home, increase as pt tolerates)  - f/up with Podiatry if we can remove ankle brace     #Left cephalic vein thrombosis  - Duplex LUE (3/10): No DVT however there is superficial thrombosis noted in the left cephalic vein  - s/p warm compress  - Repeat duplex ordered 3/27 - negative for UE and LE DVT    #Anemia - normocytic, hg dropped on 4/13, s/p 1 unit PRBC, current HG- 9  - possibly due to frequent phlebotomy, no signs of active bleed - monitor       DVT ppx - lovenox- held today   - Diet: Soft and bite sized (SS 3/7)  - full code, overall guarded prognosis    #Progress Note Handoff: Pending blood cxs,  continue IV abx, f/up vanco level at 4 pm today, next h/h at 4 pm, hold lovenox today .  Family discussion: yes, medical team Disposition: SNF once medically stable    Total time spent to complete patient's bedside assessment, review medical chart, discuss medical plan of care with covering medical team was more than 50 minutes with >50% of time spent face to face with patient, discussion with patient/family and/or coordination of care .

## 2023-04-14 NOTE — PROGRESS NOTE ADULT - SUBJECTIVE AND OBJECTIVE BOX
JOSEPH OBRIEN  67y, Female  Allergy: Allergy Status Unknown      LOS  41d    CHIEF COMPLAINT: ams (13 Apr 2023 11:37)      INTERVAL EVENTS/HPI  - No acute events overnight  - T(F): , Max: 99.7 (04-13-23 @ 20:42)  - Tolerating medication  - WBC Count: 20.94 (04-13-23 @ 22:47)  WBC Count: 19.56 (04-13-23 @ 13:00)     - Creatinine, Serum: 0.7 (04-13-23 @ 13:00)  Creatinine, Serum: 0.6 (04-12-23 @ 06:45)       ROS  ***    VITALS:  T(F): 98.7, Max: 99.7 (04-13-23 @ 20:42)  HR: 94  BP: 132/74  RR: 18Vital Signs Last 24 Hrs  T(C): 37.1 (14 Apr 2023 04:20), Max: 37.6 (13 Apr 2023 20:42)  T(F): 98.7 (14 Apr 2023 04:20), Max: 99.7 (13 Apr 2023 20:42)  HR: 94 (14 Apr 2023 04:20) (87 - 110)  BP: 132/74 (14 Apr 2023 04:20) (104/58 - 132/74)  BP(mean): 69 (13 Apr 2023 08:43) (69 - 69)  RR: 18 (14 Apr 2023 04:20) (16 - 18)  SpO2: 98% (14 Apr 2023 04:20) (97% - 100%)    Parameters below as of 14 Apr 2023 04:20  Patient On (Oxygen Delivery Method): room air        PHYSICAL EXAM:  ***    FH: Non-contributory  Social Hx: Non-contributory    TESTS & MEASUREMENTS:                        6.8    20.94 )-----------( 505      ( 13 Apr 2023 22:47 )             21.8     04-13    132<L>  |  98  |  19  ----------------------------<  84  4.2   |  26  |  0.7    Ca    8.6      13 Apr 2023 13:00  Mg     2.2     04-13              Culture - Blood (collected 04-12-23 @ 12:06)  Source: .Blood None  Preliminary Report (04-13-23 @ 22:02):    No growth to date.    Culture - Tissue with Gram Stain (collected 04-04-23 @ 08:40)  Source: .Tissue sacrum  Gram Stain (04-04-23 @ 21:29):    No polymorphonuclear leukocytes per low power field    Numerous Gram Positive Cocci in Pairs and Chains per oil power field    Numerous Gram Negative Rods per oil power field  Final Report (04-06-23 @ 19:33):    Culture yields >4 types of aerobic and/or anaerobic bacteria    Call client services within 7 days if further workup is clinically    indicated.    Culture includes    Numerous Proteus mirabilis    Numerous Morganella morganii    Moderate Enterococcus raffinosus    Few Staphylococcus aureus  Organism: Proteus mirabilis  Morganella morganii  Enterococcus raffinosus  Staphylococcus aureus (04-06-23 @ 19:33)  Organism: Staphylococcus aureus (04-06-23 @ 19:33)      Method Type: JULIANA      -  Ampicillin/Sulbactam: S <=8/4      -  Cefazolin: S <=4      -  Clindamycin: S <=0.25      -  Erythromycin: S <=0.25      -  Gentamicin: S <=1 Should not be used as monotherapy      -  Oxacillin: S <=0.25 Oxacillin predicts susceptibility for dicloxacillin, methicillin, and nafcillin      -  Penicillin: R 0.25      -  Rifampin: S <=1 Should not be used as monotherapy      -  Tetracycline: S <=1      -  Trimethoprim/Sulfamethoxazole: S <=0.5/9.5      -  Vancomycin: S 1  Organism: Enterococcus raffinosus (04-06-23 @ 19:33)      Method Type: JULIANA      -  Ampicillin: R >8 Predicts results to ampicillin/sulbactam, amoxacillin-clavulanate and  piperacillin-tazobactam.      -  Tetracycline: R >8      -  Vancomycin: S 1  Organism: Morganella morganii (04-06-23 @ 19:33)      Method Type: JULIANA      -  Amikacin: S <=16      -  Amoxicillin/Clavulanic Acid: R >16/8      -  Ampicillin: R >16 These ampicillin results predict results for amoxicillin      -  Ampicillin/Sulbactam: I 16/8 Enterobacter, Klebsiella aerogenes, Citrobacter, and Serratia may develop resistance during prolonged therapy (3-4 days)      -  Aztreonam: S <=4      -  Cefazolin: R >16 Enterobacter, Klebsiella aerogenes, Citrobacter, and Serratia may develop resistance during prolonged therapy (3-4 days)      -  Cefepime: S <=2      -  Cefoxitin: S <=8      -  Ceftriaxone: S <=1 Enterobacter, Klebsiella aerogenes, Citrobacter, and Serratia may develop resistance during prolonged therapy      -  Ciprofloxacin: S <=0.25      -  Ertapenem: S <=0.5      -  Gentamicin: S <=2      -  Levofloxacin: S <=0.5      -  Meropenem: S <=1      -  Piperacillin/Tazobactam: S <=8      -  Tobramycin: S <=2      -  Trimethoprim/Sulfamethoxazole: S <=0.5/9.5  Organism: Proteus mirabilis (04-06-23 @ 19:33)      Method Type: JULIANA      -  Amikacin: S <=16      -  Amoxicillin/Clavulanic Acid: S <=8/4      -  Ampicillin: S <=8 These ampicillin results predict results for amoxicillin      -  Ampicillin/Sulbactam: S <=4/2 Enterobacter, Klebsiella aerogenes, Citrobacter, and Serratia may develop resistance during prolonged therapy (3-4 days)      -  Aztreonam: S <=4      -  Cefazolin: S <=2 Enterobacter, Klebsiella aerogenes, Citrobacter, and Serratia may develop resistance during prolonged therapy (3-4 days)      -  Cefepime: S <=2      -  Cefoxitin: S <=8      -  Ceftriaxone: S <=1 Enterobacter, Klebsiella aerogenes, Citrobacter, and Serratia may develop resistance during prolonged therapy      -  Ciprofloxacin: S <=0.25      -  Ertapenem: S <=0.5      -  Gentamicin: S <=2      -  Levofloxacin: S <=0.5      -  Meropenem: S <=1      -  Piperacillin/Tazobactam: S <=8      -  Tobramycin: S <=2      -  Trimethoprim/Sulfamethoxazole: S <=0.5/9.5    Culture - Blood (collected 04-04-23 @ 06:35)  Source: .Blood Blood  Final Report (04-09-23 @ 14:00):    No Growth Final    Culture - Blood (collected 03-28-23 @ 07:32)  Source: .Blood None  Final Report (04-02-23 @ 18:00):    No Growth Final    Culture - Blood (collected 03-27-23 @ 10:39)  Source: .Blood Blood-Peripheral  Final Report (04-01-23 @ 18:00):    No Growth Final    Culture - Urine (collected 03-21-23 @ 11:57)  Source: Catheterized Catheterized  Final Report (03-22-23 @ 17:54):    No growth    Culture - Blood (collected 03-20-23 @ 22:07)  Source: .Blood Blood  Final Report (03-26-23 @ 02:00):    No Growth Final            INFECTIOUS DISEASES TESTING  Vancomycin Level, Trough: 31.9 (04-12-23 @ 12:06)  Vancomycin Level, Trough: 19.6 (04-07-23 @ 18:35)  Vancomycin Level, Trough: 8.0 (04-05-23 @ 17:56)  Rapid RVP Result: NotDetec (04-05-23 @ 14:26)  COVID-19 PCR: NotDetec (04-02-23 @ 18:03)  Rapid RVP Result: NotDetec (03-28-23 @ 09:55)  Procalcitonin, Serum: 0.05 (03-20-23 @ 22:07)  Rapid RVP Result: NotDetec (03-20-23 @ 21:25)  COVID-19 PCR: NotDetec (03-19-23 @ 12:47)  COVID-19 PCR: NotDetec (03-15-23 @ 16:23)  strept    INFLAMMATORY MARKERS      RADIOLOGY & ADDITIONAL TESTS:  I have personally reviewed the last available Chest xray  CXR      CT      CARDIOLOGY TESTING  12 Lead ECG:   Ventricular Rate 114 BPM    Atrial Rate 114 BPM    P-R Interval 122 ms    QRS Duration 62 ms    Q-T Interval 312 ms    QTC Calculation(Bazett) 430 ms    P Axis 43 degrees    R Axis 7 degrees    T Axis 43 degrees    Diagnosis Line Sinus tachycardia  Otherwise normal ECG    Confirmed by VICTORINA ANDINO MDM ([id number]) on 4/5/2023 8:47:49 AM (04-05-23 @ 07:28)  12 Lead ECG:   Ventricular Rate 103 BPM    Atrial Rate 103 BPM    P-R Interval 120 ms    QRS Duration 66 ms    Q-T Interval 324 ms    QTC Calculation(Bazett) 424 ms    P Axis 40 degrees    R Axis -2 degrees    T Axis 43 degrees    Diagnosis Line Sinus tachycardia  Otherwise normal ECG    Confirmed by Shanti Sage MD ([id number]) on 4/4/2023 10:25:13 AM (04-03-23 @ 11:37)      MEDICATIONS  acetaminophen  Suppository .. 325 Rectal once  ascorbic acid 500 Oral daily  aspirin  chewable 81 Oral daily  atorvastatin 40 Oral at bedtime  baclofen 5 Oral every 12 hours  cefepime   IVPB     cefepime   IVPB 2000 IV Intermittent every 12 hours  collagenase Ointment 1 Topical two times a day  Dakins Solution - 1/2 Strength 1 Topical two times a day  enoxaparin Injectable 40 SubCutaneous every 24 hours  gabapentin 100 Oral three times a day  levETIRAcetam  IVPB 1500 IV Intermittent every 12 hours  metroNIDAZOLE  IVPB 500 IV Intermittent every 8 hours  midodrine. 10 Oral three times a day  multivitamin/minerals 1 Oral daily  valproate sodium  IVPB 500 IV Intermittent three times a day  vancomycin  IVPB 750 IV Intermittent every 12 hours  vitamin A &amp; D Ointment 1 Topical two times a day      WEIGHT  Weight (kg): 68.3 (03-08-23 @ 10:12)  Creatinine, Serum: 0.7 mg/dL (04-13-23 @ 13:00)      ANTIBIOTICS:  cefepime   IVPB      cefepime   IVPB 2000 milliGRAM(s) IV Intermittent every 12 hours  metroNIDAZOLE  IVPB 500 milliGRAM(s) IV Intermittent every 8 hours  vancomycin  IVPB 750 milliGRAM(s) IV Intermittent every 12 hours      All available historical records have been reviewed       JOSEPH OBRIEN  67y, Female  Allergy: Allergy Status Unknown      LOS  41d    CHIEF COMPLAINT: ams (13 Apr 2023 11:37)      INTERVAL EVENTS/HPI  - No acute events overnight  - T(F): , Max: 99.7 (04-13-23 @ 20:42)  - Tolerating medication  - WBC Count: 20.94 (04-13-23 @ 22:47)  WBC Count: 19.56 (04-13-23 @ 13:00)     - Creatinine, Serum: 0.7 (04-13-23 @ 13:00)  Creatinine, Serum: 0.6 (04-12-23 @ 06:45)       ROS  unable to obtain history secondary to patient's mental status and/or sedation     VITALS:  T(F): 98.7, Max: 99.7 (04-13-23 @ 20:42)  HR: 94  BP: 132/74  RR: 18Vital Signs Last 24 Hrs  T(C): 37.1 (14 Apr 2023 04:20), Max: 37.6 (13 Apr 2023 20:42)  T(F): 98.7 (14 Apr 2023 04:20), Max: 99.7 (13 Apr 2023 20:42)  HR: 94 (14 Apr 2023 04:20) (87 - 110)  BP: 132/74 (14 Apr 2023 04:20) (104/58 - 132/74)  BP(mean): 69 (13 Apr 2023 08:43) (69 - 69)  RR: 18 (14 Apr 2023 04:20) (16 - 18)  SpO2: 98% (14 Apr 2023 04:20) (97% - 100%)    Parameters below as of 14 Apr 2023 04:20  Patient On (Oxygen Delivery Method): room air        PHYSICAL EXAM:  Gen: NAD, resting in bed  HEENT: Normocephalic, atraumatic  Neck: supple, no lymphadenopathy  CV: Regular rate & regular rhythm  Lungs: decreased BS at bases, no fremitus  Abdomen: Soft, BS present  Ext: Warm, well perfused  Neuro: L deficits   Skin: no rash, no erythema  Lines: no phlebitis     FH: Non-contributory  Social Hx: Non-contributory    TESTS & MEASUREMENTS:                        6.8    20.94 )-----------( 505      ( 13 Apr 2023 22:47 )             21.8     04-13    132<L>  |  98  |  19  ----------------------------<  84  4.2   |  26  |  0.7    Ca    8.6      13 Apr 2023 13:00  Mg     2.2     04-13              Culture - Blood (collected 04-12-23 @ 12:06)  Source: .Blood None  Preliminary Report (04-13-23 @ 22:02):    No growth to date.    Culture - Tissue with Gram Stain (collected 04-04-23 @ 08:40)  Source: .Tissue sacrum  Gram Stain (04-04-23 @ 21:29):    No polymorphonuclear leukocytes per low power field    Numerous Gram Positive Cocci in Pairs and Chains per oil power field    Numerous Gram Negative Rods per oil power field  Final Report (04-06-23 @ 19:33):    Culture yields >4 types of aerobic and/or anaerobic bacteria    Call client services within 7 days if further workup is clinically    indicated.    Culture includes    Numerous Proteus mirabilis    Numerous Morganella morganii    Moderate Enterococcus raffinosus    Few Staphylococcus aureus  Organism: Proteus mirabilis  Morganella morganii  Enterococcus raffinosus  Staphylococcus aureus (04-06-23 @ 19:33)  Organism: Staphylococcus aureus (04-06-23 @ 19:33)      Method Type: JULIANA      -  Ampicillin/Sulbactam: S <=8/4      -  Cefazolin: S <=4      -  Clindamycin: S <=0.25      -  Erythromycin: S <=0.25      -  Gentamicin: S <=1 Should not be used as monotherapy      -  Oxacillin: S <=0.25 Oxacillin predicts susceptibility for dicloxacillin, methicillin, and nafcillin      -  Penicillin: R 0.25      -  Rifampin: S <=1 Should not be used as monotherapy      -  Tetracycline: S <=1      -  Trimethoprim/Sulfamethoxazole: S <=0.5/9.5      -  Vancomycin: S 1  Organism: Enterococcus raffinosus (04-06-23 @ 19:33)      Method Type: JULIANA      -  Ampicillin: R >8 Predicts results to ampicillin/sulbactam, amoxacillin-clavulanate and  piperacillin-tazobactam.      -  Tetracycline: R >8      -  Vancomycin: S 1  Organism: Morganella morganii (04-06-23 @ 19:33)      Method Type: JULIANA      -  Amikacin: S <=16      -  Amoxicillin/Clavulanic Acid: R >16/8      -  Ampicillin: R >16 These ampicillin results predict results for amoxicillin      -  Ampicillin/Sulbactam: I 16/8 Enterobacter, Klebsiella aerogenes, Citrobacter, and Serratia may develop resistance during prolonged therapy (3-4 days)      -  Aztreonam: S <=4      -  Cefazolin: R >16 Enterobacter, Klebsiella aerogenes, Citrobacter, and Serratia may develop resistance during prolonged therapy (3-4 days)      -  Cefepime: S <=2      -  Cefoxitin: S <=8      -  Ceftriaxone: S <=1 Enterobacter, Klebsiella aerogenes, Citrobacter, and Serratia may develop resistance during prolonged therapy      -  Ciprofloxacin: S <=0.25      -  Ertapenem: S <=0.5      -  Gentamicin: S <=2      -  Levofloxacin: S <=0.5      -  Meropenem: S <=1      -  Piperacillin/Tazobactam: S <=8      -  Tobramycin: S <=2      -  Trimethoprim/Sulfamethoxazole: S <=0.5/9.5  Organism: Proteus mirabilis (04-06-23 @ 19:33)      Method Type: JULIANA      -  Amikacin: S <=16      -  Amoxicillin/Clavulanic Acid: S <=8/4      -  Ampicillin: S <=8 These ampicillin results predict results for amoxicillin      -  Ampicillin/Sulbactam: S <=4/2 Enterobacter, Klebsiella aerogenes, Citrobacter, and Serratia may develop resistance during prolonged therapy (3-4 days)      -  Aztreonam: S <=4      -  Cefazolin: S <=2 Enterobacter, Klebsiella aerogenes, Citrobacter, and Serratia may develop resistance during prolonged therapy (3-4 days)      -  Cefepime: S <=2      -  Cefoxitin: S <=8      -  Ceftriaxone: S <=1 Enterobacter, Klebsiella aerogenes, Citrobacter, and Serratia may develop resistance during prolonged therapy      -  Ciprofloxacin: S <=0.25      -  Ertapenem: S <=0.5      -  Gentamicin: S <=2      -  Levofloxacin: S <=0.5      -  Meropenem: S <=1      -  Piperacillin/Tazobactam: S <=8      -  Tobramycin: S <=2      -  Trimethoprim/Sulfamethoxazole: S <=0.5/9.5    Culture - Blood (collected 04-04-23 @ 06:35)  Source: .Blood Blood  Final Report (04-09-23 @ 14:00):    No Growth Final    Culture - Blood (collected 03-28-23 @ 07:32)  Source: .Blood None  Final Report (04-02-23 @ 18:00):    No Growth Final    Culture - Blood (collected 03-27-23 @ 10:39)  Source: .Blood Blood-Peripheral  Final Report (04-01-23 @ 18:00):    No Growth Final    Culture - Urine (collected 03-21-23 @ 11:57)  Source: Catheterized Catheterized  Final Report (03-22-23 @ 17:54):    No growth    Culture - Blood (collected 03-20-23 @ 22:07)  Source: .Blood Blood  Final Report (03-26-23 @ 02:00):    No Growth Final            INFECTIOUS DISEASES TESTING  Vancomycin Level, Trough: 31.9 (04-12-23 @ 12:06)  Vancomycin Level, Trough: 19.6 (04-07-23 @ 18:35)  Vancomycin Level, Trough: 8.0 (04-05-23 @ 17:56)  Rapid RVP Result: NotDetec (04-05-23 @ 14:26)  COVID-19 PCR: NotDetec (04-02-23 @ 18:03)  Rapid RVP Result: NotDetec (03-28-23 @ 09:55)  Procalcitonin, Serum: 0.05 (03-20-23 @ 22:07)  Rapid RVP Result: NotDetec (03-20-23 @ 21:25)  COVID-19 PCR: NotDetec (03-19-23 @ 12:47)  COVID-19 PCR: NotDetec (03-15-23 @ 16:23)  strept    INFLAMMATORY MARKERS      RADIOLOGY & ADDITIONAL TESTS:  I have personally reviewed the last available Chest xray  CXR      CT      CARDIOLOGY TESTING  12 Lead ECG:   Ventricular Rate 114 BPM    Atrial Rate 114 BPM    P-R Interval 122 ms    QRS Duration 62 ms    Q-T Interval 312 ms    QTC Calculation(Bazett) 430 ms    P Axis 43 degrees    R Axis 7 degrees    T Axis 43 degrees    Diagnosis Line Sinus tachycardia  Otherwise normal ECG    Confirmed by SINA GREEN, YOCASTA ([id number]) on 4/5/2023 8:47:49 AM (04-05-23 @ 07:28)  12 Lead ECG:   Ventricular Rate 103 BPM    Atrial Rate 103 BPM    P-R Interval 120 ms    QRS Duration 66 ms    Q-T Interval 324 ms    QTC Calculation(Bazett) 424 ms    P Axis 40 degrees    R Axis -2 degrees    T Axis 43 degrees    Diagnosis Line Sinus tachycardia  Otherwise normal ECG    Confirmed by Shanti Sage MD ([id number]) on 4/4/2023 10:25:13 AM (04-03-23 @ 11:37)      MEDICATIONS  acetaminophen  Suppository .. 325 Rectal once  ascorbic acid 500 Oral daily  aspirin  chewable 81 Oral daily  atorvastatin 40 Oral at bedtime  baclofen 5 Oral every 12 hours  cefepime   IVPB     cefepime   IVPB 2000 IV Intermittent every 12 hours  collagenase Ointment 1 Topical two times a day  Dakins Solution - 1/2 Strength 1 Topical two times a day  enoxaparin Injectable 40 SubCutaneous every 24 hours  gabapentin 100 Oral three times a day  levETIRAcetam  IVPB 1500 IV Intermittent every 12 hours  metroNIDAZOLE  IVPB 500 IV Intermittent every 8 hours  midodrine. 10 Oral three times a day  multivitamin/minerals 1 Oral daily  valproate sodium  IVPB 500 IV Intermittent three times a day  vancomycin  IVPB 750 IV Intermittent every 12 hours  vitamin A &amp; D Ointment 1 Topical two times a day      WEIGHT  Weight (kg): 68.3 (03-08-23 @ 10:12)  Creatinine, Serum: 0.7 mg/dL (04-13-23 @ 13:00)      ANTIBIOTICS:  cefepime   IVPB      cefepime   IVPB 2000 milliGRAM(s) IV Intermittent every 12 hours  metroNIDAZOLE  IVPB 500 milliGRAM(s) IV Intermittent every 8 hours  vancomycin  IVPB 750 milliGRAM(s) IV Intermittent every 12 hours      All available historical records have been reviewed

## 2023-04-14 NOTE — PROGRESS NOTE ADULT - SUBJECTIVE AND OBJECTIVE BOX
JOSEPH OBRIEN 67y Female  MRN#: [id number]   Hospital Day: 41d    SUBJECTIVE  Patient is a 67y old Female who presents with a chief complaint of ams (13 Apr 2023 11:37)  Currently admitted to medicine with the primary diagnosis of Stroke      INTERVAL HPI AND OVERNIGHT EVENTS:  Patient was examined and seen at bedside. This morning she is resting comfortably in bed and was transfused overnight and hematoglobin improved.    OBJECTIVE  PAST MEDICAL & SURGICAL HISTORY    ALLERGIES:  Allergy Status Unknown    MEDICATIONS:  STANDING MEDICATIONS  acetaminophen  Suppository .. 325 milliGRAM(s) Rectal once  ascorbic acid 500 milliGRAM(s) Oral daily  aspirin  chewable 81 milliGRAM(s) Oral daily  atorvastatin 40 milliGRAM(s) Oral at bedtime  baclofen 5 milliGRAM(s) Oral every 12 hours  cefepime   IVPB      cefepime   IVPB 2000 milliGRAM(s) IV Intermittent every 12 hours  collagenase Ointment 1 Application(s) Topical two times a day  Dakins Solution - 1/2 Strength 1 Application(s) Topical two times a day  gabapentin 100 milliGRAM(s) Oral three times a day  levETIRAcetam  IVPB 1500 milliGRAM(s) IV Intermittent every 12 hours  metroNIDAZOLE  IVPB 500 milliGRAM(s) IV Intermittent every 8 hours  midodrine. 10 milliGRAM(s) Oral three times a day  multivitamin/minerals 1 Tablet(s) Oral daily  valproate sodium  IVPB 500 milliGRAM(s) IV Intermittent three times a day  vitamin A &amp; D Ointment 1 Application(s) Topical two times a day    PRN MEDICATIONS  acetaminophen     Tablet .. 650 milliGRAM(s) Oral every 6 hours PRN  acetaminophen  Suppository .. 325 milliGRAM(s) Rectal once PRN  aluminum hydroxide/magnesium hydroxide/simethicone Suspension 30 milliLiter(s) Oral every 6 hours PRN  morphine  - Injectable 4 milliGRAM(s) IV Push every 6 hours PRN      VITAL SIGNS: Last 24 Hours  T(C): 37.1 (14 Apr 2023 04:20), Max: 37.6 (13 Apr 2023 20:42)  T(F): 98.7 (14 Apr 2023 04:20), Max: 99.7 (13 Apr 2023 20:42)  HR: 94 (14 Apr 2023 04:20) (87 - 110)  BP: 132/74 (14 Apr 2023 04:20) (104/58 - 132/74)  BP(mean): --  RR: 18 (14 Apr 2023 04:20) (16 - 18)  SpO2: 98% (14 Apr 2023 04:20) (97% - 100%)    LABS:                        9.0    19.23 )-----------( 575      ( 14 Apr 2023 07:32 )             27.3     04-14    137  |  102  |  19  ----------------------------<  84  4.3   |  22  |  0.8    Ca    8.8      14 Apr 2023 07:32  Mg     2.2     04-14                Culture - Blood (collected 12 Apr 2023 12:06)  Source: .Blood None  Preliminary Report (13 Apr 2023 22:02):    No growth to date.          RADIOLOGY:      PHYSICAL EXAM:    CONSTITUTIONAL: chronically ill appearing, hemoglobin dropped yesterday after debridement  HEAD: Atraumatic, normocephalic  EYES: EOM intact, PERRLA, conjunctiva and sclera clear  ENT: moist mucous membranes  PULMONARY: decrease breath sounds at the bases  CARDIOVASCULAR: Regular rate and rhythm  GASTROINTESTINAL: Soft, non-tender, non-distended; bowel sounds present  MUSCULOSKELETAL: Unstageable Sacral decubitus ulcer with osteomyelitis of the coccyx.  NEUROLOGY: MCA stoke with weakness on the left side  SKIN: warm and dry

## 2023-04-14 NOTE — PATIENT PROFILE ADULT - VISION (WITH CORRECTIVE LENSES IF THE PATIENT USUALLY WEARS THEM):
-- DO NOT REPLY / DO NOT REPLY ALL --  -- Message is from [facility]agement Center Operations ([facility]) --    ONLY TO BE USED WITHIN A REFILL MEDICATION ENCOUNTER    Med Refill  Is the patient currently having any symptoms?: No/Non-Emergent symptoms    Name of medication requested: See pended med    Has patient contacted the pharmacy? Yes    Is this the first request for the medication in the last 48 hours?: Yes      Patient is requesting a medication refill - medication is on active list      Full name of the provider who ordered the medication: Margarita SOLIS, [facility] site name / Account # for provider: [facility]: Pharmacy  [id number] - [facility] [address]  At [facility]    Patient confirmed the above pharmacy as correct?  Yes      Caller Information       Type Contact Phone/Fax    04/14/2023 12:25 PM CDT Phone (Incoming) Teodora Heena L (Self) [phone] (M)          Alternative phone number: none    Can a detailed message be left?: Yes    Patient is completely out of medication: Verify if patient is currently experiencing symptoms. If patient is symptomatic, proceed with front end triage instead of medication refill. If patient is not symptomatic but is completely out of medication, roslyn as High priority when routing. Inform patient: “Please call back with any questions or concerns and if your condition becomes life threatening, you should seek immediate medical assistance by calling 911 or going to the Emergency Department for evaluation.”    Inform all patients: \"If the clinical team needs to contact you regarding this refill, please be aware the return phone call may come from an unidentified or out of state phone number and your refill request will be addressed as soon as the clinical team reviews your message.\"  
Med:  mirtazapine (REMERON) 15 MG tablet  -  Last filled on 90 for 09/06/2022 with 1 refills    LOV:  03/15/2023    FUV:  06/14/2023    ASSESSMENT:  03/15/2023  1.  Recommend brace, right foot.  2.  Reviewed significant progressive versus relapsing-remitting multiple sclerosis and possibly use of secondary drug, though MRI of the brain and C-spine were reviewed and stable.  3.  She has extensive lesions of the cervical spine consistent with her hemiparesis.  4.  Cognitive/studies were reviewed with the patient, continue with the Ampyra for walking, Cipro 250 b.i.d. for presumed urinary tract infection.  Urine culture ordered today and then in 10 days.     
Normal vision: sees adequately in most situations; can see medication labels, newsprint

## 2023-04-14 NOTE — PROGRESS NOTE ADULT - ASSESSMENT
66 y/o woman with PMH of Right MCA stroke with left sided weakness was brought to the ED via EMS. In the ED, she had altered mental status and sepsis.    # Sepsis - new onset 4/2  # Sacral unstagble wound with osteomyelitis of coccyx  - Burn team f/up on 4/13  - s/p CT Chest/Abd/Pelvis w/w/o Ct: Sacral decubitus ulcer with osteomyelitis of the coccyx. No acute intra-abdominal pathology. Unchanged parapelvic cysts bilaterally with a 4 mm stone in the left mid ureter without evidence of obstruction.  - as per ID - c/w Cefepime/flagyl  - Vanco stopped as vanc through was elevated and repeat, f/u on that       #Seizures  #Acute R Frontal Lobe Infarct likely 2/ hypotension   #Chronic R MCA infarct- chronic bed confinement status, dysarthria- answers on/off with 1 word yes, no.    - EEG (3/8) and video EEG (3/9): Focal slowing  - MRI Brain (3/15): new subcortical infarct in the right frontal lobe. Re-demonstrated chronic infarct in the right MCA territory.  - MRI of C spine (3/16): Significantly motion limited study. No gross cord compression or spinal cord edema demonstrated. Multilevel small disc bulges.  - Neuro eval'd 3/8-3/20: For L gaze preference/stroke, OP med review: on low dose Lamictal and Depakote in addition to Keppra (possible mood disorder/ seizures), Her MRI of brain showing subcortical infarct within same M1 territory. The cause of stroke is most likely hypotension as she has a diminutive Rt M1. However, MRI findings doesn't explain her B/L upper extremity spasticity. So, it is important to rule out cervical cord pathology. DAPT for 21 days followed by ASA 81 mg daily, Increased keppra post 3/20 possible seizure event  - SS re-juliette 3/21: Minced and moist, 1:1 feeds, thin liquids through straw  - C/w Depakote 500 mg TID and Keppra to 1500 mg BID   - C/w baclofen 5mg bid (for UE spasticity)  - C/w ASA, Plavix (3/17-4/6 - completed), and high dose statin for new stroke - DAPT for 21 days followed by ASA 81 mg daily   - OP stroke clinic in 4 weeks after d/c per neurology    # Persistent Hypotension   - c/w Midodrine 10 mg TID    # Left avulsion fracture of medial malleolus  - Duplex BL LE (3/7): (-) for DVT  - CT Foot (3/11): acute minimally displaced avulsion fracture at the medial malleolus  - Podiatry following for foot pain: WBAT w/ surgical shoe, ankle brace, PT  - fall precautions  - pain control regimen - if pain uncontrolled consider increasing percocet to 2 tabs.  - C/w gabapentin at 100mg TID (Lowered dose from home, increase as pt tolerates)    # Left cephalic vein thrombosis  - Duplex LUE (3/10): No DVT however there is superficial thrombosis noted in the left cephalic vein  - s/p warm compress  - Repeat duplex ordered 3/27 - negative for UE and LE DVT         DVT ppx - Lovenox  - Diet: Soft and bite sized (SS 3/7)  - full code, overall guarded prognosis

## 2023-04-15 LAB
ANION GAP SERPL CALC-SCNC: 13 MMOL/L — SIGNIFICANT CHANGE UP (ref 7–14)
APPEARANCE UR: ABNORMAL
BACTERIA # UR AUTO: ABNORMAL
BILIRUB UR-MCNC: NEGATIVE — SIGNIFICANT CHANGE UP
BUN SERPL-MCNC: 21 MG/DL — HIGH (ref 10–20)
CALCIUM SERPL-MCNC: 9.3 MG/DL — SIGNIFICANT CHANGE UP (ref 8.4–10.5)
CHLORIDE SERPL-SCNC: 103 MMOL/L — SIGNIFICANT CHANGE UP (ref 98–110)
CO2 SERPL-SCNC: 21 MMOL/L — SIGNIFICANT CHANGE UP (ref 17–32)
COLOR SPEC: YELLOW — SIGNIFICANT CHANGE UP
CREAT SERPL-MCNC: 0.7 MG/DL — SIGNIFICANT CHANGE UP (ref 0.7–1.5)
DIFF PNL FLD: ABNORMAL
EGFR: 95 ML/MIN/1.73M2 — SIGNIFICANT CHANGE UP
EPI CELLS # UR: 21 /HPF — HIGH (ref 0–5)
GLUCOSE SERPL-MCNC: 79 MG/DL — SIGNIFICANT CHANGE UP (ref 70–99)
GLUCOSE UR QL: NEGATIVE — SIGNIFICANT CHANGE UP
HCT VFR BLD CALC: 32.8 % — LOW (ref 37–47)
HGB BLD-MCNC: 10.6 G/DL — LOW (ref 12–16)
HYALINE CASTS # UR AUTO: 20 /LPF — HIGH (ref 0–7)
KETONES UR-MCNC: ABNORMAL
LEUKOCYTE ESTERASE UR-ACNC: ABNORMAL
MAGNESIUM SERPL-MCNC: 2.3 MG/DL — SIGNIFICANT CHANGE UP (ref 1.8–2.4)
MCHC RBC-ENTMCNC: 28.8 PG — SIGNIFICANT CHANGE UP (ref 27–31)
MCHC RBC-ENTMCNC: 32.3 G/DL — SIGNIFICANT CHANGE UP (ref 32–37)
MCV RBC AUTO: 89.1 FL — SIGNIFICANT CHANGE UP (ref 81–99)
NITRITE UR-MCNC: NEGATIVE — SIGNIFICANT CHANGE UP
NRBC # BLD: 0 /100 WBCS — SIGNIFICANT CHANGE UP (ref 0–0)
PH UR: 6 — SIGNIFICANT CHANGE UP (ref 5–8)
PLATELET # BLD AUTO: 500 K/UL — HIGH (ref 130–400)
PMV BLD: 8.9 FL — SIGNIFICANT CHANGE UP (ref 7.4–10.4)
POTASSIUM SERPL-MCNC: 4.5 MMOL/L — SIGNIFICANT CHANGE UP (ref 3.5–5)
POTASSIUM SERPL-SCNC: 4.5 MMOL/L — SIGNIFICANT CHANGE UP (ref 3.5–5)
PROCALCITONIN SERPL-MCNC: 0.14 NG/ML — HIGH (ref 0.02–0.1)
PROT UR-MCNC: ABNORMAL
RBC # BLD: 3.68 M/UL — LOW (ref 4.2–5.4)
RBC # FLD: 14.6 % — HIGH (ref 11.5–14.5)
RBC CASTS # UR COMP ASSIST: 10 /HPF — HIGH (ref 0–4)
SODIUM SERPL-SCNC: 137 MMOL/L — SIGNIFICANT CHANGE UP (ref 135–146)
SP GR SPEC: 1.02 — SIGNIFICANT CHANGE UP (ref 1.01–1.03)
UROBILINOGEN FLD QL: SIGNIFICANT CHANGE UP
VANCOMYCIN TROUGH SERPL-MCNC: 19.4 UG/ML — HIGH (ref 5–10)
WBC # BLD: 17.49 K/UL — HIGH (ref 4.8–10.8)
WBC # FLD AUTO: 17.49 K/UL — HIGH (ref 4.8–10.8)
WBC UR QL: 14 /HPF — HIGH (ref 0–5)

## 2023-04-15 PROCEDURE — 99233 SBSQ HOSP IP/OBS HIGH 50: CPT

## 2023-04-15 PROCEDURE — 99232 SBSQ HOSP IP/OBS MODERATE 35: CPT | Mod: GC

## 2023-04-15 RX ORDER — ENOXAPARIN SODIUM 100 MG/ML
40 INJECTION SUBCUTANEOUS EVERY 24 HOURS
Refills: 0 | Status: DISCONTINUED | OUTPATIENT
Start: 2023-04-15 | End: 2023-04-18

## 2023-04-15 RX ADMIN — Medication 1 APPLICATION(S): at 17:35

## 2023-04-15 RX ADMIN — LEVETIRACETAM 400 MILLIGRAM(S): 250 TABLET, FILM COATED ORAL at 17:40

## 2023-04-15 RX ADMIN — Medication 1 APPLICATION(S): at 17:40

## 2023-04-15 RX ADMIN — GABAPENTIN 100 MILLIGRAM(S): 400 CAPSULE ORAL at 21:29

## 2023-04-15 RX ADMIN — Medication 5 MILLIGRAM(S): at 17:35

## 2023-04-15 RX ADMIN — Medication 100 MILLIGRAM(S): at 05:22

## 2023-04-15 RX ADMIN — Medication 1 APPLICATION(S): at 05:25

## 2023-04-15 RX ADMIN — Medication 50 MILLIGRAM(S): at 13:00

## 2023-04-15 RX ADMIN — Medication 100 MILLIGRAM(S): at 13:00

## 2023-04-15 RX ADMIN — Medication 50 MILLIGRAM(S): at 05:24

## 2023-04-15 RX ADMIN — Medication 1 TABLET(S): at 11:24

## 2023-04-15 RX ADMIN — Medication 1 APPLICATION(S): at 05:24

## 2023-04-15 RX ADMIN — Medication 100 MILLIGRAM(S): at 21:29

## 2023-04-15 RX ADMIN — GABAPENTIN 100 MILLIGRAM(S): 400 CAPSULE ORAL at 13:01

## 2023-04-15 RX ADMIN — Medication 81 MILLIGRAM(S): at 11:24

## 2023-04-15 RX ADMIN — MIDODRINE HYDROCHLORIDE 10 MILLIGRAM(S): 2.5 TABLET ORAL at 17:38

## 2023-04-15 RX ADMIN — ENOXAPARIN SODIUM 40 MILLIGRAM(S): 100 INJECTION SUBCUTANEOUS at 11:24

## 2023-04-15 RX ADMIN — Medication 500 MILLIGRAM(S): at 11:24

## 2023-04-15 RX ADMIN — Medication 50 MILLIGRAM(S): at 21:29

## 2023-04-15 RX ADMIN — ATORVASTATIN CALCIUM 40 MILLIGRAM(S): 80 TABLET, FILM COATED ORAL at 21:28

## 2023-04-15 RX ADMIN — MIDODRINE HYDROCHLORIDE 10 MILLIGRAM(S): 2.5 TABLET ORAL at 05:20

## 2023-04-15 RX ADMIN — Medication 5 MILLIGRAM(S): at 05:20

## 2023-04-15 RX ADMIN — MORPHINE SULFATE 4 MILLIGRAM(S): 50 CAPSULE, EXTENDED RELEASE ORAL at 05:12

## 2023-04-15 RX ADMIN — LEVETIRACETAM 400 MILLIGRAM(S): 250 TABLET, FILM COATED ORAL at 05:23

## 2023-04-15 RX ADMIN — Medication 1 APPLICATION(S): at 17:39

## 2023-04-15 RX ADMIN — CEFEPIME 100 MILLIGRAM(S): 1 INJECTION, POWDER, FOR SOLUTION INTRAMUSCULAR; INTRAVENOUS at 17:41

## 2023-04-15 RX ADMIN — CEFEPIME 100 MILLIGRAM(S): 1 INJECTION, POWDER, FOR SOLUTION INTRAMUSCULAR; INTRAVENOUS at 05:23

## 2023-04-15 RX ADMIN — GABAPENTIN 100 MILLIGRAM(S): 400 CAPSULE ORAL at 05:20

## 2023-04-15 NOTE — PROGRESS NOTE ADULT - SUBJECTIVE AND OBJECTIVE BOX
Podiatry Progress Note    Subjective:  JOSEPH OBRIEN is a  67y Female.   Seen bedside.   Patient is a 67y old  Female who presents with a chief complaint of Sacral decubitus ulcer (15 Apr 2023 08:44)      Past Medical History and Surgical History  PAST MEDICAL & SURGICAL HISTORY:       Objective:  Vital Signs Last 24 Hrs  T(C): 36.1 (14 Apr 2023 20:39), Max: 36.8 (14 Apr 2023 14:00)  T(F): 96.9 (14 Apr 2023 20:39), Max: 98.2 (14 Apr 2023 14:00)  HR: 102 (14 Apr 2023 20:39) (87 - 102)  BP: 130/71 (15 Apr 2023 11:27) (111/70 - 145/57)  BP(mean): --  RR: 18 (14 Apr 2023 20:39) (18 - 18)  SpO2: 100% (14 Apr 2023 20:39) (100% - 100%)    Parameters below as of 14 Apr 2023 20:39  Patient On (Oxygen Delivery Method): room air                            10.6   17.49 )-----------( 500      ( 15 Apr 2023 08:34 )             32.8                 04-15    137  |  103  |  21<H>  ----------------------------<  79  4.5   |  21  |  0.7    Ca    9.3      15 Apr 2023 08:34  Mg     2.3     04-15        Physical Exam - Lower Extremity Focused:   Derm:  Dry and warm, no open wounds, scars bruises or discoloration.   Vascular: DP and PT Pulses Diminished; Foot is Warm to Warm to the touch; Capillary Refill Time < 3 Seconds;    Neuro: Protective Sensation intact   MSK: Diffuse pain on palpation of the left foot, Left side hemiparesis     Assessment:   - S/P stroke  - Left side hemiparesis  - Left side foot drop    Plan:  Chart reviewed and Patient evaluated. All Questions and Concerns Addressed and Answered  XR Imaging Left Foot; reviewed as above   Offloading boots recommended for decubitus precautions  Weight Bearing Status; WBAT   No wound care indicated, no open lesions or ulcers on bilateral feet  Patient is stable from podiatry standpoint; may f/u w/Dr. Mcclelland @ [address] [place] [address]  Discussed Plan w/ Dr. Nazario

## 2023-04-15 NOTE — PROGRESS NOTE ADULT - SUBJECTIVE AND OBJECTIVE BOX
JOSEPH OBRIEN 67y Female  MRN#: [id number]   Hospital Day: 42d    SUBJECTIVE  Patient is a 67y old Female who presents with a chief complaint of Stroke (14 Apr 2023 10:56)  Currently admitted to medicine with the primary diagnosis of Stroke      INTERVAL HPI AND OVERNIGHT EVENTS:  Patient was examined and seen at bedside. This morning she is resting comfortably in bed and reports no issues or overnight events.    OBJECTIVE  PAST MEDICAL & SURGICAL HISTORY    ALLERGIES:  Allergy Status Unknown    MEDICATIONS:  STANDING MEDICATIONS  acetaminophen  Suppository .. 325 milliGRAM(s) Rectal once  ascorbic acid 500 milliGRAM(s) Oral daily  aspirin  chewable 81 milliGRAM(s) Oral daily  atorvastatin 40 milliGRAM(s) Oral at bedtime  baclofen 5 milliGRAM(s) Oral every 12 hours  cefepime   IVPB      cefepime   IVPB 2000 milliGRAM(s) IV Intermittent every 12 hours  collagenase Ointment 1 Application(s) Topical two times a day  Dakins Solution - 1/2 Strength 1 Application(s) Topical two times a day  gabapentin 100 milliGRAM(s) Oral three times a day  levETIRAcetam  IVPB 1500 milliGRAM(s) IV Intermittent every 12 hours  metroNIDAZOLE  IVPB 500 milliGRAM(s) IV Intermittent every 8 hours  midodrine. 10 milliGRAM(s) Oral three times a day  multivitamin/minerals 1 Tablet(s) Oral daily  valproate sodium  IVPB 500 milliGRAM(s) IV Intermittent three times a day  vitamin A &amp; D Ointment 1 Application(s) Topical two times a day    PRN MEDICATIONS  acetaminophen     Tablet .. 650 milliGRAM(s) Oral every 6 hours PRN  acetaminophen  Suppository .. 325 milliGRAM(s) Rectal once PRN  aluminum hydroxide/magnesium hydroxide/simethicone Suspension 30 milliLiter(s) Oral every 6 hours PRN  morphine  - Injectable 4 milliGRAM(s) IV Push every 6 hours PRN      VITAL SIGNS: Last 24 Hours  T(C): 36.1 (14 Apr 2023 20:39), Max: 36.8 (14 Apr 2023 14:00)  T(F): 96.9 (14 Apr 2023 20:39), Max: 98.2 (14 Apr 2023 14:00)  HR: 102 (14 Apr 2023 20:39) (87 - 102)  BP: 145/57 (14 Apr 2023 20:39) (111/60 - 145/57)  BP(mean): --  RR: 18 (14 Apr 2023 20:39) (18 - 18)  SpO2: 100% (14 Apr 2023 20:39) (100% - 100%)    LABS:                        9.1    15.34 )-----------( 531      ( 14 Apr 2023 18:23 )             27.5     04-14    137  |  102  |  19  ----------------------------<  84  4.3   |  22  |  0.8    Ca    8.8      14 Apr 2023 07:32  Mg     2.2     04-14                Culture - Blood (collected 12 Apr 2023 12:06)  Source: .Blood None  Preliminary Report (13 Apr 2023 22:02):    No growth to date.          RADIOLOGY:      PHYSICAL EXAM:  CONSTITUTIONAL: No acute distress,   HEAD: Atraumatic, normocephalic  EYES: EOM intact, PERRLA, conjunctiva and sclera clear  ENT: moist mucous membranes  PULMONARY: Decrease breath sounds b/l  CARDIOVASCULAR: Regular rate and rhythm  GASTROINTESTINAL: Soft, non-tender, non-distended; bowel sounds present  MUSCULOSKELETAL: no edema  NEUROLOGY: non-focal  SKIN: warm and dry

## 2023-04-15 NOTE — PROGRESS NOTE ADULT - ASSESSMENT
66 y/o woman with PMH of Right MCA stroke with left sided weakness was brought to the ED via EMS. In the ED, she had altered mental status and sepsis.    # Sepsis - new onset 4/2  # Sacral unstagble wound with osteomyelitis of coccyx  - WBC trending down,  - Burn team f/up on 4/13  - s/p CT Chest/Abd/Pelvis w/w/o Ct: Sacral decubitus ulcer with osteomyelitis of the coccyx. No acute intra-abdominal pathology. Unchanged parapelvic cysts bilaterally with a 4 mm stone in the left mid ureter without evidence of obstruction.  - as per ID - c/w Cefepime/flagyl  - Procal .14 4/15/23  - Vanco stopped as vanc through was elevated and repeat was 24,  - f/u 4 pm vanc through      #Seizures  #Acute R Frontal Lobe Infarct likely 2/ hypotension   #Chronic R MCA infarct- chronic bed confinement status, dysarthria- answers on/off with 1 word yes, no.    - EEG (3/8) and video EEG (3/9): Focal slowing  - MRI Brain (3/15): new subcortical infarct in the right frontal lobe. Re-demonstrated chronic infarct in the right MCA territory.  - MRI of C spine (3/16): Significantly motion limited study. No gross cord compression or spinal cord edema demonstrated. Multilevel small disc bulges.  - Neuro eval  3/8-3/20: For L gaze preference/stroke, OP med review: on low dose Lamictal and Depakote in addition to Keppra (possible mood disorder/ seizures), Her MRI of brain showing subcortical infarct within same M1 territory. The cause of stroke is most likely hypotension as she has a diminutive Rt M1. However, MRI findings doesn't explain her B/L upper extremity spasticity. So, it is important to rule out cervical cord pathology. DAPT for 21 days followed by ASA 81 mg daily, Increased keppra post 3/20 possible seizure event  - SS re-juliette 3/21: Minced and moist, 1:1 feeds, thin liquids through straw  - C/w Depakote 500 mg TID and Keppra to 1500 mg BID   - C/w baclofen 5mg bid (for UE spasticity)  - C/w ASA, Plavix (3/17-4/6 - completed), and high dose statin for new stroke - DAPT for 21 days followed by ASA 81 mg daily  - OP stroke clinic in 4 weeks after d/c per neurology    # Persistent Hypotension   - c/w Midodrine 10 mg TID    # Left avulsion fracture of medial malleolus  - Duplex BL LE (3/7): (-) for DVT  - CT Foot (3/11): acute minimally displaced avulsion fracture at the medial malleolus  - Podiatry following for foot pain: WBAT w/ surgical shoe, ankle brace, PT  - fall precautions  - pain control regimen - if pain uncontrolled consider increasing percocet to 2 tabs.  - C/w gabapentin at 100mg TID (Lowered dose from home, increase as pt tolerates)    # Left cephalic vein thrombosis  - Duplex LUE (3/10): No DVT however there is superficial thrombosis noted in the left cephalic vein  - s/p warm compress  - Repeat duplex ordered 3/27 - negative for UE and LE DVT        - DVT ppx - Lovenox held as hemoglobin was dropping but is stable today will be restarted   - Diet: Soft and bite sized (SS 3/7)  - full code, overall guarded prognosis

## 2023-04-15 NOTE — PROGRESS NOTE ADULT - ATTENDING COMMENTS
68 y/o woman with PMH of Right MCA stroke with left sided weakness was brought to the ED via EMS. In the ED, she had altered mental status and sepsis.    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 No acute intra-abdominal pathology. Unchanged parapelvic cysts bilaterally with a 4 mm stone in the left mid ureter without evidence of obstruction.  - CXR- no new infiltrates , procal level 0.14, pending u/a, urine cxs - for completion of infectious work up., f/up repeated blood cxs, daily CBC monitoring  - as per ID -  c/w Cefepime/flagyl/ Vanco(on hold due to elevated blood levels  ).   -4/12-  elevated  Vanco level- held vanco tx. for entire day   -4/13- restarted IV Vanco 750 mg twice daily  -4/14-   IV Vanco 750mg given 1 dose in am , 4pm Vanco level 24.6, d/c IV Vanco  -4/15-  f/up 4pm Vanco level    #Seizures  #Acute R Frontal Lobe Infarct likely 2/ hypotension   #Chronic R MCA infarct- chronic bed confinement status, dysarthria- answers on/off with 1 word yes, no.    - EEG (3/8) and video EEG (3/9): Focal slowing  - MRI Brain (3/15): new subcortical infarct in the right frontal lobe. Re-demonstrated chronic infarct in the right MCA territory.  - MRI of C spine (3/16): Significantly motion limited study. No gross cord compression or spinal cord edema demonstrated. Multilevel small disc bulges.  - Neuro eval'd 3/8-3/20: For L gaze preference/stroke, OP med review: on low dose Lamictal and Depakote in addition to Keppra (possible mood disorder/ seizures), Her MRI of brain showing subcortical infarct within same M1 territory. The cause of stroke is most likely hypotension as she has a diminutive Rt M1. However, MRI findings doesn't explain her B/L upper extremity spasticity. So, it is important to rule out cervical cord pathology. DAPT for 21 days followed by ASA 81 mg daily, Increased keppra post 3/20 possible seizure event  - SS re-juliette 3/21: Minced and moist, 1:1 feeds, thin liquids through straw  - C/w Depakote 500mg TID and Keppra to 1500mg BID   - C/w baclofen 5mg bid (for UE spasticity)  - C/w ASA, Plavix (3/17-4/6 - completed), and high dose statin for new stroke - DAPT for 21 days followed by ASA 81 mg daily  - - OP stroke clinic in 4 weeks after d/c per neurology    #Persistent Hypotension - stable b/p  with Midodrine 10mg TID      #Left avulsion fracture of medial malleolus  - Duplex BL LE (3/7): (-) for DVT  - CT Foot (3/11): acute minimally displaced avulsion fracture at the medial malleolus  - Podiatry following for foot pain: WBAT w/ surgical shoe, ankle brace, PT  - fall precautions  - pain control regimen  - C/w gabapentin at 100mg TID (Lowered dose from home, increase as pt tolerates)  - f/up with Podiatry if we can remove ankle brace     #Left cephalic vein thrombosis  - Duplex LUE (3/10): No DVT however there is superficial thrombosis noted in the left cephalic vein  - s/p warm compress  - Repeat duplex ordered 3/27 - negative for UE and LE DVT    #Anemia - normocytic, hg dropped on 4/13, s/p 1 unit PRBC, current HG- 10.6  - possibly due to frequent phlebotomy, no signs of active bleed - monitor       DVT ppx - lovenox    - Diet: Soft and bite sized (SS 3/7)  - full code, overall guarded prognosis    #Progress Note Handoff: daily CBC monitoring,   continue IV abx, hold IV vancomycin tx, f/up vanco level at 4 pm today, resume on  lovenox dvt proph, f/up Podiatry if we can remove ankle immobilizer  .  Family discussion: yes, medical team Disposition: SNF once medically stable    Total time spent to complete patient's bedside assessment, review medical chart, discuss medical plan of care with covering medical team was more than 50 minutes with >50% of time spent face to face with patient, discussion with patient/family and/or coordination of care . 66 y/o woman with PMH of Right MCA stroke with left sided weakness was brought to the ED via EMS. In the ED, she had altered mental status and sepsis.    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 No acute intra-abdominal pathology. Unchanged parapelvic cysts bilaterally with a 4 mm stone in the left mid ureter without evidence of obstruction.  - CXR- no new infiltrates , procal level 0.14, pending u/a, urine cxs - for completion of infectious work up., f/up repeated blood cxs, daily CBC monitoring  - as per ID -  c/w Cefepime/flagyl/ Vanco(on hold due to elevated blood levels  ).   -4/12-  elevated  Vanco level- held vanco tx. for entire day   -4/13- restarted IV Vanco 750 mg twice daily  -4/14-   IV Vanco 750mg given 1 dose in am , 4pm Vanco level 24.6, d/c IV Vanco  -4/15-  f/up 4pm Vanco level    #Seizures  #Acute R Frontal Lobe Infarct likely 2/ hypotension   #Chronic R MCA infarct- chronic bed confinement status, dysarthria- answers on/off with 1 word yes, no.    - EEG (3/8) and video EEG (3/9): Focal slowing  - MRI Brain (3/15): new subcortical infarct in the right frontal lobe. Re-demonstrated chronic infarct in the right MCA territory.  - MRI of C spine (3/16): Significantly motion limited study. No gross cord compression or spinal cord edema demonstrated. Multilevel small disc bulges.  - Neuro eval'd 3/8-3/20: For L gaze preference/stroke, OP med review: on low dose Lamictal and Depakote in addition to Keppra (possible mood disorder/ seizures), Her MRI of brain showing subcortical infarct within same M1 territory. The cause of stroke is most likely hypotension as she has a diminutive Rt M1. However, MRI findings doesn't explain her B/L upper extremity spasticity. So, it is important to rule out cervical cord pathology. DAPT for 21 days followed by ASA 81 mg daily, Increased keppra post 3/20 possible seizure event  - SS re-juliette 3/21: Minced and moist, 1:1 feeds, thin liquids through straw  - C/w Depakote 500mg TID and Keppra to 1500mg BID   - C/w baclofen 5mg bid (for UE spasticity)  - C/w ASA, Plavix (3/17-4/6 - completed), and high dose statin for new stroke - DAPT for 21 days followed by ASA 81 mg daily  - - OP stroke clinic in 4 weeks after d/c per neurology    #Persistent Hypotension - stable b/p  with Midodrine 10mg TID      #Left avulsion fracture of medial malleolus  - Duplex BL LE (3/7): (-) for DVT  - CT Foot (3/11): acute minimally displaced avulsion fracture at the medial malleolus  - Podiatry following for foot pain: WBAT w/ surgical shoe, ankle brace, PT  - fall precautions  - pain control regimen  - C/w gabapentin at 100mg TID (Lowered dose from home, increase as pt tolerates)  - f/up with Podiatry if we can remove ankle brace     #Left cephalic vein thrombosis  - Duplex LUE (3/10): No DVT however there is superficial thrombosis noted in the left cephalic vein  - s/p warm compress  - Repeat duplex ordered 3/27 - negative for UE and LE DVT    #Anemia - normocytic, hg dropped on 4/13, s/p 1 unit PRBC, current HG- 10.6  - possibly due to frequent phlebotomy, no signs of active bleed - monitor       DVT ppx - lovenox    - Diet: Soft and bite sized (SS 3/7)  - DNR/DNI, overall guarded prognosis    #Progress Note Handoff: daily CBC monitoring,   continue IV abx, hold IV vancomycin tx, f/up vanco level at 4 pm today, resume on  lovenox dvt proph, f/up Podiatry if we can remove ankle immobilizer  .  Family discussion: yes, medical team Disposition: SNF once medically stable    Total time spent to complete patient's bedside assessment, review medical chart, discuss medical plan of care with covering medical team was more than 50 minutes with >50% of time spent face to face with patient, discussion with patient/family and/or coordination of care .

## 2023-04-16 LAB
ANION GAP SERPL CALC-SCNC: 13 MMOL/L — SIGNIFICANT CHANGE UP (ref 7–14)
BUN SERPL-MCNC: 25 MG/DL — HIGH (ref 10–20)
CALCIUM SERPL-MCNC: 8.8 MG/DL — SIGNIFICANT CHANGE UP (ref 8.4–10.5)
CHLORIDE SERPL-SCNC: 106 MMOL/L — SIGNIFICANT CHANGE UP (ref 98–110)
CO2 SERPL-SCNC: 22 MMOL/L — SIGNIFICANT CHANGE UP (ref 17–32)
CREAT SERPL-MCNC: 0.8 MG/DL — SIGNIFICANT CHANGE UP (ref 0.7–1.5)
CULTURE RESULTS: SIGNIFICANT CHANGE UP
EGFR: 81 ML/MIN/1.73M2 — SIGNIFICANT CHANGE UP
GLUCOSE SERPL-MCNC: 109 MG/DL — HIGH (ref 70–99)
HCT VFR BLD CALC: 31.7 % — LOW (ref 37–47)
HGB BLD-MCNC: 10.1 G/DL — LOW (ref 12–16)
MAGNESIUM SERPL-MCNC: 2.2 MG/DL — SIGNIFICANT CHANGE UP (ref 1.8–2.4)
MCHC RBC-ENTMCNC: 28.1 PG — SIGNIFICANT CHANGE UP (ref 27–31)
MCHC RBC-ENTMCNC: 31.9 G/DL — LOW (ref 32–37)
MCV RBC AUTO: 88.1 FL — SIGNIFICANT CHANGE UP (ref 81–99)
NRBC # BLD: 0 /100 WBCS — SIGNIFICANT CHANGE UP (ref 0–0)
PLATELET # BLD AUTO: 516 K/UL — HIGH (ref 130–400)
PMV BLD: 9.3 FL — SIGNIFICANT CHANGE UP (ref 7.4–10.4)
POTASSIUM SERPL-MCNC: 4.6 MMOL/L — SIGNIFICANT CHANGE UP (ref 3.5–5)
POTASSIUM SERPL-SCNC: 4.6 MMOL/L — SIGNIFICANT CHANGE UP (ref 3.5–5)
RBC # BLD: 3.6 M/UL — LOW (ref 4.2–5.4)
RBC # FLD: 14.6 % — HIGH (ref 11.5–14.5)
SODIUM SERPL-SCNC: 141 MMOL/L — SIGNIFICANT CHANGE UP (ref 135–146)
SPECIMEN SOURCE: SIGNIFICANT CHANGE UP
VANCOMYCIN FLD-MCNC: 16 UG/ML — HIGH (ref 5–10)
WBC # BLD: 14.57 K/UL — HIGH (ref 4.8–10.8)
WBC # FLD AUTO: 14.57 K/UL — HIGH (ref 4.8–10.8)

## 2023-04-16 PROCEDURE — 99232 SBSQ HOSP IP/OBS MODERATE 35: CPT

## 2023-04-16 RX ORDER — MORPHINE SULFATE 50 MG/1
4 CAPSULE, EXTENDED RELEASE ORAL EVERY 6 HOURS
Refills: 0 | Status: DISCONTINUED | OUTPATIENT
Start: 2023-04-16 | End: 2023-04-18

## 2023-04-16 RX ADMIN — GABAPENTIN 100 MILLIGRAM(S): 400 CAPSULE ORAL at 13:14

## 2023-04-16 RX ADMIN — Medication 5 MILLIGRAM(S): at 05:15

## 2023-04-16 RX ADMIN — LEVETIRACETAM 400 MILLIGRAM(S): 250 TABLET, FILM COATED ORAL at 05:16

## 2023-04-16 RX ADMIN — Medication 1 APPLICATION(S): at 17:26

## 2023-04-16 RX ADMIN — MIDODRINE HYDROCHLORIDE 10 MILLIGRAM(S): 2.5 TABLET ORAL at 17:29

## 2023-04-16 RX ADMIN — Medication 50 MILLIGRAM(S): at 21:37

## 2023-04-16 RX ADMIN — ENOXAPARIN SODIUM 40 MILLIGRAM(S): 100 INJECTION SUBCUTANEOUS at 11:39

## 2023-04-16 RX ADMIN — CEFEPIME 100 MILLIGRAM(S): 1 INJECTION, POWDER, FOR SOLUTION INTRAMUSCULAR; INTRAVENOUS at 17:25

## 2023-04-16 RX ADMIN — Medication 1 TABLET(S): at 11:39

## 2023-04-16 RX ADMIN — ATORVASTATIN CALCIUM 40 MILLIGRAM(S): 80 TABLET, FILM COATED ORAL at 21:38

## 2023-04-16 RX ADMIN — MIDODRINE HYDROCHLORIDE 10 MILLIGRAM(S): 2.5 TABLET ORAL at 05:15

## 2023-04-16 RX ADMIN — MORPHINE SULFATE 4 MILLIGRAM(S): 50 CAPSULE, EXTENDED RELEASE ORAL at 11:56

## 2023-04-16 RX ADMIN — Medication 500 MILLIGRAM(S): at 11:40

## 2023-04-16 RX ADMIN — Medication 1 APPLICATION(S): at 05:17

## 2023-04-16 RX ADMIN — MIDODRINE HYDROCHLORIDE 10 MILLIGRAM(S): 2.5 TABLET ORAL at 11:39

## 2023-04-16 RX ADMIN — LEVETIRACETAM 400 MILLIGRAM(S): 250 TABLET, FILM COATED ORAL at 17:25

## 2023-04-16 RX ADMIN — CEFEPIME 100 MILLIGRAM(S): 1 INJECTION, POWDER, FOR SOLUTION INTRAMUSCULAR; INTRAVENOUS at 05:16

## 2023-04-16 RX ADMIN — Medication 50 MILLIGRAM(S): at 05:16

## 2023-04-16 RX ADMIN — Medication 100 MILLIGRAM(S): at 13:13

## 2023-04-16 RX ADMIN — Medication 50 MILLIGRAM(S): at 13:13

## 2023-04-16 RX ADMIN — MORPHINE SULFATE 4 MILLIGRAM(S): 50 CAPSULE, EXTENDED RELEASE ORAL at 12:25

## 2023-04-16 RX ADMIN — Medication 100 MILLIGRAM(S): at 05:16

## 2023-04-16 RX ADMIN — Medication 1 APPLICATION(S): at 17:25

## 2023-04-16 RX ADMIN — GABAPENTIN 100 MILLIGRAM(S): 400 CAPSULE ORAL at 05:15

## 2023-04-16 RX ADMIN — Medication 1 APPLICATION(S): at 17:27

## 2023-04-16 RX ADMIN — GABAPENTIN 100 MILLIGRAM(S): 400 CAPSULE ORAL at 21:38

## 2023-04-16 RX ADMIN — Medication 100 MILLIGRAM(S): at 21:37

## 2023-04-16 RX ADMIN — Medication 5 MILLIGRAM(S): at 17:25

## 2023-04-16 RX ADMIN — Medication 81 MILLIGRAM(S): at 11:40

## 2023-04-16 NOTE — PROGRESS NOTE ADULT - ATTENDING COMMENTS
14-Apr-2023 18:38 seen 5/17  b/l heels are supple. no pressure ulcers on the feet observed   recommend off-loading heel boots (ie, EZ boots) on d/c  can follow up as outpatient for period foot eval seen 4/17  b/l heels are supple. no pressure ulcers on the feet observed   recommend off-loading heel boots (ie, EZ boots) on d/c  can follow up as outpatient for period foot eval seen 4/17  b/l heels are supple. no pressure ulcers on the feet observed   recommend off-loading heel boots (ie, EZ boots) on d/c  CT 3/11: Findings suggest an acute minimally displaced avulsion fracture at the medial malleolus.  If patient is ambulatory, then LLE protective WB in CAM boot for 4 weeks

## 2023-04-16 NOTE — PROGRESS NOTE ADULT - SUBJECTIVE AND OBJECTIVE BOX
JOSEPH OBRIEN  [facility] (Back) 019 A ([facility]      Patient was evaluated and examined  by bedside, remains afebrile , tolerating feedings with assistance       REVIEW OF SYSTEMS: unable to obtain, patient is confused       T(C): , Max: 37.3 (04-16-23 @ 11:37)  HR: 98 (04-16-23 @ 11:37)  BP: 115/75 (04-16-23 @ 11:37)  RR: 18 (04-16-23 @ 11:37)  SpO2: 99% (04-16-23 @ 11:37)  CAPILLARY BLOOD GLUCOSE          PHYSICAL EXAM:  General: NAD, Awake, patient is laying comfortably in bed  HEENT: AT, NC, Supple, NO JVD, NO CB  Lungs: good breath sounds  B/L, no wheezing, no rhonchi  CVS: normal S1, S2, RRR, NO M/G/R  Abdomen: soft, bowel sounds present, non-tender, non-distended  Extremities: no edema, no clubbing, no cyanosis, positive peripheral pulses b/l  Neuro: expressive aphasia with dysarthria, left sided weakness, left foot drop, chronic bed-confinement status  Skin: unstageble sacral decubiti        LAB  CBC  Date: 04-16-23 @ 06:44  Mean cell Zhqkljzlti23.1  Mean cell Hemoglobin Conc31.9  Mean cell Volum 88.1  Platelet count-Automate 516  RBC Count 3.60  Red Cell Distrib Width14.6  WBC Count14.57  % Albumin, Urine--  Hematocrit 31.7  Hemoglobin 10.1  CBC  Date: 04-15-23 @ 08:34  Mean cell Otppkiflak35.8  Mean cell Hemoglobin Conc32.3  Mean cell Volum 89.1  Platelet count-Automate 500  RBC Count 3.68  Red Cell Distrib Width14.6  WBC Count17.49  % Albumin, Urine--  Hematocrit 32.8  Hemoglobin 10.6  CBC  Date: 04-14-23 @ 18:23  Mean cell Oucmtvyufr11.2  Mean cell Hemoglobin Conc33.1  Mean cell Volum 88.1  Platelet count-Automate 531  RBC Count 3.12  Red Cell Distrib Width14.6  WBC Count15.34  % Albumin, Urine--  Hematocrit 27.5  Hemoglobin 9.1  CBC  Date: 04-14-23 @ 07:32  Mean cell Ugrspuujjp83.9  Mean cell Hemoglobin Conc33.0  Mean cell Volum 87.8  Platelet count-Automate 575  RBC Count 3.11  Red Cell Distrib Width14.3  WBC Count19.23  % Albumin, Urine--  Hematocrit 27.3  Hemoglobin 9.0  CBC  Date: 04-13-23 @ 22:47  Mean cell Obtcgsclhd58.8  Mean cell Hemoglobin Conc31.2  Mean cell Volum 89.0  Platelet count-Automate 505  RBC Count 2.45  Red Cell Distrib Width14.6  WBC Count20.94  % Albumin, Urine--  Hematocrit 21.8  Hemoglobin 6.8  CBC  Date: 04-13-23 @ 13:00  Mean cell Kqjidhkadu38.9  Mean cell Hemoglobin Conc31.6  Mean cell Volum 88.3  Platelet count-Automate 614  RBC Count 2.40  Red Cell Distrib Width14.6  WBC Count19.56  % Albumin, Urine--  Hematocrit 21.2  Hemoglobin 6.7        BMP  04-16-23 @ 06:44  Blood Gas Arterial-Calcium,Ionized--  Blood Urea Nitrogen, Serum 25 mg/dL<H> [10 - 20]  Carbon Dioxide, Serum22 mmol/L [17 - 32]  Chloride, Ducvx897 mmol/L [98 - 110]  Creatinie, Serum0.8 mg/dL [0.7 - 1.5]  Glucose, Mxfun367 mg/dL<H> [70 - 99]  Potassium, Serum4.6 mmol/L [3.5 - 5.0]  Sodium, Serum 141 mmol/L [135 - 146]  BMP  04-15-23 @ 08:34  Blood Gas Arterial-Calcium,Ionized--  Blood Urea Nitrogen, Serum 21 mg/dL<H> [10 - 20]  Carbon Dioxide, Serum21 mmol/L [17 - 32]  Chloride, Vttsw123 mmol/L [98 - 110]  Creatinie, Serum0.7 mg/dL [0.7 - 1.5]  Glucose, Serum79 mg/dL [70 - 99]  Potassium, Serum4.5 mmol/L [3.5 - 5.0]  Sodium, Serum 137 mmol/L [135 - 146]          Microbiology:    Culture - Blood (collected 04-12-23 @ 12:06)  Source: .Blood None  Preliminary Report (04-13-23 @ 22:02):    No growth to date.    Culture - Tissue with Gram Stain (collected 04-04-23 @ 08:40)  Source: .Tissue sacrum  Gram Stain (04-04-23 @ 21:29):    No polymorphonuclear leukocytes per low power field    Numerous Gram Positive Cocci in Pairs and Chains per oil power field    Numerous Gram Negative Rods per oil power field  Final Report (04-06-23 @ 19:33):    Culture yields >4 types of aerobic and/or anaerobic bacteria    Call client services within 7 days if further workup is clinically    indicated.    Culture includes    Numerous Proteus mirabilis    Numerous Morganella morganii    Moderate Enterococcus raffinosus    Few Staphylococcus aureus  Organism: Proteus mirabilis  Morganella morganii  Enterococcus raffinosus  Staphylococcus aureus (04-06-23 @ 19:33)  Organism: Staphylococcus aureus (04-06-23 @ 19:33)      Method Type: JULIANA      -  Ampicillin/Sulbactam: S <=8/4      -  Cefazolin: S <=4      -  Clindamycin: S <=0.25      -  Erythromycin: S <=0.25      -  Gentamicin: S <=1 Should not be used as monotherapy      -  Oxacillin: S <=0.25 Oxacillin predicts susceptibility for dicloxacillin, methicillin, and nafcillin      -  Penicillin: R 0.25      -  Rifampin: S <=1 Should not be used as monotherapy      -  Tetracycline: S <=1      -  Trimethoprim/Sulfamethoxazole: S <=0.5/9.5      -  Vancomycin: S 1  Organism: Enterococcus raffinosus (04-06-23 @ 19:33)      Method Type: JULIANA      -  Ampicillin: R >8 Predicts results to ampicillin/sulbactam, amoxacillin-clavulanate and  piperacillin-tazobactam.      -  Tetracycline: R >8      -  Vancomycin: S 1  Organism: Morganella morganii (04-06-23 @ 19:33)      Method Type: JULIANA      -  Amikacin: S <=16      -  Amoxicillin/Clavulanic Acid: R >16/8      -  Ampicillin: R >16 These ampicillin results predict results for amoxicillin      -  Ampicillin/Sulbactam: I 16/8 Enterobacter, Klebsiella aerogenes, Citrobacter, and Serratia may develop resistance during prolonged therapy (3-4 days)      -  Aztreonam: S <=4      -  Cefazolin: R >16 Enterobacter, Klebsiella aerogenes, Citrobacter, and Serratia may develop resistance during prolonged therapy (3-4 days)      -  Cefepime: S <=2      -  Cefoxitin: S <=8      -  Ceftriaxone: S <=1 Enterobacter, Klebsiella aerogenes, Citrobacter, and Serratia may develop resistance during prolonged therapy      -  Ciprofloxacin: S <=0.25      -  Ertapenem: S <=0.5      -  Gentamicin: S <=2      -  Levofloxacin: S <=0.5      -  Meropenem: S <=1      -  Piperacillin/Tazobactam: S <=8      -  Tobramycin: S <=2      -  Trimethoprim/Sulfamethoxazole: S <=0.5/9.5  Organism: Proteus mirabilis (04-06-23 @ 19:33)      Method Type: JULIANA      -  Amikacin: S <=16      -  Amoxicillin/Clavulanic Acid: S <=8/4      -  Ampicillin: S <=8 These ampicillin results predict results for amoxicillin      -  Ampicillin/Sulbactam: S <=4/2 Enterobacter, Klebsiella aerogenes, Citrobacter, and Serratia may develop resistance during prolonged therapy (3-4 days)      -  Aztreonam: S <=4      -  Cefazolin: S <=2 Enterobacter, Klebsiella aerogenes, Citrobacter, and Serratia may develop resistance during prolonged therapy (3-4 days)      -  Cefepime: S <=2      -  Cefoxitin: S <=8      -  Ceftriaxone: S <=1 Enterobacter, Klebsiella aerogenes, Citrobacter, and Serratia may develop resistance during prolonged therapy      -  Ciprofloxacin: S <=0.25      -  Ertapenem: S <=0.5      -  Gentamicin: S <=2      -  Levofloxacin: S <=0.5      -  Meropenem: S <=1      -  Piperacillin/Tazobactam: S <=8      -  Tobramycin: S <=2      -  Trimethoprim/Sulfamethoxazole: S <=0.5/9.5    Culture - Blood (collected 04-04-23 @ 06:35)  Source: .Blood Blood  Final Report (04-09-23 @ 14:00):    No Growth Final    Culture - Blood (collected 03-28-23 @ 07:32)  Source: .Blood None  Final Report (04-02-23 @ 18:00):    No Growth Final    Culture - Blood (collected 03-27-23 @ 10:39)  Source: .Blood Blood-Peripheral  Final Report (04-01-23 @ 18:00):    No Growth Final    Culture - Urine (collected 03-21-23 @ 11:57)  Source: Catheterized Catheterized  Final Report (03-22-23 @ 17:54):    No growth    Culture - Blood (collected 03-20-23 @ 22:07)  Source: .Blood Blood  Final Report (03-26-23 @ 02:00):    No Growth Final        Medications:  acetaminophen     Tablet .. 650 milliGRAM(s) Oral every 6 hours PRN  acetaminophen  Suppository .. 325 milliGRAM(s) Rectal once  acetaminophen  Suppository .. 325 milliGRAM(s) Rectal once PRN  aluminum hydroxide/magnesium hydroxide/simethicone Suspension 30 milliLiter(s) Oral every 6 hours PRN  ascorbic acid 500 milliGRAM(s) Oral daily  aspirin  chewable 81 milliGRAM(s) Oral daily  atorvastatin 40 milliGRAM(s) Oral at bedtime  baclofen 5 milliGRAM(s) Oral every 12 hours  cefepime   IVPB 2000 milliGRAM(s) IV Intermittent every 12 hours  cefepime   IVPB      collagenase Ointment 1 Application(s) Topical two times a day  Dakins Solution - 1/2 Strength 1 Application(s) Topical two times a day  enoxaparin Injectable 40 milliGRAM(s) SubCutaneous every 24 hours  gabapentin 100 milliGRAM(s) Oral three times a day  levETIRAcetam  IVPB 1500 milliGRAM(s) IV Intermittent every 12 hours  metroNIDAZOLE  IVPB 500 milliGRAM(s) IV Intermittent every 8 hours  midodrine. 10 milliGRAM(s) Oral three times a day  morphine  - Injectable 4 milliGRAM(s) IV Push every 6 hours PRN  multivitamin/minerals 1 Tablet(s) Oral daily  valproate sodium  IVPB 500 milliGRAM(s) IV Intermittent three times a day  vitamin A &amp; D Ointment 1 Application(s) Topical two times a day        Assessment and Plan:  66 y/o woman with PMH of Right MCA stroke with left sided weakness was brought to the ED via EMS. In the ED, she had altered mental status and sepsis.    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 No acute intra-abdominal pathology. Unchanged parapelvic cysts bilaterally with a 4 mm stone in the left mid ureter without evidence of obstruction.  - CXR- no new infiltrates , procal level 0.14, pending u/a, urine cxs - for completion of infectious work up., f/up repeated blood cxs, daily CBC monitoring  - as per ID -  c/w Cefepime/flagyl/ Vanco(on hold due to elevated blood levels  ).   -4/16- vanco random level 16, as per ID hold vanco till level less than 15  - f/up vanco random level on 4/17    #Seizures  #Acute R Frontal Lobe Infarct likely 2/ hypotension   #Chronic R MCA infarct- chronic bed confinement status, dysarthria- answers on/off with 1 word yes, no.    - EEG (3/8) and video EEG (3/9): Focal slowing  - MRI Brain (3/15): new subcortical infarct in the right frontal lobe. Re-demonstrated chronic infarct in the right MCA territory.  - MRI of C spine (3/16): Significantly motion limited study. No gross cord compression or spinal cord edema demonstrated. Multilevel small disc bulges.  - Neuro eval'd 3/8-3/20: For L gaze preference/stroke, OP med review: on low dose Lamictal and Depakote in addition to Keppra (possible mood disorder/ seizures), Her MRI of brain showing subcortical infarct within same M1 territory. The cause of stroke is most likely hypotension as she has a diminutive Rt M1. However, MRI findings doesn't explain her B/L upper extremity spasticity. So, it is important to rule out cervical cord pathology. DAPT for 21 days followed by ASA 81 mg daily, Increased keppra post 3/20 possible seizure event  - SS re-juliette 3/21: Minced and moist, 1:1 feeds, thin liquids through straw  - C/w Depakote 500mg TID and Keppra to 1500mg BID   - C/w baclofen 5mg bid (for UE spasticity)  - C/w ASA, Plavix (3/17-4/6 - completed), and high dose statin for new stroke - DAPT for 21 days followed by ASA 81 mg daily  - - OP stroke clinic in 4 weeks after d/c per neurology    #Persistent Hypotension - stable b/p  with Midodrine 10mg TID      #Left avulsion fracture of medial malleolus  - Duplex BL LE (3/7): (-) for DVT  - CT Foot (3/11): acute minimally displaced avulsion fracture at the medial malleolus  - Podiatry following for foot pain: WBAT w/ surgical shoe, ankle brace, PT  - fall precautions  - pain control regimen  - C/w gabapentin at 100mg TID (Lowered dose from home, increase as pt tolerates)  -  Podiatry f/up, removed left foot  immobilizer     #Left cephalic vein thrombosis  - Duplex LUE (3/10): No DVT however there is superficial thrombosis noted in the left cephalic vein  - s/p warm compress  - Repeat duplex ordered 3/27 - negative for UE and LE DVT    #Anemia - normocytic, hg dropped on 4/13, s/p 1 unit PRBC, current HG stable   - possibly due to frequent phlebotomy, no signs of active bleed - monitor       DVT ppx - lovenox    - Diet: Soft and bite sized (SS 3/7)  - full code, overall guarded prognosis    #Progress Note Handoff: continue IV abx, hold IV vancomycin tx, f/up vanco level in am   Family discussion: yes, medical team Disposition: SNF once medically stable    Total time spent to complete patient's bedside assessment, review medical chart, discuss medical plan of care with covering medical team was more than 35 minutes with >50% of time spent face to face with patient, discussion with patient/family and/or coordination of care .      JOSEPH OBRIEN  [facility] (Back) 019 A ([facility]      Patient was evaluated and examined  by bedside, remains afebrile , tolerating feedings with assistance       REVIEW OF SYSTEMS: unable to obtain, patient is confused       T(C): , Max: 37.3 (04-16-23 @ 11:37)  HR: 98 (04-16-23 @ 11:37)  BP: 115/75 (04-16-23 @ 11:37)  RR: 18 (04-16-23 @ 11:37)  SpO2: 99% (04-16-23 @ 11:37)  CAPILLARY BLOOD GLUCOSE          PHYSICAL EXAM:  General: NAD, Awake, patient is laying comfortably in bed  HEENT: AT, NC, Supple, NO JVD, NO CB  Lungs: good breath sounds  B/L, no wheezing, no rhonchi  CVS: normal S1, S2, RRR, NO M/G/R  Abdomen: soft, bowel sounds present, non-tender, non-distended  Extremities: no edema, no clubbing, no cyanosis, positive peripheral pulses b/l  Neuro: expressive aphasia with dysarthria, left sided weakness, left foot drop, chronic bed-confinement status  Skin: unstageble sacral decubiti        LAB  CBC  Date: 04-16-23 @ 06:44  Mean cell Kypotzlwob33.1  Mean cell Hemoglobin Conc31.9  Mean cell Volum 88.1  Platelet count-Automate 516  RBC Count 3.60  Red Cell Distrib Width14.6  WBC Count14.57  % Albumin, Urine--  Hematocrit 31.7  Hemoglobin 10.1  CBC  Date: 04-15-23 @ 08:34  Mean cell Wahzciysvn62.8  Mean cell Hemoglobin Conc32.3  Mean cell Volum 89.1  Platelet count-Automate 500  RBC Count 3.68  Red Cell Distrib Width14.6  WBC Count17.49  % Albumin, Urine--  Hematocrit 32.8  Hemoglobin 10.6  CBC  Date: 04-14-23 @ 18:23  Mean cell Rgwbicuddd98.2  Mean cell Hemoglobin Conc33.1  Mean cell Volum 88.1  Platelet count-Automate 531  RBC Count 3.12  Red Cell Distrib Width14.6  WBC Count15.34  % Albumin, Urine--  Hematocrit 27.5  Hemoglobin 9.1  CBC  Date: 04-14-23 @ 07:32  Mean cell Hnifefyiyt57.9  Mean cell Hemoglobin Conc33.0  Mean cell Volum 87.8  Platelet count-Automate 575  RBC Count 3.11  Red Cell Distrib Width14.3  WBC Count19.23  % Albumin, Urine--  Hematocrit 27.3  Hemoglobin 9.0  CBC  Date: 04-13-23 @ 22:47  Mean cell Bmcycwelvi32.8  Mean cell Hemoglobin Conc31.2  Mean cell Volum 89.0  Platelet count-Automate 505  RBC Count 2.45  Red Cell Distrib Width14.6  WBC Count20.94  % Albumin, Urine--  Hematocrit 21.8  Hemoglobin 6.8  CBC  Date: 04-13-23 @ 13:00  Mean cell Hvgpdtqwnt18.9  Mean cell Hemoglobin Conc31.6  Mean cell Volum 88.3  Platelet count-Automate 614  RBC Count 2.40  Red Cell Distrib Width14.6  WBC Count19.56  % Albumin, Urine--  Hematocrit 21.2  Hemoglobin 6.7        BMP  04-16-23 @ 06:44  Blood Gas Arterial-Calcium,Ionized--  Blood Urea Nitrogen, Serum 25 mg/dL<H> [10 - 20]  Carbon Dioxide, Serum22 mmol/L [17 - 32]  Chloride, Qqqiy994 mmol/L [98 - 110]  Creatinie, Serum0.8 mg/dL [0.7 - 1.5]  Glucose, Hdiwf995 mg/dL<H> [70 - 99]  Potassium, Serum4.6 mmol/L [3.5 - 5.0]  Sodium, Serum 141 mmol/L [135 - 146]  BMP  04-15-23 @ 08:34  Blood Gas Arterial-Calcium,Ionized--  Blood Urea Nitrogen, Serum 21 mg/dL<H> [10 - 20]  Carbon Dioxide, Serum21 mmol/L [17 - 32]  Chloride, Xwzyi359 mmol/L [98 - 110]  Creatinie, Serum0.7 mg/dL [0.7 - 1.5]  Glucose, Serum79 mg/dL [70 - 99]  Potassium, Serum4.5 mmol/L [3.5 - 5.0]  Sodium, Serum 137 mmol/L [135 - 146]          Microbiology:    Culture - Blood (collected 04-12-23 @ 12:06)  Source: .Blood None  Preliminary Report (04-13-23 @ 22:02):    No growth to date.    Culture - Tissue with Gram Stain (collected 04-04-23 @ 08:40)  Source: .Tissue sacrum  Gram Stain (04-04-23 @ 21:29):    No polymorphonuclear leukocytes per low power field    Numerous Gram Positive Cocci in Pairs and Chains per oil power field    Numerous Gram Negative Rods per oil power field  Final Report (04-06-23 @ 19:33):    Culture yields >4 types of aerobic and/or anaerobic bacteria    Call client services within 7 days if further workup is clinically    indicated.    Culture includes    Numerous Proteus mirabilis    Numerous Morganella morganii    Moderate Enterococcus raffinosus    Few Staphylococcus aureus  Organism: Proteus mirabilis  Morganella morganii  Enterococcus raffinosus  Staphylococcus aureus (04-06-23 @ 19:33)  Organism: Staphylococcus aureus (04-06-23 @ 19:33)      Method Type: JULIANA      -  Ampicillin/Sulbactam: S <=8/4      -  Cefazolin: S <=4      -  Clindamycin: S <=0.25      -  Erythromycin: S <=0.25      -  Gentamicin: S <=1 Should not be used as monotherapy      -  Oxacillin: S <=0.25 Oxacillin predicts susceptibility for dicloxacillin, methicillin, and nafcillin      -  Penicillin: R 0.25      -  Rifampin: S <=1 Should not be used as monotherapy      -  Tetracycline: S <=1      -  Trimethoprim/Sulfamethoxazole: S <=0.5/9.5      -  Vancomycin: S 1  Organism: Enterococcus raffinosus (04-06-23 @ 19:33)      Method Type: JULIANA      -  Ampicillin: R >8 Predicts results to ampicillin/sulbactam, amoxacillin-clavulanate and  piperacillin-tazobactam.      -  Tetracycline: R >8      -  Vancomycin: S 1  Organism: Morganella morganii (04-06-23 @ 19:33)      Method Type: JULIANA      -  Amikacin: S <=16      -  Amoxicillin/Clavulanic Acid: R >16/8      -  Ampicillin: R >16 These ampicillin results predict results for amoxicillin      -  Ampicillin/Sulbactam: I 16/8 Enterobacter, Klebsiella aerogenes, Citrobacter, and Serratia may develop resistance during prolonged therapy (3-4 days)      -  Aztreonam: S <=4      -  Cefazolin: R >16 Enterobacter, Klebsiella aerogenes, Citrobacter, and Serratia may develop resistance during prolonged therapy (3-4 days)      -  Cefepime: S <=2      -  Cefoxitin: S <=8      -  Ceftriaxone: S <=1 Enterobacter, Klebsiella aerogenes, Citrobacter, and Serratia may develop resistance during prolonged therapy      -  Ciprofloxacin: S <=0.25      -  Ertapenem: S <=0.5      -  Gentamicin: S <=2      -  Levofloxacin: S <=0.5      -  Meropenem: S <=1      -  Piperacillin/Tazobactam: S <=8      -  Tobramycin: S <=2      -  Trimethoprim/Sulfamethoxazole: S <=0.5/9.5  Organism: Proteus mirabilis (04-06-23 @ 19:33)      Method Type: JULIANA      -  Amikacin: S <=16      -  Amoxicillin/Clavulanic Acid: S <=8/4      -  Ampicillin: S <=8 These ampicillin results predict results for amoxicillin      -  Ampicillin/Sulbactam: S <=4/2 Enterobacter, Klebsiella aerogenes, Citrobacter, and Serratia may develop resistance during prolonged therapy (3-4 days)      -  Aztreonam: S <=4      -  Cefazolin: S <=2 Enterobacter, Klebsiella aerogenes, Citrobacter, and Serratia may develop resistance during prolonged therapy (3-4 days)      -  Cefepime: S <=2      -  Cefoxitin: S <=8      -  Ceftriaxone: S <=1 Enterobacter, Klebsiella aerogenes, Citrobacter, and Serratia may develop resistance during prolonged therapy      -  Ciprofloxacin: S <=0.25      -  Ertapenem: S <=0.5      -  Gentamicin: S <=2      -  Levofloxacin: S <=0.5      -  Meropenem: S <=1      -  Piperacillin/Tazobactam: S <=8      -  Tobramycin: S <=2      -  Trimethoprim/Sulfamethoxazole: S <=0.5/9.5    Culture - Blood (collected 04-04-23 @ 06:35)  Source: .Blood Blood  Final Report (04-09-23 @ 14:00):    No Growth Final    Culture - Blood (collected 03-28-23 @ 07:32)  Source: .Blood None  Final Report (04-02-23 @ 18:00):    No Growth Final    Culture - Blood (collected 03-27-23 @ 10:39)  Source: .Blood Blood-Peripheral  Final Report (04-01-23 @ 18:00):    No Growth Final    Culture - Urine (collected 03-21-23 @ 11:57)  Source: Catheterized Catheterized  Final Report (03-22-23 @ 17:54):    No growth    Culture - Blood (collected 03-20-23 @ 22:07)  Source: .Blood Blood  Final Report (03-26-23 @ 02:00):    No Growth Final        Medications:  acetaminophen     Tablet .. 650 milliGRAM(s) Oral every 6 hours PRN  acetaminophen  Suppository .. 325 milliGRAM(s) Rectal once  acetaminophen  Suppository .. 325 milliGRAM(s) Rectal once PRN  aluminum hydroxide/magnesium hydroxide/simethicone Suspension 30 milliLiter(s) Oral every 6 hours PRN  ascorbic acid 500 milliGRAM(s) Oral daily  aspirin  chewable 81 milliGRAM(s) Oral daily  atorvastatin 40 milliGRAM(s) Oral at bedtime  baclofen 5 milliGRAM(s) Oral every 12 hours  cefepime   IVPB 2000 milliGRAM(s) IV Intermittent every 12 hours  cefepime   IVPB      collagenase Ointment 1 Application(s) Topical two times a day  Dakins Solution - 1/2 Strength 1 Application(s) Topical two times a day  enoxaparin Injectable 40 milliGRAM(s) SubCutaneous every 24 hours  gabapentin 100 milliGRAM(s) Oral three times a day  levETIRAcetam  IVPB 1500 milliGRAM(s) IV Intermittent every 12 hours  metroNIDAZOLE  IVPB 500 milliGRAM(s) IV Intermittent every 8 hours  midodrine. 10 milliGRAM(s) Oral three times a day  morphine  - Injectable 4 milliGRAM(s) IV Push every 6 hours PRN  multivitamin/minerals 1 Tablet(s) Oral daily  valproate sodium  IVPB 500 milliGRAM(s) IV Intermittent three times a day  vitamin A &amp; D Ointment 1 Application(s) Topical two times a day        Assessment and Plan:  68 y/o woman with PMH of Right MCA stroke with left sided weakness was brought to the ED via EMS. In the ED, she had altered mental status and sepsis.    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 No acute intra-abdominal pathology. Unchanged parapelvic cysts bilaterally with a 4 mm stone in the left mid ureter without evidence of obstruction.  - CXR- no new infiltrates , procal level 0.14, pending u/a, urine cxs - for completion of infectious work up., f/up repeated blood cxs, daily CBC monitoring  - as per ID -  c/w Cefepime/flagyl/ Vanco(on hold due to elevated blood levels  ).   -4/16- vanco random level 16, as per ID hold vanco till level less than 15  - f/up vanco random level on 4/17    #Seizures  #Acute R Frontal Lobe Infarct likely 2/ hypotension   #Chronic R MCA infarct- chronic bed confinement status, dysarthria- answers on/off with 1 word yes, no.    - EEG (3/8) and video EEG (3/9): Focal slowing  - MRI Brain (3/15): new subcortical infarct in the right frontal lobe. Re-demonstrated chronic infarct in the right MCA territory.  - MRI of C spine (3/16): Significantly motion limited study. No gross cord compression or spinal cord edema demonstrated. Multilevel small disc bulges.  - Neuro eval'd 3/8-3/20: For L gaze preference/stroke, OP med review: on low dose Lamictal and Depakote in addition to Keppra (possible mood disorder/ seizures), Her MRI of brain showing subcortical infarct within same M1 territory. The cause of stroke is most likely hypotension as she has a diminutive Rt M1. However, MRI findings doesn't explain her B/L upper extremity spasticity. So, it is important to rule out cervical cord pathology. DAPT for 21 days followed by ASA 81 mg daily, Increased keppra post 3/20 possible seizure event  - SS re-juliette 3/21: Minced and moist, 1:1 feeds, thin liquids through straw  - C/w Depakote 500mg TID and Keppra to 1500mg BID   - C/w baclofen 5mg bid (for UE spasticity)  - C/w ASA, Plavix (3/17-4/6 - completed), and high dose statin for new stroke - DAPT for 21 days followed by ASA 81 mg daily  - - OP stroke clinic in 4 weeks after d/c per neurology    #Persistent Hypotension - stable b/p  with Midodrine 10mg TID      #Left avulsion fracture of medial malleolus  - Duplex BL LE (3/7): (-) for DVT  - CT Foot (3/11): acute minimally displaced avulsion fracture at the medial malleolus  - Podiatry following for foot pain: WBAT w/ surgical shoe, ankle brace, PT  - fall precautions  - pain control regimen  - C/w gabapentin at 100mg TID (Lowered dose from home, increase as pt tolerates)  -  Podiatry f/up, removed left foot  immobilizer     #Left cephalic vein thrombosis  - Duplex LUE (3/10): No DVT however there is superficial thrombosis noted in the left cephalic vein  - s/p warm compress  - Repeat duplex ordered 3/27 - negative for UE and LE DVT    #Anemia - normocytic, hg dropped on 4/13, s/p 1 unit PRBC, current HG stable   - possibly due to frequent phlebotomy, no signs of active bleed - monitor       DVT ppx - lovenox    - Diet: Soft and bite sized (SS 3/7)  - DNR/DNI, overall guarded prognosis    #Progress Note Handoff: continue IV abx, hold IV vancomycin tx, f/up vanco level in am   Family discussion: yes, medical team Disposition: SNF once medically stable    Total time spent to complete patient's bedside assessment, review medical chart, discuss medical plan of care with covering medical team was more than 35 minutes with >50% of time spent face to face with patient, discussion with patient/family and/or coordination of care .

## 2023-04-17 LAB
ANION GAP SERPL CALC-SCNC: 12 MMOL/L — SIGNIFICANT CHANGE UP (ref 7–14)
BUN SERPL-MCNC: 23 MG/DL — HIGH (ref 10–20)
CALCIUM SERPL-MCNC: 8.8 MG/DL — SIGNIFICANT CHANGE UP (ref 8.4–10.4)
CHLORIDE SERPL-SCNC: 110 MMOL/L — SIGNIFICANT CHANGE UP (ref 98–110)
CO2 SERPL-SCNC: 19 MMOL/L — SIGNIFICANT CHANGE UP (ref 17–32)
CREAT SERPL-MCNC: 0.8 MG/DL — SIGNIFICANT CHANGE UP (ref 0.7–1.5)
CULTURE RESULTS: SIGNIFICANT CHANGE UP
EGFR: 81 ML/MIN/1.73M2 — SIGNIFICANT CHANGE UP
GLUCOSE SERPL-MCNC: 86 MG/DL — SIGNIFICANT CHANGE UP (ref 70–99)
HCT VFR BLD CALC: 32.4 % — LOW (ref 37–47)
HGB BLD-MCNC: 10.3 G/DL — LOW (ref 12–16)
MAGNESIUM SERPL-MCNC: 2.2 MG/DL — SIGNIFICANT CHANGE UP (ref 1.8–2.4)
MCHC RBC-ENTMCNC: 28.8 PG — SIGNIFICANT CHANGE UP (ref 27–31)
MCHC RBC-ENTMCNC: 31.8 G/DL — LOW (ref 32–37)
MCV RBC AUTO: 90.5 FL — SIGNIFICANT CHANGE UP (ref 81–99)
NRBC # BLD: 0 /100 WBCS — SIGNIFICANT CHANGE UP (ref 0–0)
PLATELET # BLD AUTO: 444 K/UL — HIGH (ref 130–400)
PMV BLD: 9.2 FL — SIGNIFICANT CHANGE UP (ref 7.4–10.4)
POTASSIUM SERPL-MCNC: 4.8 MMOL/L — SIGNIFICANT CHANGE UP (ref 3.5–5)
POTASSIUM SERPL-SCNC: 4.8 MMOL/L — SIGNIFICANT CHANGE UP (ref 3.5–5)
RBC # BLD: 3.58 M/UL — LOW (ref 4.2–5.4)
RBC # FLD: 14.6 % — HIGH (ref 11.5–14.5)
SODIUM SERPL-SCNC: 141 MMOL/L — SIGNIFICANT CHANGE UP (ref 135–146)
SPECIMEN SOURCE: SIGNIFICANT CHANGE UP
VANCOMYCIN FLD-MCNC: 12.4 UG/ML — HIGH (ref 5–10)
WBC # BLD: 12.47 K/UL — HIGH (ref 4.8–10.8)
WBC # FLD AUTO: 12.47 K/UL — HIGH (ref 4.8–10.8)

## 2023-04-17 PROCEDURE — 99232 SBSQ HOSP IP/OBS MODERATE 35: CPT

## 2023-04-17 RX ORDER — METRONIDAZOLE 500 MG
500 TABLET ORAL EVERY 8 HOURS
Refills: 0 | Status: DISCONTINUED | OUTPATIENT
Start: 2023-04-17 | End: 2023-04-18

## 2023-04-17 RX ADMIN — LEVETIRACETAM 400 MILLIGRAM(S): 250 TABLET, FILM COATED ORAL at 05:03

## 2023-04-17 RX ADMIN — GABAPENTIN 100 MILLIGRAM(S): 400 CAPSULE ORAL at 13:05

## 2023-04-17 RX ADMIN — Medication 5 MILLIGRAM(S): at 05:01

## 2023-04-17 RX ADMIN — Medication 1 TABLET(S): at 13:07

## 2023-04-17 RX ADMIN — MIDODRINE HYDROCHLORIDE 10 MILLIGRAM(S): 2.5 TABLET ORAL at 17:11

## 2023-04-17 RX ADMIN — ATORVASTATIN CALCIUM 40 MILLIGRAM(S): 80 TABLET, FILM COATED ORAL at 21:26

## 2023-04-17 RX ADMIN — Medication 50 MILLIGRAM(S): at 13:05

## 2023-04-17 RX ADMIN — Medication 100 MILLIGRAM(S): at 05:02

## 2023-04-17 RX ADMIN — Medication 50 MILLIGRAM(S): at 05:02

## 2023-04-17 RX ADMIN — MIDODRINE HYDROCHLORIDE 10 MILLIGRAM(S): 2.5 TABLET ORAL at 05:02

## 2023-04-17 RX ADMIN — Medication 1 APPLICATION(S): at 17:11

## 2023-04-17 RX ADMIN — CEFEPIME 100 MILLIGRAM(S): 1 INJECTION, POWDER, FOR SOLUTION INTRAMUSCULAR; INTRAVENOUS at 05:03

## 2023-04-17 RX ADMIN — Medication 1 APPLICATION(S): at 05:04

## 2023-04-17 RX ADMIN — Medication 5 MILLIGRAM(S): at 17:09

## 2023-04-17 RX ADMIN — ENOXAPARIN SODIUM 40 MILLIGRAM(S): 100 INJECTION SUBCUTANEOUS at 13:07

## 2023-04-17 RX ADMIN — LEVETIRACETAM 400 MILLIGRAM(S): 250 TABLET, FILM COATED ORAL at 21:25

## 2023-04-17 RX ADMIN — Medication 500 MILLIGRAM(S): at 21:26

## 2023-04-17 RX ADMIN — GABAPENTIN 100 MILLIGRAM(S): 400 CAPSULE ORAL at 05:01

## 2023-04-17 RX ADMIN — Medication 1 APPLICATION(S): at 17:10

## 2023-04-17 RX ADMIN — Medication 81 MILLIGRAM(S): at 13:05

## 2023-04-17 RX ADMIN — GABAPENTIN 100 MILLIGRAM(S): 400 CAPSULE ORAL at 21:26

## 2023-04-17 RX ADMIN — Medication 500 MILLIGRAM(S): at 13:05

## 2023-04-17 RX ADMIN — Medication 500 MILLIGRAM(S): at 13:06

## 2023-04-17 RX ADMIN — MORPHINE SULFATE 4 MILLIGRAM(S): 50 CAPSULE, EXTENDED RELEASE ORAL at 06:55

## 2023-04-17 RX ADMIN — MORPHINE SULFATE 4 MILLIGRAM(S): 50 CAPSULE, EXTENDED RELEASE ORAL at 07:15

## 2023-04-17 RX ADMIN — Medication 100 MILLIGRAM(S): at 17:10

## 2023-04-17 RX ADMIN — Medication 1 APPLICATION(S): at 17:09

## 2023-04-17 RX ADMIN — MIDODRINE HYDROCHLORIDE 10 MILLIGRAM(S): 2.5 TABLET ORAL at 13:05

## 2023-04-17 RX ADMIN — Medication 50 MILLIGRAM(S): at 21:25
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
## 2023-04-17 NOTE — PROGRESS NOTE ADULT - SUBJECTIVE AND OBJECTIVE BOX
JOSEPH OBRIEN  67y, Female  Allergy: Allergy Status Unknown      LOS  44d    CHIEF COMPLAINT: AMS (2023 11:54)      INTERVAL EVENTS/HPI  - No acute events overnight  - T(F): , Max: 99.1 (23 @ 11:37)  - Tolerating medication  - WBC Count: 12.47 (23 @ 06:11) downtrending   WBC Count: 14.57 (23 @ 06:44)  - Creatinine, Serum: 0.8 (23 @ 06:11)  Creatinine, Serum: 0.8 (23 @ 06:44)       ROS  ***    VITALS:  T(F): 96.9, Max: 99.1 (23 @ 11:37)  HR: 99  BP: 124/78  RR: 18Vital Signs Last 24 Hrs  T(C): 36.1 (2023 06:23), Max: 37.3 (2023 11:37)  T(F): 96.9 (2023 06:23), Max: 99.1 (2023 11:37)  HR: 99 (2023 06:23) (78 - 99)  BP: 124/78 (2023 06:23) (112/59 - 124/78)  BP(mean): --  RR: 18 (2023 06:23) (18 - 18)  SpO2: 98% (2023 06:23) (98% - 99%)    Parameters below as of 2023 11:37  Patient On (Oxygen Delivery Method): room air        PHYSICAL EXAM:  ***    FH: Non-contributory  Social Hx: Non-contributory    TESTS & MEASUREMENTS:                        10.3   12.47 )-----------( 444      ( 2023 06:11 )             32.4     04-17    141  |  110  |  23<H>  ----------------------------<  86  4.8   |  19  |  0.8    Ca    8.8      2023 06:11  Mg     2.2     04-17          Urinalysis Basic - ( 15 Apr 2023 12:00 )    Color: Yellow / Appearance: Slightly Turbid / S.025 / pH: x  Gluc: x / Ketone: Small  / Bili: Negative / Urobili: <2 mg/dL   Blood: x / Protein: 100 mg/dL / Nitrite: Negative   Leuk Esterase: Large / RBC: 10 /HPF / WBC 14 /HPF   Sq Epi: x / Non Sq Epi: x / Bacteria: Few        Culture - Urine (collected 04-15-23 @ 12:00)  Source: Catheterized Catheterized  Final Report (23 @ 18:07):    <10,000 CFU/mL Normal Urogenital Evelyn    Culture - Blood (collected 23 @ 12:06)  Source: .Blood None  Preliminary Report (23 @ 22:02):    No growth to date.    Culture - Tissue with Gram Stain (collected 23 @ 08:40)  Source: .Tissue sacrum  Gram Stain (23 @ 21:29):    No polymorphonuclear leukocytes per low power field    Numerous Gram Positive Cocci in Pairs and Chains per oil power field    Numerous Gram Negative Rods per oil power field  Final Report (23 @ 19:33):    Culture yields >4 types of aerobic and/or anaerobic bacteria    Call client services within 7 days if further workup is clinically    indicated.    Culture includes    Numerous Proteus mirabilis    Numerous Morganella morganii    Moderate Enterococcus raffinosus    Few Staphylococcus aureus  Organism: Proteus mirabilis  Morganella morganii  Enterococcus raffinosus  Staphylococcus aureus (23 @ 19:33)  Organism: Staphylococcus aureus (23 @ 19:33)      Method Type: JULIANA      -  Ampicillin/Sulbactam: S <=8/4      -  Cefazolin: S <=4      -  Clindamycin: S <=0.25      -  Erythromycin: S <=0.25      -  Gentamicin: S <=1 Should not be used as monotherapy      -  Oxacillin: S <=0.25 Oxacillin predicts susceptibility for dicloxacillin, methicillin, and nafcillin      -  Penicillin: R 0.25      -  Rifampin: S <=1 Should not be used as monotherapy      -  Tetracycline: S <=1      -  Trimethoprim/Sulfamethoxazole: S <=0.5/9.5      -  Vancomycin: S 1  Organism: Enterococcus raffinosus (23 @ 19:33)      Method Type: JULIANA      -  Ampicillin: R >8 Predicts results to ampicillin/sulbactam, amoxacillin-clavulanate and  piperacillin-tazobactam.      -  Tetracycline: R >8      -  Vancomycin: S 1  Organism: Morganella morganii (23 @ 19:33)      Method Type: JULIANA      -  Amikacin: S <=16      -  Amoxicillin/Clavulanic Acid: R >16/8      -  Ampicillin: R >16 These ampicillin results predict results for amoxicillin      -  Ampicillin/Sulbactam: I 16/8 Enterobacter, Klebsiella aerogenes, Citrobacter, and Serratia may develop resistance during prolonged therapy (3-4 days)      -  Aztreonam: S <=4      -  Cefazolin: R >16 Enterobacter, Klebsiella aerogenes, Citrobacter, and Serratia may develop resistance during prolonged therapy (3-4 days)      -  Cefepime: S <=2      -  Cefoxitin: S <=8      -  Ceftriaxone: S <=1 Enterobacter, Klebsiella aerogenes, Citrobacter, and Serratia may develop resistance during prolonged therapy      -  Ciprofloxacin: S <=0.25      -  Ertapenem: S <=0.5      -  Gentamicin: S <=2      -  Levofloxacin: S <=0.5      -  Meropenem: S <=1      -  Piperacillin/Tazobactam: S <=8      -  Tobramycin: S <=2      -  Trimethoprim/Sulfamethoxazole: S <=0.5/9.5  Organism: Proteus mirabilis (23 @ 19:33)      Method Type: JLUIANA      -  Amikacin: S <=16      -  Amoxicillin/Clavulanic Acid: S <=8/4      -  Ampicillin: S <=8 These ampicillin results predict results for amoxicillin      -  Ampicillin/Sulbactam: S <=4/2 Enterobacter, Klebsiella aerogenes, Citrobacter, and Serratia may develop resistance during prolonged therapy (3-4 days)      -  Aztreonam: S <=4      -  Cefazolin: S <=2 Enterobacter, Klebsiella aerogenes, Citrobacter, and Serratia may develop resistance during prolonged therapy (3-4 days)      -  Cefepime: S <=2      -  Cefoxitin: S <=8      -  Ceftriaxone: S <=1 Enterobacter, Klebsiella aerogenes, Citrobacter, and Serratia may develop resistance during prolonged therapy      -  Ciprofloxacin: S <=0.25      -  Ertapenem: S <=0.5      -  Gentamicin: S <=2      -  Levofloxacin: S <=0.5      -  Meropenem: S <=1      -  Piperacillin/Tazobactam: S <=8      -  Tobramycin: S <=2      -  Trimethoprim/Sulfamethoxazole: S <=0.5/9.5    Culture - Blood (collected 23 @ 06:35)  Source: .Blood Blood  Final Report (23 @ 14:00):    No Growth Final    Culture - Blood (collected 23 @ 07:32)  Source: .Blood None  Final Report (23 @ 18:00):    No Growth Final    Culture - Blood (collected 23 @ 10:39)  Source: .Blood Blood-Peripheral  Final Report (23 @ 18:00):    No Growth Final    Culture - Urine (collected 23 @ 11:57)  Source: Catheterized Catheterized  Final Report (23 @ 17:54):    No growth    Culture - Blood (collected 23 @ 22:07)  Source: .Blood Blood  Final Report (23 @ 02:00):    No Growth Final            INFECTIOUS DISEASES TESTING  Vancomycin Level, Random: 12.4 (23 @ 06:11)  Vancomycin Level, Random: 16.0 (23 @ 10:35)  Vancomycin Level, Trough: 19.4 (04-15-23 @ 18:08)  Vancomycin Level, Trough: 24.6 (23 @ 18:23)  Procalcitonin, Serum: 0.14 (23 @ 18:23)  Vancomycin Level, Trough: 31.9 (23 @ 12:06)  Vancomycin Level, Trough: 19.6 (23 @ 18:35)  Vancomycin Level, Trough: 8.0 (23 @ 17:56)  Rapid RVP Result: NotDetec (23 @ 14:26)  COVID-19 PCR: NotDetec (23 @ 18:03)  Rapid RVP Result: NotDetec (23 @ 09:55)  Procalcitonin, Serum: 0.05 (23 @ 22:07)  Rapid RVP Result: NotDetec (23 @ 21:25)  COVID-19 PCR: NotDetec (23 @ 12:47)  COVID-19 PCR: NotDetec (03-15-23 @ 16:23)  strept    INFLAMMATORY MARKERS      RADIOLOGY & ADDITIONAL TESTS:  I have personally reviewed the last available Chest xray  CXR      CT      CARDIOLOGY TESTING  12 Lead ECG:   Ventricular Rate 114 BPM    Atrial Rate 114 BPM    P-R Interval 122 ms    QRS Duration 62 ms    Q-T Interval 312 ms    QTC Calculation(Bazett) 430 ms    P Axis 43 degrees    R Axis 7 degrees    T Axis 43 degrees    Diagnosis Line Sinus tachycardia  Otherwise normal ECG    Confirmed by SINA GREEN [facility] ([id number]) on 2023 8:47:49 AM (23 @ 07:28)  12 Lead ECG:   Ventricular Rate 103 BPM    Atrial Rate 103 BPM    P-R Interval 120 ms    QRS Duration 66 ms    Q-T Interval 324 ms    QTC Calculation(Bazett) 424 ms    P Axis 40 degrees    R Axis -2 degrees    T Axis 43 degrees    Diagnosis Line Sinus tachycardia  Otherwise normal ECG    Confirmed by Luly Sage MDfer ([id number]) on 2023 10:25:13 AM (23 @ 11:37)      MEDICATIONS  acetaminophen  Suppository .. 325 Rectal once  ascorbic acid 500 Oral daily  aspirin  chewable 81 Oral daily  atorvastatin 40 Oral at bedtime  baclofen 5 Oral every 12 hours  cefepime   IVPB     cefepime   IVPB 2000 IV Intermittent every 12 hours  collagenase Ointment 1 Topical two times a day  Dakins Solution - 1/2 Strength 1 Topical two times a day  enoxaparin Injectable 40 SubCutaneous every 24 hours  gabapentin 100 Oral three times a day  levETIRAcetam  IVPB 1500 IV Intermittent every 12 hours  metroNIDAZOLE  IVPB 500 IV Intermittent every 8 hours  midodrine. 10 Oral three times a day  multivitamin/minerals 1 Oral daily  valproate sodium  IVPB 500 IV Intermittent three times a day  vitamin A &amp; D Ointment 1 Topical two times a day      WEIGHT  Weight (kg): 68.3 (23 @ 10:12)  Creatinine, Serum: 0.8 mg/dL (23 @ 06:11)      ANTIBIOTICS:  cefepime   IVPB      cefepime   IVPB 2000 milliGRAM(s) IV Intermittent every 12 hours  metroNIDAZOLE  IVPB 500 milliGRAM(s) IV Intermittent every 8 hours      All available historical records have been reviewed       CAMILLE, JOSEPH  67y, Female  Allergy: Allergy Status Unknown      LOS  44d    CHIEF COMPLAINT: AMS (2023 11:54)      INTERVAL EVENTS/HPI  - No acute events overnight  - T(F): , Max: 99.1 (23 @ 11:37)  - Tolerating medication  - WBC Count: 12.47 (23 @ 06:11) downtrending   WBC Count: 14.57 (23 @ 06:44)  - Creatinine, Serum: 0.8 (23 @ 06:11)  Creatinine, Serum: 0.8 (23 @ 06:44)       ROS  General: Denies rigors, nightsweats  HEENT: Denies headache, rhinorrhea, sore throat, eye pain  CV: Denies CP, palpitations  PULM: Denies wheezing, hemoptysis  GI: Denies hematemesis, hematochezia, melena  : Denies discharge, hematuria  MSK: Denies arthralgias, myalgias  SKIN: Denies rash, lesions  NEURO: Denies paresthesias, weakness  PSYCH: Denies depression, anxiety     VITALS:  T(F): 96.9, Max: 99.1 (23 @ 11:37)  HR: 99  BP: 124/78  RR: 18Vital Signs Last 24 Hrs  T(C): 36.1 (2023 06:23), Max: 37.3 (2023 11:37)  T(F): 96.9 (2023 06:23), Max: 99.1 (2023 11:37)  HR: 99 (2023 06:23) (78 - 99)  BP: 124/78 (2023 06:23) (112/59 - 124/78)  BP(mean): --  RR: 18 (2023 06:23) (18 - 18)  SpO2: 98% (2023 06:23) (98% - 99%)    Parameters below as of 2023 11:37  Patient On (Oxygen Delivery Method): room air        PHYSICAL EXAM:  Gen: chronically ill appearing   HEENT: Normocephalic, atraumatic  Neck: supple, no lymphadenopathy  CV: Regular rate & regular rhythm  Lungs: decreased BS at bases, no fremitus  Abdomen: Soft, BS present  Ext: Warm, well perfused  Neuro:L weakness  Sacrum deferred   Skin: no rash, no erythema  Lines: no phlebitis     FH: Non-contributory  Social Hx: Non-contributory    TESTS & MEASUREMENTS:                        10.3   12.47 )-----------( 444      ( 2023 06:11 )             32.4         141  |  110  |  23<H>  ----------------------------<  86  4.8   |  19  |  0.8    Ca    8.8      2023 06:11  Mg     2.2               Urinalysis Basic - ( 15 Apr 2023 12:00 )    Color: Yellow / Appearance: Slightly Turbid / S.025 / pH: x  Gluc: x / Ketone: Small  / Bili: Negative / Urobili: <2 mg/dL   Blood: x / Protein: 100 mg/dL / Nitrite: Negative   Leuk Esterase: Large / RBC: 10 /HPF / WBC 14 /HPF   Sq Epi: x / Non Sq Epi: x / Bacteria: Few        Culture - Urine (collected 04-15-23 @ 12:00)  Source: Catheterized Catheterized  Final Report (23 @ 18:07):    <10,000 CFU/mL Normal Urogenital Evelyn    Culture - Blood (collected 23 @ 12:06)  Source: .Blood None  Preliminary Report (23 @ 22:02):    No growth to date.    Culture - Tissue with Gram Stain (collected 23 @ 08:40)  Source: .Tissue sacrum  Gram Stain (23 @ 21:29):    No polymorphonuclear leukocytes per low power field    Numerous Gram Positive Cocci in Pairs and Chains per oil power field    Numerous Gram Negative Rods per oil power field  Final Report (23 @ 19:33):    Culture yields >4 types of aerobic and/or anaerobic bacteria    Call client services within 7 days if further workup is clinically    indicated.    Culture includes    Numerous Proteus mirabilis    Numerous Morganella morganii    Moderate Enterococcus raffinosus    Few Staphylococcus aureus  Organism: Proteus mirabilis  Morganella morganii  Enterococcus raffinosus  Staphylococcus aureus (23 @ 19:33)  Organism: Staphylococcus aureus (23 @ 19:33)      Method Type: JULIANA      -  Ampicillin/Sulbactam: S <=8/4      -  Cefazolin: S <=4      -  Clindamycin: S <=0.25      -  Erythromycin: S <=0.25      -  Gentamicin: S <=1 Should not be used as monotherapy      -  Oxacillin: S <=0.25 Oxacillin predicts susceptibility for dicloxacillin, methicillin, and nafcillin      -  Penicillin: R 0.25      -  Rifampin: S <=1 Should not be used as monotherapy      -  Tetracycline: S <=1      -  Trimethoprim/Sulfamethoxazole: S <=0.5/9.5      -  Vancomycin: S 1  Organism: Enterococcus raffinosus (23 @ 19:33)      Method Type: JULIANA      -  Ampicillin: R >8 Predicts results to ampicillin/sulbactam, amoxacillin-clavulanate and  piperacillin-tazobactam.      -  Tetracycline: R >8      -  Vancomycin: S 1  Organism: Morganella morganii (23 @ 19:33)      Method Type: JULIANA      -  Amikacin: S <=16      -  Amoxicillin/Clavulanic Acid: R >16/8      -  Ampicillin: R >16 These ampicillin results predict results for amoxicillin      -  Ampicillin/Sulbactam: I 16/8 Enterobacter, Klebsiella aerogenes, Citrobacter, and Serratia may develop resistance during prolonged therapy (3-4 days)      -  Aztreonam: S <=4      -  Cefazolin: R >16 Enterobacter, Klebsiella aerogenes, Citrobacter, and Serratia may develop resistance during prolonged therapy (3-4 days)      -  Cefepime: S <=2      -  Cefoxitin: S <=8      -  Ceftriaxone: S <=1 Enterobacter, Klebsiella aerogenes, Citrobacter, and Serratia may develop resistance during prolonged therapy      -  Ciprofloxacin: S <=0.25      -  Ertapenem: S <=0.5      -  Gentamicin: S <=2      -  Levofloxacin: S <=0.5      -  Meropenem: S <=1      -  Piperacillin/Tazobactam: S <=8      -  Tobramycin: S <=2      -  Trimethoprim/Sulfamethoxazole: S <=0.5/9.5  Organism: Proteus mirabilis (23 @ 19:33)      Method Type: JULIANA      -  Amikacin: S <=16      -  Amoxicillin/Clavulanic Acid: S <=8/4      -  Ampicillin: S <=8 These ampicillin results predict results for amoxicillin      -  Ampicillin/Sulbactam: S <=4/2 Enterobacter, Klebsiella aerogenes, Citrobacter, and Serratia may develop resistance during prolonged therapy (3-4 days)      -  Aztreonam: S <=4      -  Cefazolin: S <=2 Enterobacter, Klebsiella aerogenes, Citrobacter, and Serratia may develop resistance during prolonged therapy (3-4 days)      -  Cefepime: S <=2      -  Cefoxitin: S <=8      -  Ceftriaxone: S <=1 Enterobacter, Klebsiella aerogenes, Citrobacter, and Serratia may develop resistance during prolonged therapy      -  Ciprofloxacin: S <=0.25      -  Ertapenem: S <=0.5      -  Gentamicin: S <=2      -  Levofloxacin: S <=0.5      -  Meropenem: S <=1      -  Piperacillin/Tazobactam: S <=8      -  Tobramycin: S <=2      -  Trimethoprim/Sulfamethoxazole: S <=0.5/9.5    Culture - Blood (collected 23 @ 06:35)  Source: .Blood Blood  Final Report (23 @ 14:00):    No Growth Final    Culture - Blood (collected 23 @ 07:32)  Source: .Blood None  Final Report (23 @ 18:00):    No Growth Final    Culture - Blood (collected 23 @ 10:39)  Source: .Blood Blood-Peripheral  Final Report (23 @ 18:00):    No Growth Final    Culture - Urine (collected 23 @ 11:57)  Source: Catheterized Catheterized  Final Report (23 @ 17:54):    No growth    Culture - Blood (collected 23 @ 22:07)  Source: .Blood Blood  Final Report (23 @ 02:00):    No Growth Final            INFECTIOUS DISEASES TESTING  Vancomycin Level, Random: 12.4 (23 @ 06:11)  Vancomycin Level, Random: 16.0 (23 @ 10:35)  Vancomycin Level, Trough: 19.4 (04-15-23 @ 18:08)  Vancomycin Level, Trough: 24.6 (23 @ 18:23)  Procalcitonin, Serum: 0.14 (23 @ 18:23)  Vancomycin Level, Trough: 31.9 (23 @ 12:06)  Vancomycin Level, Trough: 19.6 (23 @ 18:35)  Vancomycin Level, Trough: 8.0 (23 @ 17:56)  Rapid RVP Result: NotDetec (23 @ 14:26)  COVID- PCR: NotDetec (23 @ 18:03)  Rapid RVP Result: NotDetec (23 @ 09:55)  Procalcitonin, Serum: 0.05 (23 @ 22:07)  Rapid RVP Result: NotDetec (23 @ 21:25)  COVID- PCR: NotDetec (23 @ 12:47)  COVID- PCR: NotDetec (03-15-23 @ 16:23)  strept    INFLAMMATORY MARKERS      RADIOLOGY & ADDITIONAL TESTS:  I have personally reviewed the last available Chest xray  CXR      CT      CARDIOLOGY TESTING  12 Lead ECG:   Ventricular Rate 114 BPM    Atrial Rate 114 BPM    P-R Interval 122 ms    QRS Duration 62 ms    Q-T Interval 312 ms    QTC Calculation(Bazett) 430 ms    P Axis 43 degrees    R Axis 7 degrees    T Axis 43 degrees    Diagnosis Line Sinus tachycardia  Otherwise normal ECG    Confirmed by SINA GREEN [facility] ([id number]) on 2023 8:47:49 AM (23 @ 07:28)  12 Lead ECG:   Ventricular Rate 103 BPM    Atrial Rate 103 BPM    P-R Interval 120 ms    QRS Duration 66 ms    Q-T Interval 324 ms    QTC Calculation(Bazett) 424 ms    P Axis 40 degrees    R Axis -2 degrees    T Axis 43 degrees    Diagnosis Line Sinus tachycardia  Otherwise normal ECG    Confirmed by Shanti Saeg MD ([id number]) on 2023 10:25:13 AM (23 @ 11:37)      MEDICATIONS  acetaminophen  Suppository .. 325 Rectal once  ascorbic acid 500 Oral daily  aspirin  chewable 81 Oral daily  atorvastatin 40 Oral at bedtime  baclofen 5 Oral every 12 hours  cefepime   IVPB     cefepime   IVPB 2000 IV Intermittent every 12 hours  collagenase Ointment 1 Topical two times a day  Dakins Solution - 1/2 Strength 1 Topical two times a day  enoxaparin Injectable 40 SubCutaneous every 24 hours  gabapentin 100 Oral three times a day  levETIRAcetam  IVPB 1500 IV Intermittent every 12 hours  metroNIDAZOLE  IVPB 500 IV Intermittent every 8 hours  midodrine. 10 Oral three times a day  multivitamin/minerals 1 Oral daily  valproate sodium  IVPB 500 IV Intermittent three times a day  vitamin A &amp; D Ointment 1 Topical two times a day      WEIGHT  Weight (kg): 68.3 (23 @ 10:12)  Creatinine, Serum: 0.8 mg/dL (23 @ 06:11)      ANTIBIOTICS:  cefepime   IVPB      cefepime   IVPB 2000 milliGRAM(s) IV Intermittent every 12 hours  metroNIDAZOLE  IVPB 500 milliGRAM(s) IV Intermittent every 8 hours      All available historical records have been reviewed

## 2023-04-17 NOTE — PROGRESS NOTE ADULT - SUBJECTIVE AND OBJECTIVE BOX
No acute events overnight    VITAL SIGNS:  T(C): 36.1 (23 @ 06:23), Max: 37.3 (23 @ 11:37)  HR: 99 (23 @ 06:23) (78 - 99)  BP: 124/78 (23 @ 06:23) (112/59 - 124/78)  RR: 18 (23 @ 06:23) (18 - 18)  SpO2: 98% (23 @ 06:23) (98% - 99%)      PHYSICAL EXAM:  GENERAL: NAD, well-groomed, well-developed  HEAD: Atraumatic, Normocephalic  NECK: Supple, No JVD  NERVOUS SYSTEM: expressive aphasia with dysarthria, left sided weakness, left foot drop, chronic bed-confinement status  CHEST/LUNG: Clear to auscultation bilaterally; No rales, rhonchi, wheezing, or rubs  HEART: Regular rate and rhythm. Normal S1/S1. No murmurs, rubs, or gallops  ABDOMEN: Soft, Nontender, Nondistended; Bowel sounds present  EXTREMITIES: no ble edema      MEDICATIONS:  MEDICATIONS  (STANDING):  acetaminophen  Suppository .. 325 milliGRAM(s) Rectal once  ascorbic acid 500 milliGRAM(s) Oral daily  aspirin  chewable 81 milliGRAM(s) Oral daily  atorvastatin 40 milliGRAM(s) Oral at bedtime  baclofen 5 milliGRAM(s) Oral every 12 hours  cefepime   IVPB 2000 milliGRAM(s) IV Intermittent every 12 hours  cefepime   IVPB      collagenase Ointment 1 Application(s) Topical two times a day  Dakins Solution - 1/2 Strength 1 Application(s) Topical two times a day  enoxaparin Injectable 40 milliGRAM(s) SubCutaneous every 24 hours  gabapentin 100 milliGRAM(s) Oral three times a day  levETIRAcetam  IVPB 1500 milliGRAM(s) IV Intermittent every 12 hours  metroNIDAZOLE  IVPB 500 milliGRAM(s) IV Intermittent every 8 hours  midodrine. 10 milliGRAM(s) Oral three times a day  multivitamin/minerals 1 Tablet(s) Oral daily  valproate sodium  IVPB 500 milliGRAM(s) IV Intermittent three times a day  vitamin A &amp; D Ointment 1 Application(s) Topical two times a day    MEDICATIONS  (PRN):  acetaminophen     Tablet .. 650 milliGRAM(s) Oral every 6 hours PRN Moderate Pain (4 - 6)  acetaminophen  Suppository .. 325 milliGRAM(s) Rectal once PRN Temp greater or equal to 38C (100.4F)  aluminum hydroxide/magnesium hydroxide/simethicone Suspension 30 milliLiter(s) Oral every 6 hours PRN Dyspepsia  morphine  - Injectable 4 milliGRAM(s) IV Push every 6 hours PRN Severe Pain (7 - 10)      LABS:                        10.3   12.47 )-----------( 444      ( 2023 06:11 )             32.4     04-17    141  |  110  |  23<H>  ----------------------------<  86  4.8   |  19  |  0.8    Ca    8.8      2023 06:11  Mg     2.2     -        Urinalysis Basic - ( 15 Apr 2023 12:00 )    Color: Yellow / Appearance: Slightly Turbid / S.025 / pH: x  Gluc: x / Ketone: Small  / Bili: Negative / Urobili: <2 mg/dL   Blood: x / Protein: 100 mg/dL / Nitrite: Negative   Leuk Esterase: Large / RBC: 10 /HPF / WBC 14 /HPF   Sq Epi: x / Non Sq Epi: x / Bacteria: Few

## 2023-04-17 NOTE — CHART NOTE - NSCHARTNOTESELECT_GEN_ALL_CORE
Dietitian Follow-Up/Event Note
Event Note
Event Note
Neurology/Off Service Note
Event Note
Family Conversation/Event Note

## 2023-04-17 NOTE — PROGRESS NOTE ADULT - ASSESSMENT
68 y/o woman with PMH of Right MCA stroke with left sided weakness was brought to the ED via EMS. In the ED, she had altered mental status and sepsis.    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 No acute intra-abdominal pathology. Unchanged parapelvic cysts bilaterally with a 4 mm stone in the left mid ureter without evidence of obstruction.  - CXR: no new infiltrates  - procal level 0.14  - UA +LE, 14 WBC, few bacteria, epithelial cells 21  - Ucx normal urogenital cesar  - Bcx ngtd  - ID: c/w Cefepime/flagyl  - currently holding vanc due to elevated levels, will continue to hold per ID with vanc level 12.4 4/27  - f/u ID Dr. Taveras for final abx recs for dc    #Seizures  #Acute R Frontal Lobe Infarct likely 2/2 hypotension   #Chronic R MCA infarct- chronic bed confinement status, dysarthria- answers on/off with 1 word yes, no.    - EEG (3/8) and video EEG (3/9): Focal slowing  - MRI Brain (3/15): new subcortical infarct in the right frontal lobe. Re-demonstrated chronic infarct in the right MCA territory.  - MRI of C spine (3/16): Significantly motion limited study. No gross cord compression or spinal cord edema demonstrated. Multilevel small disc bulges.  - Neuro eval'd 3/8-3/20: For L gaze preference/stroke, OP med review: on low dose Lamictal and Depakote in addition to Keppra (possible mood disorder/ seizures), Her MRI of brain showing subcortical infarct within same M1 territory. The cause of stroke is most likely hypotension as she has a diminutive Rt M1. However, MRI findings doesn't explain her B/L upper extremity spasticity. So, it is important to rule out cervical cord pathology. DAPT for 21 days followed by ASA 81 mg daily, Increased keppra post 3/20 possible seizure event  - SS re-juliette 3/21: Minced and moist, 1:1 feeds, thin liquids through straw  - c/w Depakote 500mg TID and Keppra 1500mg BID   - c/w baclofen 5mg bid (for UE spasticity)  - c/w ASA and high dose statin for new stroke. completed course of plavix (3/17-4/6)  - OP stroke clinic in 4 weeks after d/c per neurology    #Persistent Hypotension   - c/w Midodrine 10mg TID    #Left avulsion fracture of medial malleolus  - Duplex BL LE (3/7): (-) for DVT  - CT Foot (3/11): acute minimally displaced avulsion fracture at the medial malleolus  - Podiatry following for foot pain: WBAT w/ surgical shoe, ankle brace, PT  - fall precautions  - pain control regimen  - c/w gabapentin at 100mg TID (Lowered dose from home, increase as pt tolerates)  - Podiatry f/u, removed left foot immobilizer     #Left cephalic vein thrombosis  - Duplex LUE (3/10): No DVT however there is superficial thrombosis noted in the left cephalic vein  - Repeat duplex ordered 3/27 - negative for UE and LE DVT    #Normocytic anemia   - hb dropped on 4/13, s/p 1 unit PRBC, current HG stable   - possibly due to frequent phlebotomy, no signs of active bleed    DVT ppx: lovenox    Diet: DASH minced and moist  Code: full    Pending: c/w IV abx (cefepime, flagyl), continue to hold IV vancomycin, f/u ID for abx plan on dc   Disposition: SNF once medically stable 68 y/o woman with PMH of Right MCA stroke with left sided weakness was brought to the ED via EMS. In the ED, she had altered mental status and sepsis.    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 No acute intra-abdominal pathology. Unchanged parapelvic cysts bilaterally with a 4 mm stone in the left mid ureter without evidence of obstruction.  - CXR: no new infiltrates  - procal level 0.14  - UA +LE, 14 WBC, few bacteria, epithelial cells 21  - Ucx normal urogenital cesar  - Bcx ngtd  - ID: CHANGE IV therapy to PO levaquin 500mg daily and PO flagyl 500mg TID and PO doxy 100mg BID to complete 21 days of therapy end 4/24    #Seizures  #Acute R Frontal Lobe Infarct likely 2/2 hypotension   #Chronic R MCA infarct- chronic bed confinement status, dysarthria- answers on/off with 1 word yes, no.    - EEG (3/8) and video EEG (3/9): Focal slowing  - MRI Brain (3/15): new subcortical infarct in the right frontal lobe. Re-demonstrated chronic infarct in the right MCA territory.  - MRI of C spine (3/16): Significantly motion limited study. No gross cord compression or spinal cord edema demonstrated. Multilevel small disc bulges.  - Neuro eval'd 3/8-3/20: For L gaze preference/stroke, OP med review: on low dose Lamictal and Depakote in addition to Keppra (possible mood disorder/ seizures), Her MRI of brain showing subcortical infarct within same M1 territory. The cause of stroke is most likely hypotension as she has a diminutive Rt M1. However, MRI findings doesn't explain her B/L upper extremity spasticity. So, it is important to rule out cervical cord pathology. DAPT for 21 days followed by ASA 81 mg daily, Increased keppra post 3/20 possible seizure event  - SS re-juliette 3/21: Minced and moist, 1:1 feeds, thin liquids through straw  - c/w Depakote 500mg TID and Keppra 1500mg BID   - c/w baclofen 5mg bid (for UE spasticity)  - c/w ASA and high dose statin for new stroke. completed course of plavix (3/17-4/6)  - OP stroke clinic in 4 weeks after d/c per neurology    #Persistent Hypotension   - c/w Midodrine 10mg TID    #Left avulsion fracture of medial malleolus  - Duplex BL LE (3/7): (-) for DVT  - CT Foot (3/11): acute minimally displaced avulsion fracture at the medial malleolus  - Podiatry following for foot pain: WBAT w/ surgical shoe, ankle brace, PT  - fall precautions  - pain control regimen  - c/w gabapentin at 100mg TID (Lowered dose from home, increase as pt tolerates)  - Podiatry f/u, removed left foot immobilizer     #Left cephalic vein thrombosis  - Duplex LUE (3/10): No DVT however there is superficial thrombosis noted in the left cephalic vein  - Repeat duplex ordered 3/27 - negative for UE and LE DVT    #Normocytic anemia   - hb dropped on 4/13, s/p 1 unit PRBC, current HG stable   - possibly due to frequent phlebotomy, no signs of active bleed    DVT ppx: lovenox    Diet: DASH minced and moist  Code: full    Pending: c/w IV abx (cefepime, flagyl), continue to hold IV vancomycin, f/u ID for abx plan on dc   Disposition: SNF once medically stable 66 y/o woman with PMH of Right MCA stroke with left sided weakness was brought to the ED via EMS. In the ED, she had altered mental status and sepsis.    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 No acute intra-abdominal pathology. Unchanged parapelvic cysts bilaterally with a 4 mm stone in the left mid ureter without evidence of obstruction.  - CXR: no new infiltrates  - procal level 0.14  - UA +LE, 14 WBC, few bacteria, epithelial cells 21  - Ucx normal urogenital cesar  - Bcx ngtd  - ID: CHANGE IV therapy to PO levaquin 500mg daily and PO flagyl 500mg TID and PO doxy 100mg BID to complete 21 days of therapy end 4/24    #Seizures  #Acute R Frontal Lobe Infarct likely 2/2 hypotension   #Chronic R MCA infarct- chronic bed confinement status, dysarthria- answers on/off with 1 word yes, no.    - EEG (3/8) and video EEG (3/9): Focal slowing  - MRI Brain (3/15): new subcortical infarct in the right frontal lobe. Re-demonstrated chronic infarct in the right MCA territory.  - MRI of C spine (3/16): Significantly motion limited study. No gross cord compression or spinal cord edema demonstrated. Multilevel small disc bulges.  - Neuro eval'd 3/8-3/20: For L gaze preference/stroke, OP med review: on low dose Lamictal and Depakote in addition to Keppra (possible mood disorder/ seizures), Her MRI of brain showing subcortical infarct within same M1 territory. The cause of stroke is most likely hypotension as she has a diminutive Rt M1. However, MRI findings doesn't explain her B/L upper extremity spasticity. So, it is important to rule out cervical cord pathology. DAPT for 21 days followed by ASA 81 mg daily, Increased keppra post 3/20 possible seizure event  - SS re-juliette 3/21: Minced and moist, 1:1 feeds, thin liquids through straw  - c/w Depakote 500mg TID and Keppra 1500mg BID   - c/w baclofen 5mg bid (for UE spasticity)  - c/w ASA and high dose statin for new stroke. completed course of plavix (3/17-4/6)  - OP stroke clinic in 4 weeks after d/c per neurology    #Persistent Hypotension   - c/w Midodrine 10mg TID    #Left avulsion fracture of medial malleolus  - Duplex BL LE (3/7): (-) for DVT  - CT Foot (3/11): acute minimally displaced avulsion fracture at the medial malleolus  - Podiatry following for foot pain: WBAT w/ surgical shoe, ankle brace, PT  - fall precautions  - pain control regimen  - c/w gabapentin at 100mg TID (Lowered dose from home, increase as pt tolerates)  - Podiatry f/u, removed left foot immobilizer     #Left cephalic vein thrombosis  - Duplex LUE (3/10): No DVT however there is superficial thrombosis noted in the left cephalic vein  - Repeat duplex ordered 3/27 - negative for UE and LE DVT    #Normocytic anemia   - hb dropped on 4/13, s/p 1 unit PRBC, current HG stable   - possibly due to frequent phlebotomy, no signs of active bleed    DVT ppx: lovenox    Diet: DASH minced and moist  Code: full    Pending: switched to po abx   Disposition: SNF once medically stable 66 y/o woman with PMH of Right MCA stroke with left sided weakness was brought to the ED via EMS. In the ED, she had altered mental status and sepsis.    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 No acute intra-abdominal pathology. Unchanged parapelvic cysts bilaterally with a 4 mm stone in the left mid ureter without evidence of obstruction.  - CXR: no new infiltrates  - procal level 0.14  - UA +LE, 14 WBC, few bacteria, epithelial cells 21  - Ucx normal urogenital cesar  - Bcx ngtd  - ID: CHANGE IV therapy to PO levaquin 500mg daily and PO flagyl 500mg TID and PO doxy 100mg BID to complete 21 days of therapy end 4/24    #Seizures  #Acute R Frontal Lobe Infarct likely 2/2 hypotension   #Chronic R MCA infarct- chronic bed confinement status, dysarthria- answers on/off with 1 word yes, no.    - EEG (3/8) and video EEG (3/9): Focal slowing  - MRI Brain (3/15): new subcortical infarct in the right frontal lobe. Re-demonstrated chronic infarct in the right MCA territory.  - MRI of C spine (3/16): Significantly motion limited study. No gross cord compression or spinal cord edema demonstrated. Multilevel small disc bulges.  - Neuro eval'd 3/8-3/20: For L gaze preference/stroke, OP med review: on low dose Lamictal and Depakote in addition to Keppra (possible mood disorder/ seizures), Her MRI of brain showing subcortical infarct within same M1 territory. The cause of stroke is most likely hypotension as she has a diminutive Rt M1. However, MRI findings doesn't explain her B/L upper extremity spasticity. So, it is important to rule out cervical cord pathology. DAPT for 21 days followed by ASA 81 mg daily, Increased keppra post 3/20 possible seizure event  - SS re-juliette 3/21: Minced and moist, 1:1 feeds, thin liquids through straw  - c/w Depakote 500mg TID and Keppra 1500mg BID   - c/w baclofen 5mg bid (for UE spasticity)  - c/w ASA and high dose statin for new stroke. completed course of plavix (3/17-4/6)  - OP stroke clinic in 4 weeks after d/c per neurology    #Persistent Hypotension   - c/w Midodrine 10mg TID    #Left avulsion fracture of medial malleolus  - Duplex BL LE (3/7): (-) for DVT  - CT Foot (3/11): acute minimally displaced avulsion fracture at the medial malleolus  - Podiatry following for foot pain: WBAT w/ surgical shoe, ankle brace, PT  - fall precautions  - pain control regimen  - c/w gabapentin at 100mg TID (Lowered dose from home, increase as pt tolerates)  - Podiatry f/u, removed left foot immobilizer     #Left cephalic vein thrombosis  - Duplex LUE (3/10): No DVT however there is superficial thrombosis noted in the left cephalic vein  - Repeat duplex ordered 3/27 - negative for UE and LE DVT    #Normocytic anemia   - hb dropped on 4/13, s/p 1 unit PRBC, current HG stable   - possibly due to frequent phlebotomy, no signs of active bleed    DVT ppx: lovenox    Diet: DASH minced and moist  Code: DNR/DNI    Pending: switched to po abx   Disposition: SNF once medically stable

## 2023-04-17 NOTE — PROGRESS NOTE ADULT - TIME BILLING
I have personally seen and examined this patient.    I have reviewed all pertinent clinical information and reviewed all relevant imaging and diagnostic studies personally.   I counseled the patient about diagnostic testing and treatment plan. All questions were answered.   I discussed recommendations with the primary team.
I have personally seen and examined this patient.  I have reviewed all pertinent clinical information and reviewed all relevant imaging and diagnostic studies personally.   If possible, I counseled the patient about diagnostic testing and treatment plan.   I discussed my recommendations with the primary team.
I have personally seen and examined this patient.    I have reviewed all pertinent clinical information and reviewed all relevant imaging and diagnostic studies personally.   I counseled the patient about diagnostic testing and treatment plan. All questions were answered.   I discussed recommendations with the primary team.
I have personally seen and examined this patient.    I have reviewed all pertinent clinical information and reviewed all relevant imaging and diagnostic studies personally.   I counseled the patient about diagnostic testing and treatment plan. All questions were answered.   I discussed recommendations with the primary team.

## 2023-04-18 VITALS
OXYGEN SATURATION: 98 % | DIASTOLIC BLOOD PRESSURE: 66 MMHG | HEART RATE: 84 BPM | SYSTOLIC BLOOD PRESSURE: 135 MMHG | RESPIRATION RATE: 18 BRPM | TEMPERATURE: 99 F

## 2023-04-18 LAB
ALBUMIN SERPL ELPH-MCNC: 2.3 G/DL — LOW (ref 3.5–5.2)
ALP SERPL-CCNC: 73 U/L — SIGNIFICANT CHANGE UP (ref 30–115)
ALT FLD-CCNC: <5 U/L — SIGNIFICANT CHANGE UP (ref 0–41)
ANION GAP SERPL CALC-SCNC: 10 MMOL/L — SIGNIFICANT CHANGE UP (ref 7–14)
AST SERPL-CCNC: 14 U/L — SIGNIFICANT CHANGE UP (ref 0–41)
BASOPHILS # BLD AUTO: 0.06 K/UL — SIGNIFICANT CHANGE UP (ref 0–0.2)
BASOPHILS NFR BLD AUTO: 0.4 % — SIGNIFICANT CHANGE UP (ref 0–1)
BILIRUB SERPL-MCNC: 0.2 MG/DL — SIGNIFICANT CHANGE UP (ref 0.2–1.2)
BUN SERPL-MCNC: 24 MG/DL — HIGH (ref 10–20)
CALCIUM SERPL-MCNC: 9.1 MG/DL — SIGNIFICANT CHANGE UP (ref 8.4–10.4)
CHLORIDE SERPL-SCNC: 108 MMOL/L — SIGNIFICANT CHANGE UP (ref 98–110)
CO2 SERPL-SCNC: 22 MMOL/L — SIGNIFICANT CHANGE UP (ref 17–32)
CREAT SERPL-MCNC: 0.7 MG/DL — SIGNIFICANT CHANGE UP (ref 0.7–1.5)
EGFR: 95 ML/MIN/1.73M2 — SIGNIFICANT CHANGE UP
EOSINOPHIL # BLD AUTO: 0.18 K/UL — SIGNIFICANT CHANGE UP (ref 0–0.7)
EOSINOPHIL NFR BLD AUTO: 1.3 % — SIGNIFICANT CHANGE UP (ref 0–8)
GLUCOSE SERPL-MCNC: 85 MG/DL — SIGNIFICANT CHANGE UP (ref 70–99)
HCT VFR BLD CALC: 29.6 % — LOW (ref 37–47)
HGB BLD-MCNC: 9.2 G/DL — LOW (ref 12–16)
IMM GRANULOCYTES NFR BLD AUTO: 0.6 % — HIGH (ref 0.1–0.3)
LYMPHOCYTES # BLD AUTO: 1.68 K/UL — SIGNIFICANT CHANGE UP (ref 1.2–3.4)
LYMPHOCYTES # BLD AUTO: 12.1 % — LOW (ref 20.5–51.1)
MAGNESIUM SERPL-MCNC: 2.2 MG/DL — SIGNIFICANT CHANGE UP (ref 1.8–2.4)
MCHC RBC-ENTMCNC: 28 PG — SIGNIFICANT CHANGE UP (ref 27–31)
MCHC RBC-ENTMCNC: 31.1 G/DL — LOW (ref 32–37)
MCV RBC AUTO: 90.2 FL — SIGNIFICANT CHANGE UP (ref 81–99)
MONOCYTES # BLD AUTO: 1.06 K/UL — HIGH (ref 0.1–0.6)
MONOCYTES NFR BLD AUTO: 7.7 % — SIGNIFICANT CHANGE UP (ref 1.7–9.3)
NEUTROPHILS # BLD AUTO: 10.77 K/UL — HIGH (ref 1.4–6.5)
NEUTROPHILS NFR BLD AUTO: 77.9 % — HIGH (ref 42.2–75.2)
NRBC # BLD: 0 /100 WBCS — SIGNIFICANT CHANGE UP (ref 0–0)
PLATELET # BLD AUTO: 480 K/UL — HIGH (ref 130–400)
PMV BLD: 9.3 FL — SIGNIFICANT CHANGE UP (ref 7.4–10.4)
POTASSIUM SERPL-MCNC: 4.2 MMOL/L — SIGNIFICANT CHANGE UP (ref 3.5–5)
POTASSIUM SERPL-SCNC: 4.2 MMOL/L — SIGNIFICANT CHANGE UP (ref 3.5–5)
PROT SERPL-MCNC: 5.5 G/DL — LOW (ref 6–8)
RBC # BLD: 3.28 M/UL — LOW (ref 4.2–5.4)
RBC # FLD: 14.8 % — HIGH (ref 11.5–14.5)
SARS-COV-2 RNA SPEC QL NAA+PROBE: SIGNIFICANT CHANGE UP
SODIUM SERPL-SCNC: 140 MMOL/L — SIGNIFICANT CHANGE UP (ref 135–146)
WBC # BLD: 13.83 K/UL — HIGH (ref 4.8–10.8)
WBC # FLD AUTO: 13.83 K/UL — HIGH (ref 4.8–10.8)

## 2023-04-18 PROCEDURE — 99239 HOSP IP/OBS DSCHRG MGMT >30: CPT

## 2023-04-18 RX ORDER — VALPROIC ACID 250 MG
15 CAPSULE ORAL
Qty: 900 | Refills: 0 | DISCHARGE
Start: 2023-04-18 | End: 2023-05-17

## 2023-04-18 RX ORDER — LEVETIRACETAM 1000 MG/1
10 TABLET ORAL
Qty: 900 | Refills: 0 | DISCHARGE
Start: 2023-04-18 | End: 2023-05-17

## 2023-04-18 RX ORDER — LEVETIRACETAM 250 MG/1
15 TABLET, FILM COATED ORAL
Qty: 900 | Refills: 0
Start: 2023-04-18 | End: 2023-05-17

## 2023-04-18 RX ORDER — LEVOFLOXACIN 5 MG/ML
1 INJECTION, SOLUTION INTRAVENOUS
Qty: 0 | Refills: 0 | DISCHARGE
Start: 2023-04-18 | End: 2023-04-24

## 2023-04-18 RX ORDER — VALPROIC ACID (AS SODIUM SALT) 250 MG/5ML
10 SOLUTION, ORAL ORAL
Qty: 900 | Refills: 0
Start: 2023-04-18 | End: 2023-05-17

## 2023-04-18 RX ORDER — METRONIDAZOLE 500 MG
1 TABLET ORAL
Qty: 0 | Refills: 0 | DISCHARGE
Start: 2023-04-18 | End: 2023-04-24

## 2023-04-18 RX ADMIN — Medication 1 TABLET(S): at 11:33

## 2023-04-18 RX ADMIN — Medication 500 MILLIGRAM(S): at 11:30

## 2023-04-18 RX ADMIN — Medication 81 MILLIGRAM(S): at 11:30

## 2023-04-18 RX ADMIN — ENOXAPARIN SODIUM 40 MILLIGRAM(S): 100 INJECTION SUBCUTANEOUS at 11:35

## 2023-04-18 RX ADMIN — Medication 50 MILLIGRAM(S): at 15:11

## 2023-04-18 RX ADMIN — MIDODRINE HYDROCHLORIDE 10 MILLIGRAM(S): 2.5 TABLET ORAL at 11:31

## 2023-04-18 RX ADMIN — Medication 500 MILLIGRAM(S): at 15:10

## 2023-04-18 RX ADMIN — GABAPENTIN 100 MILLIGRAM(S): 400 CAPSULE ORAL at 15:09

## 2023-04-18 NOTE — DISCHARGE NOTE NURSING/CASE MANAGEMENT/SOCIAL WORK - NSDCPEFALRISK_GEN_ALL_CORE
For information on Fall & Injury Prevention, visit: [email or web address] OR  [email or web address] OR  https://www.cdc.gov/steadi/patient.html

## 2023-04-18 NOTE — PROGRESS NOTE ADULT - REASON FOR ADMISSION
AMS
Altered Mental status
Generalized pain
Sepsis
Stroke
ams
Generalized pain
Sacral wound ulcer
ams
Multiple Strokes
Sacral decubitus ulcer
AMS
acute stroke

## 2023-04-18 NOTE — DISCHARGE NOTE NURSING/CASE MANAGEMENT/SOCIAL WORK - PATIENT PORTAL LINK FT
You can access the FollowMyHealth Patient Portal offered by [facility] by registering at the following website: [email or web address]. By joining [facility]’s FollowMyHealth portal, you will also be able to view your health information using other applications (apps) compatible with our system.

## 2023-04-18 NOTE — PROGRESS NOTE ADULT - NUTRITIONAL ASSESSMENT
This patient has been assessed with a concern for Malnutrition and has been determined to have a diagnosis/diagnoses of Moderate protein-calorie malnutrition.    This patient is being managed with:   Diet DASH/TLC-  Sodium & Cholesterol Restricted  Minced and Moist (MINCEDMOIST)  Free Water Flush Instructions:  VANILLA OR STRAWBERRY  Supplement Feeding Modality:  Oral  Ensure Plus High Protein Cans or Servings Per Day:  2       Frequency:  Daily  Entered: Mar 27 2023  2:49PM  
This patient has been assessed with a concern for Malnutrition and has been determined to have a diagnosis/diagnoses of Moderate protein-calorie malnutrition.    This patient is being managed with:   Diet DASH/TLC-  Sodium & Cholesterol Restricted  Minced and Moist (RASHIMOIST)  Entered: Mar 21 2023  1:26PM  
This patient has been assessed with a concern for Malnutrition and has been determined to have a diagnosis/diagnoses of Moderate protein-calorie malnutrition.    This patient is being managed with:   Diet DASH/TLC-  Sodium & Cholesterol Restricted  Minced and Moist (RASHIMOIST)  Entered: Mar 21 2023  1:26PM    
This patient has been assessed with a concern for Malnutrition and has been determined to have a diagnosis/diagnoses of Moderate protein-calorie malnutrition.    This patient is being managed with:   Diet DASH/TLC-  Sodium & Cholesterol Restricted  Minced and Moist (MINCEDMOIST)  Free Water Flush Instructions:  VANILLA OR STRAWBERRY  Supplement Feeding Modality:  Oral  Ensure Plus High Protein Cans or Servings Per Day:  2       Frequency:  Daily  Entered: Mar 27 2023  2:49PM  
This patient has been assessed with a concern for Malnutrition and has been determined to have a diagnosis/diagnoses of Moderate protein-calorie malnutrition.    This patient is being managed with:   Diet DASH/TLC-  Sodium & Cholesterol Restricted  Minced and Moist (RASHIMOIST)  Entered: Mar 21 2023  1:26PM  
This patient has been assessed with a concern for Malnutrition and has been determined to have a diagnosis/diagnoses of Moderate protein-calorie malnutrition.    This patient is being managed with:   Diet DASH/TLC-  Sodium & Cholesterol Restricted  Minced and Moist (RASHIMOIST)  Entered: Mar 21 2023  1:26PM    
This patient has been assessed with a concern for Malnutrition and has been determined to have a diagnosis/diagnoses of Moderate protein-calorie malnutrition.    This patient is being managed with:   Diet Soft and Bite Sized-  Free Water Flush Instructions:  MAGIC CUP 1X AT LUNCH. PLEASE GIVE VANILLA OR STRAWBERRY SUPPLEMENTS no chocolate  Supplement Feeding Modality:  Oral  Ensure Plus High Protein Cans or Servings Per Day:  2       Frequency:  Daily  Entered: Mar  6 2023  4:26PM    
This patient has been assessed with a concern for Malnutrition and has been determined to have a diagnosis/diagnoses of Moderate protein-calorie malnutrition.    This patient is being managed with:   Diet DASH/TLC-  Sodium & Cholesterol Restricted  Minced and Moist (MINCEDMOIST)  Free Water Flush Instructions:  VANILLA OR STRAWBERRY  Supplement Feeding Modality:  Oral  Ensure Plus High Protein Cans or Servings Per Day:  2       Frequency:  Daily  Entered: Mar 27 2023  2:49PM  
This patient has been assessed with a concern for Malnutrition and has been determined to have a diagnosis/diagnoses of Moderate protein-calorie malnutrition.    This patient is being managed with:   Diet DASH/TLC-  Sodium & Cholesterol Restricted  Minced and Moist (RASHIMOIST)  Entered: Mar 21 2023  1:26PM  
This patient has been assessed with a concern for Malnutrition and has been determined to have a diagnosis/diagnoses of Moderate protein-calorie malnutrition.    This patient is being managed with:   Diet DASH/TLC-  Sodium & Cholesterol Restricted  Minced and Moist (RASHIMOIST)  Entered: Mar 21 2023  1:26PM    
This patient has been assessed with a concern for Malnutrition and has been determined to have a diagnosis/diagnoses of Moderate protein-calorie malnutrition.    This patient is being managed with:   Diet NPO-  Entered: Mar 20 2023  3:10PM    
This patient has been assessed with a concern for Malnutrition and has been determined to have a diagnosis/diagnoses of Moderate protein-calorie malnutrition.    This patient is being managed with:   Diet Soft and Bite Sized-  Free Water Flush Instructions:  MAGIC CUP 1X AT LUNCH. PLEASE GIVE VANILLA OR STRAWBERRY SUPPLEMENTS no chocolate  Supplement Feeding Modality:  Oral  Ensure Plus High Protein Cans or Servings Per Day:  2       Frequency:  Daily  Entered: Mar  6 2023  4:26PM    
This patient has been assessed with a concern for Malnutrition and has been determined to have a diagnosis/diagnoses of Moderate protein-calorie malnutrition.    This patient is being managed with:   Diet DASH/TLC-  Sodium & Cholesterol Restricted  Minced and Moist (MINCED MOIST)  Free Water Flush Instructions:  VANILLA OR STRAWBERRY  Supplement Feeding Modality:  Oral  Ensure Plus High Protein Cans or Servings Per Day:  2       Frequency:  Daily  Entered: Mar 27 2023  2:49PM
This patient has been assessed with a concern for Malnutrition and has been determined to have a diagnosis/diagnoses of Moderate protein-calorie malnutrition.    This patient is being managed with:   Diet DASH/TLC-  Sodium & Cholesterol Restricted  Minced and Moist (MINCEDMOIST)  Free Water Flush Instructions:  VANILLA OR STRAWBERRY  Supplement Feeding Modality:  Oral  Ensure Plus High Protein Cans or Servings Per Day:  2       Frequency:  Daily  Entered: Mar 27 2023  2:49PM  
This patient has been assessed with a concern for Malnutrition and has been determined to have a diagnosis/diagnoses of Moderate protein-calorie malnutrition.    This patient is being managed with:   Diet Soft and Bite Sized-  Free Water Flush Instructions:  MAGIC CUP 1X AT LUNCH. PLEASE GIVE VANILLA OR STRAWBERRY SUPPLEMENTS no chocolate  Supplement Feeding Modality:  Oral  Ensure Plus High Protein Cans or Servings Per Day:  2       Frequency:  Daily  Entered: Mar  6 2023  4:26PM    
This patient has been assessed with a concern for Malnutrition and has been determined to have a diagnosis/diagnoses of Moderate protein-calorie malnutrition.    This patient is being managed with:   Diet DASH/TLC-  Sodium & Cholesterol Restricted  Minced and Moist (MINCEDMOIST)  Free Water Flush Instructions:  VANILLA OR STRAWBERRY  Supplement Feeding Modality:  Oral  Ensure Plus High Protein Cans or Servings Per Day:  2       Frequency:  Daily  Entered: Mar 27 2023  2:49PM  

## 2023-04-18 NOTE — PROGRESS NOTE ADULT - PROVIDER SPECIALTY LIST ADULT
Hospitalist
Infectious Disease
Internal Medicine
Internal Medicine
Neurology
Neurology
Podiatry
Podiatry
Burn
Hospitalist
Infectious Disease
Internal Medicine
Neurology
Hospitalist
Infectious Disease
Internal Medicine
Burn
Hospitalist
Infectious Disease
Infectious Disease
Internal Medicine
Infectious Disease

## 2023-04-18 NOTE — PROGRESS NOTE ADULT - SUBJECTIVE AND OBJECTIVE BOX
JOSEPH OBRIEN  [facility] (Back) 019 A ([facility] (Back))      Patient was evaluated and examined  by bedside, no active events over night as per covering nurse report      REVIEW OF SYSTEMS: unable to obtain , patient is confused         T(C): , Max: 37.3 (04-18-23 @ 05:35)  HR: 89 (04-18-23 @ 05:35)  BP: 116/63 (04-18-23 @ 05:35)  RR: 20 (04-18-23 @ 05:35)  SpO2: 97% (04-18-23 @ 05:35)  CAPILLARY BLOOD GLUCOSE          PHYSICAL EXAM:  General: NAD, Awake, patient is laying comfortably in bed  HEENT: AT, NC, Supple, NO JVD, NO CB  Lungs: good breath sounds  B/L, no wheezing, no rhonchi  CVS: normal S1, S2, RRR, NO M/G/R  Abdomen: soft, bowel sounds present, non-tender, non-distended  Extremities: no edema, no clubbing, no cyanosis, positive peripheral pulses b/l  Neuro: expressive aphasia with dysarthria, left sided weakness, left foot drop, chronic bed-confinement status  Skin: unstageble sacral decubiti        LAB  CBC  Date: 04-18-23 @ 07:18  Mean cell Ivdlrpdhxu94.0  Mean cell Hemoglobin Conc31.1  Mean cell Volum 90.2  Platelet count-Automate 480  RBC Count 3.28  Red Cell Distrib Width14.8  WBC Count13.83  % Albumin, Urine--  Hematocrit 29.6  Hemoglobin 9.2  CBC  Date: 04-17-23 @ 06:11  Mean cell Ciejchaemr13.8  Mean cell Hemoglobin Conc31.8  Mean cell Volum 90.5  Platelet count-Automate 444  RBC Count 3.58  Red Cell Distrib Width14.6  WBC Count12.47  % Albumin, Urine--  Hematocrit 32.4  Hemoglobin 10.3  CBC  Date: 04-16-23 @ 06:44  Mean cell Arsuxjrtqj54.1  Mean cell Hemoglobin Conc31.9  Mean cell Volum 88.1  Platelet count-Automate 516  RBC Count 3.60  Red Cell Distrib Width14.6  WBC Count14.57  % Albumin, Urine--  Hematocrit 31.7  Hemoglobin 10.1  CBC  Date: 04-15-23 @ 08:34  Mean cell Hqyjticmvw46.8  Mean cell Hemoglobin Conc32.3  Mean cell Volum 89.1  Platelet count-Automate 500  RBC Count 3.68  Red Cell Distrib Width14.6  WBC Count17.49  % Albumin, Urine--  Hematocrit 32.8  Hemoglobin 10.6  CBC  Date: 04-14-23 @ 18:23  Mean cell Qeefldjiqi31.2  Mean cell Hemoglobin Conc33.1  Mean cell Volum 88.1  Platelet count-Automate 531  RBC Count 3.12  Red Cell Distrib Width14.6  WBC Count15.34  % Albumin, Urine--  Hematocrit 27.5  Hemoglobin 9.1  CBC  Date: 04-14-23 @ 07:32  Mean cell Gtcgivkagu82.9  Mean cell Hemoglobin Conc33.0  Mean cell Volum 87.8  Platelet count-Automate 575  RBC Count 3.11  Red Cell Distrib Width14.3  WBC Count19.23  % Albumin, Urine--  Hematocrit 27.3  Hemoglobin 9.0  CBC  Date: 04-13-23 @ 22:47  Mean cell Jdhhkapmau55.8  Mean cell Hemoglobin Conc31.2  Mean cell Volum 89.0  Platelet count-Automate 505  RBC Count 2.45  Red Cell Distrib Width14.6  WBC Count20.94  % Albumin, Urine--  Hematocrit 21.8  Hemoglobin 6.8  CBC  Date: 04-13-23 @ 13:00  Mean cell Potjafibpj53.9  Mean cell Hemoglobin Conc31.6  Mean cell Volum 88.3  Platelet count-Automate 614  RBC Count 2.40  Red Cell Distrib Width14.6  WBC Count19.56  % Albumin, Urine--  Hematocrit 21.2  Hemoglobin 6.7  CBC  Date: 04-12-23 @ 06:45  Mean cell Imyqayvdix57.1  Mean cell Hemoglobin Conc31.3  Mean cell Volum 90.0  Platelet count-Automate 720  RBC Count 3.20  Red Cell Distrib Width14.6  WBC Count22.97  % Albumin, Urine--  Hematocrit 28.8  Hemoglobin 9.0    [facility]  04-18-23 @ 07:18  Blood Gas Arterial-Calcium,Ionized--  Blood Urea Nitrogen, Serum 24 mg/dL<H> [10 - 20]  Carbon Dioxide, Serum22 mmol/L [17 - 32]  Chloride, Virwl585 mmol/L [98 - 110]  Creatinie, Serum0.7 mg/dL [0.7 - 1.5]  Glucose, Serum85 mg/dL [70 - 99]  Potassium, Serum4.2 mmol/L [3.5 - 5.0]  Sodium, Serum 140 mmol/L [135 - 146]  BMP  04-17-23 @ 06:11  Blood Gas Arterial-Calcium,Ionized--  Blood Urea Nitrogen, Serum 23 mg/dL<H> [10 - 20]  Carbon Dioxide, Serum19 mmol/L [17 - 32]  Chloride, Voalk181 mmol/L [98 - 110]  Creatinie, Serum0.8 mg/dL [0.7 - 1.5]  Glucose, Serum86 mg/dL [70 - 99]  Potassium, Serum4.8 mmol/L [3.5 - 5.0]  Sodium, Serum 141 mmol/L [135 - 146]  [facility]  04-16-23 @ 06:44  Blood Gas Arterial-Calcium,Ionized--  Blood Urea Nitrogen, Serum 25 mg/dL<H> [10 - 20]  Carbon Dioxide, Serum22 mmol/L [17 - 32]  Chloride, Cqpbt832 mmol/L [98 - 110]  Creatinie, Serum0.8 mg/dL [0.7 - 1.5]  Glucose, Ausgf501 mg/dL<H> [70 - 99]  Potassium, Serum4.6 mmol/L [3.5 - 5.0]  Sodium, Serum 141 mmol/L [135 - 146]  [facility]  04-15-23 @ 08:34  Blood Gas Arterial-Calcium,Ionized--  Blood Urea Nitrogen, Serum 21 mg/dL<H> [10 - 20]  Carbon Dioxide, Serum21 mmol/L [17 - 32]  Chloride, Zychy423 mmol/L [98 - 110]  Creatinie, Serum0.7 mg/dL [0.7 - 1.5]  Glucose, Serum79 mg/dL [70 - 99]  Potassium, Serum4.5 mmol/L [3.5 - 5.0]  Sodium, Serum 137 mmol/L [135 - 146]  [facility]  04-14-23 @ 07:32  Blood Gas Arterial-Calcium,Ionized--  Blood Urea Nitrogen, Serum 19 mg/dL [10 - 20]  Carbon Dioxide, Serum22 mmol/L [17 - 32]  Chloride, Vsojl802 mmol/L [98 - 110]  Creatinie, Serum0.8 mg/dL [0.7 - 1.5]  Glucose, Serum84 mg/dL [70 - 99]  Potassium, Serum4.3 mmol/L [3.5 - 5.0]  Sodium, Serum 137 mmol/L [135 - 146]  [facility]  04-13-23 @ 13:00  Blood Gas Arterial-Calcium,Ionized--  Blood Urea Nitrogen, Serum 19 mg/dL [10 - 20]  Carbon Dioxide, Serum26 mmol/L [17 - 32]  Chloride, Serum98 mmol/L [98 - 110]  Creatinie, Serum0.7 mg/dL [0.7 - 1.5]  Glucose, Serum84 mg/dL [70 - 99]  Potassium, Serum4.2 mmol/L [3.5 - 5.0]  Sodium, Serum 132 mmol/L<L> [135 - 146]              Microbiology:    Culture - Urine (collected 04-15-23 @ 12:00)  Source: Catheterized Catheterized  Final Report (04-16-23 @ 18:07):    <10,000 CFU/mL Normal Urogenital Evelyn    Culture - Blood (collected 04-12-23 @ 12:06)  Source: .Blood None  Final Report (04-17-23 @ 22:00):    No Growth Final    Culture - Tissue with Gram Stain (collected 04-04-23 @ 08:40)  Source: .Tissue sacrum  Gram Stain (04-04-23 @ 21:29):    No polymorphonuclear leukocytes per low power field    Numerous Gram Positive Cocci in Pairs and Chains per oil power field    Numerous Gram Negative Rods per oil power field  Final Report (04-06-23 @ 19:33):    Culture yields >4 types of aerobic and/or anaerobic bacteria    Call client services within 7 days if further workup is clinically    indicated.    Culture includes    Numerous Proteus mirabilis    Numerous Morganella morganii    Moderate Enterococcus raffinosus    Few Staphylococcus aureus  Organism: Proteus mirabilis  Morganella morganii  Enterococcus raffinosus  Staphylococcus aureus (04-06-23 @ 19:33)  Organism: Staphylococcus aureus (04-06-23 @ 19:33)      Method Type: JULIANA      -  Ampicillin/Sulbactam: S <=8/4      -  Cefazolin: S <=4      -  Clindamycin: S <=0.25      -  Erythromycin: S <=0.25      -  Gentamicin: S <=1 Should not be used as monotherapy      -  Oxacillin: S <=0.25 Oxacillin predicts susceptibility for dicloxacillin, methicillin, and nafcillin      -  Penicillin: R 0.25      -  Rifampin: S <=1 Should not be used as monotherapy      -  Tetracycline: S <=1      -  Trimethoprim/Sulfamethoxazole: S <=0.5/9.5      -  Vancomycin: S 1  Organism: Enterococcus raffinosus (04-06-23 @ 19:33)      Method Type: JULIANA      -  Ampicillin: R >8 Predicts results to ampicillin/sulbactam, amoxacillin-clavulanate and  piperacillin-tazobactam.      -  Tetracycline: R >8      -  Vancomycin: S 1  Organism: Morganella morganii (04-06-23 @ 19:33)      Method Type: JULIANA      -  Amikacin: S <=16      -  Amoxicillin/Clavulanic Acid: R >16/8      -  Ampicillin: R >16 These ampicillin results predict results for amoxicillin      -  Ampicillin/Sulbactam: I 16/8 Enterobacter, Klebsiella aerogenes, Citrobacter, and Serratia may develop resistance during prolonged therapy (3-4 days)      -  Aztreonam: S <=4      -  Cefazolin: R >16 Enterobacter, Klebsiella aerogenes, Citrobacter, and Serratia may develop resistance during prolonged therapy (3-4 days)      -  Cefepime: S <=2      -  Cefoxitin: S <=8      -  Ceftriaxone: S <=1 Enterobacter, Klebsiella aerogenes, Citrobacter, and Serratia may develop resistance during prolonged therapy      -  Ciprofloxacin: S <=0.25      -  Ertapenem: S <=0.5      -  Gentamicin: S <=2      -  Levofloxacin: S <=0.5      -  Meropenem: S <=1      -  Piperacillin/Tazobactam: S <=8      -  Tobramycin: S <=2      -  Trimethoprim/Sulfamethoxazole: S <=0.5/9.5  Organism: Proteus mirabilis (04-06-23 @ 19:33)      Method Type: JULIANA      -  Amikacin: S <=16      -  Amoxicillin/Clavulanic Acid: S <=8/4      -  Ampicillin: S <=8 These ampicillin results predict results for amoxicillin      -  Ampicillin/Sulbactam: S <=4/2 Enterobacter, Klebsiella aerogenes, Citrobacter, and Serratia may develop resistance during prolonged therapy (3-4 days)      -  Aztreonam: S <=4      -  Cefazolin: S <=2 Enterobacter, Klebsiella aerogenes, Citrobacter, and Serratia may develop resistance during prolonged therapy (3-4 days)      -  Cefepime: S <=2      -  Cefoxitin: S <=8      -  Ceftriaxone: S <=1 Enterobacter, Klebsiella aerogenes, Citrobacter, and Serratia may develop resistance during prolonged therapy      -  Ciprofloxacin: S <=0.25      -  Ertapenem: S <=0.5      -  Gentamicin: S <=2      -  Levofloxacin: S <=0.5      -  Meropenem: S <=1      -  Piperacillin/Tazobactam: S <=8      -  Tobramycin: S <=2      -  Trimethoprim/Sulfamethoxazole: S <=0.5/9.5    Culture - Blood (collected 04-04-23 @ 06:35)  Source: .Blood Blood  Final Report (04-09-23 @ 14:00):    No Growth Final    Culture - Blood (collected 03-28-23 @ 07:32)  Source: .Blood None  Final Report (04-02-23 @ 18:00):    No Growth Final    Culture - Blood (collected 03-27-23 @ 10:39)  Source: .Blood Blood-Peripheral  Final Report (04-01-23 @ 18:00):    No Growth Final    Culture - Urine (collected 03-21-23 @ 11:57)  Source: Catheterized Catheterized  Final Report (03-22-23 @ 17:54):    No growth    Culture - Blood (collected 03-20-23 @ 22:07)  Source: .Blood Blood  Final Report (03-26-23 @ 02:00):    No Growth Final        Medications:  acetaminophen     Tablet .. 650 milliGRAM(s) Oral every 6 hours PRN  acetaminophen  Suppository .. 325 milliGRAM(s) Rectal once PRN  acetaminophen  Suppository .. 325 milliGRAM(s) Rectal once  aluminum hydroxide/magnesium hydroxide/simethicone Suspension 30 milliLiter(s) Oral every 6 hours PRN  ascorbic acid 500 milliGRAM(s) Oral daily  aspirin  chewable 81 milliGRAM(s) Oral daily  atorvastatin 40 milliGRAM(s) Oral at bedtime  baclofen 5 milliGRAM(s) Oral every 12 hours  collagenase Ointment 1 Application(s) Topical two times a day  Dakins Solution - 1/2 Strength 1 Application(s) Topical two times a day  doxycycline monohydrate Capsule 100 milliGRAM(s) Oral every 12 hours  enoxaparin Injectable 40 milliGRAM(s) SubCutaneous every 24 hours  gabapentin 100 milliGRAM(s) Oral three times a day  levETIRAcetam  IVPB 1500 milliGRAM(s) IV Intermittent every 12 hours  levoFLOXacin  Tablet 500 milliGRAM(s) Oral every 24 hours  metroNIDAZOLE    Tablet 500 milliGRAM(s) Oral every 8 hours  midodrine. 10 milliGRAM(s) Oral three times a day  morphine  - Injectable 4 milliGRAM(s) IV Push every 6 hours PRN  multivitamin/minerals 1 Tablet(s) Oral daily  valproate sodium  IVPB 500 milliGRAM(s) IV Intermittent three times a day  vitamin A &amp; D Ointment 1 Application(s) Topical two times a day        Assessment and Plan:  Patient is a 66 y/o woman with PMH of Right MCA stroke with left sided weakness was brought to the ED via EMS. In the ED, she had altered mental status and sepsis.    #Sepsis - new onset 4/2  #Sacral unstagble wound with osteomyelitis of coccyx  - Burn team s/p debridement on 4/13- patient will need Burn  and wound care specialist f/up at [facility]. with wound prevention tx.   - s/p CT Chest/Abd/Pelvis w/w/o Ct: Sacral decubitus ulcer with osteomyelitis of the coccyx. No acute intra-abdominal pathology. Unchanged parapelvic cysts bilaterally with a 4 mm stone in the left mid ureter without evidence of obstruction.  - CXR- no new infiltrates , procal level 0.14, pending u/a, urine cxs - for completion of infectious work up., f/up repeated blood cxs, daily CBC monitoring  - as per ID -  c/w Cefepime/flagyl/ Vanco(on hold due to elevated blood levels  ).   -4/16- vanco random level 16, as per ID hold vanco till level less than 15  - 4/17 as pe ID : changed  IV therapy to PO levaquin 500mg daily and PO flagyl 500mg TID and PO doxy 100mg BID to complete 21 days of therapy end 4/24    #Seizures  #Acute R Frontal Lobe Infarct likely 2/ hypotension   #Chronic R MCA infarct- chronic bed confinement status, dysarthria- answers on/off with 1 word yes, no.    - EEG (3/8) and video EEG (3/9): Focal slowing  - MRI Brain (3/15): new subcortical infarct in the right frontal lobe. Re-demonstrated chronic infarct in the right MCA territory.  - MRI of C spine (3/16): Significantly motion limited study. No gross cord compression or spinal cord edema demonstrated. Multilevel small disc bulges.  - Neuro eval'd 3/8-3/20: For L gaze preference/stroke, OP med review: on low dose Lamictal and Depakote in addition to Keppra (possible mood disorder/ seizures), Her MRI of brain showing subcortical infarct within same M1 territory. The cause of stroke is most likely hypotension as she has a diminutive Rt M1. However, MRI findings doesn't explain her B/L upper extremity spasticity. So, it is important to rule out cervical cord pathology. DAPT for 21 days followed by ASA 81 mg daily, Increased keppra post 3/20 possible seizure event  - SS re-juliette 3/21: Minced and moist, 1:1 feeds, thin liquids through straw  - C/w Depakote 500mg TID and Keppra to 1500mg BID   - C/w baclofen 5mg bid (for UE spasticity)  - C/w ASA, Plavix (3/17-4/6 - completed), and high dose statin for new stroke - DAPT for 21 days followed by ASA 81 mg daily  - - OP stroke clinic in 4 weeks after d/c per neurology    #Persistent Hypotension - stable b/p  with Midodrine 10mg TID      #Left avulsion fracture of medial malleolus  - Duplex BL LE (3/7): (-) for DVT  - CT Foot (3/11): acute minimally displaced avulsion fracture at the medial malleolus  - Podiatry following for foot pain: WBAT w/ surgical shoe, ankle brace, PT  - fall precautions  - pain control regimen  - C/w gabapentin at 100mg TID (Lowered dose from home, increase as pt tolerates)  -  Podiatry f/up, removed left foot  immobilizer     #Left cephalic vein thrombosis  - Duplex LUE (3/10): No DVT however there is superficial thrombosis noted in the left cephalic vein  - s/p warm compress  - Repeat duplex ordered 3/27 - negative for UE and LE DVT    #Anemia - normocytic, hg dropped on 4/13, s/p 1 unit PRBC, current HG stable   - possibly due to frequent phlebotomy, no signs of active bleed - monitor       DVT ppx - lovenox    - Diet: Soft and bite sized (SS 3/7)- patient to be assisted with meals at all times, with aspiration precautions   - DNR/DNI, overall guarded prognosis    #Progress Note Handoff: patient was medically optimized, stable for d/c to SNF once bed is available, obtain d/c covid swab.   Family discussion: yes, medical team Disposition: SNF possibly today      Total time spent to complete patient's bedside assessment, review medical chart, discuss discharge  plan of care with covering medical team was more than 35 minutes with >50% of time spent face to face with patient, discussion with patient/family and/or coordination of care .

## 2023-04-21 DIAGNOSIS — L89.159 PRESSURE ULCER OF SACRAL REGION, UNSPECIFIED STAGE: ICD-10-CM

## 2023-04-21 DIAGNOSIS — G93.41 METABOLIC ENCEPHALOPATHY: ICD-10-CM
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
## 2023-04-21 DIAGNOSIS — S82.52XA DISPLACED FRACTURE OF MEDIAL MALLEOLUS OF LEFT TIBIA, INITIAL ENCOUNTER FOR CLOSED FRACTURE: ICD-10-CM

## 2023-04-21 DIAGNOSIS — Z20.822 CONTACT WITH AND (SUSPECTED) EXPOSURE TO COVID-19: ICD-10-CM

## 2023-04-21 DIAGNOSIS — I82.612 ACUTE EMBOLISM AND THROMBOSIS OF SUPERFICIAL VEINS OF LEFT UPPER EXTREMITY: ICD-10-CM

## 2023-04-21 DIAGNOSIS — N39.0 URINARY TRACT INFECTION, SITE NOT SPECIFIED: ICD-10-CM

## 2023-04-21 DIAGNOSIS — F05 DELIRIUM DUE TO KNOWN PHYSIOLOGICAL CONDITION: ICD-10-CM

## 2023-04-21 DIAGNOSIS — G40.909 EPILEPSY, UNSPECIFIED, NOT INTRACTABLE, WITHOUT STATUS EPILEPTICUS: ICD-10-CM

## 2023-04-21 DIAGNOSIS — E44.0 MODERATE PROTEIN-CALORIE MALNUTRITION: ICD-10-CM

## 2023-04-21 DIAGNOSIS — Z86.73 PERSONAL HISTORY OF TRANSIENT ISCHEMIC ATTACK (TIA), AND CEREBRAL INFARCTION WITHOUT RESIDUAL DEFICITS: ICD-10-CM

## 2023-04-21 DIAGNOSIS — I63.9 CEREBRAL INFARCTION, UNSPECIFIED: ICD-10-CM

## 2023-04-21 DIAGNOSIS — E87.5 HYPERKALEMIA: ICD-10-CM

## 2023-04-21 DIAGNOSIS — D64.9 ANEMIA, UNSPECIFIED: ICD-10-CM

## 2023-04-21 DIAGNOSIS — L89.323 PRESSURE ULCER OF LEFT BUTTOCK, STAGE 3: ICD-10-CM

## 2023-04-21 DIAGNOSIS — I69.354 HEMIPLEGIA AND HEMIPARESIS FOLLOWING CEREBRAL INFARCTION AFFECTING LEFT NON-DOMINANT SIDE: ICD-10-CM

## 2023-04-21 DIAGNOSIS — A41.9 SEPSIS, UNSPECIFIED ORGANISM: ICD-10-CM

## 2023-04-21 DIAGNOSIS — L89.313 PRESSURE ULCER OF RIGHT BUTTOCK, STAGE 3: ICD-10-CM
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
## 2023-04-21 DIAGNOSIS — M86.9 OSTEOMYELITIS, UNSPECIFIED: ICD-10-CM

## 2023-04-21 DIAGNOSIS — L89.810 PRESSURE ULCER OF HEAD, UNSTAGEABLE: ICD-10-CM

## 2023-07-10 NOTE — SWALLOW BEDSIDE ASSESSMENT ADULT - CONSISTENCIES ADMINISTERED
Patient called to check the status of Tramadol Refill that she requested on 7/7. thin liquid/pureed/soft & bite-sized

## 2024-02-26 ENCOUNTER — APPOINTMENT (OUTPATIENT)
Dept: OPHTHALMOLOGY | Age: 69
End: 2024-02-26

## 2024-04-24 ENCOUNTER — APPOINTMENT (OUTPATIENT)
Dept: OPHTHALMOLOGY | Age: 69
End: 2024-04-24

## 2024-04-24 DIAGNOSIS — H40.003 GLAUCOMA SUSPECT OF BOTH EYES: ICD-10-CM

## 2024-04-24 DIAGNOSIS — H52.203 HYPEROPIA OF BOTH EYES WITH ASTIGMATISM AND PRESBYOPIA: ICD-10-CM

## 2024-04-24 DIAGNOSIS — H25.813 COMBINED FORMS OF AGE-RELATED CATARACT OF BOTH EYES: ICD-10-CM

## 2024-04-24 DIAGNOSIS — Z01.00 VISIT FOR EYE AND VISION EXAM: Primary | ICD-10-CM

## 2024-04-24 DIAGNOSIS — H52.03 HYPEROPIA OF BOTH EYES WITH ASTIGMATISM AND PRESBYOPIA: ICD-10-CM

## 2024-04-24 DIAGNOSIS — H52.4 HYPEROPIA OF BOTH EYES WITH ASTIGMATISM AND PRESBYOPIA: ICD-10-CM

## 2024-04-24 PROCEDURE — 92004 COMPRE OPH EXAM NEW PT 1/>: CPT | Performed by: OPTOMETRIST

## 2024-04-24 PROCEDURE — 92015 DETERMINE REFRACTIVE STATE: CPT | Performed by: OPTOMETRIST

## 2024-04-24 RX ORDER — OLOPATADINE HYDROCHLORIDE 2 MG/ML
1 SOLUTION/ DROPS OPHTHALMIC DAILY
Qty: 2.5 ML | Refills: 11 | Status: SHIPPED | OUTPATIENT
Start: 2024-04-24 | End: 2026-04-24

## 2024-04-24 RX ORDER — GLYCERIN/PROPYLENE GLYCOL 0.6 %-0.6%
DROPPERETTE, SINGLE-USE DROP DISPENSER OPHTHALMIC (EYE)
Qty: 15 ML | Refills: 12 | Status: SHIPPED | OUTPATIENT
Start: 2024-04-24 | End: 2026-04-24

## 2024-04-24 ASSESSMENT — TONOMETRY
OD_IOP_MMHG: 12
OS_IOP_MMHG: 14
IOP_METHOD: NON-CONTACT AIR PUFF

## 2024-04-24 ASSESSMENT — REFRACTION_MANIFEST
METHOD_AUTOREFRACTION: 1
OS_AXIS: 067
OS_SPHERE: +1.75
OD_SPHERE: +1.75
OD_CYLINDER: +0.75
OS_CYLINDER: +1.00
OD_AXIS: 103

## 2024-04-24 ASSESSMENT — KERATOMETRY
METHOD_AUTO_MANUAL: AUTOMATED
OD_K1POWER_DIOPTERS: 43
OS_K2POWER_DIOPTERS: 45
OS_AXISANGLE_DEGREES: 78
OD_K2POWER_DIOPTERS: 45.25
OS_AXISANGLE2_DEGREES: 168
OD_AXISANGLE2_DEGREES: 6
OD_AXISANGLE_DEGREES: 96
OS_K1POWER_DIOPTERS: 43.25

## 2024-04-24 ASSESSMENT — CONF VISUAL FIELD
OD_SUPERIOR_NASAL_RESTRICTION: 0
OS_INFERIOR_TEMPORAL_RESTRICTION: 0
OS_INFERIOR_NASAL_RESTRICTION: 0
OD_INFERIOR_TEMPORAL_RESTRICTION: 0
OS_SUPERIOR_NASAL_RESTRICTION: 0
METHOD: COUNTING FINGERS
OS_SUPERIOR_TEMPORAL_RESTRICTION: 0
OD_INFERIOR_NASAL_RESTRICTION: 0
OS_NORMAL: 1
OD_NORMAL: 1
OD_SUPERIOR_TEMPORAL_RESTRICTION: 0

## 2024-04-24 ASSESSMENT — VISUAL ACUITY
OD_CC: 20/40
OS_SC: 20/50
CORRECTION_TYPE: GLASSES
OD_CC+: -2
OD_SC: 20/60
OS_CC: 20/20
OD_CC: 20/25
OS_CC: 20/40
METHOD: SNELLEN - LINEAR

## 2024-04-24 ASSESSMENT — REFRACTION
OD_AXIS: 107
OS_ADD: +2.00
OS_SPHERE: +1.75
OS_AXIS: 074
OS_CYLINDER: +1.00
OD_SPHERE: +1.75
OD_CYLINDER: +1.00
OD_ADD: +2.00

## 2024-04-24 ASSESSMENT — REFRACTION_WEARINGRX
OS_AXIS: 072
OS_SPHERE: +2.75
OD_AXIS: 140
OD_CYLINDER: +0.50
SPECS_TYPE: SINGLE VISION
OD_SPHERE: +3.00
OS_CYLINDER: +0.75

## 2024-04-24 ASSESSMENT — CUP TO DISC RATIO
OD_RATIO: 0.4
OS_RATIO: 0.65

## 2024-04-24 ASSESSMENT — SLIT LAMP EXAM - LIDS
COMMENTS: 2+ DERMATOCHALASIS - UPPER LID
COMMENTS: 2+ DERMATOCHALASIS - UPPER LID

## 2024-04-24 ASSESSMENT — EXTERNAL EXAM - LEFT EYE: OS_EXAM: 2+ BROW PTOSIS

## 2024-04-24 ASSESSMENT — EXTERNAL EXAM - RIGHT EYE: OD_EXAM: 2+ BROW PTOSIS

## 2024-05-03 ENCOUNTER — TELEPHONE (OUTPATIENT)
Dept: OPHTHALMOLOGY | Age: 69
End: 2024-05-03

## 2024-05-03 RX ORDER — GLYCERIN/PROPYLENE GLYCOL 0.6 %-0.6%
DROPPERETTE, SINGLE-USE DROP DISPENSER OPHTHALMIC (EYE)
Qty: 15 ML | Refills: 12 | Status: SHIPPED | OUTPATIENT
Start: 2024-05-03 | End: 2026-05-03

## 2024-05-03 RX ORDER — OLOPATADINE HYDROCHLORIDE 2 MG/ML
1 SOLUTION/ DROPS OPHTHALMIC DAILY
Qty: 2.5 ML | Refills: 11 | Status: SHIPPED | OUTPATIENT
Start: 2024-05-03 | End: 2026-05-03

## 2024-06-26 ENCOUNTER — APPOINTMENT (OUTPATIENT)
Dept: OPHTHALMOLOGY | Age: 69
End: 2024-06-26

## 2024-09-03 ENCOUNTER — INPATIENT (INPATIENT)
Facility: HOSPITAL | Age: 69
LOS: 6 days | Discharge: HOME | DRG: 871 | End: 2024-09-10
Attending: INTERNAL MEDICINE | Admitting: INTERNAL MEDICINE
Payer: MEDICARE

## 2024-09-03 VITALS
HEIGHT: 60 IN | TEMPERATURE: 99 F | WEIGHT: 130.07 LBS | HEART RATE: 124 BPM | DIASTOLIC BLOOD PRESSURE: 82 MMHG | SYSTOLIC BLOOD PRESSURE: 131 MMHG | RESPIRATION RATE: 18 BRPM | OXYGEN SATURATION: 99 %

## 2024-09-03 DIAGNOSIS — D64.9 ANEMIA, UNSPECIFIED: ICD-10-CM

## 2024-09-03 LAB
ALBUMIN SERPL ELPH-MCNC: 3.1 G/DL — LOW (ref 3.5–5.2)
ALP SERPL-CCNC: 157 U/L — HIGH (ref 30–115)
ALT FLD-CCNC: 40 U/L — SIGNIFICANT CHANGE UP (ref 0–41)
ANION GAP SERPL CALC-SCNC: 12 MMOL/L — SIGNIFICANT CHANGE UP (ref 7–14)
APPEARANCE UR: CLEAR — SIGNIFICANT CHANGE UP
APTT BLD: >200 SEC — CRITICAL HIGH (ref 27–39.2)
AST SERPL-CCNC: 28 U/L — SIGNIFICANT CHANGE UP (ref 0–41)
BACTERIA # UR AUTO: NEGATIVE /HPF — SIGNIFICANT CHANGE UP
BASE EXCESS BLDV CALC-SCNC: 4.6 MMOL/L — HIGH (ref -2–3)
BASOPHILS # BLD AUTO: 0.09 K/UL — SIGNIFICANT CHANGE UP (ref 0–0.2)
BASOPHILS NFR BLD AUTO: 0.5 % — SIGNIFICANT CHANGE UP (ref 0–1)
BILIRUB SERPL-MCNC: <0.2 MG/DL — SIGNIFICANT CHANGE UP (ref 0.2–1.2)
BILIRUB UR-MCNC: NEGATIVE — SIGNIFICANT CHANGE UP
BUN SERPL-MCNC: 13 MG/DL — SIGNIFICANT CHANGE UP (ref 10–20)
CA-I SERPL-SCNC: 1.27 MMOL/L — SIGNIFICANT CHANGE UP (ref 1.15–1.33)
CALCIUM SERPL-MCNC: 9.5 MG/DL — SIGNIFICANT CHANGE UP (ref 8.4–10.4)
CAST: 1 /LPF — SIGNIFICANT CHANGE UP (ref 0–4)
CHLORIDE SERPL-SCNC: 93 MMOL/L — LOW (ref 98–110)
CO2 SERPL-SCNC: 25 MMOL/L — SIGNIFICANT CHANGE UP (ref 17–32)
COLOR SPEC: YELLOW — SIGNIFICANT CHANGE UP
CREAT SERPL-MCNC: <0.5 MG/DL — LOW (ref 0.7–1.5)
DIFF PNL FLD: NEGATIVE — SIGNIFICANT CHANGE UP
EGFR: 115 ML/MIN/1.73M2 — SIGNIFICANT CHANGE UP
EOSINOPHIL # BLD AUTO: 0.31 K/UL — SIGNIFICANT CHANGE UP (ref 0–0.7)
EOSINOPHIL NFR BLD AUTO: 1.6 % — SIGNIFICANT CHANGE UP (ref 0–8)
FLUAV AG NPH QL: SIGNIFICANT CHANGE UP
FLUBV AG NPH QL: SIGNIFICANT CHANGE UP
GAS PNL BLDV: 131 MMOL/L — LOW (ref 136–145)
GAS PNL BLDV: SIGNIFICANT CHANGE UP
GLUCOSE SERPL-MCNC: 131 MG/DL — HIGH (ref 70–99)
GLUCOSE UR QL: NEGATIVE MG/DL — SIGNIFICANT CHANGE UP
HCO3 BLDV-SCNC: 31 MMOL/L — HIGH (ref 22–29)
HCT VFR BLD CALC: 26.4 % — LOW (ref 37–47)
HCT VFR BLDA CALC: 26 % — LOW (ref 34.5–46.5)
HGB BLD CALC-MCNC: 8.6 G/DL — LOW (ref 11.7–16.1)
HGB BLD-MCNC: 8.3 G/DL — LOW (ref 12–16)
IMM GRANULOCYTES NFR BLD AUTO: 1.7 % — HIGH (ref 0.1–0.3)
INR BLD: 1.22 RATIO — SIGNIFICANT CHANGE UP (ref 0.65–1.3)
KETONES UR-MCNC: NEGATIVE MG/DL — SIGNIFICANT CHANGE UP
LACTATE BLDV-MCNC: 1.9 MMOL/L — SIGNIFICANT CHANGE UP (ref 0.5–2)
LACTATE SERPL-SCNC: 2.1 MMOL/L — HIGH (ref 0.7–2)
LEUKOCYTE ESTERASE UR-ACNC: ABNORMAL
LYMPHOCYTES # BLD AUTO: 10.1 % — LOW (ref 20.5–51.1)
LYMPHOCYTES # BLD AUTO: 2.01 K/UL — SIGNIFICANT CHANGE UP (ref 1.2–3.4)
MCHC RBC-ENTMCNC: 27.2 PG — SIGNIFICANT CHANGE UP (ref 27–31)
MCHC RBC-ENTMCNC: 31.4 G/DL — LOW (ref 32–37)
MCV RBC AUTO: 86.6 FL — SIGNIFICANT CHANGE UP (ref 81–99)
MONOCYTES # BLD AUTO: 1.17 K/UL — HIGH (ref 0.1–0.6)
MONOCYTES NFR BLD AUTO: 5.9 % — SIGNIFICANT CHANGE UP (ref 1.7–9.3)
NEUTROPHILS # BLD AUTO: 15.91 K/UL — HIGH (ref 1.4–6.5)
NEUTROPHILS NFR BLD AUTO: 80.2 % — HIGH (ref 42.2–75.2)
NITRITE UR-MCNC: NEGATIVE — SIGNIFICANT CHANGE UP
NRBC # BLD: 0 /100 WBCS — SIGNIFICANT CHANGE UP (ref 0–0)
PCO2 BLDV: 53 MMHG — HIGH (ref 39–42)
PH BLDV: 7.37 — SIGNIFICANT CHANGE UP (ref 7.32–7.43)
PH UR: 7.5 — SIGNIFICANT CHANGE UP (ref 5–8)
PLATELET # BLD AUTO: 990 K/UL — HIGH (ref 130–400)
PMV BLD: 9.8 FL — SIGNIFICANT CHANGE UP (ref 7.4–10.4)
PO2 BLDV: 40 MMHG — SIGNIFICANT CHANGE UP (ref 25–45)
POTASSIUM BLDV-SCNC: 5.4 MMOL/L — HIGH (ref 3.5–5.1)
POTASSIUM SERPL-MCNC: 6.3 MMOL/L — CRITICAL HIGH (ref 3.5–5)
POTASSIUM SERPL-SCNC: 6.3 MMOL/L — CRITICAL HIGH (ref 3.5–5)
PROT SERPL-MCNC: 7.5 G/DL — SIGNIFICANT CHANGE UP (ref 6–8)
PROT UR-MCNC: NEGATIVE MG/DL — SIGNIFICANT CHANGE UP
PROTHROM AB SERPL-ACNC: 13.9 SEC — HIGH (ref 9.95–12.87)
RBC # BLD: 3.05 M/UL — LOW (ref 4.2–5.4)
RBC # FLD: 15.1 % — HIGH (ref 11.5–14.5)
RBC CASTS # UR COMP ASSIST: 0 /HPF — SIGNIFICANT CHANGE UP (ref 0–4)
RSV RNA NPH QL NAA+NON-PROBE: SIGNIFICANT CHANGE UP
SAO2 % BLDV: 60.5 % — LOW (ref 67–88)
SARS-COV-2 RNA SPEC QL NAA+PROBE: SIGNIFICANT CHANGE UP
SODIUM SERPL-SCNC: 130 MMOL/L — LOW (ref 135–146)
SP GR SPEC: 1.01 — SIGNIFICANT CHANGE UP (ref 1–1.03)
SQUAMOUS # UR AUTO: 0 /HPF — SIGNIFICANT CHANGE UP (ref 0–5)
UROBILINOGEN FLD QL: 0.2 MG/DL — SIGNIFICANT CHANGE UP (ref 0.2–1)
WBC # BLD: 19.82 K/UL — HIGH (ref 4.8–10.8)
WBC # FLD AUTO: 19.82 K/UL — HIGH (ref 4.8–10.8)
WBC UR QL: 21 /HPF — HIGH (ref 0–5)

## 2024-09-03 PROCEDURE — 86923 COMPATIBILITY TEST ELECTRIC: CPT

## 2024-09-03 PROCEDURE — 74176 CT ABD & PELVIS W/O CONTRAST: CPT | Mod: MC

## 2024-09-03 PROCEDURE — 74018 RADEX ABDOMEN 1 VIEW: CPT

## 2024-09-03 PROCEDURE — 93010 ELECTROCARDIOGRAM REPORT: CPT

## 2024-09-03 PROCEDURE — 36415 COLL VENOUS BLD VENIPUNCTURE: CPT

## 2024-09-03 PROCEDURE — 87077 CULTURE AEROBIC IDENTIFY: CPT

## 2024-09-03 PROCEDURE — 85610 PROTHROMBIN TIME: CPT

## 2024-09-03 PROCEDURE — 86140 C-REACTIVE PROTEIN: CPT

## 2024-09-03 PROCEDURE — 86900 BLOOD TYPING SEROLOGIC ABO: CPT

## 2024-09-03 PROCEDURE — 84466 ASSAY OF TRANSFERRIN: CPT

## 2024-09-03 PROCEDURE — 83605 ASSAY OF LACTIC ACID: CPT

## 2024-09-03 PROCEDURE — 84443 ASSAY THYROID STIM HORMONE: CPT

## 2024-09-03 PROCEDURE — 83540 ASSAY OF IRON: CPT

## 2024-09-03 PROCEDURE — 71045 X-RAY EXAM CHEST 1 VIEW: CPT

## 2024-09-03 PROCEDURE — 85027 COMPLETE CBC AUTOMATED: CPT

## 2024-09-03 PROCEDURE — 80053 COMPREHEN METABOLIC PANEL: CPT

## 2024-09-03 PROCEDURE — 82728 ASSAY OF FERRITIN: CPT

## 2024-09-03 PROCEDURE — P9016: CPT

## 2024-09-03 PROCEDURE — 80202 ASSAY OF VANCOMYCIN: CPT

## 2024-09-03 PROCEDURE — 83735 ASSAY OF MAGNESIUM: CPT

## 2024-09-03 PROCEDURE — 87641 MR-STAPH DNA AMP PROBE: CPT

## 2024-09-03 PROCEDURE — 85025 COMPLETE CBC W/AUTO DIFF WBC: CPT

## 2024-09-03 PROCEDURE — 85730 THROMBOPLASTIN TIME PARTIAL: CPT

## 2024-09-03 PROCEDURE — 86901 BLOOD TYPING SEROLOGIC RH(D): CPT

## 2024-09-03 PROCEDURE — 87040 BLOOD CULTURE FOR BACTERIA: CPT

## 2024-09-03 PROCEDURE — 85652 RBC SED RATE AUTOMATED: CPT

## 2024-09-03 PROCEDURE — 36430 TRANSFUSION BLD/BLD COMPNT: CPT

## 2024-09-03 PROCEDURE — 99291 CRITICAL CARE FIRST HOUR: CPT

## 2024-09-03 PROCEDURE — 86850 RBC ANTIBODY SCREEN: CPT

## 2024-09-03 PROCEDURE — 99223 1ST HOSP IP/OBS HIGH 75: CPT

## 2024-09-03 PROCEDURE — 80048 BASIC METABOLIC PNL TOTAL CA: CPT

## 2024-09-03 PROCEDURE — 88312 SPECIAL STAINS GROUP 1: CPT

## 2024-09-03 PROCEDURE — 87086 URINE CULTURE/COLONY COUNT: CPT

## 2024-09-03 PROCEDURE — 87640 STAPH A DNA AMP PROBE: CPT

## 2024-09-03 PROCEDURE — 88305 TISSUE EXAM BY PATHOLOGIST: CPT

## 2024-09-03 PROCEDURE — 93970 EXTREMITY STUDY: CPT

## 2024-09-03 PROCEDURE — 81001 URINALYSIS AUTO W/SCOPE: CPT

## 2024-09-03 PROCEDURE — 83550 IRON BINDING TEST: CPT

## 2024-09-03 PROCEDURE — 71045 X-RAY EXAM CHEST 1 VIEW: CPT | Mod: 26

## 2024-09-03 RX ORDER — MEROPENEM 500 MG/10ML
1000 INJECTION, POWDER, FOR SOLUTION INTRAVENOUS EVERY 8 HOURS
Refills: 0 | Status: DISCONTINUED | OUTPATIENT
Start: 2024-09-03 | End: 2024-09-03

## 2024-09-03 RX ORDER — CARIPRAZINE 4.5 MG/1
1 CAPSULE, GELATIN COATED ORAL
Refills: 0 | DISCHARGE

## 2024-09-03 RX ORDER — CEFEPIME 2 G/1
2000 INJECTION, POWDER, FOR SOLUTION INTRAVENOUS ONCE
Refills: 0 | Status: COMPLETED | OUTPATIENT
Start: 2024-09-03 | End: 2024-09-03

## 2024-09-03 RX ORDER — GABAPENTIN 100 MG
100 CAPSULE ORAL THREE TIMES A DAY
Refills: 0 | Status: DISCONTINUED | OUTPATIENT
Start: 2024-09-03 | End: 2024-09-10

## 2024-09-03 RX ORDER — ANORECTAL OINTMENT 15.7; .44; 24; 20.6 G/100G; G/100G; G/100G; G/100G
1 OINTMENT TOPICAL
Refills: 0 | DISCHARGE

## 2024-09-03 RX ORDER — POLYETHYLENE GLYCOL 3350 17 G/17G
17 POWDER, FOR SOLUTION ORAL
Refills: 0 | DISCHARGE

## 2024-09-03 RX ORDER — ACETAMINOPHEN 325 MG/1
650 TABLET ORAL ONCE
Refills: 0 | Status: DISCONTINUED | OUTPATIENT
Start: 2024-09-03 | End: 2024-09-03

## 2024-09-03 RX ORDER — PETROLATUM 93.5 G/100G
1 OINTMENT TOPICAL
Refills: 0 | Status: DISCONTINUED | OUTPATIENT
Start: 2024-09-03 | End: 2024-09-10

## 2024-09-03 RX ORDER — ASPIRIN 81 MG
81 TABLET, DELAYED RELEASE (ENTERIC COATED) ORAL DAILY
Refills: 0 | Status: DISCONTINUED | OUTPATIENT
Start: 2024-09-03 | End: 2024-09-04

## 2024-09-03 RX ORDER — VALPROIC ACID 250 MG
750 CAPSULE ORAL THREE TIMES A DAY
Refills: 0 | Status: DISCONTINUED | OUTPATIENT
Start: 2024-09-03 | End: 2024-09-04

## 2024-09-03 RX ORDER — MEROPENEM 500 MG/10ML
1000 INJECTION, POWDER, FOR SOLUTION INTRAVENOUS ONCE
Refills: 0 | Status: COMPLETED | OUTPATIENT
Start: 2024-09-03 | End: 2024-09-03

## 2024-09-03 RX ORDER — ASCORBIC ACID/ASCORBATE SODIUM 500 MG
500 TABLET,CHEWABLE ORAL DAILY
Refills: 0 | Status: DISCONTINUED | OUTPATIENT
Start: 2024-09-03 | End: 2024-09-10

## 2024-09-03 RX ORDER — DEXTROSE 15 G/33 G
50 GEL IN PACKET (GRAM) ORAL ONCE
Refills: 0 | Status: COMPLETED | OUTPATIENT
Start: 2024-09-03 | End: 2024-09-03

## 2024-09-03 RX ORDER — PANTOPRAZOLE SODIUM 40 MG
40 TABLET, DELAYED RELEASE (ENTERIC COATED) ORAL
Refills: 0 | Status: DISCONTINUED | OUTPATIENT
Start: 2024-09-03 | End: 2024-09-07

## 2024-09-03 RX ORDER — PANTOPRAZOLE SODIUM 40 MG
80 TABLET, DELAYED RELEASE (ENTERIC COATED) ORAL ONCE
Refills: 0 | Status: COMPLETED | OUTPATIENT
Start: 2024-09-03 | End: 2024-09-03

## 2024-09-03 RX ORDER — CALCIUM GLUCONATE 61(648) MG
2 TABLET ORAL ONCE
Refills: 0 | Status: DISCONTINUED | OUTPATIENT
Start: 2024-09-03 | End: 2024-09-05

## 2024-09-03 RX ORDER — VANCOMYCIN/0.9 % SOD CHLORIDE 1.75G/25
1000 PLASTIC BAG, INJECTION (ML) INTRAVENOUS EVERY 12 HOURS
Refills: 0 | Status: DISCONTINUED | OUTPATIENT
Start: 2024-09-03 | End: 2024-09-05

## 2024-09-03 RX ORDER — LIDOCAINE/BENZALKONIUM/ALCOHOL
1 SOLUTION, NON-ORAL TOPICAL EVERY 24 HOURS
Refills: 0 | Status: DISCONTINUED | OUTPATIENT
Start: 2024-09-03 | End: 2024-09-10

## 2024-09-03 RX ORDER — ACETAMINOPHEN 325 MG/1
650 TABLET ORAL EVERY 6 HOURS
Refills: 0 | Status: DISCONTINUED | OUTPATIENT
Start: 2024-09-03 | End: 2024-09-10

## 2024-09-03 RX ORDER — INSULIN REGULAR, HUMAN 100/ML (3)
5 INSULIN PEN (ML) SUBCUTANEOUS ONCE
Refills: 0 | Status: COMPLETED | OUTPATIENT
Start: 2024-09-03 | End: 2024-09-03

## 2024-09-03 RX ORDER — VANCOMYCIN/0.9 % SOD CHLORIDE 1.75G/25
1 PLASTIC BAG, INJECTION (ML) INTRAVENOUS
Refills: 0 | DISCHARGE

## 2024-09-03 RX ORDER — FERROUS SULFATE 325(65) MG
300 TABLET ORAL
Refills: 0 | DISCHARGE

## 2024-09-03 RX ORDER — LEVETIRACETAM 1000 MG/1
1000 TABLET ORAL
Refills: 0 | Status: DISCONTINUED | OUTPATIENT
Start: 2024-09-03 | End: 2024-09-10

## 2024-09-03 RX ORDER — MIDODRINE HYDROCHLORIDE 5 MG/1
10 TABLET ORAL THREE TIMES A DAY
Refills: 0 | Status: DISCONTINUED | OUTPATIENT
Start: 2024-09-03 | End: 2024-09-09

## 2024-09-03 RX ORDER — SODIUM ZIRCONIUM CYCLOSILICATE 5 G/5G
10 POWDER, FOR SUSPENSION ORAL ONCE
Refills: 0 | Status: COMPLETED | OUTPATIENT
Start: 2024-09-03 | End: 2024-09-03

## 2024-09-03 RX ORDER — SODIUM CHLORIDE 9 MG/ML
1 INJECTION INTRAMUSCULAR; INTRAVENOUS; SUBCUTANEOUS
Refills: 0 | Status: DISCONTINUED | OUTPATIENT
Start: 2024-09-03 | End: 2024-09-10

## 2024-09-03 RX ORDER — VANCOMYCIN/0.9 % SOD CHLORIDE 1.75G/25
1000 PLASTIC BAG, INJECTION (ML) INTRAVENOUS ONCE
Refills: 0 | Status: COMPLETED | OUTPATIENT
Start: 2024-09-03 | End: 2024-09-03

## 2024-09-03 RX ORDER — BACLOFEN 0.5 MG/ML
10 INJECTION INTRATHECAL EVERY 12 HOURS
Refills: 0 | Status: DISCONTINUED | OUTPATIENT
Start: 2024-09-03 | End: 2024-09-10

## 2024-09-03 RX ORDER — LIDOCAINE/BENZALKONIUM/ALCOHOL
1 SOLUTION, NON-ORAL TOPICAL
Refills: 0 | DISCHARGE

## 2024-09-03 RX ORDER — PSYLLIUM HUSK 0.4 G
1 CAPSULE ORAL DAILY
Refills: 0 | Status: DISCONTINUED | OUTPATIENT
Start: 2024-09-03 | End: 2024-09-10

## 2024-09-03 RX ORDER — SODIUM CHLORIDE 9 MG/ML
1 INJECTION INTRAMUSCULAR; INTRAVENOUS; SUBCUTANEOUS
Refills: 0 | DISCHARGE

## 2024-09-03 RX ADMIN — Medication 2000 MILLILITER(S): at 19:40

## 2024-09-03 RX ADMIN — Medication 100 MILLIGRAM(S): at 21:18

## 2024-09-03 RX ADMIN — SODIUM ZIRCONIUM CYCLOSILICATE 10 GRAM(S): 5 POWDER, FOR SUSPENSION ORAL at 21:18

## 2024-09-03 RX ADMIN — Medication 80 MILLIGRAM(S): at 16:53

## 2024-09-03 RX ADMIN — Medication 50 MILLILITER(S): at 19:40

## 2024-09-03 RX ADMIN — Medication 750 MILLIGRAM(S): at 21:18

## 2024-09-03 RX ADMIN — Medication 2000 MILLILITER(S): at 16:54

## 2024-09-03 RX ADMIN — Medication 250 MILLIGRAM(S): at 16:58

## 2024-09-03 RX ADMIN — Medication 5 UNIT(S): at 19:40

## 2024-09-03 RX ADMIN — CEFEPIME 100 MILLIGRAM(S): 2 INJECTION, POWDER, FOR SOLUTION INTRAVENOUS at 16:42

## 2024-09-03 NOTE — ED PROVIDER NOTE - PHYSICAL EXAMINATION
CONSTITUTIONAL: chrinically ill appearing;  no apparent distress.   NECK: Supple; non-tender; no cervical lymphadenopathy. \  CARDIOVASCULAR: Normal S1, S2; no murmurs, rubs, or gallops.   RESPIRATORY: Normal chest excursion with respiration; breath sounds clear and equal bilaterally; no wheezes, rhonchi, or rales.  GI/: Non-distended; non-tender; no palpable organomegaly.   MS: No evidence of trauma or deformity. Nontender to palpation; distal pulses are normal.   SKIN: +unstagable sacral wound, otherwise warm; dry; good turgor  NEURO/PSYCH: A & O x 3; grossly unremarkable. mood and manner are appropriate

## 2024-09-03 NOTE — H&P ADULT - ATTENDING COMMENTS
Patient seen and examined at bedside independently of the residents. I read the resident's note and agree with the plan with the additions and corrections as noted below. My note supersedes the resident's note.     REVIEW OF SYSTEMS:  Negative except in HPI.     PMH:  Dementia, CVA, OM, Sacral ulcer, recent admission at [facility] for septic shock (dc on Vanc, Meropenem)     FHx: Reviewed. No fhx of asthma/copd, No fhx of liver and pulmonary disease. No fhx of hematological disorder.     Physical Exam:  GEN: No acute distress. Awake, Alert and oriented x 2.  Head: Atraumatic, Normocephalic.   Eye: PEERLA. No sclera icterus. EOMI.   ENT: Normal oropharynx, no thyromegaly, no mass, no lymphadenopathy.   LUNGS: Clear to auscultation bilaterally. No wheeze/rales/crackles.   HEART: Normal. S1/S2 present. RRR. No murmur/gallops.   ABD: Soft, non-tender, non-distended. Bowel sounds present.   EXT: No pitting edema. No erythema. No tenderness.  Integumentary: No rash, No sore, No petechia. Large deep sacral ulcers with foul smelling discharge.   NEURO: Moving all extremities.     Vital Signs Last 24 Hrs  T(C): 37.3 (04 Sep 2024 01:58), Max: 38.3 (03 Sep 2024 15:18)  T(F): 99.1 (04 Sep 2024 01:58), Max: 101 (03 Sep 2024 15:18)  HR: 102 (04 Sep 2024 01:58) (102 - 125)  BP: 149/73 (04 Sep 2024 01:58) (119/80 - 166/86)  BP(mean): --  RR: 18 (04 Sep 2024 01:58) (18 - 19)  SpO2: 99% (04 Sep 2024 01:58) (98% - 99%)    Parameters below as of 04 Sep 2024 01:58  Patient On (Oxygen Delivery Method): room air        Please see the above notes for Labs and radiology.     Assessment and Plan:     70 yo F with hx of Dementia, CVA, OM, Sacral ulcer, recent admission at [facility] for septic shock (dc on Vanc, Meropenem) presents from NH for low Hb and positive occult blood stool.     Sepsis likely 2/2 infected sacral ulcer vs. less likely UTI  - UA borderline positive.   - c/w Vanc and Meropenem ( patient is receiving vanc, meropenem at NH)   - f/u BCx, UCx  - ID consult.   - Burn consult.     Acute on chronic anemia   - Hb 8.3 --> 7.6  - monitor CBC and transfuse if Hb  < 7.   - PPI BID   - NPO with IVF gentle hydration.   - active type and screen  - GI consult.     Hyperkalemia   - s/p insulin and dextrose, lokelma given in ED.   - serum K 6.3 --> 5.2  - monitor BMP    CVA - Hold ASA for now, in the setting of GIB.     DVT ppx: SCD  GI ppx: PPI  Diet: NPO  Activity: as tolerated.     Date seen by the attendin2024  Total time spent: 75 minutes.

## 2024-09-03 NOTE — H&P ADULT - NSHPPHYSICALEXAM_GEN_ALL_CORE
GENERAL: NAD, lying in bed comfortably  HEAD:  Atraumatic, normocephalic  EYES: EOMI, PERRLA, conjunctiva and sclera clear  NECK: Supple, trachea midline, no JVD  HEART: Regular rate and rhythm, no murmurs, rubs, or gallops  LUNGS: Unlabored respirations.  Clear to auscultation bilaterally, no crackles, wheezing, or rhonchi  ABDOMEN: Soft, nontender, nondistended, +BS  EXTREMITIES: 2+ peripheral pulses bilaterally. No clubbing, cyanosis, or edema  NERVOUS SYSTEM:  A&Ox3, moving all extremities, no focal deficits   SKIN: No rashes or lesions GENERAL: NAD, lying in bed comfortably  HEAD:  Atraumatic, normocephalic  EYES: EOMI, PERRLA, conjunctiva and sclera clear  NECK: Supple, trachea midline, no JVD  HEART: Regular rate and rhythm, no murmurs, rubs, or gallops  LUNGS: Unlabored respirations.  Clear to auscultation bilaterally, no crackles, wheezing, or rhonchi  ABDOMEN: Soft, nontender, nondistended, +BS  EXTREMITIES: 2+ peripheral pulses bilaterally. No clubbing, cyanosis, or edema  NERVOUS SYSTEM:  A&Ox2, moving all extremities, no focal deficits   SKIN: sacral ulcer

## 2024-09-03 NOTE — ED PROVIDER NOTE - OBJECTIVE STATEMENT
pt with pmhx  of dementia, CVA, OM, sacral ulcer, recent admission w/septic shock at [facility] discharged on vancomycin and meropenem presents from nursing home for positive occult blood and low Hb of 7 at the NH. Denies fever/chill/HA/dizziness/chest pain/palpitation/sob/abd pain/n/v/d/ black stool/bloody stool/urinary sxs

## 2024-09-03 NOTE — ED PROVIDER NOTE - CLINICAL SUMMARY MEDICAL DECISION MAKING FREE TEXT BOX
68 yo F presented to ED with concern for sepsis. Labs and EKG were ordered and reviewed. Hyperkalemia treated. Imaging was ordered and reviewed by me.  Appropriate medications for patient's presenting complaints were ordered and effects were reassessed.  Patient's records (prior hospital, ED visit, and/or nursing home notes if available) were reviewed.  Additional history was obtained from EMS, family, and/or PCP (where available).  Escalation to admission/observation was considered. Patient requires inpatient hospitalization for further care.

## 2024-09-03 NOTE — H&P ADULT - HISTORY OF PRESENT ILLNESS
A 69 year old F with pmhx  of dementia, CVA, OM, sacral ulcer, presents from nursing home for positive occult blood and low Hb of 7 in the NH.    In ED, /82, , SpO2 99% on RA  Labs remarkable for wbc 19k, Neutro 80%, Hb 8.3, MCV 86, Na 130, K 6.3, lactate 2.1  VBG ph 7.37, CO2 53  Xray chest No radiographic evidence of acute cardiopulmonary disease.    Admit for further management and workup   A 69 year old F with pmhx  of dementia, CVA, OM, sacral ulcer, recent admission w/septic shock at [facility] discharged on vancomycin and meropenem presents from nursing home for positive occult blood and low Hb of 7 at the NH.    In ED, /82, , SpO2 99% on RA  Labs remarkable for wbc 19k, Neutro 80%, Hb 8.3, MCV 86, Na 130, K 6.3, lactate 2.1  VBG ph 7.37, CO2 53  Xray chest No radiographic evidence of acute cardiopulmonary disease.    Admit for further management and workup

## 2024-09-03 NOTE — H&P ADULT - NSHPLABSRESULTS_GEN_ALL_CORE
.  LABS:                         8.3    19.82 )-----------( 990      ( 03 Sep 2024 17:21 )             26.4     09-03    130<L>  |  93<L>  |  13  ----------------------------<  131<H>  6.3<HH>   |  25  |  <0.5<L>    Ca    9.5      03 Sep 2024 17:21    TPro  7.5  /  Alb  3.1<L>  /  TBili  <0.2  /  DBili  x   /  AST  28  /  ALT  40  /  AlkPhos  157<H>  09-03    PT/INR - ( 03 Sep 2024 17:21 )   PT: 13.90 sec;   INR: 1.22 ratio         PTT - ( 03 Sep 2024 17:21 )  PTT:>200.0 sec  Urinalysis Basic - ( 03 Sep 2024 17:21 )    Color: x / Appearance: x / SG: x / pH: x  Gluc: 131 mg/dL / Ketone: x  / Bili: x / Urobili: x   Blood: x / Protein: x / Nitrite: x   Leuk Esterase: x / RBC: x / WBC x   Sq Epi: x / Non Sq Epi: x / Bacteria: x        Lactate, Blood: 2.1 mmol/L (09-03 @ 17:21)      RADIOLOGY, EKG & ADDITIONAL TESTS: Reviewed.

## 2024-09-03 NOTE — ED PROVIDER NOTE - CARE PLAN
Principal Discharge DX:	Sepsis  Secondary Diagnosis:	Anemia   1 Principal Discharge DX:	Sepsis  Secondary Diagnosis:	Anemia  Secondary Diagnosis:	Hyperkalemia   Principal Discharge DX:	Sepsis  Assessment and plan of treatment:	Plan- sepsis, labs ua cxr abx admission  Secondary Diagnosis:	Anemia  Secondary Diagnosis:	Hyperkalemia

## 2024-09-03 NOTE — ED PROVIDER NOTE - ATTENDING CONTRIBUTION TO CARE
69-year-old female past medical history dementia, CVA complicated by seizure, osteomyelitis, sacral ulcer, recent admission to [facility] discharged with vancomycin and meropenem presents to the emergency department brought in by EMS from [facility] for evaluation of low hemoglobin and elevated white blood cell count on lab work done at the nursing home. Sepsis protocols initiated on patient arrival due to VS.    CONSTITUTIONAL: NAD.   SKIN: warm, dry  HEAD: Normocephalic; atraumatic.  EYES: no conjunctival injection. PERRLA. EOMI.   ENT: MMM.   NECK: Supple.  CARD: +tachycardic   RESP: No wheezes, rales or rhonchi.  ABD: soft ntnd. +borrego +PEG without surrounding erythema   EXT: Normal ROM.  No lower extremity edema.   BACK: SDU no active drainage   RECTAL: chaperoned with PA Kathya - negative for blood   NEURO: AAOX2 follows commands moves extremities no FND

## 2024-09-03 NOTE — H&P ADULT - ASSESSMENT
# SIRS poa  # Sacral ulcer  - In ED, /82, , SpO2 99% on RA  - Labs remarkable for wbc 19k, Neutro 80%, Hb 8.3, MCV 86, Na 130, K 6.3, lactate 2.1  - VBG ph 7.37, CO2 53  - Xray chest No radiographic evidence of acute cardiopulmonary disease.  - s/p 2L bolus  - c/w cefepime and vanc  - c/w LR 75cc/hr  - Send ua, ucx, wound cx  - f/u bcx, procal  - Trend wbc and lactate  - ID and burn eval    #Acute on chronic anemia  - Per NH, Hb of 7 and positive occult blood    #Seizures  #Acute R Frontal Lobe Infarct likely 2/ hypotension   #Chronic R MCA infarct- chronic bed confinement status, dysarthria- answers on/off with 1 word yes, no.    - EEG (3/8) and video EEG (3/9): Focal slowing  - MRI Brain (3/15): new subcortical infarct in the right frontal lobe. Re-demonstrated chronic infarct in the right MCA territory.  - MRI of C spine (3/16): Significantly motion limited study. No gross cord compression or spinal cord edema demonstrated. Multilevel small disc bulges.  - Neuro eval  3/8-3/20: For L gaze preference/stroke, OP med review: on low dose Lamictal and Depakote in addition to Keppra (possible mood disorder/ seizures), Her MRI of brain showing subcortical infarct within same M1 territory. The cause of stroke is most likely hypotension as she has a diminutive Rt M1. However, MRI findings doesn't explain her B/L upper extremity spasticity. So, it is important to rule out cervical cord pathology. DAPT for 21 days followed by ASA 81 mg daily, Increased keppra post 3/20 possible seizure event  - SS re-juliette 3/21: Minced and moist, 1:1 feeds, thin liquids through straw  - C/w Depakote 500 mg TID and Keppra to 1500 mg BID   - C/w baclofen 5mg bid (for UE spasticity)  - C/w ASA, Plavix (3/17-4/6 - completed), and high dose statin for new stroke - DAPT for 21 days followed by ASA 81 mg daily  - OP stroke clinic in 4 weeks after d/c per neurology    # Persistent Hypotension   - c/w Midodrine 10 mg TID    # Left avulsion fracture of medial malleolus  - Duplex BL LE (3/7): (-) for DVT  - CT Foot (3/11): acute minimally displaced avulsion fracture at the medial malleolus  - Podiatry following for foot pain: WBAT w/ surgical shoe, ankle brace, PT  - fall precautions  - pain control regimen - if pain uncontrolled consider increasing percocet to 2 tabs.  - C/w gabapentin at 100mg TID (Lowered dose from home, increase as pt tolerates)    # Left cephalic vein thrombosis  - Duplex LUE (3/10): No DVT however there is superficial thrombosis noted in the left cephalic vein  - s/p warm compress  - Repeat duplex ordered 3/27 - negative for UE and LE DVT      - DVT ppx - Lovenox held as hemoglobin was dropping but is stable today will be restarted   - Diet: Soft and bite sized (SS 3/7)  - full code, overall guarded prognosis   A 69 year old F with pmhx  of dementia, CVA, OM, sacral ulcer, recent admission w/septic shock at [facility] discharged on vancomycin and meropenem presents from nursing home for positive occult blood and low Hb of 7 at the NH.    # SIRS poa  # Sacral ulcer  - Per daughter recent admission to [facility] for OM of sacral ulcer c/b septic shock  - In ED, /82, , SpO2 99% on RA  - Labs remarkable for wbc 19k, Neutro 80%, Hb 8.3, MCV 86, Na 130, K 6.3, lactate 2.1  - VBG ph 7.37, CO2 53  - Xray chest No radiographic evidence of acute cardiopulmonary disease.  - s/p 2L bolus  - c/w meropenem and vanc  - c/w LR 75cc/hr  - Send ua, ucx, bcx, procal, esr, crp  - Trend wbc and lactate  - ID and burn eval    #Acute on chronic anemia  - Per daughter, pt with multiple transfusions at [facility]  - Positive occult blood and Hb of 7 at NH  - Here Hb 8.3, (bs around 9)  - Agreeable for egd/colono if needed  - Keep active type and screen  - CBC bid  - PPI IV bid   - Keep NPO  - Transfuse if Hb < 8  - GI eval    #Hyperkalemia  - K of 6.3  - No EKG changes  - s/p insulin/dextrose and lokelma  - BMP bid  - Keep on telemetry    #Seizures  #Hx of R Frontal Lobe Infarct   #Chronic R MCA infarct  #Bed bound  #Tube feeds  - C/w Depakote 750 mg TID and Keppra to 1000 mg BID   - C/w baclofen 10 mg bid (for UE spasticity)  - C/w ASA, not on statin? Holding plavix in the setting of GI bleed    # Persistent Hypotension   - c/w Midodrine 10 mg TID    # Left avulsion fracture of medial malleolus  - C/w gabapentin at 100mg TID    # Left cephalic vein thrombosis  - Duplex LUE (3/10/23): No DVT however there is superficial thrombosis noted in the left cephalic vein  - Repeat duplex ordered 3/27/23 - negative for UE and LE DVT    - DVT ppx - SCDs  - Diet: NPO  - GI ppx: PPI   A 69 year old F with pmhx  of dementia, CVA, OM, sacral ulcer, recent admission w/septic shock at [facility] discharged on vancomycin and meropenem presents from nursing home for positive occult blood and low Hb of 7 at the NH.    # SIRS poa  # Sacral ulcer  - Per daughter recent admission to [facility] for OM of sacral ulcer c/b septic shock  - In ED, /82, , SpO2 99% on RA  - Labs remarkable for wbc 19k, Neutro 80%, Hb 8.3, MCV 86, Na 130, K 6.3, lactate 2.1  - VBG ph 7.37, CO2 53  - Xray chest No radiographic evidence of acute cardiopulmonary disease.  - s/p 2L bolus  - c/w meropenem and vanc  - c/w LR 75cc/hr  - Send ua, ucx, bcx, procal, esr, crp  - Trend wbc and lactate  - ID and burn eval    #Acute on chronic anemia  - Per daughter, pt with multiple transfusions at [facility]  - Positive occult blood and Hb of 7 at NH  - Here Hb 8.3, (bs around 9)  - Agreeable for egd/colono if needed  - Keep active type and screen  - CBC bid  - PPI IV bid   - Keep NPO  - Transfuse if Hb < 8  - GI eval    #Hyperkalemia  - K of 6.3  - No EKG changes  - s/p insulin/dextrose and lokelma  - BMP bid  - Keep on telemetry    #Seizures  #Hx of R Frontal Lobe Infarct   #Chronic R MCA infarct  #Bed bound  #Tube feeds  - C/w Depakote 750 mg TID and Keppra to 1000 mg BID   - C/w baclofen 10 mg bid (for UE spasticity)  - C/w ASA, on plavix but not on statin? Holding plavix for now    # Persistent Hypotension   - c/w Midodrine 10 mg TID    # Left avulsion fracture of medial malleolus  - C/w gabapentin at 100mg TID    # Left cephalic vein thrombosis  - Duplex LUE (3/10/23): No DVT however there is superficial thrombosis noted in the left cephalic vein  - Repeat duplex ordered 3/27/23 - negative for UE and LE DVT    - DVT ppx - SCDs  - Diet: NPO  - GI ppx: PPI   A 69 year old F with pmhx  of dementia, CVA, OM, sacral ulcer, recent admission w/septic shock at [facility] discharged on vancomycin and meropenem presents from nursing home for positive occult blood and low Hb of 7 at the NH.    #Sepsis poa  # Unstageable sacral ulcer  - Per daughter recent admission to [facility] for OM of sacral ulcer c/b septic shock  - In ED, /82, , SpO2 99% on RA  - Labs remarkable for wbc 19k, Neutro 80%, Hb 8.3, MCV 86, Na 130, K 6.3, lactate 2.1  - VBG ph 7.37, CO2 53  - Xray chest No radiographic evidence of acute cardiopulmonary disease.  - s/p 2L bolus  - c/w meropenem and vanc  - c/w LR 75cc/hr  - Send ua, ucx, bcx, procal, esr, crp  - Trend wbc and lactate  - ID and burn eval    #Acute on chronic anemia  - Per daughter, pt with multiple transfusions at [facility]  - Positive occult blood and Hb of 7 at NH  - Here Hb 8.3, (bs around 9)  - Agreeable for egd/colono if needed  - f/u anemia w/u  - Keep active type and screen  - CBC bid  - PPI IV bid   - Keep NPO  - Transfuse if Hb < 8  - GI eval    #Hyperkalemia  - K of 6.3  - No EKG changes  - s/p insulin/dextrose and lokelma  - BMP bid  - Keep on telemetry    #Seizures  #Hx of R Frontal Lobe Infarct   #Chronic R MCA infarct  #Bed bound  #Tube feeds  - C/w Depakote 750 mg TID and Keppra to 1000 mg BID   - C/w baclofen 10 mg bid (for UE spasticity)  - C/w ASA, on plavix but not on statin? Holding plavix for now    # Persistent Hypotension   - c/w Midodrine 10 mg TID    # Left avulsion fracture of medial malleolus  - C/w gabapentin at 100mg TID    # Left cephalic vein thrombosis  - Duplex LUE (3/10/23): No DVT however there is superficial thrombosis noted in the left cephalic vein  - Repeat duplex ordered 3/27/23 - negative for UE and LE DVT    - DVT ppx - SCDs after duplex  - Diet: NPO  - GI ppx: PPI

## 2024-09-03 NOTE — ED PROVIDER NOTE - DIFFERENTIAL DIAGNOSIS
Differential Diagnosis The differential diagnosis for patients clinical presentation includes but is not limited to: sepsis, gi bleed, uti, electrolyte abnormality

## 2024-09-03 NOTE — ED ADULT NURSE NOTE - NSFALLHARMRISKINTERV_ED_ALL_ED
Assistance OOB with selected safe patient handling equipment if applicable/Assistance with ambulation/Communicate risk of Fall with Harm to all staff, patient, and family/Monitor gait and stability/Provide visual cue: red socks, yellow wristband, yellow gown, etc/Reinforce activity limits and safety measures with patient and family/Bed in lowest position, wheels locked, appropriate side rails in place/Call bell, personal items and telephone in reach/Instruct patient to call for assistance before getting out of bed/chair/stretcher/Non-slip footwear applied when patient is off stretcher/[place] to call system/Physically safe environment - no spills, clutter or unnecessary equipment/Purposeful Proactive Rounding/Room/bathroom lighting operational, light cord in reach

## 2024-09-04 DIAGNOSIS — D64.9 ANEMIA, UNSPECIFIED: ICD-10-CM

## 2024-09-04 DIAGNOSIS — Z51.5 ENCOUNTER FOR PALLIATIVE CARE: ICD-10-CM

## 2024-09-04 DIAGNOSIS — F03.90 UNSPECIFIED DEMENTIA, UNSPECIFIED SEVERITY, WITHOUT BEHAVIORAL DISTURBANCE, PSYCHOTIC DISTURBANCE, MOOD DISTURBANCE, AND ANXIETY: ICD-10-CM

## 2024-09-04 LAB
ALBUMIN SERPL ELPH-MCNC: 2.8 G/DL — LOW (ref 3.5–5.2)
ALBUMIN SERPL ELPH-MCNC: 2.9 G/DL — LOW (ref 3.5–5.2)
ALP SERPL-CCNC: 134 U/L — HIGH (ref 30–115)
ALP SERPL-CCNC: 142 U/L — HIGH (ref 30–115)
ALT FLD-CCNC: 31 U/L — SIGNIFICANT CHANGE UP (ref 0–41)
ALT FLD-CCNC: 33 U/L — SIGNIFICANT CHANGE UP (ref 0–41)
ANION GAP SERPL CALC-SCNC: 11 MMOL/L — SIGNIFICANT CHANGE UP (ref 7–14)
ANION GAP SERPL CALC-SCNC: 12 MMOL/L — SIGNIFICANT CHANGE UP (ref 7–14)
ANION GAP SERPL CALC-SCNC: 16 MMOL/L — HIGH (ref 7–14)
AST SERPL-CCNC: 20 U/L — SIGNIFICANT CHANGE UP (ref 0–41)
AST SERPL-CCNC: 21 U/L — SIGNIFICANT CHANGE UP (ref 0–41)
BASOPHILS # BLD AUTO: 0.07 K/UL — SIGNIFICANT CHANGE UP (ref 0–0.2)
BASOPHILS # BLD AUTO: 0.07 K/UL — SIGNIFICANT CHANGE UP (ref 0–0.2)
BASOPHILS # BLD AUTO: 0.11 K/UL — SIGNIFICANT CHANGE UP (ref 0–0.2)
BASOPHILS NFR BLD AUTO: 0.4 % — SIGNIFICANT CHANGE UP (ref 0–1)
BASOPHILS NFR BLD AUTO: 0.5 % — SIGNIFICANT CHANGE UP (ref 0–1)
BASOPHILS NFR BLD AUTO: 0.6 % — SIGNIFICANT CHANGE UP (ref 0–1)
BILIRUB SERPL-MCNC: 0.2 MG/DL — SIGNIFICANT CHANGE UP (ref 0.2–1.2)
BILIRUB SERPL-MCNC: <0.2 MG/DL — SIGNIFICANT CHANGE UP (ref 0.2–1.2)
BUN SERPL-MCNC: 10 MG/DL — SIGNIFICANT CHANGE UP (ref 10–20)
BUN SERPL-MCNC: 11 MG/DL — SIGNIFICANT CHANGE UP (ref 10–20)
BUN SERPL-MCNC: 9 MG/DL — LOW (ref 10–20)
CALCIUM SERPL-MCNC: 9.5 MG/DL — SIGNIFICANT CHANGE UP (ref 8.4–10.5)
CALCIUM SERPL-MCNC: 9.6 MG/DL — SIGNIFICANT CHANGE UP (ref 8.4–10.4)
CALCIUM SERPL-MCNC: 9.7 MG/DL — SIGNIFICANT CHANGE UP (ref 8.4–10.5)
CHLORIDE SERPL-SCNC: 100 MMOL/L — SIGNIFICANT CHANGE UP (ref 98–110)
CHLORIDE SERPL-SCNC: 102 MMOL/L — SIGNIFICANT CHANGE UP (ref 98–110)
CHLORIDE SERPL-SCNC: 98 MMOL/L — SIGNIFICANT CHANGE UP (ref 98–110)
CO2 SERPL-SCNC: 23 MMOL/L — SIGNIFICANT CHANGE UP (ref 17–32)
CO2 SERPL-SCNC: 26 MMOL/L — SIGNIFICANT CHANGE UP (ref 17–32)
CO2 SERPL-SCNC: 28 MMOL/L — SIGNIFICANT CHANGE UP (ref 17–32)
CREAT SERPL-MCNC: <0.5 MG/DL — LOW (ref 0.7–1.5)
CRP SERPL-MCNC: 123.9 MG/L — HIGH
EGFR: 115 ML/MIN/1.73M2 — SIGNIFICANT CHANGE UP
EGFR: 115 ML/MIN/1.73M2 — SIGNIFICANT CHANGE UP
EGFR: 127 ML/MIN/1.73M2 — SIGNIFICANT CHANGE UP
EOSINOPHIL # BLD AUTO: 0.35 K/UL — SIGNIFICANT CHANGE UP (ref 0–0.7)
EOSINOPHIL # BLD AUTO: 0.42 K/UL — SIGNIFICANT CHANGE UP (ref 0–0.7)
EOSINOPHIL # BLD AUTO: 0.44 K/UL — SIGNIFICANT CHANGE UP (ref 0–0.7)
EOSINOPHIL NFR BLD AUTO: 1.9 % — SIGNIFICANT CHANGE UP (ref 0–8)
EOSINOPHIL NFR BLD AUTO: 2.8 % — SIGNIFICANT CHANGE UP (ref 0–8)
EOSINOPHIL NFR BLD AUTO: 3 % — SIGNIFICANT CHANGE UP (ref 0–8)
ERYTHROCYTE [SEDIMENTATION RATE] IN BLOOD: 140 MM/HR — HIGH (ref 0–20)
FERRITIN SERPL-MCNC: 390 NG/ML — HIGH (ref 13–330)
GLUCOSE SERPL-MCNC: 101 MG/DL — HIGH (ref 70–99)
GLUCOSE SERPL-MCNC: 108 MG/DL — HIGH (ref 70–99)
GLUCOSE SERPL-MCNC: 110 MG/DL — HIGH (ref 70–99)
HCT VFR BLD CALC: 24 % — LOW (ref 37–47)
HCT VFR BLD CALC: 24.5 % — LOW (ref 37–47)
HCT VFR BLD CALC: 26.5 % — LOW (ref 37–47)
HGB BLD-MCNC: 7.6 G/DL — LOW (ref 12–16)
HGB BLD-MCNC: 7.6 G/DL — LOW (ref 12–16)
HGB BLD-MCNC: 8 G/DL — LOW (ref 12–16)
IMM GRANULOCYTES NFR BLD AUTO: 1 % — HIGH (ref 0.1–0.3)
IMM GRANULOCYTES NFR BLD AUTO: 1.1 % — HIGH (ref 0.1–0.3)
IMM GRANULOCYTES NFR BLD AUTO: 1.1 % — HIGH (ref 0.1–0.3)
IRON SATN MFR SERPL: 14 UG/DL — LOW (ref 35–150)
IRON SATN MFR SERPL: 7 % — LOW (ref 15–50)
LACTATE SERPL-SCNC: 1 MMOL/L — SIGNIFICANT CHANGE UP (ref 0.7–2)
LACTATE SERPL-SCNC: 2.4 MMOL/L — HIGH (ref 0.7–2)
LYMPHOCYTES # BLD AUTO: 1.71 K/UL — SIGNIFICANT CHANGE UP (ref 1.2–3.4)
LYMPHOCYTES # BLD AUTO: 1.97 K/UL — SIGNIFICANT CHANGE UP (ref 1.2–3.4)
LYMPHOCYTES # BLD AUTO: 10.7 % — LOW (ref 20.5–51.1)
LYMPHOCYTES # BLD AUTO: 11 % — LOW (ref 20.5–51.1)
LYMPHOCYTES # BLD AUTO: 18.2 % — LOW (ref 20.5–51.1)
LYMPHOCYTES # BLD AUTO: 2.56 K/UL — SIGNIFICANT CHANGE UP (ref 1.2–3.4)
MAGNESIUM SERPL-MCNC: 2.1 MG/DL — SIGNIFICANT CHANGE UP (ref 1.8–2.4)
MAGNESIUM SERPL-MCNC: 2.1 MG/DL — SIGNIFICANT CHANGE UP (ref 1.8–2.4)
MCHC RBC-ENTMCNC: 26.8 PG — LOW (ref 27–31)
MCHC RBC-ENTMCNC: 27.2 PG — SIGNIFICANT CHANGE UP (ref 27–31)
MCHC RBC-ENTMCNC: 27.4 PG — SIGNIFICANT CHANGE UP (ref 27–31)
MCHC RBC-ENTMCNC: 30.2 G/DL — LOW (ref 32–37)
MCHC RBC-ENTMCNC: 31 G/DL — LOW (ref 32–37)
MCHC RBC-ENTMCNC: 31.7 G/DL — LOW (ref 32–37)
MCV RBC AUTO: 86.6 FL — SIGNIFICANT CHANGE UP (ref 81–99)
MCV RBC AUTO: 87.8 FL — SIGNIFICANT CHANGE UP (ref 81–99)
MCV RBC AUTO: 88.6 FL — SIGNIFICANT CHANGE UP (ref 81–99)
MONOCYTES # BLD AUTO: 0.95 K/UL — HIGH (ref 0.1–0.6)
MONOCYTES # BLD AUTO: 0.99 K/UL — HIGH (ref 0.1–0.6)
MONOCYTES # BLD AUTO: 1.05 K/UL — HIGH (ref 0.1–0.6)
MONOCYTES NFR BLD AUTO: 5.1 % — SIGNIFICANT CHANGE UP (ref 1.7–9.3)
MONOCYTES NFR BLD AUTO: 6.7 % — SIGNIFICANT CHANGE UP (ref 1.7–9.3)
MONOCYTES NFR BLD AUTO: 7 % — SIGNIFICANT CHANGE UP (ref 1.7–9.3)
NEUTROPHILS # BLD AUTO: 12.19 K/UL — HIGH (ref 1.4–6.5)
NEUTROPHILS # BLD AUTO: 14.88 K/UL — HIGH (ref 1.4–6.5)
NEUTROPHILS # BLD AUTO: 9.88 K/UL — HIGH (ref 1.4–6.5)
NEUTROPHILS NFR BLD AUTO: 70.2 % — SIGNIFICANT CHANGE UP (ref 42.2–75.2)
NEUTROPHILS NFR BLD AUTO: 78.1 % — HIGH (ref 42.2–75.2)
NEUTROPHILS NFR BLD AUTO: 80.6 % — HIGH (ref 42.2–75.2)
NRBC # BLD: 0 /100 WBCS — SIGNIFICANT CHANGE UP (ref 0–0)
PLATELET # BLD AUTO: 847 K/UL — HIGH (ref 130–400)
PLATELET # BLD AUTO: 884 K/UL — HIGH (ref 130–400)
PLATELET # BLD AUTO: 917 K/UL — HIGH (ref 130–400)
PMV BLD: 10.1 FL — SIGNIFICANT CHANGE UP (ref 7.4–10.4)
PMV BLD: 9.4 FL — SIGNIFICANT CHANGE UP (ref 7.4–10.4)
PMV BLD: 9.6 FL — SIGNIFICANT CHANGE UP (ref 7.4–10.4)
POTASSIUM SERPL-MCNC: 4.5 MMOL/L — SIGNIFICANT CHANGE UP (ref 3.5–5)
POTASSIUM SERPL-MCNC: 5.1 MMOL/L — HIGH (ref 3.5–5)
POTASSIUM SERPL-MCNC: 5.2 MMOL/L — HIGH (ref 3.5–5)
POTASSIUM SERPL-SCNC: 4.5 MMOL/L — SIGNIFICANT CHANGE UP (ref 3.5–5)
POTASSIUM SERPL-SCNC: 5.1 MMOL/L — HIGH (ref 3.5–5)
POTASSIUM SERPL-SCNC: 5.2 MMOL/L — HIGH (ref 3.5–5)
PROT SERPL-MCNC: 6.8 G/DL — SIGNIFICANT CHANGE UP (ref 6–8)
PROT SERPL-MCNC: 6.9 G/DL — SIGNIFICANT CHANGE UP (ref 6–8)
RBC # BLD: 2.77 M/UL — LOW (ref 4.2–5.4)
RBC # BLD: 2.79 M/UL — LOW (ref 4.2–5.4)
RBC # BLD: 2.99 M/UL — LOW (ref 4.2–5.4)
RBC # FLD: 14.8 % — HIGH (ref 11.5–14.5)
SODIUM SERPL-SCNC: 135 MMOL/L — SIGNIFICANT CHANGE UP (ref 135–146)
SODIUM SERPL-SCNC: 140 MMOL/L — SIGNIFICANT CHANGE UP (ref 135–146)
SODIUM SERPL-SCNC: 141 MMOL/L — SIGNIFICANT CHANGE UP (ref 135–146)
TIBC SERPL-MCNC: 206 UG/DL — LOW (ref 220–430)
TRANSFERRIN SERPL-MCNC: 169 MG/DL — LOW (ref 200–360)
UIBC SERPL-MCNC: 192 UG/DL — SIGNIFICANT CHANGE UP (ref 110–370)
WBC # BLD: 14.07 K/UL — HIGH (ref 4.8–10.8)
WBC # BLD: 15.61 K/UL — HIGH (ref 4.8–10.8)
WBC # BLD: 18.47 K/UL — HIGH (ref 4.8–10.8)
WBC # FLD AUTO: 14.07 K/UL — HIGH (ref 4.8–10.8)
WBC # FLD AUTO: 15.61 K/UL — HIGH (ref 4.8–10.8)
WBC # FLD AUTO: 18.47 K/UL — HIGH (ref 4.8–10.8)

## 2024-09-04 PROCEDURE — 99223 1ST HOSP IP/OBS HIGH 75: CPT

## 2024-09-04 PROCEDURE — 93970 EXTREMITY STUDY: CPT | Mod: 26

## 2024-09-04 PROCEDURE — 99232 SBSQ HOSP IP/OBS MODERATE 35: CPT

## 2024-09-04 PROCEDURE — 99221 1ST HOSP IP/OBS SF/LOW 40: CPT | Mod: FS

## 2024-09-04 RX ORDER — SODIUM CHLORIDE 9 MG/ML
1000 INJECTION INTRAMUSCULAR; INTRAVENOUS; SUBCUTANEOUS
Refills: 0 | Status: DISCONTINUED | OUTPATIENT
Start: 2024-09-04 | End: 2024-09-04

## 2024-09-04 RX ORDER — VALPROIC ACID 250 MG
750 CAPSULE ORAL THREE TIMES A DAY
Refills: 0 | Status: DISCONTINUED | OUTPATIENT
Start: 2024-09-04 | End: 2024-09-10

## 2024-09-04 RX ORDER — MEROPENEM 500 MG/10ML
1000 INJECTION, POWDER, FOR SOLUTION INTRAVENOUS EVERY 8 HOURS
Refills: 0 | Status: DISCONTINUED | OUTPATIENT
Start: 2024-09-04 | End: 2024-09-10

## 2024-09-04 RX ORDER — SODIUM ZIRCONIUM CYCLOSILICATE 5 G/5G
10 POWDER, FOR SUSPENSION ORAL ONCE
Refills: 0 | Status: COMPLETED | OUTPATIENT
Start: 2024-09-04 | End: 2024-09-04

## 2024-09-04 RX ADMIN — BACLOFEN 10 MILLIGRAM(S): 0.5 INJECTION INTRATHECAL at 05:36

## 2024-09-04 RX ADMIN — SODIUM CHLORIDE 75 MILLILITER(S): 9 INJECTION INTRAMUSCULAR; INTRAVENOUS; SUBCUTANEOUS at 05:41

## 2024-09-04 RX ADMIN — SODIUM CHLORIDE 1 GRAM(S): 9 INJECTION INTRAMUSCULAR; INTRAVENOUS; SUBCUTANEOUS at 05:36

## 2024-09-04 RX ADMIN — Medication 250 MILLIGRAM(S): at 05:32

## 2024-09-04 RX ADMIN — MIDODRINE HYDROCHLORIDE 10 MILLIGRAM(S): 5 TABLET ORAL at 05:37

## 2024-09-04 RX ADMIN — MEROPENEM 100 MILLIGRAM(S): 500 INJECTION, POWDER, FOR SOLUTION INTRAVENOUS at 08:53

## 2024-09-04 RX ADMIN — Medication 100 MILLIGRAM(S): at 05:37

## 2024-09-04 RX ADMIN — PETROLATUM 1 APPLICATION(S): 93.5 OINTMENT TOPICAL at 17:19

## 2024-09-04 RX ADMIN — Medication 750 MILLIGRAM(S): at 05:35

## 2024-09-04 RX ADMIN — PETROLATUM 1 APPLICATION(S): 93.5 OINTMENT TOPICAL at 05:36

## 2024-09-04 RX ADMIN — Medication 100 MILLIGRAM(S): at 22:31

## 2024-09-04 RX ADMIN — LEVETIRACETAM 1000 MILLIGRAM(S): 1000 TABLET ORAL at 05:35

## 2024-09-04 RX ADMIN — MEROPENEM 100 MILLIGRAM(S): 500 INJECTION, POWDER, FOR SOLUTION INTRAVENOUS at 17:18

## 2024-09-04 RX ADMIN — Medication 40 MILLIGRAM(S): at 06:20

## 2024-09-04 RX ADMIN — Medication 40 MILLIGRAM(S): at 17:22

## 2024-09-04 RX ADMIN — MEROPENEM 100 MILLIGRAM(S): 500 INJECTION, POWDER, FOR SOLUTION INTRAVENOUS at 01:30

## 2024-09-04 RX ADMIN — Medication 250 MILLIGRAM(S): at 17:18

## 2024-09-04 NOTE — CONSULT NOTE ADULT - ASSESSMENT
A 69 year old F with pmhx  of dementia, CVA, OM, sacral ulcer, recent admission w/septic shock at [facility] discharged on vancomycin and meropenem presents from nursing home for positive occult blood and low Hb of 7 at the NH.    Patient reports that she has several children, but her daughter, Dominga, is the one who primarily helps her to make medical decisions. She states that she if were unable to make decisions for herself, she would trust Dominga over her other children. Inquired about intubation and CPR, and she states that she would like to defer to Dominga about these issues. Per chart, Dominga had previously signed a MOLST for DNR/DNI, but she rescinded this decision during this admission. I called her x2 this afternoon but she did not answer.     Plan:  - symptoms per primary team  - will continue attempts to speak with Dominga to clarify code status  - will inquire if patient has previously designated HCP    Palliative care will continue to follow.   Please call [phone] with questions or concerns 24/7.   _____________  Ajit Dominguez MD  Palliative Medicine  [facility]   [facility] Pall[facility]  [phone]

## 2024-09-04 NOTE — PROGRESS NOTE ADULT - SUBJECTIVE AND OBJECTIVE BOX
SUBJECTIVE/OVERNIGHT EVENTS  Today is hospital day 1d. This morning patient was seen and examined at bedside, resting comfortably in bed. No acute or major events overnight.    A 69 year old F with pmhx  of dementia, CVA, OM, sacral ulcer, recent admission w/septic shock at [facility] discharged on vancomycin and meropenem presents from nursing home for positive occult blood and low Hb of 7 at the NH.    In ED, /82, , SpO2 99% on RA  Labs remarkable for wbc 19k, Neutro 80%, Hb 8.3, MCV 86, Na 130, K 6.3, lactate 2.1  VBG ph 7.37, CO2 53  Xray chest No radiographic evidence of acute cardiopulmonary disease.    Admit for further management and workup    MEDICATIONS  STANDING MEDICATIONS  albuterol    0.083%. 2.5 milliGRAM(s) Nebulizer once  ascorbic acid 500 milliGRAM(s) Oral daily  baclofen 10 milliGRAM(s) Oral every 12 hours  calcium gluconate IVPB 2 Gram(s) IV Intermittent once  gabapentin 100 milliGRAM(s) Oral three times a day  levETIRAcetam  Solution 1000 milliGRAM(s) Oral two times a day  lidocaine   4% Patch 1 Patch Transdermal every 24 hours  meropenem  IVPB 1000 milliGRAM(s) IV Intermittent every 8 hours  midodrine. 10 milliGRAM(s) Oral three times a day  multivitamin/minerals 1 Tablet(s) Oral daily  pantoprazole  Injectable 40 milliGRAM(s) IV Push two times a day  sodium chloride 1 Gram(s) Oral two times a day  sodium chloride 0.9%. 1000 milliLiter(s) IV Continuous <Continuous>  valproic  acid Syrup 750 milliGRAM(s) Oral three times a day  vancomycin  IVPB 1000 milliGRAM(s) IV Intermittent every 12 hours  vitamin A &amp; D Ointment 1 Application(s) Topical two times a day    PRN MEDICATIONS  acetaminophen     Tablet .. 650 milliGRAM(s) Oral every 6 hours PRN    VITALS  T(F): 97.7 (09-04-24 @ 07:43), Max: 101 (09-03-24 @ 15:18)  HR: 79 (09-04-24 @ 07:43) (79 - 125)  BP: 114/66 (09-04-24 @ 07:43) (114/66 - 166/86)  RR: 18 (09-04-24 @ 07:43) (18 - 19)  SpO2: 100% (09-04-24 @ 07:43) (98% - 100%)    PHYSICAL EXAM  Physical Exam: GENERAL: NAD, lying in bed comfortably  HEAD:  Atraumatic, normocephalic  EYES: EOMI, PERRLA, conjunctiva and sclera clear  NECK: Supple, trachea midline, no JVD  HEART: Regular rate and rhythm, no murmurs, rubs, or gallops  LUNGS: Unlabored respirations.  Clear to auscultation bilaterally, no crackles, wheezing, or rhonchi  ABDOMEN: Soft, nontender, nondistended, +BS  EXTREMITIES: 2+ peripheral pulses bilaterally. No clubbing, cyanosis, or edema  NERVOUS SYSTEM:  no focal deficits   SKIN: sacral ulcer    LABS             8.0    15.61 )-----------( 917      ( 09-04-24 @ 07:34 )             26.5     141  |  102  |  10  -------------------------<  110   09-04-24 @ 07:34  5.1  |  23  |  <0.5    Ca      9.7     09-04-24 @ 07:34  Mg     2.1     09-04-24 @ 07:34    TPro  6.8  /  Alb  2.9  /  TBili  <0.2  /  DBili  x   /  AST  20  /  ALT  31  /  AlkPhos  134  /  GGT  x     09-04-24 @ 07:34    PT/INR - ( 09-03-24 @ 17:21 )   PT: 13.90 sec<H>;   INR: 1.22 ratio  PTT - ( 09-03-24 @ 17:21 )  PTT:>200.0 sec      Urinalysis Basic - ( 04 Sep 2024 07:34 )    Color: x / Appearance: x / SG: x / pH: x  Gluc: 110 mg/dL / Ketone: x  / Bili: x / Urobili: x   Blood: x / Protein: x / Nitrite: x   Leuk Esterase: x / RBC: x / WBC x   Sq Epi: x / Non Sq Epi: x / Bacteria: x          Urinalysis with Rflx Culture (collected 03 Sep 2024 20:56)      IMAGING  CXR  No radiographic evidence of acute cardiopulmonary disease

## 2024-09-04 NOTE — PATIENT PROFILE ADULT - FALL HARM RISK - HARM RISK INTERVENTIONS

## 2024-09-04 NOTE — PATIENT PROFILE ADULT - FUNCTIONAL ASSESSMENT - DAILY ACTIVITY 1.
From: Michelle Chaney  To: Kyara Ferrer  Sent: 5/18/2023 11:34 AM CDT  Subject: Weight    Hi !     I hope you are doing well! I wanted to check in regarding my weight stuff. I was going to meet with Flavia Pedroza for the medical weight loss program, but determined with the nursing staff before that appointment that what she would recommend I am already doing/already have access to. I am already meeting with a dietitian who I love and then any medication she would prescribe are meds we have already tried in the past/could be meds you could prescribe. So I never did meet with her and they told me to follow-up with you. I don't want to go back on the Wegovy because 1)it made me pretty sick and nauseous 2)I don't like the shot aspect and 3)I know long term it is not fiesable to be on it forever and you can gain the weight back. I am really working hard on my nutrition and cut alcohol for the most part while still enjoying occasionally. I am also working out walking a ton and strength training on my Peloton. I still feel like I need something extra potentially medication wise, but   not sure if you have any ideas. My body really does not want to let go of the weight which is super frustrating. Just wanted to see if you have anymore ideas. I do have Hasimoto's but my TSH levels have been totally fine, so I'm not sure there is anything to do there. I also think we determined I don't have PCOS. Let me know your thoughts and maybe we can do some problem solving :) Thank you in advance for all of your help!    Michelle   1 = Total assistance

## 2024-09-04 NOTE — CONSULT NOTE ADULT - SUBJECTIVE AND OBJECTIVE BOX
HPI:  A 69 year old F with pmhx  of dementia, CVA, OM, sacral ulcer, recent admission w/septic shock at [facility] discharged on vancomycin and meropenem presents from nursing home for positive occult blood and low Hb of 7 at the NH.    In ED, /82, , SpO2 99% on RA  Labs remarkable for wbc 19k, Neutro 80%, Hb 8.3, MCV 86, Na 130, K 6.3, lactate 2.1  VBG ph 7.37, CO2 53  Xray chest No radiographic evidence of acute cardiopulmonary disease.    Admit for further management and workup   (03 Sep 2024 18:59)    Patient complaining of abdominal pain.   States that she trusts her daughter, Dominga, to help her make medical decisions.       ITEMS NOT CHECKED ARE NOT PRESENT    SOCIAL HISTORY:   Significant other/partner[ ]  Children[ ]  Moravian/Spirituality:  Substance hx:  [ ]   Tobacco hx:  [ ]   Alcohol hx: [ ]   Living Situation: [ ]Home  [ ]Long term care  [ ]Rehab [ ]Other  Home Services: [ ] HHA [ ] Jar[facility]o RN [ ] Hospice  Occupation:  Home Opioid hx:  [ ] Y [ ] N [ ] I-Stop Reference No:     ADVANCE DIRECTIVES:    [ ] Full Code [ ] DNR  MOLST  [ ]  Living Will  [ ]   DECISION MAKER(s):  [ ] Health Care Proxy(s)  [ ] Surrogate(s)  [ ] Guardian           Name(s): Phone Number(s):    BASELINE (I)ADL(s) (prior to admission):  [place]: [ ]Total  [ ] Moderate [ ]Dependent  Palliative Performance Status Version 2:         %    http://npcrc.org/files/news/palliative_performance_scale_ppsv2.pdf    Allergies    Allergy Status Unknown    Intolerances    MEDICATIONS  (STANDING):  albuterol    0.083%. 2.5 milliGRAM(s) Nebulizer once  ascorbic acid 500 milliGRAM(s) Oral daily  baclofen 10 milliGRAM(s) Oral every 12 hours  calcium gluconate IVPB 2 Gram(s) IV Intermittent once  gabapentin 100 milliGRAM(s) Oral three times a day  levETIRAcetam  Solution 1000 milliGRAM(s) Oral two times a day  lidocaine   4% Patch 1 Patch Transdermal every 24 hours  meropenem  IVPB 1000 milliGRAM(s) IV Intermittent every 8 hours  midodrine. 10 milliGRAM(s) Oral three times a day  multivitamin/minerals 1 Tablet(s) Oral daily  pantoprazole  Injectable 40 milliGRAM(s) IV Push two times a day  sodium chloride 1 Gram(s) Oral two times a day  sodium chloride 0.9%. 1000 milliLiter(s) (75 mL/Hr) IV Continuous <Continuous>  valproic  acid Syrup 750 milliGRAM(s) Oral three times a day  vancomycin  IVPB 1000 milliGRAM(s) IV Intermittent every 12 hours  vitamin A &amp; D Ointment 1 Application(s) Topical two times a day    MEDICATIONS  (PRN):  acetaminophen     Tablet .. 650 milliGRAM(s) Oral every 6 hours PRN Mild Pain (1 - 3)      PRESENT SYMPTOMS: [x ]Unable to obtain due to poor mentation   Source if other than patient:  [ ]Family   [ ]Team     Unable to obtain full pain history    Pain: [x ]yes [ ]no  QOL impact -   Location -  abdomen                  Aggravating factors -  Alleviating factors -   Quality -  Radiation -  Timing -   Severity (0-10 scale):  Minimal acceptable level (0-10 scale):     CPOT:    https://www.sccm.org/getattachment/mii34f51-3b6y-6m1v-2a3m-4498m7923f6w/Critical-Care-Pain-Observation-Tool-(CPOT)    PAIN AD Score:   http://geriatrictoolkit.[facility]/cog/painad.pdf     Dyspnea:                           [x ]None[ ]Mild [ ]Moderate [ ]Severe     Respiratory Distress Observation Scale (RDOS):   A score of 0 to 2 signifies little or no respiratory distress, 3 signifies mild distress, scores 4 to 6 indicate moderate distress, and scores greater than 7 signify severe distress  [email or web address]/[email or web address]/files/[email or web address]-zakpyyevgmn-wxtplkeh-nqgehfvheik-alohc.[email or web address]    Anxiety:                             [ ]None[ ]Mild [ ]Moderate [ ]Severe   Fatigue:                             [ ]None[ ]Mild [ ]Moderate [ ]Severe   Nausea:                             [ ]None[ ]Mild [ ]Moderate [ ]Severe   Loss of appetite:              [ ]None[ ]Mild [ ]Moderate [ ]Severe   Constipation:                    [ ]None[ ]Mild [ ]Moderate [ ]Severe    Other Symptoms:  [ ]All other review of systems negative     Palliative Performance Status Version 2:         %    http://npcrc.org/files/news/palliative_performance_scale_ppsv2.pdf  PHYSICAL EXAM:    GENERAL:  NAD   PULMONARY:  Non labored breathing  NEUROLOGIC: AOx2  BEHAVIORAL/PSYCH:  Calm.     LABS: I have reviewed daily labs                          8.0    15.61 )-----------( 917      ( 04 Sep 2024 07:34 )             26.5       09-04    141  |  102  |  10  ----------------------------<  110<H>  5.1<H>   |  23  |  <0.5<L>    Ca    9.7      04 Sep 2024 07:34  Mg     2.1     09-04    TPro  6.8  /  Alb  2.9<L>  /  TBili  <0.2  /  DBili  x   /  AST  20  /  ALT  31  /  AlkPhos  134<H>  09-04          RADIOLOGY & ADDITIONAL STUDIES: I have reviewed new imaging    PROTEIN CALORIE MALNUTRITION PRESENT: [ ]mild [ ]moderate [ ]severe [ ]underweight [ ]morbid obesity  https://www.andeal.org/vault/2440/web/files/ONC/Table_Clinical%20Characteristics%20to%20Document%20Malnutrition-White%20JV%20et%20al%202012.pdf    Height (cm): 152.4 (09-03-24 @ 13:32)  Weight (kg): 59 (09-03-24 @ 13:32)  BMI (kg/m2): 25.4 (09-03-24 @ 13:32)    [ ]PPSV2 < or = to 30% [ ]significant weight loss  [ ]poor nutritional intake  [ ]anasarca      [ ]Artificial Nutrition      Palliative Care Spiritual/Emotional Screening Tool Question  Severity (0-4):                    OR                    [ x] Unable to determine. Will assess at later time if appropriate.  Score of 2 or greater indicates recommendation of Chaplaincy and/or SW referral  Chaplaincy Referral: [ ] Yes [ ] Refused [ ] Following     Caregiver [place]:  [ ] Yes [ ] No    OR    [x ] Unable to determine. Will assess at later time if appropriate.  Social Work Referral [ ]  Patient and Family Centered Care Referral [ ]    Anticipatory Grief Present: [ ] Yes [ ] No    OR     [ x] Unable to determine. Will assess at later time if appropriate.  Social Work Referral [ ]  Patient and Family Centered Care Referral [ ]    REFERRALS:   [ ]Chaplaincy  [ ]Hospice  [ ]Child Life  [ ]Social Work  [ ]Case management [ ]Holistic Therapy     Palliative care education provided to patient and/or family

## 2024-09-04 NOTE — PROGRESS NOTE ADULT - ASSESSMENT
A 69 year old F with pmhx  of dementia, CVA, OM, sacral ulcer, recent admission w/septic shock at [facility] discharged on vancomycin and meropenem presents from nursing home for positive occult blood and low Hb of 7 at the NH.    #Sepsis poa  # Unstageable sacral ulcer  - Per daughter recent admission to [facility] for OM of sacral ulcer c/b septic shock  - In ED, /82, , SpO2 99% on RA  - Labs remarkable for wbc 19k, Neutro 80%, Hb 8.3, MCV 86, Na 130, K 6.3, lactate 2.1  - VBG ph 7.37, CO2 53  - Xray chest No radiographic evidence of acute cardiopulmonary disease.  - s/p 2L bolus  - c/w meropenem and vanc  - c/w LR 75cc/hr  - UA borderline positive, f/u ucx, bcx  - f/u procal, esr, crp  - Trend wbc and lactate  - lactate 2.1 -> 2.4  - f/u ID recs  - burn consult    #Acute on chronic anemia  - Per daughter, pt with multiple transfusions at [facility]  - Positive occult blood and Hb of 7 at NH  - Here Hb 8.3 -> 7.6 -> 8.0 (bs around 9)  - Agreeable for egd/colono if needed  - low total iron, transferrin, TIBC, % sat  - Keep active type and screen  - CBC bid  - PPI IV bid   - Keep NPO  - f/u 11am CBC  - Transfuse if Hb < 8  - GI eval    #Hyperkalemia  - No EKG changes  - K of 6.3 -> 5.2 -> 5.1  - s/p insulin/dextrose and lokelma  - BMP bid  - Keep on telemetry    #Seizures  #Hx of R Frontal Lobe Infarct   #Chronic R MCA infarct  #Bed bound  #Tube feeds  - C/w Depakote 750 mg TID and Keppra to 1000 mg BID   - C/w baclofen 10 mg bid (for UE spasticity)  - C/w ASA, on plavix but not on statin? Holding plavix for now    # Persistent Hypotension   - c/w Midodrine 10 mg TID    # Left avulsion fracture of medial malleolus  - C/w gabapentin at 100mg TID    # Left cephalic vein thrombosis  - Duplex LUE (3/10/23): No DVT however there is superficial thrombosis noted in the left cephalic vein  - Repeat duplex ordered 3/27/23 - negative for UE and LE DVT    - DVT ppx - SCDs after duplex  - Diet: NPO  - GI ppx: PPI  - Code status: Palliative consult for GOC    [facility] CBC, GI eval, burn eval, palliative eval for GOC   A 69 year old F with pmhx  of dementia, CVA, OM, sacral ulcer, recent admission w/septic shock at [facility] discharged on vancomycin and meropenem presents from nursing home for positive occult blood and low Hb of 7 at the NH.    #Sepsis poa  # Unstageable sacral ulcer  - Per daughter recent admission to [facility] for OM of sacral ulcer c/b septic shock  - In ED, /82, , SpO2 99% on RA  - Labs remarkable for wbc 19k, Neutro 80%, Hb 8.3, MCV 86, Na 130, K 6.3, lactate 2.1  - VBG ph 7.37, CO2 53  - Xray chest No radiographic evidence of acute cardiopulmonary disease.  - s/p 2L bolus  - c/w meropenem and vanc  - c/w LR 75cc/hr  - UA borderline positive, f/u ucx, bcx  - f/u procal, esr, crp  - Trend wbc and lactate  - lactate 2.1 -> 2.4  - f/u ID recs  - burn consult    #Acute on chronic anemia  - Per daughter, pt with multiple transfusions at [facility]  - Positive occult blood and Hb of 7 at NH  - Here Hb 8.3 -> 7.6 -> 8.0 (bs around 9)  - Agreeable for egd/colono if needed  - low total iron, transferrin, TIBC, % sat  - Keep active type and screen  - CBC bid  - PPI IV bid   - Keep NPO  - f/u 11am CBC  - Transfuse if Hb < 8  - GI eval    #Hyperkalemia  - No EKG changes  - K of 6.3 -> 5.2 -> 5.1  - s/p insulin/dextrose and lokelma  - BMP bid  - Keep on telemetry    #Seizures  #Hx of R Frontal Lobe Infarct   #Chronic R MCA infarct  #Bed bound  #Tube feeds  - C/w Depakote 750 mg TID and Keppra to 1000 mg BID   - C/w baclofen 10 mg bid (for UE spasticity)  - C/w ASA, on plavix but not on statin? Holding plavix for now    # Persistent Hypotension   - c/w Midodrine 10 mg TID    # Left avulsion fracture of medial malleolus  - C/w gabapentin at 100mg TID    # Left cephalic vein thrombosis  - Duplex LUE (3/10/23): No DVT however there is superficial thrombosis noted in the left cephalic vein  - Repeat duplex ordered 3/27/23 - negative for UE and LE DVT    - DVT ppx - SCDs after duplex  - Diet: NPO  - GI ppx: PPI  - Code status: Palliative consult for GOC    [facility] CBC, PTT, GI eval, burn eval, palliative eval for GOC

## 2024-09-04 NOTE — CONSULT NOTE ADULT - SUBJECTIVE AND OBJECTIVE BOX
Gastroenterology Initial Consult Note      Location: [facility] ED Hold 009 A ([facility] ED Hold)  Patient Name: JOSEPH OBRIEN  Age: 69y  Gender: Female      Chief Complaint  Patient is a 69y old Female who presents with a chief complaint of   Primary diagnosis of Anemia      Reason for Consult  Acute on Chronic Severe Iron Deficiency Anemia/Possible ACD  Reportedly Positive Outpatient FOBT with No Overt Evidence of GI Bleeding  Unintentional Weight Loss; History of Failure to Thrive and Dysphagia s/p PEG 08/2024  Rule Out Colorectal Malignancy in Setting of Family History of Colon Cancer      History of Present Illness  66 year old female patient known to have a history of dementia, sacral ulcer with osteomyelitis of coccyx in 04/2023, CVA in 2018, seizure, and recent endoscopic placement of PEG tube at [facility] who was brought to the ED on 09/03 for evaluation of outpatient Hb of 7 and positive FOBT in absence of overt bleeding, found to have a Hb of 8.3 that dropped to 7.6. We are consulted for concern of acute on chronic anemia in setting of positive FOBT.  Summary:  * Presentation: outpatient Hb 7 and positive FOBT at NH; reports constipation with BM every 5 days (no melena or hematochezia); notes intermittent nausea and reduced appetite with dysphagia to solids and difficulty chewing; notes unintentional weight loss of 15lb in 4-5 months; denies reflux symptoms  * Hemodynamically stable & no active bleeding  * Baseline hemoglobin (prior to admission): 9-10.3 in 04/2023  * Hb trend: Hb 8.3 on 09/03 -> 7.6/8/7.6 on 09/04  * Iron with Total Binding Capacity (09.04.24 @ 00:25) 206 ug/dL; % Saturation, Iron: 7 %; Iron Total: 14 ug/dL; Unsaturated Iron Binding Capacity: 192 ug/dL  * Coags: INR 1.22 on 09/03  * Takes aspirin 81mg QD for CVA  * JENARO brown stool 09/04  * PEG tube lavage clear secretions on 09/04  * Previous CT abdomen 04/06/2023 unremarkable  * Last EGD was 3 weeks ago during PEG tube placement but no biopsies to evaluate for HP/celiac were taken\  * Last colonoscopy was 9 years ago per daughter; in [place]  * Family History: gastric cancer in mother; colon cancer in maternal uncle at age of 70 years           Prior EGD:  as below      Prior Colonoscopy:  as below      Past Medical and Surgical History:  per       Home Medications:  Home Medications:  acetaminophen 325 mg oral tablet: 2 tab(s) orally every 6 hours, As needed, Moderate Pain (4 - 6) (19 Mar 2023 10:42)  ascorbic acid 500 mg oral tablet: 1 tab(s) orally once a day (19 Mar 2023 10:42)  aspirin 81 mg oral tablet, chewable: 1 tab(s) orally once a day (19 Mar 2023 10:42)  baclofen 5 mg oral tablet: 2 tab(s) orally every 12 hours (03 Sep 2024 19:55)  Calmoseptine 0.44%-20.6% topical ointment: Apply topically to affected area 3 times a day apply at sacral ulcer (03 Sep 2024 19:59)  clopidogrel 75 mg oral tablet: 1 tab(s) orally once a day (03 Sep 2024 20:06)  ferrous sulfate: 300 milligram(s) by PEG tube 3 times a day (03 Sep 2024 20:12)  gabapentin 100 mg oral capsule: 1 cap(s) orally 3 times a day (26 Mar 2023 16:24)  lactulose 10 g/15 mL oral syrup: 15 milliliter(s) orally 3 times a day (03 Sep 2024 20:05)  levETIRAcetam 100 mg/mL oral solution: 10 milliliter(s) orally every 12 hours (03 Sep 2024 20:09)  lidocaine 4% topical film: Apply topically to affected area once a day left shoulder (03 Sep 2024 20:01)  Merrem 1000 mg intravenous injection: 1,000 unit(s) intravenously every 8 hours (03 Sep 2024 20:15)  midodrine 10 mg oral tablet: 1 tab(s) orally 3 times a day Hold if Systolic BP &gt;100 (26 Mar 2023 16:24)  MiraLax oral powder for reconstitution: 17 gram(s) orally once a day (03 Sep 2024 20:03)  Multiple Vitamins with Minerals oral tablet: 1 tab(s) orally once a day (19 Mar 2023 10:42)  pantoprazole 40 mg oral delayed release tablet: 1 tab(s) orally once a day (09 Mar 2023 19:56)  sodium chloride: 1 gram(s) by PEG tube 2 times a day (03 Sep 2024 19:55)  valproic acid 250 mg/5 mL oral liquid: 15 milliliter(s) orally every 8 hours (03 Sep 2024 20:10)  vancomycin 1 g/200 mL intravenous solution: 1 gram(s) intravenously 2 times a day (03 Sep 2024 20:14)  vitamin A and D topical ointment: 1 application topically 2 times a day (19 Mar 2023 10:42)  Vraylar 1.5 mg oral capsule: 1 cap(s) orally once a day (03 Sep 2024 20:06)      Social History:  Tobacco: denies  Alcohol: denies  Drugs: denies      Allergies:  Allergy Status Unknown      Family History:  as below        Vital Signs in the last 24 hours   Vitals Summary T(C): 36.7 (09-04-24 @ 15:36), Max: 37.3 (09-04-24 @ 01:58)  HR: 102 (09-04-24 @ 15:36) (79 - 125)  BP: 106/74 (09-04-24 @ 15:36) (106/74 - 166/86)  RR: 18 (09-04-24 @ 15:36) (18 - 18)  SpO2: 100% (09-04-24 @ 15:36) (98% - 100%)  Vent Data   Intake/ Output   Measurements Height (cm): 152.4 (09-04-24 @ 15:52)  Weight (kg): 59 (09-04-24 @ 15:52)  BMI (kg/m2): 25.4 (09-04-24 @ 15:52)  BSA (m2): 1.55 (09-04-24 @ 15:52)      Physical Exam  * General Appearance: Alert, cooperative, interactive, oriented to time, place, and person, in no acute distress  * Eyes: PERRL, conjunctiva/corneas clear, EOM's intact, fundi benign, both eyes  * Lungs: Good bilateral air entry, normal breath sounds (Clear to auscultation bilaterally, no audible wheezes, crackles, or rhonchi)  * Heart: Regular Rate and Rhythm, normal S1 and S2, no audible murmur, rub, or gallop  * Abdomen: Symmetric, non-distended, soft, non-tender, bowel sounds active all four quadrants, no masses, no organomegaly (no hepatosplenomegaly)  * Rectal: Brown stool, Normal tone, no palpable masses or tenderness  * Extremities: no lower extremity pitting edema bilaterally      Investigations   Laboratory Workup      - CBC:                        7.6    14.07 )-----------( 884      ( 04 Sep 2024 15:37 )             24.5       - Hgb Trend:  7.6  09-04-24 @ 15:37  8.0  09-04-24 @ 07:34  7.6  09-04-24 @ 00:25  8.3  09-03-24 @ 17:21        - Chemistry:  09-04    140  |  100  |  9<L>  ----------------------------<  101<H>  4.5   |  28  |  <0.5<L>    Ca    9.6      04 Sep 2024 15:37  Mg     2.1     09-04    TPro  6.8  /  Alb  2.9<L>  /  TBili  <0.2  /  DBili  x   /  AST  20  /  ALT  31  /  AlkPhos  134<H>  09-04    Liver panel trend:  TBili <0.2   /   AST 20   /   ALT 31   /   AlkP 134   /   Tptn 6.8   /   Alb 2.9    /   DBili --      09-04  TBili 0.2   /   AST 21   /   ALT 33   /   AlkP 142   /   Tptn 6.9   /   Alb 2.8    /   DBili --      09-04  TBili <0.2   /   AST 28   /   ALT 40   /   AlkP 157   /   Tptn 7.5   /   Alb 3.1    /   DBili --      09-03      - Coagulation Studies:  PT/INR - ( 03 Sep 2024 17:21 )   PT: 13.90 sec;   INR: 1.22 ratio         PTT - ( 03 Sep 2024 17:21 )  PTT:>200.0 sec    - ABG:      - Cardiac Markers:        Microbiological Workup  Urinalysis Basic - ( 04 Sep 2024 15:37 )    Color: x / Appearance: x / SG: x / pH: x  Gluc: 101 mg/dL / Ketone: x  / Bili: x / Urobili: x   Blood: x / Protein: x / Nitrite: x   Leuk Esterase: x / RBC: x / WBC x   Sq Epi: x / Non Sq Epi: x / Bacteria: x        Urinalysis with Rflx Culture (collected 03 Sep 2024 20:56)        Radiological Workup            Current Medications  Standing Medications  albuterol    0.083%. 2.5 milliGRAM(s) Nebulizer once  ascorbic acid 500 milliGRAM(s) Oral daily  baclofen 10 milliGRAM(s) Oral every 12 hours  calcium gluconate IVPB 2 Gram(s) IV Intermittent once  gabapentin 100 milliGRAM(s) Oral three times a day  lactated ringers. 1000 milliLiter(s) (75 mL/Hr) IV Continuous <Continuous>  levETIRAcetam  Solution 1000 milliGRAM(s) Oral two times a day  lidocaine   4% Patch 1 Patch Transdermal every 24 hours  meropenem  IVPB 1000 milliGRAM(s) IV Intermittent every 8 hours  midodrine. 10 milliGRAM(s) Oral three times a day  multivitamin/minerals 1 Tablet(s) Oral daily  pantoprazole  Injectable 40 milliGRAM(s) IV Push two times a day  sodium chloride 1 Gram(s) Oral two times a day  valproic  acid Syrup 750 milliGRAM(s) Oral three times a day  vancomycin  IVPB 1000 milliGRAM(s) IV Intermittent every 12 hours  vitamin A &amp; D Ointment 1 Application(s) Topical two times a day    PRN Medications  acetaminophen     Tablet .. 650 milliGRAM(s) Oral every 6 hours PRN Mild Pain (1 - 3)    Singles Doses Administered  (ADM OVERRIDE) 1 each &lt;see task&gt; GiveOnce  cefepime   IVPB 2000 milliGRAM(s) IV Intermittent once  dextrose 50% Injectable 50 milliLiter(s) IV Push once  insulin regular  human recombinant 5 Unit(s) IV Push once  lactated ringers Bolus 2000 milliLiter(s) IV Bolus once  meropenem  IVPB 1000 milliGRAM(s) IV Intermittent once  pantoprazole  Injectable 80 milliGRAM(s) IV Push Once  sodium zirconium cyclosilicate 10 Gram(s) Oral once  sodium zirconium cyclosilicate 10 Gram(s) Oral once  vancomycin  IVPB. 1000 milliGRAM(s) IV Intermittent once

## 2024-09-04 NOTE — CONSULT NOTE ADULT - ASSESSMENT
ASSESSMENT   69 year old F with pmhx  of dementia, CVA, OM, sacral ulcer, recent admission w/septic shock at [facility] discharged on vancomycin and meropenem presents from nursing home for positive occult blood and low Hb of 7 at the NH.    IMPRESSION  #  #Severe sepsis with lactic acidosis T>101 P>90 WBC 19    CXR no PNA    UA without significant pyuria     Influenza/RSV/COVID19 PCR negative   #Immunodeficiency secondary to Senescence which could results in poor clinical outcomes  #Abx allergy: Allergy Status Unknown    Creatinine: <0.5 (09-04-24 @ 00:25)    Height (cm): 152.4 (09-03-24 @ 13:32)  Weight (kg): 59 (09-03-24 @ 13:32)    RECOMMENDATIONS  This is an incomplete consult note. All final recommendations to follow after interview and examination of the patient. Please follow recommendations noted below.  - Obtain [facility] records, micro data      If any questions, please send a message or call on [employer] Teams  Please continue to update ID with any pertinent new laboratory, radiographic findings, or change in clinical status   ASSESSMENT   69 year old F with pmhx  of dementia, CVA, OM, sacral ulcer, recent admission w/septic shock at [facility] discharged on vancomycin and meropenem presents from nursing home for positive occult blood and low Hb of 7 at the NH.    IMPRESSION  #Severe sepsis with lactic acidosis T>101 P>90 WBC 19  Rule out CLABSI- bacteremia   Possible SSTI, sacral decub    CXR no PNA    UA without significant pyuria     Influenza/RSV/COVID19 PCR negative   #Immunodeficiency secondary to Senescence which could results in poor clinical outcomes  #Abx allergy: Allergy Status Unknown    Creatinine: <0.5 (09-04-24 @ 00:25)    Height (cm): 152.4 (09-03-24 @ 13:32)  Weight (kg): 59 (09-03-24 @ 13:32)    RECOMMENDATIONS  - f/u BCX  - MRSA nares  - REMOVE RUE outside line  - Obtain [facility] records, micro data  - f/u Burn consult  - meropenem  IVPB 1000 milliGRAM(s) IV Intermittent every 8 hours  - vancomycin  IVPB 1000 milliGRAM(s) IV Intermittent every 12 hours  - Vanc dosing AUC/JULIANA per clinical pharmacist   - Poor prognosis, GOC    If any questions, please send a message or call on [employer] Teams  Please continue to update ID with any pertinent new laboratory, radiographic findings, or change in clinical status

## 2024-09-04 NOTE — CONSULT NOTE ADULT - NS ATTEST RISK PROBLEM GEN_ALL_CORE FT
- Patient has an illness which poses a threat to life or bodily function without treatment  - I independently interpreted the most recent CXR- no PNA  - I discussed my recommendations with the primary team housestaff / Attending   - This patient is on drug therapy requiring intensive monitoring for toxicity. Vancomycin requires intensive drug monitoring due to concerns for possible adverse effects, including acute kidney injury, and will be monitored with basic metabolic panel,  Vancomycin levels, to be done while on therapy

## 2024-09-04 NOTE — PROGRESS NOTE ADULT - ASSESSMENT
A 69 year old F with pmhx  of dementia, CVA, OM, sacral ulcer, recent admission w/septic shock at [facility] discharged on vancomycin and meropenem presents from nursing home for positive occult blood and low Hb of 7 at the NH.    #Sepsis poa secondary to  Unstageable sacral ulcer   lactate 2.1 -> 2.4   f/u ID recs   burn consult  vancomycin and meropenum     #Drug monitoring of high risk medication , potential for drug drug reaction , requiring close monitoring vancomycin adjustment per pharm recommendation    #Hematochezia / + FOBT secondary to suspected gi hemorrhage with Acute on chronic anemia  hemoglobin stable   gi consult     #Hyperkalemia  potassium improved     #Seizures  - C/w Depakote 750 mg TID and Keppra to 1000 mg BID   - C/w baclofen 10 mg bid (for UE spasticity)    #Hx of R Frontal Lobe Infarct   ASA, on plavix but not on statin? Holding plavix for now    #Chronic R MCA infarct    #Functional Quadraplegia/ Bed bound    #Tube feeds    # Persistent Hypotension   Midodrine 10 mg TID    # Left avulsion fracture of medial malleolus   gabapentin at 100mg TID    #Guarded prognosis     PROGRESS NOTE HANDOFF    Pending: juan richard , id / burn for sacral wound     Family discussion: [phone] spoke with Dominga , 3:44 PM EST 9/4/2024 and updated regarding clinical status     Disposition: nursing facility

## 2024-09-04 NOTE — GOALS OF CARE CONVERSATION - ADVANCED CARE PLANNING - CONVERSATION DETAILS
Spoke with Daughter Dominga regarding goals of care. Introduced palliative care to her. Explained to her that her mother given her history of dementia and other comorbidities will likely have a stepwise decline with each hospitalization. Dominga verbalized to me that she understands that. She wants her mother to receive all life prolonging medical management at this time. She wants her mother to be full code which includes CPR, Shocks, Medications, and intubation. She wants her to get diagnostic and therapeutic  procedures, images, blood work and other management.

## 2024-09-04 NOTE — CONSULT NOTE ADULT - SUBJECTIVE AND OBJECTIVE BOX
JOSEPH OBRIEN  69y, Female  Allergy: Allergy Status Unknown      CHIEF COMPLAINT:       LOS  1d    HPI  HPI:  A 69 year old F with pmhx  of dementia, CVA, OM, sacral ulcer, recent admission w/septic shock at [facility] discharged on vancomycin and meropenem presents from nursing home for positive occult blood and low Hb of 7 at the NH.    In ED, /82, , SpO2 99% on RA  Labs remarkable for wbc 19k, Neutro 80%, Hb 8.3, MCV 86, Na 130, K 6.3, lactate 2.1  VBG ph 7.37, CO2 53  Xray chest No radiographic evidence of acute cardiopulmonary disease.    Admit for further management and workup   (03 Sep 2024 18:59)      INFECTIOUS DISEASE HISTORY:  ID consulted for sepsis  Febrile in the ER to 101  Was discharged recently from [facility] on vanc/meropenem     Currently ordered for:  meropenem  IVPB 1000 milliGRAM(s) IV Intermittent every 8 hours  vancomycin  IVPB 1000 milliGRAM(s) IV Intermittent every 12 hours      PMH  PAST MEDICAL & SURGICAL HISTORY:      FAMILY HISTORY      SOCIAL HISTORY  Social History:        ROS  ***    VITALS:  T(F): 99.1, Max: 101 (09-03-24 @ 15:18)  HR: 102  BP: 149/73  RR: 18Vital Signs Last 24 Hrs  T(C): 37.3 (04 Sep 2024 01:58), Max: 38.3 (03 Sep 2024 15:18)  T(F): 99.1 (04 Sep 2024 01:58), Max: 101 (03 Sep 2024 15:18)  HR: 102 (04 Sep 2024 01:58) (102 - 125)  BP: 149/73 (04 Sep 2024 01:58) (119/80 - 166/86)  BP(mean): --  RR: 18 (04 Sep 2024 01:58) (18 - 19)  SpO2: 99% (04 Sep 2024 01:58) (98% - 99%)    Parameters below as of 04 Sep 2024 01:58  Patient On (Oxygen Delivery Method): room air        PHYSICAL EXAM:  ***    TESTS & MEASUREMENTS:                        7.6    18.47 )-----------( 847      ( 04 Sep 2024 00:25 )             24.0     09-04    135  |  98  |  11  ----------------------------<  108<H>  5.2<H>   |  26  |  <0.5<L>    Ca    9.5      04 Sep 2024 00:25  Mg     2.1     09-04    TPro  6.9  /  Alb  2.8<L>  /  TBili  0.2  /  DBili  x   /  AST  21  /  ALT  33  /  AlkPhos  142<H>  09-04      LIVER FUNCTIONS - ( 04 Sep 2024 00:25 )  Alb: 2.8 g/dL / Pro: 6.9 g/dL / ALK PHOS: 142 U/L / ALT: 33 U/L / AST: 21 U/L / GGT: x           Urinalysis Basic - ( 04 Sep 2024 00:25 )    Color: x / Appearance: x / SG: x / pH: x  Gluc: 108 mg/dL / Ketone: x  / Bili: x / Urobili: x   Blood: x / Protein: x / Nitrite: x   Leuk Esterase: x / RBC: x / WBC x   Sq Epi: x / Non Sq Epi: x / Bacteria: x        Urinalysis with Rflx Culture (collected 09-03-24 @ 20:56)        Lactate, Blood: 2.1 mmol/L (09-03-24 @ 17:21)  Blood Gas Venous - Lactate: 1.9 mmol/L (09-03-24 @ 15:17)      INFECTIOUS DISEASES TESTING      INFLAMMATORY MARKERS  Sedimentation Rate, Erythrocyte: 140 mm/Hr (09-04-24 @ 00:25)      RADIOLOGY & ADDITIONAL TESTS:  I have personally reviewed the last Chest xray  CXR  Xray Chest 1 View- PORTABLE-Urgent:   ACC: [id number] EXAM:  XR CHEST PORTABLE URGENT 1V   ORDERED BY: CALI DEJESUS     PROCEDURE DATE:  09/03/2024          INTERPRETATION:  CLINICAL HISTORY: Sepsis.    COMPARISON: 4/14/2023.    TECHNIQUE: Portable frontal chest radiograph. Adequatepositioning.    FINDINGS:    Support devices: None.    Cardiac/mediastinum/hilum: Unremarkable.    Lung parenchyma/Pleura: No focal parenchymal opacities, pleural   effusions, or pneumothorax.    Skeleton/soft tissues: Stable.      IMPRESSION:    No radiographic evidence of acute cardiopulmonary disease.    --- End of Report ---            JUSTUS DOBSON MD; Attending Radiologist  This document has been electronically signed. Sep  3 2024  3:56PM (09-03-24 @ 15:53)      CT      CARDIOLOGY TESTING  12 Lead ECG:   Ventricular Rate 132 BPM    Atrial Rate 132 BPM    P-R Interval 122  <TRUNCATED> (09-03-24 @ 15:23)       MEDICATIONS  albuterol    0.083%. 2.5 Nebulizer once  ascorbic acid 500 Oral daily  baclofen 10 Oral every 12 hours  calcium gluconate IVPB 2 IV Intermittent once  gabapentin 100 Oral three times a day  levETIRAcetam  Solution 1000 Oral two times a day  lidocaine   4% Patch 1 Transdermal every 24 hours  meropenem  IVPB 1000 IV Intermittent every 8 hours  midodrine. 10 Oral three times a day  multivitamin/minerals 1 Oral daily  pantoprazole  Injectable 40 IV Push two times a day  sodium chloride 1 Oral two times a day  sodium chloride 0.9%. 1000 IV Continuous <Continuous>  valproic  acid Syrup 750 Oral three times a day  vancomycin  IVPB 1000 IV Intermittent every 12 hours  vitamin A &amp; D Ointment 1 Topical two times a day      ANTIBIOTICS:  meropenem  IVPB 1000 milliGRAM(s) IV Intermittent every 8 hours  vancomycin  IVPB 1000 milliGRAM(s) IV Intermittent every 12 hours      ALLERGIES:  Allergy Status Unknown         JOSEPH OBRIEN  69y, Female  Allergy: Allergy Status Unknown      CHIEF COMPLAINT:       LOS  1d    HPI  HPI:  A 69 year old F with pmhx  of dementia, CVA, OM, sacral ulcer, recent admission w/septic shock at [facility] discharged on vancomycin and meropenem presents from nursing home for positive occult blood and low Hb of 7 at the NH.    In ED, /82, , SpO2 99% on RA  Labs remarkable for wbc 19k, Neutro 80%, Hb 8.3, MCV 86, Na 130, K 6.3, lactate 2.1  VBG ph 7.37, CO2 53  Xray chest No radiographic evidence of acute cardiopulmonary disease.    Admit for further management and workup   (03 Sep 2024 18:59)      INFECTIOUS DISEASE HISTORY:  ID consulted for sepsis  Febrile in the ER to 101  Was discharged recently from [facility] on vanc/meropenem   cannot obtain further history from the patient secondary to altered mental status or sedation     Currently ordered for:  meropenem  IVPB 1000 milliGRAM(s) IV Intermittent every 8 hours  vancomycin  IVPB 1000 milliGRAM(s) IV Intermittent every 12 hours      PMH  PAST MEDICAL & SURGICAL HISTORY:      FAMILY HISTORY  unable to obtain history secondary to patient's mental status and/or sedation     SOCIAL HISTORY  Social History:  unable to obtain history secondary to patient's mental status and/or sedation       ROS  unable to obtain history secondary to patient's mental status and/or sedation     VITALS:  T(F): 99.1, Max: 101 (09-03-24 @ 15:18)  HR: 102  BP: 149/73  RR: 18Vital Signs Last 24 Hrs  T(C): 37.3 (04 Sep 2024 01:58), Max: 38.3 (03 Sep 2024 15:18)  T(F): 99.1 (04 Sep 2024 01:58), Max: 101 (03 Sep 2024 15:18)  HR: 102 (04 Sep 2024 01:58) (102 - 125)  BP: 149/73 (04 Sep 2024 01:58) (119/80 - 166/86)  BP(mean): --  RR: 18 (04 Sep 2024 01:58) (18 - 19)  SpO2: 99% (04 Sep 2024 01:58) (98% - 99%)    Parameters below as of 04 Sep 2024 01:58  Patient On (Oxygen Delivery Method): room air        PHYSICAL EXAM:  Gen: chronically ill appearing   HEENT: Normocephalic, atraumatic  Neck: supple, no lymphadenopathy  CV: Regular rate & regular rhythm  Lungs: decreased BS at bases, no fremitus  Abdomen: Soft, BS present PEG  Ext: Warm, well perfused  Neuro: non focal, not following commands  Skin: no rash, no erythema  Lines: no phlebitis  RUE outside line    TESTS & MEASUREMENTS:                        7.6    18.47 )-----------( 847      ( 04 Sep 2024 00:25 )             24.0     09-04    135  |  98  |  11  ----------------------------<  108<H>  5.2<H>   |  26  |  <0.5<L>    Ca    9.5      04 Sep 2024 00:25  Mg     2.1     09-04    TPro  6.9  /  Alb  2.8<L>  /  TBili  0.2  /  DBili  x   /  AST  21  /  ALT  33  /  AlkPhos  142<H>  09-04      LIVER FUNCTIONS - ( 04 Sep 2024 00:25 )  Alb: 2.8 g/dL / Pro: 6.9 g/dL / ALK PHOS: 142 U/L / ALT: 33 U/L / AST: 21 U/L / GGT: x           Urinalysis Basic - ( 04 Sep 2024 00:25 )    Color: x / Appearance: x / SG: x / pH: x  Gluc: 108 mg/dL / Ketone: x  / Bili: x / Urobili: x   Blood: x / Protein: x / Nitrite: x   Leuk Esterase: x / RBC: x / WBC x   Sq Epi: x / Non Sq Epi: x / Bacteria: x        Urinalysis with Rflx Culture (collected 09-03-24 @ 20:56)        Lactate, Blood: 2.1 mmol/L (09-03-24 @ 17:21)  Blood Gas Venous - Lactate: 1.9 mmol/L (09-03-24 @ 15:17)      INFECTIOUS DISEASES TESTING      INFLAMMATORY MARKERS  Sedimentation Rate, Erythrocyte: 140 mm/Hr (09-04-24 @ 00:25)      RADIOLOGY & ADDITIONAL TESTS:  I have personally reviewed the last Chest xray  CXR  Xray Chest 1 View- PORTABLE-Urgent:   ACC: [id number] EXAM:  XR CHEST PORTABLE URGENT 1V   ORDERED BY: CALI DEJESUS     PROCEDURE DATE:  09/03/2024          INTERPRETATION:  CLINICAL HISTORY: Sepsis.    COMPARISON: 4/14/2023.    TECHNIQUE: Portable frontal chest radiograph. Adequatepositioning.    FINDINGS:    Support devices: None.    Cardiac/mediastinum/hilum: Unremarkable.    Lung parenchyma/Pleura: No focal parenchymal opacities, pleural   effusions, or pneumothorax.    Skeleton/soft tissues: Stable.      IMPRESSION:    No radiographic evidence of acute cardiopulmonary disease.    --- End of Report ---            JUSTUS DOBSON MD; Attending Radiologist  This document has been electronically signed. Sep  3 2024  3:56PM (09-03-24 @ 15:53)      CT      CARDIOLOGY TESTING  12 Lead ECG:   Ventricular Rate 132 BPM    Atrial Rate 132 BPM    P-R Interval 122  <TRUNCATED> (09-03-24 @ 15:23)       MEDICATIONS  albuterol    0.083%. 2.5 Nebulizer once  ascorbic acid 500 Oral daily  baclofen 10 Oral every 12 hours  calcium gluconate IVPB 2 IV Intermittent once  gabapentin 100 Oral three times a day  levETIRAcetam  Solution 1000 Oral two times a day  lidocaine   4% Patch 1 Transdermal every 24 hours  meropenem  IVPB 1000 IV Intermittent every 8 hours  midodrine. 10 Oral three times a day  multivitamin/minerals 1 Oral daily  pantoprazole  Injectable 40 IV Push two times a day  sodium chloride 1 Oral two times a day  sodium chloride 0.9%. 1000 IV Continuous <Continuous>  valproic  acid Syrup 750 Oral three times a day  vancomycin  IVPB 1000 IV Intermittent every 12 hours  vitamin A &amp; D Ointment 1 Topical two times a day      ANTIBIOTICS:  meropenem  IVPB 1000 milliGRAM(s) IV Intermittent every 8 hours  vancomycin  IVPB 1000 milliGRAM(s) IV Intermittent every 12 hours      ALLERGIES:  Allergy Status Unknown

## 2024-09-04 NOTE — CONSULT NOTE ADULT - SUBJECTIVE AND OBJECTIVE BOX
Pt is a 69 year old F with pmhx  of dementia, CVA, OM, sacral ulcer, recent admission w/septic shock at [facility] discharged on vancomycin and meropenem presents from nursing home for positive occult blood and low Hb of 7 at the NH.  Burn team consulted for evaluation of sacral wound.    AM rounds  Pt seen at bedside  Wound evaluated    Allergies    Allergy Status Unknown    Intolerances    PAST MEDICAL & SURGICAL HISTORY:  Dementia  CVA  OM  Sacral wound    Vital Signs Last 24 Hrs  T(C): 36.7 (04 Sep 2024 15:36), Max: 37.3 (04 Sep 2024 01:58)  T(F): 98 (04 Sep 2024 15:36), Max: 99.1 (04 Sep 2024 01:58)  HR: 102 (04 Sep 2024 15:36) (79 - 125)  BP: 106/74 (04 Sep 2024 15:36) (106/74 - 166/86)  RR: 18 (04 Sep 2024 15:36) (18 - 19)  SpO2: 100% (04 Sep 2024 15:36) (98% - 100%)    Parameters below as of 04 Sep 2024 15:36  Patient On (Oxygen Delivery Method): room air                          7.6    14.07 )-----------( 884      ( 04 Sep 2024 15:37 )             24.5   PE:   Gen: pt lying in bed in NAD  Skin: full thickness sacral wound about [id number] mostly moist and pink, with nonviable tissue present towards 12 o'clock, free floating piece of bone noted, bone palpable, mild malodor noted, no pus, no erythema, no edema  Dressing done by the burn team

## 2024-09-04 NOTE — PROGRESS NOTE ADULT - SUBJECTIVE AND OBJECTIVE BOX
JOSEPH OBRIEN  69y  [facility] ED Hold 009 A      Patient is a 69y old  Female who presents with a chief complaint of     My note supersedes the resident's note      INTERVAL HPI/OVERNIGHT EVENTS:    no acute events overnight     REVIEW OF SYSTEMS:  Unable to ROS due to MS   FAMILY HISTORY:    T(C): 36.5 (09-04-24 @ 07:43), Max: 37.3 (09-04-24 @ 01:58)  HR: 79 (09-04-24 @ 07:43) (79 - 125)  BP: 114/66 (09-04-24 @ 07:43) (114/66 - 166/86)  RR: 18 (09-04-24 @ 07:43) (18 - 19)  SpO2: 100% (09-04-24 @ 07:43) (98% - 100%)  Wt(kg): --Vital Signs Last 24 Hrs  T(C): 36.5 (04 Sep 2024 07:43), Max: 37.3 (04 Sep 2024 01:58)  T(F): 97.7 (04 Sep 2024 07:43), Max: 99.1 (04 Sep 2024 01:58)  HR: 79 (04 Sep 2024 07:43) (79 - 125)  BP: 114/66 (04 Sep 2024 07:43) (114/66 - 166/86)  BP(mean): --  RR: 18 (04 Sep 2024 07:43) (18 - 19)  SpO2: 100% (04 Sep 2024 07:43) (98% - 100%)    Parameters below as of 04 Sep 2024 07:43  Patient On (Oxygen Delivery Method): room air        PHYSICAL EXAM:  GENERAL: NAD,   HEAD:  Atraumatic, Normocephalic  EYES: EOMI, PERRLA, conjunctiva and sclera clear  NECK: Supple, No JVD, Normal thyroid  NERVOUS SYSTEM:  unresponsive   PULM: Clear to auscultation bilaterally  CARDIAC: Regular rate and rhythm; No murmurs, rubs, or gallops  GI: + BS    EXTREMITIES:  2+ Peripheral Pulses, No clubbing, cyanosis, or edema  LYMPH: No lymphadenopathy noted  SKIN: sacral ulcer     Consultant(s) Notes Reviewed:  [x ] YES  [ ] NO  Care Discussed with Consultants/Other Providers [ x] YES  [ ] NO    LABS:                            8.0    15.61 )-----------( 917      ( 04 Sep 2024 07:34 )             26.5   09-04    141  |  102  |  10  ----------------------------<  110<H>  5.1<H>   |  23  |  <0.5<L>    Ca    9.7      04 Sep 2024 07:34  Mg     2.1     09-04    TPro  6.8  /  Alb  2.9<L>  /  TBili  <0.2  /  DBili  x   /  AST  20  /  ALT  31  /  AlkPhos  134<H>  09-04            Urinalysis with Rflx Culture (collected 03 Sep 2024 20:56)      acetaminophen     Tablet .. 650 milliGRAM(s) Oral every 6 hours PRN  albuterol    0.083%. 2.5 milliGRAM(s) Nebulizer once  ascorbic acid 500 milliGRAM(s) Oral daily  baclofen 10 milliGRAM(s) Oral every 12 hours  calcium gluconate IVPB 2 Gram(s) IV Intermittent once  gabapentin 100 milliGRAM(s) Oral three times a day  levETIRAcetam  Solution 1000 milliGRAM(s) Oral two times a day  lidocaine   4% Patch 1 Patch Transdermal every 24 hours  meropenem  IVPB 1000 milliGRAM(s) IV Intermittent every 8 hours  midodrine. 10 milliGRAM(s) Oral three times a day  multivitamin/minerals 1 Tablet(s) Oral daily  pantoprazole  Injectable 40 milliGRAM(s) IV Push two times a day  sodium chloride 1 Gram(s) Oral two times a day  sodium chloride 0.9%. 1000 milliLiter(s) IV Continuous <Continuous>  valproic  acid Syrup 750 milliGRAM(s) Oral three times a day  vancomycin  IVPB 1000 milliGRAM(s) IV Intermittent every 12 hours  vitamin A &amp; D Ointment 1 Application(s) Topical two times a day      HEALTH ISSUES - PROBLEM Dx:  Acute on chronic anemia    Dementia    Encounter for palliative care            Case Discussed with House Staff   45 minutes spent on total encounter; more than 50% of the visit was spent counseling and/or coordinating care by the attending physician.    Spectra [phone]

## 2024-09-04 NOTE — CONSULT NOTE ADULT - CONVERSATION DETAILS
Patient reports that she has several children, but her daughter, Dominga, is the one who primarily helps her to make medical decisions. She states that she if were unable to make decisions for herself, she would trust Dominga over her other children. Inquired about intubation and CPR, and she states that she would like to defer to Dominga about these issues. Per chart, Dominga had previously signed a MOLST for DNR/DNI, but she rescinded this decision during this admission. I called her x2 this afternoon but she did not answer.

## 2024-09-04 NOTE — CONSULT NOTE ADULT - ASSESSMENT
Pt is a 69 year old F with pmhx  of dementia, CVA, OM, sacral ulcer, [facility] on vancomycin and meropenem presents from nursing home for positive occult blood and low Hb of 7 at the NH.  Burn team consulted for evaluation of sacral wound.  -No need for surgical intervention at this time  -Clean wound with soap and water, pack with vashe moist kerlix and cover with ABD/tape twice a day  -frequent turning and positioning  -prompt attention to incontinence  -offloading  -IV antibiotics as per ID  -Further wound management as per wound care NP

## 2024-09-05 LAB
-  COAGULASE NEGATIVE STAPHYLOCOCCUS: SIGNIFICANT CHANGE UP
ALBUMIN SERPL ELPH-MCNC: 2.8 G/DL — LOW (ref 3.5–5.2)
ALP SERPL-CCNC: 125 U/L — HIGH (ref 30–115)
ALT FLD-CCNC: 24 U/L — SIGNIFICANT CHANGE UP (ref 0–41)
ANION GAP SERPL CALC-SCNC: 14 MMOL/L — SIGNIFICANT CHANGE UP (ref 7–14)
APTT BLD: 28.8 SEC — SIGNIFICANT CHANGE UP (ref 27–39.2)
AST SERPL-CCNC: 17 U/L — SIGNIFICANT CHANGE UP (ref 0–41)
BILIRUB SERPL-MCNC: <0.2 MG/DL — SIGNIFICANT CHANGE UP (ref 0.2–1.2)
BUN SERPL-MCNC: 8 MG/DL — LOW (ref 10–20)
CALCIUM SERPL-MCNC: 9.2 MG/DL — SIGNIFICANT CHANGE UP (ref 8.4–10.5)
CHLORIDE SERPL-SCNC: 99 MMOL/L — SIGNIFICANT CHANGE UP (ref 98–110)
CO2 SERPL-SCNC: 26 MMOL/L — SIGNIFICANT CHANGE UP (ref 17–32)
CREAT SERPL-MCNC: <0.5 MG/DL — LOW (ref 0.7–1.5)
CULTURE RESULTS: ABNORMAL
CULTURE RESULTS: SIGNIFICANT CHANGE UP
EGFR: 115 ML/MIN/1.73M2 — SIGNIFICANT CHANGE UP
GLUCOSE SERPL-MCNC: 112 MG/DL — HIGH (ref 70–99)
GRAM STN FLD: ABNORMAL
HCT VFR BLD CALC: 26.9 % — LOW (ref 37–47)
HGB BLD-MCNC: 8.5 G/DL — LOW (ref 12–16)
MCHC RBC-ENTMCNC: 26.9 PG — LOW (ref 27–31)
MCHC RBC-ENTMCNC: 31.6 G/DL — LOW (ref 32–37)
MCV RBC AUTO: 85.1 FL — SIGNIFICANT CHANGE UP (ref 81–99)
METHOD TYPE: SIGNIFICANT CHANGE UP
MRSA PCR RESULT.: NEGATIVE — SIGNIFICANT CHANGE UP
NRBC # BLD: 0 /100 WBCS — SIGNIFICANT CHANGE UP (ref 0–0)
ORGANISM # SPEC MICROSCOPIC CNT: ABNORMAL
ORGANISM # SPEC MICROSCOPIC CNT: SIGNIFICANT CHANGE UP
PLATELET # BLD AUTO: 971 K/UL — HIGH (ref 130–400)
PMV BLD: 9.3 FL — SIGNIFICANT CHANGE UP (ref 7.4–10.4)
POTASSIUM SERPL-MCNC: 4.8 MMOL/L — SIGNIFICANT CHANGE UP (ref 3.5–5)
POTASSIUM SERPL-SCNC: 4.8 MMOL/L — SIGNIFICANT CHANGE UP (ref 3.5–5)
PROT SERPL-MCNC: 6.7 G/DL — SIGNIFICANT CHANGE UP (ref 6–8)
RBC # BLD: 3.16 M/UL — LOW (ref 4.2–5.4)
RBC # FLD: 14.7 % — HIGH (ref 11.5–14.5)
SODIUM SERPL-SCNC: 139 MMOL/L — SIGNIFICANT CHANGE UP (ref 135–146)
SPECIMEN SOURCE: SIGNIFICANT CHANGE UP
SPECIMEN SOURCE: SIGNIFICANT CHANGE UP
VANCOMYCIN TROUGH SERPL-MCNC: 13.4 UG/ML — HIGH (ref 5–10)
WBC # BLD: 13.56 K/UL — HIGH (ref 4.8–10.8)
WBC # FLD AUTO: 13.56 K/UL — HIGH (ref 4.8–10.8)

## 2024-09-05 PROCEDURE — 99232 SBSQ HOSP IP/OBS MODERATE 35: CPT

## 2024-09-05 PROCEDURE — 99233 SBSQ HOSP IP/OBS HIGH 50: CPT

## 2024-09-05 RX ORDER — HEPARIN SODIUM,BOVINE 1000/ML
5000 VIAL (ML) INJECTION EVERY 12 HOURS
Refills: 0 | Status: DISCONTINUED | OUTPATIENT
Start: 2024-09-05 | End: 2024-09-10

## 2024-09-05 RX ORDER — VANCOMYCIN/0.9 % SOD CHLORIDE 1.75G/25
750 PLASTIC BAG, INJECTION (ML) INTRAVENOUS
Refills: 0 | Status: DISCONTINUED | OUTPATIENT
Start: 2024-09-05 | End: 2024-09-09

## 2024-09-05 RX ORDER — POLYETHYLENE GLYCOL 3350, SODIUM SULFATE ANHYDROUS, SODIUM BICARBONATE, SODIUM CHLORIDE, POTASSIUM CHLORIDE 236; 22.74; 6.74; 5.86; 2.97 G/4L; G/4L; G/4L; G/4L; G/4L
4000 POWDER, FOR SOLUTION ORAL ONCE
Refills: 0 | Status: COMPLETED | OUTPATIENT
Start: 2024-09-05 | End: 2024-09-05

## 2024-09-05 RX ORDER — ASPIRIN 81 MG
81 TABLET, DELAYED RELEASE (ENTERIC COATED) ORAL DAILY
Refills: 0 | Status: DISCONTINUED | OUTPATIENT
Start: 2024-09-05 | End: 2024-09-10

## 2024-09-05 RX ADMIN — ACETAMINOPHEN 650 MILLIGRAM(S): 325 TABLET ORAL at 11:37

## 2024-09-05 RX ADMIN — MEROPENEM 100 MILLIGRAM(S): 500 INJECTION, POWDER, FOR SOLUTION INTRAVENOUS at 05:07

## 2024-09-05 RX ADMIN — Medication 750 MILLIGRAM(S): at 13:17

## 2024-09-05 RX ADMIN — PETROLATUM 1 APPLICATION(S): 93.5 OINTMENT TOPICAL at 05:17

## 2024-09-05 RX ADMIN — Medication 100 MILLIGRAM(S): at 13:17

## 2024-09-05 RX ADMIN — Medication 40 MILLIGRAM(S): at 17:18

## 2024-09-05 RX ADMIN — Medication 500 MILLIGRAM(S): at 11:11

## 2024-09-05 RX ADMIN — MEROPENEM 100 MILLIGRAM(S): 500 INJECTION, POWDER, FOR SOLUTION INTRAVENOUS at 11:11

## 2024-09-05 RX ADMIN — Medication 75 MILLILITER(S): at 06:00

## 2024-09-05 RX ADMIN — Medication 250 MILLIGRAM(S): at 22:13

## 2024-09-05 RX ADMIN — Medication 75 MILLILITER(S): at 21:15

## 2024-09-05 RX ADMIN — Medication 1 TABLET(S): at 11:11

## 2024-09-05 RX ADMIN — Medication 5000 UNIT(S): at 17:17

## 2024-09-05 RX ADMIN — BACLOFEN 10 MILLIGRAM(S): 0.5 INJECTION INTRATHECAL at 17:18

## 2024-09-05 RX ADMIN — Medication 750 MILLIGRAM(S): at 21:14

## 2024-09-05 RX ADMIN — PETROLATUM 1 APPLICATION(S): 93.5 OINTMENT TOPICAL at 17:17

## 2024-09-05 RX ADMIN — POLYETHYLENE GLYCOL 3350, SODIUM SULFATE ANHYDROUS, SODIUM BICARBONATE, SODIUM CHLORIDE, POTASSIUM CHLORIDE 4000 MILLILITER(S): 236; 22.74; 6.74; 5.86; 2.97 POWDER, FOR SOLUTION ORAL at 16:12

## 2024-09-05 RX ADMIN — Medication 40 MILLIGRAM(S): at 05:16

## 2024-09-05 RX ADMIN — Medication 250 MILLIGRAM(S): at 05:07

## 2024-09-05 RX ADMIN — MEROPENEM 100 MILLIGRAM(S): 500 INJECTION, POWDER, FOR SOLUTION INTRAVENOUS at 17:17

## 2024-09-05 RX ADMIN — MIDODRINE HYDROCHLORIDE 10 MILLIGRAM(S): 5 TABLET ORAL at 11:11

## 2024-09-05 RX ADMIN — MIDODRINE HYDROCHLORIDE 10 MILLIGRAM(S): 5 TABLET ORAL at 17:17

## 2024-09-05 RX ADMIN — SODIUM CHLORIDE 1 GRAM(S): 9 INJECTION INTRAMUSCULAR; INTRAVENOUS; SUBCUTANEOUS at 17:20

## 2024-09-05 RX ADMIN — LEVETIRACETAM 1000 MILLIGRAM(S): 1000 TABLET ORAL at 17:17

## 2024-09-05 RX ADMIN — Medication 81 MILLIGRAM(S): at 11:12

## 2024-09-05 RX ADMIN — Medication 100 MILLIGRAM(S): at 21:14

## 2024-09-05 NOTE — PROGRESS NOTE ADULT - ASSESSMENT
A 69 year old F with pmhx  of dementia, CVA, OM, sacral ulcer, recent admission w/septic shock at [facility] discharged on vancomycin and meropenem presents from nursing home for positive occult blood and low Hb of 7 at the NH.    #Unstageable sacral ulcer  #chronic osteomyelitis   - Per daughter recent admission to [facility] for OM of sacral ulcer c/b septic shock  - In ED, /82, , SpO2 99% on RA  - Labs remarkable for wbc 19k, Neutro 80%, Hb 8.3, MCV 86, Na 130, K 6.3, lactate 2.1  - VBG ph 7.37, CO2 53  - Xray chest No radiographic evidence of acute cardiopulmonary disease.  - s/p 2L bolus  - c/w meropenem and vanc  - c/w LR 75cc/hr  - UA borderline positive, f/u ucx, bcx  - f/u procal, esr, crp  - Trend wbc and lactate  - lactate 2.1 -> 2.4 -> 1  - ID recs: meropenem, vanc  - BCx prelim coag neg staph  - f/u BCx  - burn consult: no intervention at this time  - f/u vanc trough  - f/u MRSA  - f/u ID    #Acute on chronic anemia  - Per daughter, pt with multiple transfusions at [facility]  - Positive occult blood and Hb of 7 at NH  - Here Hb 8.3 -> 7.6 -> 8.0 (bs around 9)  - Agreeable for egd/colono if needed  - low total iron, transferrin, TIBC, % sat  - Keep active type and screen  - CBC bid  - PPI IV bid   - Transfuse if Hb < 7  - maintain active type and screen  - GI eval: EGD and colonoscopy on 09/06     #Hyperkalemia  - No EKG changes  - K of 6.3 -> 5.2 -> 5.1  - s/p insulin/dextrose and lokelma  - BMP bid    #Seizures  #Hx of R Frontal Lobe Infarct   #Chronic R MCA infarct  #Bed bound  #Tube feeds  - C/w Depakote 750 mg TID and Keppra to 1000 mg BID   - C/w baclofen 10 mg bid (for UE spasticity)  - C/w ASA, on plavix but not on statin? Holding plavix for now    # Persistent Hypotension   - c/w Midodrine 10 mg TID    #Sinus Tachycardia  IVFs  low threshold for CT chest PE protocol    #Chronic borrego  - cw borrego    - DVT ppx - SCDs after duplex  - Diet: PEG tube feeds  - GI ppx: PPI  - Code status: Palliative consult for GOC    Very poor Px.    #Progress Note Handoff  Pending: Clinical improvement and stability__x___  Pt/Family discussion: staff spoke to daughter  Disposition: Nsg Home_    My note supersedes the residents note should a discrepancy arise.    Chart and notes personally reviewed.  Care Discussed with Consultants/Other Providers/ Housestaff [ x] YES [ ] NO   Radiology, labs, old records personally reviewed.    discussed w/ housestaff, nursing, case management, ID    Attestation Statements:    Attestation Statements:  Risk Statement (NON-critical care).     On this date of service, level of risk to patient is considered: High.     Due to: anemia, sacral decub    Time-based billing (NON-critical care).     50 minutes spent on total encounter. The necessity of the time spent during the encounter on this date of service was due to:     time spent on review of labs, imaging studies, old records, obtaining history, personally examining patient, counselling and communicating with patient/ family, entering orders for medications/tests/etc, discussions with other health care providers, documentation in electronic health records, independent interpretation of labs, imaging/procedure results and care coordination.

## 2024-09-05 NOTE — PROGRESS NOTE ADULT - SUBJECTIVE AND OBJECTIVE BOX
JOSEPH OBRIEN  69y, Female  Allergy: Allergy Status Unknown      LOS  2d    CHIEF COMPLAINT:     INTERVAL EVENTS/HPI  - T(F): , Max: 100.1 (09-05-24 @ 04:37)  - WBC Count: 13.56 (09-05-24 @ 06:30) downtrending   WBC Count: 14.07 (09-04-24 @ 15:37)     - Creatinine: <0.5 (09-05-24 @ 06:30)  Creatinine: <0.5 (09-04-24 @ 15:37)     -   -   -     ROS  cannot obtain secondary to patient's sedation and/or mental status    VITALS:  T(F): 100.1, Max: 100.1 (09-05-24 @ 04:37)  HR: 126  BP: 116/77  RR: 18Vital Signs Last 24 Hrs  T(C): 37.8 (05 Sep 2024 04:37), Max: 37.8 (05 Sep 2024 04:37)  T(F): 100.1 (05 Sep 2024 04:37), Max: 100.1 (05 Sep 2024 04:37)  HR: 126 (05 Sep 2024 04:37) (102 - 126)  BP: 116/77 (05 Sep 2024 04:37) (106/74 - 120/74)  BP(mean): 90 (05 Sep 2024 04:37) (90 - 90)  RR: 18 (05 Sep 2024 04:37) (18 - 18)  SpO2: 98% (05 Sep 2024 04:37) (97% - 100%)    Parameters below as of 05 Sep 2024 04:37  Patient On (Oxygen Delivery Method): room air        PHYSICAL EXAM:  ***    FH: Non-contributory  Social Hx: Non-contributory    TESTS & MEASUREMENTS:                        8.5    13.56 )-----------( 971      ( 05 Sep 2024 06:30 )             26.9     09-05    139  |  99  |  8<L>  ----------------------------<  112<H>  4.8   |  26  |  <0.5<L>    Ca    9.2      05 Sep 2024 06:30  Mg     2.1     09-04    TPro  6.7  /  Alb  2.8<L>  /  TBili  <0.2  /  DBili  x   /  AST  17  /  ALT  24  /  AlkPhos  125<H>  09-05      LIVER FUNCTIONS - ( 05 Sep 2024 06:30 )  Alb: 2.8 g/dL / Pro: 6.7 g/dL / ALK PHOS: 125 U/L / ALT: 24 U/L / AST: 17 U/L / GGT: x           Urinalysis Basic - ( 05 Sep 2024 06:30 )    Color: x / Appearance: x / SG: x / pH: x  Gluc: 112 mg/dL / Ketone: x  / Bili: x / Urobili: x   Blood: x / Protein: x / Nitrite: x   Leuk Esterase: x / RBC: x / WBC x   Sq Epi: x / Non Sq Epi: x / Bacteria: x        Urinalysis with Rflx Culture (collected 09-03-24 @ 20:56)    Culture - Blood (collected 09-03-24 @ 17:21)  Source: .Blood Blood-Peripheral  Preliminary Report (09-05-24 @ 02:02):    No growth at 24 hours    Culture - Blood (collected 09-03-24 @ 17:21)  Source: .Blood Blood-Peripheral  Gram Stain (09-05-24 @ 09:09):    Growth in aerobic bottle: Gram Positive Cocci in Clusters  Preliminary Report (09-05-24 @ 09:09):    Growth in aerobic bottle: Gram Positive Cocci in Clusters    Direct identification is available within approximately 3-5    hours either by Blood Panel Multiplexed PCR or Direct    MALDI-TOF. Details: [email or web address]  Organism: Blood Culture PCR (09-05-24 @ 09:55)  Organism: Blood Culture PCR (09-05-24 @ 09:55)      -  Coagulase negative Staphylococcus: Detec      Method Type: PCR        Lactate, Blood: 1.0 mmol/L (09-04-24 @ 15:32)  Lactate, Blood: 2.4 mmol/L (09-04-24 @ 07:34)  Lactate, Blood: 2.1 mmol/L (09-03-24 @ 17:21)  Blood Gas Venous - Lactate: 1.9 mmol/L (09-03-24 @ 15:17)      INFECTIOUS DISEASES TESTING      INFLAMMATORY MARKERS  Sedimentation Rate, Erythrocyte: 140 mm/Hr (09-04-24 @ 00:25)  C-Reactive Protein: 123.9 mg/L (09-04-24 @ 00:25)      RADIOLOGY & ADDITIONAL TESTS:  I have personally reviewed the last available Chest xray  CXR  Xray Chest 1 View- PORTABLE-Urgent:   ACC: [id number] EXAM:  XR CHEST PORTABLE URGENT 1V   ORDERED BY: CALI DEJESUS     PROCEDURE DATE:  09/03/2024          INTERPRETATION:  CLINICAL HISTORY: Sepsis.    COMPARISON: 4/14/2023.    TECHNIQUE: Portable frontal chest radiograph. Adequatepositioning.    FINDINGS:    Support devices: None.    Cardiac/mediastinum/hilum: Unremarkable.    Lung parenchyma/Pleura: No focal parenchymal opacities, pleural   effusions, or pneumothorax.    Skeleton/soft tissues: Stable.      IMPRESSION:    No radiographic evidence of acute cardiopulmonary disease.    --- End of Report ---            JUSTUS DOBSON MD; Attending Radiologist  This document has been electronically signed. Sep  3 2024  3:56PM (09-03-24 @ 15:53)      CT      CARDIOLOGY TESTING  12 Lead ECG:   Ventricular Rate 132 BPM    Atrial Rate 132 BPM    P-R Interval 122 ms    QRS Duration 62 ms    Q-T Interval 294 ms    QTC Calculation(Bazett) 435 ms    P Axis 58 degrees    R Axis -1 degrees    T Axis 42 degrees    Diagnosis Line Sinus tachycardia  Otherwise normal ECG    Confirmed by Boris Humphries ([id number]) on 9/3/2024 8:48:37 PM (09-03-24 @ 15:23)      MEDICATIONS  albuterol    0.083%. 2.5  ascorbic acid 500  aspirin  chewable 81  baclofen 10  gabapentin 100  heparin   Injectable 5000  lactated ringers. 1000  levETIRAcetam  Solution 1000  lidocaine   4% Patch 1  meropenem  IVPB 1000  midodrine. 10  multivitamin/minerals 1  pantoprazole  Injectable 40  sodium chloride 1  valproic  acid Syrup 750  vancomycin  IVPB 1000  vitamin A &amp; D Ointment 1      WEIGHT  Weight (kg): 60 (09-05-24 @ 02:46)  Creatinine: <0.5 mg/dL (09-05-24 @ 06:30)  Creatinine: <0.5 mg/dL (09-04-24 @ 15:37)      ANTIBIOTICS:  meropenem  IVPB 1000 milliGRAM(s) IV Intermittent every 8 hours  vancomycin  IVPB 1000 milliGRAM(s) IV Intermittent every 12 hours      All available historical records have been reviewed   JOSEPH OBRIEN  69y, Female  Allergy: Allergy Status Unknown      LOS  2d    CHIEF COMPLAINT:     INTERVAL EVENTS/HPI  - T(F): , Max: 100.1 (09-05-24 @ 04:37)  - WBC Count: 13.56 (09-05-24 @ 06:30) downtrending   WBC Count: 14.07 (09-04-24 @ 15:37)     - Creatinine: <0.5 (09-05-24 @ 06:30)  Creatinine: <0.5 (09-04-24 @ 15:37)     -   -   -     ROS  cannot obtain secondary to patient's sedation and/or mental status    VITALS:  T(F): 100.1, Max: 100.1 (09-05-24 @ 04:37)  HR: 126  BP: 116/77  RR: 18Vital Signs Last 24 Hrs  T(C): 37.8 (05 Sep 2024 04:37), Max: 37.8 (05 Sep 2024 04:37)  T(F): 100.1 (05 Sep 2024 04:37), Max: 100.1 (05 Sep 2024 04:37)  HR: 126 (05 Sep 2024 04:37) (102 - 126)  BP: 116/77 (05 Sep 2024 04:37) (106/74 - 120/74)  BP(mean): 90 (05 Sep 2024 04:37) (90 - 90)  RR: 18 (05 Sep 2024 04:37) (18 - 18)  SpO2: 98% (05 Sep 2024 04:37) (97% - 100%)    Parameters below as of 05 Sep 2024 04:37  Patient On (Oxygen Delivery Method): room air        PHYSICAL EXAM:  Gen: chronically ill appearing   HEENT: Normocephalic, atraumatic  Neck: supple, no lymphadenopathy  CV: Regular rate & regular rhythm  Lungs: decreased BS at bases, no fremitus  Abdomen: Soft, BS present  Ext: Warm, well perfused  Neuro: Deficits,  not following commands  Skin: no rash, no erythema  Lines: no phlebitis     FH: Non-contributory  Social Hx: Non-contributory    TESTS & MEASUREMENTS:                        8.5    13.56 )-----------( 971      ( 05 Sep 2024 06:30 )             26.9     09-05    139  |  99  |  8<L>  ----------------------------<  112<H>  4.8   |  26  |  <0.5<L>    Ca    9.2      05 Sep 2024 06:30  Mg     2.1     09-04    TPro  6.7  /  Alb  2.8<L>  /  TBili  <0.2  /  DBili  x   /  AST  17  /  ALT  24  /  AlkPhos  125<H>  09-05      LIVER FUNCTIONS - ( 05 Sep 2024 06:30 )  Alb: 2.8 g/dL / Pro: 6.7 g/dL / ALK PHOS: 125 U/L / ALT: 24 U/L / AST: 17 U/L / GGT: x           Urinalysis Basic - ( 05 Sep 2024 06:30 )    Color: x / Appearance: x / SG: x / pH: x  Gluc: 112 mg/dL / Ketone: x  / Bili: x / Urobili: x   Blood: x / Protein: x / Nitrite: x   Leuk Esterase: x / RBC: x / WBC x   Sq Epi: x / Non Sq Epi: x / Bacteria: x        Urinalysis with Rflx Culture (collected 09-03-24 @ 20:56)    Culture - Blood (collected 09-03-24 @ 17:21)  Source: .Blood Blood-Peripheral  Preliminary Report (09-05-24 @ 02:02):    No growth at 24 hours    Culture - Blood (collected 09-03-24 @ 17:21)  Source: .Blood Blood-Peripheral  Gram Stain (09-05-24 @ 09:09):    Growth in aerobic bottle: Gram Positive Cocci in Clusters  Preliminary Report (09-05-24 @ 09:09):    Growth in aerobic bottle: Gram Positive Cocci in Clusters    Direct identification is available within approximately 3-5    hours either by Blood Panel Multiplexed PCR or Direct    MALDI-TOF. Details: [email or web address]  Organism: Blood Culture PCR (09-05-24 @ 09:55)  Organism: Blood Culture PCR (09-05-24 @ 09:55)      -  Coagulase negative Staphylococcus: Detec      Method Type: PCR        Lactate, Blood: 1.0 mmol/L (09-04-24 @ 15:32)  Lactate, Blood: 2.4 mmol/L (09-04-24 @ 07:34)  Lactate, Blood: 2.1 mmol/L (09-03-24 @ 17:21)  Blood Gas Venous - Lactate: 1.9 mmol/L (09-03-24 @ 15:17)      INFECTIOUS DISEASES TESTING      INFLAMMATORY MARKERS  Sedimentation Rate, Erythrocyte: 140 mm/Hr (09-04-24 @ 00:25)  C-Reactive Protein: 123.9 mg/L (09-04-24 @ 00:25)      RADIOLOGY & ADDITIONAL TESTS:  I have personally reviewed the last available Chest xray  CXR  Xray Chest 1 View- PORTABLE-Urgent:   ACC: [id number] EXAM:  XR CHEST PORTABLE URGENT 1V   ORDERED BY: CALI DEJESUS     PROCEDURE DATE:  09/03/2024          INTERPRETATION:  CLINICAL HISTORY: Sepsis.    COMPARISON: 4/14/2023.    TECHNIQUE: Portable frontal chest radiograph. Adequatepositioning.    FINDINGS:    Support devices: None.    Cardiac/mediastinum/hilum: Unremarkable.    Lung parenchyma/Pleura: No focal parenchymal opacities, pleural   effusions, or pneumothorax.    Skeleton/soft tissues: Stable.      IMPRESSION:    No radiographic evidence of acute cardiopulmonary disease.    --- End of Report ---            JUSTUS DOBSON MD; Attending Radiologist  This document has been electronically signed. Sep  3 2024  3:56PM (09-03-24 @ 15:53)      CT      CARDIOLOGY TESTING  12 Lead ECG:   Ventricular Rate 132 BPM    Atrial Rate 132 BPM    P-R Interval 122 ms    QRS Duration 62 ms    Q-T Interval 294 ms    QTC Calculation(Bazett) 435 ms    P Axis 58 degrees    R Axis -1 degrees    T Axis 42 degrees    Diagnosis Line Sinus tachycardia  Otherwise normal ECG    Confirmed by Boris Humphries ([id number]) on 9/3/2024 8:48:37 PM (09-03-24 @ 15:23)      MEDICATIONS  albuterol    0.083%. 2.5  ascorbic acid 500  aspirin  chewable 81  baclofen 10  gabapentin 100  heparin   Injectable 5000  lactated ringers. 1000  levETIRAcetam  Solution 1000  lidocaine   4% Patch 1  meropenem  IVPB 1000  midodrine. 10  multivitamin/minerals 1  pantoprazole  Injectable 40  sodium chloride 1  valproic  acid Syrup 750  vancomycin  IVPB 1000  vitamin A &amp; D Ointment 1      WEIGHT  Weight (kg): 60 (09-05-24 @ 02:46)  Creatinine: <0.5 mg/dL (09-05-24 @ 06:30)  Creatinine: <0.5 mg/dL (09-04-24 @ 15:37)      ANTIBIOTICS:  meropenem  IVPB 1000 milliGRAM(s) IV Intermittent every 8 hours  vancomycin  IVPB 1000 milliGRAM(s) IV Intermittent every 12 hours      All available historical records have been reviewed

## 2024-09-05 NOTE — PROGRESS NOTE ADULT - ASSESSMENT
A 69 year old F with pmhx  of dementia, CVA, OM, sacral ulcer, recent admission w/septic shock at [facility] discharged on vancomycin and meropenem presents from nursing home for positive occult blood and low Hb of 7 at the NH.    #Sepsis poa  #Unstageable sacral ulcer  Bacteriemic   #chronic osteomyelitis   - Per daughter recent admission to [facility] for OM of sacral ulcer c/b septic shock  - In ED, /82, , SpO2 99% on RA  - Labs remarkable for wbc 19k, Neutro 80%, Hb 8.3, MCV 86, Na 130, K 6.3, lactate 2.1  - VBG ph 7.37, CO2 53  - Xray chest No radiographic evidence of acute cardiopulmonary disease.  - s/p 2L bolus  - c/w meropenem and vanc  - c/w LR 75cc/hr  - UA borderline positive, f/u ucx, bcx  - f/u procal, esr, crp  - Trend wbc and lactate  - lactate 2.1 -> 2.4 -> 1  - ID recs: meropenem, vanc  - BCx prelim growing gram + coccii in clusters  - f/u BCx  - burn consult: no intervention at this time  - f/u vanc trough  - f/u MRSA  - f/u ID    #Acute on chronic anemia  - Per daughter, pt with multiple transfusions at [facility]  - Positive occult blood and Hb of 7 at NH  - Here Hb 8.3 -> 7.6 -> 8.0 (bs around 9)  - Agreeable for egd/colono if needed  - low total iron, transferrin, TIBC, % sat  - Keep active type and screen  - CBC bid  - PPI IV bid   - Transfuse if Hb < 7  - maintain active type and screen  - GI eval: EGD and colonoscopy on 09/06     #Hyperkalemia  - No EKG changes  - K of 6.3 -> 5.2 -> 5.1  - s/p insulin/dextrose and lokelma  - BMP bid  - Keep on telemetry    #Seizures  #Hx of R Frontal Lobe Infarct   #Chronic R MCA infarct  #Bed bound  #Tube feeds  - C/w Depakote 750 mg TID and Keppra to 1000 mg BID   - C/w baclofen 10 mg bid (for UE spasticity)  - C/w ASA, on plavix but not on statin? Holding plavix for now    # Persistent Hypotension   - c/w Midodrine 10 mg TID    # Left avulsion fracture of medial malleolus  - C/w gabapentin at 100mg TID    # Left cephalic vein thrombosis  - Duplex LUE (3/10/23): No DVT however there is superficial thrombosis noted in the left cephalic vein  - Repeat duplex ordered 3/27/23 - negative for UE and LE DVT    #Chronic borrego  - cw borrego    - DVT ppx - SCDs after duplex  - Diet: PEG tube feeds  - GI ppx: PPI  - Code status: Palliative consult for GOC    #PENDING  - f/u GI for EGD and colonoscopy and prep plan  - f/u BCx  - f/u ID

## 2024-09-05 NOTE — PROGRESS NOTE ADULT - SUBJECTIVE AND OBJECTIVE BOX
SUBJECTIVE/OVERNIGHT EVENTS  Today is hospital day 2d. This morning patient was seen and examined at bedside, resting comfortably in bed. No acute or major events overnight. Pt said good morning but would not engage in further conversation with me.     MEDICATIONS  STANDING MEDICATIONS  albuterol    0.083%. 2.5 milliGRAM(s) Nebulizer once  ascorbic acid 500 milliGRAM(s) Oral daily  aspirin  chewable 81 milliGRAM(s) Oral daily  baclofen 10 milliGRAM(s) Oral every 12 hours  gabapentin 100 milliGRAM(s) Oral three times a day  heparin   Injectable 5000 Unit(s) SubCutaneous every 12 hours  lactated ringers. 1000 milliLiter(s) IV Continuous <Continuous>  levETIRAcetam  Solution 1000 milliGRAM(s) Oral two times a day  lidocaine   4% Patch 1 Patch Transdermal every 24 hours  meropenem  IVPB 1000 milliGRAM(s) IV Intermittent every 8 hours  midodrine. 10 milliGRAM(s) Oral three times a day  multivitamin/minerals 1 Tablet(s) Oral daily  pantoprazole  Injectable 40 milliGRAM(s) IV Push two times a day  sodium chloride 1 Gram(s) Oral two times a day  valproic  acid Syrup 750 milliGRAM(s) Enteral Tube three times a day  vancomycin  IVPB 1000 milliGRAM(s) IV Intermittent every 12 hours  vitamin A &amp; D Ointment 1 Application(s) Topical two times a day    PRN MEDICATIONS  acetaminophen     Tablet .. 650 milliGRAM(s) Oral every 6 hours PRN    VITALS  T(F): 100.3 (09-05-24 @ 13:23), Max: 100.3 (09-05-24 @ 13:23)  HR: 115 (09-05-24 @ 13:23) (102 - 126)  BP: 127/74 (09-05-24 @ 13:23) (106/74 - 127/74)  RR: 18 (09-05-24 @ 13:23) (18 - 18)  SpO2: 98% (09-05-24 @ 13:23) (97% - 100%)    PHYSICAL EXAM  GENERAL: NAD, lying in bed comfortably  HEAD:  Atraumatic, normocephalic  EYES: EOMI, PERRLA, conjunctiva and sclera clear  NECK: Supple, trachea midline, no JVD  HEART: Regular rate and rhythm, no murmurs, rubs, or gallops  LUNGS: Unlabored respirations.  Clear to auscultation bilaterally, no crackles, wheezing, or rhonchi  ABDOMEN: Soft, nontender, nondistended, +BS, PEG tube in place, site clean and dry  EXTREMITIES: 2+ peripheral pulses bilaterally. No clubbing, cyanosis, or edema. B/L UE contracted  NERVOUS SYSTEM:  A&Ox2, moving all extremities, no focal deficits   SKIN: sacral ulcer    LABS             8.5    13.56 )-----------( 971      ( 09-05-24 @ 06:30 )             26.9     139  |  99  |  8   -------------------------<  112   09-05-24 @ 06:30  4.8  |  26  |  <0.5    Ca      9.2     09-05-24 @ 06:30  Mg     2.1     09-04-24 @ 07:34    TPro  6.7  /  Alb  2.8  /  TBili  <0.2  /  DBili  x   /  AST  17  /  ALT  24  /  AlkPhos  125  /  GGT  x     09-05-24 @ 06:30    PT/INR - ( 09-03-24 @ 17:21 )   PT: 13.90 sec<H>;   INR: 1.22 ratio  PTT - ( 09-03-24 @ 17:21 )  PTT:>200.0 sec      Urinalysis Basic - ( 05 Sep 2024 06:30 )    Color: x / Appearance: x / SG: x / pH: x  Gluc: 112 mg/dL / Ketone: x  / Bili: x / Urobili: x   Blood: x / Protein: x / Nitrite: x   Leuk Esterase: x / RBC: x / WBC x   Sq Epi: x / Non Sq Epi: x / Bacteria: x          Urinalysis with Rflx Culture (collected 03 Sep 2024 20:56)    Culture - Blood (collected 03 Sep 2024 17:21)  Source: .Blood Blood-Peripheral  Preliminary Report (05 Sep 2024 02:02):    No growth at 24 hours    Culture - Blood (collected 03 Sep 2024 17:21)  Source: .Blood Blood-Peripheral  Gram Stain (05 Sep 2024 09:09):    Growth in aerobic bottle: Gram Positive Cocci in Clusters  Preliminary Report (05 Sep 2024 09:09):    Growth in aerobic bottle: Gram Positive Cocci in Clusters    Direct identification is available within approximately 3-5    hours either by Blood Panel Multiplexed PCR or Direct    MALDI-TOF. Details: [email or web address]  Organism: Blood Culture PCR (05 Sep 2024 09:55)  Organism: Blood Culture PCR (05 Sep 2024 09:55)

## 2024-09-05 NOTE — PROGRESS NOTE ADULT - ASSESSMENT
ASSESSMENT   69 year old F with pmhx  of dementia, CVA, OM, sacral ulcer, recent admission w/septic shock at [facility] discharged on vancomycin and meropenem presents from nursing home for positive occult blood and low Hb of 7 at the NH.    IMPRESSION  #Severe sepsis with lactic acidosis T>101 P>90 WBC 19    9/3 1/4 bottles coNS- could be true pathogen as outside catheter  Possible SSTI, sacral decub- Skin: full thickness sacral wound about [id number] mostly moist and pink, with nonviable tissue present towards 12 o'clock, free floating piece of bone noted, bone palpable, mild malodor noted, no pus, no erythema, no edema    CXR no PNA    UA without significant pyuria     Influenza/RSV/COVID19 PCR negative   #Immunodeficiency secondary to Senescence which could results in poor clinical outcomes  #Abx allergy: Allergy Status Unknown    Creatinine: <0.5 (09-04-24 @ 00:25)    Height (cm): 152.4 (09-03-24 @ 13:32)  Weight (kg): 59 (09-03-24 @ 13:32)    RECOMMENDATIONS  - Repeat BCX as CoNS  - Obtain [facility] records, micro data  - Appreciate Burn consult- -No need for surgical intervention at this time  - No benefit of long term intravenous antibiotics for chronic sacral osteomyelitis. Continue with nutritional support and offloading   - meropenem  IVPB 1000 milliGRAM(s) IV Intermittent every 8 hours  - vancomycin  IVPB 1000 milliGRAM(s) IV Intermittent every 12 hours  - Vanc dosing AUC/JLUIANA per clinical pharmacist   - Poor prognosis, GOC    If any questions, please send a message or call on [employer] Teams  Please continue to update ID with any pertinent new laboratory, radiographic findings, or change in clinical status   ASSESSMENT   69 year old F with pmhx  of dementia, CVA, OM, sacral ulcer, recent admission w/septic shock at [facility] discharged on vancomycin and meropenem presents from nursing home for positive occult blood and low Hb of 7 at the NH.    IMPRESSION  #Severe sepsis with lactic acidosis T>101 P>90 WBC 19    9/3 1/4 bottles coNS- could be true pathogen as outside catheter  Possible SSTI, sacral decub- Skin: full thickness sacral wound about [id number] mostly moist and pink, with nonviable tissue present towards 12 o'clock, free floating piece of bone noted, bone palpable, mild malodor noted, no pus, no erythema, no edema    CXR no PNA    UA without significant pyuria     Influenza/RSV/COVID19 PCR negative   #Immunodeficiency secondary to Senescence which could results in poor clinical outcomes  #Abx allergy: Allergy Status Unknown    Creatinine: <0.5 (09-04-24 @ 00:25)    Height (cm): 152.4 (09-03-24 @ 13:32)  Weight (kg): 59 (09-03-24 @ 13:32)    RECOMMENDATIONS  - Remove outside line   - Repeat BCX as CoNS  - Obtain [facility] records, micro data  - Appreciate Burn consult- -No need for surgical intervention at this time  - No benefit of long term intravenous antibiotics for chronic sacral osteomyelitis. Continue with nutritional support and offloading   - meropenem  IVPB 1000 milliGRAM(s) IV Intermittent every 8 hours  - vancomycin  IVPB 1000 milliGRAM(s) IV Intermittent every 12 hours  - Vanc dosing AUC/JULIANA per clinical pharmacist   - Poor prognosis, GOC    If any questions, please send a message or call on [employer] Teams  Please continue to update ID with any pertinent new laboratory, radiographic findings, or change in clinical status

## 2024-09-05 NOTE — PROGRESS NOTE ADULT - ASSESSMENT
A 69 year old F with pmhx  of dementia, CVA, OM, sacral ulcer, recent admission w/septic shock at [facility] discharged on vancomycin and meropenem presents from nursing home for positive occult blood and low Hb of 7 at the NH.    Patient reports that she has several children, but her daughter, Dominga, is the one who primarily helps her to make medical decisions. She states that she if were unable to make decisions for herself, she would trust Dominga over her other children. Inquired about intubation and CPR, and she states that she would like to defer to Dominga about these issues. Per chart, Dominga had previously signed a MOLST for DNR/DNI, but she rescinded this decision during this admission.      Palliative care will continue to follow.   Please call [phone] with questions or concerns 24/7.

## 2024-09-05 NOTE — PROGRESS NOTE ADULT - SUBJECTIVE AND OBJECTIVE BOX
Patient is a 69y old  Female who presents with a chief complaint of   INTERVAL HPI/OVERNIGHT EVENTS: Patient was examined and seen at bedside. This morning pt is resting in bed and staff report no new issues or overnight events.     ROS: uanble to access due to lethargy  InitialHPI:  A 69 year old F with pmhx  of dementia, CVA, OM, sacral ulcer, recent admission w/septic shock at [facility] discharged on vancomycin and meropenem presents from nursing home for positive occult blood and low Hb of 7 at the NH.    In ED, /82, , SpO2 99% on RA  Labs remarkable for wbc 19k, Neutro 80%, Hb 8.3, MCV 86, Na 130, K 6.3, lactate 2.1  VBG ph 7.37, CO2 53  Xray chest No radiographic evidence of acute cardiopulmonary disease.    Admit for further management and workup   (03 Sep 2024 18:59)    PAST MEDICAL & SURGICAL HISTORY:      General: lethargic  HEENT:  LAD  CV: S1 S2  Resp: decreased breath sounds at bases  GI: NT/ND/S +BS; +PEG  MS: no clubbing/cyanosis/edema, + pulses b/l  Neuro: unable to access    MEDICATIONS  (STANDING):  albuterol    0.083%. 2.5 milliGRAM(s) Nebulizer once  ascorbic acid 500 milliGRAM(s) Oral daily  aspirin  chewable 81 milliGRAM(s) Oral daily  baclofen 10 milliGRAM(s) Oral every 12 hours  gabapentin 100 milliGRAM(s) Oral three times a day  heparin   Injectable 5000 Unit(s) SubCutaneous every 12 hours  lactated ringers. 1000 milliLiter(s) (75 mL/Hr) IV Continuous <Continuous>  levETIRAcetam  Solution 1000 milliGRAM(s) Oral two times a day  lidocaine   4% Patch 1 Patch Transdermal every 24 hours  meropenem  IVPB 1000 milliGRAM(s) IV Intermittent every 8 hours  midodrine. 10 milliGRAM(s) Oral three times a day  multivitamin/minerals 1 Tablet(s) Oral daily  pantoprazole  Injectable 40 milliGRAM(s) IV Push two times a day  sodium chloride 1 Gram(s) Oral two times a day  valproic  acid Syrup 750 milliGRAM(s) Enteral Tube three times a day  vancomycin  IVPB 1000 milliGRAM(s) IV Intermittent every 12 hours  vitamin A &amp; D Ointment 1 Application(s) Topical two times a day    MEDICATIONS  (PRN):  acetaminophen     Tablet .. 650 milliGRAM(s) Oral every 6 hours PRN Mild Pain (1 - 3)    Vital Signs Last 24 Hrs  T(C): 37.9 (05 Sep 2024 13:23), Max: 37.9 (05 Sep 2024 13:23)  T(F): 100.3 (05 Sep 2024 13:23), Max: 100.3 (05 Sep 2024 13:23)  HR: 115 (05 Sep 2024 13:23) (111 - 126)  BP: 127/74 (05 Sep 2024 13:23) (116/77 - 127/74)  BP(mean): 90 (05 Sep 2024 04:37) (90 - 90)  RR: 18 (05 Sep 2024 13:23) (18 - 18)  SpO2: 98% (05 Sep 2024 13:23) (97% - 98%)    Parameters below as of 05 Sep 2024 13:23  Patient On (Oxygen Delivery Method): room air      CAPILLARY BLOOD GLUCOSE                              8.5    13.56 )-----------( 971      ( 05 Sep 2024 06:30 )             26.9     09-05    139  |  99  |  8<L>  ----------------------------<  112<H>  4.8   |  26  |  <0.5<L>    Ca    9.2      05 Sep 2024 06:30  Mg     2.1     09-04    TPro  6.7  /  Alb  2.8<L>  /  TBili  <0.2  /  DBili  x   /  AST  17  /  ALT  24  /  AlkPhos  125<H>  09-05    LIVER FUNCTIONS - ( 05 Sep 2024 06:30 )  Alb: 2.8 g/dL / Pro: 6.7 g/dL / ALK PHOS: 125 U/L / ALT: 24 U/L / AST: 17 U/L / GGT: x               PTT - ( 05 Sep 2024 11:27 )  PTT:28.8 sec  Urinalysis Basic - ( 05 Sep 2024 06:30 )    Color: x / Appearance: x / SG: x / pH: x  Gluc: 112 mg/dL / Ketone: x  / Bili: x / Urobili: x   Blood: x / Protein: x / Nitrite: x   Leuk Esterase: x / RBC: x / WBC x   Sq Epi: x / Non Sq Epi: x / Bacteria: x              Urinalysis with Rflx Culture (collected 03 Sep 2024 20:56)    Culture - Blood (collected 03 Sep 2024 17:21)  Source: .Blood Blood-Peripheral  Preliminary Report (05 Sep 2024 02:02):    No growth at 24 hours    Culture - Blood (collected 03 Sep 2024 17:21)  Source: .Blood Blood-Peripheral  Gram Stain (05 Sep 2024 09:09):    Growth in aerobic bottle: Gram Positive Cocci in Clusters  Preliminary Report (05 Sep 2024 09:09):    Growth in aerobic bottle: Gram Positive Cocci in Clusters    Direct identification is available within approximately 3-5    hours either by Blood Panel Multiplexed PCR or Direct    MALDI-TOF. Details: [email or web address]  Organism: Blood Culture PCR (05 Sep 2024 09:55)  Organism: Blood Culture PCR (05 Sep 2024 09:55)      Chart, Consultant(s) Notes Reviewed:  [x ] YES  [ ] NO  Care Discussed with Consultants/Other Providers/ Housestaff [ x] YES  [ ] NO  Radiology, labs, old available records personally reviewed.

## 2024-09-05 NOTE — PROGRESS NOTE ADULT - SUBJECTIVE AND OBJECTIVE BOX
Gastroenterology Follow Up Note      Location: [facility] 3E 00[address] ([facility] 3E)  Patient Name: JOESPH OBRIEN  Age: 69y  Gender: Female      Chief Complaint  Patient is a 69y old Female who presents with a chief complaint of   Primary diagnosis of Anemia      Reason for Consult  Anemia  FOBT +      Progress Note  This morning patient was seen and examined at bedside.    Today is hospital day 2d.  Patient is doing fine. No acute events overnight.   Patient denies any abdominal pain.  Last bowel movement was a few days ago (brown stools).        Vital Signs in the last 24 hours   Vitals Summary T(C): 37.9 (09-05-24 @ 13:23), Max: 37.9 (09-05-24 @ 13:23)  HR: 115 (09-05-24 @ 13:23) (111 - 126)  BP: 127/74 (09-05-24 @ 13:23) (116/77 - 127/74)  RR: 18 (09-05-24 @ 13:23) (18 - 18)  SpO2: 98% (09-05-24 @ 13:23) (97% - 98%)  Vent Data   Intake/ Output   09-04-24 @ 07:01  -  09-05-24 @ 07:00  --------------------------------------------------------  IN: 0 mL / OUT: 200 mL / NET: -200 mL      Measurements Height (cm): 152.4 (09-05-24 @ 02:46)  Weight (kg): 60 (09-05-24 @ 02:46)  BMI (kg/m2): 25.8 (09-05-24 @ 02:46)  BSA (m2): 1.57 (09-05-24 @ 02:46)      Physical Exam  * General Appearance: Alert, cooperative, interactive, oriented to time, place, and person, in no acute distress  * Eyes: PERRL, conjunctiva/corneas clear, EOM's intact, fundi benign, both eyes  * Lungs: Good bilateral air entry, normal breath sounds (Clear to auscultation bilaterally, no audible wheezes, crackles, or rhonchi)  * Heart: Regular Rate and Rhythm, normal S1 and S2, no audible murmur, rub, or gallop  * Abdomen: Symmetric, non-distended, soft, non-tender, bowel sounds active all four quadrants, no masses, no organomegaly (no hepatosplenomegaly)  * Rectal: Brown stool, Normal tone, no palpable masses or tenderness  * Extremities: no lower extremity pitting edema bilaterall        Investigations   Laboratory Workup      - CBC:                        8.5    13.56 )-----------( 971      ( 05 Sep 2024 06:30 )             26.9       - Hgb Trend:  8.5  09-05-24 @ 06:30  7.6  09-04-24 @ 15:37  8.0  09-04-24 @ 07:34  7.6  09-04-24 @ 00:25  8.3  09-03-24 @ 17:21          - Chemistry:  09-05    139  |  99  |  8<L>  ----------------------------<  112<H>  4.8   |  26  |  <0.5<L>    Ca    9.2      05 Sep 2024 06:30  Mg     2.1     09-04    TPro  6.7  /  Alb  2.8<L>  /  TBili  <0.2  /  DBili  x   /  AST  17  /  ALT  24  /  AlkPhos  125<H>  09-05    Liver panel trend:  TBili <0.2   /   AST 17   /   ALT 24   /   AlkP 125   /   Tptn 6.7   /   Alb 2.8    /   DBili --      09-05  TBili <0.2   /   AST 20   /   ALT 31   /   AlkP 134   /   Tptn 6.8   /   Alb 2.9    /   DBili --      09-04  TBili 0.2   /   AST 21   /   ALT 33   /   AlkP 142   /   Tptn 6.9   /   Alb 2.8    /   DBili --      09-04  TBili <0.2   /   AST 28   /   ALT 40   /   AlkP 157   /   Tptn 7.5   /   Alb 3.1    /   DBili --      09-03      - Coagulation Studies:  PT/INR - ( 03 Sep 2024 17:21 )   PT: 13.90 sec;   INR: 1.22 ratio    PTT - ( 05 Sep 2024 11:27 )  PTT:28.8 sec        Microbiological Workup  Urinalysis Basic - ( 05 Sep 2024 06:30 )    Color: x / Appearance: x / SG: x / pH: x  Gluc: 112 mg/dL / Ketone: x  / Bili: x / Urobili: x   Blood: x / Protein: x / Nitrite: x   Leuk Esterase: x / RBC: x / WBC x   Sq Epi: x / Non Sq Epi: x / Bacteria: x        Urinalysis with Rflx Culture (collected 03 Sep 2024 20:56)    Culture - Blood (collected 03 Sep 2024 17:21)  Source: .Blood Blood-Peripheral  Preliminary Report (05 Sep 2024 02:02):    No growth at 24 hours    Culture - Blood (collected 03 Sep 2024 17:21)  Source: .Blood Blood-Peripheral  Gram Stain (05 Sep 2024 09:09):    Growth in aerobic bottle: Gram Positive Cocci in Clusters  Preliminary Report (05 Sep 2024 09:09):    Growth in aerobic bottle: Gram Positive Cocci in Clusters    Direct identification is available within approximately 3-5    hours either by Blood Panel Multiplexed PCR or Direct    MALDI-TOF. Details: [email or web address]  Organism: Blood Culture PCR (05 Sep 2024 09:55)  Organism: Blood Culture PCR (05 Sep 2024 09:55)        Current Medications  Standing Medications  albuterol    0.083%. 2.5 milliGRAM(s) Nebulizer once  ascorbic acid 500 milliGRAM(s) Oral daily  aspirin  chewable 81 milliGRAM(s) Oral daily  baclofen 10 milliGRAM(s) Oral every 12 hours  gabapentin 100 milliGRAM(s) Oral three times a day  heparin   Injectable 5000 Unit(s) SubCutaneous every 12 hours  lactated ringers. 1000 milliLiter(s) (75 mL/Hr) IV Continuous <Continuous>  levETIRAcetam  Solution 1000 milliGRAM(s) Oral two times a day  lidocaine   4% Patch 1 Patch Transdermal every 24 hours  meropenem  IVPB 1000 milliGRAM(s) IV Intermittent every 8 hours  midodrine. 10 milliGRAM(s) Oral three times a day  multivitamin/minerals 1 Tablet(s) Oral daily  pantoprazole  Injectable 40 milliGRAM(s) IV Push two times a day  sodium chloride 1 Gram(s) Oral two times a day  valproic  acid Syrup 750 milliGRAM(s) Enteral Tube three times a day  vancomycin  IVPB 1000 milliGRAM(s) IV Intermittent every 12 hours  vitamin A &amp; D Ointment 1 Application(s) Topical two times a day    PRN Medications  acetaminophen     Tablet .. 650 milliGRAM(s) Oral every 6 hours PRN Mild Pain (1 - 3)    Singles Doses Administered  (ADM OVERRIDE) 1 each &lt;see task&gt; GiveOnce  cefepime   IVPB 2000 milliGRAM(s) IV Intermittent once  dextrose 50% Injectable 50 milliLiter(s) IV Push once  insulin regular  human recombinant 5 Unit(s) IV Push once  lactated ringers Bolus 2000 milliLiter(s) IV Bolus once  meropenem  IVPB 1000 milliGRAM(s) IV Intermittent once  pantoprazole  Injectable 80 milliGRAM(s) IV Push Once  polyethylene glycol/electrolyte Solution. 4000 milliLiter(s) Oral once  sodium zirconium cyclosilicate 10 Gram(s) Oral once  sodium zirconium cyclosilicate 10 Gram(s) Oral once  vancomycin  IVPB. 1000 milliGRAM(s) IV Intermittent once

## 2024-09-05 NOTE — PROGRESS NOTE ADULT - ASSESSMENT
Assessment and Plan  Case of a 66 year old female patient known to have a history of dementia, sacral ulcer with osteomyelitis of coccyx in 04/2023, CVA in 2018, seizure, and recent endoscopic placement of PEG tube at [facility] who was brought to the ED on 09/03 for evaluation of outpatient Hb of 7 and positive FOBT in absence of overt bleeding, found to have a Hb of 8.3 that dropped to 7.6. We are consulted for concern of acute on chronic anemia in setting of positive FOBT.      Acute on Chronic Severe Iron Deficiency Anemia/Possible ACD  Reportedly Positive Outpatient FOBT with No Overt Evidence of GI Bleeding  Unintentional Weight Loss; History of Failure to Thrive and Dysphagia s/p PEG 08/2024  Rule Out Colorectal Malignancy in Setting of Family History of Colon Cancer  * Presentation: outpatient Hb 7 and positive FOBT at NH; reports constipation with BM every 5 days (no melena or hematochezia); notes intermittent nausea and reduced appetite with dysphagia to solids and difficulty chewing; notes unintentional weight loss of 15lb in 4-5 months; denies reflux symptoms  * Hemodynamically stable except for sinus tachycardia and low grade fever on 09/05; no active bleeding  * Baseline hemoglobin (prior to admission): 9-10.3 in 04/2023  * Hb trend: Hb 8.3 on 09/03 -> 7.6/8/7.6 on 09/04  * Iron with Total Binding Capacity (09.04.24 @ 00:25) 206 ug/dL; % Saturation, Iron: 7 %; Iron Total: 14 ug/dL; Unsaturated Iron Binding Capacity: 192 ug/dL; ferritin 390   * Coags: INR 1.22 on 09/03  * Takes aspirin 81mg QD for CVA  * JENARO brown stool 09/04  * PEG tube lavage clear secretions on 09/04  * Previous CT abdomen 04/06/2023 unremarkable  * Last EGD was 3 weeks ago during PEG tube placement but no biopsies to evaluate for HP/celiac were taken\  * Last colonoscopy was 9 years ago per daughter; in [place]  * Family History: gastric cancer in mother; colon cancer in maternal uncle at age of 70 years     RECOMMENDATIONS  - In the setting of acute on chronic iron deficiency anemia and dysphagia, patient would ideally benefit from EGD and colonoscopy  - In the absence of overt bleeding, this can be done as outpatient  - After discussion of the risks and benefits of both procedures with daughter (HCP) over phone at , she stated that she would prefer if both procedure can be done prior to discharge (understands this can be performed early next week in case more urgent cases arise)  - Scheduled patient for EGD and colonoscopy on 09/06 (will follow up sepsis workup)  - In the setting of stable Hb and absence of overt bleeding, can resume PEG tube feeds and keep NPO after midnight  - Check preop labs tonight: CBC, BMP, Mg, and INR  - Administer 4L golytely through the G tube today followed by Dulcolax 20mg at bedtime tonight  - Trend CBC closely BID and transfuse as needed (target Hb >7). Maintain active Type and screen  - Switch IV pantoprazole 40mg BID to PO 40mg QD  - Can continue Aspirin 81mg QD for secondary prophylaxis  - Monitor BM: no melena or hematochezia. Recommend bowel regimen in setting of constipation. Start senna 2 tabs at bedtime and miralax 17g BID  - Please avoid non essential NSAIDs  - If any unstable bleed, please call GI stat        Thank you for your consult.  - Please note that plan was communicated with medical team.   - Please reach GI on [phone] during weekdays till 5pm.  - Please call the GI service line after 5pm on Weekdays and anytime on Weekends: [phone].      Yesica Mathias MD  PGY - 5 Gastroenterology Fellow   [facility]

## 2024-09-05 NOTE — PROGRESS NOTE ADULT - SUBJECTIVE AND OBJECTIVE BOX
HPI:  A 69 year old F with pmhx  of dementia, CVA, OM, sacral ulcer, recent admission w/septic shock at [facility] discharged on vancomycin and meropenem presents from nursing home for positive occult blood and low Hb of 7 at the NH.    In ED, /82, , SpO2 99% on RA  Labs remarkable for wbc 19k, Neutro 80%, Hb 8.3, MCV 86, Na 130, K 6.3, lactate 2.1  VBG ph 7.37, CO2 53  Xray chest No radiographic evidence of acute cardiopulmonary disease.    Admit for further management and workup   (03 Sep 2024 18:59)    Patient complaining of abdominal pain.   States that she trusts her daughter, Dominga, to help her make medical decisions.       ITEMS NOT CHECKED ARE NOT PRESENT    SOCIAL HISTORY:   Significant other/partner[ ]  Children[ ]  Uatsdin/Spirituality:  Substance hx:  [ ]   Tobacco hx:  [ ]   Alcohol hx: [ ]   Living Situation: [ ]Home  [ ]Long term care  [ ]Rehab [ ]Other  Home Services: [ ] HHA [ ] Jarocho RN [ ] Hospice  Occupation:  Home Opioid hx:  [ ] Y [ ] N [ ] I-Stop Reference No:     ADVANCE DIRECTIVES:    [ ] Full Code [ ] DNR  MOLST  [ ]  Living Will  [ ]   DECISION MAKER(s):  [ ] Health Care Proxy(s)  [ ] Surrogate(s)  [ ] Guardian           Name(s): Phone Number(s):    BASELINE (I)ADL(s) (prior to admission):  [place]: [ ]Total  [ ] Moderate [ ]Dependent  Palliative Performance Status Version 2:         %    http://npcrc.org/files/news/palliative_performance_scale_ppsv2.pdf    Allergies    Allergy Status Unknown    Intolerances    MEDICATIONS  (STANDING):  albuterol    0.083%. 2.5 milliGRAM(s) Nebulizer once  ascorbic acid 500 milliGRAM(s) Oral daily  aspirin  chewable 81 milliGRAM(s) Oral daily  baclofen 10 milliGRAM(s) Oral every 12 hours  gabapentin 100 milliGRAM(s) Oral three times a day  heparin   Injectable 5000 Unit(s) SubCutaneous every 12 hours  lactated ringers. 1000 milliLiter(s) (75 mL/Hr) IV Continuous <Continuous>  levETIRAcetam  Solution 1000 milliGRAM(s) Oral two times a day  lidocaine   4% Patch 1 Patch Transdermal every 24 hours  meropenem  IVPB 1000 milliGRAM(s) IV Intermittent every 8 hours  midodrine. 10 milliGRAM(s) Oral three times a day  multivitamin/minerals 1 Tablet(s) Oral daily  pantoprazole  Injectable 40 milliGRAM(s) IV Push two times a day  polyethylene glycol/electrolyte Solution. 4000 milliLiter(s) Oral once  sodium chloride 1 Gram(s) Oral two times a day  valproic  acid Syrup 750 milliGRAM(s) Enteral Tube three times a day  vancomycin  IVPB 1000 milliGRAM(s) IV Intermittent every 12 hours  vitamin A &amp; D Ointment 1 Application(s) Topical two times a day    MEDICATIONS  (PRN):  acetaminophen     Tablet .. 650 milliGRAM(s) Oral every 6 hours PRN Mild Pain (1 - 3)      PRESENT SYMPTOMS: [x ]Unable to obtain due to poor mentation   Source if other than patient:  [ ]Family   [ ]Team     Unable to obtain full pain history    Pain: [x ]yes [ ]no  QOL impact -   Location -  abdomen                  Aggravating factors -  Alleviating factors -   Quality -  Radiation -  Timing -   Severity (0-10 scale):  Minimal acceptable level (0-10 scale):     CPOT:    https://www.[facility].org/getattachment/bsm52o24-3j2u-2u8a-[email or web address]-1891e9966y0j/Critical-Care-Pain-Observation-Tool-(CPOT)    PAIN AD Score:   http://geriatrictoolkit.[facility]/cog/painad.pdf     Dyspnea:                           [x ]None[ ]Mild [ ]Moderate [ ]Severe     Respiratory Distress Observation Scale (RDOS):   A score of 0 to 2 signifies little or no respiratory distress, 3 signifies mild distress, scores 4 to 6 indicate moderate distress, and scores greater than 7 signify severe distress  [email or web address]-uimgktwvrua-rcxvkydm-dltzmmvsgkr-mzzvy.pdf    Anxiety:                             [ ]None[ ]Mild [ ]Moderate [ ]Severe   Fatigue:                             [ ]None[ ]Mild [ ]Moderate [ ]Severe   Nausea:                             [ ]None[ ]Mild [ ]Moderate [ ]Severe   Loss of appetite:              [ ]None[ ]Mild [ ]Moderate [ ]Severe   Constipation:                    [ ]None[ ]Mild [ ]Moderate [ ]Severe    Other Symptoms:  [ ]All other review of systems negative     Palliative Performance Status Version 2:         %    http://npcrc.org/files/news/palliative_performance_scale_ppsv2.pdf  PHYSICAL EXAM:  Vital Signs Last 24 Hrs  T(C): 37.9 (05 Sep 2024 13:23), Max: 37.9 (05 Sep 2024 13:23)  T(F): 100.3 (05 Sep 2024 13:23), Max: 100.3 (05 Sep 2024 13:23)  HR: 115 (05 Sep 2024 13:23) (102 - 126)  BP: 127/74 (05 Sep 2024 13:23) (106/74 - 127/74)  BP(mean): 90 (05 Sep 2024 04:37) (90 - 90)  RR: 18 (05 Sep 2024 13:23) (18 - 18)  SpO2: 98% (05 Sep 2024 13:23) (97% - 100%)    Parameters below as of 05 Sep 2024 13:23  Patient On (Oxygen Delivery Method): room air      GENERAL:  [x ] No acute distress [ ]Lethargic  [ ]Unarousable  [x ]Verbal  [ ]Non-Verbal [ ]Cachexia    BEHAVIORAL/PSYCH:  [ ]Alert and Oriented x  [ ] Anxiety [ ] Delirium [ ] Agitation [x ] Calm   EYES: [x ] No scleral icterus [ ] Scleral icterus [ ] Closed  ENMT:  [ ]Dry mouth  [x ]No external oral lesions [ ] No external ear or nose lesions  CARDIOVASCULAR:  [ ]Regular [ ]Irregular [ x]Tachy [ ]Not Tachy  [ ]Sergey [ ] Edema [ ] No edema  PULMONARY:  [ ]Tachypnea  [ ]Audible excessive secretions [x ] No labored breathing [ ] mildly labored breathing [ ] labored breathing  GASTROINTESTINAL: [ ]Soft  [ ]Distended  [x ]Not distended [ ]Non tender [ ]Tender  MUSCULOSKELETAL: [ ]No clubbing [ ] clubbing  [x ] No cyanosis [ ] cyanosis  NEUROLOGIC: [ ]No focal deficits  [ ]Follows commands  [ ]Does not follow commands  [x ]Cognitive impairment  [ ]Dysphagia  [ ]Dysarthria  [ ]Paresis   SKIN: [ ] Jaundiced [x ] Non-jaundiced [ ]Rash [ ]No Rash [ ] Warm [ ] Dry  MISC/LINES: [ ] ET tube [ ] Trach [ ]NGT/OGT [x ]PEG [ ][place]    CRITICAL CARE:  [ ] Shock Present  [ ]Septic [ ]Cardiogenic [ ]Neurologic [ ]Hypovolemic  [ ]  Vasopressors [ ]  Inotropes   [ ]Respiratory failure present [ ]Mechanical ventilation [ ]Non-invasive ventilatory support [ ]High flow  [ ]Acute  [ ]Chronic [ ]Hypoxic  [ ]Hypercarbic [ ]Other  [ ]Other organ failure   LABS: I have reviewed daily labs                          8.5    13.56 )-----------( 971      ( 05 Sep 2024 06:30 )             26.9     09-05    139  |  99  |  8<L>  ----------------------------<  112<H>  4.8   |  26  |  <0.5<L>    Ca    9.2      05 Sep 2024 06:30  Mg     2.1     09-04    TPro  6.7  /  Alb  2.8<L>  /  TBili  <0.2  /  DBili  x   /  AST  17  /  ALT  24  /  AlkPhos  125<H>  09-05    PT/INR - ( 03 Sep 2024 17:21 )   PT: 13.90 sec;   INR: 1.22 ratio         PTT - ( 05 Sep 2024 11:27 )  PTT:28.8 sec  Urinalysis Basic - ( 05 Sep 2024 06:30 )    Color: x / Appearance: x / SG: x / pH: x  Gluc: 112 mg/dL / Ketone: x  / Bili: x / Urobili: x   Blood: x / Protein: x / Nitrite: x   Leuk Esterase: x / RBC: x / WBC x   Sq Epi: x / Non Sq Epi: x / Bacteria: x            RADIOLOGY & ADDITIONAL STUDIES: reviewed   < from: [facility] Duplex Lower Ext Vein Scan, [place] (09.04.24 @ 22:04) >  PROCEDURE DATE:  09/04/2024          INTERPRETATION:  CLINICAL INFORMATION: 69-year-old female with leg   swelling    TECHNIQUE: Duplex sonography of the BILATERAL LOWER extremity veins with   color and spectral Doppler, with and without compression.    FINDINGS:    RIGHT:  Normal compressibility of the RIGHT common femoral, femoral and popliteal   veins.  Doppler examination shows normal spontaneous and phasic flow.  No RIGHT calf vein thrombosis is detected.    LEFT:  Normal compressibility of the LEFT common femoral, femoral and popliteal   veins.  Doppler examination shows normal spontaneous and phasic flow.  No LEFT calf vein thrombosis is detected.    IMPRESSION:  No evidence of deep venous thrombosis in either lower extremity.            < end of copied text >    PROTEIN CALORIE MALNUTRITION PRESENT: [ ]mild [ ]moderate [ ]severe [ ]underweight [ ]morbid obesity  https://www.andeal.org/vault/2440/web/files/ONC/Table_Clinical%20Characteristics%20to%20Document%20Malnutrition-White%20JV%20et%20al%311335.pdf    Height (cm): 152.4 (09-03-24 @ 13:32)  Weight (kg): 59 (09-03-24 @ 13:32)  BMI (kg/m2): 25.4 (09-03-24 @ 13:32)    [ ]PPSV2 < or = to 30% [ ]significant weight loss  [ ]poor nutritional intake  [ ]anasarca      [ ]Artificial Nutrition      Palliative Care Spiritual/Emotional Screening Tool Question  Severity (0-4):                    OR                    [ x] Unable to determine. Will assess at later time if appropriate.  Score of 2 or greater indicates recommendation of Chaplaincy and/or SW referral  Chaplaincy Referral: [ ] Yes [ ] Refused [ ] Following     Caregiver [place]:  [ ] Yes [ ] No    OR    [x ] Unable to determine. Will assess at later time if appropriate.  Social Work Referral [ ]  Patient and Family Centered Care Referral [ ]    Anticipatory Grief Present: [ ] Yes [ ] No    OR     [ x] Unable to determine. Will assess at later time if appropriate.  Social Work Referral [ ]  Patient and Family Centered Care Referral [ ]    REFERRALS:   [ ]Chaplaincy  [ ]Hospice  [ ]Child Life  [ ]Social Work  [ ]Case management [ ]Holistic Therapy     Palliative care education provided to patient and/or family

## 2024-09-06 LAB
ANION GAP SERPL CALC-SCNC: 11 MMOL/L — SIGNIFICANT CHANGE UP (ref 7–14)
APTT BLD: 28.7 SEC — SIGNIFICANT CHANGE UP (ref 27–39.2)
BASOPHILS # BLD AUTO: 0.05 K/UL — SIGNIFICANT CHANGE UP (ref 0–0.2)
BASOPHILS NFR BLD AUTO: 0.4 % — SIGNIFICANT CHANGE UP (ref 0–1)
BUN SERPL-MCNC: 6 MG/DL — LOW (ref 10–20)
CALCIUM SERPL-MCNC: 8.9 MG/DL — SIGNIFICANT CHANGE UP (ref 8.4–10.5)
CHLORIDE SERPL-SCNC: 101 MMOL/L — SIGNIFICANT CHANGE UP (ref 98–110)
CO2 SERPL-SCNC: 29 MMOL/L — SIGNIFICANT CHANGE UP (ref 17–32)
CREAT SERPL-MCNC: <0.5 MG/DL — LOW (ref 0.7–1.5)
CULTURE RESULTS: ABNORMAL
EGFR: 127 ML/MIN/1.73M2 — SIGNIFICANT CHANGE UP
EOSINOPHIL # BLD AUTO: 0.13 K/UL — SIGNIFICANT CHANGE UP (ref 0–0.7)
EOSINOPHIL NFR BLD AUTO: 1.1 % — SIGNIFICANT CHANGE UP (ref 0–8)
GLUCOSE SERPL-MCNC: 86 MG/DL — SIGNIFICANT CHANGE UP (ref 70–99)
HCT VFR BLD CALC: 22.1 % — LOW (ref 37–47)
HCT VFR BLD CALC: 26.4 % — LOW (ref 37–47)
HGB BLD-MCNC: 6.8 G/DL — CRITICAL LOW (ref 12–16)
HGB BLD-MCNC: 8.3 G/DL — LOW (ref 12–16)
IMM GRANULOCYTES NFR BLD AUTO: 0.5 % — HIGH (ref 0.1–0.3)
INR BLD: 1.1 RATIO — SIGNIFICANT CHANGE UP (ref 0.65–1.3)
LACTATE SERPL-SCNC: 1.1 MMOL/L — SIGNIFICANT CHANGE UP (ref 0.7–2)
LYMPHOCYTES # BLD AUTO: 19.8 % — LOW (ref 20.5–51.1)
LYMPHOCYTES # BLD AUTO: 2.4 K/UL — SIGNIFICANT CHANGE UP (ref 1.2–3.4)
MAGNESIUM SERPL-MCNC: 1.9 MG/DL — SIGNIFICANT CHANGE UP (ref 1.8–2.4)
MAGNESIUM SERPL-MCNC: 1.9 MG/DL — SIGNIFICANT CHANGE UP (ref 1.8–2.4)
MCHC RBC-ENTMCNC: 26.4 PG — LOW (ref 27–31)
MCHC RBC-ENTMCNC: 27.2 PG — SIGNIFICANT CHANGE UP (ref 27–31)
MCHC RBC-ENTMCNC: 30.8 G/DL — LOW (ref 32–37)
MCHC RBC-ENTMCNC: 31.4 G/DL — LOW (ref 32–37)
MCV RBC AUTO: 85.7 FL — SIGNIFICANT CHANGE UP (ref 81–99)
MCV RBC AUTO: 86.6 FL — SIGNIFICANT CHANGE UP (ref 81–99)
MONOCYTES # BLD AUTO: 1.02 K/UL — HIGH (ref 0.1–0.6)
MONOCYTES NFR BLD AUTO: 8.4 % — SIGNIFICANT CHANGE UP (ref 1.7–9.3)
NEUTROPHILS # BLD AUTO: 8.47 K/UL — HIGH (ref 1.4–6.5)
NEUTROPHILS NFR BLD AUTO: 69.8 % — SIGNIFICANT CHANGE UP (ref 42.2–75.2)
NRBC # BLD: 0 /100 WBCS — SIGNIFICANT CHANGE UP (ref 0–0)
NRBC # BLD: 0 /100 WBCS — SIGNIFICANT CHANGE UP (ref 0–0)
PLATELET # BLD AUTO: 765 K/UL — HIGH (ref 130–400)
PLATELET # BLD AUTO: 916 K/UL — HIGH (ref 130–400)
PMV BLD: 9 FL — SIGNIFICANT CHANGE UP (ref 7.4–10.4)
PMV BLD: 9.1 FL — SIGNIFICANT CHANGE UP (ref 7.4–10.4)
POTASSIUM SERPL-MCNC: 4 MMOL/L — SIGNIFICANT CHANGE UP (ref 3.5–5)
POTASSIUM SERPL-SCNC: 4 MMOL/L — SIGNIFICANT CHANGE UP (ref 3.5–5)
PROTHROM AB SERPL-ACNC: 12.5 SEC — SIGNIFICANT CHANGE UP (ref 9.95–12.87)
RBC # BLD: 2.58 M/UL — LOW (ref 4.2–5.4)
RBC # BLD: 3.05 M/UL — LOW (ref 4.2–5.4)
RBC # FLD: 14.6 % — HIGH (ref 11.5–14.5)
RBC # FLD: 14.9 % — HIGH (ref 11.5–14.5)
SODIUM SERPL-SCNC: 141 MMOL/L — SIGNIFICANT CHANGE UP (ref 135–146)
SPECIMEN SOURCE: SIGNIFICANT CHANGE UP
TSH SERPL-MCNC: 1.43 UIU/ML — SIGNIFICANT CHANGE UP (ref 0.27–4.2)
WBC # BLD: 12.13 K/UL — HIGH (ref 4.8–10.8)
WBC # BLD: 12.79 K/UL — HIGH (ref 4.8–10.8)
WBC # FLD AUTO: 12.13 K/UL — HIGH (ref 4.8–10.8)
WBC # FLD AUTO: 12.79 K/UL — HIGH (ref 4.8–10.8)

## 2024-09-06 PROCEDURE — 99233 SBSQ HOSP IP/OBS HIGH 50: CPT

## 2024-09-06 PROCEDURE — 88305 TISSUE EXAM BY PATHOLOGIST: CPT | Mod: 26

## 2024-09-06 PROCEDURE — 99232 SBSQ HOSP IP/OBS MODERATE 35: CPT

## 2024-09-06 PROCEDURE — 45378 DIAGNOSTIC COLONOSCOPY: CPT

## 2024-09-06 PROCEDURE — 88312 SPECIAL STAINS GROUP 1: CPT | Mod: 26

## 2024-09-06 PROCEDURE — 43239 EGD BIOPSY SINGLE/MULTIPLE: CPT | Mod: XS

## 2024-09-06 PROCEDURE — 74018 RADEX ABDOMEN 1 VIEW: CPT | Mod: 26

## 2024-09-06 RX ORDER — ACETAMINOPHEN 325 MG/1
1000 TABLET ORAL ONCE
Refills: 0 | Status: COMPLETED | OUTPATIENT
Start: 2024-09-06 | End: 2024-09-06

## 2024-09-06 RX ADMIN — PETROLATUM 1 APPLICATION(S): 93.5 OINTMENT TOPICAL at 05:08

## 2024-09-06 RX ADMIN — Medication 5000 UNIT(S): at 18:36

## 2024-09-06 RX ADMIN — MIDODRINE HYDROCHLORIDE 10 MILLIGRAM(S): 5 TABLET ORAL at 18:42

## 2024-09-06 RX ADMIN — Medication 1 PATCH: at 21:24

## 2024-09-06 RX ADMIN — MEROPENEM 100 MILLIGRAM(S): 500 INJECTION, POWDER, FOR SOLUTION INTRAVENOUS at 09:33

## 2024-09-06 RX ADMIN — Medication 100 MILLIGRAM(S): at 13:31

## 2024-09-06 RX ADMIN — Medication 100 MILLIGRAM(S): at 21:25

## 2024-09-06 RX ADMIN — ACETAMINOPHEN 650 MILLIGRAM(S): 325 TABLET ORAL at 21:25

## 2024-09-06 RX ADMIN — Medication 750 MILLIGRAM(S): at 05:09

## 2024-09-06 RX ADMIN — Medication 250 MILLIGRAM(S): at 21:24

## 2024-09-06 RX ADMIN — Medication 500 MILLIGRAM(S): at 12:49

## 2024-09-06 RX ADMIN — ACETAMINOPHEN 400 MILLIGRAM(S): 325 TABLET ORAL at 13:20

## 2024-09-06 RX ADMIN — MIDODRINE HYDROCHLORIDE 10 MILLIGRAM(S): 5 TABLET ORAL at 05:09

## 2024-09-06 RX ADMIN — Medication 40 MILLIGRAM(S): at 18:37

## 2024-09-06 RX ADMIN — Medication 40 MILLIGRAM(S): at 05:08

## 2024-09-06 RX ADMIN — MIDODRINE HYDROCHLORIDE 10 MILLIGRAM(S): 5 TABLET ORAL at 12:48

## 2024-09-06 RX ADMIN — MEROPENEM 100 MILLIGRAM(S): 500 INJECTION, POWDER, FOR SOLUTION INTRAVENOUS at 01:11

## 2024-09-06 RX ADMIN — Medication 81 MILLIGRAM(S): at 12:49

## 2024-09-06 RX ADMIN — SODIUM CHLORIDE 1 GRAM(S): 9 INJECTION INTRAMUSCULAR; INTRAVENOUS; SUBCUTANEOUS at 18:37

## 2024-09-06 RX ADMIN — Medication 100 MILLIGRAM(S): at 05:09

## 2024-09-06 RX ADMIN — MEROPENEM 100 MILLIGRAM(S): 500 INJECTION, POWDER, FOR SOLUTION INTRAVENOUS at 18:37

## 2024-09-06 RX ADMIN — Medication 750 MILLIGRAM(S): at 13:31

## 2024-09-06 RX ADMIN — Medication 1 TABLET(S): at 12:49

## 2024-09-06 RX ADMIN — BACLOFEN 10 MILLIGRAM(S): 0.5 INJECTION INTRATHECAL at 05:09

## 2024-09-06 RX ADMIN — PETROLATUM 1 APPLICATION(S): 93.5 OINTMENT TOPICAL at 18:36

## 2024-09-06 RX ADMIN — LEVETIRACETAM 1000 MILLIGRAM(S): 1000 TABLET ORAL at 05:08

## 2024-09-06 RX ADMIN — LEVETIRACETAM 1000 MILLIGRAM(S): 1000 TABLET ORAL at 18:36

## 2024-09-06 RX ADMIN — BACLOFEN 10 MILLIGRAM(S): 0.5 INJECTION INTRATHECAL at 18:37

## 2024-09-06 RX ADMIN — SODIUM CHLORIDE 1 GRAM(S): 9 INJECTION INTRAMUSCULAR; INTRAVENOUS; SUBCUTANEOUS at 05:08

## 2024-09-06 RX ADMIN — Medication 750 MILLIGRAM(S): at 21:25

## 2024-09-06 RX ADMIN — Medication 250 MILLIGRAM(S): at 09:34

## 2024-09-06 NOTE — PROGRESS NOTE ADULT - SUBJECTIVE AND OBJECTIVE BOX
JOSEPH OBRIEN  69y, Female  Allergy: Allergy Status Unknown      LOS  3d    CHIEF COMPLAINT:     INTERVAL EVENTS/HPI  - T(F): , Max: 100.3 (09-05-24 @ 13:23)  - WBC Count: 13.56 (09-05-24 @ 06:30)  WBC Count: 14.07 (09-04-24 @ 15:37)     - Creatinine: <0.5 (09-05-24 @ 06:30)  Creatinine: <0.5 (09-04-24 @ 15:37)     -   -   -     ROS  cannot obtain secondary to patient's sedation and/or mental status    VITALS:  T(F): 98, Max: 100.3 (09-05-24 @ 13:23)  HR: 93  BP: 105/69  RR: 18Vital Signs Last 24 Hrs  T(C): 36.7 (06 Sep 2024 04:37), Max: 37.9 (05 Sep 2024 13:23)  T(F): 98 (06 Sep 2024 04:37), Max: 100.3 (05 Sep 2024 13:23)  HR: 93 (06 Sep 2024 04:37) (93 - 115)  BP: 105/69 (06 Sep 2024 04:37) (84/60 - 127/74)  BP(mean): 81 (06 Sep 2024 04:37) (81 - 81)  RR: 18 (06 Sep 2024 04:37) (18 - 18)  SpO2: 100% (06 Sep 2024 04:37) (97% - 100%)    Parameters below as of 06 Sep 2024 04:37  Patient On (Oxygen Delivery Method): room air        PHYSICAL EXAM:  ***    FH: Non-contributory  Social Hx: Non-contributory    TESTS & MEASUREMENTS:                        8.5    13.56 )-----------( 971      ( 05 Sep 2024 06:30 )             26.9     09-05    139  |  99  |  8<L>  ----------------------------<  112<H>  4.8   |  26  |  <0.5<L>    Ca    9.2      05 Sep 2024 06:30  Mg     2.1     09-04    TPro  6.7  /  Alb  2.8<L>  /  TBili  <0.2  /  DBili  x   /  AST  17  /  ALT  24  /  AlkPhos  125<H>  09-05      LIVER FUNCTIONS - ( 05 Sep 2024 06:30 )  Alb: 2.8 g/dL / Pro: 6.7 g/dL / ALK PHOS: 125 U/L / ALT: 24 U/L / AST: 17 U/L / GGT: x           Urinalysis Basic - ( 05 Sep 2024 06:30 )    Color: x / Appearance: x / SG: x / pH: x  Gluc: 112 mg/dL / Ketone: x  / Bili: x / Urobili: x   Blood: x / Protein: x / Nitrite: x   Leuk Esterase: x / RBC: x / WBC x   Sq Epi: x / Non Sq Epi: x / Bacteria: x        Culture - Urine (collected 09-04-24 @ 03:25)  Source: Clean Catch Clean Catch (Midstream)  Final Report (09-05-24 @ 18:06):    <10,000 CFU/mL Normal Urogenital Evelyn    Urinalysis with Rflx Culture (collected 09-03-24 @ 20:56)    Culture - Blood (collected 09-03-24 @ 17:21)  Source: .Blood Blood-Peripheral  Preliminary Report (09-06-24 @ 02:01):    No growth at 48 Hours    Culture - Blood (collected 09-03-24 @ 17:21)  Source: .Blood Blood-Peripheral  Gram Stain (09-05-24 @ 09:09):    Growth in aerobic bottle: Gram Positive Cocci in Clusters  Final Report (09-05-24 @ 21:55):    Growth in aerobic bottle: Staphylococcus hominis    Isolation of Coagulase negative Staphylococcus from single blood culture    sets may represent    contamination. Contact the Microbiology Department at [phone] if    susceptibility testing is    clinically indicated.    Direct identification is available within approximately 3-5    hours either by Blood Panel Multiplexed PCR or Direct    MALDI-TOF. Details: [email or web address]  Organism: Blood Culture PCR (09-05-24 @ 21:55)  Organism: Blood Culture PCR (09-05-24 @ 21:55)      -  Coagulase negative Staphylococcus: Detec      Method Type: PCR        Lactate, Blood: 1.0 mmol/L (09-04-24 @ 15:32)  Lactate, Blood: 2.4 mmol/L (09-04-24 @ 07:34)  Lactate, Blood: 2.1 mmol/L (09-03-24 @ 17:21)  Blood Gas Venous - Lactate: 1.9 mmol/L (09-03-24 @ 15:17)      INFECTIOUS DISEASES TESTING  MRSA PCR Result.: Negative (09-05-24 @ 20:32)      INFLAMMATORY MARKERS  Sedimentation Rate, Erythrocyte: 140 mm/Hr (09-04-24 @ 00:25)  C-Reactive Protein: 123.9 mg/L (09-04-24 @ 00:25)      RADIOLOGY & ADDITIONAL TESTS:  I have personally reviewed the last available Chest xray  CXR  Xray Chest 1 View- PORTABLE-Urgent:   ACC: [id number] EXAM:  XR CHEST PORTABLE URGENT 1V   ORDERED BY: CALI DEJESUS     PROCEDURE DATE:  09/03/2024          INTERPRETATION:  CLINICAL HISTORY: Sepsis.    COMPARISON: 4/14/2023.    TECHNIQUE: Portable frontal chest radiograph. Adequatepositioning.    FINDINGS:    Support devices: None.    Cardiac/mediastinum/hilum: Unremarkable.    Lung parenchyma/Pleura: No focal parenchymal opacities, pleural   effusions, or pneumothorax.    Skeleton/soft tissues: Stable.      IMPRESSION:    No radiographic evidence of acute cardiopulmonary disease.    --- End of Report ---            JUSTUS DOBSON MD; Attending Radiologist  This document has been electronically signed. Sep  3 2024  3:56PM (09-03-24 @ 15:53)      CT      CARDIOLOGY TESTING  12 Lead ECG:   Ventricular Rate 132 BPM    Atrial Rate 132 BPM    P-R Interval 122 ms    QRS Duration 62 ms    Q-T Interval 294 ms    QTC Calculation(Bazett) 435 ms    P Axis 58 degrees    R Axis -1 degrees    T Axis 42 degrees    Diagnosis Line Sinus tachycardia  Otherwise normal ECG    Confirmed by Boris Humphries ([id number]) on 9/3/2024 8:48:37 PM (09-03-24 @ 15:23)      MEDICATIONS  albuterol    0.083%. 2.5  ascorbic acid 500  aspirin  chewable 81  baclofen 10  gabapentin 100  heparin   Injectable 5000  lactated ringers. 1000  levETIRAcetam  Solution 1000  lidocaine   4% Patch 1  meropenem  IVPB 1000  midodrine. 10  multivitamin/minerals 1  pantoprazole  Injectable 40  sodium chloride 1  valproic  acid Syrup 750  vancomycin  IVPB 750  vitamin A &amp; D Ointment 1      WEIGHT  Weight (kg): 60 (09-05-24 @ 02:46)      ANTIBIOTICS:  meropenem  IVPB 1000 milliGRAM(s) IV Intermittent every 8 hours  vancomycin  IVPB 750 milliGRAM(s) IV Intermittent <User Schedule>      All available historical records have been reviewed   JOSEPH OBRIEN  69y, Female  Allergy: Allergy Status Unknown      LOS  3d    CHIEF COMPLAINT:     INTERVAL EVENTS/HPI  - T(F): , Max: 100.3 (09-05-24 @ 13:23)  - WBC Count: 13.56 (09-05-24 @ 06:30)  WBC Count: 14.07 (09-04-24 @ 15:37)     - Creatinine: <0.5 (09-05-24 @ 06:30)  Creatinine: <0.5 (09-04-24 @ 15:37)     -   -   -     ROS  cannot obtain secondary to patient's sedation and/or mental status    VITALS:  T(F): 98, Max: 100.3 (09-05-24 @ 13:23)  HR: 93  BP: 105/69  RR: 18Vital Signs Last 24 Hrs  T(C): 36.7 (06 Sep 2024 04:37), Max: 37.9 (05 Sep 2024 13:23)  T(F): 98 (06 Sep 2024 04:37), Max: 100.3 (05 Sep 2024 13:23)  HR: 93 (06 Sep 2024 04:37) (93 - 115)  BP: 105/69 (06 Sep 2024 04:37) (84/60 - 127/74)  BP(mean): 81 (06 Sep 2024 04:37) (81 - 81)  RR: 18 (06 Sep 2024 04:37) (18 - 18)  SpO2: 100% (06 Sep 2024 04:37) (97% - 100%)    Parameters below as of 06 Sep 2024 04:37  Patient On (Oxygen Delivery Method): room air        PHYSICAL EXAM:  Gen: chronically ill appearing   HEENT: Normocephalic, atraumatic  Neck: supple, no lymphadenopathy  CV: Regular rate & regular rhythm  Lungs: decreased BS at bases, no fremitus  Abdomen: Soft, BS present  Ext: Warm, well perfused  Neuro: focal deficits awake   Sacrum deferred   Skin: no rash, no erythema  Lines: no phlebitis     FH: Non-contributory  Social Hx: Non-contributory    TESTS & MEASUREMENTS:                        8.5    13.56 )-----------( 971      ( 05 Sep 2024 06:30 )             26.9     09-05    139  |  99  |  8<L>  ----------------------------<  112<H>  4.8   |  26  |  <0.5<L>    Ca    9.2      05 Sep 2024 06:30  Mg     2.1     09-04    TPro  6.7  /  Alb  2.8<L>  /  TBili  <0.2  /  DBili  x   /  AST  17  /  ALT  24  /  AlkPhos  125<H>  09-05      LIVER FUNCTIONS - ( 05 Sep 2024 06:30 )  Alb: 2.8 g/dL / Pro: 6.7 g/dL / ALK PHOS: 125 U/L / ALT: 24 U/L / AST: 17 U/L / GGT: x           Urinalysis Basic - ( 05 Sep 2024 06:30 )    Color: x / Appearance: x / SG: x / pH: x  Gluc: 112 mg/dL / Ketone: x  / Bili: x / Urobili: x   Blood: x / Protein: x / Nitrite: x   Leuk Esterase: x / RBC: x / WBC x   Sq Epi: x / Non Sq Epi: x / Bacteria: x        Culture - Urine (collected 09-04-24 @ 03:25)  Source: Clean Catch Clean Catch (Midstream)  Final Report (09-05-24 @ 18:06):    <10,000 CFU/mL Normal Urogenital Evelyn    Urinalysis with Rflx Culture (collected 09-03-24 @ 20:56)    Culture - Blood (collected 09-03-24 @ 17:21)  Source: .Blood Blood-Peripheral  Preliminary Report (09-06-24 @ 02:01):    No growth at 48 Hours    Culture - Blood (collected 09-03-24 @ 17:21)  Source: .Blood Blood-Peripheral  Gram Stain (09-05-24 @ 09:09):    Growth in aerobic bottle: Gram Positive Cocci in Clusters  Final Report (09-05-24 @ 21:55):    Growth in aerobic bottle: Staphylococcus hominis    Isolation of Coagulase negative Staphylococcus from single blood culture    sets may represent    contamination. Contact the Microbiology Department at [phone] if    susceptibility testing is    clinically indicated.    Direct identification is available within approximately 3-5    hours either by Blood Panel Multiplexed PCR or Direct    MALDI-TOF. Details: [email or web address]  Organism: Blood Culture PCR (09-05-24 @ 21:55)  Organism: Blood Culture PCR (09-05-24 @ 21:55)      -  Coagulase negative Staphylococcus: Detec      Method Type: PCR        Lactate, Blood: 1.0 mmol/L (09-04-24 @ 15:32)  Lactate, Blood: 2.4 mmol/L (09-04-24 @ 07:34)  Lactate, Blood: 2.1 mmol/L (09-03-24 @ 17:21)  Blood Gas Venous - Lactate: 1.9 mmol/L (09-03-24 @ 15:17)      INFECTIOUS DISEASES TESTING  MRSA PCR Result.: Negative (09-05-24 @ 20:32)      INFLAMMATORY MARKERS  Sedimentation Rate, Erythrocyte: 140 mm/Hr (09-04-24 @ 00:25)  C-Reactive Protein: 123.9 mg/L (09-04-24 @ 00:25)      RADIOLOGY & ADDITIONAL TESTS:  I have personally reviewed the last available Chest xray  CXR  Xray Chest 1 View- PORTABLE-Urgent:   ACC: [id number] EXAM:  XR CHEST PORTABLE URGENT 1V   ORDERED BY: CALI DEJESUS     PROCEDURE DATE:  09/03/2024          INTERPRETATION:  CLINICAL HISTORY: Sepsis.    COMPARISON: 4/14/2023.    TECHNIQUE: Portable frontal chest radiograph. Adequatepositioning.    FINDINGS:    Support devices: None.    Cardiac/mediastinum/hilum: Unremarkable.    Lung parenchyma/Pleura: No focal parenchymal opacities, pleural   effusions, or pneumothorax.    Skeleton/soft tissues: Stable.      IMPRESSION:    No radiographic evidence of acute cardiopulmonary disease.    --- End of Report ---            JUSTUS DOBSON MD; Attending Radiologist  This document has been electronically signed. Sep  3 2024  3:56PM (09-03-24 @ 15:53)      CT      CARDIOLOGY TESTING  12 Lead ECG:   Ventricular Rate 132 BPM    Atrial Rate 132 BPM    P-R Interval 122 ms    QRS Duration 62 ms    Q-T Interval 294 ms    QTC Calculation(Bazett) 435 ms    P Axis 58 degrees    R Axis -1 degrees    T Axis 42 degrees    Diagnosis Line Sinus tachycardia  Otherwise normal ECG    Confirmed by Boris Humphries ([id number]) on 9/3/2024 8:48:37 PM (09-03-24 @ 15:23)      MEDICATIONS  albuterol    0.083%. 2.5  ascorbic acid 500  aspirin  chewable 81  baclofen 10  gabapentin 100  heparin   Injectable 5000  lactated ringers. 1000  levETIRAcetam  Solution 1000  lidocaine   4% Patch 1  meropenem  IVPB 1000  midodrine. 10  multivitamin/minerals 1  pantoprazole  Injectable 40  sodium chloride 1  valproic  acid Syrup 750  vancomycin  IVPB 750  vitamin A &amp; D Ointment 1      WEIGHT  Weight (kg): 60 (09-05-24 @ 02:46)      ANTIBIOTICS:  meropenem  IVPB 1000 milliGRAM(s) IV Intermittent every 8 hours  vancomycin  IVPB 750 milliGRAM(s) IV Intermittent <User Schedule>      All available historical records have been reviewed

## 2024-09-06 NOTE — PROGRESS NOTE ADULT - NS ATTEST RISK PROBLEM GEN_ALL_CORE FT
- Patient has an illness which poses a threat to life or bodily function without treatment
- Patient has an illness which poses a threat to life or bodily function without treatment

## 2024-09-06 NOTE — DIETITIAN INITIAL EVALUATION ADULT - OTHER CALCULATIONS
Using ABW: ENERGY: 5147-3476 kcal/day (25-30 kcal/kg); PROTEIN: 72-90 g/day (1.2-1.5 g/kg); FLUID: 1mL/kcal -- with consideration for age, BMI, PI

## 2024-09-06 NOTE — PROGRESS NOTE ADULT - SUBJECTIVE AND OBJECTIVE BOX
Patient is a 69y old  Female who presents with a chief complaint of   INTERVAL HPI/OVERNIGHT EVENTS: Patient was examined and seen at bedside. This morning pt is resting in bed and staff report no new issues or overnight events. Pt reports abdominal discomfort. Was receiving Golytely overnight    ROS: unable to access due to lethargy  InitialHPI:  A 69 year old F with pmhx  of dementia, CVA, OM, sacral ulcer, recent admission w/septic shock at [facility] discharged on vancomycin and meropenem presents from nursing home for positive occult blood and low Hb of 7 at the NH.    In ED, /82, , SpO2 99% on RA  Labs remarkable for wbc 19k, Neutro 80%, Hb 8.3, MCV 86, Na 130, K 6.3, lactate 2.1  VBG ph 7.37, CO2 53  Xray chest No radiographic evidence of acute cardiopulmonary disease.    Admit for further management and workup   (03 Sep 2024 18:59)    PAST MEDICAL & SURGICAL HISTORY:      General: more alert today, chronically ill appearing  HEENT:  LAD  CV: S1 S2  Resp: decreased breath sounds at bases  GI: NT/ND/S +BS; +PEG  MS: no clubbing/cyanosis/edema, + pulses b/l  Neuro: unable to access          Home Medications:  acetaminophen 325 mg oral tablet: 2 tab(s) orally every 6 hours, As needed, Moderate Pain (4 - 6) (19 Mar 2023 10:42)  ascorbic acid 500 mg oral tablet: 1 tab(s) orally once a day (19 Mar 2023 10:42)  aspirin 81 mg oral tablet, chewable: 1 tab(s) orally once a day (19 Mar 2023 10:42)  baclofen 5 mg oral tablet: 2 tab(s) orally every 12 hours (03 Sep 2024 19:55)  Calmoseptine 0.44%-20.6% topical ointment: Apply topically to affected area 3 times a day apply at sacral ulcer (03 Sep 2024 19:59)  clopidogrel 75 mg oral tablet: 1 tab(s) orally once a day (03 Sep 2024 20:06)  ferrous sulfate: 300 milligram(s) by PEG tube 3 times a day (03 Sep 2024 20:12)  gabapentin 100 mg oral capsule: 1 cap(s) orally 3 times a day (26 Mar 2023 16:24)  lactulose 10 g/15 mL oral syrup: 15 milliliter(s) orally 3 times a day (03 Sep 2024 20:05)  levETIRAcetam 100 mg/mL oral solution: 10 milliliter(s) orally every 12 hours (03 Sep 2024 20:09)  lidocaine 4% topical film: Apply topically to affected area once a day left shoulder (03 Sep 2024 20:01)  Merrem 1000 mg intravenous injection: 1,000 unit(s) intravenously every 8 hours (03 Sep 2024 20:15)  midodrine 10 mg oral tablet: 1 tab(s) orally 3 times a day Hold if Systolic BP &gt;100 (26 Mar 2023 16:24)  MiraLax oral powder for reconstitution: 17 gram(s) orally once a day (03 Sep 2024 20:03)  Multiple Vitamins with Minerals oral tablet: 1 tab(s) orally once a day (19 Mar 2023 10:42)  pantoprazole 40 mg oral delayed release tablet: 1 tab(s) orally once a day (09 Mar 2023 19:56)  sodium chloride: 1 gram(s) by PEG tube 2 times a day (03 Sep 2024 19:55)  valproic acid 250 mg/5 mL oral liquid: 15 milliliter(s) orally every 8 hours (03 Sep 2024 20:10)  vancomycin 1 g/200 mL intravenous solution: 1 gram(s) intravenously 2 times a day (03 Sep 2024 20:14)  vitamin A and D topical ointment: 1 application topically 2 times a day (19 Mar 2023 10:42)  Vraylar 1.5 mg oral capsule: 1 cap(s) orally once a day (03 Sep 2024 20:06)    MEDICATIONS  (STANDING):  albuterol    0.083%. 2.5 milliGRAM(s) Nebulizer once  ascorbic acid 500 milliGRAM(s) Oral daily  aspirin  chewable 81 milliGRAM(s) Oral daily  baclofen 10 milliGRAM(s) Oral every 12 hours  gabapentin 100 milliGRAM(s) Oral three times a day  heparin   Injectable 5000 Unit(s) SubCutaneous every 12 hours  lactated ringers. 1000 milliLiter(s) (75 mL/Hr) IV Continuous <Continuous>  levETIRAcetam  Solution 1000 milliGRAM(s) Oral two times a day  lidocaine   4% Patch 1 Patch Transdermal every 24 hours  meropenem  IVPB 1000 milliGRAM(s) IV Intermittent every 8 hours  midodrine. 10 milliGRAM(s) Oral three times a day  multivitamin/minerals 1 Tablet(s) Oral daily  pantoprazole  Injectable 40 milliGRAM(s) IV Push two times a day  sodium chloride 1 Gram(s) Oral two times a day  valproic  acid Syrup 750 milliGRAM(s) Enteral Tube three times a day  vancomycin  IVPB 750 milliGRAM(s) IV Intermittent <User Schedule>  vitamin A &amp; D Ointment 1 Application(s) Topical two times a day    MEDICATIONS  (PRN):  acetaminophen     Tablet .. 650 milliGRAM(s) Oral every 6 hours PRN Mild Pain (1 - 3)    Vital Signs Last 24 Hrs  T(C): 36.2 (06 Sep 2024 15:59), Max: 36.7 (06 Sep 2024 04:37)  T(F): 97.2 (06 Sep 2024 15:59), Max: 98.1 (06 Sep 2024 13:32)  HR: 96 (06 Sep 2024 15:59) (90 - 103)  BP: 142/75 (06 Sep 2024 15:59) (84/60 - 142/75)  BP(mean): 88 (06 Sep 2024 15:04) (81 - 88)  RR: 18 (06 Sep 2024 15:04) (18 - 18)  SpO2: 97% (06 Sep 2024 15:59) (97% - 100%)    Parameters below as of 06 Sep 2024 15:59  Patient On (Oxygen Delivery Method): room air      CAPILLARY BLOOD GLUCOSE        LABS:                        6.8    12.13 )-----------( 916      ( 06 Sep 2024 12:25 )             22.1     09-06    141  |  101  |  6<L>  ----------------------------<  86  4.0   |  29  |  <0.5<L>    Ca    8.9      06 Sep 2024 12:25  Mg     1.9     09-06    TPro  6.7  /  Alb  2.8<L>  /  TBili  <0.2  /  DBili  x   /  AST  17  /  ALT  24  /  AlkPhos  125<H>  09-05    LIVER FUNCTIONS - ( 05 Sep 2024 06:30 )  Alb: 2.8 g/dL / Pro: 6.7 g/dL / ALK PHOS: 125 U/L / ALT: 24 U/L / AST: 17 U/L / GGT: x               PT/INR - ( 06 Sep 2024 12:25 )   PT: 12.50 sec;   INR: 1.10 ratio         PTT - ( 06 Sep 2024 12:25 )  PTT:28.7 sec  Urinalysis Basic - ( 06 Sep 2024 12:25 )    Color: x / Appearance: x / SG: x / pH: x  Gluc: 86 mg/dL / Ketone: x  / Bili: x / Urobili: x   Blood: x / Protein: x / Nitrite: x   Leuk Esterase: x / RBC: x / WBC x   Sq Epi: x / Non Sq Epi: x / Bacteria: x              Culture - Urine (collected 04 Sep 2024 03:25)  Source: Clean Catch Clean Catch (Midstream)  Final Report (05 Sep 2024 18:06):    <10,000 CFU/mL Normal Urogenital Evelyn    Urinalysis with Rflx Culture (collected 03 Sep 2024 20:56)    Culture - Urine (collected 03 Sep 2024 20:56)  Source: Catheterized None  Final Report (06 Sep 2024 12:59):    10,000 - 49,000 CFU/mL Candida albicans    "Susceptibilities not performed"    Culture - Blood (collected 03 Sep 2024 17:21)  Source: .Blood Blood-Peripheral  Preliminary Report (06 Sep 2024 02:01):    No growth at 48 Hours    Culture - Blood (collected 03 Sep 2024 17:21)  Source: .Blood Blood-Peripheral  Gram Stain (05 Sep 2024 09:09):    Growth in aerobic bottle: Gram Positive Cocci in Clusters  Final Report (05 Sep 2024 21:55):    Growth in aerobic bottle: Staphylococcus hominis    Isolation of Coagulase negative Staphylococcus from single blood culture    sets may represent    contamination. Contact the Microbiology Department at [phone] if    susceptibility testing is    clinically indicated.    Direct identification is available within approximately 3-5    hours either by Blood Panel Multiplexed PCR or Direct    MALDI-TOF. Details: [email or web address]  Organism: Blood Culture PCR (05 Sep 2024 21:55)  Organism: Blood Culture PCR (05 Sep 2024 21:55)      Consultant Notes Reviewed:  [x ] YES  [ ] NO  Care Discussed with Consultants/Other Providers/ Housestaff [ x] YES  [ ] NO  Radiology, labs, EKGs, new studies personally reviewed.

## 2024-09-06 NOTE — PROGRESS NOTE ADULT - ASSESSMENT
A 69 year old F with pmhx  of dementia, CVA, OM, sacral ulcer, recent admission w/septic shock at [facility] discharged on vancomycin and meropenem presents from nursing home for positive occult blood and low Hb of 7 at the NH.    Patient reports that she has several children, but her daughter, Dominga, is the one who primarily helps her to make medical decisions. She states that she if were unable to make decisions for herself, she would trust Dominga over her other children. Inquired about intubation and CPR, and she states that she would like to defer to Dominga about these issues. Per chart, Dominga had previously signed a MOLST for DNR/DNI, but she rescinded this decision during this admission.      Palliative care will continue to follow.   Please call [phone] with questions or concerns 24/7.

## 2024-09-06 NOTE — PROGRESS NOTE ADULT - ASSESSMENT
A 69 year old F with pmhx  of dementia, CVA, OM, sacral ulcer, recent admission w/septic shock at [facility] discharged on vancomycin and meropenem presents from nursing home for positive occult blood and low Hb of 7 at the NH.    #Sepsis poa  #Unstageable sacral ulcer  Bacteriemic   #chronic osteomyelitis   - Per daughter recent admission to [facility] for OM of sacral ulcer c/b septic shock  - In ED, /82, , SpO2 99% on RA  - Labs remarkable for wbc 19k, Neutro 80%, Hb 8.3, MCV 86, Na 130, K 6.3, lactate 2.1  - VBG ph 7.37, CO2 53  - Xray chest No radiographic evidence of acute cardiopulmonary disease.  - s/p 2L bolus  - c/w meropenem and vanc  - c/w LR 75cc/hr  - UA borderline positive, f/u ucx, bcx  - f/u procal, esr, crp  - Trend wbc and lactate  - lactate 2.1 -> 2.4 -> 1  - ID recs: meropenem, vanc  - BCx prelim growing gram + coccii in clusters  - f/u BCx  - burn consult: no intervention at this time  - f/u vanc trough  - f/u MRSA  - f/u ID    #Acute on chronic anemia  - Per daughter, pt with multiple transfusions at [facility]  - Positive occult blood and Hb of 7 at NH  - Here Hb 8.3 -> 7.6 -> 8.0 (bs around 9)  - Agreeable for egd/colono if needed  - low total iron, transferrin, TIBC, % sat  - Keep active type and screen  - CBC bid  - PPI IV bid   - Transfuse if Hb < 7  - maintain active type and screen  - GI eval: EGD and colonoscopy on 09/06   - f/u KUB to r/o obstruction    #Hyperkalemia  - No EKG changes  - K of 6.3 -> 5.2 -> 5.1  - s/p insulin/dextrose and lokelma  - BMP bid  - Keep on telemetry    #Seizures  #Hx of R Frontal Lobe Infarct   #Chronic R MCA infarct  #Bed bound  #Tube feeds  - C/w Depakote 750 mg TID and Keppra to 1000 mg BID   - C/w baclofen 10 mg bid (for UE spasticity)  - C/w ASA, on plavix but not on statin? Holding plavix for now    # Persistent Hypotension   - c/w Midodrine 10 mg TID    # Left avulsion fracture of medial malleolus  - C/w gabapentin at 100mg TID    # Left cephalic vein thrombosis  - Duplex LUE (3/10/23): No DVT however there is superficial thrombosis noted in the left cephalic vein  - Repeat duplex ordered 3/27/23 - negative for UE and LE DVT    #Chronic borrego  - cw borrego    - DVT ppx - SCDs after duplex  - Diet: PEG tube feeds  - GI ppx: PPI  - Code status: Palliative consult for GOC    #PENDING  - f/u GI for EGD and colonoscopy and prep plan  - f/u BCx  - f/u ID  - f/u KUB to r/o obstruction

## 2024-09-06 NOTE — CHART NOTE - NSCHARTNOTEFT_GEN_A_CORE
PACU ANESTHESIA ADMISSION NOTE      Procedure: EGD & Colonoscopy  Post op diagnosis:  Gastritis    ____  Intubated  TV:______       Rate: ______      FiO2: ______    _x___  Patent Airway    _x___  Full return of protective reflexes    _x___  Full recovery from anesthesia / back to baseline status    Vitals:  T(C): 97.4  HR: 84  BP: 134/68  RR: 15  SpO2: 100%    Mental Status:  _x___ Awake   _____ Alert   _____ Drowsy   _____ Sedated    Nausea/Vomiting:  _x___  NO       ______Yes,   See Post - Op Orders         Pain Scale (0-10):  __0___    Treatment: _x___ None    ____ See Post - Op/PCA Orders    Post - Operative Fluids:   __x__ Oral   ____ See Post - Op Orders    Plan: Discharge:   ____Home       __x___Floor     _____Critical Care    _____  Other:_________________    Comments:  No anesthesia issues or complications noted.  Discharge when criteria met.

## 2024-09-06 NOTE — DIETITIAN INITIAL EVALUATION ADULT - PERTINENT LABORATORY DATA
09-05    139  |  99  |  8<L>  ----------------------------<  112<H>  4.8   |  26  |  <0.5<L>    Ca    9.2      05 Sep 2024 06:30    TPro  6.7  /  Alb  2.8<L>  /  TBili  <0.2  /  DBili  x   /  AST  17  /  ALT  24  /  AlkPhos  125<H>  09-05

## 2024-09-06 NOTE — PROGRESS NOTE ADULT - SUBJECTIVE AND OBJECTIVE BOX
SUBJECTIVE/OVERNIGHT EVENTS  Today is hospital day 3d. This morning patient was seen and examined at bedside, resting in bed. Pt complains of abdominal pain this morning.    MEDICATIONS  STANDING MEDICATIONS  acetaminophen   IVPB .. 1000 milliGRAM(s) IV Intermittent once  albuterol    0.083%. 2.5 milliGRAM(s) Nebulizer once  ascorbic acid 500 milliGRAM(s) Oral daily  aspirin  chewable 81 milliGRAM(s) Oral daily  baclofen 10 milliGRAM(s) Oral every 12 hours  gabapentin 100 milliGRAM(s) Oral three times a day  heparin   Injectable 5000 Unit(s) SubCutaneous every 12 hours  lactated ringers. 1000 milliLiter(s) IV Continuous <Continuous>  levETIRAcetam  Solution 1000 milliGRAM(s) Oral two times a day  lidocaine   4% Patch 1 Patch Transdermal every 24 hours  meropenem  IVPB 1000 milliGRAM(s) IV Intermittent every 8 hours  midodrine. 10 milliGRAM(s) Oral three times a day  multivitamin/minerals 1 Tablet(s) Oral daily  pantoprazole  Injectable 40 milliGRAM(s) IV Push two times a day  sodium chloride 1 Gram(s) Oral two times a day  valproic  acid Syrup 750 milliGRAM(s) Enteral Tube three times a day  vancomycin  IVPB 750 milliGRAM(s) IV Intermittent <User Schedule>  vitamin A &amp; D Ointment 1 Application(s) Topical two times a day    PRN MEDICATIONS  acetaminophen     Tablet .. 650 milliGRAM(s) Oral every 6 hours PRN    VITALS  T(F): 98 (09-06-24 @ 04:37), Max: 100.3 (09-05-24 @ 13:23)  HR: 93 (09-06-24 @ 04:37) (93 - 115)  BP: 105/69 (09-06-24 @ 04:37) (84/60 - 127/74)  RR: 18 (09-06-24 @ 04:37) (18 - 18)  SpO2: 100% (09-06-24 @ 04:37) (97% - 100%)    PHYSICAL EXAM  GENERAL: NAD, lying in bed comfortably  HEAD:  Atraumatic, normocephalic  EYES: EOMI, PERRLA, conjunctiva and sclera clear  NECK: Supple, trachea midline, no JVD  HEART: Regular rate and rhythm, no murmurs, rubs, or gallops  LUNGS: Unlabored respirations.  Clear to auscultation bilaterally, no crackles, wheezing, or rhonchi  ABDOMEN: Soft, TTP, mildly distended, hyperactive +BS, PEG tube in place, site clean and dry  EXTREMITIES: 2+ peripheral pulses bilaterally. No clubbing, cyanosis, or edema. B/L UE contracted  NERVOUS SYSTEM:  A&Ox2, moving all extremities, no focal deficits   SKIN: sacral ulcer    LABS             8.5    13.56 )-----------( 971      ( 09-05-24 @ 06:30 )             26.9     139  |  99  |  8   -------------------------<  112   09-05-24 @ 06:30  4.8  |  26  |  <0.5    Ca      9.2     09-05-24 @ 06:30    TPro  6.7  /  Alb  2.8  /  TBili  <0.2  /  DBili  x   /  AST  17  /  ALT  24  /  AlkPhos  125  /  GGT  x     09-05-24 @ 06:30    PTT - ( 09-05-24 @ 11:27 )  PTT:28.8 sec      Urinalysis Basic - ( 05 Sep 2024 06:30 )    Color: x / Appearance: x / SG: x / pH: x  Gluc: 112 mg/dL / Ketone: x  / Bili: x / Urobili: x   Blood: x / Protein: x / Nitrite: x   Leuk Esterase: x / RBC: x / WBC x   Sq Epi: x / Non Sq Epi: x / Bacteria: x          Culture - Urine (collected 04 Sep 2024 03:25)  Source: Clean Catch Clean Catch (Midstream)  Final Report (05 Sep 2024 18:06):    <10,000 CFU/mL Normal Urogenital Evelyn    Urinalysis with Rflx Culture (collected 03 Sep 2024 20:56)    Culture - Blood (collected 03 Sep 2024 17:21)  Source: .Blood Blood-Peripheral  Preliminary Report (06 Sep 2024 02:01):    No growth at 48 Hours    Culture - Blood (collected 03 Sep 2024 17:21)  Source: .Blood Blood-Peripheral  Gram Stain (05 Sep 2024 09:09):    Growth in aerobic bottle: Gram Positive Cocci in Clusters  Final Report (05 Sep 2024 21:55):    Growth in aerobic bottle: Staphylococcus hominis    Isolation of Coagulase negative Staphylococcus from single blood culture    sets may represent    contamination. Contact the Microbiology Department at [phone] if    susceptibility testing is    clinically indicated.    Direct identification is available within approximately 3-5    hours either by Blood Panel Multiplexed PCR or Direct    MALDI-TOF. Details: [email or web address]  Organism: Blood Culture PCR (05 Sep 2024 21:55)  Organism: Blood Culture PCR (05 Sep 2024 21:55)      IMAGING None available

## 2024-09-06 NOTE — DIETITIAN INITIAL EVALUATION ADULT - NAME AND PHONE
Chloe [phone] or TEAMS    Nutrition Interventions: TF regimen; Nutrition Monitoring: Diet order, weights, labs, NFPF, body composition, BM and tolerance to TF regimen

## 2024-09-06 NOTE — PROGRESS NOTE ADULT - ASSESSMENT
ASSESSMENT   69 year old F with pmhx  of dementia, CVA, OM, sacral ulcer, recent admission w/septic shock at [facility] discharged on vancomycin and meropenem presents from nursing home for positive occult blood and low Hb of 7 at the NH.    IMPRESSION  #Severe sepsis with lactic acidosis T>101 P>90 WBC 19  Suspect related to sacral SSTI/ OM, possible CLABSI from outside     9/14 UCX NGTD     9/3 1/4 bottles Staphylococcus hominis- could be true pathogen as outside catheter  Possible SSTI, sacral decub- Skin: full thickness sacral wound about [id number] mostly moist and pink, with nonviable tissue present towards 12 o'clock, free floating piece of bone noted, bone palpable, mild malodor noted, no pus, no erythema, no edema    CXR no PNA    UA without significant pyuria     Influenza/RSV/COVID19 PCR negative   #Immunodeficiency secondary to Senescence which could results in poor clinical outcomes  #Abx allergy: Allergy Status Unknown    Creatinine: <0.5 (09-04-24 @ 00:25)    Height (cm): 152.4 (09-03-24 @ 13:32)  Weight (kg): 59 (09-03-24 @ 13:32)    RECOMMENDATIONS  - f/u Repeat BCX   - Obtain [facility] records, micro data, need antimicrobials course plan  - Appreciate Burn consult- -No need for surgical intervention at this time  - No benefit of long term intravenous antibiotics for chronic sacral osteomyelitis. Continue with nutritional support and offloading   - meropenem  IVPB 1000 milliGRAM(s) IV Intermittent every 8 hours  - vancomycin  IVPB 1000 milliGRAM(s) IV Intermittent every 12 hours  - Vanc dosing AUC/JULIANA per clinical pharmacist   - Poor prognosis, GOC    If any questions, please send a message or call on Comic [employer] Teams  Please continue to update ID with any pertinent new laboratory, radiographic findings, or change in clinical status   ASSESSMENT   69 year old F with pmhx  of dementia, CVA, OM, sacral ulcer, recent admission w/septic shock at [facility] discharged on vancomycin and meropenem presents from nursing home for positive occult blood and low Hb of 7 at the NH.    IMPRESSION  #Severe sepsis with lactic acidosis T>101 P>90 WBC 19  Suspect related to sacral SSTI/ OM, possible CLABSI from outside     9/14 UCX NGTD     9/3 1/4 bottles Staphylococcus hominis- could be true pathogen as outside catheter  Possible SSTI, sacral decub- Skin: full thickness sacral wound about [id number] mostly moist and pink, with nonviable tissue present towards 12 o'clock, free floating piece of bone noted, bone palpable, mild malodor noted, no pus, no erythema, no edema    CXR no PNA    UA without significant pyuria     Influenza/RSV/COVID19 PCR negative   #Immunodeficiency secondary to Senescence which could results in poor clinical outcomes  #Abx allergy: Allergy Status Unknown    Creatinine: <0.5 (09-04-24 @ 00:25)    Height (cm): 152.4 (09-03-24 @ 13:32)  Weight (kg): 59 (09-03-24 @ 13:32)    RECOMMENDATIONS  - f/u Repeat BCX   - Obtain [facility] records, micro data, need antimicrobials course plan  - Appreciate Burn consult- -No need for surgical intervention at this time  - No benefit of long term intravenous antibiotics for chronic sacral osteomyelitis. Continue with nutritional support and offloading   - meropenem  IVPB 1000 milliGRAM(s) IV Intermittent every 8 hours  - Vanc dosing AUC/JULIANA per clinical pharmacist   - Poor prognosis, GOC    If any questions, please send a message or call on [employer] Teams  Please continue to update ID with any pertinent new laboratory, radiographic findings, or change in clinical status

## 2024-09-06 NOTE — DIETITIAN INITIAL EVALUATION ADULT - OTHER INFO
Pertinent Medical Information: Per H&P, pt is a 70 y/o female with PMHx of Dementia, CVA, OM, Sacral ulcer, recent admission at [facility] for septic shock (dc on Vanc, Meropenem) presents from NH for low Hb and positive occult blood stool. Palliative care team following. EGD and colonoscopy on 09/06. EGD and colonoscopy on 09/06.  # Sepsis likely 2/2 infected sacral ulcer vs. less likely UTI  # Acute on chronic anemia   - NPO with IVF gentle hydration    Pertinent Subjective Information: Pt currently NPO for EGD per RN. No issues with previous diet order prior - Jevity 1.2.     Weight hx: UBW unknown. Current dosing weight is 60 KG. Previous admission weight was 68 KG on 3/11/23 per EMR. 11% unintentional weight loss in over 1 year compared to current dosing weight. Pt does not meet weight criteria for malnutrition at this time.

## 2024-09-06 NOTE — DIETITIAN INITIAL EVALUATION ADULT - ADD RECOMMEND
1. Once medically feasible to advance diet order, recommend: Jevity 1.2 via PEG, total volume: 1400 mL, bolus of 350 mL q6hrs. TF regimen to provide -- 1680 kcal, 84g pro, 1127 mL free water + additional water flushes of 70 mL pre/post feeds. TF regimen meets >85 - 105% of estimated needs.    High risk; f/u in 3-5 days or prn.

## 2024-09-06 NOTE — PROGRESS NOTE ADULT - ASSESSMENT
A 69 year old F with pmhx  of dementia, CVA, OM, sacral ulcer, recent admission w/septic shock at [facility] discharged on vancomycin and meropenem presents from nursing home for positive occult blood and low Hb of 7 at the NH.    #Unstageable sacral ulcer  #chronic osteomyelitis   #BCx w/ Staph epi x2  - Per daughter recent admission to [facility] for OM of sacral ulcer c/b septic shock  - In ED, /82, , SpO2 99% on RA  - Labs remarkable for wbc 19k, Neutro 80%, Hb 8.3, MCV 86, Na 130, K 6.3, lactate 2.1  - VBG ph 7.37, CO2 53  - Xray chest No radiographic evidence of acute cardiopulmonary disease.  - s/p 2L bolus  - c/w meropenem and vanc  - c/w LR 75cc/hr  - Trend wbc and lactate  - lactate 2.1 -> 2.4 -> 1  - ID recs: meropenem, vanc  - BCx prelim coag neg staph  - f/u BCx  - burn consult: no intervention at this time  - f/u vanc trough  -9/6 housestaff obtained records from [facility] w/ staph epi as well as staph epi on BCx at [facility]  - f/u repeat BCx  -old line removed    #Acute on chronic anemia  - Per daughter, pt with multiple transfusions at [facility]  - Positive occult blood and Hb of 7 at NH  - Here Hb 8.3 -> 7.6 -> 8.0 (bs around 9)  - Agreeable for egd/colono if needed  - low total iron, transferrin, TIBC, % sat  - Keep active type and screen  - CBC bid  - PPI IV bid   - Transfuse if Hb < 7  - maintain active type and screen  - GI eval: EGD and colonoscopy on 09/06   -9/6 Hgb dropped. Will be getting PRBCs. Serial CBC    #Hyperkalemia  - No EKG changes  - K of 6.3 -> 5.2 -> 5.1  - s/p insulin/dextrose and lokelma  - resolved    #Seizures  #Hx of R Frontal Lobe Infarct   #Chronic R MCA infarct  #Bed bound  #Tube feeds  - C/w Depakote 750 mg TID and Keppra to 1000 mg BID   - C/w baclofen 10 mg bid (for UE spasticity)  - C/w ASA, on plavix but not on statin? Holding plavix for now    # Persistent Hypotension   - c/w Midodrine 10 mg TID    #Sinus Tachycardia  IVFs  improved    #Chronic borrego  - cw borrego    - DVT ppx - SCDs after duplex  - Diet: PEG tube feeds  - GI ppx: PPI  - Code status: Palliative consult for GOC    Very poor Px.    #Progress Note Handoff  Pending: Clinical improvement and stability__EGD/C-scopy___  Pt/Family discussion: staff spoke to daughter  Disposition: Nsg Home_    My note supersedes the residents note should a discrepancy arise.    Chart and notes personally reviewed.  Care Discussed with Consultants/Other Providers/ Housestaff [ x] YES [ ] NO   Radiology, labs, old records personally reviewed.    discussed w/ housestaff, nursing, case management, GI    Attestation Statements:    Attestation Statements:  Risk Statement (NON-critical care).     On this date of service, level of risk to patient is considered: High.     Due to: anemia, sacral decub, +BCx    Time-based billing (NON-critical care).     50 minutes spent on total encounter. The necessity of the time spent during the encounter on this date of service was due to:     time spent on review of labs, imaging studies, old records, obtaining history, personally examining patient, counselling and communicating with patient/ family, entering orders for medications/tests/etc, discussions with other health care providers, documentation in electronic health records, independent interpretation of labs, imaging/procedure results and care coordination.

## 2024-09-06 NOTE — DIETITIAN INITIAL EVALUATION ADULT - ORAL INTAKE PTA/DIET HISTORY
Pt unable to participate in nutrition assessment at time of visit; confused per flow sheet. No family or aide at bedside. Unable to reach emergency contact to obtain nutrition hx this morning. Will attempt to reach out again at follow up as able. Hx limited to medical chart.

## 2024-09-06 NOTE — DIETITIAN INITIAL EVALUATION ADULT - PERTINENT MEDS FT
MEDICATIONS  (STANDING):  acetaminophen   IVPB .. 1000 milliGRAM(s) IV Intermittent once  albuterol    0.083%. 2.5 milliGRAM(s) Nebulizer once  ascorbic acid 500 milliGRAM(s) Oral daily  aspirin  chewable 81 milliGRAM(s) Oral daily  baclofen 10 milliGRAM(s) Oral every 12 hours  gabapentin 100 milliGRAM(s) Oral three times a day  heparin   Injectable 5000 Unit(s) SubCutaneous every 12 hours  lactated ringers. 1000 milliLiter(s) (75 mL/Hr) IV Continuous <Continuous>  levETIRAcetam  Solution 1000 milliGRAM(s) Oral two times a day  lidocaine   4% Patch 1 Patch Transdermal every 24 hours  meropenem  IVPB 1000 milliGRAM(s) IV Intermittent every 8 hours  midodrine. 10 milliGRAM(s) Oral three times a day  multivitamin/minerals 1 Tablet(s) Oral daily  pantoprazole  Injectable 40 milliGRAM(s) IV Push two times a day  sodium chloride 1 Gram(s) Oral two times a day  valproic  acid Syrup 750 milliGRAM(s) Enteral Tube three times a day  vancomycin  IVPB 750 milliGRAM(s) IV Intermittent <User Schedule>  vitamin A &amp; D Ointment 1 Application(s) Topical two times a day    MEDICATIONS  (PRN):  acetaminophen     Tablet .. 650 milliGRAM(s) Oral every 6 hours PRN Mild Pain (1 - 3)

## 2024-09-06 NOTE — PRE-ANESTHESIA EVALUATION ADULT - NSANTHPMHFT_GEN_ALL_CORE
Chart reviewed, MED/ GI evaluation seen.  PMH: Dementia, S/P CVA, S/P Sepsis secondly to sacral ulcer and osteomyelitis, treated in [facility]. H/H 8.5/26.9 yesterday, and today 6.8/22.1, blood transfusion in progress.

## 2024-09-06 NOTE — PROGRESS NOTE ADULT - SUBJECTIVE AND OBJECTIVE BOX
HPI:  A 69 year old F with pmhx  of dementia, CVA, OM, sacral ulcer, recent admission w/septic shock at [facility] discharged on vancomycin and meropenem presents from nursing home for positive occult blood and low Hb of 7 at the NH.    In ED, /82, , SpO2 99% on RA  Labs remarkable for wbc 19k, Neutro 80%, Hb 8.3, MCV 86, Na 130, K 6.3, lactate 2.1  VBG ph 7.37, CO2 53  Xray chest No radiographic evidence of acute cardiopulmonary disease.    Admit for further management and workup   (03 Sep 2024 18:59)    Patient complaining of abdominal pain.   States that she trusts her daughter, Dominga, to help her make medical decisions.       ITEMS NOT CHECKED ARE NOT PRESENT    SOCIAL HISTORY:   Significant other/partner[ ]  Children[ ]  Anabaptist/Spirituality:  Substance hx:  [ ]   Tobacco hx:  [ ]   Alcohol hx: [ ]   Living Situation: [ ]Home  [ ]Long term care  [ ]Rehab [ ]Other  Home Services: [ ] HHA [ ] Jarocho RN [ ] Hospice  Occupation:  Home Opioid hx:  [ ] Y [ ] N [ ] I-Stop Reference No:     ADVANCE DIRECTIVES:    [ ] Full Code [ ] DNR  MOLST  [ ]  Living Will  [ ]   DECISION MAKER(s):  [ ] Health Care Proxy(s)  [ ] Surrogate(s)  [ ] Guardian           Name(s): Phone Number(s):    BASELINE (I)ADL(s) (prior to admission):  Moran: [ ]Total  [ ] Moderate [ ]Dependent  Palliative Performance Status Version 2:         %    http://npcrc.org/files/news/palliative_performance_scale_ppsv2.pdf    Allergies    Allergy Status Unknown    Intolerances    MEDICATIONS  (STANDING):  acetaminophen   IVPB .. 1000 milliGRAM(s) IV Intermittent once  albuterol    0.083%. 2.5 milliGRAM(s) Nebulizer once  ascorbic acid 500 milliGRAM(s) Oral daily  aspirin  chewable 81 milliGRAM(s) Oral daily  baclofen 10 milliGRAM(s) Oral every 12 hours  gabapentin 100 milliGRAM(s) Oral three times a day  heparin   Injectable 5000 Unit(s) SubCutaneous every 12 hours  lactated ringers. 1000 milliLiter(s) (75 mL/Hr) IV Continuous <Continuous>  levETIRAcetam  Solution 1000 milliGRAM(s) Oral two times a day  lidocaine   4% Patch 1 Patch Transdermal every 24 hours  meropenem  IVPB 1000 milliGRAM(s) IV Intermittent every 8 hours  midodrine. 10 milliGRAM(s) Oral three times a day  multivitamin/minerals 1 Tablet(s) Oral daily  pantoprazole  Injectable 40 milliGRAM(s) IV Push two times a day  sodium chloride 1 Gram(s) Oral two times a day  valproic  acid Syrup 750 milliGRAM(s) Enteral Tube three times a day  vancomycin  IVPB 750 milliGRAM(s) IV Intermittent <User Schedule>  vitamin A &amp; D Ointment 1 Application(s) Topical two times a day    MEDICATIONS  (PRN):  acetaminophen     Tablet .. 650 milliGRAM(s) Oral every 6 hours PRN Mild Pain (1 - 3)      PRESENT SYMPTOMS: [ ]Unable to obtain due to poor mentation   Source if other than patient:  [ ]Family   [ ]Team     Unable to obtain full pain history    Pain: [x ]yes [ ]no  QOL impact -   Location -  abdomen                  Aggravating factors -  Alleviating factors -   Quality -  Radiation -  Timing -   Severity (0-10 scale): n/a  Minimal acceptable level (0-10 scale):     CPOT:    https://www.[facility].org/getattachment/ydj68v49-0f3d-2g1n-1k2t-6117z9521r1d/Critical-Care-Pain-Observation-Tool-(CPOT)    PAIN AD Score:   http://geriatrictoolkit.[facility]/cog/painad.pdf     Dyspnea:                           [x ]None[ ]Mild [ ]Moderate [ ]Severe     Respiratory Distress Observation Scale (RDOS):   A score of 0 to 2 signifies little or no respiratory distress, 3 signifies mild distress, scores 4 to 6 indicate moderate distress, and scores greater than 7 signify severe distress  https://www.[facility].ca/sites/default/files/PDFS/538539-gagsgcommxk-yblwvihq-ehwekrzytxf-bgaag.pdf    Anxiety:                             [ ]None[ ]Mild [ ]Moderate [ ]Severe   Fatigue:                             [ ]None[ ]Mild [ ]Moderate [ ]Severe   Nausea:                             [ ]None[ ]Mild [ ]Moderate [ ]Severe   Loss of appetite:              [ ]None[ ]Mild [ ]Moderate [ ]Severe   Constipation:                    [ ]None[ ]Mild [ ]Moderate [ ]Severe    Other Symptoms:  [ ]All other review of systems negative     Palliative Performance Status Version 2:         %    http://npcrc.org/files/news/palliative_performance_scale_ppsv2.pdf  PHYSICAL EXAM:    Vital Signs Last 24 Hrs  T(C): 36.7 (06 Sep 2024 04:37), Max: 37.9 (05 Sep 2024 13:23)  T(F): 98 (06 Sep 2024 04:37), Max: 100.3 (05 Sep 2024 13:23)  HR: 93 (06 Sep 2024 04:37) (93 - 115)  BP: 105/69 (06 Sep 2024 04:37) (84/60 - 127/74)  BP(mean): 81 (06 Sep 2024 04:37) (81 - 81)  RR: 18 (06 Sep 2024 04:37) (18 - 18)  SpO2: 100% (06 Sep 2024 04:37) (97% - 100%)    Parameters below as of 06 Sep 2024 04:37  Patient On (Oxygen Delivery Method): room air        GENERAL:  [x ] No acute distress [ ]Lethargic  [ ]Unarousable  [x ]Verbal  [ ]Non-Verbal [ ]Cachexia    BEHAVIORAL/PSYCH:  [ ]Alert and Oriented x  [ ] Anxiety [ ] Delirium [ ] Agitation [x ] Calm   EYES: [x ] No scleral icterus [ ] Scleral icterus [ ] Closed  ENMT:  [ ]Dry mouth  [x ]No external oral lesions [ ] No external ear or nose lesions  CARDIOVASCULAR:  [ ]Regular [ ]Irregular [ ]Tachy [x ]Not Tachy  [ ]Sergey [ ] Edema [ ] No edema  PULMONARY:  [ ]Tachypnea  [ ]Audible excessive secretions [x ] No labored breathing [ ] mildly labored breathing [ ] labored breathing  GASTROINTESTINAL: [ ]Soft  [ ]Distended  [x ]Not distended [ ]Non tender [ ]Tender  MUSCULOSKELETAL: [ ]No clubbing [ ] clubbing  [x ] No cyanosis [ ] cyanosis  NEUROLOGIC: [ ]No focal deficits  [ ]Follows commands  [ ]Does not follow commands  [x ]Cognitive impairment  [ ]Dysphagia  [ ]Dysarthria  [ ]Paresis   SKIN: [ ] Jaundiced [x ] Non-jaundiced [ ]Rash [ ]No Rash [ ] Warm [ ] Dry  MISC/LINES: [ ] ET tube [ ] Trach [ ]NGT/OGT [x ]PEG [ ]Whitman    CRITICAL CARE:  [ ] Shock Present  [ ]Septic [ ]Cardiogenic [ ]Neurologic [ ]Hypovolemic  [ ]  Vasopressors [ ]  Inotropes   [ ]Respiratory failure present [ ]Mechanical ventilation [ ]Non-invasive ventilatory support [ ]High flow  [ ]Acute  [ ]Chronic [ ]Hypoxic  [ ]Hypercarbic [ ]Other  [ ]Other organ failure   LABS: I have reviewed daily labs                          8.5    13.56 )-----------( 971      ( 05 Sep 2024 06:30 )             26.9     09-05    139  |  99  |  8<L>  ----------------------------<  112<H>  4.8   |  26  |  <0.5<L>    Ca    9.2      05 Sep 2024 06:30    TPro  6.7  /  Alb  2.8<L>  /  TBili  <0.2  /  DBili  x   /  AST  17  /  ALT  24  /  AlkPhos  125<H>  09-05    PTT - ( 05 Sep 2024 11:27 )  PTT:28.8 sec  Urinalysis Basic - ( 05 Sep 2024 06:30 )    Color: x / Appearance: x / SG: x / pH: x  Gluc: 112 mg/dL / Ketone: x  / Bili: x / Urobili: x   Blood: x / Protein: x / Nitrite: x   Leuk Esterase: x / RBC: x / WBC x   Sq Epi: x / Non Sq Epi: x / Bacteria: x        RADIOLOGY & ADDITIONAL STUDIES: reviewed   < from: [facility] Duplex Lower Ext Vein Scan, Ellen (09.04.24 @ 22:04) >  PROCEDURE DATE:  09/04/2024          INTERPRETATION:  CLINICAL INFORMATION: 69-year-old female with leg   swelling    TECHNIQUE: Duplex sonography of the BILATERAL LOWER extremity veins with   color and spectral Doppler, with and without compression.    FINDINGS:    RIGHT:  Normal compressibility of the RIGHT common femoral, femoral and popliteal   veins.  Doppler examination shows normal spontaneous and phasic flow.  No RIGHT calf vein thrombosis is detected.    LEFT:  Normal compressibility of the LEFT common femoral, femoral and popliteal   veins.  Doppler examination shows normal spontaneous and phasic flow.  No LEFT calf vein thrombosis is detected.    IMPRESSION:  No evidence of deep venous thrombosis in either lower extremity.            < end of copied text >    PROTEIN CALORIE MALNUTRITION PRESENT: [ ]mild [ ]moderate [ ]severe [ ]underweight [ ]morbid obesity  https://www.andeal.org/vault/2440/web/files/ONC/Table_Clinical%20Characteristics%20to%20Document%20Malnutrition-White%20JV%20et%20al%202012.pdf    Height (cm): 152.4 (09-03-24 @ 13:32)  Weight (kg): 59 (09-03-24 @ 13:32)  BMI (kg/m2): 25.4 (09-03-24 @ 13:32)    [ ]PPSV2 < or = to 30% [ ]significant weight loss  [ ]poor nutritional intake  [ ]anasarca      [ ]Artificial Nutrition      Palliative Care Spiritual/Emotional Screening Tool Question  Severity (0-4):                    OR                    [ x] Unable to determine. Will assess at later time if appropriate.  Score of 2 or greater indicates recommendation of Chaplaincy and/or SW referral  Chaplaincy Referral: [ ] Yes [ ] Refused [ ] Following     Caregiver Summit:  [ ] Yes [ ] No    OR    [x ] Unable to determine. Will assess at later time if appropriate.  Social Work Referral [ ]  Patient and Family Centered Care Referral [ ]    Anticipatory Grief Present: [ ] Yes [ ] No    OR     [ x] Unable to determine. Will assess at later time if appropriate.  Social Work Referral [ ]  Patient and Family Centered Care Referral [ ]    REFERRALS:   [ ]Chaplaincy  [ ]Hospice  [ ]Child Life  [ ]Social Work  [ ]Case management [ ]Holistic Therapy     Palliative care education provided to patient and/or family

## 2024-09-07 LAB
ALBUMIN SERPL ELPH-MCNC: 2.3 G/DL — LOW (ref 3.5–5.2)
ALP SERPL-CCNC: 92 U/L — SIGNIFICANT CHANGE UP (ref 30–115)
ALT FLD-CCNC: 13 U/L — SIGNIFICANT CHANGE UP (ref 0–41)
ANION GAP SERPL CALC-SCNC: 10 MMOL/L — SIGNIFICANT CHANGE UP (ref 7–14)
AST SERPL-CCNC: 14 U/L — SIGNIFICANT CHANGE UP (ref 0–41)
BILIRUB SERPL-MCNC: 0.2 MG/DL — SIGNIFICANT CHANGE UP (ref 0.2–1.2)
BUN SERPL-MCNC: 5 MG/DL — LOW (ref 10–20)
CALCIUM SERPL-MCNC: 8.7 MG/DL — SIGNIFICANT CHANGE UP (ref 8.4–10.5)
CHLORIDE SERPL-SCNC: 104 MMOL/L — SIGNIFICANT CHANGE UP (ref 98–110)
CO2 SERPL-SCNC: 27 MMOL/L — SIGNIFICANT CHANGE UP (ref 17–32)
CREAT SERPL-MCNC: <0.5 MG/DL — LOW (ref 0.7–1.5)
EGFR: 127 ML/MIN/1.73M2 — SIGNIFICANT CHANGE UP
GLUCOSE SERPL-MCNC: 99 MG/DL — SIGNIFICANT CHANGE UP (ref 70–99)
HCT VFR BLD CALC: 26.2 % — LOW (ref 37–47)
HCT VFR BLD CALC: 27.8 % — LOW (ref 37–47)
HGB BLD-MCNC: 8.2 G/DL — LOW (ref 12–16)
HGB BLD-MCNC: 8.6 G/DL — LOW (ref 12–16)
MAGNESIUM SERPL-MCNC: 1.8 MG/DL — SIGNIFICANT CHANGE UP (ref 1.8–2.4)
MCHC RBC-ENTMCNC: 27.1 PG — SIGNIFICANT CHANGE UP (ref 27–31)
MCHC RBC-ENTMCNC: 27.1 PG — SIGNIFICANT CHANGE UP (ref 27–31)
MCHC RBC-ENTMCNC: 30.9 G/DL — LOW (ref 32–37)
MCHC RBC-ENTMCNC: 31.3 G/DL — LOW (ref 32–37)
MCV RBC AUTO: 86.5 FL — SIGNIFICANT CHANGE UP (ref 81–99)
MCV RBC AUTO: 87.7 FL — SIGNIFICANT CHANGE UP (ref 81–99)
NRBC # BLD: 0 /100 WBCS — SIGNIFICANT CHANGE UP (ref 0–0)
NRBC # BLD: 0 /100 WBCS — SIGNIFICANT CHANGE UP (ref 0–0)
PLATELET # BLD AUTO: 680 K/UL — HIGH (ref 130–400)
PLATELET # BLD AUTO: 744 K/UL — HIGH (ref 130–400)
PMV BLD: 8.9 FL — SIGNIFICANT CHANGE UP (ref 7.4–10.4)
PMV BLD: 9.5 FL — SIGNIFICANT CHANGE UP (ref 7.4–10.4)
POTASSIUM SERPL-MCNC: 3.7 MMOL/L — SIGNIFICANT CHANGE UP (ref 3.5–5)
POTASSIUM SERPL-SCNC: 3.7 MMOL/L — SIGNIFICANT CHANGE UP (ref 3.5–5)
PROT SERPL-MCNC: 5.5 G/DL — LOW (ref 6–8)
RBC # BLD: 3.03 M/UL — LOW (ref 4.2–5.4)
RBC # BLD: 3.17 M/UL — LOW (ref 4.2–5.4)
RBC # FLD: 14.4 % — SIGNIFICANT CHANGE UP (ref 11.5–14.5)
RBC # FLD: 14.6 % — HIGH (ref 11.5–14.5)
SODIUM SERPL-SCNC: 141 MMOL/L — SIGNIFICANT CHANGE UP (ref 135–146)
VANCOMYCIN TROUGH SERPL-MCNC: 11 UG/ML — HIGH (ref 5–10)
WBC # BLD: 10.72 K/UL — SIGNIFICANT CHANGE UP (ref 4.8–10.8)
WBC # BLD: 11.55 K/UL — HIGH (ref 4.8–10.8)
WBC # FLD AUTO: 10.72 K/UL — SIGNIFICANT CHANGE UP (ref 4.8–10.8)
WBC # FLD AUTO: 11.55 K/UL — HIGH (ref 4.8–10.8)

## 2024-09-07 PROCEDURE — 74176 CT ABD & PELVIS W/O CONTRAST: CPT | Mod: 26

## 2024-09-07 PROCEDURE — 99232 SBSQ HOSP IP/OBS MODERATE 35: CPT

## 2024-09-07 RX ORDER — PANTOPRAZOLE SODIUM 40 MG
40 TABLET, DELAYED RELEASE (ENTERIC COATED) ORAL
Refills: 0 | Status: DISCONTINUED | OUTPATIENT
Start: 2024-09-07 | End: 2024-09-10

## 2024-09-07 RX ADMIN — Medication 40 MILLIGRAM(S): at 05:11

## 2024-09-07 RX ADMIN — Medication 100 MILLIGRAM(S): at 21:19

## 2024-09-07 RX ADMIN — Medication 1 PATCH: at 21:20

## 2024-09-07 RX ADMIN — MEROPENEM 100 MILLIGRAM(S): 500 INJECTION, POWDER, FOR SOLUTION INTRAVENOUS at 01:30

## 2024-09-07 RX ADMIN — Medication 250 MILLIGRAM(S): at 08:31

## 2024-09-07 RX ADMIN — ACETAMINOPHEN 650 MILLIGRAM(S): 325 TABLET ORAL at 21:19

## 2024-09-07 RX ADMIN — Medication 100 MILLIGRAM(S): at 05:10

## 2024-09-07 RX ADMIN — Medication 500 MILLIGRAM(S): at 11:15

## 2024-09-07 RX ADMIN — SODIUM CHLORIDE 1 GRAM(S): 9 INJECTION INTRAMUSCULAR; INTRAVENOUS; SUBCUTANEOUS at 05:10

## 2024-09-07 RX ADMIN — PETROLATUM 1 APPLICATION(S): 93.5 OINTMENT TOPICAL at 17:03

## 2024-09-07 RX ADMIN — Medication 81 MILLIGRAM(S): at 11:15

## 2024-09-07 RX ADMIN — Medication 40 MILLIGRAM(S): at 18:00

## 2024-09-07 RX ADMIN — MIDODRINE HYDROCHLORIDE 10 MILLIGRAM(S): 5 TABLET ORAL at 11:15

## 2024-09-07 RX ADMIN — MIDODRINE HYDROCHLORIDE 10 MILLIGRAM(S): 5 TABLET ORAL at 05:11

## 2024-09-07 RX ADMIN — MEROPENEM 100 MILLIGRAM(S): 500 INJECTION, POWDER, FOR SOLUTION INTRAVENOUS at 08:31

## 2024-09-07 RX ADMIN — Medication 750 MILLIGRAM(S): at 14:21

## 2024-09-07 RX ADMIN — Medication 250 MILLIGRAM(S): at 21:19

## 2024-09-07 RX ADMIN — Medication 1 TABLET(S): at 11:15

## 2024-09-07 RX ADMIN — BACLOFEN 10 MILLIGRAM(S): 0.5 INJECTION INTRATHECAL at 05:11

## 2024-09-07 RX ADMIN — MEROPENEM 100 MILLIGRAM(S): 500 INJECTION, POWDER, FOR SOLUTION INTRAVENOUS at 17:04

## 2024-09-07 RX ADMIN — Medication 5000 UNIT(S): at 17:04

## 2024-09-07 RX ADMIN — Medication 750 MILLIGRAM(S): at 21:19

## 2024-09-07 RX ADMIN — MIDODRINE HYDROCHLORIDE 10 MILLIGRAM(S): 5 TABLET ORAL at 17:03

## 2024-09-07 RX ADMIN — Medication 750 MILLIGRAM(S): at 05:10

## 2024-09-07 RX ADMIN — LEVETIRACETAM 1000 MILLIGRAM(S): 1000 TABLET ORAL at 05:10

## 2024-09-07 RX ADMIN — SODIUM CHLORIDE 1 GRAM(S): 9 INJECTION INTRAMUSCULAR; INTRAVENOUS; SUBCUTANEOUS at 17:03

## 2024-09-07 RX ADMIN — BACLOFEN 10 MILLIGRAM(S): 0.5 INJECTION INTRATHECAL at 17:03

## 2024-09-07 RX ADMIN — LEVETIRACETAM 1000 MILLIGRAM(S): 1000 TABLET ORAL at 17:03

## 2024-09-07 RX ADMIN — Medication 100 MILLIGRAM(S): at 14:21

## 2024-09-07 RX ADMIN — PETROLATUM 1 APPLICATION(S): 93.5 OINTMENT TOPICAL at 05:11

## 2024-09-07 RX ADMIN — Medication 5000 UNIT(S): at 05:11

## 2024-09-07 NOTE — PHARMACOTHERAPY INTERVENTION NOTE - NSPHARMCOMMASP
ASP - Lab/ test recommended
ASP - Dose optimization/Non-Renal dose adjustment

## 2024-09-07 NOTE — PROGRESS NOTE ADULT - SUBJECTIVE AND OBJECTIVE BOX
SUBJECTIVE/OVERNIGHT EVENTS  Today is hospital day 4d. This morning patient was seen and examined at bedside, resting comfortably in bed. Pt says her abd pain is better this morning. Pt Hgb dropped below 7 yesterday, received 1 unit blood. Pt underwent EGD-[place] yesterday.     MEDICATIONS  STANDING MEDICATIONS  albuterol    0.083%. 2.5 milliGRAM(s) Nebulizer once  ascorbic acid 500 milliGRAM(s) Oral daily  aspirin  chewable 81 milliGRAM(s) Oral daily  baclofen 10 milliGRAM(s) Oral every 12 hours  gabapentin 100 milliGRAM(s) Oral three times a day  heparin   Injectable 5000 Unit(s) SubCutaneous every 12 hours  lactated ringers. 1000 milliLiter(s) IV Continuous <Continuous>  levETIRAcetam  Solution 1000 milliGRAM(s) Oral two times a day  lidocaine   4% Patch 1 Patch Transdermal every 24 hours  meropenem  IVPB 1000 milliGRAM(s) IV Intermittent every 8 hours  midodrine. 10 milliGRAM(s) Oral three times a day  multivitamin/minerals 1 Tablet(s) Oral daily  pantoprazole   Suspension 40 milliGRAM(s) Oral two times a day  sodium chloride 1 Gram(s) Oral two times a day  valproic  acid Syrup 750 milliGRAM(s) Enteral Tube three times a day  vancomycin  IVPB 750 milliGRAM(s) IV Intermittent <User Schedule>  vitamin A &amp; D Ointment 1 Application(s) Topical two times a day    PRN MEDICATIONS  acetaminophen     Tablet .. 650 milliGRAM(s) Oral every 6 hours PRN    VITALS  T(F): 97.3 (09-07-24 @ 04:58), Max: 98.3 (09-06-24 @ 20:20)  HR: 70 (09-07-24 @ 04:58) (70 - 96)  BP: 128/79 (09-07-24 @ 04:58) (115/74 - 142/75)  RR: 18 (09-07-24 @ 04:58) (16 - 18)  SpO2: 99% (09-07-24 @ 04:58) (97% - 100%)    PHYSICAL EXAM  GENERAL: NAD, lying in bed comfortably  HEAD:  Atraumatic, normocephalic  EYES: EOMI, PERRLA, conjunctiva and sclera clear  NECK: Supple, trachea midline, no JVD  HEART: Regular rate and rhythm, no murmurs, rubs, or gallops  LUNGS: Unlabored respirations.  Clear to auscultation bilaterally, no crackles, wheezing, or rhonchi  ABDOMEN: Soft, TTP, mildly distended, hyperactive +BS, PEG tube in place, site clean and dry  EXTREMITIES: 2+ peripheral pulses bilaterally. No clubbing, cyanosis, or edema. B/L UE contracted  NERVOUS SYSTEM:  A&Ox2, moving all extremities, no focal deficits   SKIN: sacral ulcer    LABS             8.6    11.55 )-----------( 744      ( 09-07-24 @ 06:23 )             27.8     141  |  104  |  5   -------------------------<  99   09-07-24 @ 06:23  3.7  |  27  |  <0.5    Ca      8.7     09-07-24 @ 06:23  Mg     1.8     09-07-24 @ 06:23    TPro  5.5  /  Alb  2.3  /  TBili  0.2  /  DBili  x   /  AST  14  /  ALT  13  /  AlkPhos  92  /  GGT  x     09-07-24 @ 06:23    PT/INR - ( 09-06-24 @ 12:25 )   PT: 12.50 sec;   INR: 1.10 ratio  PTT - ( 09-06-24 @ 12:25 )  PTT:28.7 sec      Urinalysis Basic - ( 07 Sep 2024 06:23 )    Color: x / Appearance: x / SG: x / pH: x  Gluc: 99 mg/dL / Ketone: x  / Bili: x / Urobili: x   Blood: x / Protein: x / Nitrite: x   Leuk Esterase: x / RBC: x / WBC x   Sq Epi: x / Non Sq Epi: x / Bacteria: x          IMAGING

## 2024-09-07 NOTE — PROGRESS NOTE ADULT - ATTENDING COMMENTS
A 69 year old F with pmhx  of dementia, CVA, OM, sacral ulcer, recent admission w/septic shock at [facility] discharged on vancomycin and meropenem presents from nursing home for positive occult blood and low Hb of 7 at the NH.    #Unstageable sacral ulcer  #chronic osteomyelitis   #BCx w/ Staph epi x2  - Per daughter recent admission to [facility] for OM of sacral ulcer c/b septic shock  - In ED, /82, , SpO2 99% on RA  - Labs remarkable for wbc 19k, Neutro 80%, Hb 8.3, MCV 86, Na 130, K 6.3, lactate 2.1  - VBG ph 7.37, CO2 53  - Xray chest No radiographic evidence of acute cardiopulmonary disease.  - s/p 2L bolus  - c/w meropenem and vanc  - c/w LR 75cc/hr  - Trend wbc and lactate  - lactate 2.1 -> 2.4 -> 1  - ID recs: meropenem, vanc  - BCx prelim coag neg staph  - f/u BCx  - burn consult: no intervention at this time  - f/u vanc trough  -9/6 housestaff obtained records from [facility] w/ staph epi as well as staph epi on BCx at [facility]  - f/u repeat BCx  -old line removed    #Acute on chronic anemia  - Per daughter, pt with multiple transfusions at [facility]  - Positive occult blood and Hb of 7 at NH  - Here Hb 8.3 -> 7.6 -> 8.0 (bs around 9)  - Agreeable for egd/colono if needed  - low total iron, transferrin, TIBC, % sat  - Keep active type and screen  - CBC bid  - PPI IV bid   - Transfuse if Hb < 7  - maintain active type and screen  - GI eval: EGD and colonoscopy on 09/06   -9/6 Hgb dropped. Will be getting PRBCs. Serial CBC  - 9/7 hemoglobin stable     #Hyperkalemia  - No EKG changes  - K of 6.3 -> 5.2 -> 5.1  - s/p insulin/dextrose and lokelma  - resolved    #Seizures  #Hx of R Frontal Lobe Infarct   #Chronic R MCA infarct  #Bed bound  #Tube feeds  - C/w Depakote 750 mg TID and Keppra to 1000 mg BID   - C/w baclofen 10 mg bid (for UE spasticity)  - C/w ASA, on plavix but not on statin? Holding plavix for now    # Persistent Hypotension   - c/w Midodrine 10 mg TID    #Sinus Tachycardia  IVFs  improved    #Chronic borrego  - cw borrego    - DVT ppx - SCDs after duplex  - Diet: PEG tube feeds  - GI ppx: PPI  - Code status: Palliative consult for GOC    Very poor Px.    #Progress Note Handoff  Pending: CT sca, monitor CBC, dc plan, repeat colonoscopy if reciurrent drop in hemoglobin otherwise outpt   Disposition: Nsg Home_

## 2024-09-07 NOTE — PROGRESS NOTE ADULT - ASSESSMENT
A 69 year old F with pmhx  of dementia, CVA, OM, sacral ulcer, recent admission w/septic shock at [facility] discharged on vancomycin and meropenem presents from nursing home for positive occult blood and low Hb of 7 at the NH.    #Sepsis poa  #Unstageable sacral ulcer  Bacteriemic   #chronic osteomyelitis   - Per daughter recent admission to [facility] for OM of sacral ulcer c/b septic shock  - In ED, /82, , SpO2 99% on RA  - Labs remarkable for wbc 19k, Neutro 80%, Hb 8.3, MCV 86, Na 130, K 6.3, lactate 2.1  - VBG ph 7.37, CO2 53  - Xray chest No radiographic evidence of acute cardiopulmonary disease.  - s/p 2L bolus  - c/w meropenem and vanc  - c/w LR 75cc/hr  - UA borderline positive, f/u ucx, bcx  - f/u procal, esr, crp  - lactate 2.1 -> 2.4 -> 1  - ID recs: meropenem, vanc  - BCx prelim growing gram + coccii in clusters  - BCx: growing staph epi, which also grew in BCx in [facility] also grew in staph epi  - burn consult: no intervention at this time  - MRSA neg  - f/u ID    #Acute on chronic anemia  - Per daughter, pt with multiple transfusions at [facility]  - Positive occult blood and Hb of 7 at NH  - Here Hb 8.3 -> 7.6 -> 8.0 (bs around 9)  - Agreeable for egd/colono if needed  - low total iron, transferrin, TIBC, % sat  - Keep active type and screen  - CBC bid  - Transfuse if Hb < 7  - maintain active type and screen  - GI eval: EGD and colonoscopy on 09/06   - KUB: no obstruction  - 9/6 Hgb drop 6.8 received 1 unit, HgB normalized   - EDG 9/6:  7mm non-bleeding clean-based ([place] Class III) ulcer seen just behind bumper of the recently placed G-tube.  - Colonoscopy 9/6: poor prep, small non-bleeding external hemorrhoids  - PPI 40mg BID via PEG for 8 weeks  - f/u CT AP with IC to r/o hematoma in setting of acute drop in Hgb 9/6 despite brown stools on exam/clean-based ulcer in  stomach with no high risk features\  - f/u EGD-[place] path results    #Hyperkalemia  - No EKG changes  - K of 6.3 -> 5.2 -> 5.1  - s/p insulin/dextrose and lokelma  - BMP bid  - Keep on telemetry    #Seizures  #Hx of R Frontal Lobe Infarct   #Chronic R MCA infarct  #Bed bound  #Tube feeds  - C/w Depakote 750 mg TID and Keppra to 1000 mg BID   - C/w baclofen 10 mg bid (for UE spasticity)  - C/w ASA, on plavix but not on statin? Holding plavix for now    # Persistent Hypotension   - c/w Midodrine 10 mg TID    # Left avulsion fracture of medial malleolus  - C/w gabapentin at 100mg TID    # Left cephalic vein thrombosis  - Duplex LUE (3/10/23): No DVT however there is superficial thrombosis noted in the left cephalic vein  - Repeat duplex ordered 3/27/23 - negative for UE and LE DVT    #Chronic borrego  - cw borrego    - DVT ppx - SCDs after duplex  - Diet: PEG tube feeds  - GI ppx: PPI  - Code status: Palliative consult for GOC    #PENDING  - f/u BCx  - f/u ID  - f/u CT AP with IC to r/o hematoma in setting of acute drop in Hgb 9/6 despite brown stools on exam/clean-based ulcer in  stomach with no high risk features\  - f/u EGD-[place] path results     A 69 year old F with pmhx  of dementia, CVA, OM, sacral ulcer, recent admission w/septic shock at [facility] discharged on vancomycin and meropenem presents from nursing home for positive occult blood and low Hb of 7 at the NH.    #Sepsis poa  #Unstageable sacral ulcer  Bacteriemic   #chronic osteomyelitis   - Per daughter recent admission to [facility] for OM of sacral ulcer c/b septic shock  - In ED, /82, , SpO2 99% on RA  - Labs remarkable for wbc 19k, Neutro 80%, Hb 8.3, MCV 86, Na 130, K 6.3, lactate 2.1  - VBG ph 7.37, CO2 53  - Xray chest No radiographic evidence of acute cardiopulmonary disease.  - s/p 2L bolus  - c/w meropenem and vanc  - c/w LR 75cc/hr  - UA borderline positive, f/u ucx, bcx  - f/u procal, esr, crp  - lactate 2.1 -> 2.4 -> 1  - ID recs: meropenem, vanc  - BCx prelim growing gram + coccii in clusters  - BCx: growing staph epi, which also grew in BCx in [facility] also grew in staph epi  - burn consult: no intervention at this time  - MRSA neg  - f/u ID    #Acute on chronic anemia  - Per daughter, pt with multiple transfusions at [facility]  - Positive occult blood and Hb of 7 at NH  - Here Hb 8.3 -> 7.6 -> 8.0 (bs around 9)  - Agreeable for egd/colono if needed  - low total iron, transferrin, TIBC, % sat  - Keep active type and screen  - CBC bid  - Transfuse if Hb < 7  - maintain active type and screen  - GI eval: EGD and colonoscopy on 09/06   - KUB: no obstruction  - 9/6 Hgb drop 6.8 received 1 unit, HgB normalized   - EDG 9/6:  7mm non-bleeding clean-based ([place] Class III) ulcer seen just behind bumper of the recently placed G-tube.  - Colonoscopy 9/6: poor prep, small non-bleeding external hemorrhoids  - PPI 40mg BID via PEG for 8 weeks  - f/u CT AP to r/o hematoma in setting of acute drop in Hgb 9/6 despite brown stools on exam/clean-based ulcer in  stomach with no high risk features  - f/u EGD-[place] path results    #Hyperkalemia  - No EKG changes  - K of 6.3 -> 5.2 -> 5.1  - s/p insulin/dextrose and lokelma  - BMP bid  - Keep on telemetry    #Seizures  #Hx of R Frontal Lobe Infarct   #Chronic R MCA infarct  #Bed bound  #Tube feeds  - C/w Depakote 750 mg TID and Keppra to 1000 mg BID   - C/w baclofen 10 mg bid (for UE spasticity)  - C/w ASA, on plavix but not on statin? Holding plavix for now    # Persistent Hypotension   - c/w Midodrine 10 mg TID    # Left avulsion fracture of medial malleolus  - C/w gabapentin at 100mg TID    # Left cephalic vein thrombosis  - Duplex LUE (3/10/23): No DVT however there is superficial thrombosis noted in the left cephalic vein  - Repeat duplex ordered 3/27/23 - negative for UE and LE DVT    #Chronic borrego  - cw borrego    - DVT ppx - SCDs after duplex  - Diet: PEG tube feeds  - GI ppx: PPI  - Code status: Palliative consult for GOC    #PENDING  - f/u BCx  - f/u ID  - f/u CT AP to r/o hematoma in setting of acute drop in Hgb 9/6 despite brown stools on exam/clean-based ulcer in  stomach with no high risk features  - f/u EGD-[place] path results     A 69 year old F with pmhx  of dementia, CVA, OM, sacral ulcer, recent admission w/septic shock at [facility] discharged on vancomycin and meropenem presents from nursing home for positive occult blood and low Hb of 7 at the NH.    #Sepsis poa  #Unstageable sacral ulcer  Bacteriemic   #chronic osteomyelitis   - Per daughter recent admission to [facility] for OM of sacral ulcer c/b septic shock  - In ED, /82, , SpO2 99% on RA  - Labs remarkable for wbc 19k, Neutro 80%, Hb 8.3, MCV 86, Na 130, K 6.3, lactate 2.1  - VBG ph 7.37, CO2 53  - Xray chest No radiographic evidence of acute cardiopulmonary disease.  - s/p 2L bolus  - c/w meropenem and vanc  - c/w LR 75cc/hr  - UA borderline positive, f/u ucx, bcx  - f/u procal, esr, crp  - lactate 2.1 -> 2.4 -> 1  - ID recs: meropenem, vanc  - BCx prelim growing gram + coccii in clusters  - BCx: growing staph epi, which also grew in BCx in [facility] also grew in staph epi  - burn consult: no intervention at this time  - MRSA neg  - f/u ID    #Acute on chronic anemia  - Per daughter, pt with multiple transfusions at [facility]  - Positive occult blood and Hb of 7 at NH  - Here Hb 8.3 -> 7.6 -> 8.0 (bs around 9)  - Agreeable for egd/colono if needed  - low total iron, transferrin, TIBC, % sat  - Keep active type and screen  - CBC bid  - Transfuse if Hb < 7  - maintain active type and screen  - GI eval: EGD and colonoscopy on 09/06   - KUB: no obstruction  - 9/6 Hgb drop 6.8 received 1 unit, HgB normalized   - EDG 9/6:  7mm non-bleeding clean-based ([place] Class III) ulcer seen just behind bumper of the recently placed G-tube.  - Colonoscopy 9/6: poor prep, small non-bleeding external hemorrhoids  - PPI 40mg BID via PEG for 8 weeks  - f/u CT AP to r/o hematoma in setting of acute drop in Hgb 9/6 despite brown stools on exam/clean-based ulcer in  stomach with no high risk features  - repeat colonoscopy op  - f/u EGD-[place] path results    #Hyperkalemia  - No EKG changes  - K of 6.3 -> 5.2 -> 5.1  - s/p insulin/dextrose and lokelma  - BMP bid  - Keep on telemetry    #Seizures  #Hx of R Frontal Lobe Infarct   #Chronic R MCA infarct  #Bed bound  #Tube feeds  - C/w Depakote 750 mg TID and Keppra to 1000 mg BID   - C/w baclofen 10 mg bid (for UE spasticity)  - C/w ASA, on plavix but not on statin? Holding plavix for now    # Persistent Hypotension   - c/w Midodrine 10 mg TID    # Left avulsion fracture of medial malleolus  - C/w gabapentin at 100mg TID    # Left cephalic vein thrombosis  - Duplex LUE (3/10/23): No DVT however there is superficial thrombosis noted in the left cephalic vein  - Repeat duplex ordered 3/27/23 - negative for UE and LE DVT    #Chronic borrego  - cw borrego    - DVT ppx - SCDs after duplex  - Diet: PEG tube feeds  - GI ppx: PPI  - Code status: Palliative consult for GOC    #PENDING  - f/u BCx  - f/u ID  - f/u CT AP to r/o hematoma in setting of acute drop in Hgb 9/6 despite brown stools on exam/clean-based ulcer in  stomach with no high risk features  - f/u EGD-[place] path results     A 69 year old F with pmhx  of dementia, CVA, OM, sacral ulcer, recent admission w/septic shock at [facility] discharged on vancomycin and meropenem presents from nursing home for positive occult blood and low Hb of 7 at the NH.    #Sepsis poa  #Unstageable sacral ulcer  Bacteriemic   #chronic osteomyelitis   - Per daughter recent admission to [facility] for OM of sacral ulcer c/b septic shock  - In ED, /82, , SpO2 99% on RA  - Labs remarkable for wbc 19k, Neutro 80%, Hb 8.3, MCV 86, Na 130, K 6.3, lactate 2.1  - VBG ph 7.37, CO2 53  - Xray chest No radiographic evidence of acute cardiopulmonary disease.  - s/p 2L bolus  - c/w meropenem and vanc  - c/w LR 75cc/hr  - UA borderline positive, f/u ucx, bcx  - f/u procal, esr, crp  - lactate 2.1 -> 2.4 -> 1  - ID recs: meropenem, vanc  - BCx prelim growing gram + coccii in clusters  - BCx: growing staph epi, which also grew in BCx in [facility] also grew in staph epi  - burn consult: no intervention at this time  - MRSA neg  - f/u ID    #Acute on chronic anemia  - Per daughter, pt with multiple transfusions at [facility]  - Positive occult blood and Hb of 7 at NH  - Here Hb 8.3 -> 7.6 -> 8.0 (bs around 9)  - Agreeable for egd/colono if needed  - low total iron, transferrin, TIBC, % sat  - Keep active type and screen  - CBC bid  - Transfuse if Hb < 7  - maintain active type and screen  - KUB: no obstruction  - 9/6 Hgb drop 6.8 received 1 unit, HgB normalized   - EDG 9/6:  7mm non-bleeding clean-based ([place] Class III) ulcer seen just behind bumper of the recently placed G-tube.  - Colonoscopy 9/6: poor prep, small non-bleeding external hemorrhoids  - PPI 40mg BID via PEG for 8 weeks  - f/u CT AP to r/o hematoma in setting of acute drop in Hgb 9/6 despite brown stools on exam/clean-based ulcer in  stomach with no high risk features  - repeat colonoscopy op  - f/u EGD-[place] path results    #Hyperkalemia  - No EKG changes  - K of 6.3 -> 5.2 -> 5.1  - s/p insulin/dextrose and lokelma  - BMP bid  - Keep on telemetry    #Seizures  #Hx of R Frontal Lobe Infarct   #Chronic R MCA infarct  #Bed bound  #Tube feeds  - C/w Depakote 750 mg TID and Keppra to 1000 mg BID   - C/w baclofen 10 mg bid (for UE spasticity)  - C/w ASA, on plavix but not on statin? Holding plavix for now    # Persistent Hypotension   - c/w Midodrine 10 mg TID    # Left avulsion fracture of medial malleolus  - C/w gabapentin at 100mg TID    # Left cephalic vein thrombosis  - Duplex LUE (3/10/23): No DVT however there is superficial thrombosis noted in the left cephalic vein  - Repeat duplex ordered 3/27/23 - negative for UE and LE DVT    #Chronic borrego  - cw borrego    - DVT ppx - SCDs after duplex  - Diet: PEG tube feeds  - GI ppx: PPI  - Code status: Palliative consult for GOC    #PENDING  - f/u BCx  - f/u ID  - f/u CT AP to r/o hematoma in setting of acute drop in Hgb 9/6 despite brown stools on exam/clean-based ulcer in  stomach with no high risk features  - f/u EGD-[place] path results

## 2024-09-07 NOTE — PHARMACOTHERAPY INTERVENTION NOTE - COMMENTS
As per policy, ordered a vancomycin level for 9/7 AM in order to assist with vancomycin pharmacokinetic monitoring.    Vic Hendricks, PharmD, BCIDP  Clinical Pharmacy Specialist, Infectious Diseases  Tele-Antimicrobial Stewardship Program (Tele-ASP)  Tele-ASP Phone: [phone]  
Recommended to adjust vancomycin dose from 1000 mg IV q12h to 750 mg IV q12h since the vancomycin level today, 9/5 was 13.4 mg/L. As per PrecisePK calculations, the steady-state vancomycin AUC/JULIANA on the former dose is 681 mg/L*h, which is greater than the therapeutic range of 400 - 600 mg/L*h. Decreasing the dose is predicted to yield an AUC/JULIANA of 511 mg/L*h.    Vic Hendricks, PharmD, BCIDP  Clinical Pharmacy Specialist, Infectious Diseases  Tele-Antimicrobial Stewardship Program (Tele-ASP)  Tele-ASP Phone: [phone]  
Recommend continuing vancomycin 750mg IV q12h in the setting of a vancomycin trough level of 11mcg/mL. According to PrecisePK, a Bayesian pharmacokinetic modeling program, a vancomycin regimen of 750mg IV q12h predicts a future steady-state vancomycin AUC/JULIANA of ~460mg*hr/ml.    Josemanuel Naqvi, PharmD, [facility]  Clinical Pharmacy Specialist, Infectious Diseases  Tele-Antimicrobial Stewardship Program (Tele-ASP)  Tele-ASP Phone: [phone] 
Recommend obtaining a repeat vancomycin trough level in ~5 days or earlier in the setting of unstable renal function or hemodynamic instability.    Josemanuel Naqvi, PharmD, BCIDP  Clinical Pharmacy Specialist, Infectious Diseases  Tele-Antimicrobial Stewardship Program (Tele-ASP)  Tele-ASP Phone: [phone]

## 2024-09-08 LAB
ALBUMIN SERPL ELPH-MCNC: 2.7 G/DL — LOW (ref 3.5–5.2)
ALP SERPL-CCNC: 111 U/L — SIGNIFICANT CHANGE UP (ref 30–115)
ALT FLD-CCNC: 14 U/L — SIGNIFICANT CHANGE UP (ref 0–41)
ANION GAP SERPL CALC-SCNC: 14 MMOL/L — SIGNIFICANT CHANGE UP (ref 7–14)
AST SERPL-CCNC: 28 U/L — SIGNIFICANT CHANGE UP (ref 0–41)
BILIRUB SERPL-MCNC: 0.3 MG/DL — SIGNIFICANT CHANGE UP (ref 0.2–1.2)
BUN SERPL-MCNC: 5 MG/DL — LOW (ref 10–20)
CALCIUM SERPL-MCNC: 8.9 MG/DL — SIGNIFICANT CHANGE UP (ref 8.4–10.5)
CHLORIDE SERPL-SCNC: 103 MMOL/L — SIGNIFICANT CHANGE UP (ref 98–110)
CO2 SERPL-SCNC: 22 MMOL/L — SIGNIFICANT CHANGE UP (ref 17–32)
CREAT SERPL-MCNC: <0.5 MG/DL — LOW (ref 0.7–1.5)
EGFR: 115 ML/MIN/1.73M2 — SIGNIFICANT CHANGE UP
GLUCOSE SERPL-MCNC: 86 MG/DL — SIGNIFICANT CHANGE UP (ref 70–99)
HCT VFR BLD CALC: 36.2 % — LOW (ref 37–47)
HGB BLD-MCNC: 10.9 G/DL — LOW (ref 12–16)
MAGNESIUM SERPL-MCNC: 1.9 MG/DL — SIGNIFICANT CHANGE UP (ref 1.8–2.4)
MCHC RBC-ENTMCNC: 26.7 PG — LOW (ref 27–31)
MCHC RBC-ENTMCNC: 30.1 G/DL — LOW (ref 32–37)
MCV RBC AUTO: 88.7 FL — SIGNIFICANT CHANGE UP (ref 81–99)
NRBC # BLD: 0 /100 WBCS — SIGNIFICANT CHANGE UP (ref 0–0)
PLATELET # BLD AUTO: 663 K/UL — HIGH (ref 130–400)
PMV BLD: 9.6 FL — SIGNIFICANT CHANGE UP (ref 7.4–10.4)
POTASSIUM SERPL-MCNC: 4.7 MMOL/L — SIGNIFICANT CHANGE UP (ref 3.5–5)
POTASSIUM SERPL-SCNC: 4.7 MMOL/L — SIGNIFICANT CHANGE UP (ref 3.5–5)
PROT SERPL-MCNC: 6.4 G/DL — SIGNIFICANT CHANGE UP (ref 6–8)
RBC # BLD: 4.08 M/UL — LOW (ref 4.2–5.4)
RBC # FLD: 14.8 % — HIGH (ref 11.5–14.5)
SODIUM SERPL-SCNC: 139 MMOL/L — SIGNIFICANT CHANGE UP (ref 135–146)
WBC # BLD: 12.74 K/UL — HIGH (ref 4.8–10.8)
WBC # FLD AUTO: 12.74 K/UL — HIGH (ref 4.8–10.8)

## 2024-09-08 PROCEDURE — 99233 SBSQ HOSP IP/OBS HIGH 50: CPT

## 2024-09-08 RX ADMIN — Medication 1 PATCH: at 22:02

## 2024-09-08 RX ADMIN — MIDODRINE HYDROCHLORIDE 10 MILLIGRAM(S): 5 TABLET ORAL at 05:05

## 2024-09-08 RX ADMIN — Medication 100 MILLIGRAM(S): at 14:18

## 2024-09-08 RX ADMIN — BACLOFEN 10 MILLIGRAM(S): 0.5 INJECTION INTRATHECAL at 17:47

## 2024-09-08 RX ADMIN — Medication 100 MILLIGRAM(S): at 22:02

## 2024-09-08 RX ADMIN — Medication 40 MILLIGRAM(S): at 17:47

## 2024-09-08 RX ADMIN — Medication 40 MILLIGRAM(S): at 05:06

## 2024-09-08 RX ADMIN — PETROLATUM 1 APPLICATION(S): 93.5 OINTMENT TOPICAL at 17:46

## 2024-09-08 RX ADMIN — SODIUM CHLORIDE 1 GRAM(S): 9 INJECTION INTRAMUSCULAR; INTRAVENOUS; SUBCUTANEOUS at 17:46

## 2024-09-08 RX ADMIN — Medication 250 MILLIGRAM(S): at 21:57

## 2024-09-08 RX ADMIN — Medication 81 MILLIGRAM(S): at 11:28

## 2024-09-08 RX ADMIN — Medication 750 MILLIGRAM(S): at 05:06

## 2024-09-08 RX ADMIN — Medication 5000 UNIT(S): at 05:06

## 2024-09-08 RX ADMIN — PETROLATUM 1 APPLICATION(S): 93.5 OINTMENT TOPICAL at 05:06

## 2024-09-08 RX ADMIN — Medication 1 PATCH: at 09:30

## 2024-09-08 RX ADMIN — Medication 1 PATCH: at 19:08

## 2024-09-08 RX ADMIN — MEROPENEM 100 MILLIGRAM(S): 500 INJECTION, POWDER, FOR SOLUTION INTRAVENOUS at 17:47

## 2024-09-08 RX ADMIN — Medication 500 MILLIGRAM(S): at 11:27

## 2024-09-08 RX ADMIN — Medication 75 MILLILITER(S): at 22:00

## 2024-09-08 RX ADMIN — BACLOFEN 10 MILLIGRAM(S): 0.5 INJECTION INTRATHECAL at 05:06

## 2024-09-08 RX ADMIN — MIDODRINE HYDROCHLORIDE 10 MILLIGRAM(S): 5 TABLET ORAL at 17:46

## 2024-09-08 RX ADMIN — MEROPENEM 100 MILLIGRAM(S): 500 INJECTION, POWDER, FOR SOLUTION INTRAVENOUS at 02:40

## 2024-09-08 RX ADMIN — Medication 1 TABLET(S): at 11:27

## 2024-09-08 RX ADMIN — Medication 750 MILLIGRAM(S): at 22:01

## 2024-09-08 RX ADMIN — Medication 5000 UNIT(S): at 17:46

## 2024-09-08 RX ADMIN — Medication 100 MILLIGRAM(S): at 05:06

## 2024-09-08 RX ADMIN — Medication 750 MILLIGRAM(S): at 14:18

## 2024-09-08 RX ADMIN — Medication 250 MILLIGRAM(S): at 09:33

## 2024-09-08 RX ADMIN — LEVETIRACETAM 1000 MILLIGRAM(S): 1000 TABLET ORAL at 17:45

## 2024-09-08 RX ADMIN — ACETAMINOPHEN 650 MILLIGRAM(S): 325 TABLET ORAL at 17:47

## 2024-09-08 RX ADMIN — MIDODRINE HYDROCHLORIDE 10 MILLIGRAM(S): 5 TABLET ORAL at 14:18

## 2024-09-08 RX ADMIN — MEROPENEM 100 MILLIGRAM(S): 500 INJECTION, POWDER, FOR SOLUTION INTRAVENOUS at 08:44

## 2024-09-08 RX ADMIN — LEVETIRACETAM 1000 MILLIGRAM(S): 1000 TABLET ORAL at 05:06

## 2024-09-08 RX ADMIN — SODIUM CHLORIDE 1 GRAM(S): 9 INJECTION INTRAMUSCULAR; INTRAVENOUS; SUBCUTANEOUS at 05:05

## 2024-09-08 NOTE — PROGRESS NOTE ADULT - SUBJECTIVE AND OBJECTIVE BOX
JOSEPH OBRIEN 69y Female  MRN#: [id number]         SUBJECTIVE  Patient is a 69y old Female who presents with a chief complaint of Anemia  Currently admitted to medicine with the primary diagnosis of Sepsis  No overnight events     OBJECTIVE  PAST MEDICAL & SURGICAL HISTORY    ALLERGIES:  Allergy Status Unknown    MEDICATIONS:  STANDING MEDICATIONS  albuterol    0.083%. 2.5 milliGRAM(s) Nebulizer once  ascorbic acid 500 milliGRAM(s) Oral daily  aspirin  chewable 81 milliGRAM(s) Oral daily  baclofen 10 milliGRAM(s) Oral every 12 hours  gabapentin 100 milliGRAM(s) Oral three times a day  heparin   Injectable 5000 Unit(s) SubCutaneous every 12 hours  lactated ringers. 1000 milliLiter(s) IV Continuous <Continuous>  levETIRAcetam  Solution 1000 milliGRAM(s) Oral two times a day  lidocaine   4% Patch 1 Patch Transdermal every 24 hours  meropenem  IVPB 1000 milliGRAM(s) IV Intermittent every 8 hours  midodrine. 10 milliGRAM(s) Oral three times a day  multivitamin/minerals 1 Tablet(s) Oral daily  pantoprazole   Suspension 40 milliGRAM(s) Oral two times a day  sodium chloride 1 Gram(s) Oral two times a day  valproic  acid Syrup 750 milliGRAM(s) Enteral Tube three times a day  vancomycin  IVPB 750 milliGRAM(s) IV Intermittent <User Schedule>  vitamin A &amp; D Ointment 1 Application(s) Topical two times a day    PRN MEDICATIONS  acetaminophen     Tablet .. 650 milliGRAM(s) Oral every 6 hours PRN      VITAL SIGNS: Last 24 Hours  T(C): 37 (08 Sep 2024 05:06), Max: 38.1 (07 Sep 2024 20:01)  T(F): 98.6 (08 Sep 2024 05:06), Max: 100.6 (07 Sep 2024 20:01)  HR: 89 (08 Sep 2024 05:06) (82 - 89)  BP: 124/81 (08 Sep 2024 05:06) (104/70 - 136/82)  BP(mean): 95 (08 Sep 2024 05:06) (95 - 100)  RR: 18 (08 Sep 2024 05:06) (18 - 18)  SpO2: 100% (08 Sep 2024 05:06) (100% - 100%)    LABS:                        8.6    11.55 )-----------( 744      ( 07 Sep 2024 06:23 )             27.8     09-07    141  |  104  |  5<L>  ----------------------------<  99  3.7   |  27  |  <0.5<L>    Ca    8.7      07 Sep 2024 06:23  Mg     1.8     09-07    TPro  5.5<L>  /  Alb  2.3<L>  /  TBili  0.2  /  DBili  x   /  AST  14  /  ALT  13  /  AlkPhos  92  09-07    PT/INR - ( 06 Sep 2024 12:25 )   PT: 12.50 sec;   INR: 1.10 ratio         PTT - ( 06 Sep 2024 12:25 )  PTT:28.7 sec  Urinalysis Basic - ( 07 Sep 2024 06:23 )    Color: x / Appearance: x / SG: x / pH: x  Gluc: 99 mg/dL / Ketone: x  / Bili: x / Urobili: x   Blood: x / Protein: x / Nitrite: x   Leuk Esterase: x / RBC: x / WBC x   Sq Epi: x / Non Sq Epi: x / Bacteria: x            Culture - Blood (collected 06 Sep 2024 12:25)  Source: .Blood Blood  Preliminary Report (07 Sep 2024 21:01):    No growth at 24 hours          RADIOLOGY:      PHYSICAL EXAM:    GENERAL: NAD, lying in bed comfortably  HEAD:  Atraumatic, normocephalic  EYES: EOMI, PERRLA, conjunctiva and sclera clear  NECK: Supple, trachea midline, no JVD  HEART: Regular rate and rhythm, no murmurs, rubs, or gallops  LUNGS: Unlabored respirations.  Clear to auscultation bilaterally, no crackles, wheezing, or rhonchi  ABDOMEN: Soft, TTP, mildly distended, hyperactive G tube in place, site clean and dry  EXTREMITIES:  No clubbing, cyanosis, or edema. B/L UE contracted  NERVOUS SYSTEM:  A&Ox2, moving all extremities, no focal deficits   SKIN: sacral ulcer

## 2024-09-08 NOTE — PROGRESS NOTE ADULT - ASSESSMENT
A 69 year old F with pmhx  of dementia, CVA, OM, sacral ulcer, recent admission w/septic shock at [facility] discharged on vancomycin and meropenem presents from nursing home for positive occult blood and low Hb of 7 at the NH.    #Unstageable sacral ulcer  #chronic osteomyelitis   #BCx w/ Staph epi x2  - Per daughter recent admission to [facility] for OM of sacral ulcer c/b septic shock  - In ED, /82, , SpO2 99% on RA  - Labs remarkable for wbc 19k, Neutro 80%, Hb 8.3, MCV 86, Na 130, K 6.3, lactate 2.1  - VBG ph 7.37, CO2 53  - Xray chest No radiographic evidence of acute cardiopulmonary disease.  - s/p 2L bolus  - c/w meropenem and vanc  - c/w LR 75cc/hr  - Trend wbc and lactate  - lactate 2.1 -> 2.4 -> 1  - ID recs: meropenem, vanc  - BCx prelim coag neg staph  - f/u BCx  - burn consult: no intervention at this time  - f/u vanc trough  -9/6 housestaff obtained records from [facility] w/ staph epi as well as staph epi on BCx at [facility]  - f/u repeat BCx  -old line removed    #Acute on chronic anemia  - Per daughter, pt with multiple transfusions at [facility]  - Positive occult blood and Hb of 7 at NH  - Here Hb 8.3 -> 7.6 -> 8.0 (bs around 9)  - Agreeable for egd/colono if needed  - low total iron, transferrin, TIBC, % sat  - Keep active type and screen  - CBC bid  - PPI IV bid   - Transfuse if Hb < 7  - maintain active type and screen  - GI eval: EGD and colonoscopy on 09/06   -9/6 Hgb dropped. Will be getting PRBCs. Serial CBC  - 9/7 hemoglobin stable, CT no hematoma     #Hyperkalemia  - No EKG changes  - K of 6.3 -> 5.2 -> 5.1  - s/p insulin/dextrose and lokelma  - resolved    #Seizures  #Hx of R Frontal Lobe Infarct   #Chronic R MCA infarct  #Bed bound  #Tube feeds  - C/w Depakote 750 mg TID and Keppra to 1000 mg BID   - C/w baclofen 10 mg bid (for UE spasticity)  - C/w ASA, on plavix but not on statin? Holding plavix for now    # Persistent Hypotension   - c/w Midodrine 10 mg TID    #Sinus Tachycardia  IVFs  improved    #Chronic borrego  - cw borrego    - DVT ppx - SCDs after duplex  - Diet: PEG tube feeds  - GI ppx: PPI  - Code status: Palliative consult for GOC    Very poor Px.    #Progress Note Handoff  Pending:  monitor CBC, dc plan, IV Abx, ID f/u   Disposition: Nsg Home_ .

## 2024-09-09 LAB
ALBUMIN SERPL ELPH-MCNC: 2.7 G/DL — LOW (ref 3.5–5.2)
ALP SERPL-CCNC: 111 U/L — SIGNIFICANT CHANGE UP (ref 30–115)
ALT FLD-CCNC: 13 U/L — SIGNIFICANT CHANGE UP (ref 0–41)
ANION GAP SERPL CALC-SCNC: 11 MMOL/L — SIGNIFICANT CHANGE UP (ref 7–14)
AST SERPL-CCNC: 19 U/L — SIGNIFICANT CHANGE UP (ref 0–41)
BILIRUB SERPL-MCNC: <0.2 MG/DL — SIGNIFICANT CHANGE UP (ref 0.2–1.2)
BUN SERPL-MCNC: 6 MG/DL — LOW (ref 10–20)
CALCIUM SERPL-MCNC: 8.7 MG/DL — SIGNIFICANT CHANGE UP (ref 8.4–10.4)
CHLORIDE SERPL-SCNC: 98 MMOL/L — SIGNIFICANT CHANGE UP (ref 98–110)
CO2 SERPL-SCNC: 27 MMOL/L — SIGNIFICANT CHANGE UP (ref 17–32)
CREAT SERPL-MCNC: <0.5 MG/DL — LOW (ref 0.7–1.5)
CULTURE RESULTS: SIGNIFICANT CHANGE UP
EGFR: 115 ML/MIN/1.73M2 — SIGNIFICANT CHANGE UP
GLUCOSE SERPL-MCNC: 115 MG/DL — HIGH (ref 70–99)
HCT VFR BLD CALC: 30.2 % — LOW (ref 37–47)
HGB BLD-MCNC: 9.5 G/DL — LOW (ref 12–16)
MAGNESIUM SERPL-MCNC: 1.9 MG/DL — SIGNIFICANT CHANGE UP (ref 1.8–2.4)
MCHC RBC-ENTMCNC: 27.5 PG — SIGNIFICANT CHANGE UP (ref 27–31)
MCHC RBC-ENTMCNC: 31.5 G/DL — LOW (ref 32–37)
MCV RBC AUTO: 87.3 FL — SIGNIFICANT CHANGE UP (ref 81–99)
NRBC # BLD: 0 /100 WBCS — SIGNIFICANT CHANGE UP (ref 0–0)
PLATELET # BLD AUTO: 749 K/UL — HIGH (ref 130–400)
PMV BLD: 9.3 FL — SIGNIFICANT CHANGE UP (ref 7.4–10.4)
POTASSIUM SERPL-MCNC: 3.8 MMOL/L — SIGNIFICANT CHANGE UP (ref 3.5–5)
POTASSIUM SERPL-SCNC: 3.8 MMOL/L — SIGNIFICANT CHANGE UP (ref 3.5–5)
PROT SERPL-MCNC: 6.2 G/DL — SIGNIFICANT CHANGE UP (ref 6–8)
RBC # BLD: 3.46 M/UL — LOW (ref 4.2–5.4)
RBC # FLD: 14.7 % — HIGH (ref 11.5–14.5)
SODIUM SERPL-SCNC: 136 MMOL/L — SIGNIFICANT CHANGE UP (ref 135–146)
SPECIMEN SOURCE: SIGNIFICANT CHANGE UP
WBC # BLD: 14.42 K/UL — HIGH (ref 4.8–10.8)
WBC # FLD AUTO: 14.42 K/UL — HIGH (ref 4.8–10.8)

## 2024-09-09 PROCEDURE — 71045 X-RAY EXAM CHEST 1 VIEW: CPT | Mod: 26

## 2024-09-09 PROCEDURE — 99233 SBSQ HOSP IP/OBS HIGH 50: CPT

## 2024-09-09 RX ORDER — MIDODRINE HYDROCHLORIDE 5 MG/1
10 TABLET ORAL THREE TIMES A DAY
Refills: 0 | Status: DISCONTINUED | OUTPATIENT
Start: 2024-09-09 | End: 2024-09-10

## 2024-09-09 RX ADMIN — PETROLATUM 1 APPLICATION(S): 93.5 OINTMENT TOPICAL at 06:42

## 2024-09-09 RX ADMIN — Medication 250 MILLIGRAM(S): at 09:48

## 2024-09-09 RX ADMIN — Medication 5000 UNIT(S): at 06:43

## 2024-09-09 RX ADMIN — MEROPENEM 100 MILLIGRAM(S): 500 INJECTION, POWDER, FOR SOLUTION INTRAVENOUS at 09:48

## 2024-09-09 RX ADMIN — Medication 750 MILLIGRAM(S): at 06:42

## 2024-09-09 RX ADMIN — Medication 1 TABLET(S): at 11:33

## 2024-09-09 RX ADMIN — SODIUM CHLORIDE 1 GRAM(S): 9 INJECTION INTRAMUSCULAR; INTRAVENOUS; SUBCUTANEOUS at 06:41

## 2024-09-09 RX ADMIN — BACLOFEN 10 MILLIGRAM(S): 0.5 INJECTION INTRATHECAL at 17:10

## 2024-09-09 RX ADMIN — BACLOFEN 10 MILLIGRAM(S): 0.5 INJECTION INTRATHECAL at 06:42

## 2024-09-09 RX ADMIN — Medication 1 PATCH: at 09:40

## 2024-09-09 RX ADMIN — Medication 1 PATCH: at 15:09

## 2024-09-09 RX ADMIN — Medication 100 MILLIGRAM(S): at 21:24

## 2024-09-09 RX ADMIN — Medication 750 MILLIGRAM(S): at 21:24

## 2024-09-09 RX ADMIN — Medication 100 MILLIGRAM(S): at 13:32

## 2024-09-09 RX ADMIN — MEROPENEM 100 MILLIGRAM(S): 500 INJECTION, POWDER, FOR SOLUTION INTRAVENOUS at 00:21

## 2024-09-09 RX ADMIN — MIDODRINE HYDROCHLORIDE 10 MILLIGRAM(S): 5 TABLET ORAL at 06:43

## 2024-09-09 RX ADMIN — MIDODRINE HYDROCHLORIDE 10 MILLIGRAM(S): 5 TABLET ORAL at 17:10

## 2024-09-09 RX ADMIN — LEVETIRACETAM 1000 MILLIGRAM(S): 1000 TABLET ORAL at 06:42

## 2024-09-09 RX ADMIN — MEROPENEM 100 MILLIGRAM(S): 500 INJECTION, POWDER, FOR SOLUTION INTRAVENOUS at 17:10

## 2024-09-09 RX ADMIN — Medication 750 MILLIGRAM(S): at 13:32

## 2024-09-09 RX ADMIN — SODIUM CHLORIDE 1 GRAM(S): 9 INJECTION INTRAMUSCULAR; INTRAVENOUS; SUBCUTANEOUS at 17:11

## 2024-09-09 RX ADMIN — MIDODRINE HYDROCHLORIDE 10 MILLIGRAM(S): 5 TABLET ORAL at 11:33

## 2024-09-09 RX ADMIN — PETROLATUM 1 APPLICATION(S): 93.5 OINTMENT TOPICAL at 17:11

## 2024-09-09 RX ADMIN — Medication 500 MILLIGRAM(S): at 11:33

## 2024-09-09 RX ADMIN — Medication 40 MILLIGRAM(S): at 17:10

## 2024-09-09 RX ADMIN — Medication 100 MILLIGRAM(S): at 06:43

## 2024-09-09 RX ADMIN — LEVETIRACETAM 1000 MILLIGRAM(S): 1000 TABLET ORAL at 17:10

## 2024-09-09 RX ADMIN — Medication 40 MILLIGRAM(S): at 06:42

## 2024-09-09 RX ADMIN — Medication 5000 UNIT(S): at 17:10

## 2024-09-09 RX ADMIN — Medication 81 MILLIGRAM(S): at 11:33

## 2024-09-09 RX ADMIN — Medication 1 PATCH: at 21:23

## 2024-09-09 NOTE — PROGRESS NOTE ADULT - SUBJECTIVE AND OBJECTIVE BOX
JOSEPH OBRIEN  69y, Female  Allergy: Allergy Status Unknown      LOS  6d    CHIEF COMPLAINT: Anemia     (06 Sep 2024 11:53)      INTERVAL EVENTS/HPI  - T(F): , Max: 99.8 (09-09-24 @ 04:35)  - WBC Count: 14.42 (09-09-24 @ 06:14)  WBC Count: 12.74 (09-08-24 @ 04:30)     - Creatinine: <0.5 (09-09-24 @ 06:14)  Creatinine: <0.5 (09-08-24 @ 04:30)     -   -   -     ROS  cannot obtain secondary to patient's sedation and/or mental status    VITALS:  T(F): 98.4, Max: 99.8 (09-09-24 @ 04:35)  HR: 100  BP: 126/81  RR: 18Vital Signs Last 24 Hrs  T(C): 36.9 (09 Sep 2024 13:35), Max: 37.7 (09 Sep 2024 04:35)  T(F): 98.4 (09 Sep 2024 13:35), Max: 99.8 (09 Sep 2024 04:35)  HR: 100 (09 Sep 2024 13:35) (90 - 100)  BP: 126/81 (09 Sep 2024 13:35) (101/62 - 126/81)  BP(mean): 75 (09 Sep 2024 04:35) (75 - 75)  RR: 18 (09 Sep 2024 13:35) (16 - 18)  SpO2: 100% (09 Sep 2024 13:35) (97% - 100%)    Parameters below as of 09 Sep 2024 13:35  Patient On (Oxygen Delivery Method): room air        PHYSICAL EXAM:  Gen: chronically ill appearing   HEENT: Normocephalic, atraumatic  Neck: supple, no lymphadenopathy  CV: Regular rate & regular rhythm  Lungs: decreased BS at bases, no fremitus  Abdomen: Soft, BS present  Ext: Warm, well perfused  Neuro: deficits noted  Skin: no rash, no erythema  Lines: no phlebitis     FH: Non-contributory  Social Hx: Non-contributory    TESTS & MEASUREMENTS:                        9.5    14.42 )-----------( 749      ( 09 Sep 2024 06:14 )             30.2     09-09    136  |  98  |  6<L>  ----------------------------<  115<H>  3.8   |  27  |  <0.5<L>    Ca    8.7      09 Sep 2024 06:14  Mg     1.9     09-09    TPro  6.2  /  Alb  2.7<L>  /  TBili  <0.2  /  DBili  x   /  AST  19  /  ALT  13  /  AlkPhos  111  09-09      LIVER FUNCTIONS - ( 09 Sep 2024 06:14 )  Alb: 2.7 g/dL / Pro: 6.2 g/dL / ALK PHOS: 111 U/L / ALT: 13 U/L / AST: 19 U/L / GGT: x           Urinalysis Basic - ( 09 Sep 2024 06:14 )    Color: x / Appearance: x / SG: x / pH: x  Gluc: 115 mg/dL / Ketone: x  / Bili: x / Urobili: x   Blood: x / Protein: x / Nitrite: x   Leuk Esterase: x / RBC: x / WBC x   Sq Epi: x / Non Sq Epi: x / Bacteria: x        Culture - Blood (collected 09-07-24 @ 23:21)  Source: .Blood None  Preliminary Report (09-09-24 @ 07:01):    No growth at 24 hours    Culture - Blood (collected 09-06-24 @ 12:25)  Source: .Blood Blood  Preliminary Report (09-08-24 @ 21:00):    No growth at 48 Hours    Culture - Urine (collected 09-04-24 @ 03:25)  Source: Clean Catch Clean Catch (Midstream)  Final Report (09-05-24 @ 18:06):    <10,000 CFU/mL Normal Urogenital Evelyn    Urinalysis with Rflx Culture (collected 09-03-24 @ 20:56)    Culture - Urine (collected 09-03-24 @ 20:56)  Source: Catheterized None  Final Report (09-06-24 @ 12:59):    10,000 - 49,000 CFU/mL Candida albicans    "Susceptibilities not performed"    Culture - Blood (collected 09-03-24 @ 17:21)  Source: .Blood Blood-Peripheral  Final Report (09-09-24 @ 02:00):    No growth at 5 days    Culture - Blood (collected 09-03-24 @ 17:21)  Source: .Blood Blood-Peripheral  Gram Stain (09-05-24 @ 09:09):    Growth in aerobic bottle: Gram Positive Cocci in Clusters  Final Report (09-05-24 @ 21:55):    Growth in aerobic bottle: Staphylococcus hominis    Isolation of Coagulase negative Staphylococcus from single blood culture    sets may represent    contamination. Contact the Microbiology Department at [phone] if    susceptibility testing is    clinically indicated.    Direct identification is available within approximately 3-5    hours either by Blood Panel Multiplexed PCR or Direct    MALDI-TOF. Details: [email or web address]  Organism: Blood Culture PCR (09-05-24 @ 21:55)  Organism: Blood Culture PCR (09-05-24 @ 21:55)      Method Type: PCR      -  Coagulase negative Staphylococcus: Detec        Lactate, Blood: 1.1 mmol/L (09-06-24 @ 12:25)      INFECTIOUS DISEASES TESTING  MRSA PCR Result.: Negative (09-05-24 @ 20:32)      INFLAMMATORY MARKERS  Sedimentation Rate, Erythrocyte: 140 mm/Hr (09-04-24 @ 00:25)  C-Reactive Protein: 123.9 mg/L (09-04-24 @ 00:25)      RADIOLOGY & ADDITIONAL TESTS:  I have personally reviewed the last available Chest xray  CXR  Xray Chest 1 View- PORTABLE-Urgent:   ACC: [id number] EXAM:  XR CHEST PORTABLE URGENT 1V   ORDERED BY: ANJELICA SANTACRUZ     PROCEDURE DATE:  09/09/2024          INTERPRETATION:  Clinical History / Reason for exam: Fever.    Comparison : Chest radiograph September 3, 2024.    Technique/Positioning: Frontal portable, rotated.    Findings:    Support devices: None.    Cardiac/mediastinum/hilum: Aorta is atherosclerotic    Lung parenchyma/Pleura: Within normal limits.    Skeleton/soft tissues: Unremarkable.    Impression:    No radiographic evidence of acute cardiopulmonary disease.        --- End of Report ---            JAIDA HERNANDES MD; Attending Interventional Radiologist  This document has been electronically signed. Sep  9 2024  9:11AM (09-09-24 @ 09:07)      CT  CT Abdomen and Pelvis No Cont:   ACC: [id number] EXAM:  CT ABDOMEN AND PELVIS   ORDERED BY: TEZ HENSON     PROCEDURE DATE:  09/07/2024          INTERPRETATION:  CLINICAL INFORMATION: Anemia    COMPARISON: None.    CONTRAST/COMPLICATIONS:  IV Contrast: NONE  z  Oral Contrast: NONE  Complications: None reported at time of study completion    PROCEDURE:  CT of the Abdomen and Pelvis was performed.  Sagittal and coronal reformats were performed.    FINDINGS:  LOWER CHEST: Bibasilar atelectasis    LIVER: Within normal limits.  BILEDUCTS: Normal caliber.  GALLBLADDER: Within normal limits.  SPLEEN: Within normal limits.  PANCREAS: Within normal limits.  ADRENALS: Within normal limits.  KIDNEYS/URETERS: No renal stones or hydronephrosis. Parapelvic cysts    BLADDER: Whitman catheter.  REPRODUCTIVE ORGANS: Unremarkable at CT.    BOWEL: Bumper style gastrostomy tube within stomach lumen. No bowel   obstruction. Appendix is normal.  PERITONEUM/RETROPERITONEUM: Within normal limits.  VESSELS: Within normal limits.  LYMPH NODES: Nolymphadenopathy.  ABDOMINAL WALL: Within normal limits.  BONES: Erosive changes of the sacrum. Sacral soft tissue defect measuring   5.5 cm. Lower buttocks subcutaneous fat stranding and edema    IMPRESSION:  No CT evidence of retroperitoneal hematoma.    Sacral soft tissue defect with underlying osseous erosions which may be   seen with osteomyelitis    --- End of Report ---            ALYSE MANCIA MD; Attending Radiologist  This document has been electronically signed. Sep  7 2024 10:40AM (09-07-24 @ 09:37)      CARDIOLOGY TESTING  12 Lead ECG:   Ventricular Rate 132 BPM    Atrial Rate 132 BPM    P-R Interval 122 ms    QRS Duration 62 ms    Q-T Interval 294 ms    QTC Calculation(Bazett) 435 ms    P Axis 58 degrees    R Axis -1 degrees    T Axis 42 degrees    Diagnosis Line Sinus tachycardia  Otherwise normal ECG    Confirmed by Boris Humphries ([id number]) on 9/3/2024 8:48:37 PM (09-03-24 @ 15:23)      MEDICATIONS  albuterol    0.083%. 2.5  ascorbic acid 500  aspirin  chewable 81  baclofen 10  gabapentin 100  heparin   Injectable 5000  lactated ringers. 1000  levETIRAcetam  Solution 1000  lidocaine   4% Patch 1  meropenem  IVPB 1000  midodrine. 10  multivitamin/minerals 1  pantoprazole   Suspension 40  sodium chloride 1  valproic  acid Syrup 750  vitamin A &amp; D Ointment 1      WEIGHT  Weight (kg): 60 (09-06-24 @ 15:59)  Creatinine: <0.5 mg/dL (09-09-24 @ 06:14)      ANTIBIOTICS:  meropenem  IVPB 1000 milliGRAM(s) IV Intermittent every 8 hours      All available historical records have been reviewed

## 2024-09-09 NOTE — PROGRESS NOTE ADULT - SUBJECTIVE AND OBJECTIVE BOX
Patient is a 69y old  Female who presents with a chief complaint of   INTERVAL HPI/OVERNIGHT EVENTS: Patient was examined and seen at bedside. This morning pt is resting in bed and staff report no new issues or overnight events. No specific complaints.    ROS: denies CP, SOB, AP  InitialHPI:  A 69 year old F with pmhx  of dementia, CVA, OM, sacral ulcer, recent admission w/septic shock at [facility] discharged on vancomycin and meropenem presents from nursing home for positive occult blood and low Hb of 7 at the NH.    In ED, /82, , SpO2 99% on RA  Labs remarkable for wbc 19k, Neutro 80%, Hb 8.3, MCV 86, Na 130, K 6.3, lactate 2.1  VBG ph 7.37, CO2 53  Xray chest No radiographic evidence of acute cardiopulmonary disease.    Admit for further management and workup   (03 Sep 2024 18:59)    PAST MEDICAL & SURGICAL HISTORY:      General: NAD, AA0x1, chronically ill appearing  HEENT:  LAD  CV: S1 S2  Resp: decreased breath sounds at bases  GI: NT/ND/S +BS; +PEG  MS: no clubbing/cyanosis/edema, + pulses b/l  Neuro: unable to access          Home Medications:  acetaminophen 325 mg oral tablet: 2 tab(s) orally every 6 hours, As needed, Moderate Pain (4 - 6) (19 Mar 2023 10:42)  ascorbic acid 500 mg oral tablet: 1 tab(s) orally once a day (19 Mar 2023 10:42)  aspirin 81 mg oral tablet, chewable: 1 tab(s) orally once a day (19 Mar 2023 10:42)  baclofen 5 mg oral tablet: 2 tab(s) orally every 12 hours (03 Sep 2024 19:55)  Calmoseptine 0.44%-20.6% topical ointment: Apply topically to affected area 3 times a day apply at sacral ulcer (03 Sep 2024 19:59)  clopidogrel 75 mg oral tablet: 1 tab(s) orally once a day (03 Sep 2024 20:06)  ferrous sulfate: 300 milligram(s) by PEG tube 3 times a day (03 Sep 2024 20:12)  gabapentin 100 mg oral capsule: 1 cap(s) orally 3 times a day (26 Mar 2023 16:24)  lactulose 10 g/15 mL oral syrup: 15 milliliter(s) orally 3 times a day (03 Sep 2024 20:05)  levETIRAcetam 100 mg/mL oral solution: 10 milliliter(s) orally every 12 hours (03 Sep 2024 20:09)  lidocaine 4% topical film: Apply topically to affected area once a day left shoulder (03 Sep 2024 20:01)  Merrem 1000 mg intravenous injection: 1,000 unit(s) intravenously every 8 hours (03 Sep 2024 20:15)  midodrine 10 mg oral tablet: 1 tab(s) orally 3 times a day Hold if Systolic BP &gt;100 (26 Mar 2023 16:24)  MiraLax oral powder for reconstitution: 17 gram(s) orally once a day (03 Sep 2024 20:03)  Multiple Vitamins with Minerals oral tablet: 1 tab(s) orally once a day (19 Mar 2023 10:42)  pantoprazole 40 mg oral delayed release tablet: 1 tab(s) orally once a day (09 Mar 2023 19:56)  sodium chloride: 1 gram(s) by PEG tube 2 times a day (03 Sep 2024 19:55)  valproic acid 250 mg/5 mL oral liquid: 15 milliliter(s) orally every 8 hours (03 Sep 2024 20:10)  vancomycin 1 g/200 mL intravenous solution: 1 gram(s) intravenously 2 times a day (03 Sep 2024 20:14)  vitamin A and D topical ointment: 1 application topically 2 times a day (19 Mar 2023 10:42)  Vraylar 1.5 mg oral capsule: 1 cap(s) orally once a day (03 Sep 2024 20:06)    MEDICATIONS  (STANDING):  albuterol    0.083%. 2.5 milliGRAM(s) Nebulizer once  ascorbic acid 500 milliGRAM(s) Oral daily  aspirin  chewable 81 milliGRAM(s) Oral daily  baclofen 10 milliGRAM(s) Oral every 12 hours  gabapentin 100 milliGRAM(s) Oral three times a day  heparin   Injectable 5000 Unit(s) SubCutaneous every 12 hours  lactated ringers. 1000 milliLiter(s) (75 mL/Hr) IV Continuous <Continuous>  levETIRAcetam  Solution 1000 milliGRAM(s) Oral two times a day  lidocaine   4% Patch 1 Patch Transdermal every 24 hours  meropenem  IVPB 1000 milliGRAM(s) IV Intermittent every 8 hours  midodrine. 10 milliGRAM(s) Oral three times a day  multivitamin/minerals 1 Tablet(s) Oral daily  pantoprazole   Suspension 40 milliGRAM(s) Oral two times a day  sodium chloride 1 Gram(s) Oral two times a day  valproic  acid Syrup 750 milliGRAM(s) Enteral Tube three times a day  vitamin A &amp; D Ointment 1 Application(s) Topical two times a day    MEDICATIONS  (PRN):  acetaminophen     Tablet .. 650 milliGRAM(s) Oral every 6 hours PRN Mild Pain (1 - 3)    Vital Signs Last 24 Hrs  T(C): 36.9 (09 Sep 2024 13:35), Max: 37.7 (09 Sep 2024 04:35)  T(F): 98.4 (09 Sep 2024 13:35), Max: 99.8 (09 Sep 2024 04:35)  HR: 100 (09 Sep 2024 13:35) (90 - 100)  BP: 126/81 (09 Sep 2024 13:35) (101/62 - 126/81)  BP(mean): 75 (09 Sep 2024 04:35) (75 - 75)  RR: 18 (09 Sep 2024 13:35) (16 - 18)  SpO2: 100% (09 Sep 2024 13:35) (97% - 100%)    Parameters below as of 09 Sep 2024 13:35  Patient On (Oxygen Delivery Method): room air      CAPILLARY BLOOD GLUCOSE        LABS:                        9.5    14.42 )-----------( 749      ( 09 Sep 2024 06:14 )             30.2     09-09    136  |  98  |  6<L>  ----------------------------<  115<H>  3.8   |  27  |  <0.5<L>    Ca    8.7      09 Sep 2024 06:14  Mg     1.9     09-09    TPro  6.2  /  Alb  2.7<L>  /  TBili  <0.2  /  DBili  x   /  AST  19  /  ALT  13  /  AlkPhos  111  09-09    LIVER FUNCTIONS - ( 09 Sep 2024 06:14 )  Alb: 2.7 g/dL / Pro: 6.2 g/dL / ALK PHOS: 111 U/L / ALT: 13 U/L / AST: 19 U/L / GGT: x                 Urinalysis Basic - ( 09 Sep 2024 06:14 )    Color: x / Appearance: x / SG: x / pH: x  Gluc: 115 mg/dL / Ketone: x  / Bili: x / Urobili: x   Blood: x / Protein: x / Nitrite: x   Leuk Esterase: x / RBC: x / WBC x   Sq Epi: x / Non Sq Epi: x / Bacteria: x              Culture - Blood (collected 07 Sep 2024 23:21)  Source: .Blood None  Preliminary Report (09 Sep 2024 07:01):    No growth at 24 hours      Consultant Notes Reviewed:  [x ] YES  [ ] NO  Care Discussed with Consultants/Other Providers/ Housestaff [ x] YES  [ ] NO  Radiology, labs, EKGs, new studies personally reviewed.

## 2024-09-09 NOTE — PROGRESS NOTE ADULT - ASSESSMENT
ASSESSMENT   69 year old F with pmhx  of dementia, CVA, OM, sacral ulcer, recent admission w/septic shock at [facility] discharged on vancomycin and meropenem presents from nursing home for positive occult blood and low Hb of 7 at the NH.    IMPRESSION  #Severe sepsis with lactic acidosis T>101 P>90 WBC 19  Suspect related to sacral SSTI/ OM, possible CLABSI from outside     9/14 UCX NGTD     9/3 1/4 bottles Staphylococcus hominis- could be true pathogen as outside catheter  Possible SSTI, sacral decub- Skin: full thickness sacral wound about [id number] mostly moist and pink, with nonviable tissue present towards 12 o'clock, free floating piece of bone noted, bone palpable, mild malodor noted, no pus, no erythema, no edema    CXR no PNA    UA without significant pyuria     Influenza/RSV/COVID19 PCR negative   #Immunodeficiency secondary to Senescence which could results in poor clinical outcomes  #Abx allergy: Allergy Status Unknown    Creatinine: <0.5 (09-04-24 @ 00:25)    Height (cm): 152.4 (09-03-24 @ 13:32)  Weight (kg): 59 (09-03-24 @ 13:32)    RECOMMENDATIONS  - Plan was for vanc/koki from [facility] from 9/2-9/9, given free floating bone, extend x 7 more days of meropenem 1000 milliGRAM(s) IV Intermittent every 8 hours via midline. End 9/16  No benefit of long term intravenous antibiotics for chronic sacral osteomyelitis. Continue with nutritional support and offloading   - Poor prognosis, GOC    If any questions, please send a message or call on [employer]  Please continue to update ID with any pertinent new laboratory, radiographic findings, or change in clinical status

## 2024-09-09 NOTE — PROGRESS NOTE ADULT - SUBJECTIVE AND OBJECTIVE BOX
SUBJECTIVE/OVERNIGHT EVENTS  Today is hospital day 6d. This morning patient was seen and examined at bedside, resting comfortably in bed. No acute or major events overnight.    HOSPITAL COURSE  Day 1:   Day 2:   Day 3:     CODE STATUS:    FAMILY COMMUNICATION  Contact date:  Name of person contacted:  Relationship to patient:  Communication details:    MEDICATIONS  STANDING MEDICATIONS  albuterol    0.083%. 2.5 milliGRAM(s) Nebulizer once  ascorbic acid 500 milliGRAM(s) Oral daily  aspirin  chewable 81 milliGRAM(s) Oral daily  baclofen 10 milliGRAM(s) Oral every 12 hours  gabapentin 100 milliGRAM(s) Oral three times a day  heparin   Injectable 5000 Unit(s) SubCutaneous every 12 hours  lactated ringers. 1000 milliLiter(s) IV Continuous <Continuous>  levETIRAcetam  Solution 1000 milliGRAM(s) Oral two times a day  lidocaine   4% Patch 1 Patch Transdermal every 24 hours  meropenem  IVPB 1000 milliGRAM(s) IV Intermittent every 8 hours  midodrine. 10 milliGRAM(s) Oral three times a day  multivitamin/minerals 1 Tablet(s) Oral daily  pantoprazole   Suspension 40 milliGRAM(s) Oral two times a day  sodium chloride 1 Gram(s) Oral two times a day  valproic  acid Syrup 750 milliGRAM(s) Enteral Tube three times a day  vitamin A &amp; D Ointment 1 Application(s) Topical two times a day    PRN MEDICATIONS  acetaminophen     Tablet .. 650 milliGRAM(s) Oral every 6 hours PRN    VITALS  T(F): 98.4 (09-09-24 @ 13:35), Max: 99.8 (09-09-24 @ 04:35)  HR: 100 (09-09-24 @ 13:35) (90 - 100)  BP: 126/81 (09-09-24 @ 13:35) (101/62 - 126/81)  RR: 18 (09-09-24 @ 13:35) (16 - 18)  SpO2: 100% (09-09-24 @ 13:35) (97% - 100%)    PHYSICAL EXAM  GENERAL: NAD, lying in bed comfortably  HEAD:  Atraumatic, normocephalic  EYES: EOMI, PERRLA, conjunctiva and sclera clear  NECK: Supple, trachea midline, no JVD  HEART: Regular rate and rhythm, no murmurs, rubs, or gallops  LUNGS: Unlabored respirations.  Clear to auscultation bilaterally, no crackles, wheezing, or rhonchi  ABDOMEN: Soft, TTP, mildly distended, hyperactive G tube in place, site clean and dry  EXTREMITIES:  No clubbing, cyanosis, or edema. B/L UE contracted  NERVOUS SYSTEM:  A&Ox2, moving all extremities, no focal deficits   SKIN: sacral ulcer  LABS             9.5    14.42 )-----------( 749      ( 09-09-24 @ 06:14 )             30.2     136  |  98  |  6   -------------------------<  115   09-09-24 @ 06:14  3.8  |  27  |  <0.5    Ca      8.7     09-09-24 @ 06:14  Mg     1.9     09-09-24 @ 06:14    TPro  6.2  /  Alb  2.7  /  TBili  <0.2  /  DBili  x   /  AST  19  /  ALT  13  /  AlkPhos  111  /  GGT  x     09-09-24 @ 06:14        Urinalysis Basic - ( 09 Sep 2024 06:14 )    Color: x / Appearance: x / SG: x / pH: x  Gluc: 115 mg/dL / Ketone: x  / Bili: x / Urobili: x   Blood: x / Protein: x / Nitrite: x   Leuk Esterase: x / RBC: x / WBC x   Sq Epi: x / Non Sq Epi: x / Bacteria: x          Culture - Blood (collected 07 Sep 2024 23:21)  Source: .Blood None  Preliminary Report (09 Sep 2024 07:01):    No growth at 24 hours      IMAGING

## 2024-09-09 NOTE — PROGRESS NOTE ADULT - ASSESSMENT
A 69 year old F with pmhx  of dementia, CVA, OM, sacral ulcer, recent admission w/septic shock at [facility] discharged on vancomycin and meropenem presents from nursing home for positive occult blood and low Hb of 7 at the NH.    #Sepsis poa  #Unstageable sacral ulcer  Bacteriemic   #chronic osteomyelitis   - Per daughter recent admission to [facility] for OM of sacral ulcer c/b septic shock  - In ED, /82, , SpO2 99% on RA  - Labs remarkable for wbc 19k, Neutro 80%, Hb 8.3, MCV 86, Na 130, K 6.3, lactate 2.1  - VBG ph 7.37, CO2 53  - Xray chest No radiographic evidence of acute cardiopulmonary disease.  - s/p 2L bolus  - c/w meropenem 1 week  - c/w LR 75cc/hr  - UA borderline positive, f/u ucx, bcx  - f/u procal, esr, crp  - lactate 2.1 -> 2.4 -> 1  - ID recs: meropenem, vanc  - BCx prelim growing gram + coccii in clusters  - BCx: growing staph epi, which also grew in BCx in [facility] also grew in staph epi  - burn consult: no intervention at this time  - MRSA neg  - ID consulted, dc vanc cw meropenem for 1 week    #Acute on chronic anemia  - Per daughter, pt with multiple transfusions at [facility]  - Positive occult blood and Hb of 7 at NH  - Here Hb 8.3 -> 7.6 -> 8.0 (bs around 9)  - Agreeable for egd/colono if needed  - low total iron, transferrin, TIBC, % sat  - Keep active type and screen  - CBC bid  - Transfuse if Hb < 7  - maintain active type and screen  - KUB: no obstruction  - 9/6 Hgb drop 6.8 received 1 unit, HgB normalized   - EDG 9/6:  7mm non-bleeding clean-based ([place] Class III) ulcer seen just behind bumper of the recently placed G-tube.  - Colonoscopy 9/6: poor prep, small non-bleeding external hemorrhoids  - PPI 40mg BID via PEG for 8 weeks  -  CT AP -No CT evidence of retroperitoneal hematoma  - repeat colonoscopy op  - f/u [facility]-[place] path results    #Hyperkalemia  - No EKG changes  - K of 6.3 -> 5.2 -> 5.1  - s/p insulin/dextrose and lokelma  - BMP bid  - Keep on telemetry    #Seizures  #Hx of R Frontal Lobe Infarct   #Chronic R MCA infarct  #Bed bound  #Tube feeds  - C/w Depakote 750 mg TID and Keppra to 1000 mg BID   - C/w baclofen 10 mg bid (for UE spasticity)  - C/w ASA, on plavix but not on statin? Holding plavix for now    # Persistent Hypotension   - c/w Midodrine 10 mg TID    # Left avulsion fracture of medial malleolus  - C/w gabapentin at 100mg TID    # Left cephalic vein thrombosis  - Duplex LUE (3/10/23): No DVT however there is superficial thrombosis noted in the left cephalic vein  - Repeat duplex ordered 3/27/23 - negative for UE and LE DVT    #Chronic borrego  - cw borrego    - DVT ppx - SCDs after duplex  - Diet: PEG tube feeds  - GI ppx: PPI  - Code status: Palliative consult for GOC    #PENDING  - f/u BCx  - f/u CT AP to r/o hematoma in setting of acute drop in Hgb 9/6 despite brown stools on exam/clean-based ulcer in  stomach with no high risk features  - f/u EGD-[place] path results

## 2024-09-09 NOTE — PROGRESS NOTE ADULT - ASSESSMENT
A 69 year old F with pmhx  of dementia, CVA, OM, sacral ulcer, recent admission w/septic shock at [facility] discharged on vancomycin and meropenem presents from nursing home for positive occult blood and low Hb of 7 at the NH.    #Unstageable sacral ulcer  #chronic osteomyelitis   #BCx w/ Staph epi x2  - Per daughter recent admission to [facility] for OM of sacral ulcer c/b septic shock  - In ED, /82, , SpO2 99% on RA  - Labs remarkable for wbc 19k, Neutro 80%, Hb 8.3, MCV 86, Na 130, K 6.3, lactate 2.1  - VBG ph 7.37, CO2 53  - Xray chest No radiographic evidence of acute cardiopulmonary disease.  - s/p 2L bolus  - c/w meropenem and vanc  - c/w LR 75cc/hr  - Trend wbc and lactate  - lactate 2.1 -> 2.4 -> 1  - ID recs: meropenem, vanc  - BCx prelim coag neg staph  - f/u BCx  - burn consult: no intervention at this time  - f/u vanc trough  -9/6 housestaff obtained records from [facility] w/ staph epi as well as staph epi on BCx at [facility]  - f/u repeat BCx  -old line removed  9/9 as per [facility], pt was planned to be on Vanco/Luciana from 9/2 to 9/9. D/w ID. Will extend Luciana until 9/16 via midline.    #Acute on chronic anemia  - Per daughter, pt with multiple transfusions at [facility]  - Positive occult blood and Hb of 7 at NH  - Here Hb 8.3 -> 7.6 -> 8.0 (bs around 9)  - Agreeable for egd/colono if needed  - low total iron, transferrin, TIBC, % sat  - Keep active type and screen  - CBC bid  - PPI IV bid   - Transfuse if Hb < 7  - maintain active type and screen   -9/6 Hgb dropped. s/p PRBCs.   -EGD/C-scopy: A 7mm non-bleeding clean-based ([place] Class III) ulcer was seen just behind the bumper of the recently placed G-tube. PPI BID x8wks. C-scopy: poor prep.      #Hyperkalemia  - No EKG changes  - K of 6.3 -> 5.2 -> 5.1  - s/p insulin/dextrose and lokelma  - resolved    #Seizures  #Hx of R Frontal Lobe Infarct   #Chronic R MCA infarct  #Bed bound  #Tube feeds  - C/w Depakote 750 mg TID and Keppra to 1000 mg BID   - C/w baclofen 10 mg bid (for UE spasticity)  - C/w ASA, on plavix but not on statin? Holding plavix for now    # Persistent Hypotension   - c/w Midodrine 10 mg TID    #Sinus Tachycardia  IVFs  improved    #Chronic borrego  - cw borrego    - DVT ppx - Heparin SQ  - Diet: PEG tube feeds  - GI ppx: PPI  - Code status: Palliative consult for GOC    Very poor Px.    #Progress Note Handoff  Pending: Clinical improvement and stability___  Pt/Family discussion: staff spoke to daughter  Disposition: Nsg Home_    My note supersedes the residents note should a discrepancy arise.    Chart and notes personally reviewed.  Care Discussed with Consultants/Other Providers/ Housestaff [ x] YES [ ] NO   Radiology, labs, old records personally reviewed.    discussed w/ housestaff, nursing, case management, ID    Attestation Statements:    Attestation Statements:  Risk Statement (NON-critical care).     On this date of service, level of risk to patient is considered: High.     Due to: anemia, sacral decub,    Time-based billing (NON-critical care).     50 minutes spent on total encounter. The necessity of the time spent during the encounter on this date of service was due to:     time spent on review of labs, imaging studies, old records, obtaining history, personally examining patient, counselling and communicating with patient/ family, entering orders for medications/tests/etc, discussions with other health care providers, documentation in electronic health records, independent interpretation of labs, imaging/procedure results and care coordination.

## 2024-09-09 NOTE — PROGRESS NOTE ADULT - TIME BILLING
I have personally seen and examined this patient.  I have reviewed all pertinent clinical information and reviewed all relevant imaging and diagnostic studies personally.    I discussed my recommendations with the primary team
I have personally seen and examined this patient.  I have reviewed all pertinent clinical information and reviewed all relevant imaging and diagnostic studies personally.    I discussed my recommendations with the primary team

## 2024-09-10 VITALS
OXYGEN SATURATION: 100 % | DIASTOLIC BLOOD PRESSURE: 75 MMHG | TEMPERATURE: 98 F | SYSTOLIC BLOOD PRESSURE: 115 MMHG | HEART RATE: 104 BPM | RESPIRATION RATE: 18 BRPM

## 2024-09-10 LAB
HCT VFR BLD CALC: 27.7 % — LOW (ref 37–47)
HGB BLD-MCNC: 8.9 G/DL — LOW (ref 12–16)
MCHC RBC-ENTMCNC: 27.1 PG — SIGNIFICANT CHANGE UP (ref 27–31)
MCHC RBC-ENTMCNC: 32.1 G/DL — SIGNIFICANT CHANGE UP (ref 32–37)
MCV RBC AUTO: 84.2 FL — SIGNIFICANT CHANGE UP (ref 81–99)
NRBC # BLD: 0 /100 WBCS — SIGNIFICANT CHANGE UP (ref 0–0)
PLATELET # BLD AUTO: 672 K/UL — HIGH (ref 130–400)
PMV BLD: 9.2 FL — SIGNIFICANT CHANGE UP (ref 7.4–10.4)
RBC # BLD: 3.29 M/UL — LOW (ref 4.2–5.4)
RBC # FLD: 14.8 % — HIGH (ref 11.5–14.5)
SURGICAL PATHOLOGY STUDY: SIGNIFICANT CHANGE UP
WBC # BLD: 13.4 K/UL — HIGH (ref 4.8–10.8)
WBC # FLD AUTO: 13.4 K/UL — HIGH (ref 4.8–10.8)

## 2024-09-10 PROCEDURE — 99239 HOSP IP/OBS DSCHRG MGMT >30: CPT

## 2024-09-10 PROCEDURE — 99232 SBSQ HOSP IP/OBS MODERATE 35: CPT

## 2024-09-10 RX ORDER — LACTULOSE 10 G
15 PACKET (EA) ORAL
Qty: 0 | Refills: 0 | DISCHARGE

## 2024-09-10 RX ORDER — HEPARIN SODIUM,BOVINE 1000/ML
5000 VIAL (ML) INJECTION
Qty: 0 | Refills: 0 | DISCHARGE
Start: 2024-09-10

## 2024-09-10 RX ORDER — MEROPENEM 500 MG/10ML
1000 INJECTION, POWDER, FOR SOLUTION INTRAVENOUS
Qty: 0 | Refills: 0 | DISCHARGE

## 2024-09-10 RX ORDER — PANTOPRAZOLE SODIUM 40 MG
1 TABLET, DELAYED RELEASE (ENTERIC COATED) ORAL
Qty: 0 | Refills: 0 | DISCHARGE
Start: 2024-09-10

## 2024-09-10 RX ORDER — VANCOMYCIN/0.9 % SOD CHLORIDE 1.75G/25
1 PLASTIC BAG, INJECTION (ML) INTRAVENOUS
Refills: 0 | DISCHARGE

## 2024-09-10 RX ORDER — LEVETIRACETAM 1000 MG/1
10 TABLET ORAL
Qty: 0 | Refills: 0 | DISCHARGE
Start: 2024-09-10

## 2024-09-10 RX ORDER — CHLORHEXIDINE GLUCONATE 40 MG/ML
1 SOLUTION TOPICAL
Refills: 0 | Status: DISCONTINUED | OUTPATIENT
Start: 2024-09-10 | End: 2024-09-10

## 2024-09-10 RX ADMIN — SODIUM CHLORIDE 1 GRAM(S): 9 INJECTION INTRAMUSCULAR; INTRAVENOUS; SUBCUTANEOUS at 05:31

## 2024-09-10 RX ADMIN — Medication 5000 UNIT(S): at 05:29

## 2024-09-10 RX ADMIN — Medication 100 MILLIGRAM(S): at 13:32

## 2024-09-10 RX ADMIN — PETROLATUM 1 APPLICATION(S): 93.5 OINTMENT TOPICAL at 17:01

## 2024-09-10 RX ADMIN — LEVETIRACETAM 1000 MILLIGRAM(S): 1000 TABLET ORAL at 17:01

## 2024-09-10 RX ADMIN — Medication 750 MILLIGRAM(S): at 05:31

## 2024-09-10 RX ADMIN — Medication 81 MILLIGRAM(S): at 11:42

## 2024-09-10 RX ADMIN — Medication 500 MILLIGRAM(S): at 11:42

## 2024-09-10 RX ADMIN — CHLORHEXIDINE GLUCONATE 1 APPLICATION(S): 40 SOLUTION TOPICAL at 11:43

## 2024-09-10 RX ADMIN — BACLOFEN 10 MILLIGRAM(S): 0.5 INJECTION INTRATHECAL at 05:31

## 2024-09-10 RX ADMIN — ACETAMINOPHEN 650 MILLIGRAM(S): 325 TABLET ORAL at 13:57

## 2024-09-10 RX ADMIN — MEROPENEM 100 MILLIGRAM(S): 500 INJECTION, POWDER, FOR SOLUTION INTRAVENOUS at 10:07

## 2024-09-10 RX ADMIN — PETROLATUM 1 APPLICATION(S): 93.5 OINTMENT TOPICAL at 05:31

## 2024-09-10 RX ADMIN — BACLOFEN 10 MILLIGRAM(S): 0.5 INJECTION INTRATHECAL at 17:01

## 2024-09-10 RX ADMIN — Medication 40 MILLIGRAM(S): at 05:32

## 2024-09-10 RX ADMIN — MEROPENEM 100 MILLIGRAM(S): 500 INJECTION, POWDER, FOR SOLUTION INTRAVENOUS at 01:45

## 2024-09-10 RX ADMIN — LEVETIRACETAM 1000 MILLIGRAM(S): 1000 TABLET ORAL at 05:30

## 2024-09-10 RX ADMIN — Medication 1 TABLET(S): at 11:42

## 2024-09-10 RX ADMIN — MEROPENEM 100 MILLIGRAM(S): 500 INJECTION, POWDER, FOR SOLUTION INTRAVENOUS at 17:01

## 2024-09-10 RX ADMIN — Medication 750 MILLIGRAM(S): at 13:32

## 2024-09-10 RX ADMIN — SODIUM CHLORIDE 1 GRAM(S): 9 INJECTION INTRAMUSCULAR; INTRAVENOUS; SUBCUTANEOUS at 17:03

## 2024-09-10 RX ADMIN — Medication 5000 UNIT(S): at 17:01

## 2024-09-10 RX ADMIN — Medication 40 MILLIGRAM(S): at 17:01

## 2024-09-10 RX ADMIN — Medication 100 MILLIGRAM(S): at 05:30

## 2024-09-10 NOTE — DISCHARGE NOTE PROVIDER - NSDCMRMEDTOKEN_GEN_ALL_CORE_FT
acetaminophen 325 mg oral tablet: 2 tab(s) orally every 6 hours, As needed, Moderate Pain (4 - 6)  ascorbic acid 500 mg oral tablet: 1 tab(s) orally once a day  aspirin 81 mg oral tablet, chewable: 1 tab(s) orally once a day  baclofen 5 mg oral tablet: 2 tab(s) orally every 12 hours  Calmoseptine 0.44%-20.6% topical ointment: Apply topically to affected area 3 times a day apply at sacral ulcer  clopidogrel 75 mg oral tablet: 1 tab(s) orally once a day  ferrous sulfate: 300 milligram(s) by PEG tube 3 times a day  gabapentin 100 mg oral capsule: 1 cap(s) orally 3 times a day  lactulose 10 g/15 mL oral syrup: 15 milliliter(s) orally 3 times a day  levETIRAcetam 100 mg/mL oral solution: 10 milliliter(s) orally every 12 hours  lidocaine 4% topical film: Apply topically to affected area once a day left shoulder  Merrem 1000 mg intravenous injection: 1,000 unit(s) intravenously every 8 hours  midodrine 10 mg oral tablet: 1 tab(s) orally 3 times a day Hold if Systolic BP &gt;100  MiraLax oral powder for reconstitution: 17 gram(s) orally once a day  Multiple Vitamins with Minerals oral tablet: 1 tab(s) orally once a day  pantoprazole 40 mg oral delayed release tablet: 1 tab(s) orally once a day  sodium chloride: 1 gram(s) by PEG tube 2 times a day  valproic acid 250 mg/5 mL oral liquid: 15 milliliter(s) orally every 8 hours  vancomycin 1 g/200 mL intravenous solution: 1 gram(s) intravenously 2 times a day  vitamin A and D topical ointment: 1 application topically 2 times a day  Vraylar 1.5 mg oral capsule: 1 cap(s) orally once a day   acetaminophen 325 mg oral tablet: 2 tab(s) orally every 6 hours, As needed, Moderate Pain (4 - 6)  ascorbic acid 500 mg oral tablet: 1 tab(s) orally once a day  aspirin 81 mg oral tablet, chewable: 1 tab(s) orally once a day  baclofen 5 mg oral tablet: 2 tab(s) orally every 12 hours  Calmoseptine 0.44%-20.6% topical ointment: Apply topically to affected area 3 times a day apply at sacral ulcer  clopidogrel 75 mg oral tablet: 1 tab(s) orally once a day  ferrous sulfate: 300 milligram(s) by PEG tube 3 times a day  gabapentin 100 mg oral capsule: 1 cap(s) orally 3 times a day  heparin: 5,000 international unit(s) subcutaneous 2 times a day  lactulose 10 g/15 mL oral syrup: 15 milliliter(s) orally 3 times a day as needed for  constipation  levETIRAcetam 100 mg/mL oral solution: 10 milliliter(s) orally 2 times a day  lidocaine 4% topical film: Apply topically to affected area once a day left shoulder  Merrem 1000 mg intravenous injection: 1,000 unit(s) intravenously every 8 hours until 9/16  midodrine 10 mg oral tablet: 1 tab(s) orally 3 times a day Hold if Systolic BP &gt;100  MiraLax oral powder for reconstitution: 17 gram(s) orally once a day  Multiple Vitamins with Minerals oral tablet: 1 tab(s) orally once a day  pantoprazole 40 mg oral granule, delayed release: 1 tab(s) orally 2 times a day  sodium chloride: 1 gram(s) by PEG tube 2 times a day  valproic acid 250 mg/5 mL oral liquid: 15 milliliter(s) orally every 8 hours  vitamin A and D topical ointment: 1 application topically 2 times a day  Vraylar 1.5 mg oral capsule: 1 cap(s) orally once a day

## 2024-09-10 NOTE — DISCHARGE NOTE PROVIDER - PROVIDER TOKENS
PROVIDER:[TOKEN:[[id number]],FOLLOWUP:[2 weeks],ESTABLISHEDPATIENT:[T]],PROVIDER:[TOKEN:[[id number]],FOLLOWUP:[2 weeks]],PROVIDER:[TOKEN:[[id number]],FOLLOWUP:[2 weeks]] PROVIDER:[TOKEN:[[id number]],FOLLOWUP:[1-3 days],ESTABLISHEDPATIENT:[T]],PROVIDER:[TOKEN:[[id number]],FOLLOWUP:[1 month]],PROVIDER:[TOKEN:[[id number]],FOLLOWUP:[2 weeks]]

## 2024-09-10 NOTE — PROGRESS NOTE ADULT - PROBLEM SELECTOR PLAN 1
- symptoms per primary team  - GI eval: EGD and colonoscopy on 09/06

## 2024-09-10 NOTE — DISCHARGE NOTE PROVIDER - NSDCCPCAREPLAN_GEN_ALL_CORE_FT
PRINCIPAL DISCHARGE DIAGNOSIS  Diagnosis: Sepsis  Assessment and Plan of Treatment: You were found to be septic from your osteomyelitis ( explanation below). Please follow up with gastroenterology for another colonscopy, and your PCP and ID doctor for further management of the ulcer and osteomyeletis.   Sepsis is a serious condition that occurs when the body overreacts to an infection. It is also called systemic inflammatory response syndrome (SIRS) with infection. An infection is usually caused by bacteria that attack the body. The body's defense system normally fights off infection within the affected body part. With sepsis, the body overreacts and causes symptoms to occur throughout the body. This leads to uncontrolled and widespread inflammation and clotting in small blood vessels. Blood flow to different body parts decreases and may lead to organ failure. Sepsis requires immediate treatment.  AFTER YOU LEAVE:  Follow up with your healthcare provider as directed:  Write down your questions so you remember to ask them during your visits.  Prevent infection:  The following are ways that you can help prevent infection, which can lead to sepsis:  Avoid the spread of germs:  Wash your hands often with soap and water. Carry germ-killing gel with you. You can use the gel to clean your hands when there is no soap and water available.  Do not touch your eyes, nose, or mouth unless you have washed your hands first.  Always cover your mouth when you cough. Cough into a tissue or your shirtsleeve so you do not spread germs from your hands.  Try to avoid people who have a cold or the flu. If you are sick, stay away from others as much as possible.  Contact your healthcare provider if:  You have a fever.  You have questions or concerns about your condition or care.  Seek care immediately or call 911 if:  You have increased swelling in your legs, feet, or abdomen.  You are short of breath or you cough up blood.  You have a fast heart rate and your chest hurts.  You feel so dizzy that you have trouble standing up.  Your lips or fingernails are blue.      SECONDARY DISCHARGE DIAGNOSES  Diagnosis: Anemia  Assessment and Plan of Treatment:     Diagnosis: Hyperkalemia  Assessment and Plan of Treatment:      PRINCIPAL DISCHARGE DIAGNOSIS  Diagnosis: Anemia  Assessment and Plan of Treatment: pt had EGD that revealed clean based ulcer without active bleeding. GI recommends PPI BID for 8wks. Follow up with GI for results of biopsies. Colonoscopy was a poor prep and GI recommended to repeat c-scopy as outpt.      SECONDARY DISCHARGE DIAGNOSES  Diagnosis: Sacral decubitus ulcer  Assessment and Plan of Treatment: Clean wound with soap and water, pack with vashe moist kerlix and cover with ABD/tape twice a day  -frequent turning and positioning  -prompt attention to incontinence  -Meropenem until 9/16

## 2024-09-10 NOTE — PROGRESS NOTE ADULT - SUBJECTIVE AND OBJECTIVE BOX
SUBJECTIVE/OVERNIGHT EVENTS  Today is hospital day 7d. This morning patient was seen and examined at bedside, resting comfortably in bed. No acute or major events overnight.    MEDICATIONS  STANDING MEDICATIONS  albuterol    0.083%. 2.5 milliGRAM(s) Nebulizer once  ascorbic acid 500 milliGRAM(s) Oral daily  aspirin  chewable 81 milliGRAM(s) Oral daily  baclofen 10 milliGRAM(s) Oral every 12 hours  chlorhexidine 2% Cloths 1 Application(s) Topical <User Schedule>  gabapentin 100 milliGRAM(s) Oral three times a day  heparin   Injectable 5000 Unit(s) SubCutaneous every 12 hours  levETIRAcetam  Solution 1000 milliGRAM(s) Oral two times a day  lidocaine   4% Patch 1 Patch Transdermal every 24 hours  meropenem  IVPB 1000 milliGRAM(s) IV Intermittent every 8 hours  midodrine. 10 milliGRAM(s) Oral three times a day  multivitamin/minerals 1 Tablet(s) Oral daily  pantoprazole   Suspension 40 milliGRAM(s) Oral two times a day  sodium chloride 1 Gram(s) Oral two times a day  valproic  acid Syrup 750 milliGRAM(s) Enteral Tube three times a day  vitamin A &amp; D Ointment 1 Application(s) Topical two times a day    PRN MEDICATIONS  acetaminophen     Tablet .. 650 milliGRAM(s) Oral every 6 hours PRN    VITALS  T(F): 98.3 (09-10-24 @ 04:44), Max: 98.8 (09-09-24 @ 19:52)  HR: 96 (09-10-24 @ 04:44) (96 - 105)  BP: 125/79 (09-10-24 @ 04:44) (125/56 - 126/81)  RR: 18 (09-09-24 @ 13:35) (18 - 18)  SpO2: 100% (09-09-24 @ 13:35) (100% - 100%)    PHYSICAL EXAM  General: NAD, AA0x1, chronically ill appearing  HEENT:  LAD  CV: S1 S2  Resp: decreased breath sounds at bases  GI: NT/ND/S +BS; +PEG  MS: no clubbing/cyanosis/edema, + pulses b/l  Neuro: unable to access    LABS             9.5    14.42 )-----------( 749      ( 09-09-24 @ 06:14 )             30.2     136  |  98  |  6   -------------------------<  115   09-09-24 @ 06:14  3.8  |  27  |  <0.5    Ca      8.7     09-09-24 @ 06:14  Mg     1.9     09-09-24 @ 06:14    TPro  6.2  /  Alb  2.7  /  TBili  <0.2  /  DBili  x   /  AST  19  /  ALT  13  /  AlkPhos  111  /  GGT  x     09-09-24 @ 06:14        Urinalysis Basic - ( 09 Sep 2024 06:14 )    Color: x / Appearance: x / SG: x / pH: x  Gluc: 115 mg/dL / Ketone: x  / Bili: x / Urobili: x   Blood: x / Protein: x / Nitrite: x   Leuk Esterase: x / RBC: x / WBC x   Sq Epi: x / Non Sq Epi: x / Bacteria: x          Culture - Blood (collected 07 Sep 2024 23:21)  Source: .Blood None  Preliminary Report (10 Sep 2024 07:01):    No growth at 48 Hours      IMAGING

## 2024-09-10 NOTE — PROGRESS NOTE ADULT - PROVIDER SPECIALTY LIST ADULT
Infectious Disease
Infectious Disease
Internal Medicine
Gastroenterology
Internal Medicine
Hospitalist
Hospitalist
Internal Medicine
Palliative Care
Infectious Disease
Palliative Care
Palliative Care

## 2024-09-10 NOTE — DISCHARGE NOTE PROVIDER - CARE PROVIDERS DIRECT ADDRESSES
,[email or web address]

## 2024-09-10 NOTE — PROGRESS NOTE ADULT - SUBJECTIVE AND OBJECTIVE BOX
HPI:  A 69 year old F with pmhx  of dementia, CVA, OM, sacral ulcer, recent admission w/septic shock at [facility] discharged on vancomycin and meropenem presents from nursing home for positive occult blood and low Hb of 7 at the NH.    In ED, /82, , SpO2 99% on RA  Labs remarkable for wbc 19k, Neutro 80%, Hb 8.3, MCV 86, Na 130, K 6.3, lactate 2.1  VBG ph 7.37, CO2 53  Xray chest No radiographic evidence of acute cardiopulmonary disease.    Admit for further management and workup   (03 Sep 2024 18:59)    Patient complaining of abdominal pain.   States that she trusts her daughter, Dominga, to help her make medical decisions.       ITEMS NOT CHECKED ARE NOT PRESENT    SOCIAL HISTORY:   Significant other/partner[ ]  Children[ ]  Taoism/Spirituality:  Substance hx:  [ ]   Tobacco hx:  [ ]   Alcohol hx: [ ]   Living Situation: [ ]Home  [ ]Long term care  [ ]Rehab [ ]Other  Home Services: [ ] HHA [ ] Jarocho RN [ ] Hospice  Occupation:  Home Opioid hx:  [ ] Y [ ] N [ ] I-Stop Reference No:     ADVANCE DIRECTIVES:    [x ] Full Code [ ] DNR  MOLST  [ ]  Living Will  [ ]   DECISION MAKER(s):  [ ] Health Care Proxy(s)  [x ] Surrogate(s)  [ ] Guardian           Name(s): Phone Number(s): Dary heredia    BASELINE (I)ADL(s) (prior to admission):  [place]: [ ]Total  [ ] Moderate [ ]Dependent  Palliative Performance Status Version 2:         %    http://npcrc.org/files/news/palliative_performance_scale_ppsv2.pdf    Allergies    Allergy Status Unknown    Intolerances    MEDICATIONS  (STANDING):  albuterol    0.083%. 2.5 milliGRAM(s) Nebulizer once  ascorbic acid 500 milliGRAM(s) Oral daily  aspirin  chewable 81 milliGRAM(s) Oral daily  baclofen 10 milliGRAM(s) Oral every 12 hours  chlorhexidine 2% Cloths 1 Application(s) Topical <User Schedule>  gabapentin 100 milliGRAM(s) Oral three times a day  heparin   Injectable 5000 Unit(s) SubCutaneous every 12 hours  levETIRAcetam  Solution 1000 milliGRAM(s) Oral two times a day  lidocaine   4% Patch 1 Patch Transdermal every 24 hours  meropenem  IVPB 1000 milliGRAM(s) IV Intermittent every 8 hours  midodrine. 10 milliGRAM(s) Oral three times a day  multivitamin/minerals 1 Tablet(s) Oral daily  pantoprazole   Suspension 40 milliGRAM(s) Oral two times a day  sodium chloride 1 Gram(s) Oral two times a day  valproic  acid Syrup 750 milliGRAM(s) Enteral Tube three times a day  vitamin A &amp; D Ointment 1 Application(s) Topical two times a day    MEDICATIONS  (PRN):  acetaminophen     Tablet .. 650 milliGRAM(s) Oral every 6 hours PRN Mild Pain (1 - 3)      PRESENT SYMPTOMS: [ ]Unable to obtain due to poor mentation   Source if other than patient:  [ ]Family   [ ]Team     Unable to obtain full pain history    Pain: [ ]yes [x ]no  QOL impact -   Location -    Aggravating factors -  Alleviating factors -   Quality -  Radiation -  Timing -   Severity (0-10 scale): n/a  Minimal acceptable level (0-10 scale):     CPOT:    https://www.sccm.org/getattachment/xtz28w40-7y3c-8e4e-8u3w-0200o9424o9u/Critical-Care-Pain-Observation-Tool-(CPOT)    PAIN AD Score:   http://geriatrictoolkit.[facility]/cog/painad.pdf     Dyspnea:                           [x ]None[ ]Mild [ ]Moderate [ ]Severe     Respiratory Distress Observation Scale (RDOS):   A score of 0 to 2 signifies little or no respiratory distress, 3 signifies mild distress, scores 4 to 6 indicate moderate distress, and scores greater than 7 signify severe distress  [email or web address]://[email or web address]/files/[email or web address]-cvawkrqzyhe-tevjomzy-dnpaxjhutxg-askzg.pdf    Anxiety:                             [ ]None[ ]Mild [ ]Moderate [ ]Severe   Fatigue:                             [ ]None[ ]Mild [ ]Moderate [ ]Severe   Nausea:                             [ ]None[ ]Mild [ ]Moderate [ ]Severe   Loss of appetite:              [ ]None[ ]Mild [ ]Moderate [ ]Severe   Constipation:                    [ ]None[ ]Mild [ ]Moderate [ ]Severe    Other Symptoms:  [ ]All other review of systems negative     Palliative Performance Status Version 2:         %    http://npcrc.org/files/news/palliative_performance_scale_ppsv2.pdf  PHYSICAL EXAM:    Vital Signs Last 24 Hrs  T(C): 36.8 (10 Sep 2024 04:44), Max: 37.1 (09 Sep 2024 19:52)  T(F): 98.3 (10 Sep 2024 04:44), Max: 98.8 (09 Sep 2024 19:52)  HR: 102 (10 Sep 2024 12:02) (96 - 105)  BP: 117/79 (10 Sep 2024 12:02) (117/79 - 125/79)  BP(mean): 92 (10 Sep 2024 12:02) (92 - 92)  RR: --  SpO2: --          GENERAL:  [x ] No acute distress [ ]Lethargic  [ ]Unarousable  [x ]Verbal  [ ]Non-Verbal [ ]Cachexia    BEHAVIORAL/PSYCH:  [ ]Alert and Oriented x  [ ] Anxiety [ ] Delirium [ ] Agitation [x ] Calm   EYES: [x ] No scleral icterus [ ] Scleral icterus [ ] Closed  ENMT:  [ ]Dry mouth  [x ]No external oral lesions [ ] No external ear or nose lesions  CARDIOVASCULAR:  [ ]Regular [ ]Irregular [ ]Tachy [x ]Not Tachy  [ ]Sergey [ ] Edema [ ] No edema  PULMONARY:  [ ]Tachypnea  [ ]Audible excessive secretions [x ] No labored breathing [ ] mildly labored breathing [ ] labored breathing  GASTROINTESTINAL: [ ]Soft  [ ]Distended  [x ]Not distended [ ]Non tender [ ]Tender  MUSCULOSKELETAL: [ ]No clubbing [ ] clubbing  [x ] No cyanosis [ ] cyanosis  NEUROLOGIC: [ ]No focal deficits  [ ]Follows commands  [ ]Does not follow commands  [x ]Cognitive impairment  [ ]Dysphagia  [ ]Dysarthria  [ ]Paresis   SKIN: [ ] Jaundiced [x ] Non-jaundiced [ ]Rash [ ]No Rash [ ] Warm [ ] Dry  MISC/LINES: [ ] ET tube [ ] Trach [ ]NGT/OGT [x ]PEG [ ][place]    CRITICAL CARE:  [ ] Shock Present  [ ]Septic [ ]Cardiogenic [ ]Neurologic [ ]Hypovolemic  [ ]  Vasopressors [ ]  Inotropes   [ ]Respiratory failure present [ ]Mechanical ventilation [ ]Non-invasive ventilatory support [ ]High flow  [ ]Acute  [ ]Chronic [ ]Hypoxic  [ ]Hypercarbic [ ]Other  [ ]Other organ failure   LABS: I have reviewed daily labs                          8.9    13.40 )-----------( 672      ( 10 Sep 2024 11:33 )             27.7     09-09    136  |  98  |  6<L>  ----------------------------<  115<H>  3.8   |  27  |  <0.5<L>    Ca    8.7      09 Sep 2024 06:14  Mg     1.9     09-09    TPro  6.2  /  Alb  2.7<L>  /  TBili  <0.2  /  DBili  x   /  AST  19  /  ALT  13  /  AlkPhos  111  09-09      Urinalysis Basic - ( 09 Sep 2024 06:14 )    Color: x / Appearance: x / SG: x / pH: x  Gluc: 115 mg/dL / Ketone: x  / Bili: x / Urobili: x   Blood: x / Protein: x / Nitrite: x   Leuk Esterase: x / RBC: x / WBC x   Sq Epi: x / Non Sq Epi: x / Bacteria: x      RADIOLOGY & ADDITIONAL STUDIES: reviewed   < from: [facility] Duplex Lower Ext Vein Scan, Ellen (09.04.24 @ 22:04) >  PROCEDURE DATE:  09/04/2024          INTERPRETATION:  CLINICAL INFORMATION: 69-year-old female with leg   swelling    TECHNIQUE: Duplex sonography of the BILATERAL LOWER extremity veins with   color and spectral Doppler, with and without compression.    FINDINGS:    RIGHT:  Normal compressibility of the RIGHT common femoral, femoral and popliteal   veins.  Doppler examination shows normal spontaneous and phasic flow.  No RIGHT calf vein thrombosis is detected.    LEFT:  Normal compressibility of the LEFT common femoral, femoral and popliteal   veins.  Doppler examination shows normal spontaneous and phasic flow.  No LEFT calf vein thrombosis is detected.    IMPRESSION:  No evidence of deep venous thrombosis in either lower extremity.            < end of copied text >    PROTEIN CALORIE MALNUTRITION PRESENT: [ ]mild [ ]moderate [ ]severe [ ]underweight [ ]morbid obesity  https://www.andeal.org/vault/2440/web/files/ONC/Table_Clinical%20Characteristics%20to%20Document%20Malnutrition-White%20JV%20et%20al%202012.pdf    Height (cm): 152.4 (09-03-24 @ 13:32)  Weight (kg): 59 (09-03-24 @ 13:32)  BMI (kg/m2): 25.4 (09-03-24 @ 13:32)    [ ]PPSV2 < or = to 30% [ ]significant weight loss  [ ]poor nutritional intake  [ ]anasarca      [ ]Artificial Nutrition      Palliative Care Spiritual/Emotional Screening Tool Question  Severity (0-4):                    OR                    [ x] Unable to determine. Will assess at later time if appropriate.  Score of 2 or greater indicates recommendation of Chaplaincy and/or SW referral  Chaplaincy Referral: [ ] Yes [ ] Refused [ ] Following     Caregiver [place]:  [ ] Yes [ ] No    OR    [x ] Unable to determine. Will assess at later time if appropriate.  Social Work Referral [ ]  Patient and Family Centered Care Referral [ ]    Anticipatory Grief Present: [ ] Yes [ ] No    OR     [ x] Unable to determine. Will assess at later time if appropriate.  Social Work Referral [ ]  Patient and Family Centered Care Referral [ ]    REFERRALS:   [ ]Chaplaincy  [ ]Hospice  [ ]Child Life  [ ]Social Work  [ ]Case management [ ]Holistic Therapy     Palliative care education provided to patient and/or family

## 2024-09-10 NOTE — PROGRESS NOTE ADULT - CONVERSATION DETAILS
Spoke to daughter regarding goals of care. She stated she has discussed this previously with other providers. She said she wanted to maintain full code at this time and to do everything for the patient to keep her alive.

## 2024-09-10 NOTE — PROGRESS NOTE ADULT - PROBLEM SELECTOR PLAN 3
- will continue attempts to speak with Dominga to clarify code status  - will inquire if patient has previously designated HCP
- will continue attempts to speak with Dominga to clarify code status  - will inquire if patient has previously designated HCP
- Full Code  - Signing off as goals are clear

## 2024-09-10 NOTE — PROGRESS NOTE ADULT - ASSESSMENT
A 69 year old F with pmhx  of dementia, CVA, OM, sacral ulcer, recent admission w/septic shock at [facility] discharged on vancomycin and meropenem presents from nursing home for positive occult blood and low Hb of 7 at the NH.    Patient reports that she has several children, but her daughter, Dominga, is the one who primarily helps her to make medical decisions. She states that she if were unable to make decisions for herself, she would trust Dominga over her other children. Inquired about intubation and CPR, and she states that she would like to defer to Dominga about these issues. Per chart, Dominga had previously signed a MOLST for DNR/DNI, but she rescinded this decision during this admission.    Spoke to daughter regarding goals of care. She stated she has discussed this previously with other providers. She said she wanted to maintain full code at this time and to do everything for the patient to keep her alive.    Palliative care will continue to follow.   Please call [phone] with questions or concerns 24/7.

## 2024-09-10 NOTE — PROGRESS NOTE ADULT - ASSESSMENT
A 69 year old F with pmhx  of dementia, CVA, OM, sacral ulcer, recent admission w/septic shock at [facility] discharged on vancomycin and meropenem presents from nursing home for positive occult blood and low Hb of 7 at the NH.    #Unstageable sacral ulcer  #chronic osteomyelitis   #BCx w/ Staph epi x1  - Per daughter recent admission to [facility] for OM of sacral ulcer c/b septic shock  - In ED, /82, , SpO2 99% on RA  - Labs remarkable for wbc 19k, Neutro 80%, Hb 8.3, MCV 86, Na 130, K 6.3, lactate 2.1  - VBG ph 7.37, CO2 53  - Xray chest No radiographic evidence of acute cardiopulmonary disease.  - s/p 2L bolus  - c/w meropenem and vanc  - c/w LR 75cc/hr  - Trend wbc and lactate  - lactate 2.1 -> 2.4 -> 1  - ID recs: meropenem, vanc  - BCx prelim coag neg staph  - f/u BCx  - burn consult: no intervention at this time  - f/u vanc trough  -9/6 housestaff obtained records from [facility] w/ staph epi as well as staph epi on BCx at [facility]  - f/u repeat BCx  -old line removed  9/9 as per [facility], pt was planned to be on Vanco/Luciana from 9/2 to 9/9. D/w ID. Will extend Luciana until 9/16 via midline.    #Acute on chronic anemia  - Per daughter, pt with multiple transfusions at [facility]  - Positive occult blood and Hb of 7 at NH  - Here Hb 8.3 -> 7.6 -> 8.0 (bs around 9)  - Agreeable for egd/colono if needed  - low total iron, transferrin, TIBC, % sat  - Keep active type and screen  - CBC bid  - PPI IV bid   - Transfuse if Hb < 7  - maintain active type and screen   -9/6 Hgb dropped. s/p PRBCs.   -EGD/C-scopy: A 7mm non-bleeding clean-based ([place] Class III) ulcer was seen just behind the bumper of the recently placed G-tube. PPI BID x8wks. C-scopy: poor prep.      #Hyperkalemia  - No EKG changes  - K of 6.3 -> 5.2 -> 5.1  - s/p insulin/dextrose and lokelma  - resolved    #Seizures  #Hx of R Frontal Lobe Infarct   #Chronic R MCA infarct  #Bed bound  #Tube feeds  - C/w Depakote 750 mg TID and Keppra to 1000 mg BID   - C/w baclofen 10 mg bid (for UE spasticity)  - C/w ASA, on plavix but not on statin? Holding plavix for now  - in veiw of anemia req PRBCs and usual DAPT for 21d or 90d, will d/c plavix as risk outweighs the benefit    # Persistent Hypotension   - c/w Midodrine 10 mg TID    #Sinus Tachycardia  IVFs  improved    #Chronic borrego  - cw borrego    - DVT ppx - Heparin SQ  - Diet: PEG tube feeds  - GI ppx: PPI  - Code status: Palliative consult for GOC    Very poor Px.    #Progress Note Handoff  Pending: Clinical improvement and stability___  Pt/Family discussion: staff spoke to daughter today  Disposition: Nsg Home_    My note supersedes the residents note should a discrepancy arise.    Chart and notes personally reviewed.  Care Discussed with Consultants/Other Providers/ Housestaff [ x] YES [ ] NO   Radiology, labs, old records personally reviewed.    discussed w/ housestaff, nursing, case management, ID    Attestation Statements:    Attestation Statements:  Risk Statement (NON-critical care).     On this date of service, level of risk to patient is considered: High.     Due to: anemia, sacral decub,    Time-based billing (NON-critical care).     35 minutes spent on total encounter. The necessity of the time spent during the encounter on this date of service was due to:     time spent on review of labs, imaging studies, old records, obtaining history, personally examining patient, counselling and communicating with patient/ family, entering orders for medications/tests/etc, discussions with other health care providers, documentation in electronic health records, independent interpretation of labs, imaging/procedure results and care coordination.

## 2024-09-10 NOTE — DISCHARGE NOTE PROVIDER - INSTRUCTIONS
Diet, NPO with Tube Feed:   Tube Feeding Modality: Nasogastric  Jevity 1.2 Ilya (JEVITY1.2RTH)  Total Volume for 24 Hours (mL): 1200  Bolus  Total Volume of Bolus (mL):  300  Total # of Feeds: 4  Tube Feed Frequency: Every 6 hours   Tube Feed Start Time: 06:00  Bolus Feed Rate (mL per Hour): 100   Bolus Feed Duration (in Hours): 3  Bolus   Total Volume per Flush (mL): 150   Frequency: Every 4 Hours (09-09-24 @ 10:28) [Active]

## 2024-09-10 NOTE — DISCHARGE NOTE PROVIDER - CARE PROVIDER_API CALL
Willie Ortega  Internal Medicine  [address] [place] [address]  Phone: [phone]  Fax: [phone]  Established Patient  Follow Up Time: 2 weeks    Patrizia Ceballos  Gastroenterology  [address] [place] [address]  Phone: [phone]  Fax: [phone]  Follow Up Time: 2 weeks    Lizabeth Taveras  Infectious Disease  [address] [place] [address]  Phone: [phone]  Fax: [phone]  Follow Up Time: 2 weeks   Willie Ortega  Internal Medicine  [address] [place] [address]  Phone: [phone]  Fax: [phone]  Established Patient  Follow Up Time: 1-3 days    Patrizia Ceballos  Gastroenterology  [address] [place] [address]  Phone: [phone]  Fax: [phone]  Follow Up Time: 1 month    Reinaldo Darden  Plastic Surgery  [address] [place] [address]  Phone: [phone]  Fax: [phone]  Follow Up Time: 2 weeks

## 2024-09-10 NOTE — PROGRESS NOTE ADULT - ASSESSMENT
69 year old F with pmhx  of dementia, CVA, OM, sacral ulcer, recent admission w/septic shock at [facility] discharged on vancomycin and meropenem presents from nursing home for positive occult blood and low Hb of 7 at the NH.    #Sepsis poa  #Unstageable sacral ulcer  Bacteriemic   #chronic osteomyelitis   - Per daughter recent admission to [facility] for OM of sacral ulcer c/b septic shock  - In ED, /82, , SpO2 99% on RA  - Labs remarkable for wbc 19k, Neutro 80%, Hb 8.3, MCV 86, Na 130, K 6.3, lactate 2.1  - VBG ph 7.37, CO2 53  - Xray chest No radiographic evidence of acute cardiopulmonary disease.  - s/p 2L bolus  - c/w meropenem 1 week  - c/w LR 75cc/hr  - UA borderline positive, f/u ucx, bcx  - f/u procal, esr, crp  - lactate 2.1 -> 2.4 -> 1  - ID recs: meropenem, vanc  - BCx prelim growing gram + coccii in clusters  - BCx: growing staph epi, which also grew in BCx in [facility] also grew in staph epi  - burn consult: no intervention at this time  - MRSA neg  - ID consulted, dc vanc cw meropenem for 1 week    #Acute on chronic anemia  - Per daughter, pt with multiple transfusions at [facility]  - Positive occult blood and Hb of 7 at NH  - Here Hb 8.3 -> 7.6 -> 8.0 (bs around 9)  - Agreeable for egd/colono if needed  - low total iron, transferrin, TIBC, % sat  - Keep active type and screen  - CBC bid  - Transfuse if Hb < 7  - maintain active type and screen  - KUB: no obstruction  - 9/6 Hgb drop 6.8 received 1 unit, HgB normalized   - EDG 9/6:  7mm non-bleeding clean-based ([place] Class III) ulcer seen just behind bumper of the recently placed G-tube.  - Colonoscopy 9/6: poor prep, small non-bleeding external hemorrhoids  - PPI 40mg BID via PEG for 8 weeks  -  CT AP -No CT evidence of retroperitoneal hematoma  - repeat colonoscopy op  - f/u EGD-[place] path results    #Hyperkalemia  - No EKG changes  - K of 6.3 -> 5.2 -> 5.1  - s/p insulin/dextrose and lokelma  - BMP bid  - Keep on telemetry    #Seizures  #Hx of R Frontal Lobe Infarct   #Chronic R MCA infarct  #Bed bound  #Tube feeds  - C/w Depakote 750 mg TID and Keppra to 1000 mg BID   - C/w baclofen 10 mg bid (for UE spasticity)  - C/w ASA, on plavix but not on statin? Holding plavix for now    # Persistent Hypotension   - c/w Midodrine 10 mg TID   - holding parameters placed to wean off    # Left avulsion fracture of medial malleolus  - C/w gabapentin at 100mg TID    # Left cephalic vein thrombosis  - Duplex LUE (3/10/23): No DVT however there is superficial thrombosis noted in the left cephalic vein  - Repeat duplex ordered 3/27/23 - negative for UE and LE DVT    #Chronic borrego  - cw borrego    - DVT ppx - SCDs after duplex  - Diet: PEG tube feeds  - GI ppx: PPI  - Code status: Palliative consult for GOC    #PENDING  - midline on dc

## 2024-09-10 NOTE — DISCHARGE NOTE PROVIDER - HOSPITAL COURSE
A 69 year old F with pmhx  of dementia, CVA, OM, sacral ulcer, recent admission w/septic shock at [facility] discharged on vancomycin and meropenem presents from nursing home for positive occult blood and low Hb of 7 at the NH.    In ED, /82, , SpO2 99% on RA  Labs remarkable for wbc 19k, Neutro 80%, Hb 8.3, MCV 86, Na 130, K 6.3, lactate 2.1  VBG ph 7.37, CO2 53  Xray chest No radiographic evidence of acute cardiopulmonary disease.    Medicine course: Pt was given fluids, continued meropenem and vanc. ID was consulted and vanc was dc'ed. Meropenem to continue for 1 week outpt with midline. Pt was also found to have acute and chronic anemia, received one unit, was seen by GI and was scoped, poor prep, but clean based ulcers were noted in endoscopy. Pt safe and stable for discharge. To follow up with gastroenterology for another scope and PCP and ID for management of abx and OM.     A/P:  #Sepsis poa  #Unstageable sacral ulcer  Bacteriemic   #chronic osteomyelitis   - Per daughter recent admission to [facility] for OM of sacral ulcer c/b septic shock  - Labs remarkable for wbc 19k, Neutro 80%, Hb 8.3, MCV 86, Na 130, K 6.3, lactate 2.1  - VBG ph 7.37, CO2 53  - Xray chest No radiographic evidence of acute cardiopulmonary disease.  - s/p 2L bolus  - c/w meropenem 1 week  - c/w LR 75cc/hr  - lactate 2.1 -> 2.4 -> 1  - BCx prelim growing gram + coccii in clusters  - BCx: growing staph epi, which also grew in BCx in [facility] also grew in staph epi  - burn consult: no intervention at this time  - MRSA neg  - ID consulted, dc vanc cw meropenem for 1 week    #Acute on chronic anemia  - Per daughter, pt with multiple transfusions at [facility]  - Positive occult blood and Hb of 7 at NH  - Here Hb 8.3 -> 7.6 -> 8.0 (bs around 9)  - low total iron, transferrin, TIBC, % sat  - KUB: no obstruction  - 9/6 Hgb drop 6.8 received 1 unit, HgB normalized   - EDG 9/6:  7mm non-bleeding clean-based ([place] Class III) ulcer seen just behind bumper of the recently placed G-tube.  - Colonoscopy 9/6: poor prep, small non-bleeding external hemorrhoids  - PPI 40mg BID via PEG for 8 weeks  -  CT AP -No CT evidence of retroperitoneal hematoma  - repeat colonoscopy op      #Hyperkalemia  - No EKG changes  - K of 6.3 -> 5.2 -> 5.1  - s/p insulin/dextrose and lokelma      #Seizures  #Hx of R Frontal Lobe Infarct   #Chronic R MCA infarct  #Bed bound  #Tube feeds  - C/w Depakote 750 mg TID and Keppra to 1000 mg BID   - C/w baclofen 10 mg bid  - C/w ASA,    # Persistent Hypotension   - c/w Midodrine 10 mg TID   - holding parameters placed to wean off    # Left avulsion fracture of medial malleolus  - C/w gabapentin at 100mg TID    #Chronic borrego  - cw borrego       A 69 year old F with pmhx  of dementia, CVA, OM, sacral ulcer, recent admission w/septic shock at [facility] discharged on vancomycin and meropenem presents from nursing home for positive occult blood and low Hb of 7 at the NH.    In ED, /82, , SpO2 99% on RA  Labs remarkable for wbc 19k, Neutro 80%, Hb 8.3, MCV 86, Na 130, K 6.3, lactate 2.1  VBG ph 7.37, CO2 53  Xray chest No radiographic evidence of acute cardiopulmonary disease.    Medicine course: Pt was given fluids, continued meropenem and vanc. ID was consulted and vanc was dc'ed. Meropenem to continue for 1 week outpt with midline. Pt was also found to have acute and chronic anemia, received one unit, was seen by GI and was scoped, poor prep, but clean based ulcers were noted in endoscopy. Pt safe and stable for discharge. To follow up with gastroenterology for another scope and PCP and ID for management of abx and OM.     A/P:  #Sepsis poa  #Unstageable sacral ulcer  Bacteriemic   #chronic osteomyelitis   - Per daughter recent admission to [facility] for OM of sacral ulcer c/b septic shock  - Labs remarkable for wbc 19k, Neutro 80%, Hb 8.3, MCV 86, Na 130, K 6.3, lactate 2.1  - VBG ph 7.37, CO2 53  - Xray chest No radiographic evidence of acute cardiopulmonary disease.  - s/p 2L bolus  - c/w meropenem 1 week  - lactate 2.1 -> 2.4 -> 1  - BCx prelim growing gram + coccii in clusters  - BCx: growing staph epi, which also grew in BCx in [facility] also grew in staph epi  - burn consult: no intervention at this time  - MRSA neg  - ID consulted, dc vanc cw meropenem for 1 week    #Acute on chronic anemia  - Per daughter, pt with multiple transfusions at [facility]  - Positive occult blood and Hb of 7 at NH  - Here Hb 8.3 -> 7.6 -> 8.0 (bs around 9)  - low total iron, transferrin, TIBC, % sat  - KUB: no obstruction  - 9/6 Hgb drop 6.8 received 1 unit, HgB normalized   - EDG 9/6:  7mm non-bleeding clean-based ([place] Class III) ulcer seen just behind bumper of the recently placed G-tube.  - Colonoscopy 9/6: poor prep, small non-bleeding external hemorrhoids  - PPI 40mg BID via PEG for 8 weeks  -  CT AP -No CT evidence of retroperitoneal hematoma  - repeat colonoscopy op      #Hyperkalemia  - No EKG changes  - K of 6.3 -> 5.2 -> 5.1  - s/p insulin/dextrose and lokelma      #Seizures  #Hx of R Frontal Lobe Infarct   #Chronic R MCA infarct  #Bed bound  #Tube feeds  - C/w Depakote 750 mg TID and Keppra to 1000 mg BID   - C/w baclofen 10 mg bid  - C/w ASA,    # Persistent Hypotension   - c/w Midodrine 10 mg TID   - holding parameters placed to wean off    # Left avulsion fracture of medial malleolus  - C/w gabapentin at 100mg TID    #Chronic borrego  - cw borrego

## 2024-09-10 NOTE — PROGRESS NOTE ADULT - SUBJECTIVE AND OBJECTIVE BOX
Patient is a 69y old  Female who presents with a chief complaint of   INTERVAL HPI/OVERNIGHT EVENTS: Patient was examined and seen at bedside. This morning pt is resting in bed and staff report no new issues or overnight events. No specific complaints. Says that she feels "ok".    ROS: denies CP, SOB, AP  InitialHPI:  A 69 year old F with pmhx  of dementia, CVA, OM, sacral ulcer, recent admission w/septic shock at [facility] discharged on vancomycin and meropenem presents from nursing home for positive occult blood and low Hb of 7 at the NH.    In ED, /82, , SpO2 99% on RA  Labs remarkable for wbc 19k, Neutro 80%, Hb 8.3, MCV 86, Na 130, K 6.3, lactate 2.1  VBG ph 7.37, CO2 53  Xray chest No radiographic evidence of acute cardiopulmonary disease.    Admit for further management and workup   (03 Sep 2024 18:59)    PAST MEDICAL & SURGICAL HISTORY:      General: NAD, AA0x1, chronically ill appearing  HEENT:  LAD  CV: S1 S2  Resp: decreased breath sounds at bases  GI: NT/ND/S +BS; +PEG  MS: no clubbing/cyanosis/edema, + pulses b/l  Neuro: unable to access          Home Medications:  acetaminophen 325 mg oral tablet: 2 tab(s) orally every 6 hours, As needed, Moderate Pain (4 - 6) (19 Mar 2023 10:42)  ascorbic acid 500 mg oral tablet: 1 tab(s) orally once a day (19 Mar 2023 10:42)  aspirin 81 mg oral tablet, chewable: 1 tab(s) orally once a day (19 Mar 2023 10:42)  baclofen 5 mg oral tablet: 2 tab(s) orally every 12 hours (03 Sep 2024 19:55)  Calmoseptine 0.44%-20.6% topical ointment: Apply topically to affected area 3 times a day apply at sacral ulcer (03 Sep 2024 19:59)  ferrous sulfate: 300 milligram(s) by PEG tube 3 times a day (03 Sep 2024 20:12)  gabapentin 100 mg oral capsule: 1 cap(s) orally 3 times a day (26 Mar 2023 16:24)  heparin: 5,000 international unit(s) subcutaneous 2 times a day (10 Sep 2024 12:40)  lactulose 10 g/15 mL oral syrup: 15 milliliter(s) orally 3 times a day as needed for  constipation (10 Sep 2024 12:40)  levETIRAcetam 100 mg/mL oral solution: 10 milliliter(s) orally 2 times a day (10 Sep 2024 12:40)  lidocaine 4% topical film: Apply topically to affected area once a day left shoulder (03 Sep 2024 20:01)  Merrem 1000 mg intravenous injection: 1,000 unit(s) intravenously every 8 hours until 9/16 (10 Sep 2024 12:40)  midodrine 10 mg oral tablet: 1 tab(s) orally 3 times a day Hold if Systolic BP &gt;100 (26 Mar 2023 16:24)  MiraLax oral powder for reconstitution: 17 gram(s) orally once a day (03 Sep 2024 20:03)  Multiple Vitamins with Minerals oral tablet: 1 tab(s) orally once a day (19 Mar 2023 10:42)  pantoprazole 40 mg oral granule, delayed release: 1 tab(s) orally 2 times a day (10 Sep 2024 12:40)  sodium chloride: 1 gram(s) by PEG tube 2 times a day (03 Sep 2024 19:55)  valproic acid 250 mg/5 mL oral liquid: 15 milliliter(s) orally every 8 hours (03 Sep 2024 20:10)  vitamin A and D topical ointment: 1 application topically 2 times a day (19 Mar 2023 10:42)  Vraylar 1.5 mg oral capsule: 1 cap(s) orally once a day (03 Sep 2024 20:06)    MEDICATIONS  (STANDING):  albuterol    0.083%. 2.5 milliGRAM(s) Nebulizer once  ascorbic acid 500 milliGRAM(s) Oral daily  aspirin  chewable 81 milliGRAM(s) Oral daily  baclofen 10 milliGRAM(s) Oral every 12 hours  chlorhexidine 2% Cloths 1 Application(s) Topical <User Schedule>  gabapentin 100 milliGRAM(s) Oral three times a day  heparin   Injectable 5000 Unit(s) SubCutaneous every 12 hours  levETIRAcetam  Solution 1000 milliGRAM(s) Oral two times a day  lidocaine   4% Patch 1 Patch Transdermal every 24 hours  meropenem  IVPB 1000 milliGRAM(s) IV Intermittent every 8 hours  midodrine. 10 milliGRAM(s) Oral three times a day  multivitamin/minerals 1 Tablet(s) Oral daily  pantoprazole   Suspension 40 milliGRAM(s) Oral two times a day  sodium chloride 1 Gram(s) Oral two times a day  valproic  acid Syrup 750 milliGRAM(s) Enteral Tube three times a day  vitamin A &amp; D Ointment 1 Application(s) Topical two times a day    MEDICATIONS  (PRN):  acetaminophen     Tablet .. 650 milliGRAM(s) Oral every 6 hours PRN Mild Pain (1 - 3)    Vital Signs Last 24 Hrs  T(C): 36.7 (10 Sep 2024 13:53), Max: 37.1 (09 Sep 2024 19:52)  T(F): 98.1 (10 Sep 2024 13:53), Max: 98.8 (09 Sep 2024 19:52)  HR: 104 (10 Sep 2024 13:53) (96 - 105)  BP: 115/75 (10 Sep 2024 13:53) (115/75 - 125/79)  BP(mean): 92 (10 Sep 2024 12:02) (92 - 92)  RR: 18 (10 Sep 2024 13:53) (18 - 18)  SpO2: 100% (10 Sep 2024 13:53) (100% - 100%)    Parameters below as of 10 Sep 2024 13:53  Patient On (Oxygen Delivery Method): room air      CAPILLARY BLOOD GLUCOSE        LABS:                        8.9    13.40 )-----------( 672      ( 10 Sep 2024 11:33 )             27.7     09-09    136  |  98  |  6<L>  ----------------------------<  115<H>  3.8   |  27  |  <0.5<L>    Ca    8.7      09 Sep 2024 06:14  Mg     1.9     09-09    TPro  6.2  /  Alb  2.7<L>  /  TBili  <0.2  /  DBili  x   /  AST  19  /  ALT  13  /  AlkPhos  111  09-09    LIVER FUNCTIONS - ( 09 Sep 2024 06:14 )  Alb: 2.7 g/dL / Pro: 6.2 g/dL / ALK PHOS: 111 U/L / ALT: 13 U/L / AST: 19 U/L / GGT: x                 Urinalysis Basic - ( 09 Sep 2024 06:14 )    Color: x / Appearance: x / SG: x / pH: x  Gluc: 115 mg/dL / Ketone: x  / Bili: x / Urobili: x   Blood: x / Protein: x / Nitrite: x   Leuk Esterase: x / RBC: x / WBC x   Sq Epi: x / Non Sq Epi: x / Bacteria: x              Culture - Blood (collected 07 Sep 2024 23:21)  Source: .Blood None  Preliminary Report (10 Sep 2024 07:01):    No growth at 48 Hours      Consultant Notes Reviewed:  [x ] YES  [ ] NO  Care Discussed with Consultants/Other Providers/ Housestaff [ x] YES  [ ] NO  Radiology, labs, EKGs, new studies personally reviewed.

## 2024-09-10 NOTE — DISCHARGE NOTE NURSING/CASE MANAGEMENT/SOCIAL WORK - PATIENT PORTAL LINK FT
You can access the FollowMyHealth Patient Portal offered by [facility] by registering at the following website: [email or web address]. By joining [facility]’s FollowMyHealth portal, you will also be able to view your health information using other applications (apps) compatible with our system.

## 2024-09-11 LAB
CULTURE RESULTS: SIGNIFICANT CHANGE UP
SPECIMEN SOURCE: SIGNIFICANT CHANGE UP

## 2024-09-13 LAB
CULTURE RESULTS: SIGNIFICANT CHANGE UP
SPECIMEN SOURCE: SIGNIFICANT CHANGE UP

## 2024-09-16 DIAGNOSIS — Z86.73 PERSONAL HISTORY OF TRANSIENT ISCHEMIC ATTACK (TIA), AND CEREBRAL INFARCTION WITHOUT RESIDUAL DEFICITS: ICD-10-CM

## 2024-09-16 DIAGNOSIS — R65.20 SEVERE SEPSIS WITHOUT SEPTIC SHOCK: ICD-10-CM

## 2024-09-16 DIAGNOSIS — Z96.0 PRESENCE OF UROGENITAL IMPLANTS: ICD-10-CM

## 2024-09-16 DIAGNOSIS — K44.9 DIAPHRAGMATIC HERNIA WITHOUT OBSTRUCTION OR GANGRENE: ICD-10-CM

## 2024-09-16 DIAGNOSIS — E87.5 HYPERKALEMIA: ICD-10-CM

## 2024-09-16 DIAGNOSIS — A41.2 SEPSIS DUE TO UNSPECIFIED STAPHYLOCOCCUS: ICD-10-CM
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
## 2024-09-16 DIAGNOSIS — E87.20 ACIDOSIS, UNSPECIFIED: ICD-10-CM

## 2024-09-16 DIAGNOSIS — I82.612 ACUTE EMBOLISM AND THROMBOSIS OF SUPERFICIAL VEINS OF LEFT UPPER EXTREMITY: ICD-10-CM

## 2024-09-16 DIAGNOSIS — S82.52XA DISPLACED FRACTURE OF MEDIAL MALLEOLUS OF LEFT TIBIA, INITIAL ENCOUNTER FOR CLOSED FRACTURE: ICD-10-CM

## 2024-09-16 DIAGNOSIS — K29.70 GASTRITIS, UNSPECIFIED, WITHOUT BLEEDING: ICD-10-CM

## 2024-09-16 DIAGNOSIS — M46.28 OSTEOMYELITIS OF VERTEBRA, SACRAL AND SACROCOCCYGEAL REGION: ICD-10-CM

## 2024-09-16 DIAGNOSIS — G40.909 EPILEPSY, UNSPECIFIED, NOT INTRACTABLE, WITHOUT STATUS EPILEPTICUS: ICD-10-CM

## 2024-09-16 DIAGNOSIS — I95.89 OTHER HYPOTENSION: ICD-10-CM

## 2024-09-16 DIAGNOSIS — K64.4 RESIDUAL HEMORRHOIDAL SKIN TAGS: ICD-10-CM

## 2024-09-16 DIAGNOSIS — D50.9 IRON DEFICIENCY ANEMIA, UNSPECIFIED: ICD-10-CM

## 2024-09-16 DIAGNOSIS — K92.1 MELENA: ICD-10-CM

## 2024-09-16 DIAGNOSIS — D84.89 OTHER IMMUNODEFICIENCIES: ICD-10-CM

## 2024-09-16 DIAGNOSIS — K25.9 GASTRIC ULCER, UNSPECIFIED AS ACUTE OR CHRONIC, WITHOUT HEMORRHAGE OR PERFORATION: ICD-10-CM
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
## 2024-09-16 DIAGNOSIS — F03.90 UNSPECIFIED DEMENTIA, UNSPECIFIED SEVERITY, WITHOUT BEHAVIORAL DISTURBANCE, PSYCHOTIC DISTURBANCE, MOOD DISTURBANCE, AND ANXIETY: ICD-10-CM

## 2024-09-16 DIAGNOSIS — Z79.82 LONG TERM (CURRENT) USE OF ASPIRIN: ICD-10-CM

## 2024-09-16 DIAGNOSIS — Z74.01 BED CONFINEMENT STATUS: ICD-10-CM

## 2024-09-16 DIAGNOSIS — Z51.5 ENCOUNTER FOR PALLIATIVE CARE: ICD-10-CM

## 2024-09-16 DIAGNOSIS — Z93.1 GASTROSTOMY STATUS: ICD-10-CM

## 2024-11-06 ENCOUNTER — APPOINTMENT (OUTPATIENT)
Dept: OPHTHALMOLOGY | Age: 69
End: 2024-11-06

## 2025-02-21 PROBLEM — Z00.00 ENCOUNTER FOR PREVENTIVE HEALTH EXAMINATION: Status: ACTIVE | Noted: 2025-02-21

## 2025-05-15 ENCOUNTER — APPOINTMENT (OUTPATIENT)
Dept: OPHTHALMOLOGY | Age: 70
End: 2025-05-15

## 2025-07-22 ENCOUNTER — APPOINTMENT (OUTPATIENT)
Dept: GASTROENTEROLOGY | Facility: CLINIC | Age: 70
End: 2025-07-22

## 2025-07-31 ENCOUNTER — APPOINTMENT (OUTPATIENT)
Dept: OPHTHALMOLOGY | Age: 70
End: 2025-07-31

## 2025-08-07 ENCOUNTER — APPOINTMENT (OUTPATIENT)
Dept: OPHTHALMOLOGY | Age: 70
End: 2025-08-07

## 2025-10-15 ENCOUNTER — APPOINTMENT (OUTPATIENT)
Dept: OPHTHALMOLOGY | Age: 70
End: 2025-10-15

## (undated) DEVICE — SET TBG HYBRID CO2 DEHP-FR ERBEFLO CLEVERCAP DISP OLYMPUS